# Patient Record
Sex: FEMALE | Race: WHITE | NOT HISPANIC OR LATINO | Employment: UNEMPLOYED | URBAN - METROPOLITAN AREA
[De-identification: names, ages, dates, MRNs, and addresses within clinical notes are randomized per-mention and may not be internally consistent; named-entity substitution may affect disease eponyms.]

---

## 2017-01-04 ENCOUNTER — PATIENT MESSAGE (OUTPATIENT)
Dept: INTERNAL MEDICINE | Facility: CLINIC | Age: 82
End: 2017-01-04

## 2017-01-06 ENCOUNTER — LAB VISIT (OUTPATIENT)
Dept: LAB | Facility: HOSPITAL | Age: 82
End: 2017-01-06
Attending: INTERNAL MEDICINE
Payer: MEDICARE

## 2017-01-06 DIAGNOSIS — M17.0 PRIMARY OSTEOARTHRITIS OF BOTH KNEES: ICD-10-CM

## 2017-01-06 DIAGNOSIS — M79.89 SWELLING OF LOWER EXTREMITY: ICD-10-CM

## 2017-01-06 LAB
ALBUMIN SERPL BCP-MCNC: 3.4 G/DL
ALP SERPL-CCNC: 77 U/L
ALT SERPL W/O P-5'-P-CCNC: 14 U/L
ANION GAP SERPL CALC-SCNC: 8 MMOL/L
AST SERPL-CCNC: 22 U/L
BILIRUB SERPL-MCNC: 0.6 MG/DL
BUN SERPL-MCNC: 17 MG/DL
CALCIUM SERPL-MCNC: 9.5 MG/DL
CHLORIDE SERPL-SCNC: 101 MMOL/L
CO2 SERPL-SCNC: 31 MMOL/L
CREAT SERPL-MCNC: 0.9 MG/DL
EST. GFR  (AFRICAN AMERICAN): >60 ML/MIN/1.73 M^2
EST. GFR  (NON AFRICAN AMERICAN): 58.5 ML/MIN/1.73 M^2
GLUCOSE SERPL-MCNC: 117 MG/DL
POTASSIUM SERPL-SCNC: 4.2 MMOL/L
PROT SERPL-MCNC: 7.1 G/DL
SODIUM SERPL-SCNC: 140 MMOL/L

## 2017-01-06 PROCEDURE — 36415 COLL VENOUS BLD VENIPUNCTURE: CPT

## 2017-01-06 PROCEDURE — 80053 COMPREHEN METABOLIC PANEL: CPT

## 2017-01-07 ENCOUNTER — HOSPITAL ENCOUNTER (EMERGENCY)
Facility: HOSPITAL | Age: 82
Discharge: HOME | End: 2017-01-07
Attending: EMERGENCY MEDICINE
Payer: MEDICARE

## 2017-01-07 ENCOUNTER — OFFICE VISIT (OUTPATIENT)
Dept: INTERNAL MEDICINE | Facility: CLINIC | Age: 82
End: 2017-01-07
Payer: MEDICARE

## 2017-01-07 VITALS
TEMPERATURE: 98 F | OXYGEN SATURATION: 97 % | DIASTOLIC BLOOD PRESSURE: 94 MMHG | WEIGHT: 192.69 LBS | SYSTOLIC BLOOD PRESSURE: 162 MMHG | BODY MASS INDEX: 30.97 KG/M2 | HEIGHT: 66 IN | HEART RATE: 79 BPM

## 2017-01-07 VITALS
DIASTOLIC BLOOD PRESSURE: 83 MMHG | HEART RATE: 76 BPM | OXYGEN SATURATION: 96 % | RESPIRATION RATE: 19 BRPM | HEIGHT: 66 IN | BODY MASS INDEX: 28.7 KG/M2 | TEMPERATURE: 99 F | SYSTOLIC BLOOD PRESSURE: 161 MMHG | WEIGHT: 178.56 LBS

## 2017-01-07 DIAGNOSIS — R51.9 HEADACHE: Primary | ICD-10-CM

## 2017-01-07 DIAGNOSIS — R51.9 HEADACHE, UNSPECIFIED HEADACHE TYPE: Primary | ICD-10-CM

## 2017-01-07 LAB
ALBUMIN SERPL BCP-MCNC: 3.5 G/DL
ALP SERPL-CCNC: 84 U/L
ALT SERPL W/O P-5'-P-CCNC: 13 U/L
ANION GAP SERPL CALC-SCNC: 12 MMOL/L
AST SERPL-CCNC: 24 U/L
BACTERIA #/AREA URNS AUTO: NORMAL /HPF
BASOPHILS # BLD AUTO: 0.02 K/UL
BASOPHILS NFR BLD: 0.2 %
BILIRUB SERPL-MCNC: 1.1 MG/DL
BILIRUB UR QL STRIP: NEGATIVE
BUN SERPL-MCNC: 10 MG/DL
CALCIUM SERPL-MCNC: 9.5 MG/DL
CARBOXYHEMOGLOBIN: 1.2 % (ref 1.5–5)
CHLORIDE SERPL-SCNC: 101 MMOL/L
CLARITY CSF: CLEAR
CLARITY UR REFRACT.AUTO: CLEAR
CO2 SERPL-SCNC: 24 MMOL/L
COLOR CSF: COLORLESS
COLOR UR AUTO: ABNORMAL
CREAT SERPL-MCNC: 0.8 MG/DL
DIFFERENTIAL METHOD: ABNORMAL
EOSINOPHIL # BLD AUTO: 0.1 K/UL
EOSINOPHIL NFR BLD: 0.8 %
ERYTHROCYTE [DISTWIDTH] IN BLOOD BY AUTOMATED COUNT: 13.7 %
EST. GFR  (AFRICAN AMERICAN): >60 ML/MIN/1.73 M^2
EST. GFR  (NON AFRICAN AMERICAN): >60 ML/MIN/1.73 M^2
GLUCOSE CSF-MCNC: 55 MG/DL
GLUCOSE SERPL-MCNC: 104 MG/DL
GLUCOSE UR QL STRIP: NEGATIVE
HCT VFR BLD AUTO: 45.1 %
HGB BLD-MCNC: 15.3 G/DL
HGB UR QL STRIP: NEGATIVE
HYALINE CASTS UR QL AUTO: 1 /LPF
INR PPP: 1
KETONES UR QL STRIP: ABNORMAL
LEUKOCYTE ESTERASE UR QL STRIP: NEGATIVE
LYMPHOCYTES # BLD AUTO: 0.8 K/UL
LYMPHOCYTES NFR BLD: 9.6 %
LYMPHOCYTES NFR CSF MANUAL: 34 %
MCH RBC QN AUTO: 29 PG
MCHC RBC AUTO-ENTMCNC: 33.9 %
MCV RBC AUTO: 85 FL
MICROSCOPIC COMMENT: NORMAL
MONOCYTES # BLD AUTO: 0.3 K/UL
MONOCYTES NFR BLD: 4 %
MONOS+MACROS NFR CSF MANUAL: 58 %
NEUTROPHILS # BLD AUTO: 7.3 K/UL
NEUTROPHILS NFR BLD: 85.3 %
NEUTROPHILS NFR CSF MANUAL: 8 %
NITRITE UR QL STRIP: NEGATIVE
PH UR STRIP: 8 [PH] (ref 5–8)
PLATELET # BLD AUTO: 173 K/UL
PMV BLD AUTO: 10.7 FL
POTASSIUM SERPL-SCNC: 3.7 MMOL/L
PROT CSF-MCNC: 43 MG/DL
PROT SERPL-MCNC: 7.4 G/DL
PROT UR QL STRIP: ABNORMAL
PROTHROMBIN TIME: 10.9 SEC
RBC # BLD AUTO: 5.28 M/UL
RBC # CSF: 60 /CU MM
RBC #/AREA URNS AUTO: 3 /HPF (ref 0–4)
SODIUM SERPL-SCNC: 137 MMOL/L
SP GR UR STRIP: 1.01 (ref 1–1.03)
SPECIMEN VOL CSF: 1 ML
SQUAMOUS #/AREA URNS AUTO: 5 /HPF
URN SPEC COLLECT METH UR: ABNORMAL
UROBILINOGEN UR STRIP-ACNC: NEGATIVE EU/DL
WBC # BLD AUTO: 8.52 K/UL
WBC # CSF: 1 /CU MM
WBC #/AREA URNS AUTO: 1 /HPF (ref 0–5)

## 2017-01-07 PROCEDURE — 99213 OFFICE O/P EST LOW 20 MIN: CPT | Mod: S$GLB,,, | Performed by: INTERNAL MEDICINE

## 2017-01-07 PROCEDURE — 87205 SMEAR GRAM STAIN: CPT

## 2017-01-07 PROCEDURE — 80053 COMPREHEN METABOLIC PANEL: CPT

## 2017-01-07 PROCEDURE — 1125F AMNT PAIN NOTED PAIN PRSNT: CPT | Mod: S$GLB,,, | Performed by: INTERNAL MEDICINE

## 2017-01-07 PROCEDURE — 96374 THER/PROPH/DIAG INJ IV PUSH: CPT

## 2017-01-07 PROCEDURE — 63600175 PHARM REV CODE 636 W HCPCS: Performed by: EMERGENCY MEDICINE

## 2017-01-07 PROCEDURE — 82375 ASSAY CARBOXYHB QUANT: CPT

## 2017-01-07 PROCEDURE — 1157F ADVNC CARE PLAN IN RCRD: CPT | Mod: S$GLB,,, | Performed by: INTERNAL MEDICINE

## 2017-01-07 PROCEDURE — 82945 GLUCOSE OTHER FLUID: CPT

## 2017-01-07 PROCEDURE — 89051 BODY FLUID CELL COUNT: CPT

## 2017-01-07 PROCEDURE — 62270 DX LMBR SPI PNXR: CPT | Mod: ,,, | Performed by: EMERGENCY MEDICINE

## 2017-01-07 PROCEDURE — 1159F MED LIST DOCD IN RCRD: CPT | Mod: S$GLB,,, | Performed by: INTERNAL MEDICINE

## 2017-01-07 PROCEDURE — 62270 DX LMBR SPI PNXR: CPT

## 2017-01-07 PROCEDURE — 3077F SYST BP >= 140 MM HG: CPT | Mod: S$GLB,,, | Performed by: INTERNAL MEDICINE

## 2017-01-07 PROCEDURE — 99285 EMERGENCY DEPT VISIT HI MDM: CPT | Mod: 25,,, | Performed by: EMERGENCY MEDICINE

## 2017-01-07 PROCEDURE — 96375 TX/PRO/DX INJ NEW DRUG ADDON: CPT

## 2017-01-07 PROCEDURE — 96361 HYDRATE IV INFUSION ADD-ON: CPT

## 2017-01-07 PROCEDURE — 25000003 PHARM REV CODE 250: Performed by: ANESTHESIOLOGY

## 2017-01-07 PROCEDURE — 99000 SPECIMEN HANDLING OFFICE-LAB: CPT

## 2017-01-07 PROCEDURE — 99999 PR PBB SHADOW E&M-EST. PATIENT-LVL IV: CPT | Mod: PBBFAC,,, | Performed by: INTERNAL MEDICINE

## 2017-01-07 PROCEDURE — 99284 EMERGENCY DEPT VISIT MOD MDM: CPT | Mod: 25

## 2017-01-07 PROCEDURE — 3078F DIAST BP <80 MM HG: CPT | Mod: S$GLB,,, | Performed by: INTERNAL MEDICINE

## 2017-01-07 PROCEDURE — 85610 PROTHROMBIN TIME: CPT

## 2017-01-07 PROCEDURE — 81001 URINALYSIS AUTO W/SCOPE: CPT

## 2017-01-07 PROCEDURE — 96376 TX/PRO/DX INJ SAME DRUG ADON: CPT

## 2017-01-07 PROCEDURE — 84157 ASSAY OF PROTEIN OTHER: CPT

## 2017-01-07 PROCEDURE — 25000003 PHARM REV CODE 250: Performed by: EMERGENCY MEDICINE

## 2017-01-07 PROCEDURE — 1160F RVW MEDS BY RX/DR IN RCRD: CPT | Mod: S$GLB,,, | Performed by: INTERNAL MEDICINE

## 2017-01-07 PROCEDURE — 85025 COMPLETE CBC W/AUTO DIFF WBC: CPT

## 2017-01-07 PROCEDURE — 87070 CULTURE OTHR SPECIMN AEROBIC: CPT

## 2017-01-07 RX ORDER — HYDROMORPHONE HYDROCHLORIDE 1 MG/ML
0.25 INJECTION, SOLUTION INTRAMUSCULAR; INTRAVENOUS; SUBCUTANEOUS
Status: COMPLETED | OUTPATIENT
Start: 2017-01-07 | End: 2017-01-07

## 2017-01-07 RX ORDER — METOCLOPRAMIDE HYDROCHLORIDE 5 MG/ML
10 INJECTION INTRAMUSCULAR; INTRAVENOUS
Status: COMPLETED | OUTPATIENT
Start: 2017-01-07 | End: 2017-01-07

## 2017-01-07 RX ORDER — ACETAMINOPHEN 325 MG/1
650 TABLET ORAL
Status: COMPLETED | OUTPATIENT
Start: 2017-01-07 | End: 2017-01-07

## 2017-01-07 RX ADMIN — METOCLOPRAMIDE 10 MG: 5 INJECTION, SOLUTION INTRAMUSCULAR; INTRAVENOUS at 06:01

## 2017-01-07 RX ADMIN — HYDROMORPHONE HYDROCHLORIDE 0.25 MG: 1 INJECTION, SOLUTION INTRAMUSCULAR; INTRAVENOUS; SUBCUTANEOUS at 06:01

## 2017-01-07 RX ADMIN — ACETAMINOPHEN 650 MG: 325 TABLET ORAL at 04:01

## 2017-01-07 RX ADMIN — SODIUM CHLORIDE 1000 ML: 0.9 INJECTION, SOLUTION INTRAVENOUS at 02:01

## 2017-01-07 RX ADMIN — HYDROMORPHONE HYDROCHLORIDE 0.25 MG: 1 INJECTION, SOLUTION INTRAMUSCULAR; INTRAVENOUS; SUBCUTANEOUS at 02:01

## 2017-01-07 NOTE — ED TRIAGE NOTES
Pt was sent from urgent care, daughter states pt was up most of the night with HA and trouble finding words. Cognitive decline over the past few months reported but definitive changes today reported. Daughter states labs were done last night and pt increased lasix from 20 to 40 mg and began taking naproxen 2 weeks ago. Pt disoriented to place and time with increased confusion in the past 12 hours. Pt reports sensitivity to light.

## 2017-01-07 NOTE — ED NOTES
LOC: The patient is awake, alert and aware of environment with an appropriate affect, the patient is oriented to person only.  APPEARANCE: Patient resting comfortably and in no acute distress, patient is clean and well groomed  SKIN: The skin is warm and dry, color consistent with ethnicity, patient has normal skin turgor and moist mucus membranes, skin intact, no breakdown or brusing noted.  MUSCULOSKELETAL: Patient moving all extremities well, no obvious swelling or deformities noted.   RESPIRATORY: Airway is open and patent, breath sounds clear throughout all lung fields; respirations are spontaneous, patient has a normal effort and rate, no accessory muscle use noted.   CARDIAC: Patient has no peripheral edema noted, capillary refill < 3 seconds. No complaints of chest pain   ABDOMEN: Soft and non tender to palpation, no distention noted. Bowel sounds present x 4  NEUROLOGIC: PERRL, 3mm bilaterally, eyes open spontaneously, behavior appropriate to situation, follows commands, facial expression symmetrical, bilateral hand grasp equal and even, purposeful motor response noted, normal sensation in all extremities when touched with a finger.

## 2017-01-07 NOTE — ED PROVIDER NOTES
Encounter Date: 1/7/2017       History     Chief Complaint   Patient presents with    Altered Mental Status     went to bed last night at 2100 woke up at 0700 and having some confusion and confusing words; also c/o 10/10 headache      Review of patient's allergies indicates:   Allergen Reactions    Aspirin      Other reaction(s): esophageal pain    Aspirin Nausea Only    Celebrex  [celecoxib]      Other reaction(s): Rash    Ibuprofen      Other reaction(s): esophogeal pain    Sulfa dyne      Other reaction(s): esophogeal pain    Steroid  [corticosteroids (glucocorticoids)]      Other reaction(s): Flushing (skin)     HPI Comments: Patient is an 85 year old female with PMH of HTN, HLD, GERD and knee pain who presents with AMS from clinic this morning. Patient lives in an assisted living facility and was at her baseline last night per daughter who is at the bedside. Patient woke up this morning with severe headache and confusion, could not find her words, and this headache was worse with light. Denies any N/V, fever, chills, change in bowel or bladder functions.     The history is provided by a relative.     Past Medical History   Diagnosis Date    Arthritis     GERD (gastroesophageal reflux disease)     Hyperlipidemia     Hypertension     Joint pain     Knee pain, left 08/2016     pain with walking or standing    PUD (peptic ulcer disease)      No past medical history pertinent negatives.  Past Surgical History   Procedure Laterality Date    Eye surgery       bilateral cataract    Appendectomy      Joint replacement       right hip replacement    Joint replacement       right foot tendon surgery    Colonoscopy       08    Upper gastrointestinal endoscopy       2013    Hysterectomy       UNKNOWN BSO    Thumb surgery Left 09/2015    Knee arthroplasty Left 2001     Family History   Problem Relation Age of Onset    Heart disease Mother     Heart disease Father     Asthma Father     Cancer  Brother      breast    Heart disease Brother     Melanoma Neg Hx     Psoriasis Neg Hx     Lupus Neg Hx     Eczema Neg Hx     Acne Neg Hx     Breast cancer Neg Hx     Ovarian cancer Neg Hx     Cervical cancer Neg Hx     Endometrial cancer Neg Hx     Vaginal cancer Neg Hx      Social History   Substance Use Topics    Smoking status: Never Smoker    Smokeless tobacco: Never Used    Alcohol use No     Review of Systems   Constitutional: Negative for chills and fever.   HENT: Negative for congestion, rhinorrhea and sore throat.    Eyes: Positive for photophobia.   Respiratory: Negative for cough, chest tightness and shortness of breath.    Cardiovascular: Positive for leg swelling. Negative for chest pain.   Gastrointestinal: Negative for abdominal distention, abdominal pain, blood in stool, constipation, diarrhea, nausea and vomiting.   Endocrine: Negative for cold intolerance and heat intolerance.   Genitourinary: Negative for dysuria.   Musculoskeletal: Positive for arthralgias. Negative for neck pain and neck stiffness.   Allergic/Immunologic: Negative.    Neurological: Positive for headaches. Negative for seizures.   Hematological: Negative.    Psychiatric/Behavioral: Positive for confusion.       Physical Exam   Initial Vitals   BP Pulse Resp Temp SpO2   01/07/17 1343 01/07/17 1343 01/07/17 1343 01/07/17 1343 01/07/17 1343   161/94 91 16 99 °F (37.2 °C) 95 %     Physical Exam    Nursing note and vitals reviewed.  Constitutional: She appears well-developed.   Ill appearing   HENT:   Head: Normocephalic and atraumatic.   Right Ear: External ear normal.   Left Ear: External ear normal.   Mouth/Throat: Oropharynx is clear and moist.   Eyes: EOM are normal. Pupils are equal, round, and reactive to light.   Neck: Normal range of motion. Neck supple. No thyromegaly present. No tracheal deviation present.   Cardiovascular: Normal rate, regular rhythm, normal heart sounds and intact distal pulses.   No murmur  heard.  Pulmonary/Chest: Breath sounds normal. No respiratory distress.   Abdominal: Soft. Bowel sounds are normal.   Musculoskeletal: She exhibits no edema.   Neurological: She is alert.   Not oriented to place or time   Skin: Skin is warm and dry. No rash noted. No pallor.   Psychiatric:   confusion         ED Course   Lumbar Puncture  Date/Time: 1/7/2017 4:35 PM  Location procedure was performed: [facility] EMERGENCY DEPARTMENT  Performed by: ALINA CORRALES  Authorized by: JANETH BATISTA   Assisting provider: JANETH BATISTA  Pre-operative diagnosis:  Altered mental status  Post-operative diagnosis: altered mental status  Consent Done: Yes  Indications: evaluation for altered mental status  Anesthesia: local infiltration    Anesthesia:  Anesthesia: local infiltration  Local Anesthetic: lidocaine 1% without epinephrine   Anesthetic total: 3 mL  Patient sedated: no  Preparation: Patient was prepped and draped in the usual sterile fashion.  Lumbar space: L3-L4 interspace  Patient's position: sitting  Needle gauge: 22  Needle type: spinal needle - Quincke tip  Needle length: 3.5 in  Number of attempts: 2  Fluid appearance: blood-tinged then clearing  Tubes of fluid: 4  Total volume: 5 ml  Post-procedure: site cleaned and adhesive bandage applied  Complications: No  Specimens: No  Implants: No  Patient tolerance: Patient tolerated the procedure well with no immediate complications        Labs Reviewed   COMPREHENSIVE METABOLIC PANEL - Abnormal; Notable for the following:        Result Value    Total Bilirubin 1.1 (*)     All other components within normal limits   CBC W/ AUTO DIFFERENTIAL - Abnormal; Notable for the following:     Lymph # 0.8 (*)     Gran% 85.3 (*)     Lymph% 9.6 (*)     All other components within normal limits   URINALYSIS - Abnormal; Notable for the following:     Protein, UA 2+ (*)     Ketones, UA 1+ (*)     All other components within normal limits   PROTEIN, CSF - Abnormal; Notable for the  following:     Protein, CSF 43 (*)     All other components within normal limits   CSF CELL COUNT WITH DIFFERENTIAL - Abnormal; Notable for the following:     RBC, CSF 60 (*)     Segmented Neutrophils, CSF 8 (*)     Lymphs, CSF 34 (*)     Mono/Macrophage, CSF 58 (*)     All other components within normal limits   CULTURE, CSF  (INCLUDES STAIN)    Narrative:     On which sequentially labeled tube should this analysis be  performed?->2   PROTIME-INR   URINALYSIS MICROSCOPIC   GLUCOSE, CSF   FREEZE AND HOLD -           X-Rays:   Independently Interpreted Readings:   Head CT: No hemorrhage.  No acute stroke.           APC / Resident Notes:   Patient with acute (within last 12 hours) confusion and HA. Will order labs, CT head, UA, rectal temp, and small dose of Dilaudid for pain control.       CT head: negative    CO level: 1.2%,  Will now perform LP.            Attending Attestation:   Physician Attestation Statement for Resident:  As the supervising MD   Physician Attestation Statement: I have personally seen and examined this patient.   I agree with the above history. -: 86 yo f, h/o mild cognitive impairment?, HTN, referred to ED from , pt with severe HA x 1-3 days (family says pt was not c/o HA last night when they were w her, pt reports HA for 2-3 days), diffuse, with photophobia, no neck pain/fever/chills, no URI sx, no n/v.  Took tylenol PTA without relief.  Pt lives in assisted living.  On arrival, pt uncomfortable, confused.  + mild photophobia but no nuchal ridigity.  No significant focal weakness.  ddx includes bleed vs CO toxicity vs meningitis vs more benign pathology like migraine.  Plan on labs, CO level, CT head.  If all negative, will get LP   As the supervising MD I agree with the above PE.    As the supervising MD I agree with the above treatment, course, plan, and disposition.  I have reviewed and agree with the residents interpretation of the following: lab data, x-rays and CT scans.  I have  reviewed the following: old records at this facility.                    ED Course     7:03 PM  CT and LP negative.  Pt still with mild HA.  Given additional small dose of dilaudid and added reglan as pt has remote h/o migraine.  Unclear etiology of HA at this point but feel that major pathologies have been excluded.  Offered pt/family admit for further work-up and pain management but they are declining.  Pt wishes to go home and will f/u with PMD on Monday    Clinical Impression:   The encounter diagnosis was Headache.          Maren Jones MD  01/08/17 [id number]

## 2017-01-08 NOTE — DISCHARGE INSTRUCTIONS
"  Self-Care for Headaches  Most headaches aren't serious and can be relieved with self-care. But some headaches may be a sign of another health problem like eye trouble or high blood pressure. To find the best treatment, learn what kind of headaches you get. For tension headaches, self-care will usually help. To treat migraines, ask your healthcare provider for advice. It is also possible to get both tension and migraine headaches. Self-care involves relieving the pain and avoiding headache triggers if you can.    Ways to reduce pain and tension  Try these steps:  · Apply a cold compress or ice pack to the pain site.  · Drink fluids. If nausea makes it hard to drink, try sucking on ice.  · Rest. Protect yourself from bright light and loud noises.  · Calm your emotions by imagining a peaceful scene.  · Massage tight neck, shoulder, and head muscles.  · To relax muscles, soak in a hot bath or use a hot shower.  Use medicines  Aspirin or aspirin substitutes, such as ibuprofen and acetaminophen, can relieve headache. Remember: Never give aspirin to anyone 18 years old or younger because of the risk of developing Reye syndrome. Use pain medicines only when necessary.  Track your headaches  Keeping a headache diary can help you and your healthcare provider identify what's causing your headaches:  · Note when each headache happens.  · Identify your activities and the foods you've eaten 6 to 8 hours before the headache began.  · Look for any trends or "triggers."  Signs of tension headache  Any of the following can be signs:  · Dull pain or feeling of pressure in a tight band around your head  · Pain in your neck or shoulders  · Headache without a definite beginning or end  · Headache after an activity such as driving or working on a computer  Signs of migraine  Any of the following can be signs:  · Throbbing pain on one or both sides of your head  · Nausea or vomiting  · Extreme sensitivity to light, sound, and " "smells  · Bright spots, flashes, or other visual changes  · Pain or nausea so severe that you can't continue your daily activities  Call your healthcare provider   If you have any of the following symptoms, contact your healthcare provider:  · A headache that lingers after a recent injury or bump to the head.  · A fever with a stiff neck or pain when you bend your head toward your chest.  · A headache along with slurred speech, changes in your vision, or numbness or weakness in your arms or legs.  · A headache for longer than 3 days.  · Frequent headaches, especially in the morning.  · Headaches with seizures   · Seek immediate medical attention if you have a headache that you would call "the worst headache you have ever had."   © 5526-1556 [employer]. [address] [place] [address], [place] [address]. All rights reserved. This information is not intended as a substitute for professional medical care. Always follow your healthcare professional's instructions.        "

## 2017-01-09 ENCOUNTER — PATIENT MESSAGE (OUTPATIENT)
Dept: INTERNAL MEDICINE | Facility: CLINIC | Age: 82
End: 2017-01-09

## 2017-01-11 ENCOUNTER — PATIENT MESSAGE (OUTPATIENT)
Dept: INTERNAL MEDICINE | Facility: CLINIC | Age: 82
End: 2017-01-11

## 2017-01-11 NOTE — PROGRESS NOTES
Subjective:       Patient ID: Lilia Hidalgo is a 85 y.o. female.    Chief Complaint: Headache (headache, confusion, chills)    HPI: She complains of headache 10 out of 10 in intensity.  Her daughters reports she is very confused  Review of Systems   Constitutional: Negative for chills, fatigue, fever and unexpected weight change.   Respiratory: Negative for chest tightness and shortness of breath.    Cardiovascular: Negative for chest pain and palpitations.   Gastrointestinal: Negative for abdominal pain and blood in stool.   Neurological: Positive for headaches. Negative for dizziness, syncope and numbness.       Objective:      Physical Exam   HENT:   Right Ear: External ear normal.   Left Ear: External ear normal.   Nose: Nose normal.   Mouth/Throat: Oropharynx is clear and moist.   Eyes: Pupils are equal, round, and reactive to light.   Neck: Normal range of motion.   Cardiovascular: Normal rate and regular rhythm.    No murmur heard.  Pulmonary/Chest: Breath sounds normal.   Abdominal: She exhibits no distension. There is no hepatosplenomegaly. There is no tenderness.   Lymphadenopathy:     She has no cervical adenopathy.     She has no axillary adenopathy.   Neurological: She has normal strength and normal reflexes. No cranial nerve deficit or sensory deficit.       Assessment:         assessment and plan: Headache: Patient transferred to emergency room for further evaluation  Plan:       As above

## 2017-01-12 LAB
CMV SPEC QL SHELL VIAL CULT: NO GROWTH
GRAM STN SPEC: NORMAL

## 2017-02-08 RX ORDER — TEMAZEPAM 15 MG/1
CAPSULE ORAL
Qty: 30 CAPSULE | Refills: 5 | Status: SHIPPED | OUTPATIENT
Start: 2017-02-08 | End: 2017-06-14

## 2017-03-28 ENCOUNTER — PATIENT MESSAGE (OUTPATIENT)
Dept: INTERNAL MEDICINE | Facility: CLINIC | Age: 82
End: 2017-03-28

## 2017-03-28 DIAGNOSIS — M25.569 KNEE PAIN, UNSPECIFIED CHRONICITY, UNSPECIFIED LATERALITY: Primary | ICD-10-CM

## 2017-03-29 NOTE — TELEPHONE ENCOUNTER
Reviewed email message. Can you have family bring pt in to be seen for home health? She has to be seen within the last 3 months for Medicare to cover HH and she's right outside the window.

## 2017-03-30 ENCOUNTER — PATIENT MESSAGE (OUTPATIENT)
Dept: ORTHOPEDICS | Facility: CLINIC | Age: 82
End: 2017-03-30

## 2017-03-31 ENCOUNTER — NURSE TRIAGE (OUTPATIENT)
Dept: ADMINISTRATIVE | Facility: CLINIC | Age: 82
End: 2017-03-31

## 2017-03-31 RX ORDER — MELOXICAM 15 MG/1
15 TABLET ORAL DAILY
Qty: 30 TABLET | Refills: 11 | Status: ON HOLD | OUTPATIENT
Start: 2017-03-31 | End: 2017-05-26 | Stop reason: HOSPADM

## 2017-03-31 NOTE — TELEPHONE ENCOUNTER
Reason for Disposition   Caller has medication question only, adult not sick, and triager answers question    Protocols used: ST MEDICATION QUESTION CALL-A-AH  pt got a message today regarding a change in her meds/ states she couldn't completely understand the message/   Based on her medcard--advised pt to stop Naprosyn and start taking Mobic instead--    Pt verbalized understanding and will  new med this PM @ [facility]    Darya Morrissey RN

## 2017-03-31 NOTE — TELEPHONE ENCOUNTER
She is currently taking naproxen qd with minimal relief, when i spoke to her daughter i advised she could take up to bid, and she could not take meloxicam with naproxen, too stated she would DC naproxen when meloxicam was sent in

## 2017-04-03 ENCOUNTER — OFFICE VISIT (OUTPATIENT)
Dept: INTERNAL MEDICINE | Facility: CLINIC | Age: 82
End: 2017-04-03
Payer: MEDICARE

## 2017-04-03 VITALS
SYSTOLIC BLOOD PRESSURE: 130 MMHG | WEIGHT: 180.75 LBS | HEIGHT: 66 IN | BODY MASS INDEX: 29.05 KG/M2 | DIASTOLIC BLOOD PRESSURE: 80 MMHG | HEART RATE: 73 BPM

## 2017-04-03 DIAGNOSIS — M16.12 PRIMARY OSTEOARTHRITIS OF LEFT HIP: ICD-10-CM

## 2017-04-03 DIAGNOSIS — I10 BENIGN ESSENTIAL HYPERTENSION: ICD-10-CM

## 2017-04-03 DIAGNOSIS — I70.0 AORTIC ATHEROSCLEROSIS: ICD-10-CM

## 2017-04-03 DIAGNOSIS — R60.0 BILATERAL EDEMA OF LOWER EXTREMITY: ICD-10-CM

## 2017-04-03 DIAGNOSIS — Z79.1 ENCOUNTER FOR LONG-TERM (CURRENT) USE OF NON-STEROIDAL ANTI-INFLAMMATORIES: ICD-10-CM

## 2017-04-03 DIAGNOSIS — M17.12 PRIMARY OSTEOARTHRITIS OF LEFT KNEE: Primary | ICD-10-CM

## 2017-04-03 DIAGNOSIS — E78.5 HYPERLIPIDEMIA, UNSPECIFIED HYPERLIPIDEMIA TYPE: ICD-10-CM

## 2017-04-03 PROCEDURE — 1157F ADVNC CARE PLAN IN RCRD: CPT | Mod: S$GLB,,, | Performed by: INTERNAL MEDICINE

## 2017-04-03 PROCEDURE — 1125F AMNT PAIN NOTED PAIN PRSNT: CPT | Mod: S$GLB,,, | Performed by: INTERNAL MEDICINE

## 2017-04-03 PROCEDURE — 1159F MED LIST DOCD IN RCRD: CPT | Mod: S$GLB,,, | Performed by: INTERNAL MEDICINE

## 2017-04-03 PROCEDURE — 99499 UNLISTED E&M SERVICE: CPT | Mod: S$GLB,,, | Performed by: INTERNAL MEDICINE

## 2017-04-03 PROCEDURE — 3075F SYST BP GE 130 - 139MM HG: CPT | Mod: S$GLB,,, | Performed by: INTERNAL MEDICINE

## 2017-04-03 PROCEDURE — 99214 OFFICE O/P EST MOD 30 MIN: CPT | Mod: S$GLB,,, | Performed by: INTERNAL MEDICINE

## 2017-04-03 PROCEDURE — 1160F RVW MEDS BY RX/DR IN RCRD: CPT | Mod: S$GLB,,, | Performed by: INTERNAL MEDICINE

## 2017-04-03 PROCEDURE — 99999 PR PBB SHADOW E&M-EST. PATIENT-LVL III: CPT | Mod: PBBFAC,,, | Performed by: INTERNAL MEDICINE

## 2017-04-03 PROCEDURE — 3079F DIAST BP 80-89 MM HG: CPT | Mod: S$GLB,,, | Performed by: INTERNAL MEDICINE

## 2017-04-03 RX ORDER — TRAMADOL HYDROCHLORIDE 50 MG/1
50 TABLET ORAL EVERY 12 HOURS PRN
Qty: 60 TABLET | Refills: 1 | Status: SHIPPED | OUTPATIENT
Start: 2017-04-03 | End: 2017-04-13

## 2017-04-03 NOTE — MR AVS SNAPSHOT
[facility]  [address]  [facility] [address]  Phone: [phone]  Fax: [phone]                  Lilia Hidalgo   4/3/2017 9:20 AM   Office Visit    Description:  Female : 1931   Provider:  April Herrera MD   Department:  [facility]           Reason for Visit     Knee Pain     Hip Pain           Diagnoses this Visit        Comments    Primary osteoarthritis of left knee    -  Primary     Primary osteoarthritis of left hip         Bilateral edema of lower extremity         Benign essential hypertension         Aortic atherosclerosis                To Do List           Future Appointments        Provider Department Dept Phone    2017 10:00 AM MD [facility] [phone]      Goals (5 Years of Data)     None       These Medications        Disp Refills Start End    tramadol (ULTRAM) 50 mg tablet 60 tablet 1 4/3/2017 2017    Take 1 tablet (50 mg total) by mouth every 12 (twelve) hours as needed for Pain. - Oral    Pharmacy: [facility] - [place] - [address] [place] [address] Ph #: [phone]         [facility]     [facility] -  Assistance  Unless otherwise directed by your provider, please contact [facility], our nurse care line that is available for  assistance.     Registered nurses in the [facility] provide: appointment scheduling, clinical advisement, health education, and other advisory services.  Call: [phone] (toll free)               Medications           Message regarding Medications     Verify the changes and/or additions to your medication regime listed below are the same as discussed with your clinician today.  If any of these changes or additions are incorrect, please notify your healthcare provider.        START taking these NEW medications        Refills    tramadol (ULTRAM) 50 mg tablet 1    Sig: Take 1 tablet (50 mg total) by  mouth every 12 (twelve) hours as needed for Pain.    Class: Print    Route: Oral      STOP taking these medications     hydrocodone-acetaminophen 5-325mg (NORCO) 5-325 mg per tablet Take 1 tablet by mouth every 12 (twelve) hours as needed for Pain.           Verify that the below list of medications is an accurate representation of the medications you are currently taking.  If none reported, the list may be blank. If incorrect, please contact your healthcare provider. Carry this list with you in case of emergency.           Current Medications     acetaminophen (TYLENOL) 500 MG tablet Take 500 mg by mouth every 6 (six) hours as needed.    atorvastatin (LIPITOR) 20 MG tablet take 1 tablet by mouth once daily    benazepril (LOTENSIN) 40 MG tablet Take 1 tablet (40 mg total) by mouth once daily.    conjugated estrogens (PREMARIN) vaginal cream 1 g with applicator or dime-sized amt with finger in vagina nightly x 2 weeks, then twice a week thereafter    diclofenac sodium (VOLTAREN) 1 % Gel Apply 2 g topically once daily.    escitalopram oxalate (LEXAPRO) 20 MG tablet Take 1 tablet (20 mg total) by mouth once daily.    fish oil-omega-3 fatty acids 300-1,000 mg capsule Take 2 g by mouth once daily.    furosemide (LASIX) 40 MG tablet Take 1 tablet (40 mg total) by mouth once daily.    melatonin 5 mg Tab Take 1 tablet by mouth nightly as needed.    meloxicam (MOBIC) 15 MG tablet Take 1 tablet (15 mg total) by mouth once daily.    metoprolol succinate (TOPROL-XL) 100 MG 24 hr tablet Take 1 tablet (100 mg total) by mouth once daily.    MULTIVITAMIN W-MINERALS/LUTEIN (CENTRUM SILVER ORAL) Take by mouth once daily.    omeprazole (PRILOSEC) 20 MG capsule Take 1 capsule (20 mg total) by mouth once daily.    temazepam (RESTORIL) 15 mg Cap take 1 capsule by mouth at bedtime    tramadol (ULTRAM) 50 mg tablet Take 1 tablet (50 mg total) by mouth every 12 (twelve) hours as needed for Pain.    triamcinolone acetonide 0.1% (KENALOG) 0.1  "% cream Apply topically 2 (two) times daily.    vitamin D 1000 units Tab Take 2,000 Units by mouth once daily.           Clinical Reference Information           Your Vitals Were     BP Pulse Height Weight BMI    130/80 (BP Location: Left arm, Patient Position: Sitting, BP Method: Manual) 73 5' 6" (1.676 m) 82 kg (180 lb 12.4 oz) 29.18 kg/m2      Blood Pressure          Most Recent Value    BP  130/80      Allergies as of 4/3/2017     Aspirin    Aspirin    Celebrex  [Celecoxib]    Ibuprofen    Sulfa Dyne    Steroid  [Corticosteroids (Glucocorticoids)]      Immunizations Administered on Date of Encounter - 4/3/2017     None      Orders Placed During Today's Visit      Normal Orders This Visit    COMPRESSION STOCKINGS       Language Assistance Services     ATTENTION: Language assistance services are available, free of charge. Please call [phone].      ATENCIÓN: Si rebecca pringle, tiene a bolden disposición servicios gratuitos de asistencia lingüística. Llame al [phone].     CHÚ Ý: N?u b?n nói Ti?ng Vi?t, có các d?ch v? h? tr? ngôn ng? mi?n phí dành cho b?n. G?i s? [phone].         [facility] complies with applicable Federal civil rights laws and does not discriminate on the basis of race, color, national origin, age, disability, or sex.        "

## 2017-04-03 NOTE — PROGRESS NOTES
"Subjective:       Patient ID: Lilia Hidalgo is a 85 y.o. female.    Chief Complaint: Knee Pain (left ) and Hip Pain (left )    HPI   Chronic L knee pain and swelling. Sometimes w/ L knee weakness - feels like it would give away. Previously on mobic 15mg daily, tylenol 500mg bid, voltaren gel. Tried on tramadol and norco but pt's family have hesitations as she's had a difficult time coming off of the tramadol previously.  Knee XR 12/15/16 - L TKA (2013) w/ satisfactory position and alignment w/o new fx or dislocation. R knee w/ erosive DJD sup medical patella articular facet.  Evaluated w/ Dr. [facility] 12/22/16 for L knee and L hip pain - recommended to continue meloxicam and per note: "The only option for the left hip would be a hip replacement and will put this on hold for a while.  She will follow up with her primary care for the swelling and heaviness in her legs. Her present Lasix regiment is probably not adequate."  We increased her lasix from 20 to 40mg daily. In Jan 2017, developed confusion and HA and thinks lasix may be contributory. Lasix didn't help w/ the leg pains. Went back to 20mg qod and 40gm qod.   Tried on naproxen 500mg daily w/ foods. Pt reports that no improvement during the day. Constant pain. Only relief is not moving and lying down. This limits her involvement in activities at the assisted living facility  Will change back to mobic 15mg daily.     Review of Systems  Comprehensive review of systems otherwise negative. See history/subjective section for more details.    Objective:      Physical Exam    /80 (BP Location: Left arm, Patient Position: Sitting, BP Method: Manual)  Pulse 73  Ht 5' 6" (1.676 m)  Wt 82 kg (180 lb 12.4 oz)  BMI 29.18 kg/m2    Gen - A+OX4, NAD  HEENT - PERRL, OP clear. MMM.   Neck no LAD.   CV - RRR, II/VI ELIJAH best at RUSB  Chest - CTAB, no wheezing/rhonchi  Abd - S/NT/ND/+BS  Ext - 2+ B radial. 1+ BLE pitting edema.   MSK - pain on palpation of the L lateral " "knee and also the L trochanteric bursa region. Pain on trying to flex the L hip.     CT A/P 1/27/13  "The abdominal aorta is normal in course and caliber with atherosclerotic calcifications within its course."    KNEE XR 2016  Narrative   The 3 views left knee, 2 views right knee, comparison 2015 exam.  Erosive DJD superior medial patella articular facet.  No fracture dislocation.    Left total knee prosthesis satisfactory position and alignment.  No new fracture or dislocation.   Impression    See results above.         HIP XR 2016  Findings:  Lung volumes are normal and symmetric.  Mediastinal structures are midline allowing for mild scoliosis.    I detect no convincing evidence of pulmonary disease, pleural fluid, lymph node enlargement, cardiac decompensation, pneumothorax, pneumomediastinum, pneumoperitoneum or significant osseous abnormality.   Impression     No convincing evidence of intrathoracic disease identified in this patient with cough.         Assessment/Plan     Lilia was seen today for knee pain and hip pain.    Diagnoses and all orders for this visit:    Primary osteoarthritis of left knee - due to severe arthritis of hip joint, no PT recommended. Discussed w/ Belkys in regards to pain control. Stop naproxen and restart mobic as it has a slightly better SE profile. Dicussed tramadol, which helped before. Will start BID. Pt will have a pill box in which family will fill it a week ahead of time to prevent abuse. Discussed fall risk. At this point, my goal is to improve her quality of life as the pain is preventing her from doing her normal activities. Discussed this w/ daughter and she understands. Ok w/ proceeding w/ tramadol.   -     tramadol (ULTRAM) 50 mg tablet; Take 1 tablet (50 mg total) by mouth every 12 (twelve) hours as needed for Pain.    Primary osteoarthritis of left hip  -     tramadol (ULTRAM) 50 mg tablet; Take 1 tablet (50 mg total) by mouth every 12 (twelve) hours as needed for " Pain.    Bilateral edema of lower extremity  -     COMPRESSION STOCKINGS    Benign essential hypertension - Stable and controlled. Continue current medications.  -     Comprehensive metabolic panel; Future  -     TSH; Future    Aortic atherosclerosis - on atorva 20mg daily.  -     Lipid panel; Future    Encounter for long-term (current) use of non-steroidal anti-inflammatories  -     CBC auto differential; Future    Hyperlipidemia, unspecified hyperlipidemia type - on atorva 20mg daily.  -     Lipid panel; Future      Return in about 2 months (around 6/3/2017).      April Herrera MD  Department of Internal Medicine - [facility]  9:27 AM

## 2017-04-14 ENCOUNTER — PATIENT MESSAGE (OUTPATIENT)
Dept: INTERNAL MEDICINE | Facility: CLINIC | Age: 82
End: 2017-04-14

## 2017-05-02 ENCOUNTER — PATIENT MESSAGE (OUTPATIENT)
Dept: INTERNAL MEDICINE | Facility: CLINIC | Age: 82
End: 2017-05-02

## 2017-05-02 DIAGNOSIS — M25.559 CHRONIC HIP PAIN, UNSPECIFIED LATERALITY: Primary | ICD-10-CM

## 2017-05-02 DIAGNOSIS — G89.29 CHRONIC KNEE PAIN, UNSPECIFIED LATERALITY: ICD-10-CM

## 2017-05-02 DIAGNOSIS — M25.569 CHRONIC KNEE PAIN, UNSPECIFIED LATERALITY: ICD-10-CM

## 2017-05-02 DIAGNOSIS — G89.29 CHRONIC HIP PAIN, UNSPECIFIED LATERALITY: Primary | ICD-10-CM

## 2017-05-03 ENCOUNTER — TELEPHONE (OUTPATIENT)
Dept: PAIN MEDICINE | Facility: CLINIC | Age: 82
End: 2017-05-03

## 2017-05-03 NOTE — TELEPHONE ENCOUNTER
Patient's daughter was contacted she stated that she wanted to know about the Coolief procedures our office do on knees. She was informed that the patient would have to be evaluated first if the patient is considered a candidate for this procedure they will be scheduled for a nerve block first if the patient receives adequate relief from the nerve block the patient is scheduled for the ablation. Patient's daughter was informed the difference between a conventional RFA and Coolief is that a normal RFA is a needle that's injected in the affected area and it heats up and and burns the nerve where as the Coolief is a non invasive procedure that has heat energy and water circulating through the device cooling the surround tissue and it covers larger areas. Patient's daughter stated that she would like to discuss this with her siblings and she would contact our office on scheduling her mother later. Office staff verbalized understanding

## 2017-05-03 NOTE — TELEPHONE ENCOUNTER
----- Message from Rosalee Frazier sent at 5/3/2017  9:13 AM CDT -----  x_  1st Request  _  2nd Request  _  3rd Request        Who: pt. Daughter Belkys    Why: pt. Daughter would like to speak with nurse in regards to information about the coolief procedure.please call daughter to discuss    What Number to Call Back: [phone]    When to Expect a call back: (Before the end of the day)   -- if the call is after 12:00, the call back will be tomorrow.

## 2017-05-05 ENCOUNTER — HOSPITAL ENCOUNTER (INPATIENT)
Facility: HOSPITAL | Age: 82
LOS: 3 days | Discharge: NURSING, REHAB OR LONG-TERM CARE | DRG: 871 | End: 2017-05-08
Attending: EMERGENCY MEDICINE | Admitting: HOSPITALIST
Payer: MEDICARE

## 2017-05-05 DIAGNOSIS — J69.0 ASPIRATION PNEUMONIA: ICD-10-CM

## 2017-05-05 DIAGNOSIS — K57.92 DIVERTICULITIS: ICD-10-CM

## 2017-05-05 DIAGNOSIS — R41.82 ALTERED MENTAL STATUS, UNSPECIFIED ALTERED MENTAL STATUS TYPE: Primary | ICD-10-CM

## 2017-05-05 DIAGNOSIS — K57.92 DIVERTICULITIS OF INTESTINE WITHOUT PERFORATION OR ABSCESS WITHOUT BLEEDING, UNSPECIFIED PART OF INTESTINAL TRACT: ICD-10-CM

## 2017-05-05 DIAGNOSIS — G93.41 SEPTIC ENCEPHALOPATHY: ICD-10-CM

## 2017-05-05 DIAGNOSIS — J18.9 PNEUMONIA DUE TO INFECTIOUS ORGANISM, UNSPECIFIED LATERALITY, UNSPECIFIED PART OF LUNG: ICD-10-CM

## 2017-05-05 PROBLEM — G93.40 ENCEPHALOPATHY ACUTE: Status: ACTIVE | Noted: 2017-05-05

## 2017-05-05 PROBLEM — K57.32 DIVERTICULITIS OF LARGE INTESTINE WITHOUT PERFORATION OR ABSCESS WITHOUT BLEEDING: Status: ACTIVE | Noted: 2017-05-05

## 2017-05-05 PROBLEM — I50.9 ACUTE ON CHRONIC HEART FAILURE: Status: ACTIVE | Noted: 2017-05-05

## 2017-05-05 PROBLEM — K80.20 CHOLELITHIASIS WITHOUT CHOLECYSTITIS: Status: ACTIVE | Noted: 2017-05-05

## 2017-05-05 PROBLEM — I50.33 ACUTE ON CHRONIC DIASTOLIC HEART FAILURE: Status: ACTIVE | Noted: 2017-05-05

## 2017-05-05 PROBLEM — A41.9 SEPSIS: Status: ACTIVE | Noted: 2017-05-05

## 2017-05-05 LAB
ALBUMIN SERPL BCP-MCNC: 3.3 G/DL
ALP SERPL-CCNC: 106 U/L
ALT SERPL W/O P-5'-P-CCNC: 24 U/L
ANION GAP SERPL CALC-SCNC: 12 MMOL/L
AST SERPL-CCNC: 23 U/L
BACTERIA #/AREA URNS AUTO: NORMAL /HPF
BASOPHILS # BLD AUTO: 0.02 K/UL
BASOPHILS NFR BLD: 0.2 %
BILIRUB SERPL-MCNC: 1.3 MG/DL
BILIRUB UR QL STRIP: NEGATIVE
BNP SERPL-MCNC: 292 PG/ML
BUN SERPL-MCNC: 10 MG/DL
CALCIUM SERPL-MCNC: 9 MG/DL
CHLORIDE SERPL-SCNC: 100 MMOL/L
CLARITY UR REFRACT.AUTO: CLEAR
CO2 SERPL-SCNC: 23 MMOL/L
COLOR UR AUTO: YELLOW
CREAT SERPL-MCNC: 0.7 MG/DL
DIFFERENTIAL METHOD: ABNORMAL
EOSINOPHIL # BLD AUTO: 0 K/UL
EOSINOPHIL NFR BLD: 0 %
ERYTHROCYTE [DISTWIDTH] IN BLOOD BY AUTOMATED COUNT: 13.3 %
EST. GFR  (AFRICAN AMERICAN): >60 ML/MIN/1.73 M^2
EST. GFR  (NON AFRICAN AMERICAN): >60 ML/MIN/1.73 M^2
GLUCOSE SERPL-MCNC: 136 MG/DL
GLUCOSE UR QL STRIP: ABNORMAL
HCT VFR BLD AUTO: 44 %
HGB BLD-MCNC: 15.4 G/DL
HGB UR QL STRIP: NEGATIVE
HYALINE CASTS UR QL AUTO: 0 /LPF
INR PPP: 1
KETONES UR QL STRIP: ABNORMAL
LACTATE SERPL-SCNC: 1.3 MMOL/L
LEUKOCYTE ESTERASE UR QL STRIP: NEGATIVE
LIPASE SERPL-CCNC: 45 U/L
LYMPHOCYTES # BLD AUTO: 0.6 K/UL
LYMPHOCYTES NFR BLD: 7.3 %
MAGNESIUM SERPL-MCNC: 1.9 MG/DL
MCH RBC QN AUTO: 29.1 PG
MCHC RBC AUTO-ENTMCNC: 35 %
MCV RBC AUTO: 83 FL
MICROSCOPIC COMMENT: NORMAL
MONOCYTES # BLD AUTO: 0.2 K/UL
MONOCYTES NFR BLD: 2.4 %
NEUTROPHILS # BLD AUTO: 7.7 K/UL
NEUTROPHILS NFR BLD: 89.9 %
NITRITE UR QL STRIP: NEGATIVE
PH UR STRIP: 8 [PH] (ref 5–8)
PLATELET # BLD AUTO: 184 K/UL
PMV BLD AUTO: 10.8 FL
POCT GLUCOSE: 150 MG/DL (ref 70–110)
POTASSIUM SERPL-SCNC: 3.6 MMOL/L
PROT SERPL-MCNC: 7 G/DL
PROT UR QL STRIP: ABNORMAL
PROTHROMBIN TIME: 11.1 SEC
RBC # BLD AUTO: 5.29 M/UL
RBC #/AREA URNS AUTO: 2 /HPF (ref 0–4)
SODIUM SERPL-SCNC: 135 MMOL/L
SP GR UR STRIP: 1.01 (ref 1–1.03)
TROPONIN I SERPL DL<=0.01 NG/ML-MCNC: 0.12 NG/ML
TSH SERPL DL<=0.005 MIU/L-ACNC: 1.23 UIU/ML
URN SPEC COLLECT METH UR: ABNORMAL
UROBILINOGEN UR STRIP-ACNC: NEGATIVE EU/DL
WBC # BLD AUTO: 8.61 K/UL
WBC #/AREA URNS AUTO: 5 /HPF (ref 0–5)

## 2017-05-05 PROCEDURE — 94640 AIRWAY INHALATION TREATMENT: CPT

## 2017-05-05 PROCEDURE — 96366 THER/PROPH/DIAG IV INF ADDON: CPT

## 2017-05-05 PROCEDURE — 93010 ELECTROCARDIOGRAM REPORT: CPT | Mod: ,,, | Performed by: INTERNAL MEDICINE

## 2017-05-05 PROCEDURE — 83735 ASSAY OF MAGNESIUM: CPT

## 2017-05-05 PROCEDURE — 25000003 PHARM REV CODE 250: Performed by: EMERGENCY MEDICINE

## 2017-05-05 PROCEDURE — 84484 ASSAY OF TROPONIN QUANT: CPT

## 2017-05-05 PROCEDURE — 25000242 PHARM REV CODE 250 ALT 637 W/ HCPCS: Performed by: INTERNAL MEDICINE

## 2017-05-05 PROCEDURE — 93005 ELECTROCARDIOGRAM TRACING: CPT

## 2017-05-05 PROCEDURE — 99285 EMERGENCY DEPT VISIT HI MDM: CPT | Mod: ,,, | Performed by: EMERGENCY MEDICINE

## 2017-05-05 PROCEDURE — 83605 ASSAY OF LACTIC ACID: CPT

## 2017-05-05 PROCEDURE — 63600175 PHARM REV CODE 636 W HCPCS: Performed by: EMERGENCY MEDICINE

## 2017-05-05 PROCEDURE — 36415 COLL VENOUS BLD VENIPUNCTURE: CPT

## 2017-05-05 PROCEDURE — 11000001 HC ACUTE MED/SURG PRIVATE ROOM

## 2017-05-05 PROCEDURE — 81001 URINALYSIS AUTO W/SCOPE: CPT

## 2017-05-05 PROCEDURE — 25500020 PHARM REV CODE 255: Performed by: EMERGENCY MEDICINE

## 2017-05-05 PROCEDURE — 85025 COMPLETE CBC W/AUTO DIFF WBC: CPT

## 2017-05-05 PROCEDURE — 83880 ASSAY OF NATRIURETIC PEPTIDE: CPT

## 2017-05-05 PROCEDURE — 99285 EMERGENCY DEPT VISIT HI MDM: CPT | Mod: 25

## 2017-05-05 PROCEDURE — P9612 CATHETERIZE FOR URINE SPEC: HCPCS

## 2017-05-05 PROCEDURE — 96375 TX/PRO/DX INJ NEW DRUG ADDON: CPT

## 2017-05-05 PROCEDURE — 63600175 PHARM REV CODE 636 W HCPCS: Performed by: INTERNAL MEDICINE

## 2017-05-05 PROCEDURE — 96365 THER/PROPH/DIAG IV INF INIT: CPT

## 2017-05-05 PROCEDURE — 25000003 PHARM REV CODE 250: Performed by: INTERNAL MEDICINE

## 2017-05-05 PROCEDURE — 84443 ASSAY THYROID STIM HORMONE: CPT

## 2017-05-05 PROCEDURE — 99223 1ST HOSP IP/OBS HIGH 75: CPT | Mod: AI,GC,, | Performed by: HOSPITALIST

## 2017-05-05 PROCEDURE — 87040 BLOOD CULTURE FOR BACTERIA: CPT

## 2017-05-05 PROCEDURE — 85610 PROTHROMBIN TIME: CPT

## 2017-05-05 PROCEDURE — 84484 ASSAY OF TROPONIN QUANT: CPT | Mod: 91

## 2017-05-05 PROCEDURE — 83690 ASSAY OF LIPASE: CPT

## 2017-05-05 PROCEDURE — 87086 URINE CULTURE/COLONY COUNT: CPT

## 2017-05-05 PROCEDURE — 27000221 HC OXYGEN, UP TO 24 HOURS

## 2017-05-05 PROCEDURE — 80053 COMPREHEN METABOLIC PANEL: CPT

## 2017-05-05 RX ORDER — IPRATROPIUM BROMIDE AND ALBUTEROL SULFATE 2.5; .5 MG/3ML; MG/3ML
3 SOLUTION RESPIRATORY (INHALATION)
Status: DISCONTINUED | OUTPATIENT
Start: 2017-05-05 | End: 2017-05-05

## 2017-05-05 RX ORDER — MELOXICAM 7.5 MG/1
15 TABLET ORAL
Status: DISCONTINUED | OUTPATIENT
Start: 2017-05-05 | End: 2017-05-05

## 2017-05-05 RX ORDER — MORPHINE SULFATE 2 MG/ML
2 INJECTION, SOLUTION INTRAMUSCULAR; INTRAVENOUS EVERY 4 HOURS PRN
Status: DISCONTINUED | OUTPATIENT
Start: 2017-05-05 | End: 2017-05-07

## 2017-05-05 RX ORDER — PANTOPRAZOLE SODIUM 40 MG/1
40 TABLET, DELAYED RELEASE ORAL DAILY
Status: DISCONTINUED | OUTPATIENT
Start: 2017-05-06 | End: 2017-05-05

## 2017-05-05 RX ORDER — CHOLECALCIFEROL (VITAMIN D3) 25 MCG
2000 TABLET ORAL DAILY
Status: DISCONTINUED | OUTPATIENT
Start: 2017-05-06 | End: 2017-05-05

## 2017-05-05 RX ORDER — METRONIDAZOLE 500 MG/100ML
500 INJECTION, SOLUTION INTRAVENOUS
Status: COMPLETED | OUTPATIENT
Start: 2017-05-05 | End: 2017-05-05

## 2017-05-05 RX ORDER — MORPHINE SULFATE 2 MG/ML
4 INJECTION, SOLUTION INTRAMUSCULAR; INTRAVENOUS
Status: COMPLETED | OUTPATIENT
Start: 2017-05-05 | End: 2017-05-05

## 2017-05-05 RX ORDER — GLUCAGON 1 MG
1 KIT INJECTION
Status: DISCONTINUED | OUTPATIENT
Start: 2017-05-05 | End: 2017-05-08 | Stop reason: HOSPADM

## 2017-05-05 RX ORDER — HALOPERIDOL 5 MG/ML
1 INJECTION INTRAMUSCULAR EVERY 4 HOURS PRN
Status: DISCONTINUED | OUTPATIENT
Start: 2017-05-05 | End: 2017-05-05

## 2017-05-05 RX ORDER — IBUPROFEN 200 MG
16 TABLET ORAL
Status: DISCONTINUED | OUTPATIENT
Start: 2017-05-05 | End: 2017-05-08 | Stop reason: HOSPADM

## 2017-05-05 RX ORDER — METRONIDAZOLE 500 MG/100ML
500 INJECTION, SOLUTION INTRAVENOUS
Status: DISCONTINUED | OUTPATIENT
Start: 2017-05-05 | End: 2017-05-07

## 2017-05-05 RX ORDER — FUROSEMIDE 40 MG/1
40 TABLET ORAL
Status: COMPLETED | OUTPATIENT
Start: 2017-05-05 | End: 2017-05-05

## 2017-05-05 RX ORDER — IPRATROPIUM BROMIDE AND ALBUTEROL SULFATE 2.5; .5 MG/3ML; MG/3ML
3 SOLUTION RESPIRATORY (INHALATION)
Status: DISCONTINUED | OUTPATIENT
Start: 2017-05-05 | End: 2017-05-08

## 2017-05-05 RX ORDER — POTASSIUM CHLORIDE 7.45 MG/ML
10 INJECTION INTRAVENOUS ONCE
Status: COMPLETED | OUTPATIENT
Start: 2017-05-05 | End: 2017-05-05

## 2017-05-05 RX ORDER — OLANZAPINE 2.5 MG/1
2.5 TABLET ORAL NIGHTLY PRN
Status: DISCONTINUED | OUTPATIENT
Start: 2017-05-05 | End: 2017-05-08 | Stop reason: HOSPADM

## 2017-05-05 RX ORDER — BENAZEPRIL HYDROCHLORIDE 20 MG/1
40 TABLET ORAL
Status: DISPENSED | OUTPATIENT
Start: 2017-05-05 | End: 2017-05-05

## 2017-05-05 RX ORDER — METOPROLOL SUCCINATE 100 MG/1
100 TABLET, EXTENDED RELEASE ORAL DAILY
Status: DISCONTINUED | OUTPATIENT
Start: 2017-05-06 | End: 2017-05-08 | Stop reason: HOSPADM

## 2017-05-05 RX ORDER — ONDANSETRON 2 MG/ML
4 INJECTION INTRAMUSCULAR; INTRAVENOUS EVERY 12 HOURS PRN
Status: DISCONTINUED | OUTPATIENT
Start: 2017-05-05 | End: 2017-05-08 | Stop reason: HOSPADM

## 2017-05-05 RX ORDER — PROMETHAZINE HYDROCHLORIDE 25 MG/1
25 SUPPOSITORY RECTAL EVERY 6 HOURS PRN
Status: DISCONTINUED | OUTPATIENT
Start: 2017-05-05 | End: 2017-05-08 | Stop reason: HOSPADM

## 2017-05-05 RX ORDER — IBUPROFEN 200 MG
24 TABLET ORAL
Status: DISCONTINUED | OUTPATIENT
Start: 2017-05-05 | End: 2017-05-08 | Stop reason: HOSPADM

## 2017-05-05 RX ORDER — INSULIN ASPART 100 [IU]/ML
0-5 INJECTION, SOLUTION INTRAVENOUS; SUBCUTANEOUS
Status: DISCONTINUED | OUTPATIENT
Start: 2017-05-05 | End: 2017-05-05

## 2017-05-05 RX ORDER — CIPROFLOXACIN 2 MG/ML
400 INJECTION, SOLUTION INTRAVENOUS
Status: DISCONTINUED | OUTPATIENT
Start: 2017-05-05 | End: 2017-05-07

## 2017-05-05 RX ORDER — PANTOPRAZOLE SODIUM 40 MG/10ML
40 INJECTION, POWDER, LYOPHILIZED, FOR SOLUTION INTRAVENOUS DAILY
Status: DISCONTINUED | OUTPATIENT
Start: 2017-05-06 | End: 2017-05-07

## 2017-05-05 RX ORDER — ATORVASTATIN CALCIUM 20 MG/1
20 TABLET, FILM COATED ORAL DAILY
Status: DISCONTINUED | OUTPATIENT
Start: 2017-05-06 | End: 2017-05-08 | Stop reason: HOSPADM

## 2017-05-05 RX ORDER — ONDANSETRON 2 MG/ML
4 INJECTION INTRAMUSCULAR; INTRAVENOUS ONCE AS NEEDED
Status: COMPLETED | OUTPATIENT
Start: 2017-05-05 | End: 2017-05-05

## 2017-05-05 RX ORDER — BENAZEPRIL HYDROCHLORIDE 20 MG/1
40 TABLET ORAL DAILY
Status: DISCONTINUED | OUTPATIENT
Start: 2017-05-06 | End: 2017-05-08 | Stop reason: HOSPADM

## 2017-05-05 RX ORDER — HEPARIN SODIUM 5000 [USP'U]/ML
5000 INJECTION, SOLUTION INTRAVENOUS; SUBCUTANEOUS EVERY 8 HOURS
Status: DISCONTINUED | OUTPATIENT
Start: 2017-05-05 | End: 2017-05-08 | Stop reason: HOSPADM

## 2017-05-05 RX ORDER — CIPROFLOXACIN 500 MG/1
500 TABLET ORAL EVERY 12 HOURS
Status: DISCONTINUED | OUTPATIENT
Start: 2017-05-05 | End: 2017-05-05

## 2017-05-05 RX ORDER — METOPROLOL TARTRATE 1 MG/ML
5 INJECTION, SOLUTION INTRAVENOUS EVERY 6 HOURS PRN
Status: DISCONTINUED | OUTPATIENT
Start: 2017-05-05 | End: 2017-05-08 | Stop reason: HOSPADM

## 2017-05-05 RX ORDER — ESCITALOPRAM OXALATE 20 MG/1
20 TABLET ORAL DAILY
Status: DISCONTINUED | OUTPATIENT
Start: 2017-05-06 | End: 2017-05-08 | Stop reason: HOSPADM

## 2017-05-05 RX ORDER — METOPROLOL TARTRATE 100 MG/1
100 TABLET ORAL
Status: DISPENSED | OUTPATIENT
Start: 2017-05-05 | End: 2017-05-05

## 2017-05-05 RX ADMIN — METRONIDAZOLE 500 MG: 500 INJECTION, SOLUTION INTRAVENOUS at 10:05

## 2017-05-05 RX ADMIN — IOHEXOL 75 ML: 350 INJECTION, SOLUTION INTRAVENOUS at 02:05

## 2017-05-05 RX ADMIN — IPRATROPIUM BROMIDE AND ALBUTEROL SULFATE 3 ML: .5; 3 SOLUTION RESPIRATORY (INHALATION) at 03:05

## 2017-05-05 RX ADMIN — HEPARIN SODIUM 5000 UNITS: 5000 INJECTION, SOLUTION INTRAVENOUS; SUBCUTANEOUS at 10:05

## 2017-05-05 RX ADMIN — CEFTRIAXONE 1 G: 1 INJECTION, SOLUTION INTRAVENOUS at 11:05

## 2017-05-05 RX ADMIN — MORPHINE SULFATE 4 MG: 2 INJECTION, SOLUTION INTRAMUSCULAR; INTRAVENOUS at 12:05

## 2017-05-05 RX ADMIN — CIPROFLOXACIN 400 MG: 2 INJECTION, SOLUTION INTRAVENOUS at 05:05

## 2017-05-05 RX ADMIN — METRONIDAZOLE 500 MG: 500 INJECTION, SOLUTION INTRAVENOUS at 01:05

## 2017-05-05 RX ADMIN — POTASSIUM CHLORIDE 10 MEQ: 10 INJECTION, SOLUTION INTRAVENOUS at 05:05

## 2017-05-05 RX ADMIN — IPRATROPIUM BROMIDE AND ALBUTEROL SULFATE 3 ML: .5; 3 SOLUTION RESPIRATORY (INHALATION) at 10:05

## 2017-05-05 RX ADMIN — ONDANSETRON 4 MG: 2 INJECTION INTRAMUSCULAR; INTRAVENOUS at 09:05

## 2017-05-05 RX ADMIN — FUROSEMIDE 40 MG: 40 TABLET ORAL at 11:05

## 2017-05-05 NOTE — ASSESSMENT & PLAN NOTE
-Tbili with slight elevation, continue to trend CMP/CBC  -choleliths noted on CTap  -AES consult if worsening

## 2017-05-05 NOTE — ASSESSMENT & PLAN NOTE
-will switch Rocephin/Flagyl  to Cipro/Flagyl for better enteric coverage  -NPO status  -Diverticulitis noted on CT ap  -will follow volume status as patient appears overloaded on exam  -pain control with morphine/ antiemetics PRN  -follow septic workup, blood cultures.

## 2017-05-05 NOTE — ASSESSMENT & PLAN NOTE
-Lasix PO x1 in ED; will continue to monitor fluid status  -CXR with likely cardiogenic edema  -NPO, current antibiotics will coverage PNA as well as enteric organisms.

## 2017-05-05 NOTE — SUBJECTIVE & OBJECTIVE
Past Medical History:   Diagnosis Date    Arthritis     GERD (gastroesophageal reflux disease)     Hyperlipidemia     Hypertension     Joint pain     Knee pain, left 08/2016    pain with walking or standing    PUD (peptic ulcer disease)        Past Surgical History:   Procedure Laterality Date    APPENDECTOMY      COLONOSCOPY      08    EYE SURGERY      bilateral cataract    HYSTERECTOMY      UNKNOWN BSO    JOINT REPLACEMENT      right hip replacement    JOINT REPLACEMENT      right foot tendon surgery    KNEE ARTHROPLASTY Left 2001    thumb surgery Left 09/2015    UPPER GASTROINTESTINAL ENDOSCOPY      2013       Review of patient's allergies indicates:   Allergen Reactions    Aspirin      Other reaction(s): esophageal pain    Aspirin Nausea Only    Celebrex  [celecoxib]      Other reaction(s): Rash    Ibuprofen      Other reaction(s): esophogeal pain    Sulfa dyne      Other reaction(s): esophogeal pain    Steroid  [corticosteroids (glucocorticoids)]      Other reaction(s): Flushing (skin)       No current facility-administered medications on file prior to encounter.      Current Outpatient Prescriptions on File Prior to Encounter   Medication Sig    acetaminophen (TYLENOL) 500 MG tablet Take 500 mg by mouth every 6 (six) hours as needed.    atorvastatin (LIPITOR) 20 MG tablet take 1 tablet by mouth once daily    benazepril (LOTENSIN) 40 MG tablet Take 1 tablet (40 mg total) by mouth once daily.    conjugated estrogens (PREMARIN) vaginal cream 1 g with applicator or dime-sized amt with finger in vagina nightly x 2 weeks, then twice a week thereafter    diclofenac sodium (VOLTAREN) 1 % Gel Apply 2 g topically once daily.    escitalopram oxalate (LEXAPRO) 20 MG tablet Take 1 tablet (20 mg total) by mouth once daily.    fish oil-omega-3 fatty acids 300-1,000 mg capsule Take 2 g by mouth once daily.    furosemide (LASIX) 40 MG tablet Take 1 tablet (40 mg total) by mouth once daily.     melatonin 5 mg Tab Take 1 tablet by mouth nightly as needed.    meloxicam (MOBIC) 15 MG tablet Take 1 tablet (15 mg total) by mouth once daily.    metoprolol succinate (TOPROL-XL) 100 MG 24 hr tablet Take 1 tablet (100 mg total) by mouth once daily.    MULTIVITAMIN W-MINERALS/LUTEIN (CENTRUM SILVER ORAL) Take by mouth once daily.    omeprazole (PRILOSEC) 20 MG capsule Take 1 capsule (20 mg total) by mouth once daily.    temazepam (RESTORIL) 15 mg Cap take 1 capsule by mouth at bedtime    triamcinolone acetonide 0.1% (KENALOG) 0.1 % cream Apply topically 2 (two) times daily.    vitamin D 1000 units Tab Take 2,000 Units by mouth once daily.     Family History     Problem Relation (Age of Onset)    Asthma Father    Cancer Brother    Heart disease Mother, Father, Brother        Social History Main Topics    Smoking status: Never Smoker    Smokeless tobacco: Never Used    Alcohol use No    Drug use: No    Sexual activity: Not Currently     Birth control/ protection: None     Review of Systems   Unable to perform ROS: Mental status change     Objective:     Vital Signs (Most Recent):  Temp: 99 °F (37.2 °C) (05/05/17 1630)  Pulse: 81 (05/05/17 1630)  Resp: 18 (05/05/17 1630)  BP: 134/63 (05/05/17 1630)  SpO2: 98 % (05/05/17 1630) Vital Signs (24h Range):  Temp:  [99 °F (37.2 °C)-100 °F (37.8 °C)] 99 °F (37.2 °C)  Pulse:  [76-91] 81  Resp:  [11-28] 18  SpO2:  [94 %-98 %] 98 %  BP: (113-188)/(61-80) 134/63        There is no height or weight on file to calculate BMI.    Physical Exam   HENT:   Mouth/Throat: Oropharynx is clear and moist.   Eyes: EOM are normal. Pupils are equal, round, and reactive to light. Left eye exhibits no discharge. No scleral icterus.   Neck: Normal range of motion. No JVD present. No tracheal deviation present. No thyromegaly present.   Cardiovascular: Normal rate, regular rhythm, normal heart sounds and intact distal pulses.  Exam reveals no gallop and no friction rub.    No murmur  heard.  Pulmonary/Chest: Effort normal. No respiratory distress. She has no wheezes. She has rales. She exhibits no tenderness.   Abdominal: Soft. Bowel sounds are normal. She exhibits no distension and no mass. There is tenderness. No hernia.   Musculoskeletal: Normal range of motion. She exhibits edema and tenderness.   Lymphadenopathy:     She has no cervical adenopathy.   Neurological: No cranial nerve deficit. Coordination normal.   AOx0        Significant Labs:   CBC:   Recent Labs  Lab 05/05/17  0905   WBC 8.61   HGB 15.4   HCT 44.0        CMP:   Recent Labs  Lab 05/05/17  1008   *   K 3.6      CO2 23   *   BUN 10   CREATININE 0.7   CALCIUM 9.0   PROT 7.0   ALBUMIN 3.3*   BILITOT 1.3*   ALKPHOS 106   AST 23   ALT 24   ANIONGAP 12   EGFRNONAA >60.0       Significant Imaging: CT: I have reviewed all pertinent results/findings within the past 24 hours and my personal findings are:  cholelithiasis, diverticulitis  CXR: I have reviewed all pertinent results/findings within the past 24 hours and my personal findings are:  pulmonary edema

## 2017-05-05 NOTE — ED NOTES
Patient identifiers verified and correct for Lilia Hidalgo.   LOC: The patient is awake and alert to person only.   APPEARANCE: Patient appears comfortable and in no acute distress, patient is clean and well groomed.  SKIN: The skin is warm and dry, color consistent with ethnicity, patient has normal skin turgor and moist mucus membranes, skin intact, no breakdown or bruising noted.   MUSCULOSKELETAL: Patient moving all extremities spontaneously, no swelling noted.  RESPIRATORY: Airway is open and patent, respirations are spontaneous, patient has a normal effort and rate, no accessory muscle use noted, pt placed on continuous pulse ox with O2 sats noted at 97% on room air.  CARDIAC: Pt placed on cardiac monitor. Patient has a normal rate and regular rhythm, no edema noted, capillary refill < 3 seconds.   GASTRO: Soft and non tender to palpation, no distention noted, normoactive bowel sounds present in all four quadrants.   : Unable to access.   NEURO: Pt opens eyes spontaneously, pt seems very confused, facial expression symmetrical, bilateral hand grasp equal and even, purposeful motor response noted, normal sensation in all extremities when touched with a finger.

## 2017-05-05 NOTE — IP AVS SNAPSHOT
[facility]  [address]  [facility] [address]  Phone: [phone]           Patient Discharge Instructions   Our goal is to set you up for success. This packet includes information on your condition, medications, and your home care.  It will help you care for yourself to prevent having to return to the hospital.     Please ask your nurse if you have any questions.      There are many details to remember when preparing to leave the hospital. Here is what you will need to do:    1. Take your medicine. If you are prescribed medications, review your Medication List on the following pages. You may have new medications to  at the pharmacy and others that you'll need to stop taking. Review the instructions for how and when to take your medications. Talk with your doctor or nurses if you are unsure of what to do.     2. Go to your follow-up appointments. Specific follow-up information is listed in the following pages. Your may be contacted by a nurse or clinical provider about future appointments. Be sure we have all of the phone numbers to reach you. Please contact your provider's office if you are unable to make an appointment.     3. Watch for warning signs. Your doctor or nurse will give you detailed warning signs to watch for and when to call for assistance. These instructions may also include educational information about your condition. If you experience any of warning signs to your health, call your doctor.           [facility]  Unless otherwise directed by your provider, please   contact [facility], our nurse care line   that is available for 24/7 assistance.     [phone] (toll-free)     Registered nurses in the [facility]   provide: appointment scheduling, clinical advisement, health education, and other advisory services.                  ** Verify the list of medication(s) below is accurate and up to date. Carry this with you in case of  emergency. If your medications have changed, please notify your healthcare provider.             Medication List      ASK your doctor about these medications        Additional Info                      acetaminophen 500 MG tablet   Commonly known as:  TYLENOL   Refills:  0   Dose:  500 mg    Instructions:  Take 500 mg by mouth every 6 (six) hours as needed.     Begin Date    AM    Noon    PM    Bedtime       atorvastatin 20 MG tablet   Commonly known as:  LIPITOR   Quantity:  30 tablet   Refills:  11    Last time this was given:  20 mg on 5/8/2017  8:12 AM   Instructions:  take 1 tablet by mouth once daily     Begin Date    AM    Noon    PM    Bedtime       benazepril 40 MG tablet   Commonly known as:  LOTENSIN   Quantity:  90 tablet   Refills:  3   Dose:  40 mg    Last time this was given:  40 mg on 5/8/2017  8:11 AM   Instructions:  Take 1 tablet (40 mg total) by mouth once daily.     Begin Date    AM    Noon    PM    Bedtime       CENTRUM SILVER ORAL   Refills:  0    Instructions:  Take by mouth once daily.     Begin Date    AM    Noon    PM    Bedtime       escitalopram oxalate 20 MG tablet   Commonly known as:  LEXAPRO   Quantity:  30 tablet   Refills:  11   Dose:  20 mg    Last time this was given:  20 mg on 5/8/2017  8:11 AM   Instructions:  Take 1 tablet (20 mg total) by mouth once daily.     Begin Date    AM    Noon    PM    Bedtime       fish oil-omega-3 fatty acids 300-1,000 mg capsule   Refills:  0   Dose:  2 g    Instructions:  Take 2 g by mouth once daily.     Begin Date    AM    Noon    PM    Bedtime       furosemide 40 MG tablet   Commonly known as:  LASIX   Quantity:  90 tablet   Refills:  3   Dose:  40 mg    Last time this was given:  40 mg on 5/8/2017  8:11 AM   Instructions:  Take 1 tablet (40 mg total) by mouth once daily.     Begin Date    AM    Noon    PM    Bedtime       melatonin 5 mg Tab   Refills:  0   Dose:  1 tablet    Instructions:  Take 1 tablet by mouth every evening.     Begin Date     AM    Noon    PM    Bedtime       meloxicam 15 MG tablet   Commonly known as:  MOBIC   Quantity:  30 tablet   Refills:  11   Dose:  15 mg    Instructions:  Take 1 tablet (15 mg total) by mouth once daily.     Begin Date    AM    Noon    PM    Bedtime       metoprolol succinate 100 MG 24 hr tablet   Commonly known as:  TOPROL-XL   Quantity:  90 tablet   Refills:  3   Dose:  100 mg    Last time this was given:  100 mg on 5/8/2017  8:12 AM   Instructions:  Take 1 tablet (100 mg total) by mouth once daily.     Begin Date    AM    Noon    PM    Bedtime       omeprazole 20 MG capsule   Commonly known as:  PRILOSEC   Quantity:  90 capsule   Refills:  3   Dose:  20 mg    Instructions:  Take 1 capsule (20 mg total) by mouth once daily.     Begin Date    AM    Noon    PM    Bedtime       temazepam 15 mg Cap   Commonly known as:  RESTORIL   Quantity:  30 capsule   Refills:  5    Instructions:  take 1 capsule by mouth at bedtime     Begin Date    AM    Noon    PM    Bedtime       tramadol 50 mg tablet   Commonly known as:  ULTRAM   Refills:  0   Dose:  50 mg    Last time this was given:  25 mg on 5/8/2017 11:17 AM   Instructions:  Take 50 mg by mouth every 6 (six) hours as needed for Pain.     Begin Date    AM    Noon    PM    Bedtime       triamcinolone acetonide 0.1% 0.1 % cream   Commonly known as:  KENALOG   Quantity:  15 g   Refills:  0    Instructions:  Apply topically 2 (two) times daily.     Begin Date    AM    Noon    PM    Bedtime                  Please bring to all follow up appointments:    1. A copy of your discharge instructions.  2. All medicines you are currently taking in their original bottles.  3. Identification and insurance card.    Please arrive 15 minutes ahead of scheduled appointment time.    Please call 24 hours in advance if you must reschedule your appointment and/or time.        Your Scheduled Appointments     May 31, 2017 10:10 AM CDT   Fasting Lab with LAB, APPOINTMENT [facility]  "[facility] ([facility])    [address]  [facility] [address]   [phone]            Jun 05, 2017  9:00 AM CDT   Established Patient Visit with April Herrera MD   [facility] ([facility])    [address]  [facility] [address]   [phone]                  Primary Diagnosis     Your primary diagnosis was:  Confusion Associated With Infection      Admission Information     Date & Time Provider Department CSN    5/5/2017  8:34 AM Guero Westfall MD [facility] [id number]      Care Providers     Provider Role Specialty Primary office phone    Guero Westfall MD Attending Provider Hospitalist [phone]    Guero Westfall MD Team Attending  Hospitalist [phone]      Your Vitals Were     BP Pulse Temp Resp Height Weight    131/65 (BP Location: Right arm, Patient Position: Lying, BP Method: Automatic) 75 98.5 °F (36.9 °C) (Oral) 16 5' 7" (1.702 m) 86.7 kg (191 lb 3.2 oz)    SpO2 BMI             90% 29.95 kg/m2         Recent Lab Values     No lab values to display.      Pending Labs     Order Current Status    Blood culture #1 Preliminary result    Blood culture #2 Preliminary result      Allergies as of 5/8/2017        Reactions    Aspirin Nausea Only    Other reaction(s): esophageal pain    Celebrex  [Celecoxib]     Other reaction(s): Rash    Ibuprofen     Other reaction(s): esophogeal pain    Sulfa Dyne     Other reaction(s): esophogeal pain    Steroid  [Corticosteroids (Glucocorticoids)]     Other reaction(s): Flushing (skin)    Morphine Hallucinations      Advance Directives     An advance directive is a document which, in the event you are no longer able to make decisions for yourself, tells your healthcare team what kind of treatment you do or do not want to receive, or who you would like to make those decisions for you.  If you do not currently have an advance " directive, [employer] encourages you to create one.  For more information call:  [phone]), [phone] ([phone]),  or log on to [email or web address].        Language Assistance Services     ATTENTION: Language assistance services are available, free of charge. Please call [phone].      ATENCIÓN: Si habla español, tiene a bolden disposición servicios gratuitos de asistencia lingüística. Llame al [phone].     CHÚ Ý: N?u b?n nói Ti?ng Vi?t, có các d?ch v? h? tr? ngôn ng? mi?n phí dành cho b?n. G?i s? [phone].        Heart Failure Education       Heart Failure: Being Active  You have a condition called heart failure. Being active doesnt mean that you have to wear yourself out. Even a little movement each day helps to strengthen your heart. If you cant get out to exercise, you can do simple stretching and strengthening exercises at home. These are good ways to keep you well-conditioned and prevent you and your heart from becoming excessively weak.    Ideas to get you started  · Add a little movement to things you do now. Walk to mail letters. Park your car at the far end of the parking lot and walk to the store. Walk up a flight of stairs instead of taking the elevator.  · Choose activities you enjoy. You might walk, swim, or ride an exercise bike. Things like gardening and washing the car count, too. Other possibilities include: washing dishes, walking the dog, walking around the mall, and doing aerobic activities with friends.  · Join a group exercise program at a [facility] or [facility], a senior center, or a community center. Or look into a hospital cardiac rehabilitation program. Ask your doctor if you qualify.  Tips to keep you going  · Get up and get dressed each day. Go to a coffee shop and read a newspaper or go somewhere that you'll be in the presence of other active people. Youll feel more like being active.  · Make a plan. Choose one or more activities that you enjoy and  that you can easily do. Then plan to do at least one each day. You might write your plan on a calendar.  · Go with a friend or a group if you like company. This can help you feel supported and stay motivated, too.  · Plan social events that you enjoy. This will keep you mentally engaged as well as physically motivated to do things you find pleasure in.  For your safety  · Talk with your healthcare provider before starting an exercise program.  · Exercise indoors when its too hot or too cold outside, or when the air quality is poor. Try walking at a shopping mall.  · Wear socks and sturdy shoes to maintain your balance and prevent falls.  · Start slowly. Do a few minutes several times a day at first. Increase your time and speed little by little.  · Stop and rest whenever you feel tired or get short of breath.  · Dont push yourself on days when you dont feel well.  Date Last Reviewed: 3/20/2016  © 4543-7639 [employer]. [address] [place] [address], [place] [address]. All rights reserved. This information is not intended as a substitute for professional medical care. Always follow your healthcare professional's instructions.              Heart Failure: Evaluating Your Heart  You have a condition called heart failure. To evaluate your condition, your doctor will examine you, ask questions, and do some tests. Along with looking for signs of heart failure, the doctor looks for any other health problems that may have led to heart failure. The results of your evaluation will help your doctor form a treatment plan.  Health history and physical exam  Your visit will start with a health history. Tell the doctor about any symptoms youve noticed and about all medicines you take. Then youll have a physical exam. This includes listening to your heartbeat and breathing. Youll also be checked for swelling (edema) in your legs and neck. When you have fluid buildup or fluid in the lungs, it may be called congestive  heart failure.  Diagnosing heart failure     During an echocardiogram, sound waves bounce off the heart. These are converted into a picture on the screen.   The following may be done to help your doctor form a diagnosis:  · X-rays show the size and shape of your heart. These pictures can also show fluid in your lungs.  · An electrocardiogram (ECG or EKG) shows the pattern of your heartbeat. Small pads (electrodes) are placed on your chest, arms, and legs. Wires connect the pads to the ECG machine, which records your hearts electrical signals. This can give the doctor information about heart function.  · An echocardiogram uses ultrasound waves to show the structure and movement of your heart muscle. This shows how well the heart pumps. It also shows the thickness of the heart walls, and if the heart is enlarged. It is one of the most useful, non-invasive tests as it provides information about the heart's general function. This helps your doctor make treatment decisions.  · Lab tests evaluate small amounts of blood or urine for signs of problems. A BNP lab test can help diagnose and evaluate heart failure. BNP stands for B-type natriuretic peptide. The ventricles secrete more BNP when heart failure worsens. Lab tests can also provide information about metabolic dysfunction or heart dysfunction.  Your treatment plan  Based on the results of your evaluation and tests, your doctor will develop a treatment plan. This plan is designed to relieve some of your heart failure symptoms and help make you more comfortable. Your treatment plan may include:  · Medicine to help your heart work better and improve your quality of life  · Changes in what you eat and drink to help prevent fluid from backing up in your body  · Daily monitoring of your weight and heart failure symptoms to see how well your treatment plan is working  · Exercise to help you stay healthy  · Help with quitting smoking  · Emotional and psychological support  to help adjust to the changes  · Referrals to other specialists to make sure you are being treated comprehensively  Date Last Reviewed: 3/21/2016  © 4808-6617 [employer]. [address] [place] [address], [place] [address]. All rights reserved. This information is not intended as a substitute for professional medical care. Always follow your healthcare professional's instructions.              Heart Failure: Making Changes to Your Diet  You have a condition called heart failure. When you have heart failure, excess fluid is more likely to build up in your body because your heart isn't working well. This makes the heart work harder to pump blood. Fluid buildup causes symptoms such as shortness of breath and swelling (edema). This is often referred to as congestive heart failure or CHF. Controlling the amount of salt (sodium) you eat may help stop fluid from building up. Your doctor may also tell you to reduce the amount of fluid you drink.  Reading food labels    Your healthcare provider will tell you how much sodium you can eat each day. Read food labels to keep track. Keep in mind that certain foods are high in salt. These include canned, frozen, and processed foods. Check the amount of sodium in each serving. Watch out for high-sodium ingredients. These include MSG (monosodium glutamate), baking soda, and sodium phosphate.   Eating less salt  Give yourself time to get used to eating less salt. It may take a little while. Here are some tips to help:  · Take the saltshaker off the table. Replace it with salt-free herb mixes and spices.  · Eat fresh or plain frozen vegetables. These have much less salt than canned vegetables.  · Choose low-sodium snacks like sodium-free pretzels, crackers, or air-popped popcorn.  · Dont add salt to your food when youre cooking. Instead, season your foods with pepper, lemon, garlic, or onion.  · When you eat out, ask that your food be cooked without added salt.  · Avoid eating  fried foods as these often have a great deal of salt.  If youre told to limit fluids  You may need to limit how much fluid you have to help prevent swelling. This includes anything that is liquid at room temperature, such as ice cream and soup. If your doctor tells you to limit fluid, try these tips:  · Measure drinks in a measuring cup before you drink them. This will help you meet daily goals.  · Chill drinks to make them more refreshing.  · Suck on frozen lemon wedges to quench thirst.  · Only drink when youre thirsty.  · Chew sugarless gum or suck on hard candy to keep your mouth moist.  · Weigh yourself daily to know if your body's fluid content is rising.  My sodium goal  Your healthcare provider may give you a sodium goal to meet each day. This includes sodium found in food as well as salt that you add. My goal is to eat no more than ___________ mg of sodium per day.     When to call your doctor  Call your doctor right away if you have any symptoms of worsening heart failure. These can include:  · Sudden weight gain  · Increased swelling of your legs or ankles  · Trouble breathing when youre resting or at night  · Increase in the number of pillows you have to sleep on  · Chest pain, pressure, discomfort, or pain in the jaw, neck, or back   Date Last Reviewed: 3/21/2016  © 4203-0940 [employer]. [address] [place] [address]. All rights reserved. This information is not intended as a substitute for professional medical care. Always follow your healthcare professional's instructions.              Heart Failure: Medicines to Help Your Heart    You have a condition called heart failure (also known as congestive heart failure, or CHF). Your doctor will likely prescribe medicines for heart failure and any underlying health problems you have. Most heart failure patients take one or more types of medicinen. Your healthcare provider will work to find the combination of medicines that works  best for you.  Heart failure medicines  Here are the most common heart failure medicines:  · ACE inhibitors lower blood pressure and decrease strain on the heart. This makes it easier for the heart to pump. Angiotensin receptor blockers have similar effects. These are prescribed for some patients instead of ACE inhibitors.  · Beta-blockers relieve stress on the heart. They also improve symptoms. They may also improve the heart's pumping action over time.  · Diuretics (also called water pills) help rid your body of excess water. This can help rid your body of swelling (edema). Having less fluid to pump means your heart doesnt have to work as hard. Some diuretics make your body lose a mineral called potassium. Your doctor will tell you if you need to take supplements or eat more foods high in potassium.  · Digoxin helps your heart pump with more strength. This helps your heart pump more blood with each beat. So, more oxygen-rich blood travels to the rest of the body.  · Aldosterone antagonists help alter hormones and decrease strain on the heart.  · Hydralazine and nitrates are two separate medicines used together to treat heart failure. They may come in one combination pill. They lower blood pressure and decrease how hard the heart has to pump.  Medicines for related conditions  Controlling other heart problems helps keep heart failure under control, too. Depending on other heart problems you have, medicines may be prescribed to:  · Lower blood pressure (antihypertensives).  · Lower cholesterol levels (statins).  · Prevent blood clots (anticoagulants or aspirin).  · Keep the heartbeat steady (antiarrhythmics).  Date Last Reviewed: 3/5/2016  © 2207-0423 [employer]. [address] [place] [address], [place] [address]. All rights reserved. This information is not intended as a substitute for professional medical care. Always follow your healthcare professional's instructions.              Heart Failure:  Procedures That May Help    The heart is a muscle that pumps oxygen-rich blood to all parts of the body. When you have heart failure, the heart is not able to pump as well as it should. Blood and fluid may back up into the lungs (congestive heart failure), and some parts of the body dont get enough oxygen-rich blood to work normally. These problems lead to the symptoms of heart failure.     Certain procedures may help the heart pump better in some cases of heart failure. Some procedures are done to treat health problems that may have caused the heart failure such as coronary artery disease or heart rhythm problems. For more serious heart failure, other options are available.  Treating artery and valve problems  If you have coronary artery disease or valve disease, procedures may be done to improve blood flow. This helps the heart pump better, which can improve heart failure symptoms. First, your doctor may do a cardiac catheterization to help detect clogged blood vessels or valve damage. During this procedure, a  thin tube (catheter) in inserted into a blood vessel and guided to the heart. There a dye is injected and a special type of X-ray (angiogram) is taken of the blood vessels. Procedures to open a blocked artery or fix damaged valves can also be done using catheterization.  · Angioplasty uses a balloon-tipped instrument at the end of the catheter. The balloon is inflated to widen the narrowed artery. In many cases, a stent is expanded to further support the narrowed artery. A stent is a metal mesh tube.  · Valve surgery repairs or replacement of faulty valves can also be done during catheterization so blood can flow properly through the chambers of the heart.  Bypass surgery is another option to help treat blocked arteries. It uses a healthy blood vessel from elsewhere in the body. The healthy blood vessel is attached above and below the blocked area so that blood can flow around the blocked artery.  Treating  heart rhythm problems  A device may be placed in the chest to help a weak heart maintain a healthy, heartbeat so the heart can pump more effectively:  · Pacemaker. A pacemaker is an implanted device that regulates your heartbeat electronically. It monitors your heart's rhythm and generates a painless electric impulse that helps the heart beat in a regular rhythm. A pacemaker is programmed to meet your specific heart rhythm needs.  · Biventricular pacing/cardiac resynchronization therapy. A type of pacemaker that paces both pumping chambers of the heart at the same time to coordinate contractions and to improve the heart's function. Some people with heart failure are candidates for this therapy.  · Implantable cardioverter defibrillator. A device similar to a pacemaker that senses when the heart is beating too fast and delivers an electrical shock to convert the fast rhythm to a normal rhythm. This can be a life saving device.  In severe cases  In more serious cases of heart failure when other treatments no longer work, other options may include:  · Ventricular assist devices (VADs). These are mechanical devices used to take over the pumping function for one or both of the heart's ventricles, or pumping chambers. A VAD may be necessary when heart failure progresses to the point that medicines and other treatments no longer help. In some cases, a VAD may be used as a bridge to transplant.  · Heart transplant. This is replacing the diseased heart with a healthy one from a donor. This is an option for a few people who are very sick. A heart transplant is very serious and not an option for all patients. Your doctor can tell you more.  Date Last Reviewed: 3/20/2016  © 0928-9421 [employer]. [address] [place] [address], [place] [address]. All rights reserved. This information is not intended as a substitute for professional medical care. Always follow your healthcare professional's instructions.              Heart  Failure: Tracking Your Weight  You have a condition called heart failure. When you have heart failure, a sudden weight gain or a steady rise in weight is a warning sign that your body is retaining too much water and salt. This could mean your heart failure is getting worse. If left untreated, it can cause problems for your lungs and result in shortness of breath. Weighing yourself each day is the best way to know if youre retaining water. If your weight goes up quickly, call your doctor. You will be given instructions on how to get rid of the excess water. You will likely need medicines and to avoid salt. This will help your heart work better.  Call your doctor if you gain more than 2 pounds in 1 day, more than 5 pounds in 1 week, or whatever weight gain you were told to report by your doctor. This is often a sign of worsening heart failure and needs to be evaluated and treated. Your doctor will tell you what to do next.   Tips for weighing yourself    · Weigh yourself at the same time each morning, wearing the same clothes. Weigh yourself after urinating and before eating.  · Use the same scale each day. Make sure the numbers are easy to read. Put the scale on a flat, hard surface -- not on a rug or carpet.  · Do not stop weighing yourself. If you forget one day, weigh again the next morning.  How to use your weight chart  · Keep your weight chart near the scale. Write your weight on the chart as soon as you get off the scale.  · Fill in the month and the start date on the chart. Then write down your weight each day. Your chart will look like this:    · If you miss a day, leave the space blank. Weigh yourself the next day and write your weight in the next space.  · Take your weight chart with you when you go to see your doctor.  Date Last Reviewed: 3/20/2016  © 5860-9459 [employer]. [address] [place] [address], [place] [address]. All rights reserved. This information is not intended as a substitute for  professional medical care. Always follow your healthcare professional's instructions.              Heart Failure: Warning Signs of a Flare-Up  You have a condition called heart failure. Once you have heart failure, flare-ups can happen. Below are signs that can mean your heart failure is getting worse. If you notice any of these warning signs, call your healthcare provider.  Swelling    · Your feet, ankles, or lower legs get puffier.  · You notice skin changes on your lower legs.  · Your shoes feel too tight.  · Your clothes are tighter in the waist.  · You have trouble getting rings on or off your fingers.  Shortness of breath  · You have to breathe harder even when youre doing your normal activities or when youre resting.  · You are short of breath walking up stairs or even short distances.  · You wake up at night short of breath or coughing.  · You need to use more pillows or sit up to sleep.  · You wake up tired or restless.  Other warning signs  · You feel weaker, dizzy, or more tired.  · You have chest pain or changes in your heartbeat.  · You have a cough that wont go away.  · You cant remember things or dont feel like eating.  Tracking your weight  Gaining weight is often the first warning sign that heart failure is getting worse. Gaining even a few pounds can be a sign that your body is retaining excess water and salt. Weighing yourself each day in the morning after you urinate and before you eat, is the best way to know if you're retaining water. Get a scale that is easy to read and make sure you wear the same clothes and use the same scale every time you weigh. Your healthcare provider will show you how to track your weight. Call your doctor if you gain more than 2 pounds in 1 day, 5 pounds in 1 week, or whatever weight gain you were told to report by your doctor. This is often a sign of worsening heart failure and needs to be evaluated and treated before it compromises your breathing. Your doctor  will tell you what to do next.    Date Last Reviewed: 3/15/2016  © 1087-3032 [employer] [address] [place] [address], [place] [address]. All rights reserved. This information is not intended as a substitute for professional medical care. Always follow your healthcare professional's instructions.               [facility] complies with applicable Federal civil rights laws and does not discriminate on the basis of race, color, national origin, age, disability, or sex.

## 2017-05-05 NOTE — ED PROVIDER NOTES
Encounter Date: 5/5/2017    SCRIBE #1 NOTE: I, Keon Carroll, am scribing for, and in the presence of, Dr. Zhong.       History     Chief Complaint   Patient presents with    Altered Mental Status     Patient last seen normal at 1600 yesterday, in independent living.  Now confused and vomiting yellow gastric content.     Review of patient's allergies indicates:   Allergen Reactions    Aspirin      Other reaction(s): esophageal pain    Aspirin Nausea Only    Celebrex  [celecoxib]      Other reaction(s): Rash    Ibuprofen      Other reaction(s): esophogeal pain    Sulfa dyne      Other reaction(s): esophogeal pain    Steroid  [corticosteroids (glucocorticoids)]      Other reaction(s): Flushing (skin)     HPI Comments: Time patient was seen by the provider: 8:50 AM      The patient is a 85 y.o. female with hx of: arthritis, HLD, GERD, HTN, and PUD that presents to the ED per EMS with a complaint of altered mental status, which was noticed by assisted living staff this morning. Pt reportedly more confused than baseline. Family notes an associated vomiting.     The history is provided by a relative. The history is limited by the condition of the patient.     Past Medical History:   Diagnosis Date    Arthritis     GERD (gastroesophageal reflux disease)     Hyperlipidemia     Hypertension     Joint pain     Knee pain, left 08/2016    pain with walking or standing    PUD (peptic ulcer disease)      Past Surgical History:   Procedure Laterality Date    APPENDECTOMY      COLONOSCOPY      08    EYE SURGERY      bilateral cataract    HYSTERECTOMY      UNKNOWN BSO    JOINT REPLACEMENT      right hip replacement    JOINT REPLACEMENT      right foot tendon surgery    KNEE ARTHROPLASTY Left 2001    thumb surgery Left 09/2015    UPPER GASTROINTESTINAL ENDOSCOPY      2013     Family History   Problem Relation Age of Onset    Heart disease Mother     Heart disease Father     Asthma Father     Cancer  Brother      breast    Heart disease Brother     Melanoma Neg Hx     Psoriasis Neg Hx     Lupus Neg Hx     Eczema Neg Hx     Acne Neg Hx     Breast cancer Neg Hx     Ovarian cancer Neg Hx     Cervical cancer Neg Hx     Endometrial cancer Neg Hx     Vaginal cancer Neg Hx      Social History   Substance Use Topics    Smoking status: Never Smoker    Smokeless tobacco: Never Used    Alcohol use No     Review of Systems   Unable to perform ROS: Mental status change   Gastrointestinal: Positive for vomiting.   Psychiatric/Behavioral: Positive for confusion.       Physical Exam   Initial Vitals   BP Pulse Resp Temp SpO2   05/05/17 0833 05/05/17 0833 05/05/17 0833 05/05/17 0833 05/05/17 0833   160/80 80 20 100 °F (37.8 °C) 95 %     Physical Exam    Nursing note and vitals reviewed.  HENT:   Head: Normocephalic and atraumatic.   Eyes: EOM are normal. Pupils are equal, round, and reactive to light.   Cardiovascular: Normal rate.   Murmur (systolic) heard.  Pulmonary/Chest:   Crackles at bilateral lungs to mid lung field   Abdominal: Soft.   Obese. Tenderness to palpation in RUQ   Musculoskeletal:   Moves all extremities x4 but unable to assess strength. Trace lower extremity edema   Neurological:   Awake but not following commands. Pt is not answering questions appropriately    Skin: Skin is warm and dry.         ED Course   Procedures  Labs Reviewed   CBC W/ AUTO DIFFERENTIAL - Abnormal; Notable for the following:        Result Value    Lymph # 0.6 (*)     Mono # 0.2 (*)     Gran% 89.9 (*)     Lymph% 7.3 (*)     Mono% 2.4 (*)     All other components within normal limits   URINALYSIS - Abnormal; Notable for the following:     Protein, UA 3+ (*)     Glucose, UA 1+ (*)     Ketones, UA Trace (*)     All other components within normal limits   B-TYPE NATRIURETIC PEPTIDE - Abnormal; Notable for the following:      (*)     All other components within normal limits   TROPONIN I - Abnormal; Notable for the  following:     Troponin I 0.125 (*)     All other components within normal limits   COMPREHENSIVE METABOLIC PANEL - Abnormal; Notable for the following:     Sodium 135 (*)     Glucose 136 (*)     Albumin 3.3 (*)     Total Bilirubin 1.3 (*)     All other components within normal limits   POCT GLUCOSE - Abnormal; Notable for the following:     POCT Glucose 150 (*)     All other components within normal limits   CULTURE, URINE   CULTURE, BLOOD    Narrative:     Blood Culture #1   CULTURE, BLOOD    Narrative:     Blood Culture #2   CULTURE, RESPIRATORY   LACTIC ACID, PLASMA   PROTIME-INR   LIPASE   TSH   URINALYSIS MICROSCOPIC   POCT GLUCOSE MONITORING CONTINUOUS     EKG Readings: (Independently Interpreted)   Normal sinus rhythm, rate 81, no acute ischemic changes       X-Rays:   Independently Interpreted Readings:   Chest X-Ray: Cardiomegaly, pulmonary edema, possible infiltrate left lower lobe   Head CT: No acute hemorrhage    Abdomen: No air fluid levels. No dilated loops     Medical Decision Making:   History:   Old Medical Records: I decided to obtain old medical records.  Initial Assessment:   84 y/o F h/o HTN, arthritis, mild cognitive impairment with altered mental status. Per report from assisted living facility pt was usual self yesterday. This morning found by AM  person confused with vomitus. Pt does note nausea but otherwise not answering questions. Low grade fever on arrival to ED.  On exam mild abd ttp non focal neurologic exam  Ddx: sepsis- UTI, intraabdominal source/ cholecystitis, pneumonia, less likely CVA, electrolyte abnormality, dehydration, worsening dementia  CTbrain, routine blood work, CXR, UA, blood cultures  Anticipate admission    Independently Interpreted Test(s):   I have ordered and independently interpreted X-rays - see prior notes.  Clinical Tests:   Lab Tests: Ordered and Reviewed  Radiological Study: Ordered and Reviewed  Medical Tests: Reviewed and Ordered            Scribe  Attestation:   Scribe #1: I performed the above scribed service and the documentation accurately describes the services I performed. I attest to the accuracy of the note.    Attending Attestation:           Physician Attestation for Scribe:  Physician Attestation Statement for Scribe #1: I, Dr. Zhong, reviewed documentation, as scribed by Keon Carroll in my presence, and it is both accurate and complete.         Attending ED Notes:   10:45 AM  Normal wbc but left shift noted  AAS no signs of sbo  Possible infiltrate on CXR but pt without cough, has had vomiting. Will order abx aspiration pna  Awaiting blood work and UA    11:18 AM  Pt becoming agitated, trying to get out of bed.  Also elevated BP. Has not taken home BP and pain meds this morning. Will order home meds.  Given fever, abdominal pain and slightly elevated bilirubin will order CTa/p (do not feel pt is able to go off floor for Ultrasound RUQ at this time)    12:45  UA negative    3:27 PM  Elevated troponin no concern for acs- pt without chest pain and ecg without signs of ischemia. Per cardiology can trend troponins no indication for emergent cards intervention.  CT scan a/p aspiration pneumonitis vs pneumonia as well as sigmoid diverticulitis. No CT evidence of cholecystitis              ED Course     Clinical Impression:   The primary encounter diagnosis was Altered mental status, unspecified altered mental status type. Diagnoses of Pneumonia due to infectious organism, unspecified laterality, unspecified part of lung, Diverticulitis of intestine without perforation or abscess without bleeding, unspecified part of intestinal tract, and Aspiration pneumonia were also pertinent to this visit.    Disposition:   Disposition: Admitted  Condition: Fair       Claire Zhong MD  05/05/17 7770

## 2017-05-05 NOTE — ED TRIAGE NOTES
Pt arrived via EMS from [facility] for evaluation of AMS. Per EMS pt was last normal yesterday at 1600. Pt is oriented to person only.

## 2017-05-05 NOTE — ASSESSMENT & PLAN NOTE
-Zyprexa PRN   -delirium precautions; treat underlying infection  -IV medications and NPO, SLP swallow evaluation  -follow up blood cultures, respiratory Cx

## 2017-05-05 NOTE — H&P
[facility] Hospital Medicine  Progress Note    Patient Name: Lilia Hidalgo  MRN: [id number]  Patient Class: IP- Inpatient   Admission Date: 5/5/2017  Length of Stay: 0 days  Attending Physician: Guero Westfall MD  Primary Care Provider: April Herrera MD    [facility] Medicine Team: [facility] MED 5 Cristhian Bateman MD    Subjective:     Principal Problem:Septic encephalopathy    HPI:  Ms. Hidalgo is an 86 yo with a past medical history of HPLD, GERD, HTN, diastolic dysfunction (last ECHO 70% EF in 02/16) who presents to the ED via EMS from her senior living home after being found to be encephalopathic, poorly conversant and having an episode of nausea and vomiting this morning concerning for an aspiration event. History per patient is limited per mental status, and family member at bedside reports that yesterday evening, the patient was interacting with other facility residents and playing board games. This morning, she was found to have vomited bilious vomitus and complained of generalized abdominal pain. In the ED, her CXR showed basilar opacities concerning for CHF vs developing aspiration PNA. Her BNP was elevated, as well as her troponin and she appears clinically volume overloaded. Her exam was nonfocal, and CT a/p shows some diverticulitis and cholelithiasis without cholecystitis which could correlate with her clinical presentation, and her encephalopathy is likely secondary to a general medical condition. She had a Tbili elevation to 1.3. CT head showed no acute findings. Her family reports that patient likely has a component of dementia and has been encephalopathic from infections in the past.             Hospital Course:  86 yo F with Diastolic dysfunction presents with encephalopathy and vomiting concerning for aspiration PNA vs heart failure and abdominal pain/vomiting.    Past Medical History:   Diagnosis Date    Arthritis     GERD (gastroesophageal reflux disease)     Hyperlipidemia      Hypertension     Joint pain     Knee pain, left 08/2016    pain with walking or standing    PUD (peptic ulcer disease)        Past Surgical History:   Procedure Laterality Date    APPENDECTOMY      COLONOSCOPY      08    EYE SURGERY      bilateral cataract    HYSTERECTOMY      UNKNOWN BSO    JOINT REPLACEMENT      right hip replacement    JOINT REPLACEMENT      right foot tendon surgery    KNEE ARTHROPLASTY Left 2001    thumb surgery Left 09/2015    UPPER GASTROINTESTINAL ENDOSCOPY      2013       Review of patient's allergies indicates:   Allergen Reactions    Aspirin      Other reaction(s): esophageal pain    Aspirin Nausea Only    Celebrex  [celecoxib]      Other reaction(s): Rash    Ibuprofen      Other reaction(s): esophogeal pain    Sulfa dyne      Other reaction(s): esophogeal pain    Steroid  [corticosteroids (glucocorticoids)]      Other reaction(s): Flushing (skin)       No current facility-administered medications on file prior to encounter.      Current Outpatient Prescriptions on File Prior to Encounter   Medication Sig    acetaminophen (TYLENOL) 500 MG tablet Take 500 mg by mouth every 6 (six) hours as needed.    atorvastatin (LIPITOR) 20 MG tablet take 1 tablet by mouth once daily    benazepril (LOTENSIN) 40 MG tablet Take 1 tablet (40 mg total) by mouth once daily.    conjugated estrogens (PREMARIN) vaginal cream 1 g with applicator or dime-sized amt with finger in vagina nightly x 2 weeks, then twice a week thereafter    diclofenac sodium (VOLTAREN) 1 % Gel Apply 2 g topically once daily.    escitalopram oxalate (LEXAPRO) 20 MG tablet Take 1 tablet (20 mg total) by mouth once daily.    fish oil-omega-3 fatty acids 300-1,000 mg capsule Take 2 g by mouth once daily.    furosemide (LASIX) 40 MG tablet Take 1 tablet (40 mg total) by mouth once daily.    melatonin 5 mg Tab Take 1 tablet by mouth nightly as needed.    meloxicam (MOBIC) 15 MG tablet Take 1 tablet (15 mg  total) by mouth once daily.    metoprolol succinate (TOPROL-XL) 100 MG 24 hr tablet Take 1 tablet (100 mg total) by mouth once daily.    MULTIVITAMIN W-MINERALS/LUTEIN (CENTRUM SILVER ORAL) Take by mouth once daily.    omeprazole (PRILOSEC) 20 MG capsule Take 1 capsule (20 mg total) by mouth once daily.    temazepam (RESTORIL) 15 mg Cap take 1 capsule by mouth at bedtime    triamcinolone acetonide 0.1% (KENALOG) 0.1 % cream Apply topically 2 (two) times daily.    vitamin D 1000 units Tab Take 2,000 Units by mouth once daily.     Family History     Problem Relation (Age of Onset)    Asthma Father    Cancer Brother    Heart disease Mother, Father, Brother        Social History Main Topics    Smoking status: Never Smoker    Smokeless tobacco: Never Used    Alcohol use No    Drug use: No    Sexual activity: Not Currently     Birth control/ protection: None     Review of Systems   Unable to perform ROS: Mental status change     Objective:     Vital Signs (Most Recent):  Temp: 99 °F (37.2 °C) (05/05/17 1630)  Pulse: 81 (05/05/17 1630)  Resp: 18 (05/05/17 1630)  BP: 134/63 (05/05/17 1630)  SpO2: 98 % (05/05/17 1630) Vital Signs (24h Range):  Temp:  [99 °F (37.2 °C)-100 °F (37.8 °C)] 99 °F (37.2 °C)  Pulse:  [76-91] 81  Resp:  [11-28] 18  SpO2:  [94 %-98 %] 98 %  BP: (113-188)/(61-80) 134/63        There is no height or weight on file to calculate BMI.    Physical Exam   HENT:   Mouth/Throat: Oropharynx is clear and moist.   Eyes: EOM are normal. Pupils are equal, round, and reactive to light. Left eye exhibits no discharge. No scleral icterus.   Neck: Normal range of motion. No JVD present. No tracheal deviation present. No thyromegaly present.   Cardiovascular: Normal rate, regular rhythm, normal heart sounds and intact distal pulses.  Exam reveals no gallop and no friction rub.    No murmur heard.  Pulmonary/Chest: Effort normal. No respiratory distress. She has no wheezes. She has rales. She exhibits no  tenderness.   Abdominal: Soft. Bowel sounds are normal. She exhibits no distension and no mass. There is tenderness in RUQ, epigatrium. No hernia.   Musculoskeletal: Normal range of motion. She exhibits lower ext edema and tenderness.   Lymphadenopathy:     She has no cervical adenopathy.   Neurological: No cranial nerve deficit. Coordination normal.   AOx0        Significant Labs:   CBC:   Recent Labs  Lab 05/05/17  0905   WBC 8.61   HGB 15.4   HCT 44.0        CMP:   Recent Labs  Lab 05/05/17  1008   *   K 3.6      CO2 23   *   BUN 10   CREATININE 0.7   CALCIUM 9.0   PROT 7.0   ALBUMIN 3.3*   BILITOT 1.3*   ALKPHOS 106   AST 23   ALT 24   ANIONGAP 12   EGFRNONAA >60.0       Significant Imaging: CT: I have reviewed all pertinent results/findings within the past 24 hours and my personal findings are:  cholelithiasis, diverticulitis  CXR: I have reviewed all pertinent results/findings within the past 24 hours and my personal findings are:  pulmonary edema    Assessment/Plan:      * Septic encephalopathy  -Zyprexa PRN   -delirium precautions; treat underlying infection  -IV medications and NPO, SLP swallow evaluation  -follow up blood cultures, respiratory Cx        Essential hypertension  -can resume home PO regimen; Lopressor PRN IV      Hyperlipidemia  -resume home statin when can tolerate PO      GERD (gastroesophageal reflux disease)  -PPI      Debility  -PT/OT consult      Elevated troponin  -likely in light of HFpEF; trend to rule out ACS      Depressed mood  -resume home SSRI when can tolerate PO      Diverticulitis of large intestine without perforation or abscess without bleeding  -will switch Rocephin/Flagyl  to Cipro/Flagyl for better enteric coverage  -NPO status  -Diverticulitis noted on CT ap  -will follow volume status as patient appears overloaded on exam  -pain control with morphine/ antiemetics PRN  -follow septic workup, blood cultures.      Acute on chronic heart  failure  -Lasix PO x1 in ED; will continue to monitor fluid status  -CXR with likely cardiogenic edema  -NPO, current antibiotics will coverage PNA as well as enteric organisms.   -Duonebs, Incentive spirometry    Cholelithiasis without cholecystitis  -Tbili with slight elevation, continue to trend CMP/CBC  -choleliths noted on CTap  -AES consult if worsening labs, symptoms      VTE Risk Mitigation         Ordered     heparin (porcine) injection 5,000 Units  Every 8 hours     Route:  Subcutaneous        05/05/17 1656     Medium Risk of VTE  Once      05/05/17 1501     Place sequential compression device  Until discontinued      05/05/17 1501          Cristhian Bateman MD  Department of Hospital Medicine   [facility]

## 2017-05-06 LAB
ALBUMIN SERPL BCP-MCNC: 2.9 G/DL
ALP SERPL-CCNC: 97 U/L
ALT SERPL W/O P-5'-P-CCNC: 17 U/L
ANION GAP SERPL CALC-SCNC: 10 MMOL/L
ANION GAP SERPL CALC-SCNC: 12 MMOL/L
AST SERPL-CCNC: 22 U/L
BACTERIA UR CULT: NO GROWTH
BASOPHILS # BLD AUTO: 0.03 K/UL
BASOPHILS NFR BLD: 0.4 %
BILIRUB SERPL-MCNC: 1.2 MG/DL
BUN SERPL-MCNC: 12 MG/DL
BUN SERPL-MCNC: 13 MG/DL
CALCIUM SERPL-MCNC: 9.1 MG/DL
CALCIUM SERPL-MCNC: 9.1 MG/DL
CHLORIDE SERPL-SCNC: 102 MMOL/L
CHLORIDE SERPL-SCNC: 103 MMOL/L
CO2 SERPL-SCNC: 24 MMOL/L
CO2 SERPL-SCNC: 26 MMOL/L
CREAT SERPL-MCNC: 0.8 MG/DL
CREAT SERPL-MCNC: 0.8 MG/DL
DIFFERENTIAL METHOD: ABNORMAL
EOSINOPHIL # BLD AUTO: 0.1 K/UL
EOSINOPHIL NFR BLD: 1.2 %
ERYTHROCYTE [DISTWIDTH] IN BLOOD BY AUTOMATED COUNT: 13.5 %
EST. GFR  (AFRICAN AMERICAN): >60 ML/MIN/1.73 M^2
EST. GFR  (AFRICAN AMERICAN): >60 ML/MIN/1.73 M^2
EST. GFR  (NON AFRICAN AMERICAN): >60 ML/MIN/1.73 M^2
EST. GFR  (NON AFRICAN AMERICAN): >60 ML/MIN/1.73 M^2
GLUCOSE SERPL-MCNC: 101 MG/DL
GLUCOSE SERPL-MCNC: 109 MG/DL
HCT VFR BLD AUTO: 40.9 %
HGB BLD-MCNC: 14.2 G/DL
LYMPHOCYTES # BLD AUTO: 1.3 K/UL
LYMPHOCYTES NFR BLD: 16.3 %
MAGNESIUM SERPL-MCNC: 1.8 MG/DL
MCH RBC QN AUTO: 29.3 PG
MCHC RBC AUTO-ENTMCNC: 34.7 %
MCV RBC AUTO: 85 FL
MONOCYTES # BLD AUTO: 1 K/UL
MONOCYTES NFR BLD: 13.3 %
NEUTROPHILS # BLD AUTO: 5.3 K/UL
NEUTROPHILS NFR BLD: 68.5 %
PHOSPHATE SERPL-MCNC: 3.5 MG/DL
PLATELET # BLD AUTO: 170 K/UL
PMV BLD AUTO: 10.4 FL
POCT GLUCOSE: 103 MG/DL (ref 70–110)
POTASSIUM SERPL-SCNC: 3.2 MMOL/L
POTASSIUM SERPL-SCNC: 3.8 MMOL/L
PROT SERPL-MCNC: 6.5 G/DL
RBC # BLD AUTO: 4.84 M/UL
SODIUM SERPL-SCNC: 136 MMOL/L
SODIUM SERPL-SCNC: 141 MMOL/L
TROPONIN I SERPL DL<=0.01 NG/ML-MCNC: 0.2 NG/ML
WBC # BLD AUTO: 7.74 K/UL

## 2017-05-06 PROCEDURE — 80048 BASIC METABOLIC PNL TOTAL CA: CPT

## 2017-05-06 PROCEDURE — 63600175 PHARM REV CODE 636 W HCPCS: Performed by: INTERNAL MEDICINE

## 2017-05-06 PROCEDURE — 83735 ASSAY OF MAGNESIUM: CPT

## 2017-05-06 PROCEDURE — 25000003 PHARM REV CODE 250: Performed by: STUDENT IN AN ORGANIZED HEALTH CARE EDUCATION/TRAINING PROGRAM

## 2017-05-06 PROCEDURE — 94640 AIRWAY INHALATION TREATMENT: CPT

## 2017-05-06 PROCEDURE — 99232 SBSQ HOSP IP/OBS MODERATE 35: CPT | Mod: GC,,, | Performed by: HOSPITALIST

## 2017-05-06 PROCEDURE — 25000003 PHARM REV CODE 250: Performed by: INTERNAL MEDICINE

## 2017-05-06 PROCEDURE — C9113 INJ PANTOPRAZOLE SODIUM, VIA: HCPCS | Performed by: INTERNAL MEDICINE

## 2017-05-06 PROCEDURE — 80053 COMPREHEN METABOLIC PANEL: CPT

## 2017-05-06 PROCEDURE — 94761 N-INVAS EAR/PLS OXIMETRY MLT: CPT

## 2017-05-06 PROCEDURE — 85025 COMPLETE CBC W/AUTO DIFF WBC: CPT

## 2017-05-06 PROCEDURE — 36415 COLL VENOUS BLD VENIPUNCTURE: CPT

## 2017-05-06 PROCEDURE — 94664 DEMO&/EVAL PT USE INHALER: CPT

## 2017-05-06 PROCEDURE — 97530 THERAPEUTIC ACTIVITIES: CPT

## 2017-05-06 PROCEDURE — 11000001 HC ACUTE MED/SURG PRIVATE ROOM

## 2017-05-06 PROCEDURE — 97162 PT EVAL MOD COMPLEX 30 MIN: CPT

## 2017-05-06 PROCEDURE — 25000242 PHARM REV CODE 250 ALT 637 W/ HCPCS: Performed by: INTERNAL MEDICINE

## 2017-05-06 PROCEDURE — 84100 ASSAY OF PHOSPHORUS: CPT

## 2017-05-06 RX ORDER — FUROSEMIDE 10 MG/ML
40 INJECTION INTRAMUSCULAR; INTRAVENOUS ONCE
Status: COMPLETED | OUTPATIENT
Start: 2017-05-06 | End: 2017-05-06

## 2017-05-06 RX ORDER — TRAMADOL HYDROCHLORIDE 50 MG/1
50 TABLET ORAL EVERY 6 HOURS PRN
Status: ON HOLD | COMMUNITY
End: 2017-05-26

## 2017-05-06 RX ORDER — POTASSIUM CHLORIDE 7.45 MG/ML
10 INJECTION INTRAVENOUS
Status: DISCONTINUED | OUTPATIENT
Start: 2017-05-06 | End: 2017-05-06

## 2017-05-06 RX ORDER — MAGNESIUM SULFATE HEPTAHYDRATE 40 MG/ML
2 INJECTION, SOLUTION INTRAVENOUS ONCE
Status: COMPLETED | OUTPATIENT
Start: 2017-05-06 | End: 2017-05-06

## 2017-05-06 RX ORDER — FUROSEMIDE 40 MG/1
40 TABLET ORAL DAILY
Status: DISCONTINUED | OUTPATIENT
Start: 2017-05-06 | End: 2017-05-08 | Stop reason: HOSPADM

## 2017-05-06 RX ORDER — POTASSIUM CHLORIDE 7.45 MG/ML
10 INJECTION INTRAVENOUS
Status: COMPLETED | OUTPATIENT
Start: 2017-05-06 | End: 2017-05-06

## 2017-05-06 RX ADMIN — METOPROLOL SUCCINATE 100 MG: 100 TABLET, FILM COATED, EXTENDED RELEASE ORAL at 12:05

## 2017-05-06 RX ADMIN — MORPHINE SULFATE 2 MG: 2 INJECTION, SOLUTION INTRAMUSCULAR; INTRAVENOUS at 08:05

## 2017-05-06 RX ADMIN — OLANZAPINE 2.5 MG: 2.5 TABLET, FILM COATED ORAL at 10:05

## 2017-05-06 RX ADMIN — FUROSEMIDE 40 MG: 40 TABLET ORAL at 05:05

## 2017-05-06 RX ADMIN — METRONIDAZOLE 500 MG: 500 INJECTION, SOLUTION INTRAVENOUS at 04:05

## 2017-05-06 RX ADMIN — ESCITALOPRAM 20 MG: 20 TABLET, FILM COATED ORAL at 12:05

## 2017-05-06 RX ADMIN — HEPARIN SODIUM 5000 UNITS: 5000 INJECTION, SOLUTION INTRAVENOUS; SUBCUTANEOUS at 09:05

## 2017-05-06 RX ADMIN — POTASSIUM CHLORIDE 10 MEQ: 10 INJECTION, SOLUTION INTRAVENOUS at 07:05

## 2017-05-06 RX ADMIN — IPRATROPIUM BROMIDE AND ALBUTEROL SULFATE 3 ML: .5; 3 SOLUTION RESPIRATORY (INHALATION) at 01:05

## 2017-05-06 RX ADMIN — HEPARIN SODIUM 5000 UNITS: 5000 INJECTION, SOLUTION INTRAVENOUS; SUBCUTANEOUS at 02:05

## 2017-05-06 RX ADMIN — CIPROFLOXACIN 400 MG: 2 INJECTION, SOLUTION INTRAVENOUS at 06:05

## 2017-05-06 RX ADMIN — POTASSIUM CHLORIDE 10 MEQ: 10 INJECTION, SOLUTION INTRAVENOUS at 09:05

## 2017-05-06 RX ADMIN — CIPROFLOXACIN 400 MG: 2 INJECTION, SOLUTION INTRAVENOUS at 05:05

## 2017-05-06 RX ADMIN — MAGNESIUM SULFATE IN WATER 2 G: 40 INJECTION, SOLUTION INTRAVENOUS at 07:05

## 2017-05-06 RX ADMIN — IPRATROPIUM BROMIDE AND ALBUTEROL SULFATE 3 ML: .5; 3 SOLUTION RESPIRATORY (INHALATION) at 08:05

## 2017-05-06 RX ADMIN — POTASSIUM CHLORIDE 10 MEQ: 10 INJECTION, SOLUTION INTRAVENOUS at 08:05

## 2017-05-06 RX ADMIN — FUROSEMIDE 40 MG: 10 INJECTION, SOLUTION INTRAMUSCULAR; INTRAVENOUS at 09:05

## 2017-05-06 RX ADMIN — METRONIDAZOLE 500 MG: 500 INJECTION, SOLUTION INTRAVENOUS at 02:05

## 2017-05-06 RX ADMIN — BENAZEPRIL HYDROCHLORIDE 40 MG: 20 TABLET, FILM COATED ORAL at 12:05

## 2017-05-06 RX ADMIN — METRONIDAZOLE 500 MG: 500 INJECTION, SOLUTION INTRAVENOUS at 09:05

## 2017-05-06 RX ADMIN — ATORVASTATIN CALCIUM 20 MG: 20 TABLET, FILM COATED ORAL at 12:05

## 2017-05-06 RX ADMIN — PANTOPRAZOLE SODIUM 40 MG: 40 INJECTION, POWDER, FOR SOLUTION INTRAVENOUS at 08:05

## 2017-05-06 RX ADMIN — HEPARIN SODIUM 5000 UNITS: 5000 INJECTION, SOLUTION INTRAVENOUS; SUBCUTANEOUS at 05:05

## 2017-05-06 NOTE — PT/OT/SLP EVAL
Physical Therapy  Evaluation/treatment     Lilia Hidalgo   MRN: [id number]   Admitting Diagnosis: Septic encephalopathy    PT Received On: 05/06/17  PT Start Time: 0949     PT Stop Time: 1022    PT Total Time (min): 33 min       Billable Minutes:  Evaluation 23 minutes  and Therapeutic Activity 10 minutes    Diagnosis: Septic encephalopathy    Past Medical History:   Diagnosis Date    Arthritis     GERD (gastroesophageal reflux disease)     Hyperlipidemia     Hypertension     Joint pain     Knee pain, left 08/2016    pain with walking or standing    PUD (peptic ulcer disease)       Past Surgical History:   Procedure Laterality Date    APPENDECTOMY      COLONOSCOPY      08    EYE SURGERY      bilateral cataract    HYSTERECTOMY      UNKNOWN BSO    JOINT REPLACEMENT      right hip replacement    JOINT REPLACEMENT      right foot tendon surgery    KNEE ARTHROPLASTY Left 2001    thumb surgery Left 09/2015    UPPER GASTROINTESTINAL ENDOSCOPY      2013       Referring physician: MONI Westfall  Date referred to PT: 5/5/2017    General Precautions: Standard, fall  Orthopedic Precautions: N/A   Braces: N/A       Do you have any cultural, spiritual, Druze conflicts, given your current situation?: None stated     Patient History:  Lives With: facility resident  Living Arrangements: independent living facility  Living Environment Comment: Pt's daughter present to provide PLOF and living information. Pt [facility] apartment with handicap accessible bathroom. PTA, pt was mod (I) using RW or rollator and (I) with ADLs. Pt with recent decline in function last 2 weeks using WC for longer distances. Pt reports no recent falls. Facility staff assist patient only to cafeteria or events such as bingo.   Equipment Currently Used at Home: walker, rolling, rollator    Previous Level of Function:  Ambulation Skills: independent  Transfer Skills: independent  ADL Skills: independent  Work/Leisure  "Activity: independent    Subjective:  Communicated with RN prior to session.  Pt agreeable to PT evaluation   Chief Complaint: weakness and fear of falling. Pt reports "I don't know if I can walk that far."   Patient goals: improve mobility     Pain Ratin/10   Pain Rating Post-Intervention: 0/10    Objective:   Patient found with: peripheral IV, telemetry, virtual monitor     Cognitive Exam:  Oriented to: Person, Place and Situation    Follows Commands/attention: Follows one-step commands  Communication: clear/fluent  Safety awareness/insight to disability: impaired    Physical Exam:  Postural examination/scapula alignment: Rounded shoulder and Head forward    Skin integrity: Visible skin intact  Edema: Mild in BLE    Sensation:   Intact    Lower Extremity Range of Motion:  Right Lower Extremity: WFL  Left Lower Extremity: WFL    Lower Extremity Strength:  Right Lower Extremity: Deficits: grossly 3+/5  Left Lower Extremity: Deficits: grossly 3+/5     Fine motor coordination:  Intact    Gross motor coordination: WFL    Functional Mobility:  Bed Mobility:       Transfers:  Sit <> Stand Assistance: Minimum Assistance  (x2 trials. Trial 1 from EOB. Trial 2 from BS commode)  Sit <> Stand Assistive Device: Rolling Walker  Bed <> Chair Technique: Stand Pivot  Bed <> Chair Assistance: Minimum Assistance  Bed <> Chair Assistive Device: Rolling Walker  Toilet Transfer Technique: Stand Pivot  Toilet Transfer Assistance: Minimum Assistance  Toilet Transfer Assistive Device: bedside commode, Rolling Walker    Gait:   Gait Distance: 40' with CGA and RW. Cues for safety and AD management.   Assistance 1: Contact Guard Assistance  Gait Assistive Device: Rolling walker  Gait Pattern: reciprocal  Gait Deviation(s): decreased tracee, increased time in double stance, decreased velocity of limb motion, decreased step length, decreased stride length    Stairs: did not occur     Balance:   Static Sit: GOOD: Takes MODERATE challenges " from all directions  Dynamic Sit: GOOD-: Maintains balance through MODERATE excursions of active trunk movement,     Static Stand: FAIR+: Takes MINIMAL challenges from all directions  Dynamic stand: FAIR: Needs CONTACT GUARD during gait    Therapeutic Activities and Exercises:  Therapeutic activities aimed to increase pt's independence, safety, and efficiency with bed mobility and functional transfers. See above for assistance levels.   · Verbal cues for BLE placement on floor, forward trunk lean (nose over toes), momentum to assist with sit to stand transition, and hand placement for safety with AD (1 hand on walker and 1 hand on bed to push). Pt with excessive posterior lean requiring CGA to shift weight anterior and position RW for improved static standing balance.   · 2 trials of sit to stand from EOB and BS commode  · Cues provided for safety throughout session   · Pt educated on role of PT and POC/goals for therapy as well as safety with mobility. Pt and family verbalized understanding. Pt expressed no further concerns/questions.   · White board updated    AM-[facility] 6 CLICK MOBILITY  How much help from another person does this patient currently need?   1 = Unable, Total/Dependent Assistance  2 = A lot, Maximum/Moderate Assistance  3 = A little, Minimum/Contact Guard/Supervision  4 = None, Modified [place]/Independent    Turning over in bed (including adjusting bedclothes, sheets and blankets)?: 3  Sitting down on and standing up from a chair with arms (e.g., wheelchair, bedside commode, etc.): 3  Moving from lying on back to sitting on the side of the bed?: 3  Moving to and from a bed to a chair (including a wheelchair)?: 3  Need to walk in hospital room?: 3  Climbing 3-5 steps with a railing?: 3  Total Score: 18     AM-PAC Raw Score CMS G-Code Modifier Level of Impairment Assistance   6 % Total / Unable   7 - 9 CM 80 - 100% Maximal Assist   10 - 14 CL 60 - 80% Moderate Assist   15 - 19 CK 40 - 60%  Moderate Assist   20 - 22 CJ 20 - 40% Minimal Assist   23 CI 1-20% SBA / CGA   24 CH 0% Independent/ Mod I     Patient left up in chair with all lines intact, call button in reach and daughter present.    Assessment:   Lilia Hidalgo is a 85 y.o. female with a medical diagnosis of Septic encephalopathy. Upon initial PT evaluation, pt presents with decreased endurance, decreased safety awareness, gait instability, imbalance, and impaired functional mobility. Pt completed transfers with min A and ambulated 40' with CGA using RW. PTA, pt was mod (I) with RW and (I) with ADLs. Pt would benefit from skilled PT services to address these deficits and improve return to PLOF. Anticipate d/c to SNF (short stay).     Rehab identified problem list/impairments: Rehab identified problem list/impairments: weakness, impaired endurance, impaired self care skills, impaired functional mobilty, gait instability, impaired balance, decreased coordination    Rehab potential is good.    Activity tolerance: Good    Discharge recommendations: Discharge Facility/Level Of Care Needs: nursing facility, skilled (short stay)     Barriers to discharge: Barriers to Discharge: Decreased caregiver support    Equipment recommendations: Equipment Needed After Discharge: none     GOALS:   Physical Therapy Goals        Problem: Physical Therapy Goal    Goal Priority Disciplines Outcome Goal Variances Interventions   Physical Therapy Goal     PT/OT, PT Ongoing (interventions implemented as appropriate)     Description:  Goals to be met by: 2017     Patient will increase functional independence with mobility by performin. Supine to sit with Modified [place]  2. Sit to supine with Modified [place]  3. Sit to stand transfer with Supervision  4. Bed to chair transfer with Stand-by Assistance using Rolling Walker  5. Gait  x 100 feet with Contact Guard Assistance using Rolling Walker.   6. Lower extremity exercise program x 15 reps per  handout, with assistance as needed                PLAN:    Patient to be seen 4 x/week to address the above listed problems via gait training, therapeutic activities, therapeutic exercises, neuromuscular re-education  Plan of Care expires: 06/05/17  Plan of Care reviewed with: patient          Rosa Haq, PT  05/06/2017

## 2017-05-06 NOTE — ASSESSMENT & PLAN NOTE
-Cipro/Flagyl for better enteric coverage  -NPO status  -Diverticulitis noted on CT ap  -will follow volume status as patient appears overloaded on exam  -pain control with morphine/ antiemetics PRN  -follow septic workup, blood cultures.

## 2017-05-06 NOTE — SUBJECTIVE & OBJECTIVE
Interval History: Patient is slowly improving with mental status and is able to communicate better today, AOx2. She reports near-resolution of abdominal pain symptoms and continued improvement with diuresis and improving SOB. Follow up BMP stable. Will advance diet based on bedside swallow.     Review of Systems   Unable to perform ROS: Mental status change   Constitutional: Negative for fever.   Gastrointestinal: Positive for abdominal pain. Negative for constipation, diarrhea, nausea and vomiting.   Genitourinary: Negative for dysuria.   Psychiatric/Behavioral: Negative for confusion.     Objective:     Vital Signs (Most Recent):  Temp: 97.8 °F (36.6 °C) (05/06/17 1239)  Pulse: 72 (05/06/17 1336)  Resp: 18 (05/06/17 1336)  BP: (!) 141/64 (05/06/17 1239)  SpO2: 95 % (05/06/17 1336) Vital Signs (24h Range):  Temp:  [97.8 °F (36.6 °C)-99 °F (37.2 °C)] 97.8 °F (36.6 °C)  Pulse:  [70-86] 72  Resp:  [16-22] 18  SpO2:  [94 %-98 %] 95 %  BP: (128-169)/(60-76) 141/64     Weight: 86.7 kg (191 lb 3.2 oz)  Body mass index is 29.95 kg/(m^2).  No intake or output data in the 24 hours ending 05/06/17 1453   Physical Exam   HENT:   Mouth/Throat: Oropharynx is clear and moist.   Eyes: EOM are normal. Pupils are equal, round, and reactive to light. Left eye exhibits no discharge. No scleral icterus.   Neck: Normal range of motion. No JVD present. No tracheal deviation present. No thyromegaly present.   Cardiovascular: Normal rate, regular rhythm, normal heart sounds and intact distal pulses.  Exam reveals no gallop and no friction rub.    No murmur heard.  Pulmonary/Chest: Effort normal. No respiratory distress. She has no wheezes. She has rales. She exhibits no tenderness.   Abdominal: Soft. Bowel sounds are normal. She exhibits no distension and no mass. There is tenderness. No hernia.   Musculoskeletal: Normal range of motion. She exhibits edema and tenderness.   Lymphadenopathy:     She has no cervical adenopathy.    Neurological: She is alert. No cranial nerve deficit. Coordination normal.       Significant Labs:   CBC:   Recent Labs  Lab 05/05/17  0905 05/06/17  0412   WBC 8.61 7.74   HGB 15.4 14.2   HCT 44.0 40.9    170     CMP:   Recent Labs  Lab 05/05/17  1008 05/06/17  0412 05/06/17  1329   * 136 141   K 3.6 3.2* 3.8    102 103   CO2 23 24 26   * 109 101   BUN 10 13 12   CREATININE 0.7 0.8 0.8   CALCIUM 9.0 9.1 9.1   PROT 7.0 6.5  --    ALBUMIN 3.3* 2.9*  --    BILITOT 1.3* 1.2*  --    ALKPHOS 106 97  --    AST 23 22  --    ALT 24 17  --    ANIONGAP 12 10 12   EGFRNONAA >60.0 >60.0 >60.0       Significant Imaging: I have reviewed and interpreted all pertinent imaging results/findings within the past 24 hours.

## 2017-05-06 NOTE — ASSESSMENT & PLAN NOTE
-Lasix given twice this admission; will continue to monitor fluid status  -CXR with likely cardiogenic edema  -NPO, current antibiotics will cover PNA as well as enteric organisms.

## 2017-05-06 NOTE — PHARMACY MED REC
[facility] Medication Reconciliation  Template    Patient was admitted on 5/5/2017 for Septic encephalopathy.      Patient's prior to admission medication regimen was as follows:  Prescriptions Prior to Admission   Medication Sig Dispense Refill Last Dose    acetaminophen (TYLENOL) 500 MG tablet Take 500 mg by mouth every 6 (six) hours as needed.   Taking    atorvastatin (LIPITOR) 20 MG tablet take 1 tablet by mouth once daily 30 tablet 11 Taking    benazepril (LOTENSIN) 40 MG tablet Take 1 tablet (40 mg total) by mouth once daily. 90 tablet 3 Taking    escitalopram oxalate (LEXAPRO) 20 MG tablet Take 1 tablet (20 mg total) by mouth once daily. 30 tablet 11 Taking    fish oil-omega-3 fatty acids 300-1,000 mg capsule Take 2 g by mouth once daily.   Taking    furosemide (LASIX) 40 MG tablet Take 1 tablet (40 mg total) by mouth once daily. 90 tablet 3 Taking    melatonin 5 mg Tab Take 1 tablet by mouth every evening.    Not Taking    meloxicam (MOBIC) 15 MG tablet Take 1 tablet (15 mg total) by mouth once daily. 30 tablet 11 Not Taking    metoprolol succinate (TOPROL-XL) 100 MG 24 hr tablet Take 1 tablet (100 mg total) by mouth once daily. 90 tablet 3 Taking    MULTIVITAMIN W-MINERALS/LUTEIN (CENTRUM SILVER ORAL) Take by mouth once daily.   Taking    omeprazole (PRILOSEC) 20 MG capsule Take 1 capsule (20 mg total) by mouth once daily. 90 capsule 3 Taking    temazepam (RESTORIL) 15 mg Cap take 1 capsule by mouth at bedtime 30 capsule 5 Taking    tramadol (ULTRAM) 50 mg tablet Take 50 mg by mouth every 6 (six) hours as needed for Pain.       triamcinolone acetonide 0.1% (KENALOG) 0.1 % cream Apply topically 2 (two) times daily. 15 g 0 Not Taking         Please add appropriate    SmartPhrase below: Admission Medication Reconciliation - Pharmacy Consult Note    The home medication history was taken by [facility] lyly Byrnes.  Based on information gathered and subsequent review by the clinical  pharmacist, the items below may need attention.    No issues noted with the medication reconciliation.      Potential issues to be addressed PRIOR TO DISCHARGE  o None    Please address this information as you see fit.  Feel free to contact us if you have any questions or require assistance.    PHARMACIST NAME Moises Perez  EXT [phone]

## 2017-05-06 NOTE — PROGRESS NOTES
[facility] Hospital Medicine  Progress Note    Patient Name: Lilia Hidalgo  MRN: [id number]  Patient Class: IP- Inpatient   Admission Date: 5/5/2017  Length of Stay: 1 days  Attending Physician: Guero Westfall MD  Primary Care Provider: April Herrera MD    [facility] Medicine Team: [facility] HOSP MED 5 Cristhian Bateman MD    Subjective:     Principal Problem:Septic encephalopathy    Hospital Course:  86 yo F with Diastolic dysfunction presents with encephalopathy and vomiting concerning  heart failure and abdominal pain 2/2 diverticulitis    Interval History: Patient is slowly improving with mental status and is able to communicate better today, AOx2. She reports near-resolution of abdominal pain symptoms and continued improvement with diuresis and improving SOB. Follow up BMP stable. Will advance diet based on bedside swallow.     Review of Systems   Unable to perform ROS: Mental status change   Constitutional: Negative for fever.   Gastrointestinal: Positive for abdominal pain. Negative for constipation, diarrhea, nausea and vomiting.   Genitourinary: Negative for dysuria.   Psychiatric/Behavioral: Negative for confusion.     Objective:     Vital Signs (Most Recent):  Temp: 97.8 °F (36.6 °C) (05/06/17 1239)  Pulse: 72 (05/06/17 1336)  Resp: 18 (05/06/17 1336)  BP: (!) 141/64 (05/06/17 1239)  SpO2: 95 % (05/06/17 1336) Vital Signs (24h Range):  Temp:  [97.8 °F (36.6 °C)-99 °F (37.2 °C)] 97.8 °F (36.6 °C)  Pulse:  [70-86] 72  Resp:  [16-22] 18  SpO2:  [94 %-98 %] 95 %  BP: (128-169)/(60-76) 141/64     Weight: 86.7 kg (191 lb 3.2 oz)  Body mass index is 29.95 kg/(m^2).  No intake or output data in the 24 hours ending 05/06/17 1453   Physical Exam   HENT:   Mouth/Throat: Oropharynx is clear and moist.   Eyes: EOM are normal. Pupils are equal, round, and reactive to light. Left eye exhibits no discharge. No scleral icterus.   Neck: Normal range of motion. No JVD present. No tracheal deviation present. No  thyromegaly present.   Cardiovascular: Normal rate, regular rhythm, normal heart sounds and intact distal pulses.  Exam reveals no gallop and no friction rub.    No murmur heard.  Pulmonary/Chest: Effort normal. No respiratory distress. She has no wheezes. She has rales. She exhibits no tenderness.   Abdominal: Soft. Bowel sounds are normal. She exhibits no distension and no mass. There is tenderness. No hernia.   Musculoskeletal: Normal range of motion. She exhibits edema and tenderness.   Lymphadenopathy:     She has no cervical adenopathy.   Neurological: She is alert. No cranial nerve deficit. Coordination normal.       Significant Labs:   CBC:   Recent Labs  Lab 05/05/17  0905 05/06/17  0412   WBC 8.61 7.74   HGB 15.4 14.2   HCT 44.0 40.9    170     CMP:   Recent Labs  Lab 05/05/17  1008 05/06/17  0412 05/06/17  1329   * 136 141   K 3.6 3.2* 3.8    102 103   CO2 23 24 26   * 109 101   BUN 10 13 12   CREATININE 0.7 0.8 0.8   CALCIUM 9.0 9.1 9.1   PROT 7.0 6.5  --    ALBUMIN 3.3* 2.9*  --    BILITOT 1.3* 1.2*  --    ALKPHOS 106 97  --    AST 23 22  --    ALT 24 17  --    ANIONGAP 12 10 12   EGFRNONAA >60.0 >60.0 >60.0       Significant Imaging: I have reviewed and interpreted all pertinent imaging results/findings within the past 24 hours.    Assessment/Plan:      * Septic encephalopathy  -Zyprexa PRN   -delirium precautions; treat underlying infection  -IV medications and NPO when swallow evaluation cleared  -follow up blood cultures, respiratory Cx        Essential hypertension  -can resume home PO regimen; Lopressor PRN IV      Hyperlipidemia  -resume home statin when can tolerate PO      GERD (gastroesophageal reflux disease)  -PPI      Debility  -PT/OT consult      Elevated troponin  -likely in light of HFpEF; trend to rule out ACS      Depressed mood  -resume home SSRI when can tolerate PO      Diverticulitis of large intestine without perforation or abscess without  bleeding  -Cipro/Flagyl for better enteric coverage  -NPO status  -Diverticulitis noted on CT ap  -will follow volume status as patient appears overloaded on exam  -pain control with morphine/ antiemetics PRN  -follow septic workup, blood cultures.      Acute on chronic diastolic heart failure  -Lasix given twice this admission; will continue to monitor fluid status  -CXR with likely cardiogenic edema  -NPO, current antibiotics will cover PNA as well as enteric organisms.       Cholelithiasis without cholecystitis  -Tbili with slight elevation, continue to trend CMP/CBC  -choleliths noted on CTap  -AES consult if worsening      VTE Risk Mitigation         Ordered     heparin (porcine) injection 5,000 Units  Every 8 hours     Route:  Subcutaneous        05/05/17 1656     Medium Risk of VTE  Once      05/05/17 1501     Place sequential compression device  Until discontinued      05/05/17 1501          Cristhian Bateman MD  Department of Hospital Medicine   [facility]

## 2017-05-06 NOTE — PLAN OF CARE
Problem: Physical Therapy Goal  Goal: Physical Therapy Goal  Goals to be met by: 2017     Patient will increase functional independence with mobility by performin. Supine to sit with Modified [place]  2. Sit to supine with Modified [place]  3. Sit to stand transfer with Supervision  4. Bed to chair transfer with Stand-by Assistance using Rolling Walker  5. Gait x 100 feet with Contact Guard Assistance using Rolling Walker.   6. Lower extremity exercise program x 15 reps per handout, with assistance as needed  Outcome: Ongoing (interventions implemented as appropriate)  Upon initial PT evaluation, pt presents with decreased endurance, decreased safety awareness, gait instability, imbalance, and impaired functional mobility. Pt completed transfers with min A and ambulated 40' with CGA using RW. PTA, pt was mod (I) with RW and (I) with ADLs. Pt would benefit from skilled PT services to address these deficits and improve return to PLOF. Anticipate d/c to SNF (short stay).      Rosa Haq DPT, PT  2017

## 2017-05-06 NOTE — ASSESSMENT & PLAN NOTE
-Zyprexa PRN   -delirium precautions; treat underlying infection  -IV medications and NPO when swallow evaluation cleared  -follow up blood cultures, respiratory Cx

## 2017-05-07 PROBLEM — A41.9 SEPSIS: Status: RESOLVED | Noted: 2017-05-05 | Resolved: 2017-05-07

## 2017-05-07 LAB
ALBUMIN SERPL BCP-MCNC: 2.7 G/DL
ALP SERPL-CCNC: 81 U/L
ALT SERPL W/O P-5'-P-CCNC: 15 U/L
ANION GAP SERPL CALC-SCNC: 9 MMOL/L
AST SERPL-CCNC: 18 U/L
BASOPHILS # BLD AUTO: 0.04 K/UL
BASOPHILS NFR BLD: 0.6 %
BILIRUB SERPL-MCNC: 1 MG/DL
BUN SERPL-MCNC: 17 MG/DL
CALCIUM SERPL-MCNC: 8.8 MG/DL
CHLORIDE SERPL-SCNC: 104 MMOL/L
CO2 SERPL-SCNC: 27 MMOL/L
CREAT SERPL-MCNC: 0.9 MG/DL
DIFFERENTIAL METHOD: NORMAL
EOSINOPHIL # BLD AUTO: 0.4 K/UL
EOSINOPHIL NFR BLD: 5.9 %
ERYTHROCYTE [DISTWIDTH] IN BLOOD BY AUTOMATED COUNT: 13.7 %
EST. GFR  (AFRICAN AMERICAN): >60 ML/MIN/1.73 M^2
EST. GFR  (NON AFRICAN AMERICAN): 58.5 ML/MIN/1.73 M^2
GLUCOSE SERPL-MCNC: 93 MG/DL
HCT VFR BLD AUTO: 38.4 %
HGB BLD-MCNC: 13.3 G/DL
LYMPHOCYTES # BLD AUTO: 1.7 K/UL
LYMPHOCYTES NFR BLD: 24.7 %
MAGNESIUM SERPL-MCNC: 1.9 MG/DL
MCH RBC QN AUTO: 29.3 PG
MCHC RBC AUTO-ENTMCNC: 34.6 %
MCV RBC AUTO: 85 FL
MONOCYTES # BLD AUTO: 0.9 K/UL
MONOCYTES NFR BLD: 12.9 %
NEUTROPHILS # BLD AUTO: 3.9 K/UL
NEUTROPHILS NFR BLD: 55.9 %
PHOSPHATE SERPL-MCNC: 3.6 MG/DL
PLATELET # BLD AUTO: 180 K/UL
PMV BLD AUTO: 10.3 FL
POTASSIUM SERPL-SCNC: 3.3 MMOL/L
PROT SERPL-MCNC: 6 G/DL
RBC # BLD AUTO: 4.54 M/UL
SODIUM SERPL-SCNC: 140 MMOL/L
WBC # BLD AUTO: 6.92 K/UL

## 2017-05-07 PROCEDURE — 99232 SBSQ HOSP IP/OBS MODERATE 35: CPT | Mod: GC,,, | Performed by: HOSPITALIST

## 2017-05-07 PROCEDURE — 25000003 PHARM REV CODE 250: Performed by: STUDENT IN AN ORGANIZED HEALTH CARE EDUCATION/TRAINING PROGRAM

## 2017-05-07 PROCEDURE — 99900031 HC PATIENT EDUCATION (STAT)

## 2017-05-07 PROCEDURE — 63600175 PHARM REV CODE 636 W HCPCS: Performed by: INTERNAL MEDICINE

## 2017-05-07 PROCEDURE — 25000242 PHARM REV CODE 250 ALT 637 W/ HCPCS: Performed by: INTERNAL MEDICINE

## 2017-05-07 PROCEDURE — 99900035 HC TECH TIME PER 15 MIN (STAT)

## 2017-05-07 PROCEDURE — 94640 AIRWAY INHALATION TREATMENT: CPT

## 2017-05-07 PROCEDURE — 83735 ASSAY OF MAGNESIUM: CPT

## 2017-05-07 PROCEDURE — 84100 ASSAY OF PHOSPHORUS: CPT

## 2017-05-07 PROCEDURE — 97530 THERAPEUTIC ACTIVITIES: CPT

## 2017-05-07 PROCEDURE — 36415 COLL VENOUS BLD VENIPUNCTURE: CPT

## 2017-05-07 PROCEDURE — 25000003 PHARM REV CODE 250: Performed by: INTERNAL MEDICINE

## 2017-05-07 PROCEDURE — 85025 COMPLETE CBC W/AUTO DIFF WBC: CPT

## 2017-05-07 PROCEDURE — 94664 DEMO&/EVAL PT USE INHALER: CPT

## 2017-05-07 PROCEDURE — 97165 OT EVAL LOW COMPLEX 30 MIN: CPT

## 2017-05-07 PROCEDURE — 11000001 HC ACUTE MED/SURG PRIVATE ROOM

## 2017-05-07 PROCEDURE — 94761 N-INVAS EAR/PLS OXIMETRY MLT: CPT

## 2017-05-07 PROCEDURE — 80053 COMPREHEN METABOLIC PANEL: CPT

## 2017-05-07 PROCEDURE — 94799 UNLISTED PULMONARY SVC/PX: CPT

## 2017-05-07 RX ORDER — PANTOPRAZOLE SODIUM 40 MG/1
40 TABLET, DELAYED RELEASE ORAL DAILY
Status: DISCONTINUED | OUTPATIENT
Start: 2017-05-07 | End: 2017-05-08 | Stop reason: HOSPADM

## 2017-05-07 RX ORDER — POTASSIUM CHLORIDE 20 MEQ/1
40 TABLET, EXTENDED RELEASE ORAL ONCE
Status: COMPLETED | OUTPATIENT
Start: 2017-05-07 | End: 2017-05-07

## 2017-05-07 RX ORDER — CIPROFLOXACIN 500 MG/1
500 TABLET ORAL EVERY 12 HOURS
Status: DISCONTINUED | OUTPATIENT
Start: 2017-05-07 | End: 2017-05-08 | Stop reason: HOSPADM

## 2017-05-07 RX ORDER — METRONIDAZOLE 500 MG/1
500 TABLET ORAL EVERY 8 HOURS
Status: DISCONTINUED | OUTPATIENT
Start: 2017-05-07 | End: 2017-05-08 | Stop reason: HOSPADM

## 2017-05-07 RX ADMIN — BENAZEPRIL HYDROCHLORIDE 40 MG: 20 TABLET, FILM COATED ORAL at 08:05

## 2017-05-07 RX ADMIN — OLANZAPINE 2.5 MG: 2.5 TABLET, FILM COATED ORAL at 08:05

## 2017-05-07 RX ADMIN — METRONIDAZOLE 500 MG: 500 TABLET ORAL at 10:05

## 2017-05-07 RX ADMIN — CIPROFLOXACIN HYDROCHLORIDE 500 MG: 500 TABLET, FILM COATED ORAL at 08:05

## 2017-05-07 RX ADMIN — TRAMADOL HYDROCHLORIDE 25 MG: 50 TABLET, FILM COATED ORAL at 08:05

## 2017-05-07 RX ADMIN — PANTOPRAZOLE SODIUM 40 MG: 40 TABLET, DELAYED RELEASE ORAL at 08:05

## 2017-05-07 RX ADMIN — CIPROFLOXACIN 400 MG: 2 INJECTION, SOLUTION INTRAVENOUS at 06:05

## 2017-05-07 RX ADMIN — MORPHINE SULFATE 2 MG: 2 INJECTION, SOLUTION INTRAMUSCULAR; INTRAVENOUS at 06:05

## 2017-05-07 RX ADMIN — HEPARIN SODIUM 5000 UNITS: 5000 INJECTION, SOLUTION INTRAVENOUS; SUBCUTANEOUS at 06:05

## 2017-05-07 RX ADMIN — FUROSEMIDE 40 MG: 40 TABLET ORAL at 08:05

## 2017-05-07 RX ADMIN — HEPARIN SODIUM 5000 UNITS: 5000 INJECTION, SOLUTION INTRAVENOUS; SUBCUTANEOUS at 10:05

## 2017-05-07 RX ADMIN — IPRATROPIUM BROMIDE AND ALBUTEROL SULFATE 3 ML: .5; 3 SOLUTION RESPIRATORY (INHALATION) at 07:05

## 2017-05-07 RX ADMIN — ESCITALOPRAM 20 MG: 20 TABLET, FILM COATED ORAL at 08:05

## 2017-05-07 RX ADMIN — ATORVASTATIN CALCIUM 20 MG: 20 TABLET, FILM COATED ORAL at 08:05

## 2017-05-07 RX ADMIN — IPRATROPIUM BROMIDE AND ALBUTEROL SULFATE 3 ML: .5; 3 SOLUTION RESPIRATORY (INHALATION) at 01:05

## 2017-05-07 RX ADMIN — HEPARIN SODIUM 5000 UNITS: 5000 INJECTION, SOLUTION INTRAVENOUS; SUBCUTANEOUS at 02:05

## 2017-05-07 RX ADMIN — METRONIDAZOLE 500 MG: 500 TABLET ORAL at 02:05

## 2017-05-07 RX ADMIN — METRONIDAZOLE 500 MG: 500 INJECTION, SOLUTION INTRAVENOUS at 05:05

## 2017-05-07 RX ADMIN — METOPROLOL SUCCINATE 100 MG: 100 TABLET, FILM COATED, EXTENDED RELEASE ORAL at 08:05

## 2017-05-07 RX ADMIN — POTASSIUM CHLORIDE 40 MEQ: 1500 TABLET, EXTENDED RELEASE ORAL at 08:05

## 2017-05-07 NOTE — ASSESSMENT & PLAN NOTE
- Resolved  -Zyprexa PRN   -delirium precautions; treat underlying infection  -follow up blood cultures, respiratory Cx: NGTD

## 2017-05-07 NOTE — PT/OT/SLP PROGRESS
Speech Language Pathology      Lilia Hidalgo  MRN: [id number]     Other (Comment) (Orders received and acknowledged chart reviewed. Pt currently on diet and per discussion with medical team Pt tolerating diet without difficulty. Medical team would still appreciate SLP input regarding swallow assessment/status and in agreement with consult to be completed 5/8 as her mental status continues to improve.      Argelia Baltazar, CCC-SLP

## 2017-05-07 NOTE — ASSESSMENT & PLAN NOTE
-Cipro/Flagyl for enteric coverage  -Diverticulitis noted on CT ap  -pain control with morphine/ antiemetics PRN  -follow septic workup, blood cultures.

## 2017-05-07 NOTE — SUBJECTIVE & OBJECTIVE
Interval History: Dramatic mental status improvement. Physical exam still difficult to localize pain. Worked well with PT/OT. Transitioned to PO Cipro and flagyl.    Review of Systems   Constitutional: Negative for fever.   Gastrointestinal: Positive for abdominal pain. Negative for constipation, diarrhea, nausea and vomiting.   Genitourinary: Negative for dysuria.   Psychiatric/Behavioral: Negative for confusion.     Objective:     Vital Signs (Most Recent):  Temp: 98.7 °F (37.1 °C) (05/07/17 0435)  Pulse: 76 (05/07/17 0435)  Resp: 16 (05/07/17 0435)  BP: 128/61 (05/07/17 0435)  SpO2: (!) 94 % (05/07/17 0435) Vital Signs (24h Range):  Temp:  [97.8 °F (36.6 °C)-99.1 °F (37.3 °C)] 98.7 °F (37.1 °C)  Pulse:  [70-86] 76  Resp:  [16-18] 16  SpO2:  [92 %-96 %] 94 %  BP: (128-160)/(58-74) 128/61     Weight: 86.7 kg (191 lb 3.2 oz)  Body mass index is 29.95 kg/(m^2).    Intake/Output Summary (Last 24 hours) at 05/07/17 0656  Last data filed at 05/06/17 2155   Gross per 24 hour   Intake              720 ml   Output              325 ml   Net              395 ml      Physical Exam   Constitutional: She appears well-developed and well-nourished. No distress.   HENT:   Mouth/Throat: Oropharynx is clear and moist.   Eyes: EOM are normal. Pupils are equal, round, and reactive to light. Left eye exhibits no discharge. No scleral icterus.   Neck: Normal range of motion. No JVD present. No tracheal deviation present. No thyromegaly present.   Cardiovascular: Normal rate, regular rhythm, normal heart sounds and intact distal pulses.  Exam reveals no gallop and no friction rub.    No murmur heard.  Pulmonary/Chest: Effort normal. No respiratory distress. She has no wheezes. She exhibits no tenderness.   Abdominal: Soft. Bowel sounds are normal. She exhibits no distension and no mass. There is tenderness. No hernia.   Musculoskeletal: Normal range of motion. She exhibits edema and tenderness.   Lymphadenopathy:     She has no cervical  adenopathy.   Neurological: She is alert.   Skin: She is not diaphoretic.   Psychiatric: She has a normal mood and affect.   Nursing note and vitals reviewed.

## 2017-05-07 NOTE — PT/OT/SLP EVAL
Occupational Therapy  Evaluation/Treatment    Lilia Hidalgo   MRN: [id number]   Admitting Diagnosis: Septic encephalopathy    OT Date of Treatment: 05/07/17   OT Start Time: 1055  OT Stop Time: 1130  OT Total Time (min): 35 min    Billable Minutes:  Evaluation 20 min  Therapeutic Activity 15 min    Diagnosis: Septic encephalopathy       Past Medical History:   Diagnosis Date    Arthritis     GERD (gastroesophageal reflux disease)     Hyperlipidemia     Hypertension     Joint pain     Knee pain, left 08/2016    pain with walking or standing    PUD (peptic ulcer disease)       Past Surgical History:   Procedure Laterality Date    APPENDECTOMY      COLONOSCOPY      08    EYE SURGERY      bilateral cataract    HYSTERECTOMY      UNKNOWN BSO    JOINT REPLACEMENT      right hip replacement    JOINT REPLACEMENT      right foot tendon surgery    KNEE ARTHROPLASTY Left 2001    thumb surgery Left 09/2015    UPPER GASTROINTESTINAL ENDOSCOPY      2013       Referring physician: Cristhian Bateman MD   Date referred to OT: 5/5/17    General Precautions: Standard, fall  Orthopedic Precautions: N/A  Braces: N/A    Do you have any cultural, spiritual, Presybeterian conflicts, given your current situation?: None     Patient History:  Living Environment  Lives With: facility resident  Living Arrangements: independent living facility  Home Accessibility:  (no concerns)  Home Layout: Able to live on 1st floor  Stair Railings at Home: none  Transportation Available: family or friend will provide  Living Environment Comment: Pt's daughter present to provide PLOF and living info. Pt lives in an independent living facility with handicap accessible bathroom. PTA, pt was mod (I) for mobility using RW or rollator and (I) with ADLs, performing some meal prep tasks as well. Pt with recent decline in function for the past 2 weeks, requiring w/c for longer distances to the activity room. No recent falls.  Equipment Currently Used  at Home: rollator, walker, rolling    Prior level of function:   Bed Mobility/Transfers: needs device  Grooming: independent  Bathing: independent  Upper Body Dressing: independent  Lower Body Dressing: independent  Toileting: independent  Home Management Skills: independent        Dominant hand: right    Subjective:  Communicated with EVONNE Wilkes prior to session.  Pt and daughter agreeable to OT session.  Chief Complaint: Dizziness/blurred vision from pain medication  Patient/Family stated goals: To get better    Pain Ratin/10  Pain Rating Post-Intervention: 0/10    Objective:  Patient found with: telemetry    Cognitive Exam:  Oriented to: Person and Place  Follows Commands/attention: Follows one-step commands consistently  Communication: clear/fluent  Memory:  Impaired LTM  Safety awareness/insight to disability: impaired 2/2 dementia  Coping skills/emotional control: Appropriate to situation    Visual/perceptual:  Intact    Physical Exam:  Postural examination/scapula alignment: Rounded shoulder and Head forward  Skin integrity: Visible skin intact  Edema: Mild in BLEs    Sensation:   Intact    Upper Extremity Range of Motion:  Right Upper Extremity: WFL  Left Upper Extremity: WFL    Upper Extremity Strength:  Right Upper Extremity: grossly 4/5  Left Upper Extremity: grossly 4/5   Strength: WFL bilaterally    Fine motor coordination:   Intact  Left hand thumb/finger opposition skills and Right hand thumb/finger opposition skills, however increased time required    Gross motor coordination: WFL    Functional Mobility:  Bed Mobility:  Scooting/Bridging: Contact Guard Assistance  Sit to Supine: Minimum Assistance    Transfers:  Sit <> Stand Assistance: Minimum Assistance  Sit <> Stand Assistive Device: Rolling Walker  Bed <> Chair Technique: Stand Pivot  Bed <> Chair Transfer Assistance: Minimum Assistance  Bed <> Chair Assistive Device: No Assistive Device    Functional Ambulation: Pt performed mobility  "within room, ~20 ft, with CGA and RW. Pt c/o dizziness,so distance was limited.    Activities of Daily Living:  UE Dressing Level of Assistance: Moderate assistance, Maximum assistance (Mod Assist to doff socks, and Max Assist to don socks)    Grooming Position: Seated, bedside chair  Grooming Level of Assistance: Stand by assistance (for face washing)       Balance:   Static Sit: CGA while seated EOB  Static Stand: CGA with RW  Dynamic stand: CGA with RW    Therapeutic Activities and Exercises:  - Pt and daughter educated on OT role and POC. We also discussed in detail about D/C recs of SNF.       AM-PAC 6 CLICK ADL  How much help from another person does this patient currently need?  1 = Unable, Total/Dependent Assistance  2 = A lot, Maximum/Moderate Assistance  3 = A little, Minimum/Contact Guard/Supervision  4 = None, Modified [place]/Independent    Putting on and taking off regular lower body clothing? : 2  Bathing (including washing, rinsing, drying)?: 2  Toileting, which includes using toilet, bedpan, or urinal? : 2  Putting on and taking off regular upper body clothing?: 3  Taking care of personal grooming such as brushing teeth?: 3  Eating meals?: 4  Total Score: 16    AM-PAC Raw Score CMS "G-Code Modifier Level of Impairment Assistance   6 % Total / Unable   7 - 9 CM 80 - 100% Maximal Assist   10 - 14 CL 60 - 80% Moderate Assist   15 - 19 CK 40 - 60% Moderate Assist   20 - 22 CJ 20 - 40% Minimal Assist   23 CI 1-20% SBA / CGA   24 CH 0% Independent/ Mod I       Patient left supine with all lines intact.    Assessment:  Lilia Hidalgo is a 85 y.o. female with a medical diagnosis of Septic encephalopathy and presents with decreased mobility and (I) in ADLs. PTA, pt was (I) in ADL tasks,  And is now having to grade tasks to sitting or requires assist for LB dressing. She would benefit from SNF following D/C from hospital stay to promote return to (I) PLOF. She would benefit from continued acute " OT services to maximize (I) in ADLs and return to valued roles and routines.    Rehab identified problem list/impairments: Rehab identified problem list/impairments: weakness, impaired endurance, impaired balance, impaired self care skills, impaired functional mobilty, impaired cognition, decreased coordination    Rehab potential is good.    Activity tolerance: Good    Discharge recommendations: Discharge Facility/Level Of Care Needs:  (short stay)     Barriers to discharge: Barriers to Discharge: Decreased caregiver support    Equipment recommendations: none     GOALS:   Occupational Therapy Goals        Problem: Occupational Therapy Goal    Goal Priority Disciplines Outcome Interventions   Occupational Therapy Goal     OT, PT/OT Ongoing (interventions implemented as appropriate)    Description:  Goals to be met by: 5/17/17     Patient will increase functional independence with ADLs by performing:    UE Dressing with Stand-by Assistance.  LE Dressing with Minimal Assistance and Assistive Devices as needed.  Grooming while standing with Contact Guard Assistance.  Toileting from toilet with Minimal Assistance for hygiene and clothing management.   Supine to sit with Minimal Assistance.  Stand pivot transfers with Contact Guard Assistance.  Toilet transfer to toilet with Contact Guard Assistance.  Upper extremity exercise program x10-15 reps per handout, with assistance as needed.                PLAN:  Patient to be seen 4 x/week to address the above listed problems via self-care/home management, therapeutic activities, therapeutic exercises  Plan of Care expires: 06/06/17  Plan of Care reviewed with: patient, daughter         Maren MUÑOZ MIR Wallis  05/07/2017

## 2017-05-07 NOTE — PLAN OF CARE
Problem: Occupational Therapy Goal  Goal: Occupational Therapy Goal  Goals to be met by: 5/17/17     Patient will increase functional independence with ADLs by performing:    UE Dressing with Stand-by Assistance.  LE Dressing with Minimal Assistance and Assistive Devices as needed.  Grooming while standing with Contact Guard Assistance.  Toileting from toilet with Minimal Assistance for hygiene and clothing management.   Supine to sit with Minimal Assistance.  Stand pivot transfers with Contact Guard Assistance.  Toilet transfer to toilet with Contact Guard Assistance.  Upper extremity exercise program x10-15 reps per handout, with assistance as needed.  Outcome: Ongoing (interventions implemented as appropriate)  OT evaluation completed today. POC established.  MIR Garcia  5/7/2017

## 2017-05-07 NOTE — PROGRESS NOTES
[facility] Hospital Medicine  Progress Note    Patient Name: Lilia Hidalgo  MRN: [id number]  Patient Class: IP- Inpatient   Admission Date: 5/5/2017  Length of Stay: 2 days  Attending Physician: Guero Westfall MD  Primary Care Provider: April Herrera MD    [facility] Medicine Team: [facility]OSP MED 5 Lilly Morrow MD    Subjective:     Principal Problem:Septic encephalopathy    HPI:  Ms. Hidalgo is an 86 yo with a past medical history of HPLD, GERD, HTN, diastolic dysfunction (last ECHO 70% EF in 02/16) who presents to the ED via EMS from her senior living home after being found to be encephalopathic, poorly conversant and having an episode of nausea and vomiting this morning concerning for an aspiration event. History per patient is limited per mental status, and family member at bedside reports that yesterday evening, the patient was interacting with other facility residents and playing board games. This morning, she was found to have vomited bilious vomitus and complained of generalized abdominal pain. In the ED, her CXR showed basilar opacities concerning for CHF vs developing aspiration PNA. Her BNP was elevated, as well as her troponin and she appears clinically volume overloaded. Her exam was nonfocal, and CT a/p shows some diverticulitis and cholelithiasis without cholecystitis which could correlate with her clinical presentation, and her encephalopathy is likely secondary to a general medical condition. She had a Tbili elevation to 1.3. CT head showed no acute findings. Her family reports that patient likely has a component of dementia and has been encephalopathic from infections in the past.             Hospital Course:  86 yo F with Diastolic dysfunction presents with encephalopathy and vomiting concerning  heart failure and abdominal pain 2/2 diverticulitis Started on IB Cipro and Flagyl.     5/6: dramatic mental status improvement. Physical exam still difficult to localize pain. Worked  well with PT/OT.  5/7: transitioned to PO Cipro and flagyl.      Interval History: Dramatic mental status improvement. Physical exam still difficult to localize pain. Worked well with PT/OT. Transitioned to PO Cipro and flagyl.    Review of Systems   Constitutional: Negative for fever.   Gastrointestinal: Positive for abdominal pain. Negative for constipation, diarrhea, nausea and vomiting.   Genitourinary: Negative for dysuria.   Psychiatric/Behavioral: Positive for confusion.     Objective:     Vital Signs (Most Recent):  Temp: 98.7 °F (37.1 °C) (05/07/17 0435)  Pulse: 76 (05/07/17 0435)  Resp: 16 (05/07/17 0435)  BP: 128/61 (05/07/17 0435)  SpO2: (!) 94 % (05/07/17 0435) Vital Signs (24h Range):  Temp:  [97.8 °F (36.6 °C)-99.1 °F (37.3 °C)] 98.7 °F (37.1 °C)  Pulse:  [70-86] 76  Resp:  [16-18] 16  SpO2:  [92 %-96 %] 94 %  BP: (128-160)/(58-74) 128/61     Weight: 86.7 kg (191 lb 3.2 oz)  Body mass index is 29.95 kg/(m^2).    Intake/Output Summary (Last 24 hours) at 05/07/17 0656  Last data filed at 05/06/17 2155   Gross per 24 hour   Intake              720 ml   Output              325 ml   Net              395 ml      Physical Exam   Constitutional: She appears well-developed and well-nourished. No distress.   HENT:   Mouth/Throat: Oropharynx is clear and moist.   Eyes: EOM are normal. Pupils are equal, round, and reactive to light. Left eye exhibits no discharge. No scleral icterus.   Neck: Normal range of motion. No JVD present. No tracheal deviation present. No thyromegaly present.   Cardiovascular: Normal rate, regular rhythm, normal heart sounds and intact distal pulses.  Exam reveals no gallop and no friction rub.    No murmur heard.  Pulmonary/Chest: Effort normal. No respiratory distress. She has no wheezes. She exhibits no tenderness.   Abdominal: Soft. Bowel sounds are normal. She exhibits no distension and no mass. There is tenderness. No hernia.   Musculoskeletal: Normal range of motion. She exhibits  edema and tenderness.   Lymphadenopathy:     She has no cervical adenopathy.   Neurological: She is alert and oriented but confused and tangental  Skin: She is not diaphoretic.   Psychiatric: She has a normal mood and affect.   Nursing note and vitals reviewed.      Assessment/Plan:      * Septic encephalopathy  - Resolved  -Zyprexa PRN   -delirium precautions; treat underlying infection  -follow up blood cultures, respiratory Cx: NGTD        Diverticulitis of large intestine without perforation or abscess without bleeding  -Cipro/Flagyl for enteric coverage  -Diverticulitis noted on CT ap  -pain control with morphine/ antiemetics PRN  -follow septic workup, blood cultures.      Essential hypertension  -can resume home PO regimen; Lopressor PRN IV      Depressed mood  -resume home SSRI       Hyperlipidemia  -resume home statin       GERD (gastroesophageal reflux disease)  -PPI      Debility  -PT/OT consult: Nursing home vs Short stay      Elevated troponin  -likely in light of HFpEF;   -Trend to rule out ACS, negative      VTE Risk Mitigation         Ordered     heparin (porcine) injection 5,000 Units  Every 8 hours     Route:  Subcutaneous        05/05/17 1656     Medium Risk of VTE  Once      05/05/17 1501     Place sequential compression device  Until discontinued      05/05/17 1501          Lilly Morrow MD  Department of Hospital Medicine   [facility]

## 2017-05-08 ENCOUNTER — HOSPITAL ENCOUNTER (INPATIENT)
Facility: HOSPITAL | Age: 82
LOS: 21 days | Discharge: HOME HEALTH | DRG: 391 | End: 2017-05-29
Attending: HOSPITALIST | Admitting: HOSPITALIST
Payer: MEDICARE

## 2017-05-08 VITALS
HEART RATE: 75 BPM | RESPIRATION RATE: 16 BRPM | BODY MASS INDEX: 30.01 KG/M2 | TEMPERATURE: 99 F | HEIGHT: 67 IN | WEIGHT: 191.19 LBS | SYSTOLIC BLOOD PRESSURE: 131 MMHG | DIASTOLIC BLOOD PRESSURE: 65 MMHG | OXYGEN SATURATION: 90 %

## 2017-05-08 DIAGNOSIS — I50.33 ACUTE ON CHRONIC DIASTOLIC HEART FAILURE: ICD-10-CM

## 2017-05-08 DIAGNOSIS — Z91.81 AT RISK FOR FALLING: ICD-10-CM

## 2017-05-08 DIAGNOSIS — R13.10 DYSPHAGIA, UNSPECIFIED TYPE: ICD-10-CM

## 2017-05-08 DIAGNOSIS — R45.89 DEPRESSED MOOD: ICD-10-CM

## 2017-05-08 DIAGNOSIS — R53.81 DEBILITY: ICD-10-CM

## 2017-05-08 DIAGNOSIS — K57.92 DIVERTICULITIS: ICD-10-CM

## 2017-05-08 DIAGNOSIS — E78.5 HYPERLIPIDEMIA, UNSPECIFIED HYPERLIPIDEMIA TYPE: ICD-10-CM

## 2017-05-08 DIAGNOSIS — M17.12 PRIMARY OSTEOARTHRITIS OF LEFT KNEE: ICD-10-CM

## 2017-05-08 DIAGNOSIS — K57.32 DIVERTICULITIS OF LARGE INTESTINE WITHOUT PERFORATION OR ABSCESS WITHOUT BLEEDING: ICD-10-CM

## 2017-05-08 DIAGNOSIS — R41.89 IMPAIRED COGNITION: ICD-10-CM

## 2017-05-08 DIAGNOSIS — I10 ESSENTIAL HYPERTENSION: ICD-10-CM

## 2017-05-08 DIAGNOSIS — M17.10 PRIMARY OSTEOARTHRITIS OF KNEE, UNSPECIFIED LATERALITY: ICD-10-CM

## 2017-05-08 DIAGNOSIS — K21.9 GASTROESOPHAGEAL REFLUX DISEASE WITHOUT ESOPHAGITIS: ICD-10-CM

## 2017-05-08 DIAGNOSIS — G93.41 SEPTIC ENCEPHALOPATHY: Primary | ICD-10-CM

## 2017-05-08 LAB
ALBUMIN SERPL BCP-MCNC: 3 G/DL
ALP SERPL-CCNC: 84 U/L
ALT SERPL W/O P-5'-P-CCNC: 15 U/L
ANION GAP SERPL CALC-SCNC: 10 MMOL/L
ANISOCYTOSIS BLD QL SMEAR: SLIGHT
AST SERPL-CCNC: 22 U/L
BASOPHILS # BLD AUTO: 0.05 K/UL
BASOPHILS NFR BLD: 0.8 %
BILIRUB SERPL-MCNC: 0.8 MG/DL
BUN SERPL-MCNC: 20 MG/DL
BURR CELLS BLD QL SMEAR: NORMAL
CALCIUM SERPL-MCNC: 9.1 MG/DL
CHLORIDE SERPL-SCNC: 105 MMOL/L
CO2 SERPL-SCNC: 26 MMOL/L
CREAT SERPL-MCNC: 1 MG/DL
DACRYOCYTES BLD QL SMEAR: NORMAL
DIFFERENTIAL METHOD: NORMAL
EOSINOPHIL # BLD AUTO: 0.4 K/UL
EOSINOPHIL NFR BLD: 5.8 %
ERYTHROCYTE [DISTWIDTH] IN BLOOD BY AUTOMATED COUNT: 14.1 %
EST. GFR  (AFRICAN AMERICAN): 59.4 ML/MIN/1.73 M^2
EST. GFR  (NON AFRICAN AMERICAN): 51.5 ML/MIN/1.73 M^2
GLUCOSE SERPL-MCNC: 103 MG/DL
HCT VFR BLD AUTO: 40.9 %
HGB BLD-MCNC: 13.4 G/DL
HYPOCHROMIA BLD QL SMEAR: NORMAL
LYMPHOCYTES # BLD AUTO: 1.5 K/UL
LYMPHOCYTES NFR BLD: 24.5 %
MAGNESIUM SERPL-MCNC: 2.1 MG/DL
MCH RBC QN AUTO: 29 PG
MCHC RBC AUTO-ENTMCNC: 32.8 %
MCV RBC AUTO: 89 FL
MONOCYTES # BLD AUTO: 0.6 K/UL
MONOCYTES NFR BLD: 9.7 %
NEUTROPHILS # BLD AUTO: 3.7 K/UL
NEUTROPHILS NFR BLD: 59.2 %
OVALOCYTES BLD QL SMEAR: NORMAL
PHOSPHATE SERPL-MCNC: 4.2 MG/DL
PLATELET # BLD AUTO: 200 K/UL
PMV BLD AUTO: 11.4 FL
POIKILOCYTOSIS BLD QL SMEAR: SLIGHT
POLYCHROMASIA BLD QL SMEAR: NORMAL
POTASSIUM SERPL-SCNC: 4 MMOL/L
PROT SERPL-MCNC: 6.4 G/DL
RBC # BLD AUTO: 4.62 M/UL
SODIUM SERPL-SCNC: 141 MMOL/L
WBC # BLD AUTO: 6.2 K/UL

## 2017-05-08 PROCEDURE — 94664 DEMO&/EVAL PT USE INHALER: CPT

## 2017-05-08 PROCEDURE — 25000003 PHARM REV CODE 250: Performed by: STUDENT IN AN ORGANIZED HEALTH CARE EDUCATION/TRAINING PROGRAM

## 2017-05-08 PROCEDURE — 94640 AIRWAY INHALATION TREATMENT: CPT

## 2017-05-08 PROCEDURE — 84100 ASSAY OF PHOSPHORUS: CPT

## 2017-05-08 PROCEDURE — 97110 THERAPEUTIC EXERCISES: CPT

## 2017-05-08 PROCEDURE — G8997 SWALLOW GOAL STATUS: HCPCS | Mod: CH

## 2017-05-08 PROCEDURE — 99238 HOSP IP/OBS DSCHRG MGMT 30/<: CPT | Mod: GC,,, | Performed by: HOSPITALIST

## 2017-05-08 PROCEDURE — 25000003 PHARM REV CODE 250: Performed by: INTERNAL MEDICINE

## 2017-05-08 PROCEDURE — 97116 GAIT TRAINING THERAPY: CPT

## 2017-05-08 PROCEDURE — 92610 EVALUATE SWALLOWING FUNCTION: CPT

## 2017-05-08 PROCEDURE — 80053 COMPREHEN METABOLIC PANEL: CPT

## 2017-05-08 PROCEDURE — 11000004 HC SNF PRIVATE

## 2017-05-08 PROCEDURE — G8996 SWALLOW CURRENT STATUS: HCPCS | Mod: CK

## 2017-05-08 PROCEDURE — 36415 COLL VENOUS BLD VENIPUNCTURE: CPT

## 2017-05-08 PROCEDURE — 83735 ASSAY OF MAGNESIUM: CPT

## 2017-05-08 PROCEDURE — 85025 COMPLETE CBC W/AUTO DIFF WBC: CPT

## 2017-05-08 PROCEDURE — 25000242 PHARM REV CODE 250 ALT 637 W/ HCPCS: Performed by: INTERNAL MEDICINE

## 2017-05-08 RX ORDER — BENAZEPRIL HYDROCHLORIDE 20 MG/1
40 TABLET ORAL DAILY
Status: CANCELLED | OUTPATIENT
Start: 2017-05-09

## 2017-05-08 RX ORDER — METRONIDAZOLE 500 MG/1
500 TABLET ORAL EVERY 8 HOURS
Status: CANCELLED | OUTPATIENT
Start: 2017-05-08

## 2017-05-08 RX ORDER — PANTOPRAZOLE SODIUM 40 MG/1
40 TABLET, DELAYED RELEASE ORAL DAILY
Status: CANCELLED | OUTPATIENT
Start: 2017-05-09

## 2017-05-08 RX ORDER — AMOXICILLIN 250 MG
1 CAPSULE ORAL 2 TIMES DAILY
Status: CANCELLED | OUTPATIENT
Start: 2017-05-08

## 2017-05-08 RX ORDER — IBUPROFEN 200 MG
24 TABLET ORAL
Status: CANCELLED | OUTPATIENT
Start: 2017-05-08

## 2017-05-08 RX ORDER — FUROSEMIDE 40 MG/1
40 TABLET ORAL DAILY
Status: CANCELLED | OUTPATIENT
Start: 2017-05-09

## 2017-05-08 RX ORDER — IBUPROFEN 200 MG
16 TABLET ORAL
Status: CANCELLED | OUTPATIENT
Start: 2017-05-08

## 2017-05-08 RX ORDER — MAG HYDROX/ALUMINUM HYD/SIMETH 200-200-20
15 SUSPENSION, ORAL (FINAL DOSE FORM) ORAL EVERY 6 HOURS PRN
Status: CANCELLED | OUTPATIENT
Start: 2017-05-08

## 2017-05-08 RX ORDER — ACETAMINOPHEN 325 MG/1
650 TABLET ORAL EVERY 6 HOURS PRN
Status: CANCELLED | OUTPATIENT
Start: 2017-05-08

## 2017-05-08 RX ORDER — OLANZAPINE 2.5 MG/1
2.5 TABLET ORAL NIGHTLY PRN
Status: CANCELLED | OUTPATIENT
Start: 2017-05-08

## 2017-05-08 RX ORDER — ATORVASTATIN CALCIUM 20 MG/1
20 TABLET, FILM COATED ORAL DAILY
Status: CANCELLED | OUTPATIENT
Start: 2017-05-09

## 2017-05-08 RX ORDER — CIPROFLOXACIN 500 MG/1
500 TABLET ORAL EVERY 12 HOURS
Status: CANCELLED | OUTPATIENT
Start: 2017-05-08

## 2017-05-08 RX ORDER — RAMELTEON 8 MG/1
8 TABLET ORAL NIGHTLY PRN
Status: CANCELLED | OUTPATIENT
Start: 2017-05-08

## 2017-05-08 RX ORDER — ONDANSETRON 2 MG/ML
4 INJECTION INTRAMUSCULAR; INTRAVENOUS EVERY 12 HOURS PRN
Status: CANCELLED | OUTPATIENT
Start: 2017-05-08

## 2017-05-08 RX ORDER — METOPROLOL SUCCINATE 100 MG/1
100 TABLET, EXTENDED RELEASE ORAL DAILY
Status: CANCELLED | OUTPATIENT
Start: 2017-05-09

## 2017-05-08 RX ORDER — ESCITALOPRAM OXALATE 20 MG/1
20 TABLET ORAL DAILY
Status: CANCELLED | OUTPATIENT
Start: 2017-05-09

## 2017-05-08 RX ORDER — IPRATROPIUM BROMIDE AND ALBUTEROL SULFATE 2.5; .5 MG/3ML; MG/3ML
3 SOLUTION RESPIRATORY (INHALATION) EVERY 6 HOURS PRN
Status: DISCONTINUED | OUTPATIENT
Start: 2017-05-08 | End: 2017-05-08 | Stop reason: HOSPADM

## 2017-05-08 RX ORDER — GLUCAGON 1 MG
1 KIT INJECTION
Status: CANCELLED | OUTPATIENT
Start: 2017-05-08

## 2017-05-08 RX ORDER — HEPARIN SODIUM 5000 [USP'U]/ML
5000 INJECTION, SOLUTION INTRAVENOUS; SUBCUTANEOUS EVERY 8 HOURS
Status: CANCELLED | OUTPATIENT
Start: 2017-05-08

## 2017-05-08 RX ADMIN — PANTOPRAZOLE SODIUM 40 MG: 40 TABLET, DELAYED RELEASE ORAL at 08:05

## 2017-05-08 RX ADMIN — HEPARIN SODIUM 5000 UNITS: 5000 INJECTION, SOLUTION INTRAVENOUS; SUBCUTANEOUS at 05:05

## 2017-05-08 RX ADMIN — METRONIDAZOLE 500 MG: 500 TABLET ORAL at 02:05

## 2017-05-08 RX ADMIN — BENAZEPRIL HYDROCHLORIDE 40 MG: 20 TABLET, FILM COATED ORAL at 08:05

## 2017-05-08 RX ADMIN — METOPROLOL SUCCINATE 100 MG: 100 TABLET, FILM COATED, EXTENDED RELEASE ORAL at 08:05

## 2017-05-08 RX ADMIN — CIPROFLOXACIN HYDROCHLORIDE 500 MG: 500 TABLET, FILM COATED ORAL at 08:05

## 2017-05-08 RX ADMIN — TRAMADOL HYDROCHLORIDE 25 MG: 50 TABLET, FILM COATED ORAL at 11:05

## 2017-05-08 RX ADMIN — IPRATROPIUM BROMIDE AND ALBUTEROL SULFATE 3 ML: .5; 3 SOLUTION RESPIRATORY (INHALATION) at 07:05

## 2017-05-08 RX ADMIN — HEPARIN SODIUM 5000 UNITS: 5000 INJECTION, SOLUTION INTRAVENOUS; SUBCUTANEOUS at 02:05

## 2017-05-08 RX ADMIN — FUROSEMIDE 40 MG: 40 TABLET ORAL at 08:05

## 2017-05-08 RX ADMIN — ESCITALOPRAM 20 MG: 20 TABLET, FILM COATED ORAL at 08:05

## 2017-05-08 RX ADMIN — METRONIDAZOLE 500 MG: 500 TABLET ORAL at 05:05

## 2017-05-08 RX ADMIN — ATORVASTATIN CALCIUM 20 MG: 20 TABLET, FILM COATED ORAL at 08:05

## 2017-05-08 NOTE — ASSESSMENT & PLAN NOTE
-Cipro/Flagyl for enteric coverage  -Diverticulitis noted on CT ap  -pain control with Tramadol/antiemetics PRN  -follow septic workup, blood cultures.

## 2017-05-08 NOTE — PLAN OF CARE
05/08/17 1210   Discharge Assessment   Assessment Type Discharge Planning Assessment   Assessment information obtained from? Patient;Other  (daughter, too)   Expected Length of Stay (days) 2   Communicated expected length of stay with patient/caregiver yes   Prior to hospitilization cognitive status: Alert/Oriented   Prior to hospitalization functional status: Assistive Equipment;Needs Assistance   Current cognitive status: Alert/Oriented;Not Oriented to Time   Current Functional Status: Assistive Equipment;Needs Assistance   Arrived From assisted living   Lives With facility resident  ([facility])   Able to Return to Prior Arrangements unable to determine at this time (comments)   Is patient able to care for self after discharge? Unable to determine at this time (comments)   How many people do you have in your home that can help with your care after discharge? 1   Who are your caregiver(s) and their phone number(s)? (Too Moser, daughter, [phone])   Patient's perception of discharge disposition skilled nursing facility   Readmission Within The Last 30 Days no previous admission in last 30 days   Patient currently being followed by outpatient case management? No   Patient currently receives home health services? No   Does the patient currently use HME? Yes   Patient currently receives private duty nursing? N/A   Patient currently receives any other outside agency services? No   Equipment Currently Used at Home rollator;wheelchair   Do you have any problems affording any of your prescribed medications? No   Is the patient taking medications as prescribed? yes   Do you have any financial concerns preventing you from receiving the healthcare you need? No   Does the patient have transportation to healthcare appointments? Yes   Transportation Available family or friend will provide   On Dialysis? No   Does the patient receive services at the [facility]? No   Are there any open  cases? No   Discharge Plan A Skilled Nursing Facility   Discharge Plan B Assisted Living   Patient/Family In Agreement With Plan yes     Plan A: OSNF  Plan B: return to [facility] with HH

## 2017-05-08 NOTE — PLAN OF CARE
Pt accepted for transfer to [facility] today. Pt's team notified.    SW informed pt's daughter Belkys [phone]) of pt's transfer. Belkys amenable.    Pt accepted to [facility] Rm [id number]. Nurse can call report to nurse Harding [phone].    Secure Patient Delivery w/c van requested ([phone]), SPD estimates a 2-2.5 hr pickup delay.    Manju Borja LMSW, CCM  On Call SW

## 2017-05-08 NOTE — PROGRESS NOTES
[facility] Hospital Medicine  Progress Note    Patient Name: Lilia Hidalgo  MRN: [id number]  Patient Class: IP- Inpatient   Admission Date: 5/5/2017  Length of Stay: 3 days  Attending Physician: Guero Westfall MD  Primary Care Provider: April Herrera MD    [facility] Medicine Team: [facility] [place] 5 Cristhian Bateman MD    Subjective:     Principal Problem:Septic encephalopathy      Hospital Course:  84 yo F with Diastolic dysfunction presents with encephalopathy and vomiting concerning  heart failure and abdominal pain 2/2 diverticulitis Started on IB Cipro and Flagyl.     5/6: dramatic mental status improvement. Physical exam still difficult to localize pain. Worked well with PT/OT.  5/7: transitioned to PO Cipro and flagyl.  5/8: Continued improvement; patient will need SNF placement. Continuing antibiotics for diverticulitis.       Interval History: Patient still with mild in-hospital delirium as reported per patient and family at bedside. Patient reports improving abdominal pain and no infection symptoms. Will plan for SNF at discharge.     Review of Systems   Constitutional: Negative for fever.   Gastrointestinal: Positive for abdominal pain. Negative for constipation, diarrhea, nausea and vomiting.   Genitourinary: Negative for dysuria.   Psychiatric/Behavioral: Positive for confusion.     Objective:     Vital Signs (Most Recent):  Temp: 98.3 °F (36.8 °C) (05/08/17 0737)  Pulse: 67 (05/08/17 0749)  Resp: 16 (05/08/17 0749)  BP: (!) 150/79 (05/08/17 0737)  SpO2: (!) 92 % (05/08/17 0749) Vital Signs (24h Range):  Temp:  [97.5 °F (36.4 °C)-99 °F (37.2 °C)] 98.3 °F (36.8 °C)  Pulse:  [67-80] 67  Resp:  [16-18] 16  SpO2:  [92 %-97 %] 92 %  BP: (118-150)/(56-79) 150/79     Weight: 86.7 kg (191 lb 3.2 oz)  Body mass index is 29.95 kg/(m^2).    Intake/Output Summary (Last 24 hours) at 05/08/17 1237  Last data filed at 05/07/17 1800   Gross per 24 hour   Intake              810 ml   Output               500 ml   Net              310 ml      Physical Exam   Constitutional: She appears well-developed and well-nourished. No distress.   HENT:   Mouth/Throat: Oropharynx is clear and moist.   Eyes: EOM are normal. Pupils are equal, round, and reactive to light. Left eye exhibits no discharge. No scleral icterus.   Neck: Normal range of motion. No JVD present. No tracheal deviation present. No thyromegaly present.   Cardiovascular: Normal rate, regular rhythm, normal heart sounds and intact distal pulses.  Exam reveals no gallop and no friction rub.    No murmur heard.  Pulmonary/Chest: Effort normal. No respiratory distress. She has no wheezes. She exhibits no tenderness.   Abdominal: Soft. Bowel sounds are normal. She exhibits no distension and no mass. There is tenderness. No hernia.   Musculoskeletal: Normal range of motion. She exhibits edema and tenderness.   Lymphadenopathy:     She has no cervical adenopathy.   Neurological: She is alert.   Skin: She is not diaphoretic.   Psychiatric: She has a normal mood and affect.   Nursing note and vitals reviewed.      Significant Labs:   CBC:   Recent Labs  Lab 05/07/17  0418 05/08/17  0521   WBC 6.92 6.20   HGB 13.3 13.4   HCT 38.4 40.9    200     CMP:   Recent Labs  Lab 05/06/17  1329 05/07/17  0419 05/08/17  0819    140 141   K 3.8 3.3* 4.0    104 105   CO2 26 27 26    93 103   BUN 12 17 20   CREATININE 0.8 0.9 1.0   CALCIUM 9.1 8.8 9.1   PROT  --  6.0 6.4   ALBUMIN  --  2.7* 3.0*   BILITOT  --  1.0 0.8   ALKPHOS  --  81 84   AST  --  18 22   ALT  --  15 15   ANIONGAP 12 9 10   EGFRNONAA >60.0 58.5* 51.5*       Significant Imaging: I have reviewed and interpreted all pertinent imaging results/findings within the past 24 hours.    Assessment/Plan:      * Septic encephalopathy  - Resolving  -Zyprexa PRN   -delirium precautions; treat underlying infection  -follow up blood cultures, respiratory Cx: NGTD        Essential hypertension  -can resume  home PO regimen of Benazepril and Toprol XL; Lopressor PRN IV      Hyperlipidemia  -resume home statin       GERD (gastroesophageal reflux disease)  -PPI      Debility  -PT/OT consult: Nursing home vs Short stay SNF      Elevated troponin  -likely in light of HFpEF   -Trend to rule out ACS, negative      Depressed mood  -resume home SSRI       Diverticulitis of large intestine without perforation or abscess without bleeding  -Cipro/Flagyl for enteric coverage  -Diverticulitis noted on CT ap  -pain control with Tramadol/antiemetics PRN  -follow septic workup, blood cultures.      Acute on chronic diastolic heart failure  -Lasix PO daily; will continue to monitor fluid status  -CXR with likely cardiogenic edema        Cholelithiasis without cholecystitis  -Tbili with slight elevation, continue to trend CMP/CBC  -choleliths noted on CTap    VTE Risk Mitigation         Ordered     heparin (porcine) injection 5,000 Units  Every 8 hours     Route:  Subcutaneous        05/05/17 1656     Medium Risk of VTE  Once      05/05/17 1501     Place sequential compression device  Until discontinued      05/05/17 1501          Cristhian Bateman MD  Department of Hospital Medicine   [facility]

## 2017-05-08 NOTE — PT/OT/SLP PROGRESS
"Physical Therapy  Treatment    Lilia Hidalgo   MRN: [id number]   Admitting Diagnosis: Septic encephalopathy    PT Received On: 17  PT Start Time: 1023     PT Stop Time: 1049    PT Total Time (min): 26 min       Billable Minutes:  Gait Training 16 and Therapeutic Exercise 10    Treatment Type: Treatment  PT/PTA: PTA     PTA Visit Number: 1       General Precautions: Standard, fall  Orthopedic Precautions: N/A   Braces: N/A    Do you have any cultural, spiritual, Congregational conflicts, given your current situation?: None stated     Subjective:  Communicated with NSG prior to session.  Patient states " I am doing fine, but the doctor says that I have arthritis on my left hip".    Pain Ratin/10              Pain Rating Post-Intervention: 0/10    Objective:   Patient found with: telemetry    Functional Mobility:  Bed Mobility:   Scooting/Bridging: Stand by Assistance    Transfers:  Sit <> Stand Assistance: Minimum Assistance  Sit <> Stand Assistive Device: Rolling Walker    Gait:   Gait Distance: 70 ft with cues to correct posture and for directions.  Assistance 1: Contact Guard Assistance  Gait Assistive Device: Rolling walker  Gait Pattern: 3-point gait  Gait Deviation(s): decreased tracee, decreased velocity of limb motion, decreased step length, decreased stride length, decreased swing-to-stance ratio    Stairs:      Balance:   Static Sit: GOOD-: Takes MODERATE challenges from all directions but inconsistently  Dynamic Sit: GOOD-: Maintains balance through MODERATE excursions of active trunk movement,     Static Stand: FAIR+: Takes MINIMAL challenges from all directions  Dynamic stand: FAIR: Needs CONTACT GUARD during gait     Therapeutic Activities and Exercises:  Donned a second gown. There ex in sitting: LAQ nad AP 2x12 reps B LE.     AM-PAC 6 CLICK MOBILITY  How much help from another person does this patient currently need?   1 = Unable, Total/Dependent Assistance  2 = A lot, Maximum/Moderate " Assistance  3 = A little, Minimum/Contact Guard/Supervision  4 = None, Modified [place]/Independent    Turning over in bed (including adjusting bedclothes, sheets and blankets)?: 3  Sitting down on and standing up from a chair with arms (e.g., wheelchair, bedside commode, etc.): 3  Moving from lying on back to sitting on the side of the bed?: 3  Moving to and from a bed to a chair (including a wheelchair)?: 3  Need to walk in hospital room?: 3  Climbing 3-5 steps with a railing?: 3  Total Score: 18    AM-PAC Raw Score CMS G-Code Modifier Level of Impairment Assistance   6 % Total / Unable   7 - 9 CM 80 - 100% Maximal Assist   10 - 14 CL 60 - 80% Moderate Assist   15 - 19 CK 40 - 60% Moderate Assist   20 - 22 CJ 20 - 40% Minimal Assist   23 CI 1-20% SBA / CGA   24 CH 0% Independent/ Mod I     Patient left up in chair with all lines intact, call button in reach and daughter present.    Assessment:  Lilia Hidalgo is a 85 y.o. female with a medical diagnosis of Septic encephalopathy and presents with decreased endurance and strength. Patient ambulates with antalgic gait pattern requiring multiple stops during gait training due to pain on the left hip, but patient showed good compliance with all instructions and good participation. Patient would benefit from continued P.T. To address deficits.    Rehab identified problem list/impairments: Rehab identified problem list/impairments: weakness, impaired endurance, impaired functional mobilty, impaired balance, decreased lower extremity function    Rehab potential is good.    Activity tolerance: Good    Discharge recommendations: Discharge Facility/Level Of Care Needs: nursing facility, skilled, other (see comments) (Short stay)     Barriers to discharge: Barriers to Discharge: Decreased caregiver support    Equipment recommendations: Equipment Needed After Discharge: none     GOALS:   Physical Therapy Goals        Problem: Physical Therapy Goal    Goal  Priority Disciplines Outcome Goal Variances Interventions   Physical Therapy Goal     PT/OT, PT Ongoing (interventions implemented as appropriate)     Description:  Goals to be met by: 2017     Patient will increase functional independence with mobility by performin. Supine to sit with Modified [place]  2. Sit to supine with Modified [place]  3. Sit to stand transfer with Supervision  4. Bed to chair transfer with Stand-by Assistance using Rolling Walker  5. Gait  x 100 feet with Contact Guard Assistance using Rolling Walker.   6. Lower extremity exercise program x 15 reps per handout, with assistance as needed                PLAN:    Patient to be seen 4 x/week  to address the above listed problems via gait training, therapeutic activities, therapeutic exercises, neuromuscular re-education  Plan of Care expires: 17  Plan of Care reviewed with: patient, daughter         Rian Gaitan, PTA  2017

## 2017-05-08 NOTE — PT/OT/SLP EVAL
"Speech Language Pathology  Evaluation    Lilia Hidalgo   MRN: [id number]   Admitting Diagnosis: Septic encephalopathy    Diet recommendations: Solid Diet Level: Regular  Liquid Diet Level: Nectar Thick No straws, HOB to 90 degrees, Small bites/sips, Alternating bites/sips, 1 bite/sip at a time, Meds whole 1 at a time, Eliminate distractions, Avoid talking while eating, Assistance with meals and Assistance with thickening liquids    SLP Treatment Date: 05/08/17  Speech Start Time: 1405     Speech Stop Time: 1436     Speech Total (min): 31 min       TREATMENT BILLABLE MINUTES:  Eval Swallow and Oral Function 21    Diagnosis: Septic encephalopathy    Past Medical History:   Diagnosis Date    Arthritis     GERD (gastroesophageal reflux disease)     Hyperlipidemia     Hypertension     Joint pain     Knee pain, left 08/2016    pain with walking or standing    PUD (peptic ulcer disease)      Past Surgical History:   Procedure Laterality Date    APPENDECTOMY      COLONOSCOPY      08    EYE SURGERY      bilateral cataract    HYSTERECTOMY      UNKNOWN BSO    JOINT REPLACEMENT      right hip replacement    JOINT REPLACEMENT      right foot tendon surgery    KNEE ARTHROPLASTY Left 2001    thumb surgery Left 09/2015    UPPER GASTROINTESTINAL ENDOSCOPY      2013     Has the patient been evaluated by SLP for swallowing? : Yes  Keep patient NPO?: No   General Precautions: Standard, aspiration, fall, nectar thick        Social Hx: Patient lives alone in apartment in [place].    Prior diet: Regular with thin liquids.    Occupational/hobbies/homemaking: Patient reports she is the mother of three adult children, and grandmother of 6.    Subjective:  Patient presents awake, alert and cooperative  She explains, "I was coughing when I got up and with my breakfast today."  She denies pain.  Patient goals: "I want to get out of the hospital, I want to be able to get from the bed to the chair again on my own."    Pain Rating: " 0/10  Pain Rating Post-Intervention: 0/10    Objective: Patient found awake in bed, HOB elevated and pt repositioned in bed at start of session. Bed alarm going off and charge nurse in room to assist with bed alarm and repositioning patient.     Oral Musculature Evaluation  Dentition: present and adequate  Mucosal Quality: good  Mandibular Strength and Mobility: WFL  Oral Labial Strength and Mobility: WFL  Lingual Strength and Mobility: WFL  Volitional Cough: present and productive  Volitional Swallow: timely  Voice Prior to PO Intake: clear     Bedside Swallow Eval:  Consistencies Assessed: Thin liquids ice chip x2, tsp thin liquids x1, cup sips x2, Nectar thick liquids tsp sips x4, Puree tsp bites x3 and Solids 1 bite of cracker  Oral Phase: WFL  Pharyngeal Phase: coughing/choking and throat clearing  Patient with overt coughing following teaspoon sip of thin liquids. Patient with delayed throat clear 1 of 2 cup sips thin liquids and wet change in vocal quality, requiring cues from SLP for patient to clear throat. No overt S/S aspiration noted on trials of nectar-thickened liquids, puree or solid item. REC: Regular diet with nectar-thickened liquids with whole medications one at at time, strict aspiration precautions, and SLP to initiate tx to continue to monitor. Patient might benefit from further assessment of swallow function via MBSS to determine safest, least restrictive diet.     Additional Treatment:  SLP educated Patient on role of SLP, S/S aspiration, MBSS procedure, dietary modifications, thickener guidlines and standard aspiration precautions.  Patient v/u recommendations. No further questions noted. Nurse notified of recommendations and MD team paged. Whiteboard updated.     FIM:  Social Interaction: 5 Supervision--The patient requires supervision (e.g., monitoring, verbal control, cueing, or coaxing) only under stressful or unfamiliar conditions, but less than 10% of the time.  The patient may require  encouragement to initiate participation.     Assessment:  Lilia Hidalgo is a 85 y.o. female with a medical diagnosis of Septic encephalopathy and presents with S/S pharyngeal Dysphagia.  ST to continue to follow to monitor tolerance and safety of diet.     Do you have any cultural, spiritual, Adventist conflicts, given your current situation?: none noted     Discharge recommendations: Discharge Facility/Level Of Care Needs: nursing facility, skilled     Goals:   SLP Goals        Problem: SLP Goal    Goal Priority Disciplines Outcome   SLP Goal     SLP Ongoing (interventions implemented as appropriate)   Description:  Speech Language Pathology Goals  Goals expected to be met by 5/15/2017  1. Pt will tolerate regular diet with nectar-thickened liquids w/o overt S/S aspiration, MOD I, to improve swallow safety  2. Pt will tolerate trials of thin liquids w/o overt S/S aspiration, MIN A  3. Educate patient and family on safety swallow strategies and S/S aspiration                    Plan:   Patient to be seen Therapy Frequency: 5 x/week   Plan of Care expires: 06/07/17  Plan of Care reviewed with: patient  SLP Follow-up?: Yes  SLP - Next Visit Date: 05/09/17      SLP G-Codes  Functional Assessment Tool Used: NOMS  Score: 4  Functional Limitations: Swallowing  Swallow Current Status (): CK  Swallow Goal Status (): LUCIEN Metz, CCC-SLP  Speech-Language Pathology  Pager: [phone]  5/8/2017

## 2017-05-08 NOTE — PLAN OF CARE
05/08/17 1707   Final Note   Assessment Type Final Discharge Note   Discharge Disposition SNF  ([facility])   Discharge planning education complete? Yes   Discharge plans and expectations educations in teach back method with documentation complete? Yes   Offered [facility] -- Bedside Delivery? n/a   Discharge/Hospital Encounter Summary to (non-[facility]) PCP n/a   Referral to Outpatient Case Management complete? n/a   Referral to / orders for Home Health Complete? n/a   30 day supply of medicines given at discharge, if documented non-compliance / non-adherence? n/a   Any social issues identified prior to discharge? No   Did you assess the readiness or willingness of the family or caregiver to support self management of care? Yes     Transfer to [facility]

## 2017-05-08 NOTE — ASSESSMENT & PLAN NOTE
- Resolving  -Zyprexa PRN   -delirium precautions; treat underlying infection  -follow up blood cultures, respiratory Cx: NGTD

## 2017-05-08 NOTE — PLAN OF CARE
Problem: SLP Goal  Goal: SLP Goal  Outcome: Ongoing (interventions implemented as appropriate)  Bedside Swallow Study completed. Patient with S/S Pharyngeal Dysphagia. REC: Regular diet with nectar-thickened liquids, no straws, one bite/sip at a time, and ST to continue to follow to monitor diet tolerance and safety of swallow mechanism. Nurse notified. SLP to familia WOOD team.    JAZMIN Wills., [facility]-SLP  Speech-Language Pathology  Pager: [phone]  5/8/2017

## 2017-05-08 NOTE — DISCHARGE SUMMARY
Discharge Summary  [facility] Medicine    Patient Name: Lilia Hidalgo    YOB: 1931    Admit Date: 5/5/2017    Discharge Date and Time: 05/08/2017    Discharge Attending Physician: Guero Westfall MD     Discharge Resident Team: [facility] MED 5    Chief Complaint/Reason for Admission: encephalopathy    Primary Diagnosis: Septic encephalopathy    Secondary Diagnosis:  Patient Active Problem List   Diagnosis    Essential hypertension    Hyperlipidemia    GERD (gastroesophageal reflux disease)    Osteoarthritis of knee    Debility    S/P knee replacement    At risk for falling    Aortic stenosis    Hypertensive heart disease with heart failure    Elevated troponin    Atrophic vaginitis    Slow transit constipation    Depressed mood    Bilateral edema of lower extremity    Atherosclerosis of aorta    Septic encephalopathy    Diverticulitis of large intestine without perforation or abscess without bleeding    Acute on chronic diastolic heart failure    Cholelithiasis without cholecystitis       History of Presenting Illness:   Ms. Hidalgo is an 86 yo with a past medical history of HPLD, GERD, HTN, diastolic dysfunction (last ECHO 70% EF in 02/16) who presents to the ED via EMS from her senior living home after being found to be encephalopathic, poorly conversant and having an episode of nausea and vomiting this morning concerning for an aspiration event. History per patient is limited per mental status, and family member at bedside reports that yesterday evening, the patient was interacting with other facility residents and playing board games. This morning, she was found to have vomited bilious vomitus and complained of generalized abdominal pain. In the ED, her CXR showed basilar opacities concerning for CHF vs developing aspiration PNA. Her BNP was elevated, as well as her troponin and she appears clinically volume overloaded. Her exam was nonfocal, and CT a/p shows some  diverticulitis and cholelithiasis without cholecystitis which could correlate with her clinical presentation, and her encephalopathy is likely secondary to a general medical condition. She had a Tbili elevation to 1.3. CT head showed no acute findings. Her family reports that patient likely has a component of dementia and has been encephalopathic from infections in the past.   Hospital Course:   Patient was admitted to Hospital Medicine.   Septic encephalopathy  - Resolving  -Zyprexa PRN   -delirium precautions; treat underlying infection  -follow up blood cultures, respiratory Cx: NGTD     Essential hypertension  -can resume home PO regimen of Benazepril and Toprol XL; Lopressor PRN IV        Hyperlipidemia  -resume home statin         GERD (gastroesophageal reflux disease)  -PPI        Debility  -PT/OT consult: Nursing home vs Short stay SNF        Elevated troponin  -likely in light of HFpEF   -Trend to rule out ACS, negative        Depressed mood  -resume home SSRI         Diverticulitis of large intestine without perforation or abscess without bleeding  -Cipro/Flagyl for enteric coverage  -Diverticulitis noted on CT ap  -pain control with Tramadol/antiemetics PRN  -follow septic workup, blood cultures.        Acute on chronic diastolic heart failure  -Lasix PO daily; will continue to monitor fluid status  -CXR with likely cardiogenic edema          Cholelithiasis without cholecystitis  -Tbili with slight elevation, continue to trend CMP/CBC  -choleliths noted on CTap  Prior to discharge, the primary team reviewed the discharge plan and med list with the patient, who communicated his understanding and agreement with the plan.       Significant Investigations: CT ap, CT head, XRay abdomen, chest.     Surgical / Diagnostic Procedures: septic workup    Studies Pending: none    Discharged Condition: stable and good condition    Discharge Disposition: [facility]    Follow-up Plan: [facility]    Future  Appointments  Date Time Provider Department Center   5/31/2017 10:10 AM LAB, APPOINTMENT [facility] LAB IM K[facility] [place]   6/5/2017 9:00 AM April Herrera MD [facility] [place] [facility]       Patient Instructions / Medications:   Current Discharge Medication List      CONTINUE these medications which have NOT CHANGED    Details   acetaminophen (TYLENOL) 500 MG tablet Take 500 mg by mouth every 6 (six) hours as needed.      atorvastatin (LIPITOR) 20 MG tablet take 1 tablet by mouth once daily  Qty: 30 tablet, Refills: 11      benazepril (LOTENSIN) 40 MG tablet Take 1 tablet (40 mg total) by mouth once daily.  Qty: 90 tablet, Refills: 3      escitalopram oxalate (LEXAPRO) 20 MG tablet Take 1 tablet (20 mg total) by mouth once daily.  Qty: 30 tablet, Refills: 11      fish oil-omega-3 fatty acids 300-1,000 mg capsule Take 2 g by mouth once daily.      furosemide (LASIX) 40 MG tablet Take 1 tablet (40 mg total) by mouth once daily.  Qty: 90 tablet, Refills: 3    Associated Diagnoses: Swelling of lower extremity      melatonin 5 mg Tab Take 1 tablet by mouth every evening.       meloxicam (MOBIC) 15 MG tablet Take 1 tablet (15 mg total) by mouth once daily.  Qty: 30 tablet, Refills: 11    Associated Diagnoses: Knee pain, unspecified chronicity, unspecified laterality      metoprolol succinate (TOPROL-XL) 100 MG 24 hr tablet Take 1 tablet (100 mg total) by mouth once daily.  Qty: 90 tablet, Refills: 3    Associated Diagnoses: Benign essential hypertension      MULTIVITAMIN W-MINERALS/LUTEIN (CENTRUM SILVER ORAL) Take by mouth once daily.      omeprazole (PRILOSEC) 20 MG capsule Take 1 capsule (20 mg total) by mouth once daily.  Qty: 90 capsule, Refills: 3    Associated Diagnoses: Gastroesophageal reflux disease, esophagitis presence not specified; Hiatal hernia      temazepam (RESTORIL) 15 mg Cap take 1 capsule by mouth at bedtime  Qty: 30 capsule, Refills: 5      tramadol (ULTRAM) 50 mg tablet Take 50 mg by mouth every 6 (six)  hours as needed for Pain.      triamcinolone acetonide 0.1% (KENALOG) 0.1 % cream Apply topically 2 (two) times daily.  Qty: 15 g, Refills: 0    Associated Diagnoses: Dermatitis             No discharge procedures on file.

## 2017-05-08 NOTE — PLAN OF CARE
Problem: Physical Therapy Goal  Goal: Physical Therapy Goal  Goals to be met by: 2017     Patient will increase functional independence with mobility by performin. Supine to sit with Modified [place]  2. Sit to supine with Modified [place]  3. Sit to stand transfer with Supervision  4. Bed to chair transfer with Stand-by Assistance using Rolling Walker  5. Gait x 100 feet with Contact Guard Assistance using Rolling Walker.   6. Lower extremity exercise program x 15 reps per handout, with assistance as needed   Outcome: Ongoing (interventions implemented as appropriate)  Patient endurance and strength progressing consistently as planned.

## 2017-05-08 NOTE — SUBJECTIVE & OBJECTIVE
Interval History: Patient still with mild in-hospital delirium as reported per patient and family at bedside. Patient reports improving abdominal pain and no infection symptoms. Will plan for SNF at discharge.     Review of Systems   Constitutional: Negative for fever.   Gastrointestinal: Positive for abdominal pain. Negative for constipation, diarrhea, nausea and vomiting.   Genitourinary: Negative for dysuria.   Psychiatric/Behavioral: Positive for confusion.     Objective:     Vital Signs (Most Recent):  Temp: 98.3 °F (36.8 °C) (05/08/17 0737)  Pulse: 67 (05/08/17 0749)  Resp: 16 (05/08/17 0749)  BP: (!) 150/79 (05/08/17 0737)  SpO2: (!) 92 % (05/08/17 0749) Vital Signs (24h Range):  Temp:  [97.5 °F (36.4 °C)-99 °F (37.2 °C)] 98.3 °F (36.8 °C)  Pulse:  [67-80] 67  Resp:  [16-18] 16  SpO2:  [92 %-97 %] 92 %  BP: (118-150)/(56-79) 150/79     Weight: 86.7 kg (191 lb 3.2 oz)  Body mass index is 29.95 kg/(m^2).    Intake/Output Summary (Last 24 hours) at 05/08/17 1237  Last data filed at 05/07/17 1800   Gross per 24 hour   Intake              810 ml   Output              500 ml   Net              310 ml      Physical Exam   Constitutional: She appears well-developed and well-nourished. No distress.   HENT:   Mouth/Throat: Oropharynx is clear and moist.   Eyes: EOM are normal. Pupils are equal, round, and reactive to light. Left eye exhibits no discharge. No scleral icterus.   Neck: Normal range of motion. No JVD present. No tracheal deviation present. No thyromegaly present.   Cardiovascular: Normal rate, regular rhythm, normal heart sounds and intact distal pulses.  Exam reveals no gallop and no friction rub.    No murmur heard.  Pulmonary/Chest: Effort normal. No respiratory distress. She has no wheezes. She exhibits no tenderness.   Abdominal: Soft. Bowel sounds are normal. She exhibits no distension and no mass. There is tenderness. No hernia.   Musculoskeletal: Normal range of motion. She exhibits edema and  tenderness.   Lymphadenopathy:     She has no cervical adenopathy.   Neurological: She is alert.   Skin: She is not diaphoretic.   Psychiatric: She has a normal mood and affect.   Nursing note and vitals reviewed.      Significant Labs:   CBC:   Recent Labs  Lab 05/07/17  0418 05/08/17  0521   WBC 6.92 6.20   HGB 13.3 13.4   HCT 38.4 40.9    200     CMP:   Recent Labs  Lab 05/06/17  1329 05/07/17  0419 05/08/17  0819    140 141   K 3.8 3.3* 4.0    104 105   CO2 26 27 26    93 103   BUN 12 17 20   CREATININE 0.8 0.9 1.0   CALCIUM 9.1 8.8 9.1   PROT  --  6.0 6.4   ALBUMIN  --  2.7* 3.0*   BILITOT  --  1.0 0.8   ALKPHOS  --  81 84   AST  --  18 22   ALT  --  15 15   ANIONGAP 12 9 10   EGFRNONAA >60.0 58.5* 51.5*       Significant Imaging: I have reviewed and interpreted all pertinent imaging results/findings within the past 24 hours.

## 2017-05-09 ENCOUNTER — PATIENT OUTREACH (OUTPATIENT)
Dept: ADMINISTRATIVE | Facility: CLINIC | Age: 82
End: 2017-05-09
Payer: MEDICARE

## 2017-05-09 PROBLEM — R13.10 DYSPHAGIA: Status: ACTIVE | Noted: 2017-05-09

## 2017-05-09 LAB
ALBUMIN SERPL BCP-MCNC: 2.8 G/DL
ALP SERPL-CCNC: 84 U/L
ALT SERPL W/O P-5'-P-CCNC: 18 U/L
ANION GAP SERPL CALC-SCNC: 9 MMOL/L
AST SERPL-CCNC: 29 U/L
BILIRUB SERPL-MCNC: 0.7 MG/DL
BUN SERPL-MCNC: 16 MG/DL
CALCIUM SERPL-MCNC: 9.1 MG/DL
CHLORIDE SERPL-SCNC: 107 MMOL/L
CO2 SERPL-SCNC: 25 MMOL/L
CREAT SERPL-MCNC: 0.8 MG/DL
EST. GFR  (AFRICAN AMERICAN): >60 ML/MIN/1.73 M^2
EST. GFR  (NON AFRICAN AMERICAN): >60 ML/MIN/1.73 M^2
GLUCOSE SERPL-MCNC: 124 MG/DL
POTASSIUM SERPL-SCNC: 4.1 MMOL/L
PROT SERPL-MCNC: 6.1 G/DL
SODIUM SERPL-SCNC: 141 MMOL/L

## 2017-05-09 PROCEDURE — 97162 PT EVAL MOD COMPLEX 30 MIN: CPT

## 2017-05-09 PROCEDURE — 99306 1ST NF CARE HIGH MDM 50: CPT | Mod: ,,, | Performed by: INTERNAL MEDICINE

## 2017-05-09 PROCEDURE — 97161 PT EVAL LOW COMPLEX 20 MIN: CPT

## 2017-05-09 PROCEDURE — 25000003 PHARM REV CODE 250: Performed by: INTERNAL MEDICINE

## 2017-05-09 PROCEDURE — 92610 EVALUATE SWALLOWING FUNCTION: CPT

## 2017-05-09 PROCEDURE — 97530 THERAPEUTIC ACTIVITIES: CPT

## 2017-05-09 PROCEDURE — 97116 GAIT TRAINING THERAPY: CPT

## 2017-05-09 PROCEDURE — 80053 COMPREHEN METABOLIC PANEL: CPT

## 2017-05-09 PROCEDURE — 97165 OT EVAL LOW COMPLEX 30 MIN: CPT

## 2017-05-09 PROCEDURE — 11000004 HC SNF PRIVATE

## 2017-05-09 PROCEDURE — 36415 COLL VENOUS BLD VENIPUNCTURE: CPT

## 2017-05-09 PROCEDURE — 97535 SELF CARE MNGMENT TRAINING: CPT

## 2017-05-09 PROCEDURE — 25000003 PHARM REV CODE 250: Performed by: STUDENT IN AN ORGANIZED HEALTH CARE EDUCATION/TRAINING PROGRAM

## 2017-05-09 RX ORDER — HEPARIN SODIUM 5000 [USP'U]/ML
5000 INJECTION, SOLUTION INTRAVENOUS; SUBCUTANEOUS EVERY 8 HOURS
Status: DISCONTINUED | OUTPATIENT
Start: 2017-05-09 | End: 2017-05-29 | Stop reason: HOSPADM

## 2017-05-09 RX ORDER — GLUCAGON 1 MG
1 KIT INJECTION
Status: DISCONTINUED | OUTPATIENT
Start: 2017-05-09 | End: 2017-05-09

## 2017-05-09 RX ORDER — METOPROLOL SUCCINATE 100 MG/1
100 TABLET, EXTENDED RELEASE ORAL DAILY
Status: DISCONTINUED | OUTPATIENT
Start: 2017-05-09 | End: 2017-05-29 | Stop reason: HOSPADM

## 2017-05-09 RX ORDER — FUROSEMIDE 40 MG/1
40 TABLET ORAL DAILY
Status: DISCONTINUED | OUTPATIENT
Start: 2017-05-12 | End: 2017-05-10

## 2017-05-09 RX ORDER — OLANZAPINE 2.5 MG/1
2.5 TABLET ORAL NIGHTLY PRN
Status: DISCONTINUED | OUTPATIENT
Start: 2017-05-09 | End: 2017-05-29 | Stop reason: HOSPADM

## 2017-05-09 RX ORDER — FUROSEMIDE 40 MG/1
40 TABLET ORAL DAILY
Status: DISCONTINUED | OUTPATIENT
Start: 2017-05-09 | End: 2017-05-09

## 2017-05-09 RX ORDER — CIPROFLOXACIN 500 MG/1
500 TABLET ORAL EVERY 12 HOURS
Status: COMPLETED | OUTPATIENT
Start: 2017-05-09 | End: 2017-05-16

## 2017-05-09 RX ORDER — MAG HYDROX/ALUMINUM HYD/SIMETH 200-200-20
15 SUSPENSION, ORAL (FINAL DOSE FORM) ORAL EVERY 6 HOURS PRN
Status: DISCONTINUED | OUTPATIENT
Start: 2017-05-09 | End: 2017-05-29 | Stop reason: HOSPADM

## 2017-05-09 RX ORDER — IBUPROFEN 200 MG
24 TABLET ORAL
Status: DISCONTINUED | OUTPATIENT
Start: 2017-05-09 | End: 2017-05-09

## 2017-05-09 RX ORDER — ATORVASTATIN CALCIUM 20 MG/1
20 TABLET, FILM COATED ORAL DAILY
Status: DISCONTINUED | OUTPATIENT
Start: 2017-05-09 | End: 2017-05-29 | Stop reason: HOSPADM

## 2017-05-09 RX ORDER — BENAZEPRIL HYDROCHLORIDE 40 MG/1
40 TABLET ORAL DAILY
Status: DISCONTINUED | OUTPATIENT
Start: 2017-05-09 | End: 2017-05-10

## 2017-05-09 RX ORDER — ACETAMINOPHEN 325 MG/1
650 TABLET ORAL EVERY 6 HOURS PRN
Status: DISCONTINUED | OUTPATIENT
Start: 2017-05-09 | End: 2017-05-29 | Stop reason: HOSPADM

## 2017-05-09 RX ORDER — AMOXICILLIN 250 MG
1 CAPSULE ORAL 2 TIMES DAILY
Status: DISCONTINUED | OUTPATIENT
Start: 2017-05-09 | End: 2017-05-22

## 2017-05-09 RX ORDER — IBUPROFEN 200 MG
16 TABLET ORAL
Status: DISCONTINUED | OUTPATIENT
Start: 2017-05-09 | End: 2017-05-09

## 2017-05-09 RX ORDER — METRONIDAZOLE 500 MG/1
500 TABLET ORAL EVERY 8 HOURS
Status: COMPLETED | OUTPATIENT
Start: 2017-05-09 | End: 2017-05-16

## 2017-05-09 RX ORDER — ONDANSETRON 2 MG/ML
4 INJECTION INTRAMUSCULAR; INTRAVENOUS EVERY 12 HOURS PRN
Status: DISCONTINUED | OUTPATIENT
Start: 2017-05-09 | End: 2017-05-29 | Stop reason: HOSPADM

## 2017-05-09 RX ORDER — RAMELTEON 8 MG/1
8 TABLET ORAL NIGHTLY PRN
Status: DISCONTINUED | OUTPATIENT
Start: 2017-05-09 | End: 2017-05-29 | Stop reason: HOSPADM

## 2017-05-09 RX ORDER — PANTOPRAZOLE SODIUM 40 MG/1
40 TABLET, DELAYED RELEASE ORAL DAILY
Status: DISCONTINUED | OUTPATIENT
Start: 2017-05-09 | End: 2017-05-29 | Stop reason: HOSPADM

## 2017-05-09 RX ORDER — ESCITALOPRAM OXALATE 10 MG/1
20 TABLET ORAL DAILY
Status: DISCONTINUED | OUTPATIENT
Start: 2017-05-09 | End: 2017-05-29 | Stop reason: HOSPADM

## 2017-05-09 RX ADMIN — RAMELTEON 8 MG: 8 TABLET, FILM COATED ORAL at 12:05

## 2017-05-09 RX ADMIN — METOPROLOL SUCCINATE 100 MG: 100 TABLET, EXTENDED RELEASE ORAL at 09:05

## 2017-05-09 RX ADMIN — METRONIDAZOLE 500 MG: 500 TABLET ORAL at 09:05

## 2017-05-09 RX ADMIN — STANDARDIZED SENNA CONCENTRATE AND DOCUSATE SODIUM 1 TABLET: 8.6; 5 TABLET, FILM COATED ORAL at 12:05

## 2017-05-09 RX ADMIN — TRAMADOL HYDROCHLORIDE 25 MG: 50 TABLET, FILM COATED ORAL at 09:05

## 2017-05-09 RX ADMIN — HEPARIN SODIUM 5000 UNITS: 5000 INJECTION, SOLUTION INTRAVENOUS; SUBCUTANEOUS at 09:05

## 2017-05-09 RX ADMIN — CIPROFLOXACIN HYDROCHLORIDE 500 MG: 500 TABLET, FILM COATED ORAL at 09:05

## 2017-05-09 RX ADMIN — ATORVASTATIN CALCIUM 20 MG: 20 TABLET, FILM COATED ORAL at 09:05

## 2017-05-09 RX ADMIN — FUROSEMIDE 40 MG: 40 TABLET ORAL at 09:05

## 2017-05-09 RX ADMIN — HEPARIN SODIUM 5000 UNITS: 5000 INJECTION, SOLUTION INTRAVENOUS; SUBCUTANEOUS at 03:05

## 2017-05-09 RX ADMIN — METRONIDAZOLE 500 MG: 500 TABLET ORAL at 05:05

## 2017-05-09 RX ADMIN — HEPARIN SODIUM 5000 UNITS: 5000 INJECTION, SOLUTION INTRAVENOUS; SUBCUTANEOUS at 05:05

## 2017-05-09 RX ADMIN — ESCITALOPRAM 20 MG: 10 TABLET, FILM COATED ORAL at 09:05

## 2017-05-09 RX ADMIN — PANTOPRAZOLE SODIUM 40 MG: 40 TABLET, DELAYED RELEASE ORAL at 09:05

## 2017-05-09 RX ADMIN — STANDARDIZED SENNA CONCENTRATE AND DOCUSATE SODIUM 1 TABLET: 8.6; 5 TABLET, FILM COATED ORAL at 09:05

## 2017-05-09 RX ADMIN — BENAZEPRIL HYDROCHLORIDE 40 MG: 40 TABLET, COATED ORAL at 09:05

## 2017-05-09 RX ADMIN — METRONIDAZOLE 500 MG: 500 TABLET ORAL at 03:05

## 2017-05-09 NOTE — PT/OT/SLP EVAL
"Speech Language Pathology  Clinical Swallow Evaluation    Lilia Hidalgo   MRN: [id number]   Admitting Diagnosis: Debility    Diet recommendations: Solid Diet Level: Regular  Liquid Diet Level: Thin HOB to 90 degrees, Small bites/sips, Alternating bites/sips and 1 bite/sip at a time Monitor for overt s/s of aspiration    SLP Treatment Date: 05/09/17  Speech Start Time: 1524     Speech Stop Time: 1549     Speech Total (min): 25 min       TREATMENT BILLABLE MINUTES:  Eval Swallow and Oral Function 25    Diagnosis: Debility  S/p septic encephalopathy per acute notes    Past Medical History:   Diagnosis Date    Arthritis     GERD (gastroesophageal reflux disease)     Hyperlipidemia     Hypertension     Joint pain     Knee pain, left 08/2016    pain with walking or standing    PUD (peptic ulcer disease)      Past Surgical History:   Procedure Laterality Date    APPENDECTOMY      COLONOSCOPY      08    EYE SURGERY      bilateral cataract    HYSTERECTOMY      UNKNOWN BSO    JOINT REPLACEMENT      right hip replacement    JOINT REPLACEMENT      right foot tendon surgery    KNEE ARTHROPLASTY Left 2001    thumb surgery Left 09/2015    UPPER GASTROINTESTINAL ENDOSCOPY      2013       Has the patient been evaluated by SLP for swallowing? : Yes  Keep patient NPO?: No General Precautions: Standard, aspiration, fall          Social Hx: Patient lives in an assisted living facility ([facility])    Prior diet:regular/thins prior to hospitalization; acute SLP recommendations on 5/8 were for regular diet and nectar thick liquids      Subjective:  "Oh, it's terrible. I don't like it." pt referring to thickener. Pt likely has not been using thickener since t/f to SNF.  SLP noted pt did not have a can of thickener in room as orders for nectar thick liquids did not appear to have transferred when pt transferred from acute floor.   OT noted acute SLP's recommendations for thickener and communicated this to MD.    "There " "have been gradual changes before this." pt's daughter referring to changes in cognition.                         Objective:        Oral Musculature Evaluation  Oral Musculature: WFL  Dentition: present and adequate  Mucosal Quality: good  Mandibular Strength and Mobility: WFL  Oral Labial Strength and Mobility: WFL  Lingual Strength and Mobility: WFL  Velar Elevation: WFL  Buccal Strength and Mobility: WFL  Volitional Cough: WFL  Volitional Swallow: elicited; timely; good elevation/excursion  Voice Prior to PO Intake: dry, clear     Bedside Swallow Eval:  Consistencies Assessed: Thin liquids thin water via tsp x 1, cup sip x 2, straw sip x 3, multiple straw sips of Coke, Puree 1/2 tsp x 1, full tsp x 1 and Solids 1/4 cracker  Oral Phase: WFL  Pharyngeal Phase: mild delayed throat clear x 1 after liquid wash with Coke after solid trial; no changes in vocal quality, throat clearing, or coughing observed with any other trials    Additional Treatment:  Education provided to pt and family regarding role of ST, swallowing evaluation, overt s/s of aspiration, concern for aspiration, diet recommendations, and upcoming cognitive evaluation.  Understanding was verbalized, but pt may need reinforcement.  Pt's daughter reporting pt with gradual cognition changes prior to this hospitalization.      Assessment:  Lilia Hidalgo is a 85 y.o. female with a medical diagnosis of Debility and presents with possible mild dysphagia.           Discharge recommendations: Discharge Facility/Level Of Care Needs:  (TBD pending progress)     Diet recommendations: Solid Diet Level: Regular  Liquid Diet Level: Thin     Goals:   SLP Goals        Problem: SLP Goal    Goal Priority Disciplines Outcome   SLP Goal     SLP    Description:  Speech Language Pathology Goals  Goals expected to be met by 5/16:  1. Pt will tolerate a regular consistency diet and thin liquids without s/s of aspiration.  2. Pt will participate in speech/language/cognitive " evaluation to determine need for further intervention.                   Plan:   Patient to be seen Therapy Frequency: 5 x/week   Plan of Care expires: 06/08/17  Plan of Care reviewed with: patient, daughter, son  SLP Follow-up?: Yes              MATEUS Mejia, CCC-SLP  05/09/2017    MATEUS Mejia, CCC-SLP  Speech Language Pathologist  [phone]  5/9/2017

## 2017-05-09 NOTE — PROGRESS NOTES
Pt arrived to floor via W/C with attendant. Pt aao x 3, disoriented to time. VSS. Assessment completed. No skin breakdown noted. Fall precautions explained to pt. Call bell given to pt. Bed alarm activated and informed pt. Small bag of belongings at bedside. No c/o pain. Pt requesting sleep medication.

## 2017-05-09 NOTE — PT/OT/SLP EVAL
Occupational Therapy  Evaluation/tx    Lilia Hidalgo   MRN: [id number]   Admitting Diagnosis: deconditioning/diverticulitis/ septic encephalopathy    OT Date of Treatment: 05/09/17   OT Start Time: 0900  OT Stop Time: 0950  OT Total Time (min): 50 min    Billable Minutes:  Evaluation 25  Self Care/Home Management 25    Diagnosis:  deconditioning/diverticulitis/ septic encephalopathy      Past Medical History:   Diagnosis Date    Arthritis     GERD (gastroesophageal reflux disease)     Hyperlipidemia     Hypertension     Joint pain     Knee pain, left 08/2016    pain with walking or standing    PUD (peptic ulcer disease)       Past Surgical History:   Procedure Laterality Date    APPENDECTOMY      COLONOSCOPY      08    EYE SURGERY      bilateral cataract    HYSTERECTOMY      UNKNOWN BSO    JOINT REPLACEMENT      right hip replacement    JOINT REPLACEMENT      right foot tendon surgery    KNEE ARTHROPLASTY Left 2001    thumb surgery Left 09/2015    UPPER GASTROINTESTINAL ENDOSCOPY      2013         General Precautions: Standard, aspiration, fall, nectar thick (delerium/ No straws)  Orthopedic Precautions: N/A  Braces: N/A    Do you have any cultural, spiritual, Pentecostalism conflicts, given your current situation?: Scientologist     Patient History:  Lives With: facility resident (independent living)  Living Arrangements: independent living facility  Home Accessibility:  (elevator access)  Transportation Available: family or friend will provide  Equipment Currently Used at Home: walker, rolling, rollator (transport chair)    Prior level of function:   Bed Mobility/Transfers: needs device  Grooming: independent  Bathing: needs device (shower chair/grab bars)  Upper Body Dressing: independent  Lower Body Dressing: independent  Toileting: needs device (handicapped toilet)  Home Management Skills: independent  Driving License: No  Leisure and Hobbies: Bingo 2x/week  IADL Comments: Pt. resides in an independent  living facility ( [facility]) in a 3rd floor apartment.Per family report(son) pt. started having difficulty getting around about 3 weeks ago and needed to use a transport chair to get to dining room.  Prior to that time pt. was Mod I with ADL tasks as well as functional mobility with use of RW.    Pt. has a handicapped bathroom and was able to shower on her own as well as do light IADL tasks.       Dominant hand: right    Subjective:  Communicated with nurse prior to session.    Chief Complaint: pt. Concerned about levelof confusion she was having about where she presently was and how she got here  Patient/Family stated goals: To be able to go to granddaughter's wedding in a few weeks    Pain Rating:  (did not quantify)  Location - Side: Left     Location: knee     Pain Rating Post-Intervention:  (no increases in pain noted)    Objective:   Patient found with:  (supine in bed with bed alarm on)    Cognitive Exam:  Oriented to: Person, Place and Time  Follows Commands/attention: Follows one-step commands  Communication: clear/fluent  Memory:  Deficits noted  Safety awareness/insight to disability: impaired  Coping skills/emotional control: fearful of unfamiliar surroundings    Visual/perceptual:  Wears glasses    Physical Exam:  Postural examination/scapula alignment: Rounded shoulder, Head forward and Posterior pelvic tilt  Skin integrity: right arm noted to have slight edema and redness near dorsal surface of elbow crease  Edema: Mild elbow region    Sensation:   Intact in BUE    Upper Extremity Range of Motion:  Right Upper Extremity: WFL  Left Upper Extremity: WFL    Upper Extremity Strength:  Right Upper Extremity: WFL  Left Upper Extremity: WFL   Strength: right good; left was fair    Fine motor coordination:   Impaired on left    Gross motor coordination: WFL    Functional Status:  MDS G  Bed Mobility Functional Status: SBA  Transfer Functional Status: mod(A) without an AD; Min A with RW)  Dressing  "Functional Status: 3:Max(A) LBD with assist to initially thread BLE into pants as well as for balance when managing over hips in stand; UBD Min A to doff robe; able to don pull over shirt seated EOB  Personal Hygiene Functional Status: SBA to comb hair and brush teeth seated  Bathing Functional Status: mod(A) sponge bath with assist to wash buttocks and for balance when standing; assist at feet  Eval Only: Number of U/E limb <4/5 MMT: 0  Moving from seated to standing position: Not steady, only able to stabilize with staff assistance  Surface-to-surface transfer (transfer between bed and chair or wheelchair): Not steady, only able to stabilize with staff assistance       AM-PAC 6 CLICK ADL  How much help from another person does this patient currently need?  1 = Unable, Total/Dependent Assistance  2 = A lot, Maximum/Moderate Assistance  3 = A little, Minimum/Contact Guard/Supervision  4 = None, Modified [place]/Independent     Putting on and taking off regular lower body clothing? : 2  Bathing (including washing, rinsing, drying)?: 2  Toileting, which includes using toilet, bedpan, or urinal? : 2  Putting on and taking off regular upper body clothing?: 3  Taking care of personal grooming such as brushing teeth?: 3  Eating meals?: 3  Total Score: 15     AM-PAC Raw Score CMS "G-Code Modifier Level of Impairment Assistance   6 % Total / Unable   7 - 9 CM 80 - 100% Maximal Assist   10 - 14 CL 60 - 80% Moderate Assist   15 - 19 CK 40 - 60% Moderate Assist   20 - 22 CJ 20 - 40% Minimal Assist   23 CI 1-20% SBA / CGA   24 CH 0% Independent/ Mod I            Additional Treatment:  Pt. Educated on role of OT and POC  Pt. Educated on safety with transfers and techniques to assist with dressing/bathing on this date  Safety education provided to pt. On :  Location of call button, need for assist for transfers, and nectar thick liquids and no straws    Patient left up in chair with call button in reach, nurse " notified and son present    Assessment:  Lilia Hidalgo is a 85 y.o. female with a medical diagnosis of   deconditioning/diverticulitis/ septic encephalopathy  And presents with deficits in self-care skills, functional mobility, endurance, cognition as well as pain that is effecting safe performance with ADL tasks.  Pt. Would benefit from continued OT services to maximize independence and safety with ADL tasks.     .    Rehab identified problem list/impairments: impaired endurance, impaired self care skills, impaired functional mobilty, gait instability, impaired cognition, decreased safety awareness, impaired balance, pain    Rehab potential is good    Activity tolerance: Good    Discharge recommendations: home health OT (with supervision/light assist)     Barriers to discharge: Decreased caregiver support     Equipment recommendations: none     GOALS:   Occupational Therapy Goals        Problem: Occupational Therapy Goal    Goal Priority Disciplines Outcome Interventions   Occupational Therapy Goal     OT, PT/OT     Description:  Goals to be met by: 3 weeks     Patient will increase functional independence with ADLs by performing:    Feeding with Set-up Assistance.  UE Dressing with Set-up Assistance.  LE Dressing with Supervision.  Grooming while standing with Supervision.  Toileting from bedside commode with Supervision for hygiene and clothing management.   Bathing from  shower chair/bench with Stand-by Assistance.  Supine to sit with Modified [place].  Stand pivot transfers with Supervision.  Toilet transfer to bedside commode with Supervision  Pt. Will be able to follow 2 step directions involving ADL tasks with  75% accuracy  Pt.. Will be independent with HEP for BUE as well as for improving FMC skills in left hand   .                 PLAN: Patient to be seen 5 x/week to address the above listed problems via self-care/home management, therapeutic activities, therapeutic exercises, cognitive  retraining  Plan of Care expires: 06/08/17  Plan of Care reviewed with: patient, son    Mariangel NEFF MIR Reed  05/09/2017

## 2017-05-09 NOTE — PLAN OF CARE
Problem: Physical Therapy Goal  Goal: Physical Therapy Goal  Goals to be met by: 2-3 weeks     Patient will increase functional independence with mobility by performin. Supine to sit with supervision.  2. Sit to supine with supervision  3. Sit to stand transfer with supervision  4. Bed to chair transfer with supervision using Rolling Walker  5. Gait x 150 feet with Supervision using Rolling Walker.   6. Wheelchair propulsion x 150 feet with Supervision using bilateral upper extremities  7. Ascend/Descend 4 inch curb step with Stand-by Assistance using Rolling Walker.  8. Stand for 5 minutes with Stand-by Assistance using Rolling Walker while performing a task.  9. Lower extremity exercise program x20 reps per handout, with supervision  Outcome: Ongoing (interventions implemented as appropriate)  Goals set.

## 2017-05-09 NOTE — H&P
History & Physical  Skilled Nursing Facility      SUBJECTIVE:     Chief Complaint/Reason for Admission: Debility    History of Present Illness:  Patient is a 85 y.o. female with HLP, GERD, HTN who presents to SNF after hospitalization for septic encephalopathy, diverticulitis and diastolic CHF exacerbation. She presented with N/V and abdominal pain both of which have resolved.  She is tolerating > 50% of her meals.  She denies SOB or CP.  She remembers having visual hallucinations of red flowers in the hospital and also saw a green plant last night in her room which is now gone.  She has chronic LE edema at home and wears compression stocking at home.  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  She has no pain at rest but only with ambulation which is relieved with tramadol.  She is not familiar with mobic on her med list.      The patient has been admitted to SNF for ongoing PT/OT due to insufficient progress to go home safely from the hospital.    Events from last hospital admit reviewed.    Home Medication list:  PTA Medications   Medication Sig    acetaminophen (TYLENOL) 500 MG tablet Take 500 mg by mouth every 6 (six) hours as needed.    atorvastatin (LIPITOR) 20 MG tablet take 1 tablet by mouth once daily    benazepril (LOTENSIN) 40 MG tablet Take 1 tablet (40 mg total) by mouth once daily.    escitalopram oxalate (LEXAPRO) 20 MG tablet Take 1 tablet (20 mg total) by mouth once daily.    fish oil-omega-3 fatty acids 300-1,000 mg capsule Take 2 g by mouth once daily.    furosemide (LASIX) 40 MG tablet Take 1 tablet (40 mg total) by mouth once daily.    melatonin 5 mg Tab Take 1 tablet by mouth every evening.     meloxicam (MOBIC) 15 MG tablet Take 1 tablet (15 mg total) by mouth once daily.    metoprolol succinate (TOPROL-XL) 100 MG 24 hr tablet Take 1 tablet (100 mg total) by mouth once daily.    MULTIVITAMIN W-MINERALS/LUTEIN (CENTRUM SILVER ORAL) Take by mouth once daily.     omeprazole (PRILOSEC) 20 MG capsule Take 1 capsule (20 mg total) by mouth once daily.    temazepam (RESTORIL) 15 mg Cap take 1 capsule by mouth at bedtime    tramadol (ULTRAM) 50 mg tablet Take 50 mg by mouth every 6 (six) hours as needed for Pain.    triamcinolone acetonide 0.1% (KENALOG) 0.1 % cream Apply topically 2 (two) times daily.       Medications ordered by the discharge team:  Scheduled Meds:   atorvastatin  20 mg Oral Daily    benazepril  40 mg Oral Daily    ciprofloxacin HCl  500 mg Oral Q12H    escitalopram oxalate  20 mg Oral Daily    furosemide  40 mg Oral Daily    heparin (porcine)  5,000 Units Subcutaneous Q8H    metoprolol succinate  100 mg Oral Daily    metronidazole  500 mg Oral Q8H    pantoprazole  40 mg Oral Daily    senna-docusate 8.6-50 mg  1 tablet Oral BID     Continuous Infusions:   PRN Meds:.acetaminophen, aluminum-magnesium hydroxide-simethicone, dextrose 50%, dextrose 50%, glucagon (human recombinant), glucose, glucose, olanzapine, ondansetron, ramelteon, tramadol    Review of patient's allergies indicates:   Allergen Reactions    Aspirin Nausea Only     Other reaction(s): esophageal pain    Celebrex  [celecoxib]      Other reaction(s): Rash    Ibuprofen      Other reaction(s): esophogeal pain    Sulfa dyne      Other reaction(s): esophogeal pain    Steroid  [corticosteroids (glucocorticoids)]      Other reaction(s): Flushing (skin)    Morphine Hallucinations        Past Medical History:   Diagnosis Date    Arthritis     GERD (gastroesophageal reflux disease)     Hyperlipidemia     Hypertension     Joint pain     Knee pain, left 08/2016    pain with walking or standing    PUD (peptic ulcer disease)      Past Surgical History:   Procedure Laterality Date    APPENDECTOMY      COLONOSCOPY      08    EYE SURGERY      bilateral cataract    HYSTERECTOMY      UNKNOWN BSO    JOINT REPLACEMENT      right hip replacement    JOINT REPLACEMENT      right foot  tendon surgery    KNEE ARTHROPLASTY Left 2001    thumb surgery Left 09/2015    UPPER GASTROINTESTINAL ENDOSCOPY      2013     Family History   Problem Relation Age of Onset    Heart disease Mother     Heart disease Father     Asthma Father     Cancer Brother      breast    Heart disease Brother     Melanoma Neg Hx     Psoriasis Neg Hx     Lupus Neg Hx     Eczema Neg Hx     Acne Neg Hx     Breast cancer Neg Hx     Ovarian cancer Neg Hx     Cervical cancer Neg Hx     Endometrial cancer Neg Hx     Vaginal cancer Neg Hx      Social History   Substance Use Topics    Smoking status: Never Smoker    Smokeless tobacco: Never Used    Alcohol use No       Review of Systems:  Constitutional: no fever or chills  Eyes: no visual changes  ENT: no nasal congestion or sore throat  Respiratory: no cough or shortness of breath  Cardiovascular: no chest pain or palpitations  Gastrointestinal: no nausea or vomiting, no abdominal pain; last BM: 5/5/2017  Genitourinary: no hematuria or dysuria  Integument/Breast: no rash or pruritis  Hematologic/Lymphatic: no easy bruising or lymphadenopathy  Allergy/Immunology: no postnasal drip  Musculoskeletal: no arthralgias or myalgias  Neurological: no seizures or tremors  Behavioral/Psych: no auditory or visual hallucinations  Endocrine: no heat or cold intolerance      OBJECTIVE:     Vital Signs (Most Recent)  Temp: 98 °F (36.7 °C) (05/08/17 2300)  Pulse: 70 (05/08/17 2300)  Resp: 18 (05/08/17 2300)  BP: (!) 160/74 (05/08/17 2300)  SpO2: 97 % (05/08/17 2300)    Vital Signs Range (Last 24H):  Temp:  [98 °F (36.7 °C)-98.5 °F (36.9 °C)]   Pulse:  [67-75]   Resp:  [18]   BP: (131-160)/(63-74)   SpO2:  [90 %-97 %]     Physical Exam:  General: well developed, well nourished, no distress, seated in wheelchair  HENT: Head:normocephalic, atraumatic. Ears:bilateral external ear canals normal. Nose: Nares normal. Septum midline. Mucosa normal. Throat: lips, mucosa, and tongue normal and  no throat erythema.  Eyes: conjunctivae/corneas clear.    Neck: supple, symmetrical, trachea midline  Lungs:  clear to auscultation bilaterally and normal respiratory effort  Cardiovascular: Heart: regular rate and rhythm, S1, S2 normal, no murmur, click, rub or gallop. Chest Wall: no tenderness. Extremities: no cyanosis, 1+ BLE edema, no clubbing. Pulses: 2+ and symmetric.  Abdomen/Rectal: Abdomen: soft, non-tender non-distended; bowel sounds normal; no masses,  no organomegaly.   Skin: Skin color, texture, turgor normal. Mild erythema to right antecubital area at previous IV insertion site  Musculoskeletal:no clubbing, cyanosis  Lymph Nodes: No cervical or supraclavicular adenopathy  Neurologic: Normal strength and tone. No focal numbness or weakness  Psych/Behavioral:  Alert and oriented, appropriate affect.      Laboratory  Recent Results (from the past 24 hour(s))   Comprehensive Metabolic Panel (CMP)    Collection Time: 05/09/17  6:54 AM   Result Value Ref Range    Sodium 141 136 - 145 mmol/L    Potassium 4.1 3.5 - 5.1 mmol/L    Chloride 107 95 - 110 mmol/L    CO2 25 23 - 29 mmol/L    Glucose 124 (H) 70 - 110 mg/dL    BUN, Bld 16 8 - 23 mg/dL    Creatinine 0.8 0.5 - 1.4 mg/dL    Calcium 9.1 8.7 - 10.5 mg/dL    Total Protein 6.1 6.0 - 8.4 g/dL    Albumin 2.8 (L) 3.5 - 5.2 g/dL    Total Bilirubin 0.7 0.1 - 1.0 mg/dL    Alkaline Phosphatase 84 55 - 135 U/L    AST 29 10 - 40 U/L    ALT 18 10 - 44 U/L    Anion Gap 9 8 - 16 mmol/L    eGFR if African American >60.0 >60 mL/min/1.73 m^2    eGFR if non African American >60.0 >60 mL/min/1.73 m^2       Recent Labs  Lab 05/06/17  0412 05/07/17  0418 05/08/17  0521   WBC 7.74 6.92 6.20   HGB 14.2 13.3 13.4   HCT 40.9 38.4 40.9    180 200         Diagnostic Results:  Labs: Reviewed    ASSESSMENT/PLAN:       Active Hospital Problems    Diagnosis  POA    *Debility [R53.81]  -continue PT/OT to increase ambulation, ADL performance and endurance  -continue heparin for DVT  prophylaxis  -continue fall precautions  -continue senokot-s to prevent constipation; hold for frequent or loose stooling  Yes    Diverticulitis of large intestine without perforation or abscess without bleeding [K57.32]  -complete cipro and flagyl for 10 days  Yes    Septic encephalopathy [G93.41]  -improved from admit but remains with intermittent visual hallucinations which are decreasing in frequency  -delirium precautions and continue olanzapine prn to treat  Yes    Dysphagia  -patient was on nectar thick liquids and has been cleared for clear liquids  -patient has not been drinking the thickened liquids and has been on lasix daily and now with low BP and tachycardia; will encourage oral fluids  Yes     Acute on chronic diastolic heart failure [I50.33]  -lungs clear but remains with chronic LE edema  -hold lasix for now and encourage hydration  -monitor with daily weights  -yannick hose for LE edema which may also be due to venous insufficiency  Yes    Depressed mood [F32.9]  --continue current medical therapy as listed below  Yes    Hyperlipidemia [E78.5]  Chronic and stable  -continue current medical therapy as listed below  Yes    Essential hypertension [I10]  -low BP currently and lasix will be held  -holdin paramaters added to therapy as listed below  Yes    Chronic left knee and hip OA  -continue tramadol prn for pain  -elevate   -trial of lidoerm patch if hindering therapy      GERD (gastroesophageal reflux disease) [K21.9]  Chronic  -continue current medical therapy as listed below  Yes      Will continue to monitor HR and BP; hold IVF due to recent CHF    Continue the following medications for treatment of the indicated conditions:  ·  Indication Medication Dose Route  Frequency       HLP atorvastatin  20 mg Oral Daily    HTN benazepril  40 mg Oral Daily    diverticulitis ciprofloxacin HCl  500 mg Oral Q12H    depression escitalopram oxalate  20 mg Oral Daily    CHF furosemide  40 mg Oral Daily     DVT prophylaxis heparin (porcine)  5,000 Units Subcutaneous Q8H    HTN metoprolol succinate  100 mg Oral Daily    diverticulitis metronidazole  500 mg Oral Q8H    GERD pantoprazole  40 mg Oral Daily    constipation senna-docusate 8.6-50 mg  1 tablet Oral BID       Future Appointments  Date Time Provider Department Center   5/31/2017 10:10 AM [facility]   6/5/2017 9:00 AM April Herrera MD [facility] [place]       Patient's care plan and discharge planning will be discussed by the SNF team in IDT meeting. Medications to be reviewed and discussed with the SNF unit clinical pharmacist.

## 2017-05-09 NOTE — PLAN OF CARE
Problem: Patient Care Overview  Goal: Plan of Care Review  Outcome: Ongoing (interventions implemented as appropriate)    05/09/17 0230   Coping/Psychosocial   Plan Of Care Reviewed With patient         Problem: Mobility, Physical Impaired (Adult)  Intervention: Monitor/Assist with Self Care    05/09/17 0230   Activity   Activity Assistance Provided assistance, 1 person   Daily Care Interventions   Self-Care Promotion BADL personal routines maintained;BADL personal objects within reach   Functional Level Current   Ambulation 3 - assistive equipment and person   Transferring 3 - assistive equipment and person   Toileting 3 - assistive equipment and person   Bathing 0 - independent   Dressing 0 - independent   Eating 0 - independent   Communication 0 - understands/communicates without difficulty   Swallowing 0 - swallows foods/liquids without difficulty         Goal: Identify Related Risk Factors and Signs and Symptoms  Related risk factors and signs and symptoms are identified upon initiation of Human Response Clinical Practice Guideline (CPG)  Outcome: Ongoing (interventions implemented as appropriate)    05/09/17 0230   Mobility, Physical Impaired   Related Risk Factors (Physical Mobility, Impaired) activity intolerance;fear of falling   Signs and Symptoms (Physical Mobility Impaired) decreased balance;fear/anxiety related to mobility;holding on to furniture;inability to purposefully move in environment;unsafe transfers/ambulation

## 2017-05-09 NOTE — CLINICAL REVIEW
Clinical Pharmacy Chart Review Note    Admit Date: 5/8/2017   LOS: 1 day     Lilia Hidalgo is a 85 y.o. female admitted to SNF for PT/OT after hospitalization for diverticulitis.    Active Hospital Problems    Diagnosis  POA    Diverticulitis [K57.92]  Yes      Resolved Hospital Problems    Diagnosis Date Resolved POA   No resolved problems to display.     Review of patient's allergies indicates:   Allergen Reactions    Aspirin Nausea Only     Other reaction(s): esophageal pain    Celebrex  [celecoxib]      Other reaction(s): Rash    Ibuprofen      Other reaction(s): esophogeal pain    Sulfa dyne      Other reaction(s): esophogeal pain    Steroid  [corticosteroids (glucocorticoids)]      Other reaction(s): Flushing (skin)    Morphine Hallucinations     Patient Active Problem List    Diagnosis Date Noted    Diverticulitis 05/08/2017    Septic encephalopathy 05/05/2017    Diverticulitis of large intestine without perforation or abscess without bleeding 05/05/2017    Acute on chronic diastolic heart failure 05/05/2017    Cholelithiasis without cholecystitis 05/05/2017    Atherosclerosis of aorta 11/01/2016    Depressed mood 06/22/2016    Bilateral edema of lower extremity 06/22/2016    Atrophic vaginitis 02/18/2016    Slow transit constipation 02/18/2016    Elevated troponin 02/13/2016    Hypertensive heart disease with heart failure 07/14/2015    Aortic stenosis 06/17/2015    At risk for falling 02/21/2013    Debility 02/13/2013    S/P knee replacement 02/13/2013    Osteoarthritis of knee 02/05/2013    Essential hypertension     Hyperlipidemia     GERD (gastroesophageal reflux disease)        Scheduled Meds:    atorvastatin  20 mg Oral Daily    benazepril  40 mg Oral Daily    ciprofloxacin HCl  500 mg Oral Q12H    escitalopram oxalate  20 mg Oral Daily    furosemide  40 mg Oral Daily    heparin (porcine)  5,000 Units Subcutaneous Q8H    metoprolol succinate  100 mg Oral Daily     metronidazole  500 mg Oral Q8H    pantoprazole  40 mg Oral Daily    senna-docusate 8.6-50 mg  1 tablet Oral BID     Continuous Infusions:    PRN Meds: acetaminophen, aluminum-magnesium hydroxide-simethicone, dextrose 50%, dextrose 50%, glucagon (human recombinant), glucose, glucose, olanzapine, ondansetron, ramelteon, tramadol    OBJECTIVE:     Vital Signs (Last 24H)  Temp:  [98 °F (36.7 °C)-98.5 °F (36.9 °C)]   Pulse:  [70-75]   Resp:  [18]   BP: (131-160)/(65-74)   SpO2:  [90 %-97 %]     Laboratory:  CBC:   Recent Labs  Lab 05/06/17 0412 05/07/17  0418 05/08/17  0521   WBC 7.74 6.92 6.20   RBC 4.84 4.54 4.62   HGB 14.2 13.3 13.4   HCT 40.9 38.4 40.9    180 200   MCV 85 85 89   MCH 29.3 29.3 29.0   MCHC 34.7 34.6 32.8     BMP:   Recent Labs  Lab 05/06/17  0412 05/07/17  0419 05/08/17  0819 05/09/17  0654     < > 93 103 124*     < > 140 141 141   K 3.2*  < > 3.3* 4.0 4.1     < > 104 105 107   CO2 24  < > 27 26 25   BUN 13  < > 17 20 16   CREATININE 0.8  < > 0.9 1.0 0.8   CALCIUM 9.1  < > 8.8 9.1 9.1   MG 1.8  --  1.9 2.1  --    < > = values in this interval not displayed.  CMP:   Recent Labs  Lab 05/07/17 0419 05/08/17  0819 05/09/17  0654   GLU 93 103 124*   CALCIUM 8.8 9.1 9.1   ALBUMIN 2.7* 3.0* 2.8*   PROT 6.0 6.4 6.1    141 141   K 3.3* 4.0 4.1   CO2 27 26 25    105 107   BUN 17 20 16   CREATININE 0.9 1.0 0.8   ALKPHOS 81 84 84   ALT 15 15 18   AST 18 22 29   BILITOT 1.0 0.8 0.7     LFTs:   Recent Labs  Lab 05/07/17  0419 05/08/17  0819 05/09/17  0654   ALT 15 15 18   AST 18 22 29   ALKPHOS 81 84 84   BILITOT 1.0 0.8 0.7   PROT 6.0 6.4 6.1   ALBUMIN 2.7* 3.0* 2.8*     Coagulation:   Recent Labs  Lab 05/05/17  0905   INR 1.0     Cardiac markers: No results for input(s): CKMB, TROPONINT, MYOGLOBIN in the last 168 hours.  ABGs: No results for input(s): PH, PCO2, PO2, HCO3, POCSATURATED, BE in the last 168 hours.  Microbiology Results (last 7 days)     ** No results found  for the last 168 hours. **        Specimen     None          Recent Labs  Lab 05/05/17  1129   COLORU Yellow   SPECGRAV 1.015   PHUR 8.0   PROTEINUA 3+*   BACTERIA Rare   NITRITE Negative   LEUKOCYTESUR Negative   UROBILINOGEN Negative   HYALINECASTS 0     Others:   Recent Labs  Lab 05/05/17  1008   TSH 1.229     ASSESSMENT/PLAN:      Debility  --PT/OT  --tramadol 25 mg q6h prn severe pain   --bowel regimen for constipation; hold for loose or frequent stools  --DVT prophylaxis: heparin 5000 units q8h      Septic encephalopathy  --improving, continue antibiotics for underlying infection   --olanzapine 2.5 mg qHS prn agitation     Diverticulitis  --ciprofloxacin 500 mg q12h, metronidazole 500 mg q8h day 4 of 10    Acute on chronic diastolic heart failure  --furosemide 40 mg daily  --benazepril 40 mg daily, toprol  mg daily   --salt & fluid restriction    Monitor weight, volume status, electrolytes, renal function, BP, HR     Depressed mood  --escitalopram 20 mg daily  Monitor: mental status for depression, suicide ideation, anxiety, serotonin syndrome, hyponatremia     Essential hypertension  --benazepril 40 mg daily, toprol  mg daily   BP: (131-160)/(65-74), HR 70-75     Hyperlipidemia  --atorvastatin 20 mg daily; AST 29, ALT 18   Lab Results   Component Value Date    CHOL 189 02/14/2016    CHOL 206 (H) 02/13/2016    CHOL 217 (H) 06/05/2015     Lab Results   Component Value Date    HDL 40 02/14/2016    HDL 47 06/05/2015    HDL 43 12/02/2014     Lab Results   Component Value Date    LDLCALC 113.6 02/14/2016    LDLCALC 111.6 06/05/2015    LDLCALC Invalid, Trig>400.0 12/02/2014     Lab Results   Component Value Date    TRIG 177 (H) 02/14/2016    TRIG 292 (H) 06/05/2015    TRIG 908 (H) 12/02/2014     Lab Results   Component Value Date    CHOLHDL 21.2 02/14/2016    CHOLHDL 21.7 06/05/2015    CHOLHDL 11.1 (L) 12/02/2014       Osteoarthritis of knee  --S/P knee replacement 2013  --tramadol 25 mg q6h prn  severe pain     GERD (gastroesophageal reflux disease)  --pantoprazole 40 mg daily      Monitor BMP/CBC/Vitals

## 2017-05-09 NOTE — PT/OT/SLP EVAL
PhysicalTherapy   Evaluation    Lilia Hidalgo   MRN: [id number]     PT Received On: 05/09/17  PT Start Time: 1400     PT Stop Time: 1500    PT Total Time (min): 60 min       Billable Minutes:  Evaluation 1, Gait [id number] and Therapeutic Activity 30    Diagnosis: Debility  Past Medical History:   Diagnosis Date    Arthritis     GERD (gastroesophageal reflux disease)     Hyperlipidemia     Hypertension     Joint pain     Knee pain, left 08/2016    pain with walking or standing    PUD (peptic ulcer disease)       Past Surgical History:   Procedure Laterality Date    APPENDECTOMY      COLONOSCOPY      08    EYE SURGERY      bilateral cataract    HYSTERECTOMY      UNKNOWN BSO    JOINT REPLACEMENT      right hip replacement    JOINT REPLACEMENT      right foot tendon surgery    KNEE ARTHROPLASTY Left 2001    thumb surgery Left 09/2015    UPPER GASTROINTESTINAL ENDOSCOPY      2013         General Precautions: Standard, aspiration, fall, nectar thick (no straws), Delerium (keep blinds open, lights on).  Orthopedic Precautions: N/A   Braces: N/A    Do you have any cultural, spiritual, Sikhism conflicts, given your current situation?: Oriental orthodox    Patient History:  Lives With: facility resident ([facility]  Living Arrangements: independent living facility  Home Layout: Able to live on 1st floor  Stair Railings at Home: none  Transportation Available: family or friend will provide  Living Environment Comment: Pt lives in [facility] apartment with handicap accessible bathroom. PTA, pt was mod (I) using RW or rollator and (I) with ADLs. Pt with recent decline in function last 2 weeks using WC for longer distances. Pt reports no recent falls.   (Son, Joao, present to confirm details.)  Equipment Currently Used at Home: walker, rolling, rollator  DME owned (not currently used): none    Previous Level of Function:  Ambulation Skills: needs device (Furniture walking or rollator/RW)  Transfer  "Skills: independent  ADL Skills: independent  Work/Leisure Activity: independent    Subjective:  Communicated with pt prior to session. Pt reports that her L knee gives her trouble. Pt's son says that the problems in her knees is stemming from her L hip.  "I've been confused" reported Ms. Hidalgo.    Chief Complaint: L knee pain  Patient goals: To get back to [facility], taking care of herself.    Pain Ratin/10  Location - Side: Left  Location - Orientation: anterior  Location: knee  Pain Addressed: Reposition       Objective:  Patient found supine in bed with son, Joao, present.       Cognitive Exam:  Oriented to: Person, Place, Time and Situation  Follows Commands/attention: Follows multistep  commands  Communication: clear/fluent  Safety awareness/insight to disability: intact    Physical Exam:  Postural examination/scapula alignment: Rounded shoulder and Head forward    Skin integrity: Visible skin intact  Edema: None noted     Sensation:   Intact    Upper Extremity Range of Motion:  Right Upper Extremity: WNL  Left Upper Extremity: WNL    Upper Extremity Strength:  Right Upper Extremity: WNL  Left Upper Extremity: WNL    Lower Extremity Range of Motion:  Right Lower Extremity: WNL  Left Lower Extremity: WNL    Lower Extremity Strength:  Right Lower Extremity: WNL except knee flexion/extension (3/5) but pt reports pain in L knee.  Left Lower Extremity: WNL     Fine motor coordination:  Intact    Gross motor coordination: WFL    Functional Status:  MDS G  Bed Mobility Functional Status: S-SBA  Transfer Functional Status: CGA-Min (A)  Walk in Room Functional Status: CGA-Min (A)  Walk in Corridor Functional Status: CGA-Min (A)  Eval Only: Number of L/E limb <4/5 MMT: 1        Bed Mobility:  Supine>Sit: SBA from bed    Transfers:  Sit<>Stand: Minimal assistance edge of bed<>wheelchair  Toilet sit<>stand: Contact guard assistance  Stand Pivot Transfer: CGA from edge of bed>wheelchair    Gait:  Amb 75 feet " with reciprocal gait pattern, no loss of balance, use of rolling walker, CGA    Additional Treatment:   Pt was assisted to the restroom. Ambulated to the toilet, sat with CGA.   She performed her own toileting, pulled up her pants with CGA, washed her hands at the sink with SBA. No loss of balance noted throughout this activity.    LBE x 10 minutes to improve her LE strength and AROM.    Patient left up in chair with call button in reach and son, Joao, present.  Educated Joao on having pt return to bed prior to his leaving to promote safety for Ms. Hidalgo.    Assessment:  Lilia Hidalgo is a 85 y.o. female with a medical diagnosis of Debility. Ms. Hidalgo will benefit from skilled PT services to improve the strength of her L knee functionally during ambulation so that it will not buckle.  She presents with slight confusion, pain in her L knee, and requires CGA-Tiana for transfers at this time.  Ms. Hidalgo's evaluation is labeled low complexity, despite the fact that her presentation is evolving due to her confusion.     Rehab identified problem list/impairments: weakness, impaired endurance, impaired self care skills, impaired functional mobilty, gait instability, decreased lower extremity function, pain    Rehab potential is good.    Activity tolerance: Good    Discharge recommendations: home health PT     Barriers to discharge: Decreased caregiver support    Equipment recommendations: none     GOALS:   Physical Therapy Goals        Problem: Physical Therapy Goal    Goal Priority Disciplines Outcome Goal Variances Interventions   Physical Therapy Goal     PT/OT, PT Ongoing (interventions implemented as appropriate)     Description:  Goals to be met by: 2-3 weeks     Patient will increase functional independence with mobility by performin. Supine to sit with supervision.  2. Sit to supine with supervision  3. Sit to stand transfer with supervision  4. Bed to chair transfer with supervision using Rolling  Walker  5. Gait  x 150 feet with Supervision using Rolling Walker.   6. Wheelchair propulsion x 150 feet with Supervision using bilateral upper extremities  7. Ascend/Descend 4 inch curb step with Stand-by Assistance using Rolling Walker.  8. Stand for 5 minutes with Stand-by Assistance using Rolling Walker while performing a task.  9. Lower extremity exercise program x20 reps per handout, with supervision                PLAN:    Patient to be seen  (5-6x/wk)  to address the above listed problems via gait training, therapeutic activities, therapeutic exercises, neuromuscular re-education, wheelchair management/training  Plan of Care Expires: 06/08/17    Patricia Moralez, PT 5/9/2017

## 2017-05-10 LAB
BACTERIA BLD CULT: NORMAL
BACTERIA BLD CULT: NORMAL

## 2017-05-10 PROCEDURE — 99309 SBSQ NF CARE MODERATE MDM 30: CPT | Mod: ,,, | Performed by: NURSE PRACTITIONER

## 2017-05-10 PROCEDURE — 25000003 PHARM REV CODE 250: Performed by: INTERNAL MEDICINE

## 2017-05-10 PROCEDURE — 97110 THERAPEUTIC EXERCISES: CPT

## 2017-05-10 PROCEDURE — 97116 GAIT TRAINING THERAPY: CPT

## 2017-05-10 PROCEDURE — 11000004 HC SNF PRIVATE

## 2017-05-10 PROCEDURE — 92523 SPEECH SOUND LANG COMPREHEN: CPT

## 2017-05-10 PROCEDURE — 97535 SELF CARE MNGMENT TRAINING: CPT

## 2017-05-10 PROCEDURE — 25000003 PHARM REV CODE 250: Performed by: STUDENT IN AN ORGANIZED HEALTH CARE EDUCATION/TRAINING PROGRAM

## 2017-05-10 RX ORDER — BENAZEPRIL HYDROCHLORIDE 20 MG/1
20 TABLET ORAL DAILY
Status: DISCONTINUED | OUTPATIENT
Start: 2017-05-11 | End: 2017-05-23

## 2017-05-10 RX ORDER — FUROSEMIDE 20 MG/1
20 TABLET ORAL DAILY
Status: DISCONTINUED | OUTPATIENT
Start: 2017-05-11 | End: 2017-05-14

## 2017-05-10 RX ADMIN — METRONIDAZOLE 500 MG: 500 TABLET ORAL at 06:05

## 2017-05-10 RX ADMIN — STANDARDIZED SENNA CONCENTRATE AND DOCUSATE SODIUM 1 TABLET: 8.6; 5 TABLET, FILM COATED ORAL at 10:05

## 2017-05-10 RX ADMIN — ATORVASTATIN CALCIUM 20 MG: 20 TABLET, FILM COATED ORAL at 08:05

## 2017-05-10 RX ADMIN — HEPARIN SODIUM 5000 UNITS: 5000 INJECTION, SOLUTION INTRAVENOUS; SUBCUTANEOUS at 02:05

## 2017-05-10 RX ADMIN — METRONIDAZOLE 500 MG: 500 TABLET ORAL at 10:05

## 2017-05-10 RX ADMIN — CIPROFLOXACIN HYDROCHLORIDE 500 MG: 500 TABLET, FILM COATED ORAL at 10:05

## 2017-05-10 RX ADMIN — PANTOPRAZOLE SODIUM 40 MG: 40 TABLET, DELAYED RELEASE ORAL at 08:05

## 2017-05-10 RX ADMIN — HEPARIN SODIUM 5000 UNITS: 5000 INJECTION, SOLUTION INTRAVENOUS; SUBCUTANEOUS at 06:05

## 2017-05-10 RX ADMIN — METOPROLOL SUCCINATE 100 MG: 100 TABLET, EXTENDED RELEASE ORAL at 08:05

## 2017-05-10 RX ADMIN — STANDARDIZED SENNA CONCENTRATE AND DOCUSATE SODIUM 1 TABLET: 8.6; 5 TABLET, FILM COATED ORAL at 08:05

## 2017-05-10 RX ADMIN — HEPARIN SODIUM 5000 UNITS: 5000 INJECTION, SOLUTION INTRAVENOUS; SUBCUTANEOUS at 10:05

## 2017-05-10 RX ADMIN — METRONIDAZOLE 500 MG: 500 TABLET ORAL at 02:05

## 2017-05-10 RX ADMIN — ESCITALOPRAM 20 MG: 10 TABLET, FILM COATED ORAL at 08:05

## 2017-05-10 RX ADMIN — CIPROFLOXACIN HYDROCHLORIDE 500 MG: 500 TABLET, FILM COATED ORAL at 08:05

## 2017-05-10 RX ADMIN — RAMELTEON 8 MG: 8 TABLET, FILM COATED ORAL at 10:05

## 2017-05-10 NOTE — PROGRESS NOTES
Discharge Planning Assessment     Payor: HU[employer] MANAGED MEDICARE / Plan: [employer] H[employer] SNP / Product Type: Medicare Advantage /     Expected length of stay:  [] 7 days   [] 10 days  [] 14 days   [x] 21 days   [] > 30 days    Communicated expected length of stay with patient/caregiver:  [x] Yes   [] No    Anticipated discharge date:  5/29/2017    Assessment information obtained from:  [x] Patient   [] Caregiver     Arrived from:   [] Home   [x] Assisted Living    [] Nursing Home   [] SNF   [] Rehab  [] LTACH   [] Group Home   [] Foster Care   [] Psych   [] Shelter   [] Homeless   [] Transfer  [] Correctional Facility  [] Name of Facility:      Patient currently lives with:    [x] Alone   [] Spouse   [] Daughter   [] Son   [] Grandparents   []  Parents   [] Siblings   [] Friends   [] Domestic Partner   [] Facility Resident      [] Foster Home    [] Other:       Extended Emergency Contact Information  Primary Emergency Contact: Belkys Moser  Address: [address] [place] [address]  Home Phone: [phone]  Mobile Phone: [phone]  Relation: Daughter  Secondary Emergency Contact: Lina Benavides  Address: [address] [place] [address]  Mobile Phone: [phone]  Relation: Daughter     Prior to hospitalization cognitive status:   [x] Alert/Oriented  [] No Deficits [] Risk of Harm to Self/Others   [] Not Oriented to Person   [] Not Oriented to Place   [] Not Oriented to Time   [] Coma/Sedated/Intubated  [] Judgement Impaired    []  Unable to Assess   [] Inappropriate Behavior  [] Infant/Toddler    Prior to hospitalization functional status:   [x] Independent   [x] Assistive Equipment   [] Assistive Person    [] Completely Dependent  [] Infant/Toddler/Child Appropriate   [] Infant/Toddler/Child Delayed    []  Adolescent     Current cognitive status:   [x] Alert/Oriented  [] No Deficits [] Risk of Harm to  Self/Others   [] Not Oriented to Person   [] Not Oriented to Place   [] Not Oriented to Time   [] Coma/Sedated/Intubated  [] Judgement Impaired    []  Unable to Assess   [] Inappropriate Behavior  [] Infant/Toddler    Current functional status:     [] Independent   [x] Assistive Equipment   [x] Assistive Person     [] Completely Dependent   [] Infant/Toddler/Child Appropriate   [] Infant/Toddler/Child Delayed     [] Adolescent     Capacity to Care for Self:   Is patient able to return to prior living arrangements after discharge: [x] Yes  [] No     Is patient able to care for self after discharge?   [] Yes   [x] No     [] Pediatric     Does the patient have family/friends to assist after discharge?:  [x] Yes   [] No    [] N/A   Comments:  Son,daughter      Patient/caregiver perception of discharge disposition:   [x] Home   [] Home with Family  [x] Home Health   [] SNF   [] Rehab   [] LTAC    []  New Nursing Home Placement  [] Return to Nursing Home    [] Shelter     [] Assisted Living  [] Foster Home   [] Other:      Readmit:   Has patient michael in the hospital in the last 30 days? [] Yes   [x] No     If YES, was patient admitted for the same reason?  [] Yes   [] No       Home Health:   Patient currently receives home health services?:   [] Yes   [x] No     Patient previously received home health services and would like to resume services if necessary   [] Yes   [x] No   DME:   Patient currently uses DME:   [x] Yes   [] No        List of equipment currently used:     [] Wheelchair   [] Standard Walker  [x] Rolling Walker  [x]  Rollator    [] Oxygen    [] Portable oxygen   [] Nebulizer    [] Apnea Monitor    [] Crutches  [] Hospital Bed   []  Lift Device   [] Scooter [] Cane     [] Prosthesis   []  BSC   [] Tub Bench   [] Catheter Supplies    [] Ostomy Supplies   [] Trach Supplies     [] Suction Machine        [] Home Vent    [] Bipap   [] Other:         Medications:    Can the patient afford all prescribed  medications?  [x] Yes   [] No     If NO, what medication:       Is the patient taking medications as prescribed?    [x] Yes   [] No    Financial Concerns:   Does the patient have any financial concerns?   [] Yes   [x] No     Transportation:   Does the patient have transportation to healthcare appointments?   [x] Yes   [] No     If YES, what means of transportation does the patient have?   [] Car   [x] Family/Friend  [] Bicycle   [] Motorcycle   [] Public Transportation [] Ambulance[] Wheelchair van   [] Name of Provider    Dialysis:   Does the patient currently receive dialysis?   [] Yes   [x] No     April Herrera MD   [address]  [place] [address]  [phone]         APS/CPS involved in the case:  [] Yes   [x] No   If YES, name of :     If YES, phone number of :        Discharge Plan A:  [x] Home   [] Home with Family  [x] Home Health   [] SNF   [] Rehab   [] LTAC   []  New Nursing Home Placement  [] Return to Nursing Home    [] Assisted Living    [] Shelter     [] Private Duty Nursing   [] Foster Home    [] Psych    [] Early Steps  [] WIC       [] Home Hospice   [] Inpatient Hospice   [] Other    Discharge Plan B:  [] Home   [] Home with Family  [] Home Health   [] SNF   [] Rehab   [] LTAC  []  New Nursing Home Placement   [] Return to  Nursing Home    [] Assisted Living   [] Shelter  [] Private Duty Nursing   [] Foster Home     [] Psych     [] Early Steps  [] WIC    [] Home Hospice     [] Inpatient Hospice   [] Other     [x] Patient and family in agreement with discharge plan.    Met with patient in room to discuss discharge plan and date. Patient AAO. Discharge plan is to return home alone ([facility]). Patient stated assist of family, son and daughter. Informed patient therapy recommends a 3 week SNF stay and that we (MD, nurse, therapy,SHAREE,PREET) meet tomorrow to review her case and come up with a discharge date. Patient stated understanding. PREET and SHAREE will continue to  follow for any additional needs.    Jaquelin Hewitt RN, CM Skilled  [id number]

## 2017-05-10 NOTE — ASSESSMENT & PLAN NOTE
Continue PT/OT to restore functional goals.  She reports she lives alone but at [facility].  Plans to return to same living arrangement after discharge.  Continue Heparin for DVT prevention.

## 2017-05-10 NOTE — PT/OT/SLP PROGRESS
Physical Therapy  Treatment    Lilia Hidalgo   MRN: [id number]   Admitting Diagnosis: Debility    PT Received On: 05/10/17    Billable Minutes:  Gait [id number] and Therapeutic Exercise 30    Treatment Type: Treatment  PT/PTA: PTA       General Precautions: Standard, aspiration, fall, nectar thick (no straws)  Orthopedic Precautions: N/A   Braces: N/A    Do you have any cultural, spiritual, Protestant conflicts, given your current situation?: Gnosticist    Subjective:  Communicated with nsg prior to session.  Nsg communicated that pt has had incidence of low blood pressure and to be aware of this in case the patient becomes dizzy.   The patient was agreeable to physical therapy.   The patient reported fatigue and occasional pain in her L knee with movement.     Objective:  Patient found with HOB elevated, falling asleep.      Functional Status:  MDS G  Bed Mobility Functional Status: S-SBA  Bed Mobility Level of (A): 0: No set up or physical (A) from staff  Transfer Functional Status: S-SBA  Transfer Level of (A): 0: No set up or physical (A) from staff  Walk in Room Functional Status: CGA-Min (A)  Walk in Corridor Functional Status: CGA-Min (A)    Bed Mobility:  Sit>Supine: NP left UIC   Supine>Sit: HOB elevated, Sup    Transfers:  Sit<>Stand: SBA  Stand Pivot Transfer: SBA bed > w/c    Gait:  Amb 107 feet, RW, wheelchair follow, CGA   Pt with decreased tracee and increased time in stance on L LE.     Therex:  Pt performs BLE therex in sitting x 10 ea to improve strength, ROM, endurance for functional activities. Reps limited by fatigue.    Heel raises/ toe raises   LAQ's   ABD/ADD with knees extended, hips flexed   glute sets     Additional Treatment:  Pt educated on safety for sit <> stand using RW transfers. Pt attempted x 3 and was correct on third attempt with technique. Pt has a tendency to place both UE's on the RW. Pt needs reinforcement for safe management.     Patient left up in chair with call button in  jeet and JULIA Edwards present.    Assessment:  Lilia Hidalgo is a 85 y.o. female with a medical diagnosis of Debility.  Pt bk tx well and shows improvement in distance ambulated from her last treatment session. She experienced fatigue with therex and had difficulty completing ten reps. The curb step was not attempted today 2/2 fatigue, laboured breathing and slowed tracee during gait training. Pt with 1 standing rest break during gait training, then stated she could not go any further and returned to sitting in her w/c. She is very motivated for therapy and would continue to benefit from skilled PT to address the deficits in her POC.     Rehab identified problem list/impairments: weakness, impaired endurance, impaired self care skills, impaired functional mobilty, gait instability, decreased lower extremity function, pain    Rehab potential is good.    Activity tolerance: Good    Discharge recommendations: home health PT     Barriers to discharge: Decreased caregiver support    Equipment recommendations: none     GOALS:   Physical Therapy Goals        Problem: Physical Therapy Goal    Goal Priority Disciplines Outcome Goal Variances Interventions   Physical Therapy Goal     PT/OT, PT Ongoing (interventions implemented as appropriate)     Description:  Goals to be met by: 2-3 weeks     Patient will increase functional independence with mobility by performin. Supine to sit with supervision.  2. Sit to supine with supervision  3. Sit to stand transfer with supervision  4. Bed to chair transfer with supervision using Rolling Walker  5. Gait  x 150 feet with Supervision using Rolling Walker.   6. Wheelchair propulsion x 150 feet with Supervision using bilateral upper extremities  7. Ascend/Descend 4 inch curb step with Stand-by Assistance using Rolling Walker.  8. Stand for 5 minutes with Stand-by Assistance using Rolling Walker while performing a task.  9. Lower extremity exercise program x20 reps per  handout, with supervision                PLAN:    Patient to be seen  (5-6x/wk)  to address the above listed problems via gait training, therapeutic activities, therapeutic exercises, neuromuscular re-education, wheelchair management/training  Plan of Care expires: 06/08/17  Plan of Care reviewed with: patient, sukhdev MANRIQUEZ Dov, PTA  05/10/2017

## 2017-05-10 NOTE — PLAN OF CARE
Problem: Physical Therapy Goal  Goal: Physical Therapy Goal  Goals to be met by: 2-3 weeks     Patient will increase functional independence with mobility by performin. Supine to sit with supervision.  2. Sit to supine with supervision  3. Sit to stand transfer with supervision  4. Bed to chair transfer with supervision using Rolling Walker  5. Gait x 150 feet with Supervision using Rolling Walker.   6. Wheelchair propulsion x 150 feet with Supervision using bilateral upper extremities  7. Ascend/Descend 4 inch curb step with Stand-by Assistance using Rolling Walker.  8. Stand for 5 minutes with Stand-by Assistance using Rolling Walker while performing a task.  9. Lower extremity exercise program x20 reps per handout, with supervision   Outcome: Ongoing (interventions implemented as appropriate)  Goals remain appropriate. Continue with plan of care.

## 2017-05-10 NOTE — ASSESSMENT & PLAN NOTE
Currently afebrile.  Last WBC was normal on 5/8.  Currently on Cipro 500 mg bid and Flagyl 500 mg tid until 5/16.

## 2017-05-10 NOTE — PLAN OF CARE
Pt A & O pt repositioned with pillow every q2 hrs,  No skin breakdown noted .  pulses palable +movement/sensation, cap refill WNL in all 4 extremities , monitored for pain and safety. Safety maintained pt remained free of falls . , pt remained afebrile 97.3 Bed locked and lowered, call light within reach. Will continue to monitor

## 2017-05-10 NOTE — PROGRESS NOTES
[facility]  Progress Note    Patient Name: Lilia Hidalgo  MRN: [id number]  Code Status: Full Code  Admission Date: 5/8/2017  Length of Stay: 2 days  Attending Physician: Crystal Sanders MD  Primary Care Provider: April Herrera MD    Subjective:     Principal Problem:Debility    HPI:  Patient is a 85 y.o. female with HLP, GERD, HTN who presents to SNF after hospitalization for septic encephalopathy, diverticulitis and diastolic CHF exacerbation. She presented with N/V and abdominal pain both of which have resolved. She is tolerating > 50% of her meals. She denies SOB or CP. Upon admit to SNF, she reported to the MD she previously had visual hallucinations which has now resolved.  She has chronic LE edema at home and wears compression stocking at home. 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 She has no pain at rest but only with ambulation which is relieved with tramadol.     Interval History:   Admit to SNF for therapy.  Monitor weights  Control pain.    Review of Systems   Constitutional: Positive for fatigue. Negative for appetite change and fever.   Respiratory: Negative for cough and shortness of breath.    Cardiovascular: Negative for chest pain, palpitations and leg swelling.   Gastrointestinal: Negative for abdominal pain, constipation, diarrhea, nausea and vomiting.   Endocrine: Negative for polydipsia, polyphagia and polyuria.   Genitourinary: Negative for dysuria and frequency.   Musculoskeletal: Positive for arthralgias.   Skin: Negative for rash.   Psychiatric/Behavioral: Negative for confusion and hallucinations.     Objective:     Vital Signs (Most Recent):  Temp: 97.3 °F (36.3 °C) (05/10/17 0749)  Pulse: 66 (05/10/17 0749)  Resp: 18 (05/10/17 0749)  BP: (!) 107/59 (05/10/17 0749)  SpO2: (!) 94 % (05/10/17 0749) Vital Signs (24h Range):  Temp:  [97.3 °F (36.3 °C)-98 °F (36.7 °C)] 97.3 °F (36.3 °C)  Pulse:  [] 66  Resp:  [18-19] 18  SpO2:  [94 %] 94 %  BP:  ()/(50-59) 107/59     Weight: 83.1 kg (183 lb 3.2 oz)  Body mass index is 30.49 kg/(m^2).    Intake/Output Summary (Last 24 hours) at 05/10/17 1048  Last data filed at 05/10/17 0600   Gross per 24 hour   Intake             1930 ml   Output                0 ml   Net             1930 ml      Physical Exam   Constitutional: She is oriented to person, place, and time. She appears well-developed and well-nourished.   Cardiovascular: Normal rate, regular rhythm, normal heart sounds and intact distal pulses.  Exam reveals no gallop and no friction rub.    No murmur heard.  Pulmonary/Chest: Effort normal and breath sounds normal. No respiratory distress.   Abdominal: Soft. Normal appearance and bowel sounds are normal. She exhibits distension. There is no tenderness. There is no guarding.   Musculoskeletal: Normal range of motion. She exhibits edema. She exhibits no tenderness.   Neurological: She is alert and oriented to person, place, and time. She has normal strength.   Skin: Skin is warm, dry and intact. No rash noted. No erythema.   Psychiatric: She has a normal mood and affect.       Significant Labs:   BMP:   Recent Labs  Lab 05/09/17  0654   *      K 4.1      CO2 25   BUN 16   CREATININE 0.8   CALCIUM 9.1     CBC: No results for input(s): WBC, HGB, HCT, PLT in the last 48 hours.    Significant Imaging: n/a    Assessment/Plan:      * Debility  Continue PT/OT to restore functional goals.  She reports she lives alone but at [facility].  Plans to return to same living arrangement after discharge.  Continue Heparin for DVT prevention.    Essential hypertension  BP is 107/59 HR is 66.  Currently treated with benazepril 40 mg daily, Toprol  mg daily.  Per MD note, lasix is on hold and set to resume on 5/12.    Will reduce benazepril to 20 mg daily and restart lasix at 20 mg daily given her increase in weight of 2 kg.    Hyperlipidemia  Chronic.  Continue atorvastatin 20 mg daily.    GERD  (gastroesophageal reflux disease)  Chronic.  Continue Protonix 40 mg daily.    Primary osteoarthritis of knee  Chronic.  Continue Tramadol 25 mg PRN for pain control.  Encourage limb elevation when not in therapy.    Depressed mood  Chronic.  Continue Lexapro 20 mg daily.    Septic encephalopathy  No further complaints of hallucinations.  Continue Zyprexa 2.5 mg PRN.     Diverticulitis of large intestine without perforation or abscess without bleeding  Currently afebrile.  Last WBC was normal on 5/8.  Currently on Cipro 500 mg bid and Flagyl 500 mg tid until 5/16.    Acute on chronic diastolic heart failure  No reports of SOB.  Lungs are clear but remains with chronic LE edema  Will restart lasix at 20 mg due to increase in weight and continue to encourage hydration  monitor with daily weights  yannick hose for LE edema which may also be due to venous insufficiency    Dysphagia  Per MD note now on clear liquids.  Will ask speech to follow.       Foreign Hendrix NP  [facility]

## 2017-05-10 NOTE — PLAN OF CARE
Problem: Occupational Therapy Goal  Goal: Occupational Therapy Goal  Goals to be met by: 3 weeks     Patient will increase functional independence with ADLs by performing:    Feeding with Set-up Assistance.  UE Dressing with Set-up Assistance.  LE Dressing with Supervision.  Grooming while standing with Supervision.  Toileting from bedside commode with Supervision for hygiene and clothing management.   Bathing from shower chair/bench with Stand-by Assistance.  Supine to sit with Modified [place].  Stand pivot transfers with Supervision.  Toilet transfer to bedside commode with Supervision  Pt. Will be able to follow 2 step directions involving ADL tasks with 75% accuracy  Pt.. Will be independent with HEP for BUE as well as for improving FMC skills in left hand   .   Pt. Tolerated session well

## 2017-05-10 NOTE — PLAN OF CARE
Problem: Patient Care Overview  Goal: Plan of Care Review  Outcome: Ongoing (interventions implemented as appropriate)  Pt remained free from falls, injury and trauma throughout shift. Pt AAO x 3, disoriented to time. Bed in lowest position and wheels locked. Pt instructed to call for assistance. Hourly rounds to monitor pt for safety and comfort. Fluids encouraged, Personal items and call bell within reach. Pt medicated for pain. No signs or symptoms of distress. Pt resting, will continue to monitor.

## 2017-05-10 NOTE — ASSESSMENT & PLAN NOTE
Chronic.  Continue Tramadol 25 mg PRN for pain control.  Encourage limb elevation when not in therapy.

## 2017-05-10 NOTE — SUBJECTIVE & OBJECTIVE
Interval History:   Admit to SNF for therapy.  Monitor weights  Control pain.    Review of Systems   Constitutional: Positive for fatigue. Negative for appetite change and fever.   Respiratory: Negative for cough and shortness of breath.    Cardiovascular: Negative for chest pain, palpitations and leg swelling.   Gastrointestinal: Negative for abdominal pain, constipation, diarrhea, nausea and vomiting.   Endocrine: Negative for polydipsia, polyphagia and polyuria.   Genitourinary: Negative for dysuria and frequency.   Musculoskeletal: Positive for arthralgias.   Skin: Negative for rash.   Psychiatric/Behavioral: Negative for confusion and hallucinations.     Objective:     Vital Signs (Most Recent):  Temp: 97.3 °F (36.3 °C) (05/10/17 0749)  Pulse: 66 (05/10/17 0749)  Resp: 18 (05/10/17 0749)  BP: (!) 107/59 (05/10/17 0749)  SpO2: (!) 94 % (05/10/17 0749) Vital Signs (24h Range):  Temp:  [97.3 °F (36.3 °C)-98 °F (36.7 °C)] 97.3 °F (36.3 °C)  Pulse:  [] 66  Resp:  [18-19] 18  SpO2:  [94 %] 94 %  BP: ()/(50-59) 107/59     Weight: 83.1 kg (183 lb 3.2 oz)  Body mass index is 30.49 kg/(m^2).    Intake/Output Summary (Last 24 hours) at 05/10/17 1048  Last data filed at 05/10/17 0600   Gross per 24 hour   Intake             1930 ml   Output                0 ml   Net             1930 ml      Physical Exam   Constitutional: She is oriented to person, place, and time. She appears well-developed and well-nourished.   Cardiovascular: Normal rate, regular rhythm, normal heart sounds and intact distal pulses.  Exam reveals no gallop and no friction rub.    No murmur heard.  Pulmonary/Chest: Effort normal and breath sounds normal. No respiratory distress.   Abdominal: Soft. Normal appearance and bowel sounds are normal. She exhibits distension. There is no tenderness. There is no guarding.   Musculoskeletal: Normal range of motion. She exhibits edema. She exhibits no tenderness.   Neurological: She is alert and  oriented to person, place, and time. She has normal strength.   Skin: Skin is warm, dry and intact. No rash noted. No erythema.   Psychiatric: She has a normal mood and affect.       Significant Labs:   BMP:   Recent Labs  Lab 05/09/17  0654   *      K 4.1      CO2 25   BUN 16   CREATININE 0.8   CALCIUM 9.1     CBC: No results for input(s): WBC, HGB, HCT, PLT in the last 48 hours.    Significant Imaging: n/a

## 2017-05-10 NOTE — ASSESSMENT & PLAN NOTE
BP is 107/59 HR is 66.  Currently treated with benazepril 40 mg daily, Toprol  mg daily.  Per MD note, lasix is on hold and set to resume on 5/12.    Will reduce benazepril to 20 mg daily and restart lasix at 20 mg daily given her increase in weight of 2 kg.

## 2017-05-10 NOTE — PT/OT/SLP PROGRESS
Occupational Therapy  Treatment    Lilia Hidalgo   MRN: [id number]   Admitting Diagnosis: Debility    OT Date of Treatment: 05/10/17  Total Time (min): 59 min    Billable Minutes:  Self Care/Home Management 59    General Precautions: Standard, aspiration, fall (delerium)  Orthopedic Precautions: N/A  Braces: N/A    Do you have any cultural, spiritual, Pentecostalism conflicts, given your current situation?: Mormonism    Subjective:  Communicated with nurse prior to session.  I still do not know why I am here.    Pain Rating:  (did not quantify appeared tolerable)  Location - Side: Left     Location: hip     Pain Rating Post-Intervention:  (no increases in pain noted during session)    Objective:  Patient found with:  (supine in bed)    Functional Status:  MDS G  Bed Mobility Functional Status: SBA  Transfer Functional Status: CGA sit to stand and SPT from bed to w/c; w/c to toilet with use of grab bar  Transfer Level of (A):  (with RW)  Dressing Functional Status: 3:mod(A)-LBD with assist to initially thread RLE as well as for balance when managing pants over hips in stand while holding grab bar  Eating Functional Status: Supervision and cues for small bites/sips as well as no straws  Toilet Use Functional Status: Min (A) to manage underpants back over hips in stand  Personal Hygiene Functional Status: S seated in w/c at sink to brush hair and wash face  Bathing Functional Status:Min (A) when standing for balance to wash buttocks and to wash feet  Moving from seated to standing position: Not steady, only able to stabilize with staff assistance  Moving on and off the toilet: Not steady, only able to stabilize with staff assistance  Surface-to-surface transfer (transfer between bed and chair or wheelchair): Not steady, only able to stabilize with staff assistance          Additional Treatment:  Education on safety with transfers  Education on small bites and sips with meals as well as NO straws    Patient left supine with  call button in reach, bed alarm on and nurse notified    ASSESSMENT:  Lilia Hidalgo is a 85 y.o. female with a medical diagnosis of Debility and presents with deficits with ADL tasks, functional mobility, endurance as well as cognition and would benefit from continued OT services to maximize safety and independence with ADL tasks.     Rehab identified problem list/impairments: impaired self care skills, impaired functional mobilty, impaired cognition, decreased safety awareness, pain, gait instability, impaired balance    Rehab potential is good    Activity tolerance: Good    Discharge recommendations: home health OT (may require supervision for safety on d/c)     Barriers to discharge: Decreased caregiver support     Equipment recommendations: none     GOALS:   Occupational Therapy Goals        Problem: Occupational Therapy Goal    Goal Priority Disciplines Outcome Interventions   Occupational Therapy Goal     OT, PT/OT     Description:  Goals to be met by: 3 weeks     Patient will increase functional independence with ADLs by performing:    Feeding with Set-up Assistance.  UE Dressing with Set-up Assistance.  LE Dressing with Supervision.  Grooming while standing with Supervision.  Toileting from bedside commode with Supervision for hygiene and clothing management.   Bathing from  shower chair/bench with Stand-by Assistance.  Supine to sit with Modified [place].  Stand pivot transfers with Supervision.  Toilet transfer to bedside commode with Supervision  Pt. Will be able to follow 2 step directions involving ADL tasks with  75% accuracy  Pt.. Will be independent with HEP for BUE as well as for improving FMC skills in left hand   .                 Plan:  Patient to be seen 5 x/week to address the above listed problems via self-care/home management, therapeutic activities, therapeutic exercises  Plan of Care expires: 06/08/17  Plan of Care reviewed with: patient    MIR Chavis  05/10/2017

## 2017-05-10 NOTE — ASSESSMENT & PLAN NOTE
No reports of SOB.  Lungs are clear but remains with chronic LE edema  Will restart lasix at 20 mg due to increase in weight and continue to encourage hydration  monitor with daily weights  yannick hose for LE edema which may also be due to venous insufficiency

## 2017-05-10 NOTE — PT/OT/SLP DISCHARGE
Physical Therapy Discharge Summary    Lilia Hidalgo  MRN: [id number]   Septic encephalopathy   Patient Discharged from acute Physical Therapy on 2017.  Please refer to prior PT noted date on 2017 for functional status.     Assessment:   Patient appropriate for care in another setting.  GOALS:   Physical Therapy Goals        Problem: Physical Therapy Goal    Goal Priority Disciplines Outcome Goal Variances Interventions   Physical Therapy Goal     PT/OT, PT Ongoing (interventions implemented as appropriate)     Description:  Goals to be met by: 2017     Patient will increase functional independence with mobility by performin. Supine to sit with Modified [place]  2. Sit to supine with Modified [place]  3. Sit to stand transfer with Supervision  4. Bed to chair transfer with Stand-by Assistance using Rolling Walker  5. Gait  x 100 feet with Contact Guard Assistance using Rolling Walker.   6. Lower extremity exercise program x 15 reps per handout, with assistance as needed              Reasons for Discontinuation of Therapy Services  Transfer to alternate level of care.      Plan:  Patient Discharged to: Skilled Nursing Facility.    Rosa Haq DPT, PT  5/10/2017

## 2017-05-11 LAB
ANION GAP SERPL CALC-SCNC: 10 MMOL/L
BASOPHILS # BLD AUTO: 0.04 K/UL
BASOPHILS NFR BLD: 0.6 %
BUN SERPL-MCNC: 19 MG/DL
CALCIUM SERPL-MCNC: 8.8 MG/DL
CHLORIDE SERPL-SCNC: 105 MMOL/L
CO2 SERPL-SCNC: 25 MMOL/L
CREAT SERPL-MCNC: 0.9 MG/DL
DIFFERENTIAL METHOD: NORMAL
EOSINOPHIL # BLD AUTO: 0.4 K/UL
EOSINOPHIL NFR BLD: 6.6 %
ERYTHROCYTE [DISTWIDTH] IN BLOOD BY AUTOMATED COUNT: 14.5 %
EST. GFR  (AFRICAN AMERICAN): >60 ML/MIN/1.73 M^2
EST. GFR  (NON AFRICAN AMERICAN): 58.5 ML/MIN/1.73 M^2
GLUCOSE SERPL-MCNC: 95 MG/DL
HCT VFR BLD AUTO: 41.5 %
HGB BLD-MCNC: 13.4 G/DL
LYMPHOCYTES # BLD AUTO: 1.3 K/UL
LYMPHOCYTES NFR BLD: 20.3 %
MAGNESIUM SERPL-MCNC: 1.8 MG/DL
MCH RBC QN AUTO: 28.8 PG
MCHC RBC AUTO-ENTMCNC: 32.3 %
MCV RBC AUTO: 89 FL
MONOCYTES # BLD AUTO: 0.7 K/UL
MONOCYTES NFR BLD: 10.7 %
NEUTROPHILS # BLD AUTO: 4.1 K/UL
NEUTROPHILS NFR BLD: 61.8 %
PHOSPHATE SERPL-MCNC: 4.2 MG/DL
PLATELET # BLD AUTO: 187 K/UL
PMV BLD AUTO: 11.4 FL
POTASSIUM SERPL-SCNC: 3.4 MMOL/L
RBC # BLD AUTO: 4.65 M/UL
SODIUM SERPL-SCNC: 140 MMOL/L
WBC # BLD AUTO: 6.56 K/UL

## 2017-05-11 PROCEDURE — 25000003 PHARM REV CODE 250: Performed by: INTERNAL MEDICINE

## 2017-05-11 PROCEDURE — 97535 SELF CARE MNGMENT TRAINING: CPT

## 2017-05-11 PROCEDURE — 85025 COMPLETE CBC W/AUTO DIFF WBC: CPT

## 2017-05-11 PROCEDURE — 25000003 PHARM REV CODE 250: Performed by: STUDENT IN AN ORGANIZED HEALTH CARE EDUCATION/TRAINING PROGRAM

## 2017-05-11 PROCEDURE — 92507 TX SP LANG VOICE COMM INDIV: CPT

## 2017-05-11 PROCEDURE — 80048 BASIC METABOLIC PNL TOTAL CA: CPT

## 2017-05-11 PROCEDURE — 83735 ASSAY OF MAGNESIUM: CPT

## 2017-05-11 PROCEDURE — 97110 THERAPEUTIC EXERCISES: CPT

## 2017-05-11 PROCEDURE — 97116 GAIT TRAINING THERAPY: CPT

## 2017-05-11 PROCEDURE — 25000003 PHARM REV CODE 250: Performed by: NURSE PRACTITIONER

## 2017-05-11 PROCEDURE — 11000004 HC SNF PRIVATE

## 2017-05-11 PROCEDURE — 84100 ASSAY OF PHOSPHORUS: CPT

## 2017-05-11 PROCEDURE — 97530 THERAPEUTIC ACTIVITIES: CPT

## 2017-05-11 PROCEDURE — 36415 COLL VENOUS BLD VENIPUNCTURE: CPT

## 2017-05-11 RX ORDER — POTASSIUM CHLORIDE 20 MEQ/1
40 TABLET, EXTENDED RELEASE ORAL ONCE
Status: COMPLETED | OUTPATIENT
Start: 2017-05-11 | End: 2017-05-11

## 2017-05-11 RX ADMIN — HEPARIN SODIUM 5000 UNITS: 5000 INJECTION, SOLUTION INTRAVENOUS; SUBCUTANEOUS at 01:05

## 2017-05-11 RX ADMIN — PANTOPRAZOLE SODIUM 40 MG: 40 TABLET, DELAYED RELEASE ORAL at 09:05

## 2017-05-11 RX ADMIN — CIPROFLOXACIN HYDROCHLORIDE 500 MG: 500 TABLET, FILM COATED ORAL at 09:05

## 2017-05-11 RX ADMIN — HEPARIN SODIUM 5000 UNITS: 5000 INJECTION, SOLUTION INTRAVENOUS; SUBCUTANEOUS at 06:05

## 2017-05-11 RX ADMIN — BENAZEPRIL HYDROCHLORIDE 20 MG: 20 TABLET, FILM COATED ORAL at 09:05

## 2017-05-11 RX ADMIN — METOPROLOL SUCCINATE 100 MG: 100 TABLET, EXTENDED RELEASE ORAL at 09:05

## 2017-05-11 RX ADMIN — HEPARIN SODIUM 5000 UNITS: 5000 INJECTION, SOLUTION INTRAVENOUS; SUBCUTANEOUS at 09:05

## 2017-05-11 RX ADMIN — METRONIDAZOLE 500 MG: 500 TABLET ORAL at 01:05

## 2017-05-11 RX ADMIN — METRONIDAZOLE 500 MG: 500 TABLET ORAL at 09:05

## 2017-05-11 RX ADMIN — FUROSEMIDE 20 MG: 20 TABLET ORAL at 09:05

## 2017-05-11 RX ADMIN — ESCITALOPRAM 20 MG: 10 TABLET, FILM COATED ORAL at 09:05

## 2017-05-11 RX ADMIN — POTASSIUM CHLORIDE 40 MEQ: 1500 TABLET, EXTENDED RELEASE ORAL at 09:05

## 2017-05-11 RX ADMIN — RAMELTEON 8 MG: 8 TABLET, FILM COATED ORAL at 10:05

## 2017-05-11 RX ADMIN — METRONIDAZOLE 500 MG: 500 TABLET ORAL at 06:05

## 2017-05-11 RX ADMIN — ATORVASTATIN CALCIUM 20 MG: 20 TABLET, FILM COATED ORAL at 09:05

## 2017-05-11 NOTE — PLAN OF CARE
Problem: Occupational Therapy Goal  Goal: Occupational Therapy Goal  Goals to be met by: 3 weeks     Patient will increase functional independence with ADLs by performing:    Feeding with Set-up Assistance.  UE Dressing with Set-up Assistance.  LE Dressing with Supervision.  Grooming while standing with Supervision.  Toileting from bedside commode with Supervision for hygiene and clothing management.   Bathing from shower chair/bench with Stand-by Assistance.  Supine to sit with Modified [place].  Stand pivot transfers with Supervision.  Toilet transfer to bedside commode with Supervision  Pt. Will be able to follow 2 step directions involving ADL tasks with 75% accuracy  Pt.. Will be independent with HEP for BUE as well as for improving FMC skills in left hand   .   Pt. Tolerated session well

## 2017-05-11 NOTE — PLAN OF CARE
Problem: Patient Care Overview  Goal: Plan of Care Review  Outcome: Ongoing (interventions implemented as appropriate)    05/11/17 9589   Coping/Psychosocial   Plan Of Care Reviewed With patient

## 2017-05-11 NOTE — PT/OT/SLP PROGRESS
Occupational Therapy  Treatment    Lilia Hidalgo   MRN: [id number]   Admitting Diagnosis: Debility    OT Date of Treatment: 05/11/17  Total Time (min): 58 min    Billable Minutes:  Self Care/Home Management 58    General Precautions: Standard, aspiration, fall (delerium/bed alarm)  Orthopedic Precautions: N/A  Braces: N/A    Do you have any cultural, spiritual, Church conflicts, given your current situation?: Orthodoxy    Subjective:  Communicated with nurse prior to session.  I have to go to the bathroom again    Pain Rating: 3/10  Location - Side: Left     Location: hip  Pain Addressed: Reposition, Distraction  Pain Rating Post-Intervention:  (no increases in pain noted)    Objective:  Patient found with:  (seated on BSC)    Functional Status:  MDS G  Transfer Functional Status: CGA with RW  Transfer Level of (A):  (with RW and vc's for technique)  Dressing Functional Status: 2:CGA LBD to don socks, pants and underwear with AE for socks; UBD SBA for bra and pull over top  Eating Functional Status: S-SBA  Toilet Use Functional Status: Min (A) when standing to manage clothing  Personal Hygiene Functional Status: CGA standing at sink to brush teeth  Bathing Functional Status: CGA-Min (A)  Moving from seated to standing position: Not steady, only able to stabilize with staff assistance  Moving on and off the toilet: Not steady, only able to stabilize with staff assistance  Surface-to-surface transfer (transfer between bed and chair or wheelchair): Not steady, only able to stabilize with staff assistance            Additional Treatment:  Educated on technique to don bra overhead  Educated on safety with transfers and dressing    Patient left supine with call button in reach, bed alarm on and nurse notified    ASSESSMENT:  Lilia Hidalgo is a 85 y.o. female with a medical diagnosis of Debility and presents with deficits with cognition, self-care skills, functional mobility and overall level of endurance and would  benefit from continued OT services to maximize safety and independence with ADL tasks.    Rehab identified problem list/impairments: impaired endurance, impaired self care skills, impaired functional mobilty, impaired cognition, pain    Rehab potential is good    Activity tolerance: Good    Discharge recommendations: home health OT (may require supervision for safety on d/c)     Barriers to discharge: Decreased caregiver support     Equipment recommendations: none     GOALS:   Occupational Therapy Goals        Problem: Occupational Therapy Goal    Goal Priority Disciplines Outcome Interventions   Occupational Therapy Goal     OT, PT/OT     Description:  Goals to be met by: 3 weeks     Patient will increase functional independence with ADLs by performing:    Feeding with Set-up Assistance.  UE Dressing with Set-up Assistance.  LE Dressing with Supervision.  Grooming while standing with Supervision.  Toileting from bedside commode with Supervision for hygiene and clothing management.   Bathing from  shower chair/bench with Stand-by Assistance.  Supine to sit with Modified [place].  Stand pivot transfers with Supervision.  Toilet transfer to bedside commode with Supervision  Pt. Will be able to follow 2 step directions involving ADL tasks with  75% accuracy  Pt.. Will be independent with HEP for BUE as well as for improving FMC skills in left hand   .                 Plan:  Patient to be seen 5 x/week to address the above listed problems via self-care/home management, therapeutic activities, therapeutic exercises  Plan of Care expires: 06/08/17  Plan of Care reviewed with: patient    MIR Chavis  05/11/2017

## 2017-05-11 NOTE — PT/OT/SLP PROGRESS
"Speech Language Pathology  Treatment    Lilia Hidalgo   MRN: [id number]   Admitting Diagnosis: Debility    Diet recommendations: Solid Diet Level: Regular  Liquid Diet Level: Thin No straws, HOB to 90 degrees, Small bites/sips, Alternating bites/sips and 1 bite/sip at a time    SLP Treatment Date: 05/11/17  Speech Start Time: 1340     Speech Stop Time: 1422     Speech Total (min): 42 min       TREATMENT BILLABLE MINUTES:  Speech Therapy Individual 42    Has the patient been evaluated by SLP for swallowing? : Yes  Keep patient NPO?: No   General Precautions: Standard, aspiration, fall          Subjective:  "A few days is all I think I'll stay." pt referring to length of stay she is willing to stay at SNF.                        Objective:       Pt exhibited functional reading abilities when reading a paragraph with reading glasses on.  She write her name and generated a sentence with good legibility and one grammar error.  She valeriy the face of a clock with minor errors in placement on left side of clock with verbalized awareness of errors.  Pt was provided with 3 facts which were repeated and rehearsed multiple times with SLP's assistance to facilitate delayed recall.  Following a 3 minute delay, pt recalled 2/3 facts given supervision.  Word list retention tasks recalling words from fo4 by attribute completed with 70% acc. Ind'ly/100% given A.  Extensive education was provided to pt regarding purpose of therapy for increasing strength, function, endurance, and independence.  Pt stating that she does not want to stay at SNF for more than "a few days."       Assessment:  Lilia Hidalgo is a 85 y.o. female with a medical diagnosis of Debility and presents with cognitive-linguistic deficits and mild dysphagia.   Diet recommendations:   Solid Diet Level: Regular    Liquid Diet Level: Thin        Discharge recommendations: Discharge Facility/Level Of Care Needs: home health speech therapy     Goals:   SLP Goals        " Problem: SLP Goal    Goal Priority Disciplines Outcome   SLP Goal     SLP Ongoing (interventions implemented as appropriate)   Description:  Speech Language Pathology Goals  Goals expected to be met by 5/16:  1. Pt will tolerate a regular consistency diet and thin liquids without s/s of aspiration.  2. Pt will participate in speech/language/cognitive evaluation to determine need for further intervention.- goal met 5/10  ADDITIONAL GOALS:  2. Pt will orient x 4 given min-mod A.  3. Pt will complete immediate memory tasks with 70% accuracy given min A.  4. Following a delay, pt will recall 3 words/facts given mod A.  5. Pt will follow complex commands with 70% accuracy.  6. Pt will complete moderate level problem solving tasks with 80% accuracy given mod A.  7. Pt will list an average of 10 items in a category within one minute.  8. Further evaluation of reading, writing, visual spatial, and math/money management abilities.                     Plan:   Patient to be seen Therapy Frequency: 5 x/week   Plan of Care expires: 06/08/17  Plan of Care reviewed with: patient  SLP Follow-up?: Yes              MATEUS Mejia, MANUEL-SLP  05/11/2017     MATEUS Mejia, CCC-SLP  Speech Language Pathologist  [phone]  5/11/2017

## 2017-05-11 NOTE — PLAN OF CARE
Problem: Mobility, Physical Impaired (Adult)  Goal: Identify Related Risk Factors and Signs and Symptoms  Related risk factors and signs and symptoms are identified upon initiation of Human Response Clinical Practice Guideline (CPG)   Outcome: Ongoing (interventions implemented as appropriate)    05/11/17 0445   Mobility, Physical Impaired   Related Risk Factors (Physical Mobility, Impaired) activity intolerance;cognitive impairment;functional decline   Signs and Symptoms (Physical Mobility Impaired) fear/anxiety related to mobility

## 2017-05-11 NOTE — PT/OT/SLP PROGRESS
"Physical Therapy  Treatment    Lilia Hidalgo   MRN: [id number]   Admitting Diagnosis: Debility    PT Received On: 17  Total Time (min): 48       Billable Minutes:  Gait [id number], Therapeutic Activity 15, Therapeutic Exercise 20 and Total Time 48    Treatment Type: Treatment  PT/PTA: PT     PTA Visit Number: 0       General Precautions: Standard, aspiration, fall, nectar thick (no straws)  Orthopedic Precautions: N/A   Braces: N/A    Do you have any cultural, spiritual, Restorationist conflicts, given your current situation?: Taoism    Subjective:  Pt agreeable to session.    Pain Ratin/10  Location - Side: Left  Location - Orientation: generalized  Location: knee  Pain Addressed: Pre-medicate for activity       Objective:  Patient found supine in bed         Functional Status:  MDS G  Bed Mobility Functional Status: S-SBA  Transfer Functional Status: CGA-Min (A)  Walk in Room Functional Status: CGA-Min (A)  Walk in Corridor Functional Status: CGA-Min (A)  Locomotion on Unit Functional Status: CGA-Min (A)          Bed Mobility:  Supine>Sit: on bed w/ HOB elevated and side rail, SBA    Transfers:  Sit<>Stand: to/from w/c (2 trials) w/ RW and close SBA for safety  Stand Pivot Transfer: EOB>w/c w/ RW and SBA  vc's for hand placement    Gait:  Amb 172ft w/ RW and CGA for safety, limited by knee pain and fatigue, vc's for upright posture     Advanced Gait:  Curb Step: 4" curb step w/ RW and Tiana for RW management and stability, vc's for correct sequence and technique    Wheelchair Mobility:  Patient propels w/c 150ft w/ BUE and SBA, vc's for technique     Balance:  Standing beach ball tap [id number] w/ RW and Min/CGA for stability, limited by fatigue    Additional Treatment:  Seated w/c leg press BLE x10min to inc BLE strength and endurance    Patient left up in chair with call button in reach.    Assessment:  Lilia Hidalgo is a 85 y.o. female with a medical diagnosis of Debility.  Pt bk session well w/ good " participation. Pt was able to inc amb distance and complete the curb step today for the first time. Pt is progressing well overall but continues to be limited by impaired cognition and knee pain. Pt will continue to benefit from skilled PT. She is however recommended to have assistance upon D/C for safety.    Rehab identified problem list/impairments: weakness, impaired endurance, impaired self care skills, impaired functional mobilty, gait instability, decreased lower extremity function, pain    Rehab potential is good.    Activity tolerance: Good    Discharge recommendations: home health PT     Barriers to discharge: Decreased caregiver support    Equipment recommendations: none     GOALS:   Physical Therapy Goals        Problem: Physical Therapy Goal    Goal Priority Disciplines Outcome Goal Variances Interventions   Physical Therapy Goal     PT/OT, PT Ongoing (interventions implemented as appropriate)     Description:  Goals to be met by: 2-3 weeks     Patient will increase functional independence with mobility by performin. Supine to sit with supervision.  2. Sit to supine with supervision  3. Sit to stand transfer with supervision  4. Bed to chair transfer with supervision using Rolling Walker  5. Gait  x 150 feet with Supervision using Rolling Walker.   6. Wheelchair propulsion x 150 feet with Supervision using bilateral upper extremities  7. Ascend/Descend 4 inch curb step with Stand-by Assistance using Rolling Walker.  8. Stand for 5 minutes with Stand-by Assistance using Rolling Walker while performing a task.  9. Lower extremity exercise program x20 reps per handout, with supervision                PLAN:    Patient to be seen  (5-6x/wk)  to address the above listed problems via gait training, therapeutic activities, therapeutic exercises, neuromuscular re-education, wheelchair management/training  Plan of Care expires: 17  Plan of Care reviewed with: patient, sukhdev Peterson,  PT  05/11/2017

## 2017-05-11 NOTE — PLAN OF CARE
Problem: Physical Therapy Goal  Goal: Physical Therapy Goal  Goals to be met by: 2-3 weeks     Patient will increase functional independence with mobility by performin. Supine to sit with supervision.  2. Sit to supine with supervision  3. Sit to stand transfer with supervision  4. Bed to chair transfer with supervision using Rolling Walker  5. Gait x 150 feet with Supervision using Rolling Walker.   6. Wheelchair propulsion x 150 feet with Supervision using bilateral upper extremities  7. Ascend/Descend 4 inch curb step with Stand-by Assistance using Rolling Walker.  8. Stand for 5 minutes with Stand-by Assistance using Rolling Walker while performing a task.  9. Lower extremity exercise program x20 reps per handout, with supervision   LTGs remain appropriate. Pt will continue PT POC.     Jaelyn Peterson, PT  2017

## 2017-05-11 NOTE — PLAN OF CARE
Problem: SLP Goal  Goal: SLP Goal  Speech Language Pathology Goals  Goals expected to be met by 5/16:  1. Pt will tolerate a regular consistency diet and thin liquids without s/s of aspiration.  2. Pt will participate in speech/language/cognitive evaluation to determine need for further intervention.- goal met 5/10  ADDITIONAL GOALS:  2. Pt will orient x 4 given min-mod A.  3. Pt will complete immediate memory tasks with 70% accuracy given min A.  4. Following a delay, pt will recall 3 words/facts given mod A.  5. Pt will follow complex commands with 70% accuracy.  6. Pt will complete moderate level problem solving tasks with 80% accuracy given mod A.  7. Pt will list an average of 10 items in a category within one minute.  8. Further evaluation of reading, writing, visual spatial, and math/money management abilities.      Outcome: Ongoing (interventions implemented as appropriate)  Pt continues to demonstrate cognitive-linguistic deficits, including dec'd short term memory deficits and dec'd understanding of purpose and benefits of therapy.  Pt may need significant encouragement to agree to stay until recommended discharge date.  MATEUS Mejia, CCC-SLP  Speech Language Pathologist  [phone]  5/11/2017

## 2017-05-12 PROCEDURE — 92507 TX SP LANG VOICE COMM INDIV: CPT

## 2017-05-12 PROCEDURE — 25000003 PHARM REV CODE 250: Performed by: NURSE PRACTITIONER

## 2017-05-12 PROCEDURE — 97530 THERAPEUTIC ACTIVITIES: CPT

## 2017-05-12 PROCEDURE — 97116 GAIT TRAINING THERAPY: CPT

## 2017-05-12 PROCEDURE — 97110 THERAPEUTIC EXERCISES: CPT

## 2017-05-12 PROCEDURE — 97535 SELF CARE MNGMENT TRAINING: CPT

## 2017-05-12 PROCEDURE — 25000003 PHARM REV CODE 250: Performed by: INTERNAL MEDICINE

## 2017-05-12 PROCEDURE — 25000003 PHARM REV CODE 250: Performed by: STUDENT IN AN ORGANIZED HEALTH CARE EDUCATION/TRAINING PROGRAM

## 2017-05-12 PROCEDURE — 11000004 HC SNF PRIVATE

## 2017-05-12 RX ADMIN — RAMELTEON 8 MG: 8 TABLET, FILM COATED ORAL at 09:05

## 2017-05-12 RX ADMIN — METRONIDAZOLE 500 MG: 500 TABLET ORAL at 09:05

## 2017-05-12 RX ADMIN — HEPARIN SODIUM 5000 UNITS: 5000 INJECTION, SOLUTION INTRAVENOUS; SUBCUTANEOUS at 06:05

## 2017-05-12 RX ADMIN — ESCITALOPRAM 20 MG: 10 TABLET, FILM COATED ORAL at 09:05

## 2017-05-12 RX ADMIN — ALUMINUM HYDROXIDE, MAGNESIUM HYDROXIDE, AND SIMETHICONE 15 ML: 200; 200; 20 SUSPENSION ORAL at 11:05

## 2017-05-12 RX ADMIN — METRONIDAZOLE 500 MG: 500 TABLET ORAL at 01:05

## 2017-05-12 RX ADMIN — METOPROLOL SUCCINATE 100 MG: 100 TABLET, EXTENDED RELEASE ORAL at 09:05

## 2017-05-12 RX ADMIN — ATORVASTATIN CALCIUM 20 MG: 20 TABLET, FILM COATED ORAL at 09:05

## 2017-05-12 RX ADMIN — FUROSEMIDE 20 MG: 20 TABLET ORAL at 09:05

## 2017-05-12 RX ADMIN — TRAMADOL HYDROCHLORIDE 25 MG: 50 TABLET, FILM COATED ORAL at 09:05

## 2017-05-12 RX ADMIN — HEPARIN SODIUM 5000 UNITS: 5000 INJECTION, SOLUTION INTRAVENOUS; SUBCUTANEOUS at 09:05

## 2017-05-12 RX ADMIN — CIPROFLOXACIN HYDROCHLORIDE 500 MG: 500 TABLET, FILM COATED ORAL at 09:05

## 2017-05-12 RX ADMIN — PANTOPRAZOLE SODIUM 40 MG: 40 TABLET, DELAYED RELEASE ORAL at 09:05

## 2017-05-12 RX ADMIN — METRONIDAZOLE 500 MG: 500 TABLET ORAL at 06:05

## 2017-05-12 RX ADMIN — HEPARIN SODIUM 5000 UNITS: 5000 INJECTION, SOLUTION INTRAVENOUS; SUBCUTANEOUS at 01:05

## 2017-05-12 NOTE — PT/OT/SLP PROGRESS
Occupational Therapy  Treatment    Lilia Hidalgo   MRN: [id number]   Admitting Diagnosis: Debility    OT Date of Treatment: 05/12/17  Total Time (min): 59 min    Billable Minutes:  Self Care/Home Management 15, Therapeutic Activity 15 and Therapeutic Exercise 29    General Precautions: Standard, aspiration, fall  Orthopedic Precautions: N/A  Braces: N/A    Do you have any cultural, spiritual, Hindu conflicts, given your current situation?: Moravian    Subjective:  Communicated with nurse prior to session.  My legs feel like they weigh 300 pounds.     Pain Rating: other (see comments) (pain not quanitfied at this time)                   Objective:  Patient found with:  (found up in chair in activity room with Supervision)    Functional Status:  MDS G  Bed Mobility Functional Status: Min A  for LLE from sit to supine  Transfer Functional Status: ; CGA to transfer from w/c to BSC for balance and safety  Toilet Use Functional Status: CGA- for balance while managing dress  Moving on and off the toilet: Not steady, but able to stabilize without staff assistance  Surface-to-surface transfer (transfer between bed and chair or wheelchair): Not steady, but able to stabilize without staff assistance          OT Exercises: AROM chest presses with 1# dowel 2 sets x 10 reps; shoulder flexion and bicep curls without dowel, 2 sets x 10 reps; cervical stretches in all planes 2 reps with 3 second hold  PROM shoulder rolls 2 sets x 10 reps    Additional Treatment:  Pt engaged in dynamic standing task at table while manipulating tubes over PVC pipe with 3 seated rest breaks. Times standing as follows: ~3 minutes, 1.19 minutes, & 4.37 minutes  Pt. Engaged in balloon toss activity while seated 10 sets x 10 reps.     Patient left supine with call button in reach, bed alarm on and ST notified    ASSESSMENT:  Lilia Hidalgo is a 85 y.o. female with a medical diagnosis of Debility and presents with BLE weakness, decreased endurance,  decreased self-care skills, confusion, decreased safety awareness, and is limited by pain on this date. Pt. Would benefit from continued OT services to maximize safety and independence with ADLs.     Rehab identified problem list/impairments: impaired endurance, gait instability, impaired functional mobilty, impaired self care skills, impaired balance, decreased lower extremity function, decreased safety awareness, pain    Rehab potential is good    Activity tolerance: Good    Discharge recommendations: home health OT, home health speech therapy     Barriers to discharge: Decreased caregiver support     Equipment recommendations: none     GOALS:   Occupational Therapy Goals        Problem: Occupational Therapy Goal    Goal Priority Disciplines Outcome Interventions   Occupational Therapy Goal     OT, PT/OT     Description:  Goals to be met by: 3 weeks     Patient will increase functional independence with ADLs by performing:    Feeding with Set-up Assistance.  UE Dressing with Set-up Assistance.  LE Dressing with Supervision.  Grooming while standing with Supervision.  Toileting from bedside commode with Supervision for hygiene and clothing management.   Bathing from  shower chair/bench with Stand-by Assistance.  Supine to sit with Modified [place].  Stand pivot transfers with Supervision.  Toilet transfer to bedside commode with Supervision  Pt. Will be able to follow 2 step directions involving ADL tasks with  75% accuracy  Pt.. Will be independent with HEP for BUE as well as for improving FMC skills in left hand   .                 Plan:  Patient to be seen 5 x/week to address the above listed problems via self-care/home management, therapeutic exercises, therapeutic activities  Plan of Care expires: 06/08/17  Plan of Care reviewed with: patient    Brenda Soleruldin, SOT  05/12/2017

## 2017-05-12 NOTE — PT/OT/SLP PROGRESS
"Speech Language Pathology  Treatment    Lilia Hidalgo   MRN: [id number]   Admitting Diagnosis: Debility    Diet recommendations: Solid Diet Level: Regular  Liquid Diet Level: Thin No straws, HOB to 90 degrees, Small bites/sips, Alternating bites/sips and 1 bite/sip at a time    SLP Treatment Date: 05/12/17  Speech Start Time: 1412     Speech Stop Time: 1442     Speech Total (min): 30 min       TREATMENT BILLABLE MINUTES:  Speech Therapy Individual 30    Has the patient been evaluated by SLP for swallowing? : Yes  Keep patient NPO?: No   General Precautions: Standard, aspiration, fall          Subjective:  "I think I'll leave the early part of next week."                        Objective:      Pt was oriented to month, RAMANA, year, time of day, and place ind'ly.  Min cues needed to fully orient to situation/reason for admission.  SLP provided pt with 3 simple facts regarding events earlier this AM.  These facts were reviewed multiple times with pt begin encouraged to repeat back to SLP. FOllwoing a 2 minute delay, pt recall;ed 1/3 facts given supervision.  A picture retention task was completed with 85% acc. Ind'ly/95% given cues.  Pt immediately recalled sentences with 80% acc. Ind'ly/90% given repetition.  A word list retention/category inclusion task was completed with 60% acc. Ind'ly/90% given A.       Assessment:  Lilia Hidalgo is a 85 y.o. female with a medical diagnosis of Debility and presents with cognitive-linguistic deficits and mild dysphagia.     Diet recommendations:   Solid Diet Level: Regular    Liquid Diet Level: Thin        Discharge recommendations: Discharge Facility/Level Of Care Needs: home health speech therapy     Goals:   SLP Goals        Problem: SLP Goal    Goal Priority Disciplines Outcome   SLP Goal     SLP Ongoing (interventions implemented as appropriate)   Description:  Speech Language Pathology Goals  Goals expected to be met by 5/16:  1. Pt will tolerate a regular consistency diet and " thin liquids without s/s of aspiration.  2. Pt will participate in speech/language/cognitive evaluation to determine need for further intervention.- goal met 5/10  ADDITIONAL GOALS:  2. Pt will orient x 4 given min-mod A.  3. Pt will complete immediate memory tasks with 70% accuracy given min A.  4. Following a delay, pt will recall 3 words/facts given mod A.  5. Pt will follow complex commands with 70% accuracy.  6. Pt will complete moderate level problem solving tasks with 80% accuracy given mod A.  7. Pt will list an average of 10 items in a category within one minute.  8. Further evaluation of reading, writing, visual spatial, and math/money management abilities.                     Plan:   Patient to be seen Therapy Frequency: 5 x/week   Plan of Care expires: 06/08/17  Plan of Care reviewed with: patient  SLP Follow-up?: Yes  SLP - Next Visit Date: 05/15/17           MATEUS Mejia, MANUEL-SLP  05/12/2017     MATEUS Mejia, CCC-SLP  Speech Language Pathologist  [phone]  5/12/2017

## 2017-05-12 NOTE — PT/OT/SLP PROGRESS
"Physical Therapy  Treatment    Lilia Hidalgo   MRN: [id number]   Admitting Diagnosis: Debility    PT Received On: 17  Total Time (min): 46       Billable Minutes:  Gait [id number], Therapeutic Activity 21, Therapeutic Exercise 15 and Total Time 46    Treatment Type: Treatment  PT/PTA: PT     PTA Visit Number: 0       General Precautions: Standard, aspiration, fall, nectar thick (no straws)  Orthopedic Precautions: N/A   Braces: N/A    Do you have any cultural, spiritual, Taoism conflicts, given your current situation?: Lutheran    Subjective:  "I'm never sure what I'm supposed to be doing."    Pain Ratin/10  Location - Side: Left  Location - Orientation: generalized  Location: knee  Pain Addressed: Pre-medicate for activity       Objective:  Patient found supine in bed         Functional Status:  MDS G  Bed Mobility Functional Status: S-SBA  Transfer Functional Status: S-SBA  Walk in Room Functional Status: CGA-Min (A)  Walk in Corridor Functional Status: CGA-Min (A)  Locomotion on Unit Functional Status: CGA-Min (A)  Moving from seated to standing position: Not steady, but able to stabilize without staff assistance  Walking (with assistive device if used): Not steady, only able to stabilize with staff assistance  Surface-to-surface transfer (transfer between bed and chair or wheelchair): Not steady, only able to stabilize with staff assistance          Bed Mobility:  Sit>Supine:on mat w/ SBA, vc's for technique  Supine>Sit: on bed and mat w/ SBA, vc's for technique    Transfers:  Sit<>Stand: to/from w/c (2 trials) w/ RW and SBA for safety  Stand Pivot Transfer: EOB>w/c and w/c<>EOM w/ RW and SBA  vc's for hand placment    Gait:  Amb 135ft w/ RW and CGA for safety, limited by left knee pain     Therex:  Supine therex 2x15 reps (GS, SAQ, AP, Abd/Add)    Balance:  Standing card activity x5min w/ RW and SBA for safety, limited by left knee pain    Additional Treatment:  PT assisted pt w/ dressing at start " of session. Pt required Tiana only for bra. SBA to change dresses.    Patient left up in chair with call button in reach.    Assessment:  Lilia Hidalgo is a 85 y.o. female with a medical diagnosis of Debility.  Pt continues to be limited t/o sessions by left knee pain when standing. She does however participate well. Pt remains at a CGA/SBA level for transfers and amb w/ RW. Pt is recommended to have 24hour care upon D/C for safety due to impaired memory. Pt will continue to benefit from skilled PT sessions..    Rehab identified problem list/impairments: weakness, impaired endurance, impaired self care skills, impaired functional mobilty, gait instability, decreased lower extremity function, pain    Rehab potential is good.    Activity tolerance: Good    Discharge recommendations: home health PT     Barriers to discharge: Decreased caregiver support    Equipment recommendations: none     GOALS:   Physical Therapy Goals        Problem: Physical Therapy Goal    Goal Priority Disciplines Outcome Goal Variances Interventions   Physical Therapy Goal     PT/OT, PT Ongoing (interventions implemented as appropriate)     Description:  Goals to be met by: 2-3 weeks     Patient will increase functional independence with mobility by performin. Supine to sit with supervision.  2. Sit to supine with supervision  3. Sit to stand transfer with supervision  4. Bed to chair transfer with supervision using Rolling Walker  5. Gait  x 150 feet with Supervision using Rolling Walker.   6. Wheelchair propulsion x 150 feet with Supervision using bilateral upper extremities  7. Ascend/Descend 4 inch curb step with Stand-by Assistance using Rolling Walker.  8. Stand for 5 minutes with Stand-by Assistance using Rolling Walker while performing a task.  9. Lower extremity exercise program x20 reps per handout, with supervision                PLAN:    Patient to be seen  (5-6x/wk)  to address the above listed problems via gait training,  therapeutic activities, therapeutic exercises, neuromuscular re-education, wheelchair management/training  Plan of Care expires: 06/08/17  Plan of Care reviewed with: patient, sukhdev Peterson, PT  05/12/2017

## 2017-05-12 NOTE — PLAN OF CARE
Problem: Occupational Therapy Goal  Goal: Occupational Therapy Goal  Goals to be met by: 3 weeks     Patient will increase functional independence with ADLs by performing:    Feeding with Set-up Assistance.  UE Dressing with Set-up Assistance.  LE Dressing with Supervision.  Grooming while standing with Supervision.  Toileting from bedside commode with Supervision for hygiene and clothing management.   Bathing from shower chair/bench with Stand-by Assistance.  Supine to sit with Modified [place].  Stand pivot transfers with Supervision.  Toilet transfer to bedside commode with Supervision  Pt. Will be able to follow 2 step directions involving ADL tasks with 75% accuracy  Pt.. Will be independent with HEP for BUE as well as for improving FMC skills in left hand   .   Pt. Tolerated session well.

## 2017-05-12 NOTE — PLAN OF CARE
Problem: SLP Goal  Goal: SLP Goal  Speech Language Pathology Goals  Goals expected to be met by 5/16:  1. Pt will tolerate a regular consistency diet and thin liquids without s/s of aspiration.  2. Pt will participate in speech/language/cognitive evaluation to determine need for further intervention.- goal met 5/10  ADDITIONAL GOALS:  2. Pt will orient x 4 given min-mod A.  3. Pt will complete immediate memory tasks with 70% accuracy given min A.  4. Following a delay, pt will recall 3 words/facts given mod A.  5. Pt will follow complex commands with 70% accuracy.  6. Pt will complete moderate level problem solving tasks with 80% accuracy given mod A.  7. Pt will list an average of 10 items in a category within one minute.  8. Further evaluation of reading, writing, visual spatial, and math/money management abilities.      Outcome: Ongoing (interventions implemented as appropriate)  Pt with good participation and cooperation. Cont POC. MATEUS Mejia, CCC-SLP  Speech Language Pathologist  [phone]  5/12/2017

## 2017-05-12 NOTE — PLAN OF CARE
Problem: Patient Care Overview  Goal: Plan of Care Review  Outcome: Ongoing (interventions implemented as appropriate)  Pt is a a o x 3, continent  X 2, vss, resp effort even and unlabored. Call bell within reach, side rails up x 3, Pt has remained free from falls. Will continue to monitor.    05/12/17 4633   Coping/Psychosocial   Plan Of Care Reviewed With patient

## 2017-05-13 PROCEDURE — 25000003 PHARM REV CODE 250: Performed by: INTERNAL MEDICINE

## 2017-05-13 PROCEDURE — 25000003 PHARM REV CODE 250: Performed by: NURSE PRACTITIONER

## 2017-05-13 PROCEDURE — 11000004 HC SNF PRIVATE

## 2017-05-13 PROCEDURE — 25000003 PHARM REV CODE 250: Performed by: STUDENT IN AN ORGANIZED HEALTH CARE EDUCATION/TRAINING PROGRAM

## 2017-05-13 RX ADMIN — ESCITALOPRAM 20 MG: 10 TABLET, FILM COATED ORAL at 09:05

## 2017-05-13 RX ADMIN — METRONIDAZOLE 500 MG: 500 TABLET ORAL at 02:05

## 2017-05-13 RX ADMIN — ATORVASTATIN CALCIUM 20 MG: 20 TABLET, FILM COATED ORAL at 09:05

## 2017-05-13 RX ADMIN — BENAZEPRIL HYDROCHLORIDE 20 MG: 20 TABLET, FILM COATED ORAL at 09:05

## 2017-05-13 RX ADMIN — RAMELTEON 8 MG: 8 TABLET, FILM COATED ORAL at 09:05

## 2017-05-13 RX ADMIN — TRAMADOL HYDROCHLORIDE 25 MG: 50 TABLET, FILM COATED ORAL at 09:05

## 2017-05-13 RX ADMIN — CIPROFLOXACIN HYDROCHLORIDE 500 MG: 500 TABLET, FILM COATED ORAL at 09:05

## 2017-05-13 RX ADMIN — FUROSEMIDE 20 MG: 20 TABLET ORAL at 09:05

## 2017-05-13 RX ADMIN — METOPROLOL SUCCINATE 100 MG: 100 TABLET, EXTENDED RELEASE ORAL at 09:05

## 2017-05-13 RX ADMIN — HEPARIN SODIUM 5000 UNITS: 5000 INJECTION, SOLUTION INTRAVENOUS; SUBCUTANEOUS at 02:05

## 2017-05-13 RX ADMIN — PANTOPRAZOLE SODIUM 40 MG: 40 TABLET, DELAYED RELEASE ORAL at 09:05

## 2017-05-13 RX ADMIN — HEPARIN SODIUM 5000 UNITS: 5000 INJECTION, SOLUTION INTRAVENOUS; SUBCUTANEOUS at 09:05

## 2017-05-13 RX ADMIN — METRONIDAZOLE 500 MG: 500 TABLET ORAL at 09:05

## 2017-05-13 RX ADMIN — METRONIDAZOLE 500 MG: 500 TABLET ORAL at 06:05

## 2017-05-13 RX ADMIN — HEPARIN SODIUM 5000 UNITS: 5000 INJECTION, SOLUTION INTRAVENOUS; SUBCUTANEOUS at 06:05

## 2017-05-13 NOTE — PLAN OF CARE
Problem: Patient Care Overview  Goal: Plan of Care Review  Outcome: Ongoing (interventions implemented as appropriate)  Pt a a o x 3, forgettful at times. Vss, resp effort even and unlabored, voids per BSC. Call bell within reach, side rails up x 3. Pt has remained free from falls. Will continue to monitor.    05/13/17 0707   Coping/Psychosocial   Plan Of Care Reviewed With patient

## 2017-05-14 PROCEDURE — 25000003 PHARM REV CODE 250: Performed by: INTERNAL MEDICINE

## 2017-05-14 PROCEDURE — 25000003 PHARM REV CODE 250: Performed by: STUDENT IN AN ORGANIZED HEALTH CARE EDUCATION/TRAINING PROGRAM

## 2017-05-14 PROCEDURE — 11000004 HC SNF PRIVATE

## 2017-05-14 PROCEDURE — 97535 SELF CARE MNGMENT TRAINING: CPT

## 2017-05-14 PROCEDURE — 25000003 PHARM REV CODE 250: Performed by: NURSE PRACTITIONER

## 2017-05-14 RX ORDER — FUROSEMIDE 40 MG/1
40 TABLET ORAL DAILY
Status: DISCONTINUED | OUTPATIENT
Start: 2017-05-15 | End: 2017-05-29 | Stop reason: HOSPADM

## 2017-05-14 RX ORDER — FUROSEMIDE 20 MG/1
20 TABLET ORAL ONCE
Status: COMPLETED | OUTPATIENT
Start: 2017-05-14 | End: 2017-05-14

## 2017-05-14 RX ADMIN — HEPARIN SODIUM 5000 UNITS: 5000 INJECTION, SOLUTION INTRAVENOUS; SUBCUTANEOUS at 10:05

## 2017-05-14 RX ADMIN — METRONIDAZOLE 500 MG: 500 TABLET ORAL at 06:05

## 2017-05-14 RX ADMIN — CIPROFLOXACIN HYDROCHLORIDE 500 MG: 500 TABLET, FILM COATED ORAL at 10:05

## 2017-05-14 RX ADMIN — ATORVASTATIN CALCIUM 20 MG: 20 TABLET, FILM COATED ORAL at 09:05

## 2017-05-14 RX ADMIN — FUROSEMIDE 20 MG: 20 TABLET ORAL at 02:05

## 2017-05-14 RX ADMIN — TRAMADOL HYDROCHLORIDE 25 MG: 50 TABLET, FILM COATED ORAL at 10:05

## 2017-05-14 RX ADMIN — ESCITALOPRAM 20 MG: 10 TABLET, FILM COATED ORAL at 09:05

## 2017-05-14 RX ADMIN — PANTOPRAZOLE SODIUM 40 MG: 40 TABLET, DELAYED RELEASE ORAL at 09:05

## 2017-05-14 RX ADMIN — STANDARDIZED SENNA CONCENTRATE AND DOCUSATE SODIUM 1 TABLET: 8.6; 5 TABLET, FILM COATED ORAL at 10:05

## 2017-05-14 RX ADMIN — HEPARIN SODIUM 5000 UNITS: 5000 INJECTION, SOLUTION INTRAVENOUS; SUBCUTANEOUS at 06:05

## 2017-05-14 RX ADMIN — FUROSEMIDE 20 MG: 20 TABLET ORAL at 09:05

## 2017-05-14 RX ADMIN — METOPROLOL SUCCINATE 100 MG: 100 TABLET, EXTENDED RELEASE ORAL at 09:05

## 2017-05-14 RX ADMIN — RAMELTEON 8 MG: 8 TABLET, FILM COATED ORAL at 10:05

## 2017-05-14 RX ADMIN — METRONIDAZOLE 500 MG: 500 TABLET ORAL at 10:05

## 2017-05-14 RX ADMIN — HEPARIN SODIUM 5000 UNITS: 5000 INJECTION, SOLUTION INTRAVENOUS; SUBCUTANEOUS at 02:05

## 2017-05-14 RX ADMIN — CIPROFLOXACIN HYDROCHLORIDE 500 MG: 500 TABLET, FILM COATED ORAL at 09:05

## 2017-05-14 RX ADMIN — METRONIDAZOLE 500 MG: 500 TABLET ORAL at 02:05

## 2017-05-14 NOTE — PROGRESS NOTES
"Upon entering pt room to inform of follow-up order, to obtain cath u/a,c&s, pt states,"I feel better now." "Do I have to do that." Replied, no, "you can refuse if you want to."  Pt refused, informed charge nurse who stated to obtain clean catch and this nurse states pt is refusing, not getting either. Charge nurse states she will attempt to obtain clean catch.  "

## 2017-05-14 NOTE — PT/OT/SLP PROGRESS
Occupational Therapy  Treatment    Lilia Hidalgo   MRN: [id number]   Admitting Diagnosis: Debility    OT Date of Treatment: 05/14/17  Total Time (min): 43 min    Billable Minutes:  Self Care/Home Management 43    General Precautions: Standard, fall, aspiration  Orthopedic Precautions: N/A  Braces: N/A    Do you have any cultural, spiritual, Sabianism conflicts, given your current situation?: Pentecostalism    Subjective:  Communicated with nurse prior to session.  I am not sure why my leg gives me so much trouble (referring to left hip to knee)    Pain Rating: 3/10 (at rest 8 when ambulating)  Location - Side: Left     Location: hip (down to knee)  Pain Addressed: Nurse notified  Pain Rating Post-Intervention: 3/10 (because seated)    Objective:  Patient found with:  (seated at EOB awaiting breakfast)  Pt. Offered shower but declined stating preferred to wash at sink )    Functional Status:  MDS G  Transfer Functional Status: SBA  Transfer Level of (A):  (with RW)  Walk in Room Functional Status: SBA to perform functional mobility in room x ~ 50 feet with RW  Dressing Functional Status: 1: SBA LBD/ set Up A UBD with mod vc's for bra  Eating Functional Status: Set Up A  Personal Hygiene Functional Status: SBA in stand at sink to brush teeth, hair and apply lipstick  Bathing Functional Status:SBA sponge bath at sink when standing to clean danny region.  ( pt. With c/o of stinging in vaginal area OT notified nursing at end of session)  Moving from seated to standing position: Not steady, but able to stabilize without staff assistance  Surface-to-surface transfer (transfer between bed and chair or wheelchair): Not steady, but able to stabilize without staff assistance            Additional Treatment:  Educated on   Safety with ADL task performance and proper technique.  Need to call for assist with all ambulation  Safety with functional mobility with RW  Role of OT and POC      Patient left seated EOB with breakfast tray with  call button in reach     ASSESSMENT:  Lilia Hidalgo is a 85 y.o. female with a medical diagnosis of Debility and presents with deficits in self-care skills as well as functional mobility and cognitive deficits effecting safety with ADL task performance.  Pain in left knee is also inhibiting pt.  Performance.  Recommend continued OT services to maximize independence and safety with ADL tasks.    Rehab identified problem list/impairments: impaired endurance, impaired self care skills, impaired functional mobilty, impaired cognition, pain, decreased lower extremity function, decreased safety awareness    Rehab potential is good    Activity tolerance: Good    Discharge recommendations: home health OT (with supervision/light assist)     Barriers to discharge: Decreased caregiver support     Equipment recommendations: none     GOALS:   Occupational Therapy Goals        Problem: Occupational Therapy Goal    Goal Priority Disciplines Outcome Interventions   Occupational Therapy Goal     OT, PT/OT     Description:  Goals to be met by: 3 weeks     Patient will increase functional independence with ADLs by performing:    Feeding with Set-up Assistance.  UE Dressing with Set-up Assistance.  LE Dressing with Supervision.  Grooming while standing with Supervision.  Toileting from bedside commode with Supervision for hygiene and clothing management.   Bathing from  shower chair/bench with Stand-by Assistance.  Supine to sit with Modified [place].  Stand pivot transfers with Supervision.  Toilet transfer to bedside commode with Supervision  Pt. Will be able to follow 2 step directions involving ADL tasks with  75% accuracy  Pt.. Will be independent with HEP for BUE as well as for improving FMC skills in left hand   .                 Plan:  Patient to be seen 5 x/week to address the above listed problems via self-care/home management, therapeutic activities, therapeutic exercises  Plan of Care expires: 06/08/17  Plan of Care  reviewed with: patient    MIR Chavis  05/14/2017

## 2017-05-14 NOTE — PROGRESS NOTES
"Summoned by OT, Mariangel, regarding pt's c/o "urine stinging."  Upon assessment. Pt stated, urine feels like stinging." pt denied burning on urination, pain and is afebrile. Emptied approx 250 cc of urine from commode in which urine color was very dk sukumar, no odor. Encouraged to drink more water.  Informed on duty charge nurse of complaint. Dr. Sanders notified. Awaiting follow-up.  "

## 2017-05-15 ENCOUNTER — TELEPHONE (OUTPATIENT)
Dept: INTERNAL MEDICINE | Facility: CLINIC | Age: 82
End: 2017-05-15

## 2017-05-15 LAB
ANION GAP SERPL CALC-SCNC: 8 MMOL/L
BACTERIA #/AREA URNS AUTO: ABNORMAL /HPF
BASOPHILS # BLD AUTO: 0.03 K/UL
BASOPHILS NFR BLD: 0.6 %
BILIRUB UR QL STRIP: NEGATIVE
BUN SERPL-MCNC: 13 MG/DL
CALCIUM SERPL-MCNC: 9.2 MG/DL
CAOX CRY UR QL COMP ASSIST: ABNORMAL
CHLORIDE SERPL-SCNC: 103 MMOL/L
CLARITY UR REFRACT.AUTO: ABNORMAL
CO2 SERPL-SCNC: 31 MMOL/L
COLOR UR AUTO: YELLOW
CREAT SERPL-MCNC: 0.9 MG/DL
DIFFERENTIAL METHOD: NORMAL
EOSINOPHIL # BLD AUTO: 0.3 K/UL
EOSINOPHIL NFR BLD: 5.5 %
ERYTHROCYTE [DISTWIDTH] IN BLOOD BY AUTOMATED COUNT: 14.5 %
EST. GFR  (AFRICAN AMERICAN): >60 ML/MIN/1.73 M^2
EST. GFR  (NON AFRICAN AMERICAN): 58.5 ML/MIN/1.73 M^2
GLUCOSE SERPL-MCNC: 91 MG/DL
GLUCOSE UR QL STRIP: NEGATIVE
HCT VFR BLD AUTO: 42.1 %
HGB BLD-MCNC: 13.7 G/DL
HGB UR QL STRIP: ABNORMAL
KETONES UR QL STRIP: NEGATIVE
LEUKOCYTE ESTERASE UR QL STRIP: ABNORMAL
LYMPHOCYTES # BLD AUTO: 1.2 K/UL
LYMPHOCYTES NFR BLD: 21.3 %
MAGNESIUM SERPL-MCNC: 1.6 MG/DL
MCH RBC QN AUTO: 29 PG
MCHC RBC AUTO-ENTMCNC: 32.5 %
MCV RBC AUTO: 89 FL
MICROSCOPIC COMMENT: ABNORMAL
MONOCYTES # BLD AUTO: 0.5 K/UL
MONOCYTES NFR BLD: 9.9 %
NEUTROPHILS # BLD AUTO: 3.4 K/UL
NEUTROPHILS NFR BLD: 62.7 %
NITRITE UR QL STRIP: NEGATIVE
NON-SQ EPI CELLS #/AREA URNS AUTO: <1 /HPF
PH UR STRIP: 6 [PH] (ref 5–8)
PHOSPHATE SERPL-MCNC: 4.1 MG/DL
PLATELET # BLD AUTO: 192 K/UL
PMV BLD AUTO: 11.5 FL
POCT GLUCOSE: 147 MG/DL (ref 70–110)
POTASSIUM SERPL-SCNC: 3.7 MMOL/L
PROT UR QL STRIP: NEGATIVE
RBC # BLD AUTO: 4.72 M/UL
RBC #/AREA URNS AUTO: 3 /HPF (ref 0–4)
SODIUM SERPL-SCNC: 142 MMOL/L
SP GR UR STRIP: 1.01 (ref 1–1.03)
SQUAMOUS #/AREA URNS AUTO: 5 /HPF
URN SPEC COLLECT METH UR: ABNORMAL
UROBILINOGEN UR STRIP-ACNC: NEGATIVE EU/DL
WBC # BLD AUTO: 5.44 K/UL
WBC #/AREA URNS AUTO: >100 /HPF (ref 0–5)
WBC CLUMPS UR QL AUTO: ABNORMAL
YEAST UR QL AUTO: ABNORMAL

## 2017-05-15 PROCEDURE — 25000003 PHARM REV CODE 250: Performed by: INTERNAL MEDICINE

## 2017-05-15 PROCEDURE — 97110 THERAPEUTIC EXERCISES: CPT

## 2017-05-15 PROCEDURE — 87086 URINE CULTURE/COLONY COUNT: CPT

## 2017-05-15 PROCEDURE — 25000003 PHARM REV CODE 250: Performed by: STUDENT IN AN ORGANIZED HEALTH CARE EDUCATION/TRAINING PROGRAM

## 2017-05-15 PROCEDURE — 97530 THERAPEUTIC ACTIVITIES: CPT

## 2017-05-15 PROCEDURE — 36415 COLL VENOUS BLD VENIPUNCTURE: CPT

## 2017-05-15 PROCEDURE — 97116 GAIT TRAINING THERAPY: CPT

## 2017-05-15 PROCEDURE — 87088 URINE BACTERIA CULTURE: CPT

## 2017-05-15 PROCEDURE — 84100 ASSAY OF PHOSPHORUS: CPT

## 2017-05-15 PROCEDURE — 25000003 PHARM REV CODE 250: Performed by: NURSE PRACTITIONER

## 2017-05-15 PROCEDURE — 80048 BASIC METABOLIC PNL TOTAL CA: CPT

## 2017-05-15 PROCEDURE — 85025 COMPLETE CBC W/AUTO DIFF WBC: CPT

## 2017-05-15 PROCEDURE — 92507 TX SP LANG VOICE COMM INDIV: CPT

## 2017-05-15 PROCEDURE — 11000004 HC SNF PRIVATE

## 2017-05-15 PROCEDURE — 87106 FUNGI IDENTIFICATION YEAST: CPT

## 2017-05-15 PROCEDURE — 99900058 HC 022 PAID UNDER SNF PPS

## 2017-05-15 PROCEDURE — 97535 SELF CARE MNGMENT TRAINING: CPT

## 2017-05-15 PROCEDURE — 81001 URINALYSIS AUTO W/SCOPE: CPT

## 2017-05-15 PROCEDURE — 83735 ASSAY OF MAGNESIUM: CPT

## 2017-05-15 RX ADMIN — HEPARIN SODIUM 5000 UNITS: 5000 INJECTION, SOLUTION INTRAVENOUS; SUBCUTANEOUS at 06:05

## 2017-05-15 RX ADMIN — METRONIDAZOLE 500 MG: 500 TABLET ORAL at 09:05

## 2017-05-15 RX ADMIN — ESCITALOPRAM 20 MG: 10 TABLET, FILM COATED ORAL at 10:05

## 2017-05-15 RX ADMIN — FUROSEMIDE 40 MG: 40 TABLET ORAL at 10:05

## 2017-05-15 RX ADMIN — CIPROFLOXACIN HYDROCHLORIDE 500 MG: 500 TABLET, FILM COATED ORAL at 09:05

## 2017-05-15 RX ADMIN — METRONIDAZOLE 500 MG: 500 TABLET ORAL at 06:05

## 2017-05-15 RX ADMIN — BENAZEPRIL HYDROCHLORIDE 20 MG: 20 TABLET, FILM COATED ORAL at 10:05

## 2017-05-15 RX ADMIN — METOPROLOL SUCCINATE 100 MG: 100 TABLET, EXTENDED RELEASE ORAL at 10:05

## 2017-05-15 RX ADMIN — STANDARDIZED SENNA CONCENTRATE AND DOCUSATE SODIUM 1 TABLET: 8.6; 5 TABLET, FILM COATED ORAL at 09:05

## 2017-05-15 RX ADMIN — HEPARIN SODIUM 5000 UNITS: 5000 INJECTION, SOLUTION INTRAVENOUS; SUBCUTANEOUS at 09:05

## 2017-05-15 RX ADMIN — PANTOPRAZOLE SODIUM 40 MG: 40 TABLET, DELAYED RELEASE ORAL at 10:05

## 2017-05-15 RX ADMIN — HEPARIN SODIUM 5000 UNITS: 5000 INJECTION, SOLUTION INTRAVENOUS; SUBCUTANEOUS at 03:05

## 2017-05-15 RX ADMIN — CIPROFLOXACIN HYDROCHLORIDE 500 MG: 500 TABLET, FILM COATED ORAL at 10:05

## 2017-05-15 RX ADMIN — METRONIDAZOLE 500 MG: 500 TABLET ORAL at 03:05

## 2017-05-15 RX ADMIN — RAMELTEON 8 MG: 8 TABLET, FILM COATED ORAL at 09:05

## 2017-05-15 RX ADMIN — ATORVASTATIN CALCIUM 20 MG: 20 TABLET, FILM COATED ORAL at 10:05

## 2017-05-15 NOTE — PT/OT/SLP PROGRESS
"Speech Language Pathology  Treatment    Lilia Hidalgo   MRN: [id number]   Admitting Diagnosis: Debility    Diet recommendations: Solid Diet Level: Regular  Liquid Diet Level: Thin No straws, HOB to 90 degrees, Small bites/sips, Alternating bites/sips and 1 bite/sip at a time    SLP Treatment Date: 05/15/17  Speech Start Time: 1137     Speech Stop Time: 1208     Speech Total (min): 31 min       TREATMENT BILLABLE MINUTES:  Speech Therapy Individual 31    Has the patient been evaluated by SLP for swallowing? : Yes  Keep patient NPO?: No   General Precautions: Standard, aspiration, fall          Subjective:  "I think it's age playing its part." pt commented regarding memory difficulties.                         Objective:      Pt was oriented x 4.  SLP stated 3 facts and rehearsed with pt multiple times to facilitate delayed recall of information. Following a 3 minute delay, pt recalled 3/3 facts given supervision to min A (1/3 ind'ly).  During another delayed memory task, pt generated a small grocery list (3 items), wrote list, and was encouraged to immediately repeat/recall list. Following a 3 minute delay, pt recalled 3/3 items ind'ly.  Pt provided solutions to hypothetical medical and safety situations with 90% acc.  Pt stated that her daughter assists her with organizing and managing her medications at home.  She does not, however, have reminders for taking medications at designated times, but medications are organized according to time of day (i.e., morning, noon, midday, evening).  Pt's daughter also assists pt with handling mail and bills.        Assessment:  Lilia Hidalgo is a 85 y.o. female with a medical diagnosis of Debility and presents with cognitive-linguistic deficits.     Diet recommendations:   Solid Diet Level: Regular    Liquid Diet Level: Thin        Discharge recommendations: Discharge Facility/Level Of Care Needs: home health speech therapy     Goals:   SLP Goals        Problem: SLP Goal    Goal " Priority Disciplines Outcome   SLP Goal     SLP Ongoing (interventions implemented as appropriate)   Description:  Speech Language Pathology Goals  Goals expected to be met by 5/16:  1. Pt will tolerate a regular consistency diet and thin liquids without s/s of aspiration.  2. Pt will participate in speech/language/cognitive evaluation to determine need for further intervention.- goal met 5/10  ADDITIONAL GOALS:  2. Pt will orient x 4 given min-mod A.  3. Pt will complete immediate memory tasks with 70% accuracy given min A.  4. Following a delay, pt will recall 3 words/facts given mod A.  5. Pt will follow complex commands with 70% accuracy.  6. Pt will complete moderate level problem solving tasks with 80% accuracy given mod A.  7. Pt will list an average of 10 items in a category within one minute.  8. Further evaluation of reading, writing, visual spatial, and math/money management abilities.                     Plan:   Patient to be seen Therapy Frequency: 5 x/week   Plan of Care expires: 06/08/17  Plan of Care reviewed with: patient  SLP Follow-up?: Yes  SLP - Next Visit Date: 05/15/17           MATEUS Mejia, CCC-SLP  05/15/2017     MATEUS Mejia, CCC-SLP  Speech Language Pathologist  [phone]  5/15/2017

## 2017-05-15 NOTE — PT/OT/SLP PROGRESS
Occupational Therapy  Treatment    Lilia Hidalgo   MRN: [id number]   Admitting Diagnosis: Debility    OT Date of Treatment: 05/15/17  Total Time (min): 36 min    Billable Minutes:  Self Care/Home Management 36    General Precautions: Standard, aspiration, fall  Orthopedic Precautions: N/A  Braces: N/A    Do you have any cultural, spiritual, Shinto conflicts, given your current situation?: Yarsanism    Subjective:  Communicated with nurse prior to session.  I didn't know I made a mess (referring to the feces on the floor and along the frame of the BSC as well as on the bed sheets)    Pain Rating: other (see comments) (not quantified at this time)  Location - Side: Left     Location: knee          Objective:  Patient found with:  (supine in bed; feces on floor and BSC)    Functional Status:  MDS G  Bed Mobility Functional Status: SBA to sit up on EOB  Transfer Functional Status: CGA with RW  Dressing Functional Status: LBD Min (A) to don socks without the use of equipment. UBD Supervision for bra and zip up dress.   Toilet Use Functional Status: Min (A) (upon entering room, feces found on floor and along the frame of the BSC. Pt. Insists that she did not use the BSC by herself)  Bathing Functional Status: Min (A) to wash feet on this date        Additional Treatment:  Pt. Educated on need for (A) when getting out of bed.     Patient left up in chair with call button in reach and nurse notified    ASSESSMENT:  Lilia Hidalgo is a 85 y.o. female with a medical diagnosis of Debility and presents with decreased endurance, functional mobility, and self-care skills. Pt. Would benefit from continued OT services to increase independence and safety with ADL tasks.    Rehab identified problem list/impairments: impaired endurance, impaired self care skills, impaired functional mobilty, impaired cognition, decreased safety awareness, pain    Rehab potential is good    Activity tolerance: Good    Discharge recommendations: home  health OT, home health speech therapy     Barriers to discharge: Decreased caregiver support     Equipment recommendations: none     GOALS:   Occupational Therapy Goals        Problem: Occupational Therapy Goal    Goal Priority Disciplines Outcome Interventions   Occupational Therapy Goal     OT, PT/OT     Description:  Goals to be met by: 3 weeks     Patient will increase functional independence with ADLs by performing:    Feeding with Set-up Assistance.  MET 05-14  UE Dressing with Set-up Assistance. MET 05-14  LE Dressing with Supervision.  Grooming while standing with Supervision.  Toileting from bedside commode with Supervision for hygiene and clothing management.   Bathing from  shower chair/bench with Stand-by Assistance. MET sponge bath 05-14  Supine to sit with Modified [place].   Stand pivot transfers with Supervision.  Toilet transfer to bedside commode with Supervision  Pt. Will be able to follow 2 step directions involving ADL tasks with  75% accuracy MET 05-14  Pt.. Will be independent with HEP for BUE as well as for improving FMC skills in left hand   .                  Plan:  Patient to be seen 5 x/week to address the above listed problems via self-care/home management, therapeutic activities, therapeutic exercises  Plan of Care expires: 06/08/17  Plan of Care reviewed with: patient    Brenda Arlene, ARMANI  05/15/2017

## 2017-05-15 NOTE — PT/OT/SLP PROGRESS
"Physical Therapy  Treatment    Lilia Hidalgo   MRN: [id number]   Admitting Diagnosis: Debility    PT Received On: 05/15/17  Total Time (min): 48       Billable Minutes:  Gait [id number], Therapeutic Activity 22, Therapeutic Exercise 15 and Total Time 48    Treatment Type: Treatment  PT/PTA: PT     PTA Visit Number: 0       General Precautions: Standard, aspiration, fall, nectar thick (no straws)  Orthopedic Precautions: N/A   Braces: N/A    Do you have any cultural, spiritual, Alevism conflicts, given your current situation?: Methodist    Subjective:  "I'm not sure why I'm here."    Pain Ratin/10  Location - Side: Left  Location - Orientation: generalized  Location: knee  Pain Addressed: Pre-medicate for activity       Objective:  Patient found sitting in w/c        Functional Status:  MDS G  Transfer Functional Status: S-SBA  Walk in Room Functional Status: S-SBA  Walk in Corridor Functional Status: S-SBA  Locomotion on Unit Functional Status: S-SBA  Toilet Use Functional Status: S-SBA          Bed Mobility:  Activity not performed    Transfers:  Sit<>Stand: to/from w/c (3 trials) w/ RW and SBA for safety  Stand Pivot w/c<>BSC w/ RW and SBA for safety  vc's for safety    Gait:  Amb 2 trials (91ft and 75ft) w/ RW and SBA for safety, limited by fatigue and left knee pain     Balance:  Standing balloon tap x5min w/ RW and SBA for safety, limited by fatigue, no LOB    Additional Treatment:  Seated LBE x15min to inc BLE strength and endurance    Pt performed toileting at start of session requiring only SBA for safety    Patient left up in chair with call button in reach.    Assessment:  Lilia Hidalgo is a 85 y.o. female with a medical diagnosis of Debility.  Pt bk session well despite knee pain. Pt only able to ambulate up to 91ft today (previously she could walk 135ft) due to fatigue. Pt remains at a SBA level for transfers and amb w/ RW. Pt will continue to benefit from skilled PT in order to improve functional " mobility.    Rehab identified problem list/impairments: weakness, impaired endurance, impaired self care skills, impaired functional mobilty, gait instability, decreased lower extremity function, pain    Rehab potential is good.    Activity tolerance: Good    Discharge recommendations: home health PT     Barriers to discharge: Decreased caregiver support    Equipment recommendations: none     GOALS:   Physical Therapy Goals        Problem: Physical Therapy Goal    Goal Priority Disciplines Outcome Goal Variances Interventions   Physical Therapy Goal     PT/OT, PT Ongoing (interventions implemented as appropriate)     Description:  Goals to be met by: 2-3 weeks     Patient will increase functional independence with mobility by performin. Supine to sit with supervision.  2. Sit to supine with supervision  3. Sit to stand transfer with supervision  4. Bed to chair transfer with supervision using Rolling Walker  5. Gait  x 150 feet with Supervision using Rolling Walker.   6. Wheelchair propulsion x 150 feet with Supervision using bilateral upper extremities  7. Ascend/Descend 4 inch curb step with Stand-by Assistance using Rolling Walker.  8. Stand for 5 minutes with Stand-by Assistance using Rolling Walker while performing a task.  9. Lower extremity exercise program x20 reps per handout, with supervision                PLAN:    Patient to be seen  (5-6x/wk)  to address the above listed problems via gait training, therapeutic activities, therapeutic exercises, neuromuscular re-education, wheelchair management/training  Plan of Care expires: 17  Plan of Care reviewed with: patient, sukhdev    Jaelyn Peterson, PT  05/15/2017

## 2017-05-15 NOTE — PLAN OF CARE
Problem: Occupational Therapy Goal  Goal: Occupational Therapy Goal  Goals to be met by: 3 weeks     Patient will increase functional independence with ADLs by performing:    Feeding with Set-up Assistance. MET 05-14  UE Dressing with Set-up Assistance. MET 05-14  LE Dressing with Supervision.  Grooming while standing with Supervision.  Toileting from bedside commode with Supervision for hygiene and clothing management.   Bathing from shower chair/bench with Stand-by Assistance. MET sponge bath 05-14  Supine to sit with Modified [place].   Stand pivot transfers with Supervision.  Toilet transfer to bedside commode with Supervision  Pt. Will be able to follow 2 step directions involving ADL tasks with 75% accuracy MET 05-14  Pt.. Will be independent with HEP for BUE as well as for improving FMC skills in left hand   .   Pt. Tolerated session well.

## 2017-05-15 NOTE — PLAN OF CARE
Problem: Physical Therapy Goal  Goal: Physical Therapy Goal  Goals to be met by: 2-3 weeks     Patient will increase functional independence with mobility by performin. Supine to sit with supervision.  2. Sit to supine with supervision  3. Sit to stand transfer with supervision  4. Bed to chair transfer with supervision using Rolling Walker  5. Gait x 150 feet with Supervision using Rolling Walker.   6. Wheelchair propulsion x 150 feet with Supervision using bilateral upper extremities  7. Ascend/Descend 4 inch curb step with Stand-by Assistance using Rolling Walker.  8. Stand for 5 minutes with Stand-by Assistance using Rolling Walker while performing a task.  9. Lower extremity exercise program x20 reps per handout, with supervision   LTGs remain appropriate. Pt will continue PT POC.     Jaelyn Peterson, PT  5/15/2017

## 2017-05-16 PROCEDURE — 25000003 PHARM REV CODE 250: Performed by: INTERNAL MEDICINE

## 2017-05-16 PROCEDURE — 97535 SELF CARE MNGMENT TRAINING: CPT

## 2017-05-16 PROCEDURE — 99308 SBSQ NF CARE LOW MDM 20: CPT | Mod: ,,, | Performed by: NURSE PRACTITIONER

## 2017-05-16 PROCEDURE — 25000003 PHARM REV CODE 250: Performed by: NURSE PRACTITIONER

## 2017-05-16 PROCEDURE — 97110 THERAPEUTIC EXERCISES: CPT

## 2017-05-16 PROCEDURE — 92507 TX SP LANG VOICE COMM INDIV: CPT

## 2017-05-16 PROCEDURE — 11000004 HC SNF PRIVATE

## 2017-05-16 PROCEDURE — 97530 THERAPEUTIC ACTIVITIES: CPT

## 2017-05-16 PROCEDURE — 25000003 PHARM REV CODE 250: Performed by: STUDENT IN AN ORGANIZED HEALTH CARE EDUCATION/TRAINING PROGRAM

## 2017-05-16 PROCEDURE — 97116 GAIT TRAINING THERAPY: CPT

## 2017-05-16 RX ADMIN — METRONIDAZOLE 500 MG: 500 TABLET ORAL at 09:05

## 2017-05-16 RX ADMIN — METRONIDAZOLE 500 MG: 500 TABLET ORAL at 05:05

## 2017-05-16 RX ADMIN — PANTOPRAZOLE SODIUM 40 MG: 40 TABLET, DELAYED RELEASE ORAL at 10:05

## 2017-05-16 RX ADMIN — CIPROFLOXACIN HYDROCHLORIDE 500 MG: 500 TABLET, FILM COATED ORAL at 09:05

## 2017-05-16 RX ADMIN — HEPARIN SODIUM 5000 UNITS: 5000 INJECTION, SOLUTION INTRAVENOUS; SUBCUTANEOUS at 09:05

## 2017-05-16 RX ADMIN — ATORVASTATIN CALCIUM 20 MG: 20 TABLET, FILM COATED ORAL at 10:05

## 2017-05-16 RX ADMIN — CIPROFLOXACIN HYDROCHLORIDE 500 MG: 500 TABLET, FILM COATED ORAL at 10:05

## 2017-05-16 RX ADMIN — HEPARIN SODIUM 5000 UNITS: 5000 INJECTION, SOLUTION INTRAVENOUS; SUBCUTANEOUS at 02:05

## 2017-05-16 RX ADMIN — STANDARDIZED SENNA CONCENTRATE AND DOCUSATE SODIUM 1 TABLET: 8.6; 5 TABLET, FILM COATED ORAL at 10:05

## 2017-05-16 RX ADMIN — STANDARDIZED SENNA CONCENTRATE AND DOCUSATE SODIUM 1 TABLET: 8.6; 5 TABLET, FILM COATED ORAL at 09:05

## 2017-05-16 RX ADMIN — METRONIDAZOLE 500 MG: 500 TABLET ORAL at 02:05

## 2017-05-16 RX ADMIN — TRAMADOL HYDROCHLORIDE 25 MG: 50 TABLET, FILM COATED ORAL at 02:05

## 2017-05-16 RX ADMIN — RAMELTEON 8 MG: 8 TABLET, FILM COATED ORAL at 03:05

## 2017-05-16 RX ADMIN — FUROSEMIDE 40 MG: 40 TABLET ORAL at 10:05

## 2017-05-16 RX ADMIN — BENAZEPRIL HYDROCHLORIDE 20 MG: 20 TABLET, FILM COATED ORAL at 10:05

## 2017-05-16 RX ADMIN — ESCITALOPRAM 20 MG: 10 TABLET, FILM COATED ORAL at 10:05

## 2017-05-16 RX ADMIN — METOPROLOL SUCCINATE 100 MG: 100 TABLET, EXTENDED RELEASE ORAL at 10:05

## 2017-05-16 RX ADMIN — HEPARIN SODIUM 5000 UNITS: 5000 INJECTION, SOLUTION INTRAVENOUS; SUBCUTANEOUS at 05:05

## 2017-05-16 NOTE — PLAN OF CARE
Problem: Physical Therapy Goal  Goal: Physical Therapy Goal  Goals to be met by: 2-3 weeks     Patient will increase functional independence with mobility by performin. Supine to sit with supervision.  2. Sit to supine with supervision  3. Sit to stand transfer with supervision  4. Bed to chair transfer with supervision using Rolling Walker  5. Gait x 150 feet with Supervision using Rolling Walker.   6. Wheelchair propulsion x 150 feet with Supervision using bilateral upper extremities  7. Ascend/Descend 4 inch curb step with Stand-by Assistance using Rolling Walker.  8. Stand for 5 minutes with Stand-by Assistance using Rolling Walker while performing a task.  9. Lower extremity exercise program x20 reps per handout, with supervision   Goals remain appropriate at time. Continue with PT POC as indicated.

## 2017-05-16 NOTE — PLAN OF CARE
Problem: Occupational Therapy Goal  Goal: Occupational Therapy Goal  Goals to be met by: 3 weeks     Patient will increase functional independence with ADLs by performing:    Feeding with Set-up Assistance. MET 05-14  UE Dressing with Set-up Assistance. MET 05-14  LE Dressing with Supervision.  Grooming while standing with Supervision.  Toileting from bedside commode with Supervision for hygiene and clothing management.   Bathing from shower chair/bench with Stand-by Assistance. MET sponge bath 05-14  Supine to sit with Modified Hurt.   Stand pivot transfers with Supervision.  Toilet transfer to bedside commode with Supervision  Pt. Will be able to follow 2 step directions involving ADL tasks with 75% accuracy MET 05-14  Pt.. Will be independent with HEP for BUE as well as for improving FMC skills in left hand   .   Pt. Tolerated session well.

## 2017-05-16 NOTE — PT/OT/SLP PROGRESS
Occupational Therapy  Treatment    Lilia Hidalgo   MRN: [id number]   Admitting Diagnosis: Debility    OT Date of Treatment: 05/16/17  Total Time (min): 64 min    Billable Minutes:  Self Care/Home Management 24, Therapeutic Activity 10 and Therapeutic Exercise 30    General Precautions: Standard, aspiration, fall  Orthopedic Precautions: N/A  Braces: N/A    Do you have any cultural, spiritual, Judaism conflicts, given your current situation?: Moravian    Subjective:  Communicated with nurse prior to session.  I'm tired. I don't know how I am going to do this when I get home.    Pain Rating: 3/10  Location - Side: Left     Location: hip  Pain Addressed: Reposition, Distraction  Pain Rating Post-Intervention: 7/10    Objective:  Patient found with:  (supine in bed)    Functional Status:  MDS G  Bed Mobility Functional Status: Mod I supine to sitting EOB  Transfer Functional Status: CGA with RW from EOB to w/c  Walk in Room Functional Status: CGA with RW from w/c to EOB          OT Exercises: AROM BUE 1# dowel exercises 2 sets x 10 reps in all major planes  UE Ergometer 10 minutes  Rickshaw 2# 4 sets x 25 reps    Additional Treatment:  Pt. Engaged in dynamic standing activity at table to place spheres into designated spaces with 3 seated rest breaks. Times:   Trial #1: 1:14 min   Trial #2: 1:04 min   Trial #3: 1:00 min      Patient left supine with call button in reach and nurse notified    ASSESSMENT:  Lilia Hidalgo is a 85 y.o. female with a medical diagnosis of Debility and presents with decreased endurance, self-care skills, and functional mobility. Pt. Indicates that pain in L knee is better than in previous days, however, pt. Is unable to endure standing activity as long as previous sessions 2/2 LE pain. Pt. Would benefit from continued OT services to maximize safety and independence in ADL tasks.    Rehab identified problem list/impairments: weakness, impaired endurance, impaired self care skills, impaired  functional mobilty, gait instability, impaired balance, decreased lower extremity function, decreased upper extremity function, pain    Rehab potential is good    Activity tolerance: Good    Discharge recommendations: home health OT, home health speech therapy     Barriers to discharge: Decreased caregiver support     Equipment recommendations: none     GOALS:   Occupational Therapy Goals        Problem: Occupational Therapy Goal    Goal Priority Disciplines Outcome Interventions   Occupational Therapy Goal     OT, PT/OT     Description:  Goals to be met by: 3 weeks     Patient will increase functional independence with ADLs by performing:    Feeding with Set-up Assistance.  MET 05-14  UE Dressing with Set-up Assistance. MET 05-14  LE Dressing with Supervision.  Grooming while standing with Supervision.  Toileting from bedside commode with Supervision for hygiene and clothing management.   Bathing from  shower chair/bench with Stand-by Assistance. MET sponge bath 05-14  Supine to sit with Modified Coyle. MET 05/16  Stand pivot transfers with Supervision.  Toilet transfer to bedside commode with Supervision  Pt. Will be able to follow 2 step directions involving ADL tasks with  75% accuracy MET 05-14  Pt.. Will be independent with HEP for BUE as well as for improving FMC skills in left hand   .                   Plan:  Patient to be seen 5 x/week to address the above listed problems via therapeutic activities, therapeutic exercises, self-care/home management  Plan of Care expires: 06/08/17  Plan of Care reviewed with: patient    ARMANI Carias  05/16/2017

## 2017-05-16 NOTE — PLAN OF CARE
Problem: Physical Therapy Goal  Goal: Physical Therapy Goal  Goals to be met by: 2-3 weeks     Patient will increase functional independence with mobility by performin. Supine to sit with supervision.  2. Sit to supine with supervision  3. Sit to stand transfer with supervision  4. Bed to chair transfer with supervision using Rolling Walker  5. Gait x 150 feet with Supervision using Rolling Walker.   6. Wheelchair propulsion x 150 feet with Supervision using bilateral upper extremities  7. Ascend/Descend 4 inch curb step with Stand-by Assistance using Rolling Walker.  8. Stand for 5 minutes with Stand-by Assistance using Rolling Walker while performing a task.  9. Lower extremity exercise program x20 reps per handout, with supervision   Safety with mobility.

## 2017-05-16 NOTE — ASSESSMENT & PLAN NOTE
BP is 125/59 HR is 68.  Currently treated with benazepril 20 mg daily, Lasix 40 mg daily and Toprol  mg daily.  Weight today is 84 kg.

## 2017-05-16 NOTE — ASSESSMENT & PLAN NOTE
Chronic.  No complaints of pain today.  Continue Tramadol 25 mg PRN for pain control.  Encourage limb elevation when not in therapy.

## 2017-05-16 NOTE — ASSESSMENT & PLAN NOTE
Currently afebrile.  Last WBC was normal on 5/15 at 5.44.  Currently on Cipro 500 mg bid and Flagyl 500 mg tid until 5/16.

## 2017-05-16 NOTE — SUBJECTIVE & OBJECTIVE
Interval History:   Admit to SNF for therapy.  Monitor weights  Control pain.    5/16/17  Patient seen at bedside today  No complaints of pain.  Concerned about her inability to walk unassisted.    Review of Systems   Constitutional: Positive for fatigue. Negative for appetite change and fever.   Respiratory: Negative for cough and shortness of breath.    Cardiovascular: Negative for chest pain, palpitations and leg swelling.   Gastrointestinal: Negative for abdominal pain, constipation, diarrhea, nausea and vomiting.   Endocrine: Negative for polydipsia, polyphagia and polyuria.   Genitourinary: Negative for dysuria and frequency.   Musculoskeletal: Positive for arthralgias.   Skin: Negative for rash.   Psychiatric/Behavioral: Negative for confusion and hallucinations.     Objective:     Vital Signs (Most Recent):  Temp: 97.9 °F (36.6 °C) (05/15/17 2000)  Pulse: 63 (05/15/17 2000)  Resp: 20 (05/15/17 2000)  BP: 135/63 (05/15/17 2000)  SpO2: (!) 93 % (05/15/17 0800) Vital Signs (24h Range):  Temp:  [97.9 °F (36.6 °C)] 97.9 °F (36.6 °C)  Pulse:  [62-63] 63  Resp:  [20] 20  BP: (121-135)/(57-63) 135/63     Weight: 84 kg (185 lb 3 oz)  Body mass index is 30.82 kg/(m^2).  No intake or output data in the 24 hours ending 05/16/17 0958   Physical Exam   Constitutional: She is oriented to person, place, and time. She appears well-developed and well-nourished.   Cardiovascular: Normal rate, regular rhythm and intact distal pulses.  Exam reveals no gallop and no friction rub.    Murmur heard.  Pulmonary/Chest: Effort normal and breath sounds normal. No respiratory distress.   Abdominal: Soft. Normal appearance and bowel sounds are normal. She exhibits distension. There is no tenderness. There is no guarding.   Musculoskeletal: Normal range of motion. She exhibits edema. She exhibits no tenderness.   Neurological: She is alert and oriented to person, place, and time. She has normal strength.   Skin: Skin is warm, dry and  intact. No rash noted. No erythema.   Psychiatric: She has a normal mood and affect.       Significant Labs:   BMP:     Recent Labs  Lab 05/15/17  0512   GLU 91      K 3.7      CO2 31*   BUN 13   CREATININE 0.9   CALCIUM 9.2   MG 1.6     CBC:     Recent Labs  Lab 05/15/17  0512   WBC 5.44   HGB 13.7   HCT 42.1          Significant Imaging: n/a    Scheduled Meds:   atorvastatin  20 mg Oral Daily    benazepril  20 mg Oral Daily    ciprofloxacin HCl  500 mg Oral Q12H    escitalopram oxalate  20 mg Oral Daily    furosemide  40 mg Oral Daily    heparin (porcine)  5,000 Units Subcutaneous Q8H    metoprolol succinate  100 mg Oral Daily    metronidazole  500 mg Oral Q8H    pantoprazole  40 mg Oral Daily    senna-docusate 8.6-50 mg  1 tablet Oral BID     Continuous Infusions:   PRN Meds:.acetaminophen, aluminum-magnesium hydroxide-simethicone, olanzapine, ondansetron, ramelteon, tramadol

## 2017-05-16 NOTE — PLAN OF CARE
Problem: Fall Risk (Adult)  Goal: Absence of Falls  Patient will demonstrate the desired outcomes by discharge/transition of care.   Outcome: Ongoing (interventions implemented as appropriate)    05/16/17 0208   Fall Risk (Adult)   Absence of Falls making progress toward outcome   Pt remain free from trauma/fall/injury. Call light w/i reach. Will monitor

## 2017-05-16 NOTE — PT/OT/SLP PROGRESS
Physical Therapy  Treatment    Lilia Hidalgo   MRN: [id number]   Admitting Diagnosis: Debility    PT Received On: 17  Total Time (min): 53       Billable Minutes:  Gait [id number], Therapeutic Activity 20 and Therapeutic Exercise 23    Treatment Type: Treatment  PT/PTA: PTA     PTA Visit Number: 1       General Precautions: Standard, aspiration, fall  Orthopedic Precautions: N/A   Braces: N/A    Do you have any cultural, spiritual, Zoroastrianism conflicts, given your current situation?: Gnosticism    Subjective:  Communicated with nursing prior to session.  Pt agreed to work with therapy.     Pain Ratin/10              Pain Rating Post-Intervention: 0/10    Objective:  Patient found supine in bed.     Functional Status:    Bed Mobility:  Sit>Supine:NP  Supine>Sit: SBA    Transfers:  Sit<>Stand: to/from w/c with RW and SBA for safety  Stand Pivot Transfer: bed to w/c with RW and SBA    Gait:  Amb 115 feet with RW and SBA, wheelchair in tow.     Therex:  Seated BLE therex 2x20 reps (AP, LAQ, Hip Flexion, and GS)     Additional Treatment:  LBE x15 min to improve overall endurance.     Patient left up in chair with call button in reach.    Assessment:  Lilia Hidalgo is a 85 y.o. female with a medical diagnosis of Debility.  Pt tolerated increased gait distance well, and would continue to benefit from skilled PT intervention at this time. Continue with PT POC as indicated.    Rehab identified problem list/impairments: weakness, impaired endurance, impaired self care skills, impaired functional mobilty, gait instability, impaired balance, decreased lower extremity function, decreased upper extremity function, pain    Rehab potential is good.    Activity tolerance: Good    Discharge recommendations: home with home health     Barriers to discharge: Decreased caregiver support    Equipment recommendations: none     GOALS:   Physical Therapy Goals        Problem: Physical Therapy Goal    Goal Priority Disciplines Outcome  Goal Variances Interventions   Physical Therapy Goal     PT/OT, PT Ongoing (interventions implemented as appropriate)     Description:  Goals to be met by: 2-3 weeks     Patient will increase functional independence with mobility by performin. Supine to sit with supervision.  2. Sit to supine with supervision  3. Sit to stand transfer with supervision  4. Bed to chair transfer with supervision using Rolling Walker  5. Gait  x 150 feet with Supervision using Rolling Walker.   6. Wheelchair propulsion x 150 feet with Supervision using bilateral upper extremities  7. Ascend/Descend 4 inch curb step with Stand-by Assistance using Rolling Walker.  8. Stand for 5 minutes with Stand-by Assistance using Rolling Walker while performing a task.  9. Lower extremity exercise program x20 reps per handout, with supervision                PLAN:    Patient to be seen  (5-6x/wk)  to address the above listed problems via gait training, therapeutic activities, therapeutic exercises, wheelchair management/training, neuromuscular re-education  Plan of Care expires: 17  Plan of Care reviewed with: patient    Marycarmen Milton, PTA  2017

## 2017-05-16 NOTE — ASSESSMENT & PLAN NOTE
No reports of SOB.  Lungs are clear but remains with chronic LE edema  Continue Lasix 40 mg daily and monitor electrolytes.  Weight is 84 kg and stable for past few days.  Encourage use of yannick hose for LE edema which may also be due to venous insufficiency

## 2017-05-16 NOTE — ASSESSMENT & PLAN NOTE
Participating with therapy.  She is currently at a [facility] for her adls and transfers.  Ambulating up to 91 feet.  Continue PT/OT to restore functional goals.  She reports she lives alone but at [facility].  Plans to return to same living arrangement after discharge.  Continue Heparin for DVT prevention.

## 2017-05-16 NOTE — PT/OT/SLP PROGRESS
"Speech Language Pathology  Treatment    Lilia Hidalgo   MRN: [id number]   Admitting Diagnosis: Debility    Diet recommendations: Solid Diet Level: Regular  Liquid Diet Level: Thin No straws, HOB to 90 degrees, Small bites/sips, Alternating bites/sips and 1 bite/sip at a time    SLP Treatment Date: 05/16/17  Speech Start Time: 1130     Speech Stop Time: 1200     Speech Total (min): 30 min       TREATMENT BILLABLE MINUTES:  Speech Therapy Individual 30    Has the patient been evaluated by SLP for swallowing? : Yes  Keep patient NPO?: No   General Precautions: Standard, aspiration, fall          Subjective:  "It's rewarding." pt discussing progress she's making in PT.                        Objective:      Pt was able to recall some events from this AM, including performance during PT and conversation with NP.  She recalled 3 elements from paragraphs read aloud by SLP with 40% acc. Ind'ly/90% given cues and repetitions.  A picture retention task was completed with 100% acc.  Pt compared 2 given items with 100% acc. And contrasted them with 50% acc. Ind'ly/80% given A.     Assessment:  Lilia Hidlago is a 85 y.o. female with a medical diagnosis of Debility and presents with cognitive-linguistic deficits and mild dysphagia.     Diet recommendations:   Solid Diet Level: Regular    Liquid Diet Level: Thin        Discharge recommendations: Discharge Facility/Level Of Care Needs: home health speech therapy     Goals:   SLP Goals        Problem: SLP Goal    Goal Priority Disciplines Outcome   SLP Goal     SLP Ongoing (interventions implemented as appropriate)   Description:  Speech Language Pathology Goals  Goals expected to be met by 5/16:  1. Pt will tolerate a regular consistency diet and thin liquids without s/s of aspiration. Ongoing  2. Pt will participate in speech/language/cognitive evaluation to determine need for further intervention.- goal met 5/10  ADDITIONAL GOALS:  2. Pt will orient x 4 given min-mod A. Goal " met.  3. Pt will complete immediate memory tasks with 70% accuracy given min A. Goal met.  4. Following a delay, pt will recall 3 words/facts given mod A. Goal met on 2/4 trials.  5. Pt will follow complex commands with 70% accuracy. Ongoing  6. Pt will complete moderate level problem solving tasks with 80% accuracy given mod A. Goal met.  7. Pt will list an average of 10 items in a category within one minute. Ongoing  8. Further evaluation of reading, writing, visual spatial, and math/money management abilities. Reading, writing, and visual spatial abilities appear to be WFL. Math/money management abilities not yet assessed.    Updated goals expected to be met by 5/23:   1. Pt will tolerate a regular consistency diet and thin liquids without s/s of aspiration.  2. Pt will orient x 4.  3. Pt will complete immediate memory tasks with 80% accuracy given min A.  4. Following a delay, pt will consistently recall 3 words/facts given mod A.  5. Pt will follow complex commands with 70% accuracy.  6. Pt will complete moderate level problem solving tasks with 80% accuracy given min A.  7. Pt will list an average of 10 items in a category within one minute.  8. Further evaluation of math/money management abilities.                         Plan:   Patient to be seen Therapy Frequency: 5 x/week   Plan of Care expires: 06/08/17  Plan of Care reviewed with: patient  SLP Follow-up?: Yes  SLP - Next Visit Date: 05/15/17           MATEUS Mejia CCC-SLP  05/16/2017     MATEUS Mejia CCC-SLP  Speech Language Pathologist  [phone]  5/16/2017

## 2017-05-16 NOTE — PROGRESS NOTES
[facility]  Progress Note    Patient Name: Lilia Hidalgo  MRN: [id number]  Code Status: Full Code  Admission Date: 5/8/2017  Length of Stay: 8 days  Attending Physician: Maxim Patel MD  Primary Care Provider: April Herrera MD    Subjective:     Principal Problem:Debility    HPI:  Patient is a 85 y.o. female with HLP, GERD, HTN who presents to SNF after hospitalization for septic encephalopathy, diverticulitis and diastolic CHF exacerbation. She presented with N/V and abdominal pain both of which have resolved. She is tolerating > 50% of her meals. She denies SOB or CP. Upon admit to SNF, she reported to the MD she previously had visual hallucinations which has now resolved.  She has chronic LE edema at home and wears compression stocking at home. 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 She has no pain at rest but only with ambulation which is relieved with tramadol.     Interval History:   Admit to SNF for therapy.  Monitor weights  Control pain.    5/16/17  Patient seen at bedside today  No complaints of pain.  Concerned about her inability to walk unassisted.    Review of Systems   Constitutional: Positive for fatigue. Negative for appetite change and fever.   Respiratory: Negative for cough and shortness of breath.    Cardiovascular: Negative for chest pain, palpitations and leg swelling.   Gastrointestinal: Negative for abdominal pain, constipation, diarrhea, nausea and vomiting.   Endocrine: Negative for polydipsia, polyphagia and polyuria.   Genitourinary: Negative for dysuria and frequency.   Musculoskeletal: Positive for arthralgias.   Skin: Negative for rash.   Psychiatric/Behavioral: Negative for confusion and hallucinations.     Objective:     Vital Signs (Most Recent):  Temp: 97.9 °F (36.6 °C) (05/15/17 2000)  Pulse: 63 (05/15/17 2000)  Resp: 20 (05/15/17 2000)  BP: 135/63 (05/15/17 2000)  SpO2: (!) 93 % (05/15/17 0800) Vital Signs (24h Range):  Temp:  [97.9  °F (36.6 °C)] 97.9 °F (36.6 °C)  Pulse:  [62-63] 63  Resp:  [20] 20  BP: (121-135)/(57-63) 135/63     Weight: 84 kg (185 lb 3 oz)  Body mass index is 30.82 kg/(m^2).  No intake or output data in the 24 hours ending 05/16/17 0958   Physical Exam   Constitutional: She is oriented to person, place, and time. She appears well-developed and well-nourished.   Cardiovascular: Normal rate, regular rhythm and intact distal pulses.  Exam reveals no gallop and no friction rub.    Murmur heard.  Pulmonary/Chest: Effort normal and breath sounds normal. No respiratory distress.   Abdominal: Soft. Normal appearance and bowel sounds are normal. She exhibits distension. There is no tenderness. There is no guarding.   Musculoskeletal: Normal range of motion. She exhibits edema. She exhibits no tenderness.   Neurological: She is alert and oriented to person, place, and time. She has normal strength.   Skin: Skin is warm, dry and intact. No rash noted. No erythema.   Psychiatric: She has a normal mood and affect.       Significant Labs:   BMP:     Recent Labs  Lab 05/15/17  0512   GLU 91      K 3.7      CO2 31*   BUN 13   CREATININE 0.9   CALCIUM 9.2   MG 1.6     CBC:     Recent Labs  Lab 05/15/17  0512   WBC 5.44   HGB 13.7   HCT 42.1          Significant Imaging: n/a    Scheduled Meds:   atorvastatin  20 mg Oral Daily    benazepril  20 mg Oral Daily    ciprofloxacin HCl  500 mg Oral Q12H    escitalopram oxalate  20 mg Oral Daily    furosemide  40 mg Oral Daily    heparin (porcine)  5,000 Units Subcutaneous Q8H    metoprolol succinate  100 mg Oral Daily    metronidazole  500 mg Oral Q8H    pantoprazole  40 mg Oral Daily    senna-docusate 8.6-50 mg  1 tablet Oral BID     Continuous Infusions:   PRN Meds:.acetaminophen, aluminum-magnesium hydroxide-simethicone, olanzapine, ondansetron, ramelteon, tramadol    Assessment/Plan:      * Debility  Participating with therapy.  She is currently at a [facility] for her  adls and transfers.  Ambulating up to 91 feet.  Continue PT/OT to restore functional goals.  She reports she lives alone but at [facility].  Plans to return to same living arrangement after discharge.  Continue Heparin for DVT prevention.    Essential hypertension  BP is 125/59 HR is 68.  Currently treated with benazepril 20 mg daily, Lasix 40 mg daily and Toprol  mg daily.  Weight today is 84 kg.    Hyperlipidemia  Chronic.  Continue atorvastatin 20 mg daily.    GERD (gastroesophageal reflux disease)  Chronic.  Continue Protonix 40 mg daily.    Primary osteoarthritis of knee  Chronic.  No complaints of pain today.  Continue Tramadol 25 mg PRN for pain control.  Encourage limb elevation when not in therapy.    Depressed mood  Chronic.  Continue Lexapro 20 mg daily.    Septic encephalopathy  No further complaints of hallucinations.  Continue Zyprexa 2.5 mg PRN.     Diverticulitis of large intestine without perforation or abscess without bleeding  Currently afebrile.  Last WBC was normal on 5/15 at 5.44.  Currently on Cipro 500 mg bid and Flagyl 500 mg tid until 5/16.    Acute on chronic diastolic heart failure  No reports of SOB.  Lungs are clear but remains with chronic LE edema  Continue Lasix 40 mg daily and monitor electrolytes.  Weight is 84 kg and stable for past few days.  Encourage use of yannick hose for LE edema which may also be due to venous insufficiency    Dysphagia  Per MD note now on clear liquids.  Seen by speech, recommending regular diet with thin liquids.       Foreign Hendrix NP  [facility]

## 2017-05-17 LAB — BACTERIA UR CULT: NORMAL

## 2017-05-17 PROCEDURE — 25000003 PHARM REV CODE 250: Performed by: NURSE PRACTITIONER

## 2017-05-17 PROCEDURE — 25000003 PHARM REV CODE 250: Performed by: STUDENT IN AN ORGANIZED HEALTH CARE EDUCATION/TRAINING PROGRAM

## 2017-05-17 PROCEDURE — 25000003 PHARM REV CODE 250: Performed by: INTERNAL MEDICINE

## 2017-05-17 PROCEDURE — 92507 TX SP LANG VOICE COMM INDIV: CPT

## 2017-05-17 PROCEDURE — 11000004 HC SNF PRIVATE

## 2017-05-17 PROCEDURE — 97110 THERAPEUTIC EXERCISES: CPT

## 2017-05-17 PROCEDURE — 97535 SELF CARE MNGMENT TRAINING: CPT

## 2017-05-17 PROCEDURE — 97530 THERAPEUTIC ACTIVITIES: CPT

## 2017-05-17 PROCEDURE — 97116 GAIT TRAINING THERAPY: CPT

## 2017-05-17 RX ADMIN — FUROSEMIDE 40 MG: 40 TABLET ORAL at 10:05

## 2017-05-17 RX ADMIN — HEPARIN SODIUM 5000 UNITS: 5000 INJECTION, SOLUTION INTRAVENOUS; SUBCUTANEOUS at 10:05

## 2017-05-17 RX ADMIN — ATORVASTATIN CALCIUM 20 MG: 20 TABLET, FILM COATED ORAL at 10:05

## 2017-05-17 RX ADMIN — TRAMADOL HYDROCHLORIDE 25 MG: 50 TABLET, FILM COATED ORAL at 01:05

## 2017-05-17 RX ADMIN — HEPARIN SODIUM 5000 UNITS: 5000 INJECTION, SOLUTION INTRAVENOUS; SUBCUTANEOUS at 03:05

## 2017-05-17 RX ADMIN — RAMELTEON 8 MG: 8 TABLET, FILM COATED ORAL at 10:05

## 2017-05-17 RX ADMIN — HEPARIN SODIUM 5000 UNITS: 5000 INJECTION, SOLUTION INTRAVENOUS; SUBCUTANEOUS at 05:05

## 2017-05-17 RX ADMIN — METOPROLOL SUCCINATE 100 MG: 100 TABLET, EXTENDED RELEASE ORAL at 10:05

## 2017-05-17 RX ADMIN — STANDARDIZED SENNA CONCENTRATE AND DOCUSATE SODIUM 1 TABLET: 8.6; 5 TABLET, FILM COATED ORAL at 10:05

## 2017-05-17 RX ADMIN — PANTOPRAZOLE SODIUM 40 MG: 40 TABLET, DELAYED RELEASE ORAL at 10:05

## 2017-05-17 RX ADMIN — BENAZEPRIL HYDROCHLORIDE 20 MG: 20 TABLET, FILM COATED ORAL at 10:05

## 2017-05-17 RX ADMIN — ESCITALOPRAM 20 MG: 10 TABLET, FILM COATED ORAL at 10:05

## 2017-05-17 NOTE — PT/OT/SLP PROGRESS
"Speech Language Pathology  Treatment    Lilia Hidalgo   MRN: [id number]   Admitting Diagnosis: Debility    Diet recommendations: Solid Diet Level: Regular  Liquid Diet Level: Thin No straws, HOB to 90 degrees and Small bites/sips    SLP Treatment Date: 05/17/17  Speech Start Time: 1305     Speech Stop Time: 1345     Speech Total (min): 40 min       TREATMENT BILLABLE MINUTES:  Speech Therapy Individual 40    Has the patient been evaluated by SLP for swallowing? : Yes  Keep patient NPO?: No   General Precautions: Standard, aspiration, fall          Subjective:  "I don't want to fall." pt commented when SLP assisted her with ambulating for toileting.  Pt exhibiting good safety awareness for sit/stand and stand/sit, but safety awareness fluctuates, therefore, pt will continue to need supervision.                         Objective:      Pt was provided with 3 unrelated words. Pt was able to utilize association as a memory strategy. Following a 3-4 minute delay, pt recalled 3/3 words.  Pt solved word problems related to time and money with 90% acc. Ind'ly/100% given cues.  Pt was observed swallowing one pill whole with cup sips of soda without overt s/s of aspiration.      Assessment:  Lilia Hidalgo is a 85 y.o. female with a medical diagnosis of Debility and presents with cognitive-linguistic deficits and mild dysphagia.      Diet recommendations:   Solid Diet Level: Regular    Liquid Diet Level: Thin        Discharge recommendations: Discharge Facility/Level Of Care Needs: home health speech therapy     Goals:   SLP Goals        Problem: SLP Goal    Goal Priority Disciplines Outcome   SLP Goal     SLP Ongoing (interventions implemented as appropriate)   Description:  Speech Language Pathology Goals  Goals expected to be met by 5/16:  1. Pt will tolerate a regular consistency diet and thin liquids without s/s of aspiration. Ongoing  2. Pt will participate in speech/language/cognitive evaluation to determine need for " further intervention.- goal met 5/10  ADDITIONAL GOALS:  2. Pt will orient x 4 given min-mod A. Goal met.  3. Pt will complete immediate memory tasks with 70% accuracy given min A. Goal met.  4. Following a delay, pt will recall 3 words/facts given mod A. Goal met on 2/4 trials.  5. Pt will follow complex commands with 70% accuracy. Ongoing  6. Pt will complete moderate level problem solving tasks with 80% accuracy given mod A. Goal met.  7. Pt will list an average of 10 items in a category within one minute. Ongoing  8. Further evaluation of reading, writing, visual spatial, and math/money management abilities. Reading, writing, and visual spatial abilities appear to be WFL. Math/money management abilities not yet assessed.    Updated goals expected to be met by 5/23:   1. Pt will tolerate a regular consistency diet and thin liquids without s/s of aspiration.  2. Pt will orient x 4.  3. Pt will complete immediate memory tasks with 80% accuracy given min A.  4. Following a delay, pt will consistently recall 3 words/facts given mod A.  5. Pt will follow complex commands with 70% accuracy.  6. Pt will complete moderate level problem solving tasks with 80% accuracy given min A.  7. Pt will list an average of 10 items in a category within one minute.  8. Further evaluation of math/money management abilities.                         Plan:   Patient to be seen Therapy Frequency: 5 x/week   Plan of Care expires: 06/08/17  Plan of Care reviewed with: patient  SLP Follow-up?: Yes  SLP - Next Visit Date: 05/15/17           MATEUS Mejia CCC-SLP  05/17/2017     MATEUS Mejia CCC-SLP  Speech Language Pathologist  [phone]  5/17/2017

## 2017-05-17 NOTE — PLAN OF CARE
Problem: Fall Risk (Adult)  Goal: Absence of Falls  Patient will demonstrate the desired outcomes by discharge/transition of care.   Outcome: Ongoing (interventions implemented as appropriate)    05/17/17 0720   Fall Risk (Adult)   Absence of Falls making progress toward outcome   Pt remain free from fall/injury/ trauma. Call light w/i reach will monitor

## 2017-05-17 NOTE — PLAN OF CARE
Problem: SLP Goal  Goal: SLP Goal  Speech Language Pathology Goals  Goals expected to be met by 5/16:  1. Pt will tolerate a regular consistency diet and thin liquids without s/s of aspiration. Ongoing  2. Pt will participate in speech/language/cognitive evaluation to determine need for further intervention.- goal met 5/10  ADDITIONAL GOALS:  2. Pt will orient x 4 given min-mod A. Goal met.  3. Pt will complete immediate memory tasks with 70% accuracy given min A. Goal met.  4. Following a delay, pt will recall 3 words/facts given mod A. Goal met on 2/4 trials.  5. Pt will follow complex commands with 70% accuracy. Ongoing  6. Pt will complete moderate level problem solving tasks with 80% accuracy given mod A. Goal met.  7. Pt will list an average of 10 items in a category within one minute. Ongoing  8. Further evaluation of reading, writing, visual spatial, and math/money management abilities. Reading, writing, and visual spatial abilities appear to be WFL. Math/money management abilities not yet assessed.    Updated goals expected to be met by 5/23:  1. Pt will tolerate a regular consistency diet and thin liquids without s/s of aspiration.  2. Pt will orient x 4.  3. Pt will complete immediate memory tasks with 80% accuracy given min A.  4. Following a delay, pt will consistently recall 3 words/facts given mod A.  5. Pt will follow complex commands with 70% accuracy.  6. Pt will complete moderate level problem solving tasks with 80% accuracy given min A.  7. Pt will list an average of 10 items in a category within one minute.  8. Further evaluation of math/money management abilities.        Outcome: Ongoing (interventions implemented as appropriate)  Pt participate well in cognitive therapy. Continues to exhibit cognitive deficits, including impaired short term memory.  Cont POC. MATEUS Mejia, CCC-SLP  Speech Language Pathologist  [phone]  5/17/2017

## 2017-05-17 NOTE — PT/OT/SLP PROGRESS
Occupational Therapy  Treatment    Lilia Hidalgo   MRN: [id number]   Admitting Diagnosis: Debility    OT Date of Treatment: 05/17/17  Total Time (min): 50 min    Billable Minutes:  Self Care/Management 47 Therapeutic Exercises 10    General Precautions: Standard, aspiration, fall  Orthopedic Precautions: N/A  Braces: N/A    Do you have any cultural, spiritual, Zoroastrianism conflicts, given your current situation?: Tenriism    Subjective:  Communicated with nurse prior to session.  I'm feeling better today.     Pain Rating: other (see comments) (not quantified at this time)        Objective:      Therapeutic Exercises: dowel program without weights 2 sets x 10 reps, shoulder flexion, chest presses, & bicep curls    Functional Status:  MDS G  Bed Mobility Functional Status: S from supine to sitting EOB  Transfer Functional Status: CGA from sitting EOB to w/c with RW  Dressing Functional Status:LBD Min (A) to don socks; UBD SBA to don bra and front-zipper dress  Eating Functional Status: Set up (A)  Toilet Use Functional Status: SBA   Personal Hygiene Functional Status: SBA to comb hair, wash face, and wash hands  Bathing Functional Status: CGA for balance while standing to wash private areas  Moving on and off the toilet: Not steady, but able to stabilize without staff assistance  Surface-to-surface transfer (transfer between bed and chair or wheelchair): Not steady, but able to stabilize without staff assistance        Additional Treatment:  Pt. Educated on how to wipe private areas while still seated on toilet after voiding.     Patient left up in chair with call button in reach, eating breakfast.    ASSESSMENT:  Lilia Hidalgo is a 85 y.o. female with a medical diagnosis of Debility and presents with decreased endurance, self-care skills, and functional mobility. Pt. Is able to tolerate ADL tasks well, but weakness/pain in LLE continues to inhibit pt. Progress. Pt. Would benefit from continued OT services to maximize  safety and independence in ADL tasks.     Rehab identified problem list/impairments: impaired endurance, impaired self care skills, impaired functional mobilty, decreased lower extremity function, decreased safety awareness, pain    Rehab potential is good    Activity tolerance: Good    Discharge recommendations: home health OT, home health speech therapy     Barriers to discharge: Decreased caregiver support     Equipment recommendations: none     GOALS:   Occupational Therapy Goals        Problem: Occupational Therapy Goal    Goal Priority Disciplines Outcome Interventions   Occupational Therapy Goal     OT, PT/OT     Description:  Goals to be met by: 3 weeks     Patient will increase functional independence with ADLs by performing:    Feeding with Set-up Assistance.  MET 05-14  UE Dressing with Set-up Assistance. MET 05-14  LE Dressing with Supervision.  Grooming while standing with Supervision.  Toileting from bedside commode with Supervision for hygiene and clothing management.   Bathing from  shower chair/bench with Stand-by Assistance. MET sponge bath 05-14  Supine to sit with Modified [place]. MET 05/16  Stand pivot transfers with Supervision.  Toilet transfer to bedside commode with Supervision  Pt. Will be able to follow 2 step directions involving ADL tasks with  75% accuracy MET 05-14  Pt.. Will be independent with HEP for BUE as well as for improving FMC skills in left hand   .                   Plan:  Patient to be seen 5 x/week to address the above listed problems via self-care/home management, therapeutic exercises, therapeutic activities  Plan of Care expires: 06/08/17  Plan of Care reviewed with: patient    Brenda Arlene, SOROD  05/17/2017

## 2017-05-17 NOTE — PT/OT/SLP PROGRESS
Physical Therapy  Treatment    Lilia Hidalgo   MRN: [id number]   Admitting Diagnosis: Debility    PT Received On: 05/17/17  Total Time (min): 45       Billable Minutes:  Gait [id number], Therapeutic Activity 12 and Therapeutic Exercise 22    Treatment Type: Treatment  PT/PTA: PTA     PTA Visit Number: 2       General Precautions: Standard, aspiration, fall  Orthopedic Precautions: N/A   Braces: N/A    Do you have any cultural, spiritual, Yazdanism conflicts, given your current situation?: Sabianist    Subjective:  Communicated with nursing prior to session.  Pt was willing to work with therapy.     Pain Rating:  (Pt did not rate. )  Location - Side: Left  Location - Orientation: generalized  Location:  (hip and knee)  Pain Addressed: Distraction, Reposition  Pain Rating Post-Intervention:  (Pt did not rate. )    Objective:  Patient found seated w/c.          Functional Status:             Bed Mobility:  Sit>Supine:SBA  Supine>Sit: NP    Transfers:  Sit<>Stand: to/from w/c with RW and SBA  Stand Pivot Transfer: w/c to bed with RW and SBA      Gait:  Amb 100 feet with RW and SBA, wheelchair follow.     Therex:  Seated BLE therex 2x20 reps (AP, LAQ, Hip Flexion, GS)    Additional Treatment:  LBE x15 min to improve overall endurance.     Patient left up in chair with call button in reach.    Assessment:  Lilia Hidalgo is a 85 y.o. female with a medical diagnosis of Debility.  Pt limited with gait distance on this date do to pain despite pt being medicated prior to treatment session. Pt would continue to benefit from skilled PT intervention at this time. Continue with PT POC as indicated.    Rehab identified problem list/impairments: impaired endurance, impaired self care skills, impaired functional mobilty, decreased lower extremity function, decreased safety awareness    Rehab potential is good.    Activity tolerance: Good    Discharge recommendations: home health PT     Barriers to discharge: Decreased caregiver  support    Equipment recommendations: none     GOALS:   Physical Therapy Goals        Problem: Physical Therapy Goal    Goal Priority Disciplines Outcome Goal Variances Interventions   Physical Therapy Goal     PT/OT, PT Ongoing (interventions implemented as appropriate)     Description:  Goals to be met by: 2-3 weeks     Patient will increase functional independence with mobility by performin. Supine to sit with supervision.  2. Sit to supine with supervision  3. Sit to stand transfer with supervision  4. Bed to chair transfer with supervision using Rolling Walker  5. Gait  x 150 feet with Supervision using Rolling Walker.   6. Wheelchair propulsion x 150 feet with Supervision using bilateral upper extremities  7. Ascend/Descend 4 inch curb step with Stand-by Assistance using Rolling Walker.  8. Stand for 5 minutes with Stand-by Assistance using Rolling Walker while performing a task.  9. Lower extremity exercise program x20 reps per handout, with supervision                PLAN:    Patient to be seen  (5-6x/wk)  to address the above listed problems via gait training, therapeutic activities, therapeutic exercises, neuromuscular re-education, wheelchair management/training  Plan of Care expires: 17  Plan of Care reviewed with: patient    Marycarmen Milton, PTA  2017

## 2017-05-17 NOTE — PLAN OF CARE
Problem: Occupational Therapy Goal  Goal: Occupational Therapy Goal  Goals to be met by: 3 weeks     Patient will increase functional independence with ADLs by performing:    Feeding with Set-up Assistance. MET 05-14  UE Dressing with Set-up Assistance. MET 05-14  LE Dressing with Supervision.  Grooming while standing with Supervision.  Toileting from bedside commode with Supervision for hygiene and clothing management.   Bathing from shower chair/bench with Stand-by Assistance. MET sponge bath 05-14  Supine to sit with Modified [place]. MET 05/16  Stand pivot transfers with Supervision.  Toilet transfer to bedside commode with Supervision  Pt. Will be able to follow 2 step directions involving ADL tasks with 75% accuracy MET 05-14  Pt.. Will be independent with HEP for BUE as well as for improving FMC skills in left hand   .   Pt. Tolerated session well.

## 2017-05-18 LAB
ANION GAP SERPL CALC-SCNC: 6 MMOL/L
BASOPHILS # BLD AUTO: 0.03 K/UL
BASOPHILS NFR BLD: 0.6 %
BUN SERPL-MCNC: 15 MG/DL
CALCIUM SERPL-MCNC: 8.6 MG/DL
CHLORIDE SERPL-SCNC: 100 MMOL/L
CO2 SERPL-SCNC: 29 MMOL/L
CREAT SERPL-MCNC: 0.9 MG/DL
DIFFERENTIAL METHOD: NORMAL
EOSINOPHIL # BLD AUTO: 0.3 K/UL
EOSINOPHIL NFR BLD: 6 %
ERYTHROCYTE [DISTWIDTH] IN BLOOD BY AUTOMATED COUNT: 14.4 %
EST. GFR  (AFRICAN AMERICAN): >60 ML/MIN/1.73 M^2
EST. GFR  (NON AFRICAN AMERICAN): 58.5 ML/MIN/1.73 M^2
GLUCOSE SERPL-MCNC: 97 MG/DL
HCT VFR BLD AUTO: 40 %
HGB BLD-MCNC: 12.9 G/DL
LYMPHOCYTES # BLD AUTO: 1.4 K/UL
LYMPHOCYTES NFR BLD: 25.6 %
MAGNESIUM SERPL-MCNC: 1.6 MG/DL
MCH RBC QN AUTO: 28.8 PG
MCHC RBC AUTO-ENTMCNC: 32.3 %
MCV RBC AUTO: 89 FL
MONOCYTES # BLD AUTO: 0.6 K/UL
MONOCYTES NFR BLD: 10.5 %
NEUTROPHILS # BLD AUTO: 3.1 K/UL
NEUTROPHILS NFR BLD: 57.3 %
PHOSPHATE SERPL-MCNC: 3.8 MG/DL
PLATELET # BLD AUTO: 166 K/UL
PMV BLD AUTO: 11.7 FL
POTASSIUM SERPL-SCNC: 3.1 MMOL/L
RBC # BLD AUTO: 4.48 M/UL
SODIUM SERPL-SCNC: 135 MMOL/L
WBC # BLD AUTO: 5.35 K/UL

## 2017-05-18 PROCEDURE — 97110 THERAPEUTIC EXERCISES: CPT

## 2017-05-18 PROCEDURE — 25000003 PHARM REV CODE 250: Performed by: STUDENT IN AN ORGANIZED HEALTH CARE EDUCATION/TRAINING PROGRAM

## 2017-05-18 PROCEDURE — 83735 ASSAY OF MAGNESIUM: CPT

## 2017-05-18 PROCEDURE — 97802 MEDICAL NUTRITION INDIV IN: CPT

## 2017-05-18 PROCEDURE — 25000003 PHARM REV CODE 250: Performed by: INTERNAL MEDICINE

## 2017-05-18 PROCEDURE — 92507 TX SP LANG VOICE COMM INDIV: CPT

## 2017-05-18 PROCEDURE — 97116 GAIT TRAINING THERAPY: CPT

## 2017-05-18 PROCEDURE — 97530 THERAPEUTIC ACTIVITIES: CPT

## 2017-05-18 PROCEDURE — 85025 COMPLETE CBC W/AUTO DIFF WBC: CPT

## 2017-05-18 PROCEDURE — 11000004 HC SNF PRIVATE

## 2017-05-18 PROCEDURE — 25000003 PHARM REV CODE 250: Performed by: NURSE PRACTITIONER

## 2017-05-18 PROCEDURE — 97535 SELF CARE MNGMENT TRAINING: CPT

## 2017-05-18 PROCEDURE — 80048 BASIC METABOLIC PNL TOTAL CA: CPT

## 2017-05-18 PROCEDURE — 84100 ASSAY OF PHOSPHORUS: CPT

## 2017-05-18 PROCEDURE — 36415 COLL VENOUS BLD VENIPUNCTURE: CPT

## 2017-05-18 RX ORDER — POTASSIUM CHLORIDE 20 MEQ/1
40 TABLET, EXTENDED RELEASE ORAL ONCE
Status: DISCONTINUED | OUTPATIENT
Start: 2017-05-18 | End: 2017-05-18

## 2017-05-18 RX ORDER — FLUCONAZOLE 150 MG/1
150 TABLET ORAL DAILY
Status: COMPLETED | OUTPATIENT
Start: 2017-05-18 | End: 2017-05-20

## 2017-05-18 RX ORDER — POTASSIUM CHLORIDE 20 MEQ/1
40 TABLET, EXTENDED RELEASE ORAL ONCE
Status: COMPLETED | OUTPATIENT
Start: 2017-05-18 | End: 2017-05-18

## 2017-05-18 RX ADMIN — HEPARIN SODIUM 5000 UNITS: 5000 INJECTION, SOLUTION INTRAVENOUS; SUBCUTANEOUS at 09:05

## 2017-05-18 RX ADMIN — ESCITALOPRAM 20 MG: 10 TABLET, FILM COATED ORAL at 08:05

## 2017-05-18 RX ADMIN — POTASSIUM CHLORIDE 40 MEQ: 1500 TABLET, EXTENDED RELEASE ORAL at 02:05

## 2017-05-18 RX ADMIN — ATORVASTATIN CALCIUM 20 MG: 20 TABLET, FILM COATED ORAL at 08:05

## 2017-05-18 RX ADMIN — METOPROLOL SUCCINATE 100 MG: 100 TABLET, EXTENDED RELEASE ORAL at 08:05

## 2017-05-18 RX ADMIN — PANTOPRAZOLE SODIUM 40 MG: 40 TABLET, DELAYED RELEASE ORAL at 08:05

## 2017-05-18 RX ADMIN — FLUCONAZOLE 150 MG: 150 TABLET ORAL at 02:05

## 2017-05-18 RX ADMIN — BENAZEPRIL HYDROCHLORIDE 20 MG: 20 TABLET, FILM COATED ORAL at 08:05

## 2017-05-18 RX ADMIN — TRAMADOL HYDROCHLORIDE 25 MG: 50 TABLET, FILM COATED ORAL at 08:05

## 2017-05-18 RX ADMIN — HEPARIN SODIUM 5000 UNITS: 5000 INJECTION, SOLUTION INTRAVENOUS; SUBCUTANEOUS at 06:05

## 2017-05-18 RX ADMIN — HEPARIN SODIUM 5000 UNITS: 5000 INJECTION, SOLUTION INTRAVENOUS; SUBCUTANEOUS at 02:05

## 2017-05-18 RX ADMIN — FUROSEMIDE 40 MG: 40 TABLET ORAL at 08:05

## 2017-05-18 RX ADMIN — RAMELTEON 8 MG: 8 TABLET, FILM COATED ORAL at 11:05

## 2017-05-18 NOTE — PT/OT/SLP PROGRESS
Occupational Therapy  Treatment    Lilia Hidalgo   MRN: [id number]   Admitting Diagnosis: Debility    OT Date of Treatment: 17  Total Time (min): 65 min    Billable Minutes:  Self Care/Home Management 35, Therapeutic Activity 15 and Therapeutic Exercise 15    General Precautions: Standard, aspiration, fall  Orthopedic Precautions: N/A  Braces: N/A    Do you have any cultural, spiritual, Presybeterian conflicts, given your current situation?: Mormon    Subjective:  Communicated with nurse prior to session.  I think something will have to be done about this knee one day, but it's feeling better today. (referring to L knee)    Pain Ratin/10           Objective:  Patient found with:  (seated, EOB)    Functional Status:  MDS G  Bed Mobility Functional Status: SBA (pt. Found seated at EOB)  Transfer Functional Status: CGA with RW from EOB to sink  Walk in Room Functional Status: CGA with RW from EOB to sink for balance and weakness in LLE  Dressing Functional Status: 1: S-SBA  Eating Functional Status: LBD SBA to don underwear; UBD SBA to don bra and dress  Personal Hygiene Functional Status: CGA standing at sink with RW to brush teeth, supervision to wash hands and brush hair while seated  Moving from seated to standing position: Not steady, but able to stabilize without staff assistance  Walking (with assistive device if used): Not steady, but able to stabilize without staff assistance          OT Exercises: AROM 1# dowel exercises 2 sets x 10 reps  UE Ergometer ~9 minutes    Additional Treatment:  Pt. Participated in dynamic standing activity with SBA at table to increase endurance and visual processing skills.   Trial #1 ~5 minutes    Patient left up in chair with call button in reach and nurse notified    ASSESSMENT:  Lilia Hidalgo is a 85 y.o. female with a medical diagnosis of Debility and presents with decreased endurance, self-care skills, and functional mobility. Pt. Has made improvements in therapy,  including increased standing endurance, however, pt. Remains CGA for pain and weakness in left knee. Pt. Would benefit from continued OT services to maximize safety and independence in ADL tasks.    Rehab identified problem list/impairments: impaired endurance, impaired self care skills, decreased lower extremity function, decreased safety awareness, pain, impaired balance    Rehab potential is good    Activity tolerance: Good    Discharge recommendations: home health OT     Barriers to discharge: Decreased caregiver support     Equipment recommendations: none     GOALS:   Occupational Therapy Goals        Problem: Occupational Therapy Goal    Goal Priority Disciplines Outcome Interventions   Occupational Therapy Goal     OT, PT/OT     Description:  Goals to be met by: 3 weeks     Patient will increase functional independence with ADLs by performing:    Feeding with Set-up Assistance.  MET 05-14  UE Dressing with Set-up Assistance. MET 05-14  LE Dressing with Supervision.  Grooming while standing with Supervision.  Toileting from bedside commode with Supervision for hygiene and clothing management.   Bathing from  shower chair/bench with Stand-by Assistance. MET sponge bath 05-14  Supine to sit with Modified [place]. MET 05/16  Stand pivot transfers with Supervision.  Toilet transfer to bedside commode with Supervision  Pt. Will be able to follow 2 step directions involving ADL tasks with  75% accuracy MET 05-14  Pt.. Will be independent with HEP for BUE as well as for improving FMC skills in left hand   .                   Plan:  Patient to be seen 5 x/week to address the above listed problems via self-care/home management, therapeutic activities, therapeutic exercises  Plan of Care expires: 06/08/17  Plan of Care reviewed with: patient    Brenda Soleruldin, SOROD  05/18/2017

## 2017-05-18 NOTE — PLAN OF CARE
Problem: Occupational Therapy Goal  Goal: Occupational Therapy Goal  Goals to be met by: 3 weeks     Patient will increase functional independence with ADLs by performing:    Feeding with Set-up Assistance. MET 05-14  UE Dressing with Set-up Assistance. MET 05-14  LE Dressing with Supervision.  Grooming while standing with Supervision.  Toileting from bedside commode with Supervision for hygiene and clothing management.   Bathing from shower chair/bench with Stand-by Assistance. MET sponge bath 05-14  Supine to sit with Modified [place]. MET 05/16  Stand pivot transfers with Supervision.  Toilet transfer to bedside commode with Supervision  Pt. Will be able to follow 2 step directions involving ADL tasks with 75% accuracy MET 05-14  Pt.. Will be independent with HEP for BUE as well as for improving FMC skills in left hand   .   Pt. Tolerated session well.

## 2017-05-18 NOTE — PROGRESS NOTES
[facility]  Adult Nutrition  Progress Note    SUMMARY     Recommendations    Recommendation/Intervention: Educate on fluid restriction   Goals: Pt to be complient with fluid restriction, Pt intake to meet 85% of EEN  Nutrition Goal Status: new  Communication of RD Recs: other (comment)    Continuum of Care Plan         Reason for Assessment    Reason for Assessment: length of stay  Diagnosis:  (Debility)  Relevent Medical History: HTN, Obesity, GERD, CHF, Diverticulitis, Dysphagia, Hyperlipidemia   Interdisciplinary Rounds: attended     General Information Comments: Pt states the CHF is new and she was unaware of fluid restriction    Nutrition Discharge Planning: Patient to consume 85% of EEN prior to discharge     Nutrition Prescription Ordered    Current Diet Order: Cardiac  Nutrition Order Comments: 1500ml fluid           Oral Nutrition Supplement: -     Evaluation of Received Nutrients/Fluid Intake               PO %     Tolerance: tolerating     Nutrition Risk Screen     Nutrition Risk Screen: no indicators present    Nutrition/Diet History       Typical Food/Fluid Intake: eats 2-3 meals per day, no special diet but limits portions so as not to gain weight  Food Preferences: likes to drink soda for lunch and dinner , no coffee, No cultural or Jewish needs relative to food        Factors Affecting Nutritional Intake: difficulty/impaired swallowing          Labs/Tests/Procedures/Meds       Pertinent Labs Reviewed: reviewed, pertinent     Pertinent Medications Reviewed: reviewed, pertinent       Physical Findings    Overall Physical Appearance:  (pale mild edema lower extremities)     Oral/Mouth Cavity: WDL       Anthropometrics    Height Method: Stated  Height (inches): 65 in  Weight Method: Standard Scale  Weight (kg): 83.8 kg     Ideal Body Weight (IBW), Female: 125 lb     % Ideal Body Weight, Female (lb): 147.8 lb  BMI (kg/m2): 30.74  BMI Grade: 30 - 34.9- obesity - grade I, 25 -  29.9 - overweight       Estimated/Assessed Needs    Weight Used For Calorie Calculations: 83.8 kg (184 lb 11.9 oz)   Height (cm): 165.1 cm     Energy Need Method: Kcal/kg     20 kcal/kg (kcal): 1676   RMR (Martin-St. Jeor Equation): 1289.84        Weight Used For Protein Calculations: 56.8 kg (125 lb 3.5 oz)  Protein Requirements: 62-75  1.1 gm Protein (gm): 62.61  Fluid Need Method: other (see comments) (per MD 1500ml)                           Assessment and Plan    No new Assessment & Plan notes have been filed under this hospital service since the last note was generated.  Service: Nutrition      Monitor and Evaluation    Food and Nutrient Intake: food and beverage intake  Food and Nutrient Adminstration: diet order  Knowledge/Beliefs/Attitudes: food and nutrition knowledge/skill     Anthropometric Measurements: weight change  Biochemical Data, Medical Tests and Procedures: gastrointestinal profile, electrolyte and renal panel  Nutrition-Focused Physical Findings: overall appearance  % Intake of Estimated Energy Needs: 75 - 100 %  % Meal Intake: 75%    Nutrition Risk    Level of Risk: other (see comments) (follow weekly)    Nutrition Follow-Up     Yes

## 2017-05-18 NOTE — PLAN OF CARE
Problem: Physical Therapy Goal  Goal: Physical Therapy Goal  Goals to be met by: 2-3 weeks     Patient will increase functional independence with mobility by performin. Supine to sit with supervision.  2. Sit to supine with supervision  3. Sit to stand transfer with supervision  4. Bed to chair transfer with supervision using Rolling Walker  5. Gait x 150 feet with Supervision using Rolling Walker.   6. Wheelchair propulsion x 150 feet with Supervision using bilateral upper extremities  7. Ascend/Descend 4 inch curb step with Stand-by Assistance using Rolling Walker.  8. Stand for 5 minutes with Stand-by Assistance using Rolling Walker while performing a task.  9. Lower extremity exercise program x20 reps per handout, with supervision   Outcome: Ongoing (interventions implemented as appropriate)  Goals remain appropriate. Continue with POC.

## 2017-05-18 NOTE — PT/OT/SLP PROGRESS
Physical Therapy  Treatment    Lilia Hidalgo   MRN: [id number]   Admitting Diagnosis: Debility    PT Received On: 05/18/17  Total Time (min): 48       Billable Minutes:  Gait [id number], Therapeutic Activity 8 and Therapeutic Exercise 25    Treatment Type: Treatment  PT/PTA: PTA     PTA Visit Number: 3       General Precautions: Standard, aspiration, fall  Orthopedic Precautions: N/A   Braces: N/A    Do you have any cultural, spiritual, Mormonism conflicts, given your current situation?: Yarsani    Subjective:  Pt agreeable to physical therapy today.   Pain Rating:  (Pain in her knees reported but not rated. )    Objective:  Patient found with:  (HOB elevated in room)       Functional Status:  MDS G  Bed Mobility Functional Status: S-SBA  Transfer Functional Status: CGA-Min (A)  Walk in Room Functional Status: CGA-Min (A)  Walk in Corridor Functional Status: S-SBA    Bed Mobility:  Sit>Supine: SBA  Supine>Sit: SBA    Transfers:  Sit<>Stand: SBA RW   Stand Pivot Transfer: HHA from wheelchair to bed    Gait:  Amb 132 feet x 2 trial, CGA/SBA, RW, wheelchair follow  Pt with vcs to increase step length. Pt demonstrated understanding.     Therex:  LBE x 15 minutes to improve overall cardiovascular endurance, build LE strength and facilitate ankle, hip and knee ROM needed for functional activities. Min A for peddling at times to complete the revolution and pt peddled backwards x 4 minutes.   Pt performed BLE therex 2 x 20 ea: ankle pumps, LAQ's, hip flexion, glute sets   Pt with rest breaks as needed.     Balance:  Static stand x 2 minutes, SBA    Additional Treatment:  Pt educated on the importance of staying positive about her progress. Pt verbalized understanding. Pt reported that her goal is to walk like she used to for long distances.     Patient left HOB elevated with call button in reach.    Assessment:  Lilia Hidalgo is a 85 y.o. female with a medical diagnosis of Debility.  Pt tolerated treatment well and was  very motivated today 2/2 a conversation regarding her personal goal of walking longer distances/building endurance. The patient showed improved cardiovascular endurance with the distance ambulated today. The patient would continue to benefit from skilled PT to address the deficits in her plan of care.     Rehab identified problem list/impairments: impaired endurance, impaired self care skills, impaired functional mobilty, decreased lower extremity function, decreased safety awareness    Rehab potential is good.    Activity tolerance: Good    Discharge recommendations: home health PT     Barriers to discharge: Decreased caregiver support    Equipment recommendations: none     GOALS:   Physical Therapy Goals        Problem: Physical Therapy Goal    Goal Priority Disciplines Outcome Goal Variances Interventions   Physical Therapy Goal     PT/OT, PT Ongoing (interventions implemented as appropriate)     Description:  Goals to be met by: 2-3 weeks     Patient will increase functional independence with mobility by performin. Supine to sit with supervision.  2. Sit to supine with supervision  3. Sit to stand transfer with supervision  4. Bed to chair transfer with supervision using Rolling Walker  5. Gait  x 150 feet with Supervision using Rolling Walker.   6. Wheelchair propulsion x 150 feet with Supervision using bilateral upper extremities  7. Ascend/Descend 4 inch curb step with Stand-by Assistance using Rolling Walker.  8. Stand for 5 minutes with Stand-by Assistance using Rolling Walker while performing a task.  9. Lower extremity exercise program x20 reps per handout, with supervision                PLAN:    Patient to be seen  (5-6x/wk)  to address the above listed problems via gait training, therapeutic activities, therapeutic exercises, neuromuscular re-education, wheelchair management/training  Plan of Care expires: 17  Plan of Care reviewed with: patient    Dom Monae, PTA  2017

## 2017-05-18 NOTE — PLAN OF CARE
Problem: SLP Goal  Goal: SLP Goal  Speech Language Pathology Goals  Goals expected to be met by 5/16:  1. Pt will tolerate a regular consistency diet and thin liquids without s/s of aspiration. Ongoing  2. Pt will participate in speech/language/cognitive evaluation to determine need for further intervention.- goal met 5/10  ADDITIONAL GOALS:  2. Pt will orient x 4 given min-mod A. Goal met.  3. Pt will complete immediate memory tasks with 70% accuracy given min A. Goal met.  4. Following a delay, pt will recall 3 words/facts given mod A. Goal met on 2/4 trials.  5. Pt will follow complex commands with 70% accuracy. Ongoing  6. Pt will complete moderate level problem solving tasks with 80% accuracy given mod A. Goal met.  7. Pt will list an average of 10 items in a category within one minute. Ongoing  8. Further evaluation of reading, writing, visual spatial, and math/money management abilities. Reading, writing, and visual spatial abilities appear to be WFL. Math/money management abilities not yet assessed.    Updated goals expected to be met by 5/23:  1. Pt will tolerate a regular consistency diet and thin liquids without s/s of aspiration.  2. Pt will orient x 4.  3. Pt will complete immediate memory tasks with 80% accuracy given min A.  4. Following a delay, pt will consistently recall 3 words/facts given mod A.  5. Pt will follow complex commands with 70% accuracy.  6. Pt will complete moderate level problem solving tasks with 80% accuracy given min A.  7. Pt will list an average of 10 items in a category within one minute.  8. Further evaluation of math/money management abilities.        Outcome: Ongoing (interventions implemented as appropriate)  Pt with good participation and motivation. Cont POC. MATEUS Mejia, CCC-SLP  Speech Language Pathologist  [phone]  5/18/2017

## 2017-05-18 NOTE — PT/OT/SLP PROGRESS
"Speech Language Pathology  Treatment    Lilia Hidalgo   MRN: [id number]   Admitting Diagnosis: Debility    Diet recommendations: Solid Diet Level: Regular  Liquid Diet Level: Thin No straws, HOB to 90 degrees, Small bites/sips, Alternating bites/sips and 1 bite/sip at a time    SLP Treatment Date: 05/18/17  Speech Start Time: 1440     Speech Stop Time: 1512     Speech Total (min): 32 min       TREATMENT BILLABLE MINUTES:  Speech Therapy Individual 32    Has the patient been evaluated by SLP for swallowing? : Yes  Keep patient NPO?: No   General Precautions: Standard, aspiration, fall          Subjective:  "I did a lot of walking."                        Objective:      During 3 word fluency trials, pt listed 11 fruits with min cues, 13 clothing items ind'ly, and 12 animals with min cues given one minute per category.  Pt completed word list retention/category inclusion tasks with 40% acc. Ind'ly/100% given repetitions and/or cues.  Pt listed 3 methods for accomplishing given tasks with 100% acc. Given supervision x 1.        Assessment:  Lilia Hidalgo is a 85 y.o. female with a medical diagnosis of Debility and presents with cognitive-linguistic deficits and mild dysphagia.     Diet recommendations:   Solid Diet Level: Regular    Liquid Diet Level: Thin        Discharge recommendations: Discharge Facility/Level Of Care Needs: home health speech therapy     Goals:   SLP Goals        Problem: SLP Goal    Goal Priority Disciplines Outcome   SLP Goal     SLP Ongoing (interventions implemented as appropriate)   Description:  Speech Language Pathology Goals  Goals expected to be met by 5/16:  1. Pt will tolerate a regular consistency diet and thin liquids without s/s of aspiration. Ongoing  2. Pt will participate in speech/language/cognitive evaluation to determine need for further intervention.- goal met 5/10  ADDITIONAL GOALS:  2. Pt will orient x 4 given min-mod A. Goal met.  3. Pt will complete immediate memory tasks " with 70% accuracy given min A. Goal met.  4. Following a delay, pt will recall 3 words/facts given mod A. Goal met on 2/4 trials.  5. Pt will follow complex commands with 70% accuracy. Ongoing  6. Pt will complete moderate level problem solving tasks with 80% accuracy given mod A. Goal met.  7. Pt will list an average of 10 items in a category within one minute. Ongoing  8. Further evaluation of reading, writing, visual spatial, and math/money management abilities. Reading, writing, and visual spatial abilities appear to be WFL. Math/money management abilities not yet assessed.    Updated goals expected to be met by 5/23:   1. Pt will tolerate a regular consistency diet and thin liquids without s/s of aspiration.  2. Pt will orient x 4.  3. Pt will complete immediate memory tasks with 80% accuracy given min A.  4. Following a delay, pt will consistently recall 3 words/facts given mod A.  5. Pt will follow complex commands with 70% accuracy.  6. Pt will complete moderate level problem solving tasks with 80% accuracy given min A.  7. Pt will list an average of 10 items in a category within one minute.  8. Further evaluation of math/money management abilities.                         Plan:   Patient to be seen Therapy Frequency: 5 x/week   Plan of Care expires: 06/08/17  Plan of Care reviewed with: patient  SLP Follow-up?: Yes  SLP - Next Visit Date: 05/15/17           MATEUS Mejia, MANUEL-SLP  05/18/2017     MATEUS Mejia CCC-SLP  Speech Language Pathologist  [phone]  5/18/2017

## 2017-05-19 PROCEDURE — 11000004 HC SNF PRIVATE

## 2017-05-19 PROCEDURE — 97535 SELF CARE MNGMENT TRAINING: CPT

## 2017-05-19 PROCEDURE — 97110 THERAPEUTIC EXERCISES: CPT

## 2017-05-19 PROCEDURE — 25000003 PHARM REV CODE 250: Performed by: INTERNAL MEDICINE

## 2017-05-19 PROCEDURE — 97530 THERAPEUTIC ACTIVITIES: CPT

## 2017-05-19 PROCEDURE — 25000003 PHARM REV CODE 250: Performed by: NURSE PRACTITIONER

## 2017-05-19 PROCEDURE — 25000003 PHARM REV CODE 250: Performed by: STUDENT IN AN ORGANIZED HEALTH CARE EDUCATION/TRAINING PROGRAM

## 2017-05-19 PROCEDURE — 97116 GAIT TRAINING THERAPY: CPT

## 2017-05-19 PROCEDURE — 92507 TX SP LANG VOICE COMM INDIV: CPT

## 2017-05-19 RX ADMIN — ATORVASTATIN CALCIUM 20 MG: 20 TABLET, FILM COATED ORAL at 08:05

## 2017-05-19 RX ADMIN — ESCITALOPRAM 20 MG: 10 TABLET, FILM COATED ORAL at 08:05

## 2017-05-19 RX ADMIN — FLUCONAZOLE 150 MG: 150 TABLET ORAL at 08:05

## 2017-05-19 RX ADMIN — ACETAMINOPHEN 650 MG: 325 TABLET ORAL at 09:05

## 2017-05-19 RX ADMIN — PANTOPRAZOLE SODIUM 40 MG: 40 TABLET, DELAYED RELEASE ORAL at 08:05

## 2017-05-19 RX ADMIN — STANDARDIZED SENNA CONCENTRATE AND DOCUSATE SODIUM 1 TABLET: 8.6; 5 TABLET, FILM COATED ORAL at 08:05

## 2017-05-19 RX ADMIN — METOPROLOL SUCCINATE 100 MG: 100 TABLET, EXTENDED RELEASE ORAL at 08:05

## 2017-05-19 RX ADMIN — TRAMADOL HYDROCHLORIDE 25 MG: 50 TABLET, FILM COATED ORAL at 01:05

## 2017-05-19 RX ADMIN — BENAZEPRIL HYDROCHLORIDE 20 MG: 20 TABLET, FILM COATED ORAL at 08:05

## 2017-05-19 RX ADMIN — TRAMADOL HYDROCHLORIDE 25 MG: 50 TABLET, FILM COATED ORAL at 09:05

## 2017-05-19 RX ADMIN — HEPARIN SODIUM 5000 UNITS: 5000 INJECTION, SOLUTION INTRAVENOUS; SUBCUTANEOUS at 01:05

## 2017-05-19 RX ADMIN — HEPARIN SODIUM 5000 UNITS: 5000 INJECTION, SOLUTION INTRAVENOUS; SUBCUTANEOUS at 05:05

## 2017-05-19 RX ADMIN — FUROSEMIDE 40 MG: 40 TABLET ORAL at 08:05

## 2017-05-19 RX ADMIN — HEPARIN SODIUM 5000 UNITS: 5000 INJECTION, SOLUTION INTRAVENOUS; SUBCUTANEOUS at 09:05

## 2017-05-19 RX ADMIN — RAMELTEON 8 MG: 8 TABLET, FILM COATED ORAL at 09:05

## 2017-05-19 NOTE — PT/OT/SLP PROGRESS
"Speech Language Pathology  Treatment    Lilia Hidalgo   MRN: [id number]   Admitting Diagnosis: Debility    Diet recommendations: Solid Diet Level: Regular  Liquid Diet Level: Thin No straws, HOB to 90 degrees, Small bites/sips and Alternating bites/sips    SLP Treatment Date: 05/19/17  Speech Start Time: 1445     Speech Stop Time: 1520     Speech Total (min): 35 min       TREATMENT BILLABLE MINUTES:  Speech Therapy Individual 35    Has the patient been evaluated by SLP for swallowing? : Yes  Keep patient NPO?: No   General Precautions: Standard, aspiration, fall          Subjective:  "I better wake up."                        Objective:      Pt was provided with 3 unrelated words then encouraged to use association and rehearsal/repetition as compensatory memory strategies. Following a 5 minute delay, pt recalled 3/3 words without SLP's assistance.  Multi-unit commands including temporal relations were followed with 100% acc. Pt stated possible causes for hypothetical problem situations with supervision x 1.  Pt found 10 differences between nearly identical pictures with 70% acc. Ind'ly/90% given cues.     Assessment:  Lilia Hidalgo is a 85 y.o. female with a medical diagnosis of Debility and presents with cognitive-linguistic deficits.     Diet recommendations:   Solid Diet Level: Regular    Liquid Diet Level: Thin        Discharge recommendations: Discharge Facility/Level Of Care Needs: home health speech therapy     Goals:   SLP Goals        Problem: SLP Goal    Goal Priority Disciplines Outcome   SLP Goal     SLP Ongoing (interventions implemented as appropriate)   Description:  Speech Language Pathology Goals  Goals expected to be met by 5/16:  1. Pt will tolerate a regular consistency diet and thin liquids without s/s of aspiration. Ongoing  2. Pt will participate in speech/language/cognitive evaluation to determine need for further intervention.- goal met 5/10  ADDITIONAL GOALS:  2. Pt will orient x 4 given " min-mod A. Goal met.  3. Pt will complete immediate memory tasks with 70% accuracy given min A. Goal met.  4. Following a delay, pt will recall 3 words/facts given mod A. Goal met on 2/4 trials.  5. Pt will follow complex commands with 70% accuracy. Ongoing  6. Pt will complete moderate level problem solving tasks with 80% accuracy given mod A. Goal met.  7. Pt will list an average of 10 items in a category within one minute. Ongoing  8. Further evaluation of reading, writing, visual spatial, and math/money management abilities. Reading, writing, and visual spatial abilities appear to be WFL. Math/money management abilities not yet assessed.    Updated goals expected to be met by 5/23:   1. Pt will tolerate a regular consistency diet and thin liquids without s/s of aspiration.  2. Pt will orient x 4.  3. Pt will complete immediate memory tasks with 80% accuracy given min A.  4. Following a delay, pt will consistently recall 3 words/facts given mod A.  5. Pt will follow complex commands with 70% accuracy.  6. Pt will complete moderate level problem solving tasks with 80% accuracy given min A.  7. Pt will list an average of 10 items in a category within one minute.  8. Further evaluation of math/money management abilities.                         Plan:   Patient to be seen Therapy Frequency: 5 x/week   Plan of Care expires: 06/08/17  Plan of Care reviewed with: patient  SLP Follow-up?: Yes  SLP - Next Visit Date: 05/22/17           MATEUS Mejia CCC-SLP  05/19/2017     MATEUS Mejia CCC-SLP  Speech Language Pathologist  [phone]  5/19/2017

## 2017-05-19 NOTE — PLAN OF CARE
Problem: SLP Goal  Goal: SLP Goal  Speech Language Pathology Goals  Goals expected to be met by 5/16:  1. Pt will tolerate a regular consistency diet and thin liquids without s/s of aspiration. Ongoing  2. Pt will participate in speech/language/cognitive evaluation to determine need for further intervention.- goal met 5/10  ADDITIONAL GOALS:  2. Pt will orient x 4 given min-mod A. Goal met.  3. Pt will complete immediate memory tasks with 70% accuracy given min A. Goal met.  4. Following a delay, pt will recall 3 words/facts given mod A. Goal met on 2/4 trials.  5. Pt will follow complex commands with 70% accuracy. Ongoing  6. Pt will complete moderate level problem solving tasks with 80% accuracy given mod A. Goal met.  7. Pt will list an average of 10 items in a category within one minute. Ongoing  8. Further evaluation of reading, writing, visual spatial, and math/money management abilities. Reading, writing, and visual spatial abilities appear to be WFL. Math/money management abilities not yet assessed.    Updated goals expected to be met by 5/23:  1. Pt will tolerate a regular consistency diet and thin liquids without s/s of aspiration.  2. Pt will orient x 4.  3. Pt will complete immediate memory tasks with 80% accuracy given min A.  4. Following a delay, pt will consistently recall 3 words/facts given mod A.  5. Pt will follow complex commands with 70% accuracy.  6. Pt will complete moderate level problem solving tasks with 80% accuracy given min A.  7. Pt will list an average of 10 items in a category within one minute.  8. Further evaluation of math/money management abilities.        Outcome: Ongoing (interventions implemented as appropriate)  Pt making progress towards meeting goals.  Cont POC and ST services upon d/c.  Will still require supervision upon d/c. MATEUS Mejia, CCC-SLP  Speech Language Pathologist  [phone]  5/19/2017

## 2017-05-19 NOTE — PT/OT/SLP PROGRESS
"Physical Therapy  Treatment    Lilia Hidalgo   MRN: [id number]   Admitting Diagnosis: Debility    PT Received On: 05/19/17  Total Time (min): 48       Billable Minutes:  Gait [id number] and Therapeutic Exercise 33    Treatment Type: Treatment  PT/PTA: PTA     PTA Visit Number: 4       General Precautions: Standard, aspiration, fall  Orthopedic Precautions: N/A   Braces: N/A    Do you have any cultural, spiritual, Quaker conflicts, given your current situation?: Quaker    Subjective:  Pt agreeable to physical therapy today.   Pain Rating: 3/10  Location - Side: Right     Location: hip          Objective  Patient found with:  ([facility] in therapy room)       Functional Status:  MDS G  Bed Mobility Functional Status: S-SBA  Transfer Functional Status: CGA-Min (A)  Walk in Room Functional Status: S-SBA  Walk in Corridor Functional Status: S-SBA    Bed Mobility:  Sit>Supine: SBA, vcs for technique    Transfers:  Sit<>Stand: SBA/CGA, pt reports legs feel weaker today than yesterday.   Stand Pivot Transfer: RW to bed SBA, RW    Gait:  Amb ~120 feet with SBA, RW, w/c follow    Therex:  LBE x 15 minutes to promote hip, ankle, knee ROM for functional mobility, build LE strength and improve overall cardiovascular endurance.   Pt performed BLE therex 2 x 20 each in sitting  -ankle pumps  -GS  -LAQ  -HF with soriano range on L due to pain     Additional Treatment:  Pt educated for correct technique for sit to supine to make the process easier for herself. Pt demonstrated understanding. "I will remember this. It's much easier."    Patient left supine with call button in reach.    Assessment:  Lilia Hidalgo is a 85 y.o. female with a medical diagnosis of Debility.  The pt bk tx well with some limitations in distance ambulated and with hip flexion on LLE 2/2 to fatigue and pain. The patient would continue to benefit from skilled PT to address the deficits in her plan of care.     Rehab identified problem list/impairments: impaired " endurance, impaired self care skills, impaired functional mobilty, decreased lower extremity function, decreased safety awareness    Rehab potential is good.    Activity tolerance: Good    Discharge recommendations: home health PT     Barriers to discharge: Decreased caregiver support    Equipment recommendations: none     GOALS:   Physical Therapy Goals        Problem: Physical Therapy Goal    Goal Priority Disciplines Outcome Goal Variances Interventions   Physical Therapy Goal     PT/OT, PT Ongoing (interventions implemented as appropriate)     Description:  Goals to be met by: 2-3 weeks     Patient will increase functional independence with mobility by performin. Supine to sit with supervision.  2. Sit to supine with supervision  3. Sit to stand transfer with supervision  4. Bed to chair transfer with supervision using Rolling Walker  5. Gait  x 150 feet with Supervision using Rolling Walker.   6. Wheelchair propulsion x 150 feet with Supervision using bilateral upper extremities  7. Ascend/Descend 4 inch curb step with Stand-by Assistance using Rolling Walker.  8. Stand for 5 minutes with Stand-by Assistance using Rolling Walker while performing a task.  9. Lower extremity exercise program x20 reps per handout, with supervision                PLAN:    Patient to be seen  (5-6x/wk)  to address the above listed problems via gait training, therapeutic activities, therapeutic exercises, neuromuscular re-education, wheelchair management/training  Plan of Care expires: 17  Plan of Care reviewed with: patient    Dom Monae, PTA  2017

## 2017-05-19 NOTE — PT/OT/SLP PROGRESS
Occupational Therapy  Treatment    Lilia Hidalgo   MRN: [id number]   Admitting Diagnosis: Debility    OT Date of Treatment: 05/19/17  Total Time (min): 60 min    Billable Minutes:  Self Care/Home Management 10, Therapeutic Activity 20 and Therapeutic Exercise 30    General Precautions: Standard, aspiration, fall  Orthopedic Precautions: N/A  Braces: N/A    Do you have any cultural, spiritual, Moravian conflicts, given your current situation?: Confucianist    Subjective:  Communicated with nurse prior to session.  I am exhausted.    Pain Rating: 3/10  Location - Side: Left     Location: knee     Pain Rating Post-Intervention: 7/10    Objective:  Patient found with:  (supine in bed)    Functional Status:  MDS G  Bed Mobility Functional Status: SBA from EOB to supine  Transfer Functional Status: CGA from w/c to EOB with RW; CGA sit to stand with VCs  Walk in Room Functional Status: CGA with RW  Walk in Corridor Functional Status: CGA for balance and safety with RW  Moving from seated to standing position: Not steady, but able to stabilize without staff assistance  Surface-to-surface transfer (transfer between bed and chair or wheelchair): Not steady, but able to stabilize without staff assistance      OT Exercises: UE Ergometer x10 minutes  Rickshaw 5# 4 sets x 25 reps    Additional Treatment:  Pt. Walked from room to rehab gym with RW and CGA with one rest break. Distance as follows:   Trial #1 -24 ft   Trial #2 -58 ft    Pt. Engaged in dynamic standing activity with SBA for endurance for 3 minutes, 20 seconds.  Pt. Engaged in fine motor activity while seated to strengthen L hand (nut/bolt board)  Pt. Engaged in cognitive task while seated with Mod VCs (PVC pipe tree).     Patient left supine with call button in reach    ASSESSMENT:  Lilia Hidalgo is a 85 y.o. female with a medical diagnosis of Debility and presents with limitations in self-care, endurance, and functional mobility. Pain is a limiting factor during  endurance activities. Pt. Would benefit from continued OT services to maximize safety and independence in ADL tasks.     Rehab identified problem list/impairments: impaired endurance, weakness, impaired self care skills, impaired functional mobilty, decreased lower extremity function, pain    Rehab potential is good    Activity tolerance: Good    Discharge recommendations: home health OT     Barriers to discharge: Decreased caregiver support     Equipment recommendations: none     GOALS:   Occupational Therapy Goals        Problem: Occupational Therapy Goal    Goal Priority Disciplines Outcome Interventions   Occupational Therapy Goal     OT, PT/OT     Description:  Goals to be met by: 3 weeks     Patient will increase functional independence with ADLs by performing:    Feeding with Set-up Assistance.  MET 05-14  UE Dressing with Set-up Assistance. MET 05-14  LE Dressing with Supervision.  Grooming while standing with Supervision.  Toileting from bedside commode with Supervision for hygiene and clothing management.   Bathing from  shower chair/bench with Stand-by Assistance. MET sponge bath 05-14  Supine to sit with Modified [place]. MET 05/16  Stand pivot transfers with Supervision.  Toilet transfer to bedside commode with Supervision  Pt. Will be able to follow 2 step directions involving ADL tasks with  75% accuracy MET 05-14  Pt.. Will be independent with HEP for BUE as well as for improving FMC skills in left hand   .                   Plan:  Patient to be seen 5 x/week to address the above listed problems via self-care/home management, therapeutic exercises, therapeutic activities  Plan of Care expires: 06/08/17  Plan of Care reviewed with: patient    Brenda Soleruldin, SOROD  05/19/2017

## 2017-05-19 NOTE — PLAN OF CARE
Problem: Occupational Therapy Goal  Goal: Occupational Therapy Goal  Goals to be met by: 3 weeks     Patient will increase functional independence with ADLs by performing:    Feeding with Set-up Assistance. MET 05-14  UE Dressing with Set-up Assistance. MET 05-14  LE Dressing with Supervision.  Grooming while standing with Supervision.  Toileting from bedside commode with Supervision for hygiene and clothing management.   Bathing from shower chair/bench with Stand-by Assistance. MET sponge bath 05-14  Supine to sit with Modified [place]. MET 05/16  Stand pivot transfers with Supervision.  Toilet transfer to bedside commode with Supervision  Pt. Will be able to follow 2 step directions involving ADL tasks with 75% accuracy MET 05-14  Pt.. Will be independent with HEP for BUE as well as for improving FMC skills in left hand   .   Pt. Tolerated session well

## 2017-05-19 NOTE — PLAN OF CARE
Problem: Occupational Therapy Goal  Goal: Occupational Therapy Goal  Goals to be met by: 3 weeks     Patient will increase functional independence with ADLs by performing:    Feeding with Set-up Assistance. MET 05-14  UE Dressing with Set-up Assistance. MET 05-14  LE Dressing with Supervision.  Grooming while standing with Supervision.  Toileting from bedside commode with Supervision for hygiene and clothing management.   Bathing from shower chair/bench with Stand-by Assistance. MET sponge bath 05-14  Supine to sit with Modified [place]. MET 05/16  Stand pivot transfers with Supervision.  Toilet transfer to bedside commode with Supervision  Pt. Will be able to follow 2 step directions involving ADL tasks with 75% accuracy MET 05-14  Pt.. Will be independent with HEP for BUE as well as for improving FMC skills in left hand   .   Pt. Tolerated session well.

## 2017-05-20 PROCEDURE — 97116 GAIT TRAINING THERAPY: CPT

## 2017-05-20 PROCEDURE — 11000004 HC SNF PRIVATE

## 2017-05-20 PROCEDURE — 25000003 PHARM REV CODE 250: Performed by: NURSE PRACTITIONER

## 2017-05-20 PROCEDURE — 97530 THERAPEUTIC ACTIVITIES: CPT

## 2017-05-20 PROCEDURE — 25000003 PHARM REV CODE 250: Performed by: INTERNAL MEDICINE

## 2017-05-20 PROCEDURE — 97110 THERAPEUTIC EXERCISES: CPT

## 2017-05-20 PROCEDURE — 25000003 PHARM REV CODE 250: Performed by: STUDENT IN AN ORGANIZED HEALTH CARE EDUCATION/TRAINING PROGRAM

## 2017-05-20 RX ADMIN — HEPARIN SODIUM 5000 UNITS: 5000 INJECTION, SOLUTION INTRAVENOUS; SUBCUTANEOUS at 06:05

## 2017-05-20 RX ADMIN — STANDARDIZED SENNA CONCENTRATE AND DOCUSATE SODIUM 1 TABLET: 8.6; 5 TABLET, FILM COATED ORAL at 09:05

## 2017-05-20 RX ADMIN — ESCITALOPRAM 20 MG: 10 TABLET, FILM COATED ORAL at 09:05

## 2017-05-20 RX ADMIN — FUROSEMIDE 40 MG: 40 TABLET ORAL at 09:05

## 2017-05-20 RX ADMIN — METOPROLOL SUCCINATE 100 MG: 100 TABLET, EXTENDED RELEASE ORAL at 09:05

## 2017-05-20 RX ADMIN — FLUCONAZOLE 150 MG: 150 TABLET ORAL at 09:05

## 2017-05-20 RX ADMIN — HEPARIN SODIUM 5000 UNITS: 5000 INJECTION, SOLUTION INTRAVENOUS; SUBCUTANEOUS at 02:05

## 2017-05-20 RX ADMIN — PANTOPRAZOLE SODIUM 40 MG: 40 TABLET, DELAYED RELEASE ORAL at 09:05

## 2017-05-20 RX ADMIN — HEPARIN SODIUM 5000 UNITS: 5000 INJECTION, SOLUTION INTRAVENOUS; SUBCUTANEOUS at 09:05

## 2017-05-20 RX ADMIN — RAMELTEON 8 MG: 8 TABLET, FILM COATED ORAL at 09:05

## 2017-05-20 RX ADMIN — ATORVASTATIN CALCIUM 20 MG: 20 TABLET, FILM COATED ORAL at 09:05

## 2017-05-20 NOTE — PLAN OF CARE
Problem: Patient Care Overview  Goal: Plan of Care Review  Outcome: Ongoing (interventions implemented as appropriate)  Pt a a o x 3, vss, resp effort even and unlabored, voids per BSC. Call bell within reach, side rails up x 3, Pt has remained free from falls. Will continue to monitor.    05/20/17 9698   Coping/Psychosocial   Plan Of Care Reviewed With patient

## 2017-05-20 NOTE — PLAN OF CARE
Problem: Patient Care Overview  Goal: Plan of Care Review  Outcome: Revised  Repositions independently, no new skin breakdowns afebrile. Monitored for pain and safety q 1- 2hrs this shift. Safety maintained. Denies pain.

## 2017-05-20 NOTE — PT/OT/SLP PROGRESS
Physical Therapy  Treatment    Lilia Hidalgo   MRN: [id number]   Admitting Diagnosis: Debility    PT Received On: 17  Total Time (min): 45       Billable Minutes:  Gait Training8, Therapeutic Activity 15 and Therapeutic Exercise 22    Treatment Type: Treatment  PT/PTA: PTA     PTA Visit Number: 5       General Precautions: Standard, aspiration, fall  Orthopedic Precautions: N/A   Braces: N/A    Do you have any cultural, spiritual, Druze conflicts, given your current situation?: [employer]    Subjective:  Communicated with nursing prior to session.  Pt was willing to work with therapy.     Pain Ratin/10              Pain Rating Post-Intervention: 0/10    Objective:  Patient found seated bedside chair.          Functional Status:             Bed Mobility:  Sit>Supine:NP  Supine>Sit: NP    Transfers:  Sit<>Stand: to/from w/c with RW and SBA; to/from toilet with RW and SBA (pt able to perform toileting herself)  Stand Pivot Transfer: bedside chair to w/c with RW and SBA    Gait:  Amb 118 feet with RW and SBA, wheelchair in tow.     Therex:  Seated BLE Therex x20 reps (AP, LAQ, Hip Flexion, GS)    Additional Treatment:  LBE x15 min to improve overall endurance.     Patient left up in chair with call button in reach.    Assessment:  Lilia Hidalgo is a 85 y.o. female with a medical diagnosis of Debility.  Pt tolerated treatment well, and would continue to benefit from skilled PT intervention at this time. Continue with PT POC as indicated.    Rehab identified problem list/impairments: impaired endurance, weakness, impaired self care skills, impaired functional mobilty, decreased lower extremity function, pain    Rehab potential is good.    Activity tolerance: Good    Discharge recommendations: home health PT     Barriers to discharge: Decreased caregiver support    Equipment recommendations: none     GOALS:   Physical Therapy Goals        Problem: Physical Therapy Goal    Goal Priority Disciplines Outcome Goal  Variances Interventions   Physical Therapy Goal     PT/OT, PT Ongoing (interventions implemented as appropriate)     Description:  Goals to be met by: 2-3 weeks     Patient will increase functional independence with mobility by performin. Supine to sit with supervision.  2. Sit to supine with supervision  3. Sit to stand transfer with supervision  4. Bed to chair transfer with supervision using Rolling Walker  5. Gait  x 150 feet with Supervision using Rolling Walker.   6. Wheelchair propulsion x 150 feet with Supervision using bilateral upper extremities  7. Ascend/Descend 4 inch curb step with Stand-by Assistance using Rolling Walker.  8. Stand for 5 minutes with Stand-by Assistance using Rolling Walker while performing a task.  9. Lower extremity exercise program x20 reps per handout, with supervision                PLAN:    Patient to be seen  (5-6x/wk)  to address the above listed problems via gait training, therapeutic activities, therapeutic exercises, neuromuscular re-education, wheelchair management/training  Plan of Care expires: 17  Plan of Care reviewed with: patient    Marycarmen Milton, PTA  2017

## 2017-05-21 PROCEDURE — 25000003 PHARM REV CODE 250: Performed by: NURSE PRACTITIONER

## 2017-05-21 PROCEDURE — 11000004 HC SNF PRIVATE

## 2017-05-21 PROCEDURE — 99900058 HC 022 PAID UNDER SNF PPS

## 2017-05-21 PROCEDURE — 25000003 PHARM REV CODE 250: Performed by: INTERNAL MEDICINE

## 2017-05-21 PROCEDURE — 25000003 PHARM REV CODE 250: Performed by: STUDENT IN AN ORGANIZED HEALTH CARE EDUCATION/TRAINING PROGRAM

## 2017-05-21 PROCEDURE — 97530 THERAPEUTIC ACTIVITIES: CPT

## 2017-05-21 PROCEDURE — 97110 THERAPEUTIC EXERCISES: CPT

## 2017-05-21 PROCEDURE — 97535 SELF CARE MNGMENT TRAINING: CPT

## 2017-05-21 RX ADMIN — ESCITALOPRAM 20 MG: 10 TABLET, FILM COATED ORAL at 09:05

## 2017-05-21 RX ADMIN — RAMELTEON 8 MG: 8 TABLET, FILM COATED ORAL at 09:05

## 2017-05-21 RX ADMIN — STANDARDIZED SENNA CONCENTRATE AND DOCUSATE SODIUM 1 TABLET: 8.6; 5 TABLET, FILM COATED ORAL at 09:05

## 2017-05-21 RX ADMIN — BENAZEPRIL HYDROCHLORIDE 20 MG: 20 TABLET, FILM COATED ORAL at 09:05

## 2017-05-21 RX ADMIN — ATORVASTATIN CALCIUM 20 MG: 20 TABLET, FILM COATED ORAL at 09:05

## 2017-05-21 RX ADMIN — METOPROLOL SUCCINATE 100 MG: 100 TABLET, EXTENDED RELEASE ORAL at 09:05

## 2017-05-21 RX ADMIN — HEPARIN SODIUM 5000 UNITS: 5000 INJECTION, SOLUTION INTRAVENOUS; SUBCUTANEOUS at 05:05

## 2017-05-21 RX ADMIN — FUROSEMIDE 40 MG: 40 TABLET ORAL at 09:05

## 2017-05-21 RX ADMIN — HEPARIN SODIUM 5000 UNITS: 5000 INJECTION, SOLUTION INTRAVENOUS; SUBCUTANEOUS at 01:05

## 2017-05-21 RX ADMIN — TRAMADOL HYDROCHLORIDE 25 MG: 50 TABLET, FILM COATED ORAL at 04:05

## 2017-05-21 RX ADMIN — PANTOPRAZOLE SODIUM 40 MG: 40 TABLET, DELAYED RELEASE ORAL at 09:05

## 2017-05-21 RX ADMIN — HEPARIN SODIUM 5000 UNITS: 5000 INJECTION, SOLUTION INTRAVENOUS; SUBCUTANEOUS at 09:05

## 2017-05-21 RX ADMIN — ALUMINUM HYDROXIDE, MAGNESIUM HYDROXIDE, AND SIMETHICONE 15 ML: 200; 200; 20 SUSPENSION ORAL at 10:05

## 2017-05-21 RX ADMIN — ACETAMINOPHEN 650 MG: 325 TABLET ORAL at 09:05

## 2017-05-21 NOTE — PLAN OF CARE
Problem: Occupational Therapy Goal  Goal: Occupational Therapy Goal  Goals to be met by: 3 weeks     Patient will increase functional independence with ADLs by performing:    Feeding with Set-up Assistance.  MET 05-14  UE Dressing with Set-up Assistance. MET 05-14  LE Dressing with Supervision.  Grooming while standing with Supervision.  Toileting from bedside commode with Supervision for hygiene and clothing management.   Bathing from  shower chair/bench with Stand-by Assistance. MET sponge bath 05-14  Supine to sit with Modified [place]. MET 05/16  Stand pivot transfers with Supervision.  Toilet transfer to bedside commode with Supervision  Pt. Will be able to follow 2 step directions involving ADL tasks with  75% accuracy MET 05-14  Pt.. Will be independent with HEP for BUE as well as for improving FMC skills in left hand   .        Outcome: Ongoing (interventions implemented as appropriate)  OT goals remain appropriate.  MIR Hebert  5/21/2017

## 2017-05-21 NOTE — TREATMENT PLAN
Rehab Services' DME recommendations    Lilia Calixtonz  MRN: [id number]      [x] 3 in 1 commode Standard       [x] Home health PT, OT and Aide      MIR Hebert 5/21/2017

## 2017-05-21 NOTE — PLAN OF CARE
Problem: Patient Care Overview  Goal: Plan of Care Review  Outcome: Revised  Repositions independently, no new skin breakdowns noted. Afebrile, monitored for pain and safety q 1-2 hrs this shift. Safety maintained. Denies pain

## 2017-05-21 NOTE — PT/OT/SLP PROGRESS
"Occupational Therapy  Treatment    Lilia Hidalgo   MRN: [id number]   Admitting Diagnosis: Debility    OT Date of Treatment: 05/21/17  Total Time (min): 53 min    Billable Minutes:  Self Care/Home Management 20, Therapeutic Activity 23 and Therapeutic Exercise 10    General Precautions: Standard, aspiration, fall  Orthopedic Precautions: N/A  Braces: N/A    Do you have any cultural, spiritual, Anabaptism conflicts, given your current situation?: Taoism    Subjective:  "I feel good today."    Pain Rating: other (see comments)    Objective:  Pt presented up in bedside chair    Functional Status:  Transfer Functional Status: S-SBA with RW  Walk in Room Functional Status: S-SBA with RW  Walk in Corridor Functional Status: S-SBA x100ft x 2 trials with SBA using RW  Dressing Functional Status: Pt practiced LB dressing with AD (reacher/sock aid) and completed task with SBA    OT Exercises: UE Ergometer 10min    Additional Treatment:  Pt seen for LB dressing training with AD the results as above  Pt educated on safety with transfers (locking w/c brakes prior to t/f & hand placement on RW)  Dicussed DME needs; OT educated on uses for BS    Patient left up in chair with call button in reach    ASSESSMENT:  Lilia Hidalgo is a 85 y.o. female with a medical diagnosis of Debility.  Pt is completing dressing and functional mobility at SBA level with AD.  She requires minimal cues for safety aspects such as locking w/c brakes prior to transfers. Pt had c/o L knee pain during functional ambulation, but was able to work past this reporting that the pain was manageable.  Fatigue is limiting, therefore frequent rest breaks are required. Pt will benefit from continued skilled OT services to maximize functional independence.    Rehab identified problem list/impairments: impaired endurance, weakness, impaired self care skills, impaired functional mobilty, decreased lower extremity function, pain    Rehab potential is good    Activity " tolerance: Fair    Discharge recommendations: home health OT     Barriers to discharge: Decreased caregiver support     Equipment recommendations: none     GOALS:    Occupational Therapy Goals        Problem: Occupational Therapy Goal    Goal Priority Disciplines Outcome Interventions   Occupational Therapy Goal     OT, PT/OT Ongoing (interventions implemented as appropriate)    Description:  Goals to be met by: 3 weeks     Patient will increase functional independence with ADLs by performing:    Feeding with Set-up Assistance.  MET 05-14  UE Dressing with Set-up Assistance. MET 05-14  LE Dressing with Supervision.  Grooming while standing with Supervision.  Toileting from bedside commode with Supervision for hygiene and clothing management.   Bathing from  shower chair/bench with Stand-by Assistance. MET sponge bath 05-14  Supine to sit with Modified [place]. MET 05/16  Stand pivot transfers with Supervision.  Toilet transfer to bedside commode with Supervision  Pt. Will be able to follow 2 step directions involving ADL tasks with  75% accuracy MET 05-14  Pt.. Will be independent with HEP for BUE as well as for improving FMC skills in left hand   .                         Plan:  Patient to be seen 5 x/week to address the above listed problems via self-care/home management, therapeutic exercises, therapeutic activities  Plan of Care expires: 06/08/17  Plan of Care reviewed with: patient    Gael K MIR Ying  05/21/2017

## 2017-05-21 NOTE — PLAN OF CARE
Problem: Patient Care Overview  Goal: Plan of Care Review  Outcome: Ongoing (interventions implemented as appropriate)    05/20/17 2257   Coping/Psychosocial   Plan Of Care Reviewed With patient         Problem: Fall Risk (Adult)  Goal: Absence of Falls  Patient will demonstrate the desired outcomes by discharge/transition of care.   Outcome: Ongoing (interventions implemented as appropriate)    05/20/17 2257   Fall Risk (Adult)   Absence of Falls making progress toward outcome         Problem: Mobility, Physical Impaired (Adult)  Goal: Enhanced Mobility Skills  Patient will demonstrate the desired outcomes by discharge/transition of care.   Outcome: Ongoing (interventions implemented as appropriate)    05/20/17 2257   Mobility, Physical Impaired (Adult)   Enhanced Mobility Skills making progress toward outcome         Comments:   Pt was assessed and vs taken recorded. Noted no active skin breakdown, bilateral lower extremity edema were noted. Medications were given and pt tolerated. Call bell within reached, commode at bedside, 2 wheel walker within reached and bed in lowest position. Pt turned every 2 hours and monitor for pain and safety.

## 2017-05-22 LAB
ANION GAP SERPL CALC-SCNC: 6 MMOL/L
BASOPHILS # BLD AUTO: 0.05 K/UL
BASOPHILS NFR BLD: 1.1 %
BUN SERPL-MCNC: 16 MG/DL
CALCIUM SERPL-MCNC: 8.8 MG/DL
CHLORIDE SERPL-SCNC: 102 MMOL/L
CO2 SERPL-SCNC: 31 MMOL/L
CREAT SERPL-MCNC: 0.8 MG/DL
DIFFERENTIAL METHOD: NORMAL
EOSINOPHIL # BLD AUTO: 0.3 K/UL
EOSINOPHIL NFR BLD: 5.7 %
ERYTHROCYTE [DISTWIDTH] IN BLOOD BY AUTOMATED COUNT: 14.3 %
EST. GFR  (AFRICAN AMERICAN): >60 ML/MIN/1.73 M^2
EST. GFR  (NON AFRICAN AMERICAN): >60 ML/MIN/1.73 M^2
GLUCOSE SERPL-MCNC: 89 MG/DL
HCT VFR BLD AUTO: 39.4 %
HGB BLD-MCNC: 12.7 G/DL
LYMPHOCYTES # BLD AUTO: 1.5 K/UL
LYMPHOCYTES NFR BLD: 31.7 %
MAGNESIUM SERPL-MCNC: 1.8 MG/DL
MCH RBC QN AUTO: 28.7 PG
MCHC RBC AUTO-ENTMCNC: 32.2 %
MCV RBC AUTO: 89 FL
MONOCYTES # BLD AUTO: 0.5 K/UL
MONOCYTES NFR BLD: 10.3 %
NEUTROPHILS # BLD AUTO: 2.4 K/UL
NEUTROPHILS NFR BLD: 51.2 %
PHOSPHATE SERPL-MCNC: 3.5 MG/DL
PLATELET # BLD AUTO: 179 K/UL
PMV BLD AUTO: 11.5 FL
POTASSIUM SERPL-SCNC: 3.5 MMOL/L
RBC # BLD AUTO: 4.42 M/UL
SODIUM SERPL-SCNC: 139 MMOL/L
WBC # BLD AUTO: 4.58 K/UL

## 2017-05-22 PROCEDURE — 83735 ASSAY OF MAGNESIUM: CPT

## 2017-05-22 PROCEDURE — 84100 ASSAY OF PHOSPHORUS: CPT

## 2017-05-22 PROCEDURE — 25000003 PHARM REV CODE 250: Performed by: INTERNAL MEDICINE

## 2017-05-22 PROCEDURE — 25000003 PHARM REV CODE 250: Performed by: NURSE PRACTITIONER

## 2017-05-22 PROCEDURE — 80048 BASIC METABOLIC PNL TOTAL CA: CPT

## 2017-05-22 PROCEDURE — 36415 COLL VENOUS BLD VENIPUNCTURE: CPT

## 2017-05-22 PROCEDURE — 97116 GAIT TRAINING THERAPY: CPT

## 2017-05-22 PROCEDURE — 97530 THERAPEUTIC ACTIVITIES: CPT

## 2017-05-22 PROCEDURE — 85025 COMPLETE CBC W/AUTO DIFF WBC: CPT

## 2017-05-22 PROCEDURE — 11000004 HC SNF PRIVATE

## 2017-05-22 PROCEDURE — 97110 THERAPEUTIC EXERCISES: CPT

## 2017-05-22 PROCEDURE — 25000003 PHARM REV CODE 250: Performed by: STUDENT IN AN ORGANIZED HEALTH CARE EDUCATION/TRAINING PROGRAM

## 2017-05-22 PROCEDURE — 92507 TX SP LANG VOICE COMM INDIV: CPT

## 2017-05-22 RX ORDER — AMOXICILLIN 250 MG
1 CAPSULE ORAL 2 TIMES DAILY PRN
Status: DISCONTINUED | OUTPATIENT
Start: 2017-05-22 | End: 2017-05-29 | Stop reason: HOSPADM

## 2017-05-22 RX ADMIN — PANTOPRAZOLE SODIUM 40 MG: 40 TABLET, DELAYED RELEASE ORAL at 09:05

## 2017-05-22 RX ADMIN — HEPARIN SODIUM 5000 UNITS: 5000 INJECTION, SOLUTION INTRAVENOUS; SUBCUTANEOUS at 05:05

## 2017-05-22 RX ADMIN — HEPARIN SODIUM 5000 UNITS: 5000 INJECTION, SOLUTION INTRAVENOUS; SUBCUTANEOUS at 08:05

## 2017-05-22 RX ADMIN — TRAMADOL HYDROCHLORIDE 25 MG: 50 TABLET, FILM COATED ORAL at 10:05

## 2017-05-22 RX ADMIN — ACETAMINOPHEN 650 MG: 325 TABLET ORAL at 08:05

## 2017-05-22 RX ADMIN — RAMELTEON 8 MG: 8 TABLET, FILM COATED ORAL at 08:05

## 2017-05-22 RX ADMIN — ESCITALOPRAM 20 MG: 10 TABLET, FILM COATED ORAL at 09:05

## 2017-05-22 RX ADMIN — METOPROLOL SUCCINATE 100 MG: 100 TABLET, EXTENDED RELEASE ORAL at 09:05

## 2017-05-22 RX ADMIN — HEPARIN SODIUM 5000 UNITS: 5000 INJECTION, SOLUTION INTRAVENOUS; SUBCUTANEOUS at 01:05

## 2017-05-22 RX ADMIN — STANDARDIZED SENNA CONCENTRATE AND DOCUSATE SODIUM 1 TABLET: 8.6; 5 TABLET, FILM COATED ORAL at 09:05

## 2017-05-22 RX ADMIN — BENAZEPRIL HYDROCHLORIDE 20 MG: 20 TABLET, FILM COATED ORAL at 09:05

## 2017-05-22 RX ADMIN — FUROSEMIDE 40 MG: 40 TABLET ORAL at 09:05

## 2017-05-22 RX ADMIN — ATORVASTATIN CALCIUM 20 MG: 20 TABLET, FILM COATED ORAL at 09:05

## 2017-05-22 NOTE — PLAN OF CARE
Problem: Patient Care Overview  Goal: Plan of Care Review  Outcome: Ongoing (interventions implemented as appropriate)   05/21/17 2252   Coping/Psychosocial   Plan Of Care Reviewed With patient       Problem: Fall Risk (Adult)  Goal: Absence of Falls  Patient will demonstrate the desired outcomes by discharge/transition of care.   Outcome: Ongoing (interventions implemented as appropriate)   05/21/17 2252   Fall Risk (Adult)   Absence of Falls making progress toward outcome       Problem: Mobility, Physical Impaired (Adult)  Goal: Enhanced Mobility Skills  Patient will demonstrate the desired outcomes by discharge/transition of care.   Outcome: Ongoing (interventions implemented as appropriate)   05/21/17 2252   Mobility, Physical Impaired (Adult)   Enhanced Mobility Skills making progress toward outcome       Comments: Pt was assessed and vs taken recorded. Noted no active skin breakdown only swelling of bilateral extremities and bruising at abdomen were noted. Medications were given including pain medication, pt tolerated and claimed offer relief. Call bell within reached, commode chair at bedside, 2 x side rails of bed elevated and bedin lowest position. Pt can independently repositioned herself and monitor for pain and safety.

## 2017-05-22 NOTE — PLAN OF CARE
Problem: Patient Care Overview  Goal: Plan of Care Review  Outcome: Ongoing (interventions implemented as appropriate)  FALL PRECAUTIONS MAINTAINED NO INJURIES NOTED.NO PRESSURE ULCERS NOTED.

## 2017-05-22 NOTE — PT/OT/SLP PROGRESS
"Speech Language Pathology  Treatment    Lilia Hidalgo   MRN: [id number]   Admitting Diagnosis: Debility    Diet recommendations: Solid Diet Level: Regular  Liquid Diet Level: Thin No straws, HOB to 90 degrees, Small bites/sips, Alternating bites/sips and 1 bite/sip at a time    SLP Treatment Date: 05/22/17  Speech Start Time: 1433     Speech Stop Time: 1508     Speech Total (min): 35 min       TREATMENT BILLABLE MINUTES:  Speech Therapy Individual 35    Has the patient been evaluated by SLP for swallowing? : Yes  Keep patient NPO?: No   General Precautions: Standard, aspiration, fall          Subjective:  "Isn't that strange?" pt often states when having difficulty recalling during a memory task.                         Objective:      After presentation of 2 items belonging in same category, pt stated additional items belonging to category with 80% acc. Ind'ly/100% given cues.  Pt answered reasoning q's with 100% acc.  SLP provided pt with 3 facts and prompted pt to use repetition/rehearsal as memory strategies to facilitate delayed recall. Following a 3 minute delay, pt required supervision and increased response time to recall 3/3 facts.      Spoke to pt's daughter over the phone regarding pt's progress and cognitive status.  SLP explained pt is making progress and demonstrating improvements in cognitive function. However, pt does continue to display short term memory difficulties, including decreased recall of receiving pain medications before therapy and dec'd recall of whether pt received assistance during transfers or mobility tasks.  SLP discussed continued safety concerns for pt to return to assisted living facility without increased supervision. Pt's daughter verbalized understanding and agreement.  Pt's daughter also verbalized interest in having pt undergo neurological or neuropsychological testing to further assess pt's cognition. This was communicated to the PT representing therapy during IDT meeting " today and written on Sticky Note to NP and MD.    Assessment:  Lilia Hidalgo is a 85 y.o. female with a medical diagnosis of Debility and presents with cognitive-linguistic deficits.     Diet recommendations:   Solid Diet Level: Regular    Liquid Diet Level: Thin        Discharge recommendations: Discharge Facility/Level Of Care Needs: home health speech therapy     Goals:    SLP Goals        Problem: SLP Goal    Goal Priority Disciplines Outcome   SLP Goal     SLP Ongoing (interventions implemented as appropriate)   Description:  Speech Language Pathology Goals  Goals expected to be met by 5/16:  1. Pt will tolerate a regular consistency diet and thin liquids without s/s of aspiration. Ongoing  2. Pt will participate in speech/language/cognitive evaluation to determine need for further intervention.- goal met 5/10  ADDITIONAL GOALS:  2. Pt will orient x 4 given min-mod A. Goal met.  3. Pt will complete immediate memory tasks with 70% accuracy given min A. Goal met.  4. Following a delay, pt will recall 3 words/facts given mod A. Goal met on 2/4 trials.  5. Pt will follow complex commands with 70% accuracy. Ongoing  6. Pt will complete moderate level problem solving tasks with 80% accuracy given mod A. Goal met.  7. Pt will list an average of 10 items in a category within one minute. Ongoing  8. Further evaluation of reading, writing, visual spatial, and math/money management abilities. Reading, writing, and visual spatial abilities appear to be WFL. Math/money management abilities not yet assessed.    Updated goals expected to be met by 5/23:   1. Pt will tolerate a regular consistency diet and thin liquids without s/s of aspiration.  2. Pt will orient x 4.  3. Pt will complete immediate memory tasks with 80% accuracy given min A.  4. Following a delay, pt will consistently recall 3 words/facts given mod A.  5. Pt will follow complex commands with 70% accuracy.  6. Pt will complete moderate level problem solving  tasks with 80% accuracy given min A.  7. Pt will list an average of 10 items in a category within one minute.  8. Further evaluation of math/money management abilities.                               Plan:   Patient to be seen Therapy Frequency: 5 x/week   Plan of Care expires: 06/08/17  Plan of Care reviewed with: patient, daughter  SLP Follow-up?: Yes  SLP - Next Visit Date: 05/22/17           MATEUS Mejia, MANUEL-SLP  05/22/2017     MATEUS Mejia, CCC-SLP  Speech Language Pathologist  [phone]  5/22/2017

## 2017-05-22 NOTE — PLAN OF CARE
Problem: Physical Therapy Goal  Goal: Physical Therapy Goal  Goals to be met by: 2-3 weeks     Patient will increase functional independence with mobility by performin. Supine to sit with supervision. Met 17  2. Sit to supine with supervision  3. Sit to stand transfer with supervision Met 17  4. Bed to chair transfer with supervision using Rolling Walker  5. Gait  x 150 feet with Supervision using Rolling Walker.   6. Wheelchair propulsion x 150 feet with Supervision using bilateral upper extremities  7. Ascend/Descend 4 inch curb step with Stand-by Assistance using Rolling Walker.  8. Stand for 5 minutes with Stand-by Assistance using Rolling Walker while performing a task.  9. Lower extremity exercise program x20 reps per handout, with supervision     Outcome: Ongoing (interventions implemented as appropriate)  Goals remain appropriate

## 2017-05-22 NOTE — PT/OT/SLP PROGRESS
Occupational Therapy  Treatment    Lilia Hidalgo   MRN: [id number]   Admitting Diagnosis: Debility    OT Date of Treatment: 17  Total Time (min): 51 min    Billable Minutes:  Therapeutic Activity 21 and Therapeutic Exercise 30    General Precautions: Standard, fall, aspiration  Orthopedic Precautions: N/A  Braces: N/A    Do you have any cultural, spiritual, Advent conflicts, given your current situation?: Restoration    Subjective:  Communicated with PT prior to session.  I am feeling much stronger.    Pain Ratin/10  Location - Side: Left     Location: hip  Pain Addressed: Pre-medicate for activity, Cessation of Activity       Objective:  Patient found with:  (up in w/c in rehab gym)    Functional Status:  MDS G  Transfer Functional Status: SBA from sit to stand from w/c without AD at table  Moving from seated to standing position: Not steady, but able to stabilize without staff assistance          OT Exercises: AROM 1# dowel exercises in all planes 2 sets x 10 reps  UE Ergometer x 10 mins  Rickshaw 2# 4 sets x 25 reps    Additional Treatment:  Pt. Participated in dynamic standing activity with SBA at table. Pt. Able to stand for 1 minute and 53 seconds.  Pt. Participated in cognitive task while seated at table with minimal VCs for ~8 minutes. (PVC pipe tree)    Pt.'s daughter was present on this date and OT was able to inform her on pt.'s progress and recommended need for supervision on d/c.     Patient left up in chair with call button in reach and nurse notified    ASSESSMENT:  Lilia Hidalgo is a 85 y.o. female with a medical diagnosis of Debility and presents with limitations in self-care, endurance, and functional mobility. Pt. Tolerates therapy well, but pain is a limiting factor. Pt. Would benefit from continued OT services to maximize safety and independence in ADL tasks.    Rehab identified problem list/impairments: impaired endurance, impaired self care skills, impaired functional mobilty,  decreased lower extremity function, pain    Rehab potential is good    Activity tolerance: Good    Discharge recommendations: home health OT     Barriers to discharge: Decreased caregiver support     Equipment recommendations: none     GOALS:    Occupational Therapy Goals        Problem: Occupational Therapy Goal    Goal Priority Disciplines Outcome Interventions   Occupational Therapy Goal     OT, PT/OT Ongoing (interventions implemented as appropriate)    Description:  Goals to be met by: 3 weeks     Patient will increase functional independence with ADLs by performing:    Feeding with Set-up Assistance.  MET 05-14  UE Dressing with Set-up Assistance. MET 05-14  LE Dressing with Supervision.  Grooming while standing with Supervision.  Toileting from bedside commode with Supervision for hygiene and clothing management.   Bathing from  shower chair/bench with Stand-by Assistance. MET sponge bath 05-14  Supine to sit with Modified [place]. MET 05/16  Stand pivot transfers with Supervision.  Toilet transfer to bedside commode with Supervision  Pt. Will be able to follow 2 step directions involving ADL tasks with  75% accuracy MET 05-14  Pt.. Will be independent with HEP for BUE as well as for improving FMC skills in left hand   .                         Plan:  Patient to be seen 5 x/week to address the above listed problems via self-care/home management, therapeutic activities, therapeutic exercises  Plan of Care expires: 06/08/17  Plan of Care reviewed with: patient, daughter    Brenda Jacobs, ARMANI  05/22/2017

## 2017-05-22 NOTE — PLAN OF CARE
Problem: SLP Goal  Goal: SLP Goal  Speech Language Pathology Goals  Goals expected to be met by 5/16:  1. Pt will tolerate a regular consistency diet and thin liquids without s/s of aspiration. Ongoing  2. Pt will participate in speech/language/cognitive evaluation to determine need for further intervention.- goal met 5/10  ADDITIONAL GOALS:  2. Pt will orient x 4 given min-mod A. Goal met.  3. Pt will complete immediate memory tasks with 70% accuracy given min A. Goal met.  4. Following a delay, pt will recall 3 words/facts given mod A. Goal met on 2/4 trials.  5. Pt will follow complex commands with 70% accuracy. Ongoing  6. Pt will complete moderate level problem solving tasks with 80% accuracy given mod A. Goal met.  7. Pt will list an average of 10 items in a category within one minute. Ongoing  8. Further evaluation of reading, writing, visual spatial, and math/money management abilities. Reading, writing, and visual spatial abilities appear to be WFL. Math/money management abilities not yet assessed.    Updated goals expected to be met by 5/23:   1. Pt will tolerate a regular consistency diet and thin liquids without s/s of aspiration.  2. Pt will orient x 4.  3. Pt will complete immediate memory tasks with 80% accuracy given min A.  4. Following a delay, pt will consistently recall 3 words/facts given mod A.  5. Pt will follow complex commands with 70% accuracy.  6. Pt will complete moderate level problem solving tasks with 80% accuracy given min A.  7. Pt will list an average of 10 items in a category within one minute.  8. Further evaluation of math/money management abilities.             Outcome: Ongoing (interventions implemented as appropriate)  Pt participated in bedside cognitive-linguistic tx with good effort and motivation.  Cont  POC. MATEUS Mejia, CCC-SLP  Speech Language Pathologist  [phone]  5/22/2017

## 2017-05-22 NOTE — PLAN OF CARE
Problem: Occupational Therapy Goal  Goal: Occupational Therapy Goal  Goals to be met by: 3 weeks     Patient will increase functional independence with ADLs by performing:    Feeding with Set-up Assistance.  MET 05-14  UE Dressing with Set-up Assistance. MET 05-14  LE Dressing with Supervision.  Grooming while standing with Supervision.  Toileting from bedside commode with Supervision for hygiene and clothing management.   Bathing from  shower chair/bench with Stand-by Assistance. MET sponge bath 05-14  Supine to sit with Modified [place]. MET 05/16  Stand pivot transfers with Supervision.  Toilet transfer to bedside commode with Supervision  Pt. Will be able to follow 2 step directions involving ADL tasks with  75% accuracy MET 05-14  Pt.. Will be independent with HEP for BUE as well as for improving FMC skills in left hand   .        Pt. Tolerated session well.

## 2017-05-22 NOTE — PT/OT/SLP PROGRESS
Physical Therapy  Treatment    Lilia Hidalgo   MRN: [id number]   Admitting Diagnosis: Debility    PT Received On: 05/22/17  Total Time (min): 45       Billable Minutes:  Gait [id number] and Therapeutic Activity 29    Treatment Type: Treatment  PT/PTA: PT     PTA Visit Number: 5       General Precautions: Standard, fall  Orthopedic Precautions: N/A   Braces: N/A    Do you have any cultural, spiritual, Mosque conflicts, given your current situation?: Mu-ism    Subjective:  Communicated with pt prior to session. Pt stated that she was ready for anything that would help to get her better. Pt reported L hip and knee pain likely due to arthritis.          Location - Side: Left     Location: knee  Pain Addressed: Cessation of Activity       Objective:  Patient found lying supine in bed with HOB elevated and nurse present.        Functional Status:  MDS G  Bed Mobility Functional Status: S-SBA  Transfer Functional Status: S-SBA  Walk in Room Functional Status: S-SBA  Locomotion on Unit Functional Status: S-SBA  Moving from seated to standing position: Not steady, but able to stabilize without staff assistance  Walking (with assistive device if used): Not steady, but able to stabilize without staff assistance  Turning around and facing the opposite direction while walking: Not steady, but able to stabilize without staff assistance  Surface-to-surface transfer (transfer between bed and chair or wheelchair): Not steady, but able to stabilize without staff assistance          Bed Mobility:  Supine>Sit: supervision    Transfers:  Sit<>Stand: supervision from bed, wheelchair, and BSC  Stand Pivot Transfer: SBA bed <> wheelchair    Gait:  Amb 100 feet and 150 with RW with SBA with seated rest break between trials with verbal instruction required to decrease weight bearing on UE including making turns and starting/stopping gait    Balance:  Standing during adjusting clothes during dressing and toileting without UE  support      Patient left up in chair with OT    Assessment:  Lilia Hidalgo is a 85 y.o. female with a medical diagnosis of Debility.  The patient is tolerating treatment well demonstrating improving stability and endurance. Pt was slightly limited during ambulation due to L knee and hip pain but was able to tolerate activity. Pt continues to demonstrate good potential for further progress toward achieving goals.     Rehab identified problem list/impairments: impaired endurance, weakness, impaired self care skills, impaired functional mobilty, decreased lower extremity function, pain    Rehab potential is good.    Activity tolerance: Good    Discharge recommendations: home health PT     Barriers to discharge: Decreased caregiver support    Equipment recommendations: none     GOALS:    Physical Therapy Goals        Problem: Physical Therapy Goal    Goal Priority Disciplines Outcome Goal Variances Interventions   Physical Therapy Goal     PT/OT, PT Ongoing (interventions implemented as appropriate)     Description:  Goals to be met by: 2-3 weeks     Patient will increase functional independence with mobility by performin. Supine to sit with supervision. Met 17  2. Sit to supine with supervision  3. Sit to stand transfer with supervision Met 17  4. Bed to chair transfer with supervision using Rolling Walker  5. Gait  x 150 feet with Supervision using Rolling Walker.   6. Wheelchair propulsion x 150 feet with Supervision using bilateral upper extremities  7. Ascend/Descend 4 inch curb step with Stand-by Assistance using Rolling Walker.  8. Stand for 5 minutes with Stand-by Assistance using Rolling Walker while performing a task.  9. Lower extremity exercise program x20 reps per handout, with supervision                       PLAN:    Patient to be seen  (5-6x/wk)  to address the above listed problems via gait training, therapeutic activities, therapeutic exercises, neuromuscular re-education,  wheelchair management/training  Plan of Care expires: 06/08/17  Plan of Care reviewed with: patient    Micki D Padmaja, PT  05/22/2017

## 2017-05-22 NOTE — NURSING
Pt was complaining of abdominal pain requested for Aluminum-magnesium hydroxide-simethicone as prn ordered. Charge Nurse made aware.

## 2017-05-23 PROBLEM — R13.10 DYSPHAGIA: Status: RESOLVED | Noted: 2017-05-09 | Resolved: 2017-05-23

## 2017-05-23 PROCEDURE — 99308 SBSQ NF CARE LOW MDM 20: CPT | Mod: ,,, | Performed by: NURSE PRACTITIONER

## 2017-05-23 PROCEDURE — 25000003 PHARM REV CODE 250: Performed by: INTERNAL MEDICINE

## 2017-05-23 PROCEDURE — 97110 THERAPEUTIC EXERCISES: CPT

## 2017-05-23 PROCEDURE — 25000003 PHARM REV CODE 250: Performed by: STUDENT IN AN ORGANIZED HEALTH CARE EDUCATION/TRAINING PROGRAM

## 2017-05-23 PROCEDURE — 25000003 PHARM REV CODE 250: Performed by: NURSE PRACTITIONER

## 2017-05-23 PROCEDURE — 97530 THERAPEUTIC ACTIVITIES: CPT

## 2017-05-23 PROCEDURE — 11000004 HC SNF PRIVATE

## 2017-05-23 PROCEDURE — 92507 TX SP LANG VOICE COMM INDIV: CPT

## 2017-05-23 PROCEDURE — 97535 SELF CARE MNGMENT TRAINING: CPT

## 2017-05-23 PROCEDURE — 97116 GAIT TRAINING THERAPY: CPT

## 2017-05-23 RX ORDER — BENAZEPRIL HYDROCHLORIDE 40 MG/1
40 TABLET ORAL DAILY
Status: DISCONTINUED | OUTPATIENT
Start: 2017-05-24 | End: 2017-05-29 | Stop reason: HOSPADM

## 2017-05-23 RX ADMIN — FUROSEMIDE 40 MG: 40 TABLET ORAL at 09:05

## 2017-05-23 RX ADMIN — PANTOPRAZOLE SODIUM 40 MG: 40 TABLET, DELAYED RELEASE ORAL at 09:05

## 2017-05-23 RX ADMIN — HEPARIN SODIUM 5000 UNITS: 5000 INJECTION, SOLUTION INTRAVENOUS; SUBCUTANEOUS at 06:05

## 2017-05-23 RX ADMIN — HEPARIN SODIUM 5000 UNITS: 5000 INJECTION, SOLUTION INTRAVENOUS; SUBCUTANEOUS at 08:05

## 2017-05-23 RX ADMIN — ESCITALOPRAM 20 MG: 10 TABLET, FILM COATED ORAL at 09:05

## 2017-05-23 RX ADMIN — TRAMADOL HYDROCHLORIDE 25 MG: 50 TABLET, FILM COATED ORAL at 09:05

## 2017-05-23 RX ADMIN — ATORVASTATIN CALCIUM 20 MG: 20 TABLET, FILM COATED ORAL at 09:05

## 2017-05-23 RX ADMIN — BENAZEPRIL HYDROCHLORIDE 20 MG: 20 TABLET, FILM COATED ORAL at 09:05

## 2017-05-23 RX ADMIN — METOPROLOL SUCCINATE 100 MG: 100 TABLET, EXTENDED RELEASE ORAL at 09:05

## 2017-05-23 RX ADMIN — RAMELTEON 8 MG: 8 TABLET, FILM COATED ORAL at 08:05

## 2017-05-23 RX ADMIN — TRAMADOL HYDROCHLORIDE 25 MG: 50 TABLET, FILM COATED ORAL at 08:05

## 2017-05-23 RX ADMIN — HEPARIN SODIUM 5000 UNITS: 5000 INJECTION, SOLUTION INTRAVENOUS; SUBCUTANEOUS at 01:05

## 2017-05-23 NOTE — PLAN OF CARE
05/23/2017  1:20 PM    SHAREE spoke with pt's daughter (Belkys [phone]) regarding pt's d/c plans.  Belkys acknowledged pt's d/c date scheduled for 5/29/17.  Belkys reports having spoken with management at [facility] regarding pt's return there upon SNF d/c.  Belkys states that pt is clear to return and plans to pay for additional support/supervision at [facility] as needed for pt.  Belkys expressed awareness of pt's current functional status and reports having spoken with SNF PT, OT, and ST about pt's status as well.  Belkys states that pt owns all needed DME.  Belkys also requests that pt be placed with [facility] upon SNF d/c with specific request for  PT Tj Mccabe.  Belkys states plans of accompanying and transporting pt back to [facility] upon SNF d/c on 5/29/17.    SHAREE spoke with management at [facility] ([phone]) regarding above.  SHAREE provided update on pt's functional status based on current therapy notes.  Manager stated pt is appropriate to return to [facility] on 5/29/17 and staff will be available to pt as needed upon her return.  Manager requested that SW fax d/c summary and medication list prior to d/c.  SW remains available for further d/c planning assistance and will f/u as needed.    Leon Chun, SHAREE  [id number]

## 2017-05-23 NOTE — PT/OT/SLP PROGRESS
"Physical Therapy  Treatment    Lilia Hidalgo   MRN: [id number]   Admitting Diagnosis: Debility    PT Received On: 17  Total Time (min): 53       Billable Minutes: 53    Gait [id number], Therapeutic Activity 8 and Therapeutic Exercise 25    Treatment Type: Treatment  PT/PTA: PTA     PTA Visit Number: 1       General Precautions: Standard, fall  Orthopedic Precautions: N/A   Braces: N/A    Do you have any cultural, spiritual, Sabianism conflicts, given your current situation?: Presybeterian    Subjective:  Communicated with nsg during session.  "I just sat up" agreeable to therapy    Pain Ratin/10 (with mobility/gait/WB)  Location - Side: Left  Location - Orientation: generalized  Location: knee (sometime hip)  Pain Addressed: Nurse notified (med requested nsg notified)  Pain Rating Post-Intervention:  (inc with moblity)    Objective:  Patient found sitting EOB     Functional Status:  MDS G  Transfer Functional Status: S-SBA  Walk in Corridor Functional Status: S-SBA        Transfers:  Sit<>Stand:  with RW S  Stand Pivot Transfer: with RW SBA/S EOB>WC      Gait:  Amb with RW  ft and 102 ft no LOB, L knee buckled towards end of second trial pt able to maintain balance    Therex:  2x10 reps AP,GS,LAQ,hip flex    Balance:  SBA with RW to doff gown/lucian dress    Additional Treatment:  LBE x15     Patient left up in chair with call button in reach and belongings in reach.    Assessment:  Lilia Hidalgo is a 85 y.o. female with a medical diagnosis of Debility.  Pt tolerated well, pt would continue to benefit from skilled PT services to improve overall functional mobility, strength and endurance.      Rehab identified problem list/impairments: impaired endurance, weakness, impaired self care skills, impaired functional mobilty, decreased lower extremity function, pain    Rehab potential is good.    Activity tolerance: Fair    Discharge recommendations: home health PT     Barriers to discharge: Decreased caregiver " support    Equipment recommendations: none     GOALS:    Physical Therapy Goals        Problem: Physical Therapy Goal    Goal Priority Disciplines Outcome Goal Variances Interventions   Physical Therapy Goal     PT/OT, PT Ongoing (interventions implemented as appropriate)     Description:  Goals to be met by: 2-3 weeks     Patient will increase functional independence with mobility by performin. Supine to sit with supervision. Met 17  2. Sit to supine with supervision  3. Sit to stand transfer with supervision Met 17  4. Bed to chair transfer with supervision using Rolling Walker  5. Gait  x 150 feet with Supervision using Rolling Walker.   6. Wheelchair propulsion x 150 feet with Supervision using bilateral upper extremities  7. Ascend/Descend 4 inch curb step with Stand-by Assistance using Rolling Walker.  8. Stand for 5 minutes with Stand-by Assistance using Rolling Walker while performing a task.  9. Lower extremity exercise program x20 reps per handout, with supervision                       PLAN:    Patient to be seen  (5-6x/wk)  to address the above listed problems via gait training, therapeutic activities, therapeutic exercises, neuromuscular re-education, wheelchair management/training  Plan of Care expires: 17  Plan of Care reviewed with: patient    Luly Anderson, PTA  2017

## 2017-05-23 NOTE — ASSESSMENT & PLAN NOTE
Chronic.  No complaints of pain today.  Change Tramadol 25 mg to bid for pain control.  Encourage limb elevation when not in therapy.

## 2017-05-23 NOTE — PT/OT/SLP PROGRESS
"Speech Language Pathology  Treatment    Lilia Hidalgo   MRN: [id number]   Admitting Diagnosis: Debility    Diet recommendations: Solid Diet Level: Regular  Liquid Diet Level: Thin HOB to 90 degrees, Small bites/sips, Alternating bites/sips and 1 bite/sip at a time    SLP Treatment Date: 05/23/17  Speech Start Time: 1113     Speech Stop Time: 1157     Speech Total (min): 44 min       TREATMENT BILLABLE MINUTES:  Speech Therapy Individual 44    Has the patient been evaluated by SLP for swallowing? : Yes  Keep patient NPO?: No   General Precautions: Standard, aspiration, fall          Subjective:  "All these names." pt commented when having difficulty recalling information from a telephone message containing names.                        Objective:      SLP discussed using environmental cues as a compensatory memory strategy. Pt verbalized understanding and states she uses such strategies at home.  Following extensive review of telephone messages, pt answered retention q's with 50% acc. Ind'ly/80% given cues.  Pt completed a picture retention task with 45% acc. Ind'ly/75% given cues.  Word deductions were solved with 90% acc. Ind'ly/100% given cues.      Pt was speaking on the phone with daughter, Belkys, who asked to speak to SLP briefly.  Daughter wanted to make staff aware that pt/family would like for pt to practice being more independent before discharge back to assisted living on Monday.  She would like for staff to leave pt's walker within reach in case pt needs to get up and ambulate to complete tasks such as toileting.  Daughter was concerned that the pt would not remember to ask staff member to leave walker closer to pt before staff member leaves the room.  After session, SLP spoke to PT and OT who are familiar with pt to ask if they felt pt was safe to ambulate in room without supervision.  PT and OT both feel pt still needs supervision during ambulation for safety.  OT also recommends that pt has supervision " upon discharge initially with gradual decrease in level of supervision as appropriate.  OT plans to call daughter back this afternoon to explain recommendations.     Assessment:  Lilia Hidalgo is a 85 y.o. female with a medical diagnosis of Debility and presents with cognitive-linguistic deficits.     Diet recommendations:   Solid Diet Level: Regular    Liquid Diet Level: Thin        Discharge recommendations: Discharge Facility/Level Of Care Needs: home health speech therapy     Goals:    SLP Goals        Problem: SLP Goal    Goal Priority Disciplines Outcome   SLP Goal     SLP Ongoing (interventions implemented as appropriate)   Description:  Speech Language Pathology Goals  Goals expected to be met by 5/16:  1. Pt will tolerate a regular consistency diet and thin liquids without s/s of aspiration. Ongoing  2. Pt will participate in speech/language/cognitive evaluation to determine need for further intervention.- goal met 5/10  ADDITIONAL GOALS:  2. Pt will orient x 4 given min-mod A. Goal met.  3. Pt will complete immediate memory tasks with 70% accuracy given min A. Goal met.  4. Following a delay, pt will recall 3 words/facts given mod A. Goal met on 2/4 trials.  5. Pt will follow complex commands with 70% accuracy. Ongoing  6. Pt will complete moderate level problem solving tasks with 80% accuracy given mod A. Goal met.  7. Pt will list an average of 10 items in a category within one minute. Ongoing  8. Further evaluation of reading, writing, visual spatial, and math/money management abilities. Reading, writing, and visual spatial abilities appear to be WFL. Math/money management abilities not yet assessed.    Updated goals expected to be met by 5/23:   1. Pt will tolerate a regular consistency diet and thin liquids without s/s of aspiration. ongoing  2. Pt will orient x 4. Goal met  3. Pt will complete immediate memory tasks with 80% accuracy given min A. Goal not met provided only min A on average.  4.  Following a delay, pt will consistently recall 3 words/facts given mod A. Goal met.  5. Pt will follow complex commands with 70% accuracy. Goal met.  6. Pt will complete moderate level problem solving tasks with 80% accuracy given min A. Goal met.   7. Pt will list an average of 10 items in a category within one minute. Goal met.  8. Further evaluation of math/money management abilities.  Goal met.    Updated goals expected to be met by 5/30:  1. Pt will tolerate a regular consistency diet and thin liquids without s/s of aspiration.  2. Pt will complete immediate memory tasks with 80% accuracy given mod A.  3. Following a delay, pt will consistently recall 3 words/facts given min A.  4. Pt will complete moderate level problem solving tasks with 80% accuracy.  5. Pt will list an average of 12 items in a category within one minute.                                          Plan:   Patient to be seen Therapy Frequency: 5 x/week   Plan of Care expires: 06/08/17  Plan of Care reviewed with: patient  SLP Follow-up?: Yes  SLP - Next Visit Date: 05/22/17           MAETUS Mejia, CCC-SLP  05/23/2017     MATEUS Mejia, CCC-SLP  Speech Language Pathologist  [phone]  5/23/2017

## 2017-05-23 NOTE — PT/OT/SLP PROGRESS
Occupational Therapy  Treatment    Lilia Hidalgo   MRN: [id number]   Admitting Diagnosis: Debility    OT Date of Treatment: 17  Total Time (min): 51 min    Billable Minutes:  Self Care/Home Management 10, Therapeutic Activity 21 and Therapeutic Exercise 20    General Precautions: Standard, aspiration, fall  Orthopedic Precautions: N/A  Braces: N/A    Do you have any cultural, spiritual, Jew conflicts, given your current situation?: Jain    Subjective:  Communicated with nurse prior to session.  I don't want to have someone with me all the time.     Pain Ratin/10  Location - Side: Left     Location: knee  Pain Addressed: Cessation of Activity, Distraction       Objective:  Patient found with:  (up in w/c in activity room)    Functional Status:  MDS G  Bed Mobility Functional Status: SBA from EOB to supine  Transfer Functional Status: CGA sit to stand for balance  Walk in Room Functional Status: CGA from doorway to toilet ~12 feet  Walk in Corridor Functional Status: CGA from pt. Room to rehab gym ~50 feet; from rehab gym toward pt. Room ~21 feet  Toilet Use Functional Status: CGA for balance when standing to wipe private area  Personal Hygiene Functional Status: SBA to wash hands at sink while standing  Moving from seated to standing position: Not steady, but able to stabilize without staff assistance  Turning around and facing the opposite direction while walking: Not steady, but able to stabilize without staff assistance  Moving on and off the toilet: Not steady, only able to stabilize with staff assistance          OT Exercises: Rickshaw 5# 2 sets x 25 reps; 2# 2 sets x 25 reps    Additional Treatment:  Pt. Engaged in dynamic standing activity with CGA in rehab gym to increase standing endurance. Times as follows:   1st Trial: 5 minuets 4 seconds   2nd Trial: 2 minutes 15 seconds  Pt. Educated on safety techniques for w/c transfers including checking for locked brakes, scooting toward edge of  chair, and pushing up with both hands from chair. Pt. Required several verbal cues.    OT communicated to pt.'s daughter to discuss POC.     Patient left supine with call button in reach    ASSESSMENT:  Lilia Hidalgo is a 85 y.o. female with a medical diagnosis of Debility and presents with decreased self-care skills, endurance, and functional mobility. Pt. And family is eager for pt. To return to independent living with minimal assistance, however, OT recommends 24 hr supervision initially. .    Rehab identified problem list/impairments: impaired endurance, impaired self care skills, impaired functional mobilty, decreased lower extremity function, impaired cognition, pain    Rehab potential is good    Activity tolerance: Good    Discharge recommendations: home health OT     Barriers to discharge: Decreased caregiver support     Equipment recommendations: none     GOALS:    Occupational Therapy Goals        Problem: Occupational Therapy Goal    Goal Priority Disciplines Outcome Interventions   Occupational Therapy Goal     OT, PT/OT Ongoing (interventions implemented as appropriate)    Description:  Goals to be met by: 3 weeks     Patient will increase functional independence with ADLs by performing:    Feeding with Set-up Assistance.  MET 05-14  UE Dressing with Set-up Assistance. MET 05-14  LE Dressing with Supervision.  Grooming while standing with Supervision.  Toileting from bedside commode with Supervision for hygiene and clothing management.   Bathing from  shower chair/bench with Stand-by Assistance. MET sponge bath 05-14  Supine to sit with Modified [place]. MET 05/16  Stand pivot transfers with Supervision.  Toilet transfer to bedside commode with Supervision  Pt. Will be able to follow 2 step directions involving ADL tasks with  75% accuracy MET 05-14  Pt.. Will be independent with HEP for BUE as well as for improving FMC skills in left hand   .                         Plan:  Patient to be seen 5  x/week to address the above listed problems via self-care/home management, therapeutic exercises, therapeutic activities  Plan of Care expires: 06/08/17  Plan of Care reviewed with: patient, daughter    Brenda Pantojain, SOROD  05/23/2017

## 2017-05-23 NOTE — SUBJECTIVE & OBJECTIVE
Interval History:   Admit to SNF for therapy.  Monitor weights  Control pain.    5/16/17  Patient seen at bedside today  No complaints of pain.  Concerned about her inability to walk unassisted.    5/23/17  Patient seen at bedside.  She is reporting pain to her left knee worse with ambulation.  States pain is controlled with Tramadol.  She has no other complaints.    Review of Systems   Constitutional: Negative for appetite change, fatigue and fever.   Respiratory: Negative for cough and shortness of breath.    Cardiovascular: Negative for chest pain, palpitations and leg swelling.   Gastrointestinal: Negative for abdominal pain, constipation, diarrhea, nausea and vomiting.   Endocrine: Negative for polydipsia, polyphagia and polyuria.   Genitourinary: Negative for dysuria and frequency.   Musculoskeletal: Positive for arthralgias.        Left knee pain   Skin: Negative for rash.   Psychiatric/Behavioral: Negative for confusion and hallucinations.     Objective:     Vital Signs (Most Recent):  Temp: 98.1 °F (36.7 °C) (05/23/17 0800)  Pulse: 66 (05/23/17 0800)  Resp: 18 (05/23/17 0800)  BP: (!) 162/72 (05/23/17 0800)  SpO2: 95 % (05/23/17 0800) Vital Signs (24h Range):  Temp:  [96.6 °F (35.9 °C)-98.1 °F (36.7 °C)] 98.1 °F (36.7 °C)  Pulse:  [66-71] 66  Resp:  [18] 18  SpO2:  [95 %-96 %] 95 %  BP: (123-162)/(60-72) 162/72     Weight: 83 kg (182 lb 15.7 oz)  Body mass index is 30.45 kg/m².    Intake/Output Summary (Last 24 hours) at 05/23/17 1136  Last data filed at 05/22/17 1753   Gross per 24 hour   Intake              675 ml   Output              550 ml   Net              125 ml      Physical Exam   Constitutional: She is oriented to person, place, and time. She appears well-developed and well-nourished.   Cardiovascular: Normal rate, regular rhythm and intact distal pulses.  Exam reveals no gallop and no friction rub.    Murmur heard.  Pulmonary/Chest: Effort normal and breath sounds normal. No respiratory  distress.   Abdominal: Soft. Normal appearance and bowel sounds are normal. She exhibits no distension. There is no tenderness. There is no guarding.   Musculoskeletal: Normal range of motion. She exhibits no edema or tenderness.   Neurological: She is alert and oriented to person, place, and time. She has normal strength.   Skin: Skin is warm, dry and intact. No rash noted. No erythema.   Psychiatric: She has a normal mood and affect.       Significant Labs:   BMP:     Recent Labs  Lab 05/22/17  0529   GLU 89      K 3.5      CO2 31*   BUN 16   CREATININE 0.8   CALCIUM 8.8   MG 1.8     CBC:     Recent Labs  Lab 05/22/17  0529   WBC 4.58   HGB 12.7   HCT 39.4          Significant Imaging: n/a    Scheduled Meds:   atorvastatin  20 mg Oral Daily    benazepril  20 mg Oral Daily    escitalopram oxalate  20 mg Oral Daily    furosemide  40 mg Oral Daily    heparin (porcine)  5,000 Units Subcutaneous Q8H    metoprolol succinate  100 mg Oral Daily    pantoprazole  40 mg Oral Daily    tramadol  25 mg Oral BID     Continuous Infusions:   PRN Meds:.acetaminophen, aluminum-magnesium hydroxide-simethicone, olanzapine, ondansetron, ramelteon, senna-docusate 8.6-50 mg     Future Appointments  Date Time Provider Department Center   5/31/2017 10:10 AM LAB, APPOINTMENT [facility] IM [place]   6/5/2017 9:20 AM April Herrera MD [facility] [place]

## 2017-05-23 NOTE — PROGRESS NOTES
[facility]  Progress Note    Patient Name: Lliia Hidalgo  MRN: [id number]  Code Status: Full Code  Admission Date: 5/8/2017  Length of Stay: 15 days  Attending Physician: No att. providers found  Primary Care Provider: April Herrera MD    Subjective:     Principal Problem:Debility    HPI:  Patient is a 85 y.o. female with HLP, GERD, HTN who presents to SNF after hospitalization for septic encephalopathy, diverticulitis and diastolic CHF exacerbation. She presented with N/V and abdominal pain both of which have resolved. She is tolerating > 50% of her meals. She denies SOB or CP. Upon admit to SNF, she reported to the MD she previously had visual hallucinations which has now resolved.  She has chronic LE edema at home and wears compression stocking at home. 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 She has no pain at rest but only with ambulation which is relieved with tramadol.     Interval History:   Admit to SNF for therapy.  Monitor weights  Control pain.    5/16/17  Patient seen at bedside today  No complaints of pain.  Concerned about her inability to walk unassisted.    5/23/17  Patient seen at bedside.  She is reporting pain to her left knee worse with ambulation.  States pain is controlled with Tramadol.  She has no other complaints.    Review of Systems   Constitutional: Negative for appetite change, fatigue and fever.   Respiratory: Negative for cough and shortness of breath.    Cardiovascular: Negative for chest pain, palpitations and leg swelling.   Gastrointestinal: Negative for abdominal pain, constipation, diarrhea, nausea and vomiting.   Endocrine: Negative for polydipsia, polyphagia and polyuria.   Genitourinary: Negative for dysuria and frequency.   Musculoskeletal: Positive for arthralgias.        Left knee pain   Skin: Negative for rash.   Psychiatric/Behavioral: Negative for confusion and hallucinations.     Objective:     Vital Signs (Most  Recent):  Temp: 98.1 °F (36.7 °C) (05/23/17 0800)  Pulse: 66 (05/23/17 0800)  Resp: 18 (05/23/17 0800)  BP: (!) 162/72 (05/23/17 0800)  SpO2: 95 % (05/23/17 0800) Vital Signs (24h Range):  Temp:  [96.6 °F (35.9 °C)-98.1 °F (36.7 °C)] 98.1 °F (36.7 °C)  Pulse:  [66-71] 66  Resp:  [18] 18  SpO2:  [95 %-96 %] 95 %  BP: (123-162)/(60-72) 162/72     Weight: 83 kg (182 lb 15.7 oz)  Body mass index is 30.45 kg/m².    Intake/Output Summary (Last 24 hours) at 05/23/17 1136  Last data filed at 05/22/17 1753   Gross per 24 hour   Intake              675 ml   Output              550 ml   Net              125 ml      Physical Exam   Constitutional: She is oriented to person, place, and time. She appears well-developed and well-nourished.   Cardiovascular: Normal rate, regular rhythm and intact distal pulses.  Exam reveals no gallop and no friction rub.    Murmur heard.  Pulmonary/Chest: Effort normal and breath sounds normal. No respiratory distress.   Abdominal: Soft. Normal appearance and bowel sounds are normal. She exhibits no distension. There is no tenderness. There is no guarding.   Musculoskeletal: Normal range of motion. She exhibits no edema or tenderness.   Neurological: She is alert and oriented to person, place, and time. She has normal strength.   Skin: Skin is warm, dry and intact. No rash noted. No erythema.   Psychiatric: She has a normal mood and affect.       Significant Labs:   BMP:     Recent Labs  Lab 05/22/17  0529   GLU 89      K 3.5      CO2 31*   BUN 16   CREATININE 0.8   CALCIUM 8.8   MG 1.8     CBC:     Recent Labs  Lab 05/22/17  0529   WBC 4.58   HGB 12.7   HCT 39.4          Significant Imaging: n/a    Scheduled Meds:   atorvastatin  20 mg Oral Daily    benazepril  20 mg Oral Daily    escitalopram oxalate  20 mg Oral Daily    furosemide  40 mg Oral Daily    heparin (porcine)  5,000 Units Subcutaneous Q8H    metoprolol succinate  100 mg Oral Daily    pantoprazole  40 mg Oral  Daily    tramadol  25 mg Oral BID     Continuous Infusions:   PRN Meds:.acetaminophen, aluminum-magnesium hydroxide-simethicone, olanzapine, ondansetron, ramelteon, senna-docusate 8.6-50 mg     Future Appointments  Date Time Provider Department Center   5/31/2017 10:10 AM LAB, APPOINTMENT [facility]   6/5/2017 9:20 AM April Herrera MD [facility] [place]         Assessment/Plan:      Acute on chronic diastolic heart failure    No reports of SOB.  Lungs are clear  Continue Lasix 40 mg daily and monitor electrolytes.  Weight is stable.  Encourage use of yannick hose for LE edema which may also be due to venous insufficiency        Diverticulitis of large intestine without perforation or abscess without bleeding    Currently afebrile.  Last WBC was normal, last 4.58.  Has completed Cipro and Flagyl course.  Issue is resolved.        Septic encephalopathy    No further complaints of hallucinations.  Continue Zyprexa 2.5 mg PRN.         Depressed mood    Chronic.  Continue Lexapro 20 mg daily.        Primary osteoarthritis of knee    Chronic.  No complaints of pain today.  Change Tramadol 25 mg to bid for pain control.  Encourage limb elevation when not in therapy.        GERD (gastroesophageal reflux disease)    Chronic.  Continue Protonix 40 mg daily.        Hyperlipidemia    Chronic.  Continue atorvastatin 20 mg daily.        Essential hypertension    BP is 162/72 HR is 66.  Currently treated with benazepril 20 mg daily, Lasix 40 mg daily and Toprol  mg daily.  Weight today is stable.  Will increase benazepril to 40 mg daily (current home dosing)        * Debility    Participating with therapy.  She is currently at a [facility] for her adls and transfers.  Ambulating up to 150 feet.  Continue PT/OT to restore functional goals.  She reports she lives alone but at [facility].  Plans to return to same living arrangement after discharge.  Continue Heparin for DVT prevention.         Dysphagia-resolved as of 5/23/2017    Per MD note now on clear liquids.  Seen by speech, recommending regular diet with thin liquids.             Foreign Hendrix NP  [facility]

## 2017-05-23 NOTE — PLAN OF CARE
Problem: Patient Care Overview  Goal: Plan of Care Review  Outcome: Ongoing (interventions implemented as appropriate)  Pt a a o x 3, vss, reap effort even and unlabored. Voids per BSC. Call bell within reach, side rail sup x 3. Pt has remained free from falls. Will continue to monitor.   05/23/17 0521   Coping/Psychosocial   Plan Of Care Reviewed With patient

## 2017-05-23 NOTE — ASSESSMENT & PLAN NOTE
No reports of SOB.  Lungs are clear  Continue Lasix 40 mg daily and monitor electrolytes.  Weight is stable.  Encourage use of yannick hose for LE edema which may also be due to venous insufficiency

## 2017-05-23 NOTE — ASSESSMENT & PLAN NOTE
Currently afebrile.  Last WBC was normal, last 4.58.  Has completed Cipro and Flagyl course.  Issue is resolved.

## 2017-05-23 NOTE — ASSESSMENT & PLAN NOTE
Participating with therapy.  She is currently at a [facility] for her adls and transfers.  Ambulating up to 150 feet.  Continue PT/OT to restore functional goals.  She reports she lives alone but at [facility].  Plans to return to same living arrangement after discharge.  Continue Heparin for DVT prevention.

## 2017-05-23 NOTE — ASSESSMENT & PLAN NOTE
BP is 162/72 HR is 66.  Currently treated with benazepril 20 mg daily, Lasix 40 mg daily and Toprol  mg daily.  Weight today is stable.  Will increase benazepril to 40 mg daily (current home dosing)

## 2017-05-24 PROCEDURE — 25000003 PHARM REV CODE 250: Performed by: INTERNAL MEDICINE

## 2017-05-24 PROCEDURE — 97110 THERAPEUTIC EXERCISES: CPT

## 2017-05-24 PROCEDURE — 97530 THERAPEUTIC ACTIVITIES: CPT

## 2017-05-24 PROCEDURE — 97116 GAIT TRAINING THERAPY: CPT

## 2017-05-24 PROCEDURE — 11000004 HC SNF PRIVATE

## 2017-05-24 PROCEDURE — 25000003 PHARM REV CODE 250: Performed by: STUDENT IN AN ORGANIZED HEALTH CARE EDUCATION/TRAINING PROGRAM

## 2017-05-24 PROCEDURE — 97535 SELF CARE MNGMENT TRAINING: CPT

## 2017-05-24 PROCEDURE — 25000003 PHARM REV CODE 250: Performed by: NURSE PRACTITIONER

## 2017-05-24 PROCEDURE — 92507 TX SP LANG VOICE COMM INDIV: CPT

## 2017-05-24 RX ADMIN — TRAMADOL HYDROCHLORIDE 25 MG: 50 TABLET, FILM COATED ORAL at 09:05

## 2017-05-24 RX ADMIN — HEPARIN SODIUM 5000 UNITS: 5000 INJECTION, SOLUTION INTRAVENOUS; SUBCUTANEOUS at 05:05

## 2017-05-24 RX ADMIN — ATORVASTATIN CALCIUM 20 MG: 20 TABLET, FILM COATED ORAL at 09:05

## 2017-05-24 RX ADMIN — HEPARIN SODIUM 5000 UNITS: 5000 INJECTION, SOLUTION INTRAVENOUS; SUBCUTANEOUS at 09:05

## 2017-05-24 RX ADMIN — HEPARIN SODIUM 5000 UNITS: 5000 INJECTION, SOLUTION INTRAVENOUS; SUBCUTANEOUS at 03:05

## 2017-05-24 RX ADMIN — FUROSEMIDE 40 MG: 40 TABLET ORAL at 09:05

## 2017-05-24 RX ADMIN — BENAZEPRIL HYDROCHLORIDE 40 MG: 40 TABLET, COATED ORAL at 09:05

## 2017-05-24 RX ADMIN — RAMELTEON 8 MG: 8 TABLET, FILM COATED ORAL at 09:05

## 2017-05-24 RX ADMIN — ESCITALOPRAM 20 MG: 10 TABLET, FILM COATED ORAL at 09:05

## 2017-05-24 RX ADMIN — PANTOPRAZOLE SODIUM 40 MG: 40 TABLET, DELAYED RELEASE ORAL at 09:05

## 2017-05-24 RX ADMIN — METOPROLOL SUCCINATE 100 MG: 100 TABLET, EXTENDED RELEASE ORAL at 09:05

## 2017-05-24 RX ADMIN — ACETAMINOPHEN 650 MG: 325 TABLET ORAL at 04:05

## 2017-05-24 NOTE — PT/OT/SLP PROGRESS
"Physical Therapy  Treatment    Lilia Hidalgo   MRN: [id number]   Admitting Diagnosis: Debility    PT Received On: 17  Total Time (min): 53       Billable Minutes: 53    Gait [id number], Therapeutic Activity 15 and Therapeutic Exercise 23    Treatment Type: Treatment cleared with PT and NP to ace wrap L knee  PT/PTA: PTA     PTA Visit Number: 2       General Precautions: Standard, fall  Orthopedic Precautions: N/A   Braces: N/A    Do you have any cultural, spiritual, Sabianist conflicts, given your current situation?: Mosque    Subjective:  Communicated with nsg prior to session."looking everywhere for my button to call and go to the bathroom" button on side under by pillow agreeable to therapy      Pain Ratin/10 ("little at rest" inc with mobility/gait/WB)  Location - Side: Left  Location - Orientation: generalized  Location: knee (hip)  Pain Addressed: Pre-medicate for activity, Reposition, Distraction, Cessation of Activity, Nurse notified  Pain Rating Post-Intervention:  (inc with mobility/WB, ace wrap knee helped)    Objective:  Patient found in bed     Functional Status:  MDS G  Bed Mobility Functional Status: S-SBA  Transfer Functional Status: S-SBA  Walk in Corridor Functional Status: CGA-Min (A)  Dressing Functional Status: 1: S-SBA  Toilet Use Functional Status: S-SBA  Personal Hygiene Functional Status: S-SBA          Bed Mobility:  Supine>Sit: S    Transfers:  Sit<>Stand: with RW SBA  Stand Pivot Transfer: EOB>BSC>WC with RW SBA      Gait:  Amb with RW CG/close SBA ~75 ft limited by knee pain, followed by additional 100 ft with L knee ace wrap CG/close SBA with repts of ace wrap helping the pain/giving more support    Therex:  2x15 AP,LAQ,hip flex    Additional Treatment:  LBE x 15 min  toileting SBA with trfs with RW, SBA/set up with clothing mgmt,danny care and hand hygiene    Patient left up in chair with with ST.    Assessment:  Lilia Hidalgo is a 85 y.o. female with a medical diagnosis of " Debility.  Pt tolerated well, ace wrap on left knee provided some relief during gait, pt would continue to benefit from skilled PT services to improve overall functional mobility, strength and endurance.  .    Rehab identified problem list/impairments: impaired endurance, weakness, impaired self care skills, impaired functional mobilty, decreased lower extremity function, pain    Rehab potential is good.    Activity tolerance: Fair    Discharge recommendations: home health PT     Barriers to discharge: Decreased caregiver support    Equipment recommendations: none     GOALS:    Physical Therapy Goals        Problem: Physical Therapy Goal    Goal Priority Disciplines Outcome Goal Variances Interventions   Physical Therapy Goal     PT/OT, PT Ongoing (interventions implemented as appropriate)     Description:  Goals to be met by: 2-3 weeks     Patient will increase functional independence with mobility by performin. Supine to sit with supervision. Met 17  2. Sit to supine with supervision  3. Sit to stand transfer with supervision Met 17  4. Bed to chair transfer with supervision using Rolling Walker  5. Gait  x 150 feet with Supervision using Rolling Walker.   6. Wheelchair propulsion x 150 feet with Supervision using bilateral upper extremities  7. Ascend/Descend 4 inch curb step with Stand-by Assistance using Rolling Walker.  8. Stand for 5 minutes with Stand-by Assistance using Rolling Walker while performing a task.  9. Lower extremity exercise program x20 reps per handout, with supervision                       PLAN:    Patient to be seen  (5-6x/wk)  to address the above listed problems via gait training, therapeutic activities, therapeutic exercises, neuromuscular re-education, wheelchair management/training  Plan of Care expires: 17  Plan of Care reviewed with: patient    Luly Anderson, PTA  2017

## 2017-05-24 NOTE — PLAN OF CARE
Problem: Patient Care Overview  Goal: Plan of Care Review  Outcome: Ongoing (interventions implemented as appropriate)  Pt a a o x 3, vss, resp effort even and unlabored. Voids per BSC. Call bell within reach, side rail sup x 3, Pt has remained free from falls.    05/24/17 0502   Coping/Psychosocial   Plan Of Care Reviewed With patient    Will continue to monitor.

## 2017-05-24 NOTE — PT/OT/SLP PROGRESS
Occupational Therapy  Treatment    Lilia Hidalgo   MRN: [id number]   Admitting Diagnosis: Debility    OT Date of Treatment: 17  Total Time (min): 49 min    Billable Minutes:  Self Care/Home Management 49    General Precautions: Standard, aspiration, fall  Orthopedic Precautions: N/A  Braces: N/A    Do you have any cultural, spiritual, Restorationist conflicts, given your current situation?: Evangelical    Subjective:  Communicated with nurse prior to session.  I want to dry my hair.     Pain Ratin/10          Objective:  Patient found with:  (supine in bed)    Functional Status:  MDS G  Bed Mobility Functional Status: S from supine to EOB  Transfer Functional Status: SBA sit to stand at EOB  Walk in Room Functional Status: SBA from EOB to dresser to bathroom shower ~20 feet  Dressing Functional Status: LBD Min (A) to thread underwear while seated and for balance while standing to manage underwear over hips; UBD SBA to don bra and overhead dress while seated  Eating Functional Status: Set up (A)  Personal Hygiene Functional Status: Set up (A) to wash face and blow dry hair while seated  Bathing Functional Status: CGA for balance when standing to wash private areas in pt.'s walk in shower with built in bench  Moving from seated to standing position: Not steady, but able to stabilize without staff assistance  Walking (with assistive device if used): Not steady, but able to stabilize without staff assistance  Turning around and facing the opposite direction while walking: Not steady, but able to stabilize without staff assistance          Additional Treatment:  Pt. Educated on safety techniques for entering and exiting walk-in shower.    Patient left up in chair with call button in reach and eating breakfast.    ASSESSMENT:  Lilia Hidalgo is a 85 y.o. female with a medical diagnosis of Debility and presents with decreased self-care skills, endurance, and functional mobility. Pt. Has progressed in therapy, however pt.  Remains unsafe to return home alone. OT recommends 24hr supervision and light assist upon D/C. Pt. Would benefit from continued OT services to maximize safety and independence in ADL tasks.     Rehab identified problem list/impairments: impaired endurance, impaired self care skills, impaired functional mobilty, decreased lower extremity function, pain    Rehab potential is good    Activity tolerance: Good    Discharge recommendations: home health OT (for supervision and light assist)     Barriers to discharge: Decreased caregiver support     Equipment recommendations: none     GOALS:    Occupational Therapy Goals        Problem: Occupational Therapy Goal    Goal Priority Disciplines Outcome Interventions   Occupational Therapy Goal     OT, PT/OT Ongoing (interventions implemented as appropriate)    Description:  Goals to be met by: 3 weeks     Patient will increase functional independence with ADLs by performing:    Feeding with Set-up Assistance.  MET 05-14  UE Dressing with Set-up Assistance. MET 05-14  LE Dressing with Supervision.  Grooming while standing with Supervision.  Toileting from bedside commode with Supervision for hygiene and clothing management.   Bathing from  shower chair/bench with Stand-by Assistance. MET sponge bath 05-14  Supine to sit with Modified [place]. MET 05/16  Stand pivot transfers with Supervision.  Toilet transfer to bedside commode with Supervision  Pt. Will be able to follow 2 step directions involving ADL tasks with  75% accuracy MET 05-14  Pt.. Will be independent with HEP for BUE as well as for improving FMC skills in left hand   .                         Plan:  Patient to be seen 5 x/week to address the above listed problems via self-care/home management, therapeutic activities, therapeutic exercises  Plan of Care expires: 06/08/17  Plan of Care reviewed with: patient    ARMANI Carias  05/24/2017

## 2017-05-24 NOTE — PLAN OF CARE
Problem: Occupational Therapy Goal  Goal: Occupational Therapy Goal  Goals to be met by: 3 weeks     Patient will increase functional independence with ADLs by performing:    Feeding with Set-up Assistance.  MET 05-14  UE Dressing with Set-up Assistance. MET 05-14  LE Dressing with Supervision.  Grooming while standing with Supervision.  Toileting from bedside commode with Supervision for hygiene and clothing management.   Bathing from  shower chair/bench with Stand-by Assistance. MET sponge bath 05-14  Supine to sit with Modified [place]. MET 05/16  Stand pivot transfers with Supervision.  Toilet transfer to bedside commode with Supervision  Pt. Will be able to follow 2 step directions involving ADL tasks with  75% accuracy MET 05-14  Pt.. Will be independent with HEP for BUE as well as for improving FMC skills in left hand   .        Pt. Tolerated session well.

## 2017-05-24 NOTE — PLAN OF CARE
Problem: SLP Goal  Goal: SLP Goal  Speech Language Pathology Goals  Goals expected to be met by 5/16:  1. Pt will tolerate a regular consistency diet and thin liquids without s/s of aspiration. Ongoing  2. Pt will participate in speech/language/cognitive evaluation to determine need for further intervention.- goal met 5/10  ADDITIONAL GOALS:  2. Pt will orient x 4 given min-mod A. Goal met.  3. Pt will complete immediate memory tasks with 70% accuracy given min A. Goal met.  4. Following a delay, pt will recall 3 words/facts given mod A. Goal met on 2/4 trials.  5. Pt will follow complex commands with 70% accuracy. Ongoing  6. Pt will complete moderate level problem solving tasks with 80% accuracy given mod A. Goal met.  7. Pt will list an average of 10 items in a category within one minute. Ongoing  8. Further evaluation of reading, writing, visual spatial, and math/money management abilities. Reading, writing, and visual spatial abilities appear to be WFL. Math/money management abilities not yet assessed.    Updated goals expected to be met by 5/23:   1. Pt will tolerate a regular consistency diet and thin liquids without s/s of aspiration. ongoing  2. Pt will orient x 4. Goal met  3. Pt will complete immediate memory tasks with 80% accuracy given min A. Goal not met provided only min A on average.  4. Following a delay, pt will consistently recall 3 words/facts given mod A. Goal met.  5. Pt will follow complex commands with 70% accuracy. Goal met.  6. Pt will complete moderate level problem solving tasks with 80% accuracy given min A. Goal met.   7. Pt will list an average of 10 items in a category within one minute. Goal met.  8. Further evaluation of math/money management abilities.  Goal met.    Updated goals expected to be met by 5/30:  1. Pt will tolerate a regular consistency diet and thin liquids without s/s of aspiration.  2. Pt will complete immediate memory tasks with 80% accuracy given mod A.  3.  Following a delay, pt will consistently recall 3 words/facts given min A.  4. Pt will complete moderate level problem solving tasks with 80% accuracy.  5. Pt will list an average of 12 items in a category within one minute.                        Outcome: Ongoing (interventions implemented as appropriate)  Pt participated well in cognitive tx. Continue to recommend further ST services upon d/c.  MATEUS Mejia, CCC-SLP  Speech Language Pathologist  [phone]  5/24/2017

## 2017-05-25 LAB
ANION GAP SERPL CALC-SCNC: 7 MMOL/L
BASOPHILS # BLD AUTO: 0.05 K/UL
BASOPHILS NFR BLD: 1.1 %
BUN SERPL-MCNC: 19 MG/DL
CALCIUM SERPL-MCNC: 9.2 MG/DL
CHLORIDE SERPL-SCNC: 103 MMOL/L
CO2 SERPL-SCNC: 30 MMOL/L
CREAT SERPL-MCNC: 0.8 MG/DL
DIFFERENTIAL METHOD: NORMAL
EOSINOPHIL # BLD AUTO: 0.3 K/UL
EOSINOPHIL NFR BLD: 5.4 %
ERYTHROCYTE [DISTWIDTH] IN BLOOD BY AUTOMATED COUNT: 14 %
EST. GFR  (AFRICAN AMERICAN): >60 ML/MIN/1.73 M^2
EST. GFR  (NON AFRICAN AMERICAN): >60 ML/MIN/1.73 M^2
GLUCOSE SERPL-MCNC: 89 MG/DL
HCT VFR BLD AUTO: 40.4 %
HGB BLD-MCNC: 13.1 G/DL
LYMPHOCYTES # BLD AUTO: 1.6 K/UL
LYMPHOCYTES NFR BLD: 34 %
MAGNESIUM SERPL-MCNC: 1.9 MG/DL
MCH RBC QN AUTO: 28.7 PG
MCHC RBC AUTO-ENTMCNC: 32.4 %
MCV RBC AUTO: 89 FL
MONOCYTES # BLD AUTO: 0.5 K/UL
MONOCYTES NFR BLD: 10.8 %
NEUTROPHILS # BLD AUTO: 2.3 K/UL
NEUTROPHILS NFR BLD: 48.7 %
PHOSPHATE SERPL-MCNC: 3.8 MG/DL
PLATELET # BLD AUTO: 163 K/UL
PMV BLD AUTO: 11.5 FL
POTASSIUM SERPL-SCNC: 3.6 MMOL/L
RBC # BLD AUTO: 4.56 M/UL
SODIUM SERPL-SCNC: 140 MMOL/L
WBC # BLD AUTO: 4.65 K/UL

## 2017-05-25 PROCEDURE — 80048 BASIC METABOLIC PNL TOTAL CA: CPT

## 2017-05-25 PROCEDURE — 92507 TX SP LANG VOICE COMM INDIV: CPT

## 2017-05-25 PROCEDURE — 84100 ASSAY OF PHOSPHORUS: CPT

## 2017-05-25 PROCEDURE — 25000003 PHARM REV CODE 250: Performed by: INTERNAL MEDICINE

## 2017-05-25 PROCEDURE — 97530 THERAPEUTIC ACTIVITIES: CPT

## 2017-05-25 PROCEDURE — 11000004 HC SNF PRIVATE

## 2017-05-25 PROCEDURE — 97803 MED NUTRITION INDIV SUBSEQ: CPT

## 2017-05-25 PROCEDURE — 85025 COMPLETE CBC W/AUTO DIFF WBC: CPT

## 2017-05-25 PROCEDURE — 97110 THERAPEUTIC EXERCISES: CPT

## 2017-05-25 PROCEDURE — 25000003 PHARM REV CODE 250: Performed by: NURSE PRACTITIONER

## 2017-05-25 PROCEDURE — 97116 GAIT TRAINING THERAPY: CPT

## 2017-05-25 PROCEDURE — 92526 ORAL FUNCTION THERAPY: CPT

## 2017-05-25 PROCEDURE — 83735 ASSAY OF MAGNESIUM: CPT

## 2017-05-25 PROCEDURE — 36415 COLL VENOUS BLD VENIPUNCTURE: CPT

## 2017-05-25 PROCEDURE — 25000003 PHARM REV CODE 250: Performed by: STUDENT IN AN ORGANIZED HEALTH CARE EDUCATION/TRAINING PROGRAM

## 2017-05-25 RX ADMIN — BENAZEPRIL HYDROCHLORIDE 40 MG: 40 TABLET, COATED ORAL at 09:05

## 2017-05-25 RX ADMIN — METOPROLOL SUCCINATE 100 MG: 100 TABLET, EXTENDED RELEASE ORAL at 09:05

## 2017-05-25 RX ADMIN — ATORVASTATIN CALCIUM 20 MG: 20 TABLET, FILM COATED ORAL at 09:05

## 2017-05-25 RX ADMIN — HEPARIN SODIUM 5000 UNITS: 5000 INJECTION, SOLUTION INTRAVENOUS; SUBCUTANEOUS at 06:05

## 2017-05-25 RX ADMIN — FUROSEMIDE 40 MG: 40 TABLET ORAL at 09:05

## 2017-05-25 RX ADMIN — ACETAMINOPHEN 650 MG: 325 TABLET ORAL at 01:05

## 2017-05-25 RX ADMIN — RAMELTEON 8 MG: 8 TABLET, FILM COATED ORAL at 10:05

## 2017-05-25 RX ADMIN — TRAMADOL HYDROCHLORIDE 25 MG: 50 TABLET, FILM COATED ORAL at 10:05

## 2017-05-25 RX ADMIN — PANTOPRAZOLE SODIUM 40 MG: 40 TABLET, DELAYED RELEASE ORAL at 09:05

## 2017-05-25 RX ADMIN — ESCITALOPRAM 20 MG: 10 TABLET, FILM COATED ORAL at 09:05

## 2017-05-25 RX ADMIN — TRAMADOL HYDROCHLORIDE 25 MG: 50 TABLET, FILM COATED ORAL at 09:05

## 2017-05-25 RX ADMIN — HEPARIN SODIUM 5000 UNITS: 5000 INJECTION, SOLUTION INTRAVENOUS; SUBCUTANEOUS at 10:05

## 2017-05-25 RX ADMIN — HEPARIN SODIUM 5000 UNITS: 5000 INJECTION, SOLUTION INTRAVENOUS; SUBCUTANEOUS at 01:05

## 2017-05-25 NOTE — PROGRESS NOTES
[facility]  Adult Nutrition  Progress Note    SUMMARY     Recommendations    Recommendation/Intervention: Educate on fluid restriction   Goals: Pt to be complient with fluid restriction, Pt intake to meet 85% of EEN  Nutrition Goal Status: new  Communication of RD Recs: other (comment)    Continuum of Care Plan         Reason for Assessment    Reason for Assessment: RD follow-up  Diagnosis:  (Debility)  Relevent Medical History: HTN, Obesity, GERD, CHF, Diverticulitis, Dysphagia, Hyperlipidemia   Interdisciplinary Rounds: attended     General Information Comments: Pt states the CHF is new and she was unaware of fluid restriction    Nutrition Discharge Planning: Patient to consume 85% of EEN prior to discharge     Nutrition Prescription Ordered    Current Diet Order: Cardiac  Nutrition Order Comments: 1500ml fluid           Oral Nutrition Supplement: -     Evaluation of Received Nutrients/Fluid Intake       Energy Calories Required: meeting needs     Protein Required: meeting needs         Fluid Required: meeting needs     Tolerance: tolerating     Nutrition Risk Screen     Nutrition Risk Screen: no indicators present    Nutrition/Diet History       Typical Food/Fluid Intake: eats 2-3 meals per day, no special diet but limits portions so as not to gain weight  Food Preferences: likes to drink soda for lunch and dinner , no coffee, No cultural or Jehovah's witness needs relative to food        Factors Affecting Nutritional Intake: difficulty/impaired swallowing        Labs/Tests/Procedures/Meds       Pertinent Labs Reviewed: reviewed, pertinent     Pertinent Medications Reviewed: reviewed, pertinent       Physical Findings    Overall Physical Appearance:  (pale mild edema lower extremities)     Oral/Mouth Cavity: WDL       Anthropometrics    Height Method: Stated  Height (inches): 65 in  Weight Method: Standard Scale  Weight (kg): 82.6 kg     Ideal Body Weight (IBW), Female: 125 lb     % Ideal Body Weight,  Female (lb): 147.8 lb  BMI (kg/m2): 30.74  BMI Grade: 30 - 34.9- obesity - grade I, 25 - 29.9 - overweight          Estimated/Assessed Needs    Weight Used For Calorie Calculations: 83.8 kg (184 lb 11.9 oz)   Height (cm): 165.1 cm     Energy Need Method: Kcal/kg       RMR (Aynor-St. Jeor Equation): 1289.84        Weight Used For Protein Calculations: 56.8 kg (125 lb 3.5 oz)  Protein Requirements: 62-75    Fluid Need Method: other (see comments) (per MD 1500ml)             Assessment and Plan    No new Assessment & Plan notes have been filed under this hospital service since the last note was generated.  Service: Nutrition      Monitor and Evaluation    Food and Nutrient Intake: food and beverage intake  Food and Nutrient Adminstration: diet order  Knowledge/Beliefs/Attitudes: food and nutrition knowledge/skill     Anthropometric Measurements: weight change  Biochemical Data, Medical Tests and Procedures: gastrointestinal profile, electrolyte and renal panel  Nutrition-Focused Physical Findings: overall appearance  % Intake of Estimated Energy Needs: 75 - 100 %  % Meal Intake: 75%    Nutrition Risk    Level of Risk: other (see comments) (follow weekly)    Nutrition Follow-Up    RD Follow-up?: Yes

## 2017-05-25 NOTE — PT/OT/SLP PROGRESS
Speech Language Pathology  Treatment    Lilia Hidalgo   MRN: [id number]   Admitting Diagnosis: Debility    Diet recommendations: Solid Diet Level: Regular  Liquid Diet Level: Thin HOB to 90 degrees, Small bites/sips and 1 bite/sip at a time    SLP Treatment Date: 05/25/17  Speech Start Time: 0915     Speech Stop Time: 0945     Speech Total (min): 30 min       TREATMENT BILLABLE MINUTES:  Speech Therapy Individual 20 and Treatment Swallowing Dysfunction 10    Has the patient been evaluated by SLP for swallowing? : Yes  Keep patient NPO?: No   General Precautions: Standard, aspiration, fall          Subjective:  Awake & alert. Pain rating 2 in knee & hip. NSG at bedside.          Objective:    Pt tolerated cup sips of thin x5 while taking pills whole, one at a time with no s/s aspiration. Delayed cough x1 noted. NSG reports no difficulty observed with meals. All precautions reviewed, pt recalled all given min A. Pt named an average of 7 items in a concrete category in one minute over 4 trials. Pt immediately recalled 4 unrelated items with 70% acc IND'ly, 90% acc given min A. Strategies reviewed. Pt became distracted from tx tasks when there was talking outside of room.     FIM:                                 Assessment:  Lilia Hidalgo is a 85 y.o. female with a medical diagnosis of Debility and presents with cognitive linguistic impairment, ST is also monitoring pt for diet tolerance        Discharge recommendations: Discharge Facility/Level Of Care Needs: home health speech therapy     Goals:    SLP Goals        Problem: SLP Goal    Goal Priority Disciplines Outcome   SLP Goal     SLP Ongoing (interventions implemented as appropriate)   Description:  Speech Language Pathology Goals  Goals expected to be met by 5/16:  1. Pt will tolerate a regular consistency diet and thin liquids without s/s of aspiration. Ongoing  2. Pt will participate in speech/language/cognitive evaluation to determine need for further  intervention.- goal met 5/10  ADDITIONAL GOALS:  2. Pt will orient x 4 given min-mod A. Goal met.  3. Pt will complete immediate memory tasks with 70% accuracy given min A. Goal met.  4. Following a delay, pt will recall 3 words/facts given mod A. Goal met on 2/4 trials.  5. Pt will follow complex commands with 70% accuracy. Ongoing  6. Pt will complete moderate level problem solving tasks with 80% accuracy given mod A. Goal met.  7. Pt will list an average of 10 items in a category within one minute. Ongoing  8. Further evaluation of reading, writing, visual spatial, and math/money management abilities. Reading, writing, and visual spatial abilities appear to be WFL. Math/money management abilities not yet assessed.    Updated goals expected to be met by 5/23:   1. Pt will tolerate a regular consistency diet and thin liquids without s/s of aspiration. ongoing  2. Pt will orient x 4. Goal met  3. Pt will complete immediate memory tasks with 80% accuracy given min A. Goal not met provided only min A on average.  4. Following a delay, pt will consistently recall 3 words/facts given mod A. Goal met.  5. Pt will follow complex commands with 70% accuracy. Goal met.  6. Pt will complete moderate level problem solving tasks with 80% accuracy given min A. Goal met.   7. Pt will list an average of 10 items in a category within one minute. Goal met.  8. Further evaluation of math/money management abilities.  Goal met.    Updated goals expected to be met by 5/30:  1. Pt will tolerate a regular consistency diet and thin liquids without s/s of aspiration.  2. Pt will complete immediate memory tasks with 80% accuracy given mod A.  3. Following a delay, pt will consistently recall 3 words/facts given min A.  4. Pt will complete moderate level problem solving tasks with 80% accuracy.  5. Pt will list an average of 12 items in a category within one minute.                                          Plan:   Patient to be seen Therapy  Frequency: 5 x/week   Plan of Care expires: 06/08/17  Plan of Care reviewed with: patient  SLP Follow-up?: Yes  SLP - Next Visit Date: 05/22/17           Amanda Gautam CCC-SLP   [phone]  05/25/2017

## 2017-05-25 NOTE — PLAN OF CARE
Problem: Patient Care Overview  Goal: Plan of Care Review  Outcome: Ongoing (interventions implemented as appropriate)  Pt remained free from falls, injury and trauma throughout shift. Pt AAO x 4. Bed in lowest position and wheels locked. Pt instructed to call for assistance. Hourly rounds to monitor pt for safety and comfort. Personal items and call bell within reach. No signs or symptoms of distress.  Will continue to monitor pt.

## 2017-05-25 NOTE — PT/OT/SLP PROGRESS
"Occupational Therapy  Treatment    Lilia Hidalgo   MRN: [id number]   Admitting Diagnosis: Debility    OT Date of Treatment: 05/25/17  Total Time (min): 38 min    Billable Minutes:  Therapeutic Activity 15 and Therapeutic Exercise 23    General Precautions: Standard, aspiration, fall  Orthopedic Precautions: N/A  Braces: N/A    Do you have any cultural, spiritual, Anabaptism conflicts, given your current situation?: Yarsani    Subjective:  "Do you hear my knee popping?"    Pain/Comfort  Pain Rating 1: pt with c/o L knee pain throughout session; pt with c/o "popping" in L knee; did not rate pain but it did appear to affect her ability to perform functional mobility and standing tasks    Objective:  Pt presented up in w/c    Functional Status:  Transfer Functional Status:   -Pt completed sit->stand t/f requiring min A for rise using RW; pt had several failed attempts at achieving stand from w/c level; OT educated pt on technique and proper weight distribution to assist with sit->stand t/f's from low surfaces     Functional Mobility: ~50ft in hallway with SBA using RW; limited by L knee pain    OT Exercises: Bilateral Vick 3x10 max incline without added resistance     UE Ergometer 10min with one short rest break required 2* fatigue    Patient left up in chair with call button in reach    ASSESSMENT:  Lilia Hidalgo is a 85 y.o. female with a medical diagnosis of Debility.  Pt is limited by L knee pain and decreased strength/endurance for functional activities.  Pt is progressing towards her goals and will benefit from continued skilled OT services to maximize functional independence.  Pt would benefit from 24/7 SPV at d/c, but reports she does not have this available.      Rehab identified problem list/impairments: impaired endurance, impaired self care skills, impaired functional mobilty, decreased lower extremity function, pain    Rehab potential is good    Activity tolerance: Fair    Discharge recommendations: home " health OT (for supervision and light assist)     Barriers to discharge: Decreased caregiver support     Equipment recommendations: none     GOALS:    Occupational Therapy Goals        Problem: Occupational Therapy Goal    Goal Priority Disciplines Outcome Interventions   Occupational Therapy Goal     OT, PT/OT Ongoing (interventions implemented as appropriate)    Description:  Goals to be met by: 3 weeks     Patient will increase functional independence with ADLs by performing:    Feeding with Set-up Assistance.  MET 05-14  UE Dressing with Set-up Assistance. MET 05-14  LE Dressing with Supervision.  Grooming while standing with Supervision.  Toileting from bedside commode with Supervision for hygiene and clothing management.   Bathing from  shower chair/bench with Stand-by Assistance. MET sponge bath 05-14  Supine to sit with Modified [place]. MET 05/16  Stand pivot transfers with Supervision.  Toilet transfer to bedside commode with Supervision  Pt. Will be able to follow 2 step directions involving ADL tasks with  75% accuracy MET 05-14  Pt.. Will be independent with HEP for BUE as well as for improving FMC skills in left hand   .                         Plan:  Patient to be seen 5 x/week to address the above listed problems via self-care/home management, therapeutic activities, therapeutic exercises  Plan of Care expires: 06/08/17  Plan of Care reviewed with: patient    Gael MIR Gupta  05/25/2017

## 2017-05-25 NOTE — PLAN OF CARE
Problem: SLP Goal  Goal: SLP Goal  Speech Language Pathology Goals  Goals expected to be met by 5/16:  1. Pt will tolerate a regular consistency diet and thin liquids without s/s of aspiration. Ongoing  2. Pt will participate in speech/language/cognitive evaluation to determine need for further intervention.- goal met 5/10  ADDITIONAL GOALS:  2. Pt will orient x 4 given min-mod A. Goal met.  3. Pt will complete immediate memory tasks with 70% accuracy given min A. Goal met.  4. Following a delay, pt will recall 3 words/facts given mod A. Goal met on 2/4 trials.  5. Pt will follow complex commands with 70% accuracy. Ongoing  6. Pt will complete moderate level problem solving tasks with 80% accuracy given mod A. Goal met.  7. Pt will list an average of 10 items in a category within one minute. Ongoing  8. Further evaluation of reading, writing, visual spatial, and math/money management abilities. Reading, writing, and visual spatial abilities appear to be WFL. Math/money management abilities not yet assessed.    Updated goals expected to be met by 5/23:   1. Pt will tolerate a regular consistency diet and thin liquids without s/s of aspiration. ongoing  2. Pt will orient x 4. Goal met  3. Pt will complete immediate memory tasks with 80% accuracy given min A. Goal not met provided only min A on average.  4. Following a delay, pt will consistently recall 3 words/facts given mod A. Goal met.  5. Pt will follow complex commands with 70% accuracy. Goal met.  6. Pt will complete moderate level problem solving tasks with 80% accuracy given min A. Goal met.   7. Pt will list an average of 10 items in a category within one minute. Goal met.  8. Further evaluation of math/money management abilities.  Goal met.    Updated goals expected to be met by 5/30:  1. Pt will tolerate a regular consistency diet and thin liquids without s/s of aspiration.  2. Pt will complete immediate memory tasks with 80% accuracy given mod A.  3.  Following a delay, pt will consistently recall 3 words/facts given min A.  4. Pt will complete moderate level problem solving tasks with 80% accuracy.  5. Pt will list an average of 12 items in a category within one minute.                        Outcome: Ongoing (interventions implemented as appropriate)  Pt participated in ST session.     Amanda Gautam MS, CCC-SLP  Speech Language Pathologist  Pager: [phone]  5/25/2017

## 2017-05-25 NOTE — PT/OT/SLP PROGRESS
"Physical Therapy  Treatment    Lilia Hidalgo   MRN: [id number]   Admitting Diagnosis: Debility    PT Received On: 05/25/17  Total Time (min): 65       Billable Minutes:65    Gait [id number], Therapeutic Activity 20 and Therapeutic Exercise 30    Treatment Type: Treatment  PT/PTA: PTA     PTA Visit Number: 3       General Precautions: Standard, fall  Orthopedic Precautions: N/A   Braces: N/A    Do you have any cultural, spiritual, Buddhism conflicts, given your current situation?: Hoahaoism    Subjective:  "good to see you" agreeable to therapy      Pain/Comfort  Pain Rating 1: 5/10 (mostly with gait/WB also feels knee popping sometimes,NP nof)  Location - Side 1: Left  Location - Orientation 1: generalized  Location 1: knee  Pain Addressed 1: Pre-medicate for activity, Reposition, Distraction, Cessation of Activity, Nurse notified, Other (see comments) (NP and nsg notified of no chg in pain despite ace wrap and meds taken 1 hr prior to session)  Pain Rating Post-Intervention 1: 5/10    Objective:  Patient found with:  (in wc)       Functional Status:  MDS G  Transfer Functional Status: S-SBA  Walk in Room Functional Status: S-SBA  Walk in Corridor Functional Status: S-SBA  Locomotion on Unit Functional Status: S-SBA        Transfers:  Sit<>Stand: SBA with RW  Stand Pivot Transfer: SBA with RW from WC>BScommode>BSchair      Gait:  Amb with RW SBA ~ 116 ft no LOB,  Some pain throughout, mild antalgic gait noted, pt repts she feels her knee popping sometimes, nsg and NP notified, despite pain meds 1 hr prior to session and ace wrap to L knee, pain about the same as yesterday    Advanced Gait:  Curb Step:asc/jasmin 4" curb with RW CGA, vcs for safety/tech    Wheelchair Mobility:  Patient propels w/c ~75ft with BUE SBA vcs for tech     Therex:  2x15 reps AP,LAQ,hip flex    Additional Treatment:  LBE x 15 min  toileting trfs with RW SBA, pericare and clothing mgmt in std'g with RW SBA    Patient left up in chair with call " button in reach, nsg notified and belongings in reach.    Assessment:  Lilia Hidalgo is a 85 y.o. female with a medical diagnosis of Debility.  Pt tolerated well, pt demo good effort however gait distance remains limited by L knee pain, pt would continue to benefit from skilled PT services to improve overall functional mobility, strength and endurance.  .    Rehab identified problem list/impairments: impaired endurance, weakness, impaired self care skills, impaired functional mobilty, decreased lower extremity function, pain    Rehab potential is good.    Activity tolerance: Fair    Discharge recommendations: home health PT     Barriers to discharge: Decreased caregiver support    Equipment recommendations: none     GOALS:    Physical Therapy Goals        Problem: Physical Therapy Goal    Goal Priority Disciplines Outcome Goal Variances Interventions   Physical Therapy Goal     PT/OT, PT Ongoing (interventions implemented as appropriate)     Description:  Goals to be met by: 2-3 weeks     Patient will increase functional independence with mobility by performin. Supine to sit with supervision. Met 17  2. Sit to supine with supervision  3. Sit to stand transfer with supervision Met 17  4. Bed to chair transfer with supervision using Rolling Walker  5. Gait  x 150 feet with Supervision using Rolling Walker.   6. Wheelchair propulsion x 150 feet with Supervision using bilateral upper extremities  7. Ascend/Descend 4 inch curb step with Stand-by Assistance using Rolling Walker.  8. Stand for 5 minutes with Stand-by Assistance using Rolling Walker while performing a task.  9. Lower extremity exercise program x20 reps per handout, with supervision met                        PLAN:    Patient to be seen  (5-6x/wk)  to address the above listed problems via gait training, therapeutic activities, therapeutic exercises, neuromuscular re-education, wheelchair management/training  Plan of Care expires:  06/08/17  Plan of Care reviewed with: patient    Luly Anderson, PTA  05/25/2017

## 2017-05-25 NOTE — PLAN OF CARE
Problem: Occupational Therapy Goal  Goal: Occupational Therapy Goal  Goals to be met by: 3 weeks     Patient will increase functional independence with ADLs by performing:    Feeding with Set-up Assistance.  MET 05-14  UE Dressing with Set-up Assistance. MET 05-14  LE Dressing with Supervision.  Grooming while standing with Supervision.  Toileting from bedside commode with Supervision for hygiene and clothing management.   Bathing from  shower chair/bench with Stand-by Assistance. MET sponge bath 05-14  Supine to sit with Modified [place]. MET 05/16  Stand pivot transfers with Supervision.  Toilet transfer to bedside commode with Supervision  Pt. Will be able to follow 2 step directions involving ADL tasks with  75% accuracy MET 05-14  Pt.. Will be independent with HEP for BUE as well as for improving FMC skills in left hand   .        Outcome: Ongoing (interventions implemented as appropriate)  OT goals remain appropriate.  MIR Hebert  5/25/2017

## 2017-05-25 NOTE — PLAN OF CARE
Problem: Physical Therapy Goal  Goal: Physical Therapy Goal  Goals to be met by: 2-3 weeks     Patient will increase functional independence with mobility by performin. Supine to sit with supervision. Met 17  2. Sit to supine with supervision  3. Sit to stand transfer with supervision Met 17  4. Bed to chair transfer with supervision using Rolling Walker  5. Gait  x 150 feet with Supervision using Rolling Walker.   6. Wheelchair propulsion x 150 feet with Supervision using bilateral upper extremities  7. Ascend/Descend 4 inch curb step with Stand-by Assistance using Rolling Walker.  8. Stand for 5 minutes with Stand-by Assistance using Rolling Walker while performing a task.  9. Lower extremity exercise program x20 reps per handout, with supervision met      Outcome: Ongoing (interventions implemented as appropriate)  Goals remain appropriate

## 2017-05-26 PROCEDURE — 92526 ORAL FUNCTION THERAPY: CPT

## 2017-05-26 PROCEDURE — 92507 TX SP LANG VOICE COMM INDIV: CPT

## 2017-05-26 PROCEDURE — 97110 THERAPEUTIC EXERCISES: CPT

## 2017-05-26 PROCEDURE — 11000004 HC SNF PRIVATE

## 2017-05-26 PROCEDURE — 97530 THERAPEUTIC ACTIVITIES: CPT

## 2017-05-26 PROCEDURE — 25000003 PHARM REV CODE 250: Performed by: INTERNAL MEDICINE

## 2017-05-26 PROCEDURE — 97116 GAIT TRAINING THERAPY: CPT

## 2017-05-26 PROCEDURE — 25000003 PHARM REV CODE 250: Performed by: STUDENT IN AN ORGANIZED HEALTH CARE EDUCATION/TRAINING PROGRAM

## 2017-05-26 PROCEDURE — 25000003 PHARM REV CODE 250: Performed by: NURSE PRACTITIONER

## 2017-05-26 RX ORDER — TRAMADOL HYDROCHLORIDE 50 MG/1
25 TABLET ORAL EVERY 12 HOURS PRN
Qty: 15 TABLET | Refills: 0 | Status: SHIPPED | OUTPATIENT
Start: 2017-05-26 | End: 2017-06-14 | Stop reason: SDUPTHER

## 2017-05-26 RX ADMIN — RAMELTEON 8 MG: 8 TABLET, FILM COATED ORAL at 09:05

## 2017-05-26 RX ADMIN — ATORVASTATIN CALCIUM 20 MG: 20 TABLET, FILM COATED ORAL at 09:05

## 2017-05-26 RX ADMIN — TRAMADOL HYDROCHLORIDE 25 MG: 50 TABLET, FILM COATED ORAL at 09:05

## 2017-05-26 RX ADMIN — ACETAMINOPHEN 650 MG: 325 TABLET ORAL at 02:05

## 2017-05-26 RX ADMIN — BENAZEPRIL HYDROCHLORIDE 40 MG: 40 TABLET, COATED ORAL at 09:05

## 2017-05-26 RX ADMIN — ESCITALOPRAM 20 MG: 10 TABLET, FILM COATED ORAL at 09:05

## 2017-05-26 RX ADMIN — PANTOPRAZOLE SODIUM 40 MG: 40 TABLET, DELAYED RELEASE ORAL at 09:05

## 2017-05-26 RX ADMIN — METOPROLOL SUCCINATE 100 MG: 100 TABLET, EXTENDED RELEASE ORAL at 09:05

## 2017-05-26 RX ADMIN — HEPARIN SODIUM 5000 UNITS: 5000 INJECTION, SOLUTION INTRAVENOUS; SUBCUTANEOUS at 09:05

## 2017-05-26 RX ADMIN — HEPARIN SODIUM 5000 UNITS: 5000 INJECTION, SOLUTION INTRAVENOUS; SUBCUTANEOUS at 02:05

## 2017-05-26 RX ADMIN — FUROSEMIDE 40 MG: 40 TABLET ORAL at 09:05

## 2017-05-26 RX ADMIN — HEPARIN SODIUM 5000 UNITS: 5000 INJECTION, SOLUTION INTRAVENOUS; SUBCUTANEOUS at 05:05

## 2017-05-26 NOTE — PT/OT/SLP PROGRESS
"Physical Therapy  Treatment    Lilia Hidalgo   MRN: [id number]   Admitting Diagnosis: Debility    PT Received On: 05/26/17  Total Time (min): 53       Billable Minutes:53    Gait [id number], Therapeutic Activity 10 and Therapeutic Exercise 28    Treatment Type: Treatment  PT/PTA: PTA     PTA Visit Number: 4       General Precautions: Standard, fall  Orthopedic Precautions: N/A   Braces: N/A    Do you have any cultural, spiritual, Congregation conflicts, given your current situation?: Confucianist    Subjective:  Communicated with nsg prior to session.nsg present cleared for therapy  "I was just napping" agreeable to therapy    Pain/Comfort  Pain Rating 1: 6/10 (4/10 initially, inc 6/10 with ing gait distance/WB)  Location - Side 1: Left  Location - Orientation 1: generalized  Location 1: knee  Pain Addressed 1: Pre-medicate for activity, Nurse notified  Pain Rating Post-Intervention 1:  ("same")    Objective:   Patient found with:  (in bed)       Functional Status:  MDS G  Bed Mobility Functional Status: S-SBA  Transfer Functional Status: S-SBA  Walk in Room Functional Status: S-SBA  Walk in Corridor Functional Status: S-SBA          Bed Mobility:  Sit>Supine:S   Supine>Sit: S HOB flat no rail    Transfers:  Sit<>Stand: S with RW  Stand Pivot Transfer: S EOB>WC with RW      Gait:  Amb with RW SBA ~ 108 ft L knee ace wrapped for support and shoes donned, ed on step to gait pattern and using BUE support on RW when pain is bad to be safer and avoid L knee buckling, pt repts step to gait and ace wrap helps,   discussed with PT and NP if ace wrapped helped, she could purchase a velcro knee support she could lucian/doff her self.    Therex:  2x15 reps AP,GS,LAQ    Additional Treatment:  Leg press x 15 min    Patient left up in chair with call button in reach, nsg  notified and belongings in reach.    Assessment:  Lilia Hidalgo is a 85 y.o. female with a medical diagnosis of Debility.  Pt tolerated well, pt would continue to " benefit from skilled PT services to improve overall functional mobility, strength and endurance.  .    Rehab identified problem list/impairments: impaired endurance, weakness, impaired self care skills, impaired functional mobilty, decreased lower extremity function, pain    Rehab potential is good.    Activity tolerance: Fair    Discharge recommendations: home health PT     Barriers to discharge: Decreased caregiver support    Equipment recommendations: none     GOALS:    Physical Therapy Goals        Problem: Physical Therapy Goal    Goal Priority Disciplines Outcome Goal Variances Interventions   Physical Therapy Goal     PT/OT, PT Ongoing (interventions implemented as appropriate)     Description:  Goals to be met by: 2-3 weeks     Patient will increase functional independence with mobility by performin. Supine to sit with supervision. Met 17  2. Sit to supine with supervision Met  3. Sit to stand transfer with supervision Met 17  4. Bed to chair transfer with supervision using Rolling Walker Met  5. Gait  x 150 feet with Supervision using Rolling Walker.   6. Wheelchair propulsion x 150 feet with Supervision using bilateral upper extremities  7. Ascend/Descend 4 inch curb step with Stand-by Assistance using Rolling Walker.  8. Stand for 5 minutes with Stand-by Assistance using Rolling Walker while performing a task.  9. Lower extremity exercise program x20 reps per handout, with supervision met                         PLAN:    Patient to be seen  (5-6x/wk)  to address the above listed problems via gait training, therapeutic activities, therapeutic exercises, neuromuscular re-education, wheelchair management/training  Plan of Care expires: 17  Plan of Care reviewed with: patient    Luly Anderson, PTA  2017

## 2017-05-26 NOTE — CLINICAL REVIEW
Spoke with Daughter, Belkys Moser at [phone], this afternoon.  Advised x-ray did not indicate any new or acute findings to suggest fractures.  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  Recommend patient follow up with Orthopedics, Dr. [facility], after discharge for chronic management.    Imaging Results          X-Ray Knee 1 or 2 View Left (Final result)  Result time 05/25/17 14:47:55    Final result by Emre Rascon MD (05/25/17 14:47:55)                 Impression:     As above.      Electronically signed by: EMRE RASCON MD  Date:     05/25/17  Time:    14:47              Narrative:    Left knee, 2 views    Comparison: 12/15/16    Findings: Status post total knee arthroplasty.  No periprosthetic lucency or fracture.  Small joint effusion.                             X-Ray Hip 2 or 3 views Left (Final result)  Result time 05/25/17 14:47:25    Final result by Emre Rascon MD (05/25/17 14:47:25)                 Impression:        As above.      Electronically signed by: EMRE RASCON MD  Date:     05/25/17  Time:    14:47              Narrative:    Left hip radiographs    Images: 3    Comparison: 4/25/16    Findings:    Bones: No fracture.  No lytic or blastic lesion.  Joint: There is severe narrowing of hip cartilage space accompanied by subchondral sclerosis and osteophyte production.  Miscellaneous: Status post right total hip arthroplasty.  No evidence for complication on frontal views.

## 2017-05-26 NOTE — PLAN OF CARE
Problem: Physical Therapy Goal  Goal: Physical Therapy Goal  Goals to be met by: 2-3 weeks     Patient will increase functional independence with mobility by performin. Supine to sit with supervision. Met 17  2. Sit to supine with supervision Met  3. Sit to stand transfer with supervision Met 17  4. Bed to chair transfer with supervision using Rolling Walker Met  5. Gait  x 150 feet with Supervision using Rolling Walker.   6. Wheelchair propulsion x 150 feet with Supervision using bilateral upper extremities  7. Ascend/Descend 4 inch curb step with Stand-by Assistance using Rolling Walker.  8. Stand for 5 minutes with Stand-by Assistance using Rolling Walker while performing a task.  9. Lower extremity exercise program x20 reps per handout, with supervision met       Outcome: Ongoing (interventions implemented as appropriate)  Goals remain appropriate

## 2017-05-26 NOTE — PT/OT/SLP PROGRESS
Speech Language Pathology  Treatment    Lilia Hidalgo   MRN: [id number]   Admitting Diagnosis: Debility    Diet recommendations: Solid Diet Level: Regular  Liquid Diet Level: Thin HOB to 90 degrees, Small bites/sips and 1 bite/sip at a time    SLP Treatment Date: 05/26/17  Speech Start Time: 1100     Speech Stop Time: 1130     Speech Total (min): 30 min       TREATMENT BILLABLE MINUTES:  Speech Therapy Individual 20 and Treatment Swallowing Dysfunction 10    Has the patient been evaluated by SLP for swallowing? : Yes  Keep patient NPO?: No   General Precautions: Standard, fall          Subjective:  Awake & alert. No pain reported.          Objective:    Pt recalled 1/3 wds IND'ly following a 3 min filled delay, 3/3 given mod A (phonemic & categorical cueing). Pt was educated on strategies to improve recall & completed task again with increased accuracy. Pt provided solutions to medical & safety situation with 100% acc. Pt named an average of 10 items in a concrete category in 1 min IND'ly, improvement from yesterday. Pt tolerated thin liquid via cup sips x5 & solid trial (1 moises cracker) with no s/s aspiration & adequate oral phase of swallow. Swallow precautions were reviewed & pt recalled given min A.     FIM:                                 Assessment:  Lilia Hidalgo is a 85 y.o. female with a medical diagnosis of Debility and presents with cognitive linguistic impairment, dysphagia      Discharge recommendations: Discharge Facility/Level Of Care Needs: home health speech therapy     Goals:    SLP Goals        Problem: SLP Goal    Goal Priority Disciplines Outcome   SLP Goal     SLP Ongoing (interventions implemented as appropriate)   Description:  Speech Language Pathology Goals  Goals expected to be met by 5/16:  1. Pt will tolerate a regular consistency diet and thin liquids without s/s of aspiration. Ongoing  2. Pt will participate in speech/language/cognitive evaluation to determine need for further  intervention.- goal met 5/10  ADDITIONAL GOALS:  2. Pt will orient x 4 given min-mod A. Goal met.  3. Pt will complete immediate memory tasks with 70% accuracy given min A. Goal met.  4. Following a delay, pt will recall 3 words/facts given mod A. Goal met on 2/4 trials.  5. Pt will follow complex commands with 70% accuracy. Ongoing  6. Pt will complete moderate level problem solving tasks with 80% accuracy given mod A. Goal met.  7. Pt will list an average of 10 items in a category within one minute. Ongoing  8. Further evaluation of reading, writing, visual spatial, and math/money management abilities. Reading, writing, and visual spatial abilities appear to be WFL. Math/money management abilities not yet assessed.    Updated goals expected to be met by 5/23:   1. Pt will tolerate a regular consistency diet and thin liquids without s/s of aspiration. ongoing  2. Pt will orient x 4. Goal met  3. Pt will complete immediate memory tasks with 80% accuracy given min A. Goal not met provided only min A on average.  4. Following a delay, pt will consistently recall 3 words/facts given mod A. Goal met.  5. Pt will follow complex commands with 70% accuracy. Goal met.  6. Pt will complete moderate level problem solving tasks with 80% accuracy given min A. Goal met.   7. Pt will list an average of 10 items in a category within one minute. Goal met.  8. Further evaluation of math/money management abilities.  Goal met.    Updated goals expected to be met by 5/30:  1. Pt will tolerate a regular consistency diet and thin liquids without s/s of aspiration.  2. Pt will complete immediate memory tasks with 80% accuracy given mod A.  3. Following a delay, pt will consistently recall 3 words/facts given min A.  4. Pt will complete moderate level problem solving tasks with 80% accuracy.  5. Pt will list an average of 12 items in a category within one minute.                                          Plan:   Patient to be seen Therapy  Frequency: 5 x/week   Plan of Care expires: 06/08/17  Plan of Care reviewed with: patient  SLP Follow-up?: Yes  SLP - Next Visit Date: 05/22/17           Amanda Gautam CCC-SLP   [phone]  05/26/2017

## 2017-05-26 NOTE — PLAN OF CARE
05/26/2017  11:50 AM     05/26/17 1149   Medicare Message   Important Message from Medicare regarding Discharge Appeal Rights Explained to patient/caregiver   SHAREE unable to serve NOMNC to pt due to cognitive linguistic impairment.  SHAREE called pt's daughter (Belkys Moser [phone]) to notify of d/c date of 5/29/17 and appeal right.  Belkys expressed understanding and satisfaction regarding same.  SHAREE left copy of NOMNC with pt in room.  SHAREE faxed copy of NOMNC to [facility] (fx [phone]) and placed original in pt's blue chart in nurse's station.    Leon Chun, MAEGANSW  [id number]

## 2017-05-26 NOTE — PLAN OF CARE
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 Go on 6/5/2017.    Specialty:  Internal Medicine  Why:  Hospital follow up  Contact information:  [address]  [facility] [address]  [phone]                     I have seen and examined this patient face to face today. My clinical findings that support the need for the home health skilled services and home bound status are the following:  Weakness/numbness causing balance and gait disturbance due to Weakness/Debility making it taxing to leave home.    Allergies:  Review of patient's allergies indicates:   Allergen Reactions    Aspirin Nausea Only     Other reaction(s): esophageal pain    Celebrex  [celecoxib]      Other reaction(s): Rash    Ibuprofen      Other reaction(s): esophogeal pain    Steroid  [corticosteroids (glucocorticoids)]      Other reaction(s): Flushing (skin)    Sulfa dyne      Other reaction(s): esophogeal pain    Morphine  Hallucinations       Diet: cardiac diet    Activities: activity as tolerated    Nursing:   SN to complete comprehensive assessment including routine vital signs. Instruct on disease process and s/s of complications to report to MD. Review/verify medication list sent home with the patient at time of discharge  and instruct patient/caregiver as needed. Frequency may be adjusted depending on start of care date.    Notify MD if SBP > 160 or < 90; DBP > 90 or < 50; HR > 120 or < 50; Temp > 101;       CONSULTS:    Physical Therapy to evaluate and treat. Evaluate for home safety and equipment needs; Establish/upgrade home exercise program. Perform / instruct on therapeutic exercises, gait training, transfer training, and Range of Motion.  Occupational Therapy to evaluate and treat. Evaluate home environment for safety and equipment needs. Perform/Instruct on transfers, ADL training, ROM, and therapeutic exercises.  Speech Therapy  to evaluate and treat for  Cognition.  Aide to provide assistance with personal care, ADLs, and vital signs.    MISCELLANEOUS CARE:  Heart Failure:      SN to instruct on the following:    Instruct on the definition of CHF.   Instruct on the signs/sympoms of CHF to be reported.   Instruct on and monitor daily weights.   Instruct on factors that cause exacerbation.   Instruct on action, dose, schedule, and side effects of medications.   Instruct on diet as prescribed.   Instruct on activity allowed.   Instruct on life-style modifications for life long management of CHF   SN to assess compliance with daily weights, diet, medications, fluid retention,    safety precautions, activities permitted and life-style modifications.   Additional 1-2 SN visits per week as needed for signs and symptoms     of CHF exacerbation.      For Weight Gain > 2-3 lbs in 1 day or 4-6 lbs over 1 week notify PCP:      WOUND CARE ORDERS  n/a      Medications: Review discharge medications with patient and family and provide  education.      Current Discharge Medication List      CONTINUE these medications which have CHANGED    Details   tramadol (ULTRAM) 50 mg tablet Take 0.5 tablets (25 mg total) by mouth every 12 (twelve) hours as needed for Pain.  Qty: 15 tablet, Refills: 0         CONTINUE these medications which have NOT CHANGED    Details   acetaminophen (TYLENOL) 500 MG tablet Take 500 mg by mouth every 6 (six) hours as needed.      atorvastatin (LIPITOR) 20 MG tablet take 1 tablet by mouth once daily  Qty: 30 tablet, Refills: 11      benazepril (LOTENSIN) 40 MG tablet Take 1 tablet (40 mg total) by mouth once daily.  Qty: 90 tablet, Refills: 3      escitalopram oxalate (LEXAPRO) 20 MG tablet Take 1 tablet (20 mg total) by mouth once daily.  Qty: 30 tablet, Refills: 11      furosemide (LASIX) 40 MG tablet Take 1 tablet (40 mg total) by mouth once daily.  Qty: 90 tablet, Refills: 3    Associated Diagnoses: Swelling of lower extremity      melatonin 5 mg Tab Take 1 tablet by mouth every evening.       metoprolol succinate (TOPROL-XL) 100 MG 24 hr tablet Take 1 tablet (100 mg total) by mouth once daily.  Qty: 90 tablet, Refills: 3    Associated Diagnoses: Benign essential hypertension      MULTIVITAMIN W-MINERALS/LUTEIN (CENTRUM SILVER ORAL) Take by mouth once daily.      omeprazole (PRILOSEC) 20 MG capsule Take 1 capsule (20 mg total) by mouth once daily.  Qty: 90 capsule, Refills: 3    Associated Diagnoses: Gastroesophageal reflux disease, esophagitis presence not specified; Hiatal hernia      temazepam (RESTORIL) 15 mg Cap take 1 capsule by mouth at bedtime  Qty: 30 capsule, Refills: 5         STOP taking these medications       fish oil-omega-3 fatty acids 300-1,000 mg capsule Comments:   Reason for Stopping:         meloxicam (MOBIC) 15 MG tablet Comments:   Reason for Stopping:         triamcinolone acetonide 0.1% (KENALOG) 0.1 % cream Comments:   Reason for Stopping:               I certify that this patient is confined to  her home and needs physical therapy, speech therapy and occupational therapy.

## 2017-05-26 NOTE — PLAN OF CARE
Problem: SLP Goal  Goal: SLP Goal  Speech Language Pathology Goals  Goals expected to be met by 5/16:  1. Pt will tolerate a regular consistency diet and thin liquids without s/s of aspiration. Ongoing  2. Pt will participate in speech/language/cognitive evaluation to determine need for further intervention.- goal met 5/10  ADDITIONAL GOALS:  2. Pt will orient x 4 given min-mod A. Goal met.  3. Pt will complete immediate memory tasks with 70% accuracy given min A. Goal met.  4. Following a delay, pt will recall 3 words/facts given mod A. Goal met on 2/4 trials.  5. Pt will follow complex commands with 70% accuracy. Ongoing  6. Pt will complete moderate level problem solving tasks with 80% accuracy given mod A. Goal met.  7. Pt will list an average of 10 items in a category within one minute. Ongoing  8. Further evaluation of reading, writing, visual spatial, and math/money management abilities. Reading, writing, and visual spatial abilities appear to be WFL. Math/money management abilities not yet assessed.    Updated goals expected to be met by 5/23:   1. Pt will tolerate a regular consistency diet and thin liquids without s/s of aspiration. ongoing  2. Pt will orient x 4. Goal met  3. Pt will complete immediate memory tasks with 80% accuracy given min A. Goal not met provided only min A on average.  4. Following a delay, pt will consistently recall 3 words/facts given mod A. Goal met.  5. Pt will follow complex commands with 70% accuracy. Goal met.  6. Pt will complete moderate level problem solving tasks with 80% accuracy given min A. Goal met.   7. Pt will list an average of 10 items in a category within one minute. Goal met.  8. Further evaluation of math/money management abilities.  Goal met.    Updated goals expected to be met by 5/30:  1. Pt will tolerate a regular consistency diet and thin liquids without s/s of aspiration.  2. Pt will complete immediate memory tasks with 80% accuracy given mod A.  3.  Following a delay, pt will consistently recall 3 words/facts given min A.  4. Pt will complete moderate level problem solving tasks with 80% accuracy.  5. Pt will list an average of 12 items in a category within one minute.                        Outcome: Ongoing (interventions implemented as appropriate)  Pt participated in ST session.     Amanda Gautam MS, CCC-SLP  Speech Language Pathologist  Pager: [phone]  5/26/2017

## 2017-05-27 PROCEDURE — 25000003 PHARM REV CODE 250: Performed by: INTERNAL MEDICINE

## 2017-05-27 PROCEDURE — 11000004 HC SNF PRIVATE

## 2017-05-27 PROCEDURE — 97110 THERAPEUTIC EXERCISES: CPT

## 2017-05-27 PROCEDURE — 25000003 PHARM REV CODE 250: Performed by: STUDENT IN AN ORGANIZED HEALTH CARE EDUCATION/TRAINING PROGRAM

## 2017-05-27 PROCEDURE — 97530 THERAPEUTIC ACTIVITIES: CPT

## 2017-05-27 PROCEDURE — 97116 GAIT TRAINING THERAPY: CPT

## 2017-05-27 PROCEDURE — 25000003 PHARM REV CODE 250: Performed by: NURSE PRACTITIONER

## 2017-05-27 RX ADMIN — ESCITALOPRAM 20 MG: 10 TABLET, FILM COATED ORAL at 11:05

## 2017-05-27 RX ADMIN — TRAMADOL HYDROCHLORIDE 25 MG: 50 TABLET, FILM COATED ORAL at 09:05

## 2017-05-27 RX ADMIN — HEPARIN SODIUM 5000 UNITS: 5000 INJECTION, SOLUTION INTRAVENOUS; SUBCUTANEOUS at 02:05

## 2017-05-27 RX ADMIN — ATORVASTATIN CALCIUM 20 MG: 20 TABLET, FILM COATED ORAL at 11:05

## 2017-05-27 RX ADMIN — RAMELTEON 8 MG: 8 TABLET, FILM COATED ORAL at 09:05

## 2017-05-27 RX ADMIN — HEPARIN SODIUM 5000 UNITS: 5000 INJECTION, SOLUTION INTRAVENOUS; SUBCUTANEOUS at 09:05

## 2017-05-27 RX ADMIN — TRAMADOL HYDROCHLORIDE 25 MG: 50 TABLET, FILM COATED ORAL at 02:05

## 2017-05-27 RX ADMIN — METOPROLOL SUCCINATE 100 MG: 100 TABLET, EXTENDED RELEASE ORAL at 11:05

## 2017-05-27 RX ADMIN — HEPARIN SODIUM 5000 UNITS: 5000 INJECTION, SOLUTION INTRAVENOUS; SUBCUTANEOUS at 05:05

## 2017-05-27 RX ADMIN — FUROSEMIDE 40 MG: 40 TABLET ORAL at 11:05

## 2017-05-27 RX ADMIN — PANTOPRAZOLE SODIUM 40 MG: 40 TABLET, DELAYED RELEASE ORAL at 11:05

## 2017-05-27 RX ADMIN — BENAZEPRIL HYDROCHLORIDE 40 MG: 40 TABLET, COATED ORAL at 11:05

## 2017-05-27 RX ADMIN — STANDARDIZED SENNA CONCENTRATE AND DOCUSATE SODIUM 1 TABLET: 8.6; 5 TABLET, FILM COATED ORAL at 11:05

## 2017-05-27 NOTE — PT/OT/SLP PROGRESS
"Physical Therapy  Treatment    Lilia Hidalgo   MRN: [id number]   Admitting Diagnosis: Debility    PT Received On: 05/27/17  Total Time (min): 45       Billable Minutes:  Gait [id number], Therapeutic Activity 15 and Therapeutic Exercise 15    Treatment Type: Treatment  PT/PTA: PT     PTA Visit Number: 0       General Precautions: Standard, fall  Orthopedic Precautions: N/A   Braces: N/A    Do you have any cultural, spiritual, Restorationism conflicts, given your current situation?: [employer]    Subjective:  Communicated with pt and nurse prior to session. Pt was agreeable to PT Services. "I'm going home this week."     Pain/Comfort  Pain Rating 1: 6/10  Location - Side 1: Left  Location - Orientation 1: generalized  Location 1: knee  Pain Addressed 1: Distraction    Objective:  Patient found seated in wheelchair after using the restroom with nurse's assistance.        Functional Status:  MDS G  Transfer Functional Status: S-SBA  Walk in Room Functional Status: S-SBA  Walk in Corridor Functional Status: S-SBA    Transfers:  Sit<>Stand: SBA from wheelchair/mat  Stand Pivot Transfer: SBA WC<>mat    Gait:  Amb 75 feet x 2 trials with a seated rest break in between trials, RW, SBA.     Therex:  Seated- hip flexion, long arc quads, ankle pumps, gluteal sets x 20 reps  Standing- heel raises x 20 reps    Additional Treatment:  LBE x 10 minutes to improve LE strength and AROM.    Patient left up in chair with call button in reach. Pt was educated on the importance of calling for assistance when she needs to use the restroom. She verbalized understanding.    Assessment:  Lilia Hidalgo is a 85 y.o. female with a medical diagnosis of Debility.  Ms. Hidalgo did c/o pain in her L knee, but she stated that her pain was alleviated with rest. She will benefit from further PT services to ensure safety upon return home.    Rehab identified problem list/impairments: impaired endurance, weakness, impaired self care skills, impaired functional " mobilty, decreased lower extremity function, pain    Rehab potential is good.    Activity tolerance: Good    Discharge recommendations: home health PT     Barriers to discharge: Decreased caregiver support    Equipment recommendations: none     GOALS:    Physical Therapy Goals        Problem: Physical Therapy Goal    Goal Priority Disciplines Outcome Goal Variances Interventions   Physical Therapy Goal     PT/OT, PT Ongoing (interventions implemented as appropriate)     Description:  Goals to be met by: 2-3 weeks     Patient will increase functional independence with mobility by performin. Supine to sit with supervision. Met 17  2. Sit to supine with supervision Met  3. Sit to stand transfer with supervision Met 17  4. Bed to chair transfer with supervision using Rolling Walker Met  5. Gait  x 150 feet with Supervision using Rolling Walker.   6. Wheelchair propulsion x 150 feet with Supervision using bilateral upper extremities  7. Ascend/Descend 4 inch curb step with Stand-by Assistance using Rolling Walker.  8. Stand for 5 minutes with Stand-by Assistance using Rolling Walker while performing a task.  9. Lower extremity exercise program x20 reps per handout, with supervision met                         PLAN:    Patient to be seen  (5-6x/wk)  to address the above listed problems via gait training, therapeutic activities, therapeutic exercises, neuromuscular re-education, wheelchair management/training  Plan of Care expires: 17  Plan of Care reviewed with: patient    Patricia Moralez, PT  2017

## 2017-05-27 NOTE — PLAN OF CARE
Problem: Physical Therapy Goal  Goal: Physical Therapy Goal  Goals to be met by: 2-3 weeks     Patient will increase functional independence with mobility by performin. Supine to sit with supervision. Met 17  2. Sit to supine with supervision Met  3. Sit to stand transfer with supervision Met 17  4. Bed to chair transfer with supervision using Rolling Walker Met  5. Gait  x 150 feet with Supervision using Rolling Walker.   6. Wheelchair propulsion x 150 feet with Supervision using bilateral upper extremities  7. Ascend/Descend 4 inch curb step with Stand-by Assistance using Rolling Walker.  8. Stand for 5 minutes with Stand-by Assistance using Rolling Walker while performing a task.  9. Lower extremity exercise program x20 reps per handout, with supervision met        Outcome: Ongoing (interventions implemented as appropriate)  Goals remain appropriate.

## 2017-05-28 PROCEDURE — 25000003 PHARM REV CODE 250: Performed by: INTERNAL MEDICINE

## 2017-05-28 PROCEDURE — 11000004 HC SNF PRIVATE

## 2017-05-28 PROCEDURE — 25000003 PHARM REV CODE 250: Performed by: STUDENT IN AN ORGANIZED HEALTH CARE EDUCATION/TRAINING PROGRAM

## 2017-05-28 PROCEDURE — 25000003 PHARM REV CODE 250: Performed by: NURSE PRACTITIONER

## 2017-05-28 RX ADMIN — BENAZEPRIL HYDROCHLORIDE 40 MG: 40 TABLET, COATED ORAL at 10:05

## 2017-05-28 RX ADMIN — METOPROLOL SUCCINATE 100 MG: 100 TABLET, EXTENDED RELEASE ORAL at 10:05

## 2017-05-28 RX ADMIN — TRAMADOL HYDROCHLORIDE 25 MG: 50 TABLET, FILM COATED ORAL at 10:05

## 2017-05-28 RX ADMIN — HEPARIN SODIUM 5000 UNITS: 5000 INJECTION, SOLUTION INTRAVENOUS; SUBCUTANEOUS at 03:05

## 2017-05-28 RX ADMIN — HEPARIN SODIUM 5000 UNITS: 5000 INJECTION, SOLUTION INTRAVENOUS; SUBCUTANEOUS at 05:05

## 2017-05-28 RX ADMIN — RAMELTEON 8 MG: 8 TABLET, FILM COATED ORAL at 09:05

## 2017-05-28 RX ADMIN — PANTOPRAZOLE SODIUM 40 MG: 40 TABLET, DELAYED RELEASE ORAL at 10:05

## 2017-05-28 RX ADMIN — HEPARIN SODIUM 5000 UNITS: 5000 INJECTION, SOLUTION INTRAVENOUS; SUBCUTANEOUS at 09:05

## 2017-05-28 RX ADMIN — ATORVASTATIN CALCIUM 20 MG: 20 TABLET, FILM COATED ORAL at 10:05

## 2017-05-28 RX ADMIN — FUROSEMIDE 40 MG: 40 TABLET ORAL at 10:05

## 2017-05-28 RX ADMIN — TRAMADOL HYDROCHLORIDE 25 MG: 50 TABLET, FILM COATED ORAL at 09:05

## 2017-05-28 RX ADMIN — ESCITALOPRAM 20 MG: 10 TABLET, FILM COATED ORAL at 10:05

## 2017-05-28 NOTE — PLAN OF CARE
Problem: Patient Care Overview  Goal: Plan of Care Review  Pt free of fall and injury. Pain controlled with tramadol. Bed in lowest position, call light in reach. Will continue to monitor pt.     Problem: Fall Risk (Adult)  Goal: Absence of Falls  Patient will demonstrate the desired outcomes by discharge/transition of care.    05/28/17 1808   Fall Risk (Adult)   Absence of Falls making progress toward outcome       Problem: Mobility, Physical Impaired (Adult)  Goal: Enhanced Mobility Skills  Patient will demonstrate the desired outcomes by discharge/transition of care.    05/28/17 1808   Mobility, Physical Impaired (Adult)   Enhanced Mobility Skills making progress toward outcome       Problem: Pressure Ulcer Risk ([place] Scale) (Adult,Obstetrics,Pediatric)  Intervention: Prevent/Minimize Sheer/Friction Injuries   05/27/17 1925   Skin Interventions   Pressure Reduction Devices pressure-redistributing mattress utilized   Pressure Reduction Techniques frequent weight shift encouraged

## 2017-05-29 VITALS
TEMPERATURE: 99 F | WEIGHT: 177.5 LBS | HEART RATE: 69 BPM | OXYGEN SATURATION: 94 % | HEIGHT: 65 IN | SYSTOLIC BLOOD PRESSURE: 100 MMHG | RESPIRATION RATE: 18 BRPM | DIASTOLIC BLOOD PRESSURE: 50 MMHG | BODY MASS INDEX: 29.57 KG/M2

## 2017-05-29 LAB
ANION GAP SERPL CALC-SCNC: 8 MMOL/L
BASOPHILS # BLD AUTO: 0.03 K/UL
BASOPHILS NFR BLD: 0.5 %
BUN SERPL-MCNC: 16 MG/DL
CALCIUM SERPL-MCNC: 9.2 MG/DL
CHLORIDE SERPL-SCNC: 100 MMOL/L
CO2 SERPL-SCNC: 29 MMOL/L
CREAT SERPL-MCNC: 0.8 MG/DL
DIFFERENTIAL METHOD: NORMAL
EOSINOPHIL # BLD AUTO: 0.2 K/UL
EOSINOPHIL NFR BLD: 4 %
ERYTHROCYTE [DISTWIDTH] IN BLOOD BY AUTOMATED COUNT: 14 %
EST. GFR  (AFRICAN AMERICAN): >60 ML/MIN/1.73 M^2
EST. GFR  (NON AFRICAN AMERICAN): >60 ML/MIN/1.73 M^2
GLUCOSE SERPL-MCNC: 91 MG/DL
HCT VFR BLD AUTO: 40.7 %
HGB BLD-MCNC: 13.5 G/DL
LYMPHOCYTES # BLD AUTO: 1.5 K/UL
LYMPHOCYTES NFR BLD: 26.9 %
MAGNESIUM SERPL-MCNC: 1.8 MG/DL
MCH RBC QN AUTO: 29.2 PG
MCHC RBC AUTO-ENTMCNC: 33.2 %
MCV RBC AUTO: 88 FL
MONOCYTES # BLD AUTO: 0.6 K/UL
MONOCYTES NFR BLD: 10.9 %
NEUTROPHILS # BLD AUTO: 3.2 K/UL
NEUTROPHILS NFR BLD: 57.7 %
PHOSPHATE SERPL-MCNC: 3.8 MG/DL
PLATELET # BLD AUTO: 152 K/UL
PMV BLD AUTO: 11.4 FL
POTASSIUM SERPL-SCNC: 3.7 MMOL/L
RBC # BLD AUTO: 4.63 M/UL
SODIUM SERPL-SCNC: 137 MMOL/L
WBC # BLD AUTO: 5.51 K/UL

## 2017-05-29 PROCEDURE — 25000003 PHARM REV CODE 250: Performed by: NURSE PRACTITIONER

## 2017-05-29 PROCEDURE — 97110 THERAPEUTIC EXERCISES: CPT

## 2017-05-29 PROCEDURE — 83735 ASSAY OF MAGNESIUM: CPT

## 2017-05-29 PROCEDURE — 25000003 PHARM REV CODE 250: Performed by: INTERNAL MEDICINE

## 2017-05-29 PROCEDURE — 36415 COLL VENOUS BLD VENIPUNCTURE: CPT

## 2017-05-29 PROCEDURE — 97116 GAIT TRAINING THERAPY: CPT

## 2017-05-29 PROCEDURE — 92507 TX SP LANG VOICE COMM INDIV: CPT

## 2017-05-29 PROCEDURE — 99315 NF DSCHRG MGMT 30 MIN/LESS: CPT | Mod: ,,, | Performed by: INTERNAL MEDICINE

## 2017-05-29 PROCEDURE — 85025 COMPLETE CBC W/AUTO DIFF WBC: CPT

## 2017-05-29 PROCEDURE — 97530 THERAPEUTIC ACTIVITIES: CPT

## 2017-05-29 PROCEDURE — 84100 ASSAY OF PHOSPHORUS: CPT

## 2017-05-29 PROCEDURE — 97535 SELF CARE MNGMENT TRAINING: CPT

## 2017-05-29 PROCEDURE — 80048 BASIC METABOLIC PNL TOTAL CA: CPT

## 2017-05-29 RX ADMIN — PANTOPRAZOLE SODIUM 40 MG: 40 TABLET, DELAYED RELEASE ORAL at 09:05

## 2017-05-29 RX ADMIN — ESCITALOPRAM 20 MG: 10 TABLET, FILM COATED ORAL at 09:05

## 2017-05-29 RX ADMIN — ATORVASTATIN CALCIUM 20 MG: 20 TABLET, FILM COATED ORAL at 09:05

## 2017-05-29 RX ADMIN — HEPARIN SODIUM 5000 UNITS: 5000 INJECTION, SOLUTION INTRAVENOUS; SUBCUTANEOUS at 05:05

## 2017-05-29 RX ADMIN — FUROSEMIDE 40 MG: 40 TABLET ORAL at 09:05

## 2017-05-29 RX ADMIN — TRAMADOL HYDROCHLORIDE 25 MG: 50 TABLET, FILM COATED ORAL at 09:05

## 2017-05-29 RX ADMIN — METOPROLOL SUCCINATE 100 MG: 100 TABLET, EXTENDED RELEASE ORAL at 09:05

## 2017-05-29 RX ADMIN — BENAZEPRIL HYDROCHLORIDE 40 MG: 40 TABLET, COATED ORAL at 09:05

## 2017-05-29 NOTE — PLAN OF CARE
Problem: Occupational Therapy Goal  Goal: Occupational Therapy Goal  Goals to be met by: 3 weeks     Patient will increase functional independence with ADLs by performing:    Feeding with Set-up Assistance.  MET 05-14  UE Dressing with Set-up Assistance. MET 05-14  LE Dressing with Supervision.  Grooming while standing with Supervision.  Toileting from bedside commode with Supervision for hygiene and clothing management.   Bathing from  shower chair/bench with Stand-by Assistance. MET sponge bath 05-14  Supine to sit with Modified [place]. MET 05/16  Stand pivot transfers with Supervision.  Toilet transfer to bedside commode with Supervision  Pt. Will be able to follow 2 step directions involving ADL tasks with  75% accuracy MET 05-14  Pt.. Will be independent with HEP for BUE as well as for improving FMC skills in left hand   .        Pt. Tolerated session well. Pt. To be D/C'd from [facility] OT on this date.

## 2017-05-29 NOTE — PLAN OF CARE
Problem: Physical Therapy Goal  Goal: Physical Therapy Goal  Goals to be met by: 2-3 weeks     Patient will increase functional independence with mobility by performin. Supine to sit with supervision. Met 17  2. Sit to supine with supervision Met  3. Sit to stand transfer with supervision Met 17  4. Bed to chair transfer with supervision using Rolling Walker Met  5. Gait  x 150 feet with Supervision using Rolling Walker.   6. Wheelchair propulsion x 150 feet with Supervision using bilateral upper extremities Met 17  7. Ascend/Descend 4 inch curb step with Stand-by Assistance using Rolling Walker.  8. Stand for 5 minutes with Stand-by Assistance using Rolling Walker while performing a task.  9. Lower extremity exercise program x20 reps per handout, with supervision met        Outcome: Unable to achieve outcome(s) by discharge  Pt continues to require increased assistance for functional mobility due to increased L hip and knee pain and risk for falls

## 2017-05-29 NOTE — ASSESSMENT & PLAN NOTE
Participating with therapy.  She is currently at a [facility] for her adls and transfers.  Ambulating up to  feet.  Therapy feels she is safe for discharge and recommends home health.  I have spoken with her daughter several times last week, she plans to return her to [facility].  Daughter is also concerned about her cognition and speech is recommending Neuropsyc testing.  She will need to follow up as an outpatient to complete.

## 2017-05-29 NOTE — ASSESSMENT & PLAN NOTE
Currently afebrile.  Last WBC was normal, last 5.51.  Has completed Cipro and Flagyl course.  Issue is resolved.

## 2017-05-29 NOTE — PLAN OF CARE
Patient to discharge home ([facility]) today. Patient set up with [facility] per SHAREE Quintero.     Future Appointments  Date Time Provider Department Center   6/5/2017 9:20 AM April Herrera MD [facility]        05/29/17 1156   Final Note   Assessment Type Final Discharge Note   Discharge Disposition Home  ([facility])   Discharge planning education complete? Yes   What phone number can be called within the next 1-3 days to see how you are doing after discharge? [phone]   Hospital Follow Up  Appt(s) scheduled? Yes   Discharge plans and expectations educations in teach back method with documentation complete? Yes   Referral to / orders for Home Health Complete? Yes   Any social issues identified prior to discharge? No   Did you assess the readiness or willingness of the family or caregiver to support self management of care? Yes     Jaquelin Hewitt RN, CM Skilled  [id number]

## 2017-05-29 NOTE — PT/OT/SLP PROGRESS
"Speech Language Pathology  Treatment/discharge    Lilia Hidalgo   MRN: [id number]   Admitting Diagnosis: Debility    Diet recommendations: Solid Diet Level: Regular  Liquid Diet Level: Thin general aspiration precautions    SLP Treatment Date: 05/29/17  Speech Start Time: 1100     Speech Stop Time: 1128     Speech Total (min): 28 min       TREATMENT BILLABLE MINUTES:  Speech Therapy Individual 28    Has the patient been evaluated by SLP for swallowing? : Yes  Keep patient NPO?: No   General Precautions: Standard, fall          Subjective:  "Wake up." pt said to herself when answering orientation q's.          Objective:      Pt was oriented to month, RAMANA, place, and situation.  Min cues were needed to orient to year. Pt completed functional memory tasks recalling 3 elements from verbally presented paragraphs with 40% acc. Ind'ly/90% given cues.  Following a 3 minute delay, pt recalled 3 facts given mod A.  Pt compared 2 given items ind'ly and contrasted them given min-mod A.     Assessment:  Lilia Hidalgo is a 85 y.o. female with a medical diagnosis of Debility and presents with cognitive-linguistic deficits.     Diet recommendations:   Solid Diet Level: Regular    Liquid Diet Level: Thin        Discharge recommendations: Discharge Facility/Level Of Care Needs: home health speech therapy     Goals:    SLP Goals     Not on file          Multidisciplinary Problems (Resolved)        Problem: SLP Goal    Goal Priority Disciplines Outcome   SLP Goal   (Resolved)     SLP Outcome(s) achieved   Description:  Speech Language Pathology Goals  Goals expected to be met by 5/16:  1. Pt will tolerate a regular consistency diet and thin liquids without s/s of aspiration. Ongoing  2. Pt will participate in speech/language/cognitive evaluation to determine need for further intervention.- goal met 5/10  ADDITIONAL GOALS:  2. Pt will orient x 4 given min-mod A. Goal met.  3. Pt will complete immediate memory tasks with 70% accuracy " given min A. Goal met.  4. Following a delay, pt will recall 3 words/facts given mod A. Goal met on 2/4 trials.  5. Pt will follow complex commands with 70% accuracy. Ongoing  6. Pt will complete moderate level problem solving tasks with 80% accuracy given mod A. Goal met.  7. Pt will list an average of 10 items in a category within one minute. Ongoing  8. Further evaluation of reading, writing, visual spatial, and math/money management abilities. Reading, writing, and visual spatial abilities appear to be WFL. Math/money management abilities not yet assessed.    Updated goals expected to be met by 5/23:   1. Pt will tolerate a regular consistency diet and thin liquids without s/s of aspiration. ongoing  2. Pt will orient x 4. Goal met  3. Pt will complete immediate memory tasks with 80% accuracy given min A. Goal not met provided only min A on average.  4. Following a delay, pt will consistently recall 3 words/facts given mod A. Goal met.  5. Pt will follow complex commands with 70% accuracy. Goal met.  6. Pt will complete moderate level problem solving tasks with 80% accuracy given min A. Goal met.   7. Pt will list an average of 10 items in a category within one minute. Goal met.  8. Further evaluation of math/money management abilities.  Goal met.    Updated goals expected to be met by 5/30:  1. Pt will tolerate a regular consistency diet and thin liquids without s/s of aspiration.  2. Pt will complete immediate memory tasks with 80% accuracy given mod A.  3. Following a delay, pt will consistently recall 3 words/facts given min A.  4. Pt will complete moderate level problem solving tasks with 80% accuracy.  5. Pt will list an average of 12 items in a category within one minute.                                          Plan:   Patient to be seen Therapy Frequency: 5 x/week   Plan of Care expires: 06/08/17  Plan of Care reviewed with: patient  SLP Follow-up?: Yes  SLP - Next Visit Date: 05/22/17           Berlin HERRERA  MATEUS Holt, CCC-SLP  05/29/2017     MATEUS Mejia, CCC-SLP  Speech Language Pathologist  [phone]  5/29/2017

## 2017-05-29 NOTE — ASSESSMENT & PLAN NOTE
BP is 100/50 HR is 69.  Currently treated with benazepril 40 mg daily, Lasix 40 mg daily and Toprol  mg daily.  Weight is 80.5 kg today, down from 81.9 yesterday.  Patient to continue monitor her weight at discharge.  PCP need to evaluate dosing of Lasix at next follow up visit.  Patient to follow up with PCP on June 5th as planned for continue management of BP.

## 2017-05-29 NOTE — HOSPITAL COURSE
Admit to SNF for therapy.  Complete course of antibiotics for encephalopathy and diverticulitis.  Monitor weights.  5/16/17  Patient seen at bedside today  No complaints of pain.  Concerned about her inability to walk unassisted.    5/23/17  Patient seen at bedside.  She is reporting pain to her left knee worse with ambulation.  States pain is controlled with Tramadol.  She has no other complaints.    5/29/17  Patient seen prior to leaving, she has no complaints and is looking forward to going home as planned.

## 2017-05-29 NOTE — PLAN OF CARE
05/29/2017  10:30 AM    SHAREE sent home health referral (via [facility]) to [facility] per pt/daughter (Belkys) request.  Per Belkys, pt does not require BSC to be ordered as pt already has an elevated toilet seat and would not be able to fit a commode at bedside in apartment..  Belkys stated plans of considering BSC at a later time if deemed necessary following pt's return to [facility].  Belkys stated plans of accompanying and transporting pt home between 1 pm and 2 pm this afternoon.  Per request, SHAREE faxed d/c orders and medication list to management at [facility] (fx [phone]).    Leon Chun, [facility]  [phone]

## 2017-05-29 NOTE — ASSESSMENT & PLAN NOTE
No reports of SOB.  Lungs are clear  Continue Lasix 40 mg daily.  Patient to monitor her weight closely and follow up with her PCP for further recommendations.    Today's weight is down by 3 lbs from yesterday.  Previous weights were stable.  Encourage use of yannick hose for LE edema which may also be due to venous insufficiency

## 2017-05-29 NOTE — PT/OT/SLP PROGRESS
Occupational Therapy  Treatment/ D/C Summary    Lilia Hidalgo   MRN: [id number]   Admitting Diagnosis: Debility    OT Date of Treatment: 05/29/17  Total Time (min): 41 min    Billable Minutes:  Self Care/Home Management 41    General Precautions: Standard, fall  Orthopedic Precautions: N/A  Braces: N/A    Do you have any cultural, spiritual, Confucianism conflicts, given your current situation?: Christian    Subjective:  Communicated with nurse prior to session.  I am ready to go home today.     Pain/Comfort  Pain Rating 1: 6/10  Location - Side 1: Left  Location 1: knee  Pain Addressed 1: Distraction, Cessation of Activity    Objective:  Patient found with:  (supine in bed)    Functional Status:  MDS G  Bed Mobility Functional Status: S from supine to sit EOB with HOB elevated  Transfer Functional Status: SBA from sit to stand with RW  Walk in Room Functional Status: SBA from EOB to dresser to bathroom with RW  Dressing Functional Status: 1: LBD SBA for balance when standing to manage pants/underwear over hips; UBD Supervision to doff gown and don bra/shirt while seated  Eating Functional Status: Set up (A)  Personal Hygiene Functional Status: S to wash face, wash hands, brush hair, & brush teeth while seated at sink  Bathing Functional Status: Min (A) to wash L foot  Moving from seated to standing position: Not steady, but able to stabilize without staff assistance  Walking (with assistive device if used): Not steady, but able to stabilize without staff assistance  Turning around and facing the opposite direction while walking: Not steady, but able to stabilize without staff assistance            Additional Treatment:  Pt. Educated on need for supervision upon D/C.  Pt. Educated on safety during transfers.     Patient left up in chair with call button in reach    ASSESSMENT:  Lilia Hidalgo is a 85 y.o. female with a medical diagnosis of Debility and presents with decreased endurance, self-care skills, and functional  mobility. Pt. Has progressed in therapy, and is ready for D/C, however, pt. Will need supervision and light assist for safety during standing and mobility tasks. Pt. Would benefit from continued OT services to maximize safety and independence in ADL tasks upon D/C.    Rehab identified problem list/impairments: weakness, impaired endurance, impaired self care skills, impaired functional mobilty, decreased lower extremity function, pain    Rehab potential is good    Activity tolerance: Good    Discharge recommendations: home health OT (for supervision and light assist)     Barriers to discharge: Decreased caregiver support     Equipment recommendations: none     GOALS:    Occupational Therapy Goals        Problem: Occupational Therapy Goal    Goal Priority Disciplines Outcome Interventions   Occupational Therapy Goal     OT, PT/OT Ongoing (interventions implemented as appropriate)    Description:  Goals to be met by: 3 weeks     Patient will increase functional independence with ADLs by performing:    Feeding with Set-up Assistance.  MET 05-14  UE Dressing with Set-up Assistance. MET 05-14  LE Dressing with Supervision. NOT MET 05-29 SBA  Grooming while standing with Supervision. MET seated ;  SBA in stand 5-29 -17  Toileting from bedside commode with Supervision for hygiene and clothing management. NOT MET 05-29-17  Bathing from  shower chair/bench with Stand-by Assistance. MET sponge bath 05-14  Supine to sit with Modified Leonard. MET 05/16  Stand pivot transfers with Supervision. NOT MET 05-29  Toilet transfer to bedside commode with Supervision NOT MET 05-29  Pt. Will be able to follow 2 step directions involving ADL tasks with  75% accuracy MET 05-14  Pt.. Will be independent with HEP for BUE as well as for improving FMC skills in left hand NOT MET  .                          Plan:  Patient to be seen 5 x/week to address the above listed problems via self-care/home management, therapeutic exercises,  therapeutic activities  Plan of Care expires: 06/08/17  Plan of Care reviewed with: patient    Brenda Pantojain, SOT  05/29/2017

## 2017-05-29 NOTE — PT/OT/SLP PROGRESS
Physical Therapy  Treatment/Discharge     Lilia Hidalgo   MRN: [id number]   Admitting Diagnosis: Debility    PT Received On: 05/29/17  Total Time (min): 45       Billable Minutes:  Gait [id number], Therapeutic Activity 15 and Therapeutic Exercise 15    Treatment Type: Treatment  PT/PTA: PT     PTA Visit Number: 0       General Precautions: Standard, fall  Orthopedic Precautions: N/A   Braces: N/A    Do you have any cultural, spiritual, Worship conflicts, given your current situation?: Adventism    Subjective:  Communicated with pt prior to session. Pt reported that she felt ready to go home today and was planning to have increased assistance from staff at [facility] for ADLs.     Pain/Comfort  Pain Rating 1: 7/10  Location - Side 1: Left  Location 1: knee  Pain Addressed 1: Cessation of Activity    Objective:  Patient found sitting upright in wheelchair at bedside.      Functional Status:  MDS G  Bed Mobility Functional Status: S-SBA  Transfer Functional Status: S-SBA  Locomotion on Unit Functional Status: S-SBA  Moving from seated to standing position: Not steady, but able to stabilize without staff assistance  Walking (with assistive device if used): Not steady, but able to stabilize without staff assistance  Surface-to-surface transfer (transfer between bed and chair or wheelchair): Not steady, but able to stabilize without staff assistance          Bed Mobility:  Sit>Supine: supervision    Transfers:  Sit<>Stand: SBA to supervision from wheelchair   Stand Pivot Transfer: SBA to supervision wheelchair <> mat with RW    Gait:  Amb 30 feet with RW with instruction to achieve and maintain full upright posture with distance limited due to increased L knee pain    Wheelchair Mobility:  Patient propels w/c 150 feet with Mod I    Therex:  Seated therex including marching, LAQ, ankle pumps, and hip abduction and adduction 2x20    Balance:  Pt requires SBA for dynamic standing tasks without UE support      Patient  left supine with HOB elevated and call button in reach.    Assessment:  Lilia Hidalgo is a 85 y.o. female with a medical diagnosis of Debility.  Pt has demonstrated improving safety awareness which will help to improve safety upon return home. Pt daughter has arranged for pt to have increased assistance during ADLs upon return home and pt son will be rearranging some of the furniture to make apartment more accessible.     Rehab identified problem list/impairments: impaired endurance, weakness, impaired self care skills, impaired functional mobilty, decreased lower extremity function, pain      Activity tolerance: Good    Discharge recommendations: home health PT     Barriers to discharge: Decreased caregiver support    Equipment recommendations: none     GOALS:    Physical Therapy Goals        Problem: Physical Therapy Goal    Goal Priority Disciplines Outcome Goal Variances Interventions   Physical Therapy Goal     PT/OT, PT Unable to achieve outcome(s) by discharge     Description:  Goals to be met by: 2-3 weeks     Patient will increase functional independence with mobility by performin. Supine to sit with supervision. Met 17  2. Sit to supine with supervision Met  3. Sit to stand transfer with supervision Met 17  4. Bed to chair transfer with supervision using Rolling Walker Met  5. Gait  x 150 feet with Supervision using Rolling Walker.   6. Wheelchair propulsion x 150 feet with Supervision using bilateral upper extremities Met 17  7. Ascend/Descend 4 inch curb step with Stand-by Assistance using Rolling Walker.  8. Stand for 5 minutes with Stand-by Assistance using Rolling Walker while performing a task.  9. Lower extremity exercise program x20 reps per handout, with supervision met                          PLAN:    Pt will discharge on 17 to [facility] where she has an apartment and will have assistance from staff for ADLs and transport to meals in dining room.    Plan of Care expires: 06/08/17  Plan of Care reviewed with: patient    Micki D Padmaja, PT  05/29/2017

## 2017-05-29 NOTE — ASSESSMENT & PLAN NOTE
Chronic.  No complaints of pain today.  Continue Tramadol 25 mg to bid for pain control.  Recommend patient to follow up with Dr. [facility] after discharge for chonic management.

## 2017-05-29 NOTE — HPI
Patient is a 85 y.o. female with HLP, GERD, HTN who presents to SNF after hospitalization for septic encephalopathy, diverticulitis and diastolic CHF exacerbation. She presented with N/V and abdominal pain both of which have resolved. She is tolerating > 50% of her meals. She denies SOB or CP. Upon admit to SNF, she reported to the MD she previously had visual hallucinations which has now resolved.  She has chronic LE edema at home and wears compression stocking at home. 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 She has no pain at rest but only with ambulation which is relieved with tramadol.

## 2017-05-29 NOTE — PLAN OF CARE
Problem: SLP Goal  Goal: SLP Goal  Speech Language Pathology Goals  Goals expected to be met by 5/16:  1. Pt will tolerate a regular consistency diet and thin liquids without s/s of aspiration. Ongoing  2. Pt will participate in speech/language/cognitive evaluation to determine need for further intervention.- goal met 5/10  ADDITIONAL GOALS:  2. Pt will orient x 4 given min-mod A. Goal met.  3. Pt will complete immediate memory tasks with 70% accuracy given min A. Goal met.  4. Following a delay, pt will recall 3 words/facts given mod A. Goal met on 2/4 trials.  5. Pt will follow complex commands with 70% accuracy. Ongoing  6. Pt will complete moderate level problem solving tasks with 80% accuracy given mod A. Goal met.  7. Pt will list an average of 10 items in a category within one minute. Ongoing  8. Further evaluation of reading, writing, visual spatial, and math/money management abilities. Reading, writing, and visual spatial abilities appear to be WFL. Math/money management abilities not yet assessed.    Updated goals expected to be met by 5/23:   1. Pt will tolerate a regular consistency diet and thin liquids without s/s of aspiration. ongoing  2. Pt will orient x 4. Goal met  3. Pt will complete immediate memory tasks with 80% accuracy given min A. Goal not met provided only min A on average.  4. Following a delay, pt will consistently recall 3 words/facts given mod A. Goal met.  5. Pt will follow complex commands with 70% accuracy. Goal met.  6. Pt will complete moderate level problem solving tasks with 80% accuracy given min A. Goal met.   7. Pt will list an average of 10 items in a category within one minute. Goal met.  8. Further evaluation of math/money management abilities.  Goal met.    Updated goals expected to be met by 5/30:  1. Pt will tolerate a regular consistency diet and thin liquids without s/s of aspiration.  2. Pt will complete immediate memory tasks with 80% accuracy given mod A.  3.  Following a delay, pt will consistently recall 3 words/facts given min A.  4. Pt will complete moderate level problem solving tasks with 80% accuracy.  5. Pt will list an average of 12 items in a category within one minute.                        Outcome: Outcome(s) achieved Date Met: 05/29/17  Pt discharging to assisted living facility with plans for supervision. Cont ST upon d/c MATEUS Mejia, CCC-SLP  Speech Language Pathologist  [phone]  5/29/2017

## 2017-05-29 NOTE — DISCHARGE SUMMARY
[facility]  Discharge Summary      Patient Name: Lilia Hidalgo  MRN: [id number]  Admission Date: 5/8/2017  Hospital Length of Stay: 21 days  Discharge Date and Time:  05/29/2017 1:03 PM  Attending Physician: Crystal Sanders MD   Discharging Provider: Foreign Hendrix NP  Primary Care Provider: April Herrera MD    HPI:   Patient is a 85 y.o. female with HLP, GERD, HTN who presents to SNF after hospitalization for septic encephalopathy, diverticulitis and diastolic CHF exacerbation. She presented with N/V and abdominal pain both of which have resolved. She is tolerating > 50% of her meals. She denies SOB or CP. Upon admit to SNF, she reported to the MD she previously had visual hallucinations which has now resolved.  She has chronic LE edema at home and wears compression stocking at home. 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 She has no pain at rest but only with ambulation which is relieved with tramadol.     * No surgery found *      Hospital Course:   Admit to SNF for therapy.  Complete course of antibiotics for encephalopathy and diverticulitis.  Monitor weights.  5/16/17  Patient seen at bedside today  No complaints of pain.  Concerned about her inability to walk unassisted.    5/23/17  Patient seen at bedside.  She is reporting pain to her left knee worse with ambulation.  States pain is controlled with Tramadol.  She has no other complaints.    5/29/17  Patient seen prior to leaving, she has no complaints and is looking forward to going home as planned.     Consults         Status Ordering Provider     Inpatient consult to [facility]  Once     Provider:  (Not yet assigned)    Completed SHANICE LEBLANC          Significant Diagnostic Studies: Labs:   BMP:   Recent Labs  Lab 05/29/17  0447   GLU 91      K 3.7      CO2 29   BUN 16   CREATININE 0.8   CALCIUM 9.2   MG 1.8    and CBC   Recent Labs  Lab 05/29/17 0447   WBC 5.51   HGB 13.5   HCT 40.7         Imaging Results          X-Ray Knee 1 or 2 View Left (Final result)  Result time 05/25/17 14:47:55    Final result by Emre Rascon MD (05/25/17 14:47:55)                 Impression:     As above.      Electronically signed by: EMRE RASCON MD  Date:     05/25/17  Time:    14:47              Narrative:    Left knee, 2 views    Comparison: 12/15/16    Findings: Status post total knee arthroplasty.  No periprosthetic lucency or fracture.  Small joint effusion.                             X-Ray Hip 2 or 3 views Left (Final result)  Result time 05/25/17 14:47:25    Final result by Emre Rascon MD (05/25/17 14:47:25)                 Impression:        As above.      Electronically signed by: EMRE RASCON MD  Date:     05/25/17  Time:    14:47              Narrative:    Left hip radiographs    Images: 3    Comparison: 4/25/16    Findings:    Bones: No fracture.  No lytic or blastic lesion.  Joint: There is severe narrowing of hip cartilage space accompanied by subchondral sclerosis and osteophyte production.  Miscellaneous: Status post right total hip arthroplasty.  No evidence for complication on frontal views.                              Pending Diagnostic Studies:     None        Final Active Diagnoses:    Diagnosis Date Noted POA    PRINCIPAL PROBLEM:  Debility [R53.81] 02/13/2013 Yes    Diverticulitis of large intestine without perforation or abscess without bleeding [K57.32] 05/05/2017 Yes    Septic encephalopathy [G93.41] 05/05/2017 Yes    Acute on chronic diastolic heart failure [I50.33] 05/05/2017 Yes    Depressed mood [F32.9] 06/22/2016 Yes    Primary osteoarthritis of knee [M17.10] 02/05/2013 Yes    Hyperlipidemia [E78.5]  Yes    Essential hypertension [I10]  Yes    GERD (gastroesophageal reflux disease) [K21.9]  Yes      Problems Resolved During this Admission:    Diagnosis Date Noted Date Resolved POA    Dysphagia [R13.10] 05/09/2017 05/23/2017 Yes      Acute on chronic diastolic  heart failure    No reports of SOB.  Lungs are clear  Continue Lasix 40 mg daily.  Patient to monitor her weight closely and follow up with her PCP for further recommendations.    Today's weight is down by 3 lbs from yesterday.  Previous weights were stable.  Encourage use of yannick hose for LE edema which may also be due to venous insufficiency        Diverticulitis of large intestine without perforation or abscess without bleeding    Currently afebrile.  Last WBC was normal, last 5.51.  Has completed Cipro and Flagyl course.  Issue is resolved.        Septic encephalopathy    No further complaints of hallucinations.  Continue Zyprexa 2.5 mg PRN.         Depressed mood    Chronic.  Continue Lexapro 20 mg daily.        Primary osteoarthritis of knee    Chronic.  No complaints of pain today.  Continue Tramadol 25 mg to bid for pain control.  Recommend patient to follow up with Dr. [facility] after discharge for chonic management.          GERD (gastroesophageal reflux disease)    Chronic.  She will resume home Prilosec dosing at discharge.        Hyperlipidemia    Chronic.  Continue atorvastatin 20 mg daily.        Essential hypertension    BP is 100/50 HR is 69.  Currently treated with benazepril 40 mg daily, Lasix 40 mg daily and Toprol  mg daily.  Weight is 80.5 kg today, down from 81.9 yesterday.  Patient to continue monitor her weight at discharge.  PCP need to evaluate dosing of Lasix at next follow up visit.  Patient to follow up with PCP on June 5th as planned for continue management of BP.        * Debility    Participating with therapy.  She is currently at a [facility] for her adls and transfers.  Ambulating up to  feet.  Therapy feels she is safe for discharge and recommends home health.  I have spoken with her daughter several times last week, she plans to return her to [facility].  Daughter is also concerned about her cognition and speech is recommending Neuropsyc testing.  She will need to follow  "up as an outpatient to complete.            Discharged Condition: stable    Disposition: Home or Self Care    Follow Up:  Follow-up Information     April Herrera MD. Go on 6/5/2017.    Specialty:  Internal Medicine  Why:  Hospital follow up  Contact information:  [address]  [facility] [address]  [phone]             [facility] [place].    Specialty:  Home Health Services  Why:  Home Health Questions/Concerns  Contact information:  [address]  [address]  [place] [address]  [phone]             [facility].    Specialty:  DME Provider  Why:  Home Medical Equipment Questions/Concerns  Contact information:  [address]  [facility] [address]  [phone]                 Patient Instructions:     3 IN 1 COMMODE FOR HOME USE   Order Specific Question Answer Comments   Type: Standard    Height: 5' 5" (1.651 m)    Weight: 81.9 kg (180 lb 8.9 oz)    Does patient have medical equipment at home? walker, rolling    Does patient have medical equipment at home? rollator    Length of need (1-99 months): 99    Vendor: [facility] to pull from SNF closet   Expected Date of Delivery: 5/26/2017      Ambulatory consult to Neurology   Referral Priority: Routine Referral Type: Consultation   Referral Reason: Specialty Services Required    Requested Specialty: Neurology    Number of Visits Requested: 1      Diet general   Order Specific Question Answer Comments   Additional restrictions: Cardiac (Low Na/Chol)      Activity as tolerated     Call MD for:  temperature >100.4     Call MD for:  persistent nausea and vomiting or diarrhea     Call MD for:  severe uncontrolled pain     Call MD for:  redness, tenderness, or signs of infection (pain, swelling, redness, odor or green/yellow discharge around incision site)     Call MD for:  difficulty breathing or increased cough     Call MD for:  severe persistent headache     Call MD for:  persistent dizziness, light-headedness, " or visual disturbances     Call MD for:  increased confusion or weakness       Medications:  Reconciled Home Medications:   Current Discharge Medication List      CONTINUE these medications which have CHANGED    Details   tramadol (ULTRAM) 50 mg tablet Take 0.5 tablets (25 mg total) by mouth every 12 (twelve) hours as needed for Pain.  Qty: 15 tablet, Refills: 0         CONTINUE these medications which have NOT CHANGED    Details   acetaminophen (TYLENOL) 500 MG tablet Take 500 mg by mouth every 6 (six) hours as needed.      atorvastatin (LIPITOR) 20 MG tablet take 1 tablet by mouth once daily  Qty: 30 tablet, Refills: 11      benazepril (LOTENSIN) 40 MG tablet Take 1 tablet (40 mg total) by mouth once daily.  Qty: 90 tablet, Refills: 3      escitalopram oxalate (LEXAPRO) 20 MG tablet Take 1 tablet (20 mg total) by mouth once daily.  Qty: 30 tablet, Refills: 11      furosemide (LASIX) 40 MG tablet Take 1 tablet (40 mg total) by mouth once daily.  Qty: 90 tablet, Refills: 3    Associated Diagnoses: Swelling of lower extremity      melatonin 5 mg Tab Take 1 tablet by mouth every evening.       metoprolol succinate (TOPROL-XL) 100 MG 24 hr tablet Take 1 tablet (100 mg total) by mouth once daily.  Qty: 90 tablet, Refills: 3    Associated Diagnoses: Benign essential hypertension      MULTIVITAMIN W-MINERALS/LUTEIN (CENTRUM SILVER ORAL) Take by mouth once daily.      omeprazole (PRILOSEC) 20 MG capsule Take 1 capsule (20 mg total) by mouth once daily.  Qty: 90 capsule, Refills: 3    Associated Diagnoses: Gastroesophageal reflux disease, esophagitis presence not specified; Hiatal hernia      temazepam (RESTORIL) 15 mg Cap take 1 capsule by mouth at bedtime  Qty: 30 capsule, Refills: 5         STOP taking these medications       fish oil-omega-3 fatty acids 300-1,000 mg capsule Comments:   Reason for Stopping:         meloxicam (MOBIC) 15 MG tablet Comments:   Reason for Stopping:         triamcinolone acetonide 0.1%  (KENALOG) 0.1 % cream Comments:   Reason for Stopping:             Time spent on the discharge of patient: 30 minutes    Foreign Hendrix NP  [facility]

## 2017-05-30 ENCOUNTER — PATIENT OUTREACH (OUTPATIENT)
Dept: ADMINISTRATIVE | Facility: CLINIC | Age: 82
End: 2017-05-30
Payer: MEDICARE

## 2017-05-30 NOTE — PROGRESS NOTES
C3 nurse attempted to conduct a POST ACUTE 1 call with [facility].  Staff is unavailable at this time.

## 2017-05-31 ENCOUNTER — PATIENT OUTREACH (OUTPATIENT)
Dept: ADMINISTRATIVE | Facility: CLINIC | Age: 82
End: 2017-05-31
Payer: MEDICARE

## 2017-05-31 ENCOUNTER — TELEPHONE (OUTPATIENT)
Dept: ADMINISTRATIVE | Facility: CLINIC | Age: 82
End: 2017-05-31

## 2017-05-31 NOTE — PATIENT INSTRUCTIONS
Confusion  Confusion is a change in a persons ability to think clearly. There may be trouble recognizing familiar people and places or knowing what day it is. Memory, judgment, and decision-making may also be affected. In severe cases, the person may have limited or no response to being spoken to.  Confusion is usually a sign of an underlying problem. It may occur suddenly. Or it may develop gradually over time. Causes of confusion include brain injury, medicines, alcohol, withdrawal from certain medicines or illegal drugs, and infection. Heart attack and stroke may cause it. Confusion can also be a sign of dementia or a mental illness.  Treatment will depend on the cause of the problem. If the issue is a medicine, stopping the medicine may help. The healthcare provider will perform an evaluation and certain tests may be done. Follow up with the healthcare provider for the results.  Home care  · Be sure someone is with the confused person at all times. He or she should not be left alone or unsupervised.  · Tell the healthcare provider about all medicines that the person takes. These include prescription, over-the-counter, herbs, and supplements.  · Dehydration can increase confusion. Ask the healthcare provider how much fluid the person should be drinking. Offer liquids and ensure that they are taken.  · Keep all medicines in a secure place under the caregivers control. To prevent overdose, a confused person should take medicines only under the supervision of a caregiver.  · To help a person with confusion:  ¨ Establish a daily routine. Change can be a source of stress for someone with confusion. Make and keep a time schedule for common tasks such as bathing, dressing, taking medicines, meals, going for walks, shopping, naps and bed time.  ¨ Speak slowly and clearly with a gentle tone of voice. Use short simple words and sentences. Ask one question at a time. Do not interrupt, criticize or argue. Be calm and  supportive. Use friendly facial expressions. Use pointing and touching to help communicate. If there has been loss of long-term memory, do not ask questions about past events. This would only cause frustration for the person.  ¨ Use lists, signs, family photos, clocks and calendars as memory aids. Label cabinets and drawers. Try to distract, not confront, the patient. When he/she becomes frustrated or upset, redirect his/her attention to eating or some other activity of interest.  ¨ If this proves to be due to a permanent condition, talk to the healthcare provider or a  about getting a Power of  for healthcare and for financial decisions. It is best to do this while the person can still sign legal documents and make his or her own decisions. Otherwise, a court order will be required.  Follow-up care  Follow up with the person's healthcare provider or as advised for further testing or changes in medical care.  When to seek medical advice  Call the healthcare provider for any of the following:  · Frequent falling  · Refusal to eat or drink  · Violent behavior or behavior too difficult to manage at home  · New hallucinations or delusions  · Increased drowsiness  · Complaints of headache or numbness or weakness of the face, arm or leg  · Nausea or vomiting  · Slurred speech or trouble speaking, walking, or seeing  · Fainting spell, dizziness or seizure  · Unexplained fever over 100.4º F (38.0º C) or as directed by the healthcare provider  Date Last Reviewed: 8/23/2015 © 2000-2016 [employer]. [address] [place] [address], [place] [address]. All rights reserved. This information is not intended as a substitute for professional medical care. Always follow your healthcare professional's instructions.

## 2017-06-02 ENCOUNTER — TELEPHONE (OUTPATIENT)
Dept: PAIN MEDICINE | Facility: CLINIC | Age: 82
End: 2017-06-02

## 2017-06-02 NOTE — TELEPHONE ENCOUNTER
----- Message from Bud Wilson sent at 6/2/2017 10:43 AM CDT -----  X_  1st Request  _  2nd Request  _  3rd Request        Who: Belkys(Daughter)    Why: Former pt of Dr. Fung is having hip issues and left knee pain. Her PCP recommended surgery, but that is not a good option for her. She's allergic to steroid injections and she would like to know her options. She's taking 25 mg of Tramadol twice a day and getting home health phys. therapy through [facility]. Please call to discuss.    What Number to Call Back:[phone]    When to Expect a call back: (Before the end of the day)   -- if the call is after 12:00, the call back will be tomorrow.

## 2017-06-08 ENCOUNTER — TELEPHONE (OUTPATIENT)
Dept: ADMINISTRATIVE | Facility: CLINIC | Age: 82
End: 2017-06-08

## 2017-06-08 ENCOUNTER — TELEPHONE (OUTPATIENT)
Dept: INTERNAL MEDICINE | Facility: CLINIC | Age: 82
End: 2017-06-08

## 2017-06-08 DIAGNOSIS — M79.89 SWELLING OF LOWER EXTREMITY: ICD-10-CM

## 2017-06-08 RX ORDER — FUROSEMIDE 40 MG/1
60 TABLET ORAL DAILY
Qty: 90 TABLET | Refills: 3
Start: 2017-06-08 | End: 2017-06-14 | Stop reason: SDUPTHER

## 2017-06-08 NOTE — TELEPHONE ENCOUNTER
Please call and speak w/ daughter/son. She needs a hospital follow up ASAP. She can take AREX slot next Wednesday at 11am. In the mean time increase lasix to 40mg 1.5 pills daily till appt next Wednesday. Hold on increasing tramadol till appt visit.

## 2017-06-08 NOTE — PT/OT/SLP DISCHARGE
Occupational Therapy Discharge Summary    Lilia Hidalgo  MRN: [id number]   Septic encephalopathy   Patient Discharged from acute Occupational Therapy on 5/7/17.  Please refer to prior OT note dated on 5/8/17 for functional status.     Assessment:   Patient appropriate for care in another setting.  GOALS:    Occupational Therapy Goals        Problem: Occupational Therapy Goal    Goal Priority Disciplines Outcome Interventions   Occupational Therapy Goal     OT, PT/OT Ongoing (interventions implemented as appropriate)    Description:  Goals to be met by: 5/17/17     Patient will increase functional independence with ADLs by performing:    UE Dressing with Stand-by Assistance.  LE Dressing with Minimal Assistance and Assistive Devices as needed.  Grooming while standing with Contact Guard Assistance.  Toileting from toilet with Minimal Assistance for hygiene and clothing management.   Supine to sit with Minimal Assistance.  Stand pivot transfers with Contact Guard Assistance.  Toilet transfer to toilet with Contact Guard Assistance.  Upper extremity exercise program x10-15 reps per handout, with assistance as needed.                    Reasons for Discontinuation of Therapy Services  Transfer to alternate level of care.      Plan:  Patient Discharged to: Skilled Nursing Facility.    MIR Garcia  6/8/2017

## 2017-06-08 NOTE — TELEPHONE ENCOUNTER
Pt also wants in go back on 50mg of tramadol due to hip pain, taking 2000mg of tylenol daily, 75mg of tramadol daily (25 mg TID)

## 2017-06-08 NOTE — TELEPHONE ENCOUNTER
----- Message from Lucia Prince sent at 6/2/2017  8:12 AM CDT -----  Contact: Belkys [phone]  Patient's daughter would like a call back form the office to discuss patient health . Please call and advise, Thanks !

## 2017-06-08 NOTE — TELEPHONE ENCOUNTER
Pt was taking 40mg qd of lasix, she is back home now alternating 40 and 20 daily, daughter wants to know if she can go back on 40mg QD,please advise

## 2017-06-08 NOTE — TELEPHONE ENCOUNTER
----- Message from [place] sent at 6/8/2017  2:14 PM CDT -----  Contact: [facility]/[phone]  Currently the pat is taking furosemide (LASIX) 40 MG tablet every other day and furosemide (LASIX) 20 mg on opposite day, pt currently has 3 pulse  edema to both feet and her ankle measurements increase 1.5  Cm over there past two days. Should the pt take furosemide (LASIX) 40 MG tablet daily? Please call and advise.

## 2017-06-09 NOTE — TELEPHONE ENCOUNTER
Spoke to daughter Belkys and [facility], both are aware of increase to lasix and hold tramadol, appt info was also given to both.

## 2017-06-14 ENCOUNTER — OFFICE VISIT (OUTPATIENT)
Dept: INTERNAL MEDICINE | Facility: CLINIC | Age: 82
End: 2017-06-14
Payer: MEDICARE

## 2017-06-14 VITALS
TEMPERATURE: 98 F | WEIGHT: 185.13 LBS | SYSTOLIC BLOOD PRESSURE: 96 MMHG | HEIGHT: 65 IN | DIASTOLIC BLOOD PRESSURE: 60 MMHG | RESPIRATION RATE: 18 BRPM | BODY MASS INDEX: 30.84 KG/M2 | HEART RATE: 69 BPM

## 2017-06-14 DIAGNOSIS — I50.32 HEART FAILURE, DIASTOLIC, CHRONIC: ICD-10-CM

## 2017-06-14 DIAGNOSIS — K57.32 DIVERTICULITIS OF LARGE INTESTINE WITHOUT PERFORATION OR ABSCESS WITHOUT BLEEDING: ICD-10-CM

## 2017-06-14 DIAGNOSIS — I11.0 HYPERTENSIVE HEART DISEASE WITH HEART FAILURE: ICD-10-CM

## 2017-06-14 DIAGNOSIS — Z09 HOSPITAL DISCHARGE FOLLOW-UP: Primary | ICD-10-CM

## 2017-06-14 DIAGNOSIS — M17.0 PRIMARY OSTEOARTHRITIS OF BOTH KNEES: ICD-10-CM

## 2017-06-14 DIAGNOSIS — M79.89 SWELLING OF LOWER EXTREMITY: ICD-10-CM

## 2017-06-14 PROBLEM — I50.33 ACUTE ON CHRONIC DIASTOLIC HEART FAILURE: Status: RESOLVED | Noted: 2017-05-05 | Resolved: 2017-06-14

## 2017-06-14 PROBLEM — G93.41 SEPTIC ENCEPHALOPATHY: Status: RESOLVED | Noted: 2017-05-05 | Resolved: 2017-06-14

## 2017-06-14 PROCEDURE — 99499 UNLISTED E&M SERVICE: CPT | Mod: S$GLB,,, | Performed by: INTERNAL MEDICINE

## 2017-06-14 PROCEDURE — 99999 PR PBB SHADOW E&M-EST. PATIENT-LVL III: CPT | Mod: PBBFAC,,, | Performed by: INTERNAL MEDICINE

## 2017-06-14 RX ORDER — FUROSEMIDE 40 MG/1
40 TABLET ORAL DAILY
Qty: 90 TABLET | Refills: 3 | Status: SHIPPED | OUTPATIENT
Start: 2017-06-14 | End: 2018-07-26 | Stop reason: SDUPTHER

## 2017-06-14 RX ORDER — TRAMADOL HYDROCHLORIDE 50 MG/1
TABLET ORAL
Qty: 60 TABLET | Refills: 5 | Status: SHIPPED | OUTPATIENT
Start: 2017-06-14 | End: 2017-07-12

## 2017-06-14 RX ORDER — BENAZEPRIL HYDROCHLORIDE 20 MG/1
20 TABLET ORAL DAILY
Qty: 90 TABLET | Refills: 3 | Status: SHIPPED | OUTPATIENT
Start: 2017-06-14 | End: 2018-08-13 | Stop reason: SDUPTHER

## 2017-06-14 NOTE — PATIENT INSTRUCTIONS
Weigh yourself daily at the same time. If your weight goes up by 2-3lbs in 24 hours, then take another 1/2 pill of lasix 40mg daily until weight goes back to the baseline weight.

## 2017-06-14 NOTE — PROGRESS NOTES
Transitional Care Note  Subjective:       Patient ID: Lilia Hidalgo is a 85 y.o. female.  Chief Complaint: Hospital Follow Up; Leg Swelling (bilateral leg swelling); Knee Pain (referred knee pain due to hip arthrosis 6); and Hip Pain (hip arthritis 6)    Family and/or Caretaker present at visit?  Yes, daughter.  Diagnostic tests reviewed/disposition: No diagnosic tests pending after this hospitalization.  Disease/illness education: yes  Home health/community services discussion/referrals: Patient has home health established at [facility].   ST - working w/ pt in regards to memory  PT/OT  SN - twice a week.   Establishment or re-establishment of referral orders for community resources: No other necessary community resources.   Discussion with other health care providers: No discussion with other health care providers necessary.     HPI   Hospitalized 5/5/17-5/8/17 for septic encephalopathy due to diverticulitis and also diastolics heart failure exacerbation. Pt was placed on cipro and flagyl for diverticulitis. Given infection, pt was just placed on lasix PO.     She was then transitions to SNF 5/8/17-5/29/17.  Pt completed her course of antibiotics. Leukocytosis resolved. Hallucinations have also resolved. Discharge weight is 80.5.    After discharge, pt missed hosp f/u w/ me on 6/5/17.  nurse saw pt and she was taking lasix 40mg qod and 20mg on the alternating days w/ 3+ pitting BLE edema. Communicated this w/ office. Told to take 40mg 1.5 pills since 6/10/17 and here for hospital follow up today.    Today lower extremity edema is much better. Just started weighing herself yesterday. Pain in the knees and tenderness along the shin. Can't tolerate the compression stockings.     Reports BP from  is a little on the low side of low 100s for SBP. Pt has home PT/OT (last week) but pt is using the wheelchair for the most times except for walker in the apartment. Able to dress and bathe herself. Added laundry service  "to [employer]. Low sodium diet from dining room now.     OA of knees - tramadol bid prn previously. Thought this was causing confusion. Currently on 1/2 pill qam and then 1 pill at 1pm and then before bedtime. Also on tylenol 650mg TID. Gotten rid of restoril. Only on melatonin at night, which is helping her sleep.     Review of Systems    No abdominal pain, nausea/vomiting. BM daily. Good appetite.  Urinating normally.   No SOB/wheezing.   No rash. Still w/ bruising on the abdomen from the heparin injections in the hospital.   No fevers/chills.     Objective:      Physical Exam    BP 96/60   Pulse 69   Temp 97.9 °F (36.6 °C) (Oral)   Resp 18   Ht 5' 5" (1.651 m)   Wt 84 kg (185 lb 1.6 oz)   LMP  (LMP Unknown)   BMI 30.80 kg/m²     Gen - A+O to self. NAD, in wheelchair.   HEENT - PERRL, OP clear. MMM.   Neck - no LAD  CV - RRR, II/VI ELIJAH best at RUSB  Chest - CTAB, no wheezing/crackles.  Abd - S/NT/ND/+BS  Ext -2 + B DP and radial pulses. No LE edema.  MSK - Pain on palpation of the shins and knees. L knee swelling. Pain on palpation of hips.   Skin - bruising on the abdomen (nontender)    CT A/P 5/5/17  Impression   Mild diverticulitis of the sigmoid colon.    Reticulation and mosaic attenuation within the lung bases bilaterally, possibly consistent with aspiration pneumonitis, developing pneumonia, pulmonary edema or atelectasis.    Cholelithiasis without evidence of cholecystitis.     CT HEAD 5/5/17  Impression    No acute intracranial process, specifically without evidence of major territorial infarct or hemorrhage.     KNEE XR 5/25/17  Comparison: 12/15/16    Findings: Status post total knee arthroplasty.  No periprosthetic lucency or fracture.  Small joint effusion.   Impression    As above.     HIP XR 5/25/17  Findings:    Bones: No fracture.  No lytic or blastic lesion.  Joint: There is severe narrowing of hip cartilage space accompanied by subchondral sclerosis and osteophyte " production.  Miscellaneous: Status post right total hip arthroplasty.  No evidence for complication on frontal views.   Impression   As above.     Assessment/Plan       Lilia was seen today for hospital follow up, leg swelling, knee pain and hip pain.    Diagnoses and all orders for this visit:    Hospital discharge follow-up  -     furosemide (LASIX) 40 MG tablet; Take 1 tablet (40 mg total) by mouth once daily.  -     Comprehensive metabolic panel; Future; Expected date: 06/14/2017    Heart failure, diastolic, chronic - decrease from 1.5 pills to 1 pill daily.  Weigh yourself daily at the same time. If your weight goes up by 2-3lbs in 24 hours, then take another 1/2 pill of lasix 40mg daily until weight goes back to the baseline weight.   -     furosemide (LASIX) 40 MG tablet; Take 1 tablet (40 mg total) by mouth once daily.  -     Comprehensive metabolic panel; Future; Expected date: 06/14/2017    Diverticulitis of large intestine without perforation or abscess without bleeding - s/p antibiotics. Resolved now.     Swelling of lower extremity  -     furosemide (LASIX) 40 MG tablet; Take 1 tablet (40 mg total) by mouth once daily.  -     Comprehensive metabolic panel; Future; Expected date: 06/14/2017    Primary osteoarthritis of both knees and hips  -     tramadol (ULTRAM) 50 mg tablet; Take 1/2 pill every morning, 1/2 pill at 1pm and before bedtime.    Hypertensive heart disease with heart failure - BP on the low side. Dec benazepril 20mg daily. Skilled nurse to continue monitoring pt's BP.   -     furosemide (LASIX) 40 MG tablet; Take 1 tablet (40 mg total) by mouth once daily.  -     benazepril (LOTENSIN) 20 MG tablet; Take 1 tablet (20 mg total) by mouth once daily.  -     Comprehensive metabolic panel; Future; Expected date: 06/14/2017    Reviewed dx, labs, imaging and mgmt of HF.    45 minutes was spent on patient with over half the time was spent in coordination of care and/or counseling.    RTC in 2 mo,  sooner if needed.     April Herrera MD  Department of Internal Medicine - [facility]  12:13 PM

## 2017-06-28 PROCEDURE — 99495 TRANSJ CARE MGMT MOD F2F 14D: CPT | Mod: S$GLB,,, | Performed by: INTERNAL MEDICINE

## 2017-06-29 ENCOUNTER — TELEPHONE (OUTPATIENT)
Dept: INTERNAL MEDICINE | Facility: CLINIC | Age: 82
End: 2017-06-29

## 2017-06-29 NOTE — TELEPHONE ENCOUNTER
She's on tramadol already. Is it not controlling her pain? She has chronic knee and hip pain. Please see if they need a stronger medicine such as hydrocodone but this has the potential to cause confusion also (she said who pill of tramadol caused confusion in the past). I can refer her to pain management.

## 2017-06-29 NOTE — TELEPHONE ENCOUNTER
----- Message from Taya Santos sent at 6/28/2017 10:15 AM CDT -----  Contact: Micki/ [facility]/ [phone]   Micki is calling to let the doctor know the pt left knee is swollen and is in pain. She want to know if the doctor can order something for her. If not the might need to come in to see the doctor for the pain. Please call and advise     Thank you

## 2017-07-05 ENCOUNTER — TELEPHONE (OUTPATIENT)
Dept: INTERNAL MEDICINE | Facility: CLINIC | Age: 82
End: 2017-07-05

## 2017-07-05 NOTE — TELEPHONE ENCOUNTER
----- Message from Koki Wilson sent at 7/5/2017  4:35 PM CDT -----  Contact: Daughter/Belkys [phone]  Patient still in a lot of hip and knee pain after taking 100mg of tramadol and 2000mg of tylenol a day. Has a lot of swelling in legs. Taking 40mg of lasix a day every 3 days takes 60mg. Daughter wants to know if there is anything that can be done.    Please call and advise.    Thank You

## 2017-07-11 ENCOUNTER — TELEPHONE (OUTPATIENT)
Dept: INTERNAL MEDICINE | Facility: CLINIC | Age: 82
End: 2017-07-11

## 2017-07-11 DIAGNOSIS — M17.9 OSTEOARTHRITIS OF KNEE, UNSPECIFIED LATERALITY, UNSPECIFIED OSTEOARTHRITIS TYPE: ICD-10-CM

## 2017-07-11 DIAGNOSIS — M16.10 PRIMARY OSTEOARTHRITIS OF HIP, UNSPECIFIED LATERALITY: Primary | ICD-10-CM

## 2017-07-11 DIAGNOSIS — M17.0 PRIMARY OSTEOARTHRITIS OF BOTH KNEES: Primary | ICD-10-CM

## 2017-07-11 DIAGNOSIS — I10 BENIGN ESSENTIAL HYPERTENSION: ICD-10-CM

## 2017-07-11 DIAGNOSIS — K21.9 GASTROESOPHAGEAL REFLUX DISEASE, ESOPHAGITIS PRESENCE NOT SPECIFIED: ICD-10-CM

## 2017-07-11 DIAGNOSIS — K44.9 HIATAL HERNIA: ICD-10-CM

## 2017-07-11 NOTE — TELEPHONE ENCOUNTER
Pt having severe pain in hip, doesn't feel pain medication is working patient was not able to get out of bed this morning had to call for help. Want to know if tramadol or tylenol can be increased, or if there is some alternative medication that she can take in addition

## 2017-07-11 NOTE — TELEPHONE ENCOUNTER
----- Message from Taya Santos sent at 7/11/2017 10:45 AM CDT -----  Contact: Belkys/ Daughter/ [phone]   Belkys is calling receive some other pain medication. The pain medication that she has is not working. She would like to speak with somone in the office as soon as possible. Please call and advise     Thank you

## 2017-07-12 ENCOUNTER — TELEPHONE (OUTPATIENT)
Dept: PAIN MEDICINE | Facility: CLINIC | Age: 82
End: 2017-07-12

## 2017-07-12 ENCOUNTER — PATIENT MESSAGE (OUTPATIENT)
Dept: INTERNAL MEDICINE | Facility: CLINIC | Age: 82
End: 2017-07-12

## 2017-07-12 DIAGNOSIS — M16.0 PRIMARY OSTEOARTHRITIS OF BOTH HIPS: Primary | ICD-10-CM

## 2017-07-12 DIAGNOSIS — M25.562 CHRONIC PAIN OF LEFT KNEE: ICD-10-CM

## 2017-07-12 DIAGNOSIS — G89.29 CHRONIC PAIN OF LEFT KNEE: ICD-10-CM

## 2017-07-12 RX ORDER — METOPROLOL SUCCINATE 100 MG/1
TABLET, EXTENDED RELEASE ORAL
Qty: 90 TABLET | Refills: 3 | Status: SHIPPED | OUTPATIENT
Start: 2017-07-12 | End: 2018-06-27 | Stop reason: SDUPTHER

## 2017-07-12 RX ORDER — OMEPRAZOLE 20 MG/1
CAPSULE, DELAYED RELEASE ORAL
Qty: 90 CAPSULE | Refills: 3 | Status: SHIPPED | OUTPATIENT
Start: 2017-07-12 | End: 2018-06-27 | Stop reason: SDUPTHER

## 2017-07-12 RX ORDER — HYDROCODONE BITARTRATE AND ACETAMINOPHEN 5; 325 MG/1; MG/1
.5-1 TABLET ORAL EVERY 12 HOURS PRN
Qty: 45 TABLET | Refills: 0 | Status: SHIPPED | OUTPATIENT
Start: 2017-07-12 | End: 2017-08-10 | Stop reason: SDUPTHER

## 2017-07-12 NOTE — TELEPHONE ENCOUNTER
----- Message from Rosalee Frazier sent at 7/12/2017  8:11 AM CDT -----  _x 1st Request  _  2nd Request  _  3rd Request        Who: pt. Daughter too    Why: pt.daughter wants to know can pt. Be seen before July 25,2017.please call to discussx    What Number to Call Back:[phone]    When to Expect a call back: (With in 24 hours)

## 2017-07-12 NOTE — TELEPHONE ENCOUNTER
Patient's daughter was contacted staff left a message on voicemail informing her to contact the office regarding an earlier appt

## 2017-07-12 NOTE — TELEPHONE ENCOUNTER
Spoke w/ Belkys. Sent in norco 5-325mg 0.5-1 tablet BID prn. Watch for confusion and constipation.

## 2017-07-12 NOTE — TELEPHONE ENCOUNTER
----- Message from Ramya De Anda sent at 7/12/2017  8:00 AM CDT -----  Contact: Daughter, Belkys Mobile: [phone]   Pain management can not see her mother until July 25 th. What should she do pain wise until then? [facility] [phone] (Phone) or [phone] (Fax). Patient has some old Hydrocodone left at home, can she take this? She's in a lot of pain. She want to hear from you this morning and don't call in any medications until you speak with daughter Belkys. Also, she want to know if you can move up that appointment in pain management?

## 2017-07-19 ENCOUNTER — OFFICE VISIT (OUTPATIENT)
Dept: PHYSICAL MEDICINE AND REHAB | Facility: CLINIC | Age: 82
End: 2017-07-19
Payer: MEDICARE

## 2017-07-19 VITALS
SYSTOLIC BLOOD PRESSURE: 115 MMHG | BODY MASS INDEX: 30.82 KG/M2 | DIASTOLIC BLOOD PRESSURE: 72 MMHG | HEIGHT: 65 IN | WEIGHT: 185 LBS | HEART RATE: 73 BPM

## 2017-07-19 DIAGNOSIS — Z96.652 H/O TOTAL KNEE REPLACEMENT, LEFT: ICD-10-CM

## 2017-07-19 DIAGNOSIS — M25.562 CHRONIC PAIN OF LEFT KNEE: Primary | ICD-10-CM

## 2017-07-19 DIAGNOSIS — G89.29 CHRONIC PAIN OF LEFT KNEE: Primary | ICD-10-CM

## 2017-07-19 PROCEDURE — 1159F MED LIST DOCD IN RCRD: CPT | Mod: S$GLB,,, | Performed by: PHYSICAL MEDICINE & REHABILITATION

## 2017-07-19 PROCEDURE — 99999 PR PBB SHADOW E&M-EST. PATIENT-LVL II: CPT | Mod: PBBFAC,,, | Performed by: PHYSICAL MEDICINE & REHABILITATION

## 2017-07-19 PROCEDURE — 1157F ADVNC CARE PLAN IN RCRD: CPT | Mod: S$GLB,,, | Performed by: PHYSICAL MEDICINE & REHABILITATION

## 2017-07-19 PROCEDURE — 99204 OFFICE O/P NEW MOD 45 MIN: CPT | Mod: S$GLB,,, | Performed by: PHYSICAL MEDICINE & REHABILITATION

## 2017-07-19 PROCEDURE — 1125F AMNT PAIN NOTED PAIN PRSNT: CPT | Mod: S$GLB,,, | Performed by: PHYSICAL MEDICINE & REHABILITATION

## 2017-07-19 RX ORDER — LIDOCAINE 50 MG/G
1 PATCH TOPICAL DAILY PRN
Qty: 30 PATCH | Refills: 1 | Status: SHIPPED | OUTPATIENT
Start: 2017-07-19 | End: 2018-09-19

## 2017-07-19 RX ORDER — MELOXICAM 15 MG/1
TABLET ORAL
COMMUNITY
Start: 2017-07-11 | End: 2017-08-01

## 2017-07-19 NOTE — PROGRESS NOTES
[facility]    CHIEF COMPLAINT:   Chief Complaint   Patient presents with    Hip Pain     left hip pain      HISTORY OF PRESENT ILLNESS: Lilia Hidalgo is an 86 y.o. female who presents to me for evaluation of her left knee pain. She is accompanied today by her son and daughter. She is s/p left total knee replacement (11 years ago) and has been having worsening left knee pain over the past year. Over the past year, the pain has become progressively severe causing a significant decline in functional mobility so that she transitioned from independently walking, to needing an assistive walker, to now being wheelchair bound (beginning 2 weeks ago). Along with this decrease in functional mobility, she now needs full assistance with her ADLs at her assisted-living facility. While her decline in mobility also coincides with a recent hospitalization with subsequent inpatient rehabilitation for deconditioning, she and her family feel that her main obstacle to regaining her independence is her knee pain, since it prevents her from walking and prevents her from participating in therapies. In regards to her pain, it is primarily located over the anterior aspect of the knee and does not radiate. The pain is worse with activity, and it is associated with weakness 2/2 to pain. She denies any numbness or tingling. She has been taking oral opioid pain medications, but they cause her to be excessively drowsy and inadequately address the pain. She also states that she has occasional pain in the anterior aspect of the left hip, but says it is negligible compared to the knee. She saw her orthopedic surgeon 9 months ago, who stated that she would not be a surgical candidate 2/2 medical comorbidities.    Review of Systems   Constitutional: Negative for fever.   HENT: Negative for drooling.    Eyes: Negative for discharge.   Respiratory: Negative for choking.    Cardiovascular: Negative for chest pain.   Genitourinary:  Negative for flank pain.   Skin: Negative for wound.   Allergic/Immunologic: Negative for immunocompromised state.   Neurological: Negative for tremors and syncope.   Psychiatric/Behavioral: Negative for behavioral problems.     Past Medical History:   Past Medical History:   Diagnosis Date    Arthritis     GERD (gastroesophageal reflux disease)     Hyperlipidemia     Hypertension     Joint pain     Knee pain, left 08/2016    pain with walking or standing    PUD (peptic ulcer disease)      Past Surgical History:   Past Surgical History:   Procedure Laterality Date    APPENDECTOMY      COLONOSCOPY      08    EYE SURGERY      bilateral cataract    HYSTERECTOMY      UNKNOWN BSO    JOINT REPLACEMENT      right hip replacement    JOINT REPLACEMENT      right foot tendon surgery    KNEE ARTHROPLASTY Left 2001    thumb surgery Left 09/2015    UPPER GASTROINTESTINAL ENDOSCOPY      2013       Family History:   Family History   Problem Relation Age of Onset    Heart disease Mother     Heart disease Father     Asthma Father     Cancer Brother      breast    Heart disease Brother     Melanoma Neg Hx     Psoriasis Neg Hx     Lupus Neg Hx     Eczema Neg Hx     Acne Neg Hx     Breast cancer Neg Hx     Ovarian cancer Neg Hx     Cervical cancer Neg Hx     Endometrial cancer Neg Hx     Vaginal cancer Neg Hx        Medications:   Current Outpatient Prescriptions on File Prior to Visit   Medication Sig Dispense Refill    acetaminophen (TYLENOL) 500 MG tablet Take 500 mg by mouth every 6 (six) hours as needed.      atorvastatin (LIPITOR) 20 MG tablet take 1 tablet by mouth once daily 30 tablet 11    benazepril (LOTENSIN) 20 MG tablet Take 1 tablet (20 mg total) by mouth once daily. 90 tablet 3    escitalopram oxalate (LEXAPRO) 20 MG tablet Take 1 tablet (20 mg total) by mouth once daily. 30 tablet 11    furosemide (LASIX) 40 MG tablet Take 1 tablet (40 mg total) by mouth once daily. 90 tablet 3     "hydrocodone-acetaminophen 5-325mg (NORCO) 5-325 mg per tablet Take 0.5-1 tablets by mouth every 12 (twelve) hours as needed for Pain. 45 tablet 0    melatonin 5 mg Tab Take 1 tablet by mouth every evening.       metoprolol succinate (TOPROL-XL) 100 MG 24 hr tablet take 1 tablet by mouth once daily 90 tablet 3    MULTIVITAMIN W-MINERALS/LUTEIN (CENTRUM SILVER ORAL) Take by mouth once daily.      omeprazole (PRILOSEC) 20 MG capsule take 1 capsule by mouth once daily 90 capsule 3     No current facility-administered medications on file prior to visit.        Allergies:   Review of patient's allergies indicates:   Allergen Reactions    Aspirin Nausea Only     Other reaction(s): esophageal pain    Celebrex  [celecoxib]      Other reaction(s): Rash    Ibuprofen      Other reaction(s): esophogeal pain    Steroid  [corticosteroids (glucocorticoids)]      Other reaction(s): Flushing (skin)    Sulfa dyne      Other reaction(s): esophogeal pain    Morphine Hallucinations       Social History:   Social History     Social History    Marital status:      Spouse name: N/A    Number of children: N/A    Years of education: N/A     Social History Main Topics    Smoking status: Never Smoker    Smokeless tobacco: Never Used    Alcohol use No    Drug use: No    Sexual activity: Not Currently     Birth control/ protection: None     Other Topics Concern    Are You Pregnant Or Think You May Be? No    Breast-Feeding No     Social History Narrative    None     Lilia lives in an assisted living facility in [place], [place].    PHYSICAL EXAMINATION:   General    Vitals:    07/19/17 1120   BP: 115/72   Pulse: 73   Weight: 83.9 kg (185 lb)   Height: 5' 5" (1.651 m)     Constitutional: Oriented to person, place, and time. No apparent distress. Appears well-developed and well-nourished. Pleasant.  Head: Normocephalic and atraumatic.   Eyes: Right eye exhibits no discharge. Left eye exhibits no discharge. No scleral " icterus.   Pulmonary/Chest: Effort normal. No respiratory distress.   Abdominal: There is no guarding.   Neurological: Alert and oriented to person, place, and time.   Psychiatric: Behavior is normal.   Right Ankle Exam     Muscle Strength   Dorsiflexion:  5/5  Plantar flexion:  5/5      Left Ankle Exam     Muscle Strength   Dorsiflexion:  4/5   Plantar flexion:  4/5       Right Knee Exam     Comments:  Knee flexion/ extension strength: 5/5      Left Knee Exam     Tenderness   The patient is experiencing tenderness in the lateral joint line, lateral retinaculum, medial joint line, patellar tendon and patella (diffuse tenderness to palpation about all aspects of knee, most notably over patella).    Range of Motion   Extension: 15 (limited by pain)   Flexion: 100 (limited by pain)     Tests   Lachman:  Anterior - negative    Posterior - negative  Drawer:       Anterior - negative     Posterior - negative  Varus: negative  Valgus: negative  Patellar Apprehension: negative    Other   Erythema: absent  Scars: present  Left knee sensation: hypersensitivity over knee.  Pulse: present  Swelling: mild (diffuse 2/2 to edema)  Effusion: no effusion present    Comments:  Knee flexion/ extension strength: 4/5      Left Hip Exam     Comments:  Unable to evaluate as patient was too distracted by knee pain        INSPECTION: There is no diffuse swelling 2/2 dependent edema in bilateral lower extremities. Scar noted over left knee.  GAIT/DYNAMIC: Patient was not able to ambulate independently. She was in a manual wheelchair. She was unable to climb onto the exam table with assistance.  Overall, exam somewhat limited secondary to a inability to transfer to the examining table.    Imaging  Left knee xray (5/25/17): Status post total knee arthroplasty.  No periprosthetic lucency or fracture.  Small joint effusion.  Left hip xray (5/25/17): No fracture.  No lytic or blastic lesion. Joint: There is severe narrowing of hip cartilage space  accompanied by subchondral sclerosis and osteophyte production. Miscellaneous: Status post right total hip arthroplasty.  No evidence for complication on frontal views.    Data Reviewed: X-rays    ASSESSMENT:   Encounter Diagnoses   Name Primary?    Chronic pain of left knee Yes    H/O total knee replacement, left      PLAN:   1. Time was spent reviewing the above diagnosis in depth with Lilia today, including acute management and rehabilitation.     2. Being that Lilia is not a surgical candidate and the family would like to focus on pain control, we discussed the option of genicular nerve ablation for her left knee pain.  I believe she is a good candidate for this procedure in hopes of giving her some long-term pain reduction and the avoidance of opiate use.  After discussion both Lilia and her family decided that they would like to go ahead with this option.    3. Will plan for genicular nerve block at next earliest available appointment. If patient has success from the block, we will proceed with genicular nerve ablation at a future date. f unsuccessful, we will look into other options as well as further investigate her hip as a possible pain generator.    4. In the meantime while Lilia waits for appointment, it is appropriate for her to continue taking her hydrocodone as prescribed. It is also acceptable for her to take OTC aleve as directed as needed for pain. I also prescribed her lidoderm patches to be applied to the knee.    5. Return at next available appointment for genicular nerve block.     The patient was initially seen and examined by [facility] resident PGY 1, Dr. Estiven Yang.    The above note was completed, in part, with the aid of Dragon dictation software/hardware. Translation errors may be present.

## 2017-07-24 DIAGNOSIS — G89.29 CHRONIC PAIN OF LEFT KNEE: Primary | ICD-10-CM

## 2017-07-24 DIAGNOSIS — M25.562 CHRONIC PAIN OF LEFT KNEE: Primary | ICD-10-CM

## 2017-07-24 RX ORDER — SODIUM CHLORIDE, SODIUM LACTATE, POTASSIUM CHLORIDE, CALCIUM CHLORIDE 600; 310; 30; 20 MG/100ML; MG/100ML; MG/100ML; MG/100ML
INJECTION, SOLUTION INTRAVENOUS CONTINUOUS
Status: CANCELLED | OUTPATIENT
Start: 2017-07-24

## 2017-07-24 RX ORDER — MIDAZOLAM HYDROCHLORIDE 1 MG/ML
2 INJECTION INTRAMUSCULAR; INTRAVENOUS ONCE AS NEEDED
Status: CANCELLED | OUTPATIENT
Start: 2017-07-24 | End: 2017-07-24

## 2017-07-24 RX ORDER — LIDOCAINE HYDROCHLORIDE 10 MG/ML
1 INJECTION, SOLUTION EPIDURAL; INFILTRATION; INTRACAUDAL; PERINEURAL ONCE
Status: CANCELLED | OUTPATIENT
Start: 2017-07-24 | End: 2017-07-24

## 2017-07-26 ENCOUNTER — PATIENT MESSAGE (OUTPATIENT)
Dept: SURGERY | Facility: HOSPITAL | Age: 82
End: 2017-07-26

## 2017-08-01 RX ORDER — NAPROXEN SODIUM 220 MG
220 TABLET ORAL
COMMUNITY
End: 2017-08-25 | Stop reason: ALTCHOICE

## 2017-08-03 ENCOUNTER — HOSPITAL ENCOUNTER (OUTPATIENT)
Facility: HOSPITAL | Age: 82
Discharge: HOME | End: 2017-08-03
Attending: PHYSICAL MEDICINE & REHABILITATION | Admitting: PHYSICAL MEDICINE & REHABILITATION
Payer: MEDICARE

## 2017-08-03 ENCOUNTER — HOSPITAL ENCOUNTER (OUTPATIENT)
Dept: RADIOLOGY | Facility: HOSPITAL | Age: 82
Discharge: HOME | End: 2017-08-03
Attending: PHYSICAL MEDICINE & REHABILITATION | Admitting: PHYSICAL MEDICINE & REHABILITATION
Payer: MEDICARE

## 2017-08-03 ENCOUNTER — SURGERY (OUTPATIENT)
Age: 82
End: 2017-08-03

## 2017-08-03 DIAGNOSIS — M17.10 PRIMARY OSTEOARTHRITIS OF KNEE, UNSPECIFIED LATERALITY: Primary | ICD-10-CM

## 2017-08-03 DIAGNOSIS — M25.562 CHRONIC PAIN OF LEFT KNEE: ICD-10-CM

## 2017-08-03 DIAGNOSIS — G89.29 CHRONIC PAIN OF LEFT KNEE: ICD-10-CM

## 2017-08-03 DIAGNOSIS — M25.562 LEFT KNEE PAIN: ICD-10-CM

## 2017-08-03 PROCEDURE — 63600175 PHARM REV CODE 636 W HCPCS: Mod: PO | Performed by: PHYSICAL MEDICINE & REHABILITATION

## 2017-08-03 PROCEDURE — 76000 FLUOROSCOPY <1 HR PHYS/QHP: CPT | Mod: TC,PO

## 2017-08-03 PROCEDURE — 25000003 PHARM REV CODE 250: Mod: PO | Performed by: PHYSICAL MEDICINE & REHABILITATION

## 2017-08-03 PROCEDURE — 64450 NJX AA&/STRD OTHER PN/BRANCH: CPT | Mod: PO | Performed by: PHYSICAL MEDICINE & REHABILITATION

## 2017-08-03 PROCEDURE — 64450 NJX AA&/STRD OTHER PN/BRANCH: CPT | Mod: ,,, | Performed by: PHYSICAL MEDICINE & REHABILITATION

## 2017-08-03 RX ORDER — BUPIVACAINE HYDROCHLORIDE 2.5 MG/ML
INJECTION, SOLUTION EPIDURAL; INFILTRATION; INTRACAUDAL
Status: DISCONTINUED | OUTPATIENT
Start: 2017-08-03 | End: 2017-08-03 | Stop reason: HOSPADM

## 2017-08-03 RX ORDER — MIDAZOLAM HYDROCHLORIDE 2 MG/2ML
INJECTION, SOLUTION INTRAMUSCULAR; INTRAVENOUS
Status: DISCONTINUED | OUTPATIENT
Start: 2017-08-03 | End: 2017-08-03 | Stop reason: HOSPADM

## 2017-08-03 RX ORDER — LIDOCAINE HYDROCHLORIDE 10 MG/ML
INJECTION INFILTRATION; PERINEURAL
Status: DISCONTINUED | OUTPATIENT
Start: 2017-08-03 | End: 2017-08-03 | Stop reason: HOSPADM

## 2017-08-03 RX ORDER — MIDAZOLAM HYDROCHLORIDE 5 MG/ML
2 INJECTION INTRAMUSCULAR; INTRAVENOUS ONCE AS NEEDED
Status: DISCONTINUED | OUTPATIENT
Start: 2017-08-03 | End: 2017-08-03 | Stop reason: HOSPADM

## 2017-08-03 RX ORDER — SODIUM CHLORIDE, SODIUM LACTATE, POTASSIUM CHLORIDE, CALCIUM CHLORIDE 600; 310; 30; 20 MG/100ML; MG/100ML; MG/100ML; MG/100ML
INJECTION, SOLUTION INTRAVENOUS CONTINUOUS
Status: DISCONTINUED | OUTPATIENT
Start: 2017-08-03 | End: 2017-08-03 | Stop reason: HOSPADM

## 2017-08-03 RX ORDER — LIDOCAINE HYDROCHLORIDE 10 MG/ML
1 INJECTION, SOLUTION EPIDURAL; INFILTRATION; INTRACAUDAL; PERINEURAL ONCE
Status: COMPLETED | OUTPATIENT
Start: 2017-08-03 | End: 2017-08-03

## 2017-08-03 RX ADMIN — SODIUM CHLORIDE, SODIUM LACTATE, POTASSIUM CHLORIDE, AND CALCIUM CHLORIDE: .6; .31; .03; .02 INJECTION, SOLUTION INTRAVENOUS at 12:08

## 2017-08-03 RX ADMIN — LIDOCAINE HYDROCHLORIDE 10 ML: 10 INJECTION, SOLUTION INFILTRATION; PERINEURAL at 01:08

## 2017-08-03 RX ADMIN — BUPIVACAINE HYDROCHLORIDE 10 ML: 2.5 INJECTION, SOLUTION EPIDURAL; INFILTRATION; INTRACAUDAL; PERINEURAL at 01:08

## 2017-08-03 RX ADMIN — LIDOCAINE HYDROCHLORIDE: 10 INJECTION, SOLUTION EPIDURAL; INFILTRATION; INTRACAUDAL; PERINEURAL at 12:08

## 2017-08-03 RX ADMIN — MIDAZOLAM HYDROCHLORIDE 1 MG: 1 INJECTION, SOLUTION INTRAMUSCULAR; INTRAVENOUS at 01:08

## 2017-08-03 NOTE — OP NOTE
Operative Note       Surgery Date: 8/3/2017     Surgeon(s) and Role:     * Steve Norton MD - Primary    Pre-op Diagnosis:  Chronic pain of left knee [M25.562, G89.29]    Post-op Diagnosis: Post-Op Diagnosis Codes:     * Chronic pain of left knee [M25.562, G89.29]    Procedure(s) (LRB):  DIAGNOSTIC BLOCK ORF THE GENICULAR BRANCHES TO THE LEFT KNEE (Right) x 3 nerve branches  Fluoroscopic guidance    Anesthesia: RN IV Sedation    Procedure in Detail/Findings:    Description of Procedure:     A 86 y.o. male with chronic left knee pain presents for an elective genicular nerve branch block X3 for the left knee. We discussed risks, benefits, and alternatives. She has failed conservation care. We are going to see if a water-cooled radiofrequency ablation procedure to the genicular nerve branches would benefit her knee pain. Patient's verbal and written consent was obtained. She was brought to the fluoro scopic suite, placed in supine position. Left knee was prepped in the usual sterile fashion. Using AP and lateral views, with 1 % lidocaine with a 27g needle, starting with a skin wheal down to the periosteum the area was injected for local anesthesia in each of the four sites of the genicular branches. Next, a 25-gauge needle was introduced down to the proximal medial tibial metaphysis. A similar needle was placed at the lateral aspect of the distal femur. A third introducer needle was guided to the medial aspect of the distal femur. All 3 needles being placed near or around the genicular nerve origin prior to entering the left knee joint. AP views were obtained to assure proper location then lateral fluoroscopic views were obtained to assure the needle was at the appropriate mid shaft location. Once this was obtained, then the stylette was removed in each needle and 1cc of 0.25% Marcaine was injected. The stylette was replaced after each injection. The needles were then removed and a sterile dressing with  antibiotic ointment was applied. The patient was returned to the recovery area in stable condition. There were no complications. Neurovascular exam was performed and revealed no injury. Patient was discharged to follow up in the clinic in several days. We discussed postoperative protocol including pain diary recording and voiced understanding.     Estimated Blood Loss:  Minimal           Specimens     None                         Condition: Good    Attestation:  I performed the procedure.           Discharge Note    Admit Date: 8/3/2017    Attending Physician: Steve Norton MD     Discharge Physician: Steve Norton MD    Final Diagnosis: Post-Op Diagnosis Codes:     * Chronic pain of left knee [M25.562, G89.29]    Disposition: Home or Self Care, pt discharged in good condition and will follow up by phone in 3 days.    Patient Instructions:   Current Discharge Medication List      CONTINUE these medications which have NOT CHANGED    Details   atorvastatin (LIPITOR) 20 MG tablet take 1 tablet by mouth once daily  Qty: 30 tablet, Refills: 11      benazepril (LOTENSIN) 20 MG tablet Take 1 tablet (20 mg total) by mouth once daily.  Qty: 90 tablet, Refills: 3    Associated Diagnoses: Hypertensive heart disease with heart failure      furosemide (LASIX) 40 MG tablet Take 1 tablet (40 mg total) by mouth once daily.  Qty: 90 tablet, Refills: 3    Associated Diagnoses: Swelling of lower extremity; Heart failure, diastolic, chronic; Hypertensive heart disease with heart failure; Hospital discharge follow-up      hydrocodone-acetaminophen 5-325mg (NORCO) 5-325 mg per tablet Take 0.5-1 tablets by mouth every 12 (twelve) hours as needed for Pain.  Qty: 45 tablet, Refills: 0    Associated Diagnoses: Primary osteoarthritis of both knees      lidocaine (LIDODERM) 5 % Place 1 patch onto the skin daily as needed. Remove & Discard patch within 12 hours or as directed by MD  Qty: 30 patch, Refills: 1      melatonin 5 mg  Tab Take 1 tablet by mouth every evening.       metoprolol succinate (TOPROL-XL) 100 MG 24 hr tablet take 1 tablet by mouth once daily  Qty: 90 tablet, Refills: 3    Associated Diagnoses: Benign essential hypertension      MULTIVITAMIN W-MINERALS/LUTEIN (CENTRUM SILVER ORAL) Take by mouth once daily.      naproxen sodium (ANAPROX) 220 MG tablet Take 220 mg by mouth 3 (three) times daily with meals.       omeprazole (PRILOSEC) 20 MG capsule take 1 capsule by mouth once daily  Qty: 90 capsule, Refills: 3    Associated Diagnoses: Gastroesophageal reflux disease, esophagitis presence not specified; Hiatal hernia             Discharge Procedure Orders (must include Diet, Follow-up, Activity)    Discharge Procedure Orders (must include Diet, Follow-up, Activity)  Diet general     Activity as tolerated     Shower on day dressing removed (No bath)     Ice to affected area     Call MD for:  temperature >100.4     Call MD for:  persistent nausea and vomiting     Call MD for:  severe uncontrolled pain     Call MD for:  difficulty breathing, headache or visual disturbances     Call MD for:  redness, tenderness, or signs of infection (pain, swelling, redness, odor or green/yellow discharge around incision site)     Call MD for:  hives     Call MD for:  persistent dizziness or light-headedness     Call MD for:  extreme fatigue     No dressing needed        Discharge Date: 8/3/17

## 2017-08-03 NOTE — DISCHARGE INSTRUCTIONS
Procedural Sedation (Adult)  You have been given medicine by vein to make you sleep during your surgery. This may have included both a pain medicine and sleeping medicine. Most of the effects have worn off. But you may still have some drowsiness for the next 6 to 8 hours.  Home care  Follow these guidelines when you get home:  · For the next 8 hours, you should be watched by a responsible adult. This person should make sure your condition is not getting worse.  · Don't take any medicine by mouth for pain or for sleep during the next 4 hours. These might react with the medicines you were given in the hospital. This could cause a much stronger response than usual.  · Don't drink any alcohol for the next 24 hours.  · Don't drive, operate dangerous machinery, or make important business or personal decisions during the next 24 hours.  Follow-up care  Follow up with your healthcare provider if you are not alert and back to your usual level of activity within 12 hours.  When to seek medical advice  Call your healthcare provider right away if any of these occur:  · Drowsiness gets worse  · Weakness or dizziness gets worse  · Repeated vomiting  · You cannot be awakened   Date Last Reviewed: 10/18/2016  © 1202-3484 [employer]. [address] [place] [address], [place] [address]. All rights reserved. This information is not intended as a substitute for professional medical care. Always follow your healthcare professional's instructions.      Fill out pain diary

## 2017-08-03 NOTE — H&P (VIEW-ONLY)
[facility]    CHIEF COMPLAINT:   Chief Complaint   Patient presents with    Hip Pain     left hip pain      HISTORY OF PRESENT ILLNESS: Lilia Hidalgo is an 86 y.o. female who presents to me for evaluation of her left knee pain. She is accompanied today by her son and daughter. She is s/p left total knee replacement (11 years ago) and has been having worsening left knee pain over the past year. Over the past year, the pain has become progressively severe causing a significant decline in functional mobility so that she transitioned from independently walking, to needing an assistive walker, to now being wheelchair bound (beginning 2 weeks ago). Along with this decrease in functional mobility, she now needs full assistance with her ADLs at her assisted-living facility. While her decline in mobility also coincides with a recent hospitalization with subsequent inpatient rehabilitation for deconditioning, she and her family feel that her main obstacle to regaining her independence is her knee pain, since it prevents her from walking and prevents her from participating in therapies. In regards to her pain, it is primarily located over the anterior aspect of the knee and does not radiate. The pain is worse with activity, and it is associated with weakness 2/2 to pain. She denies any numbness or tingling. She has been taking oral opioid pain medications, but they cause her to be excessively drowsy and inadequately address the pain. She also states that she has occasional pain in the anterior aspect of the left hip, but says it is negligible compared to the knee. She saw her orthopedic surgeon 9 months ago, who stated that she would not be a surgical candidate 2/2 medical comorbidities.    Review of Systems   Constitutional: Negative for fever.   HENT: Negative for drooling.    Eyes: Negative for discharge.   Respiratory: Negative for choking.    Cardiovascular: Negative for chest pain.   Genitourinary:  Negative for flank pain.   Skin: Negative for wound.   Allergic/Immunologic: Negative for immunocompromised state.   Neurological: Negative for tremors and syncope.   Psychiatric/Behavioral: Negative for behavioral problems.     Past Medical History:   Past Medical History:   Diagnosis Date    Arthritis     GERD (gastroesophageal reflux disease)     Hyperlipidemia     Hypertension     Joint pain     Knee pain, left 08/2016    pain with walking or standing    PUD (peptic ulcer disease)      Past Surgical History:   Past Surgical History:   Procedure Laterality Date    APPENDECTOMY      COLONOSCOPY      08    EYE SURGERY      bilateral cataract    HYSTERECTOMY      UNKNOWN BSO    JOINT REPLACEMENT      right hip replacement    JOINT REPLACEMENT      right foot tendon surgery    KNEE ARTHROPLASTY Left 2001    thumb surgery Left 09/2015    UPPER GASTROINTESTINAL ENDOSCOPY      2013       Family History:   Family History   Problem Relation Age of Onset    Heart disease Mother     Heart disease Father     Asthma Father     Cancer Brother      breast    Heart disease Brother     Melanoma Neg Hx     Psoriasis Neg Hx     Lupus Neg Hx     Eczema Neg Hx     Acne Neg Hx     Breast cancer Neg Hx     Ovarian cancer Neg Hx     Cervical cancer Neg Hx     Endometrial cancer Neg Hx     Vaginal cancer Neg Hx        Medications:   Current Outpatient Prescriptions on File Prior to Visit   Medication Sig Dispense Refill    acetaminophen (TYLENOL) 500 MG tablet Take 500 mg by mouth every 6 (six) hours as needed.      atorvastatin (LIPITOR) 20 MG tablet take 1 tablet by mouth once daily 30 tablet 11    benazepril (LOTENSIN) 20 MG tablet Take 1 tablet (20 mg total) by mouth once daily. 90 tablet 3    escitalopram oxalate (LEXAPRO) 20 MG tablet Take 1 tablet (20 mg total) by mouth once daily. 30 tablet 11    furosemide (LASIX) 40 MG tablet Take 1 tablet (40 mg total) by mouth once daily. 90 tablet 3     "hydrocodone-acetaminophen 5-325mg (NORCO) 5-325 mg per tablet Take 0.5-1 tablets by mouth every 12 (twelve) hours as needed for Pain. 45 tablet 0    melatonin 5 mg Tab Take 1 tablet by mouth every evening.       metoprolol succinate (TOPROL-XL) 100 MG 24 hr tablet take 1 tablet by mouth once daily 90 tablet 3    MULTIVITAMIN W-MINERALS/LUTEIN (CENTRUM SILVER ORAL) Take by mouth once daily.      omeprazole (PRILOSEC) 20 MG capsule take 1 capsule by mouth once daily 90 capsule 3     No current facility-administered medications on file prior to visit.        Allergies:   Review of patient's allergies indicates:   Allergen Reactions    Aspirin Nausea Only     Other reaction(s): esophageal pain    Celebrex  [celecoxib]      Other reaction(s): Rash    Ibuprofen      Other reaction(s): esophogeal pain    Steroid  [corticosteroids (glucocorticoids)]      Other reaction(s): Flushing (skin)    Sulfa dyne      Other reaction(s): esophogeal pain    Morphine Hallucinations       Social History:   Social History     Social History    Marital status:      Spouse name: N/A    Number of children: N/A    Years of education: N/A     Social History Main Topics    Smoking status: Never Smoker    Smokeless tobacco: Never Used    Alcohol use No    Drug use: No    Sexual activity: Not Currently     Birth control/ protection: None     Other Topics Concern    Are You Pregnant Or Think You May Be? No    Breast-Feeding No     Social History Narrative    None     Lilia lives in an assisted living facility in [place], [place].    PHYSICAL EXAMINATION:   General    Vitals:    07/19/17 1120   BP: 115/72   Pulse: 73   Weight: 83.9 kg (185 lb)   Height: 5' 5" (1.651 m)     Constitutional: Oriented to person, place, and time. No apparent distress. Appears well-developed and well-nourished. Pleasant.  Head: Normocephalic and atraumatic.   Eyes: Right eye exhibits no discharge. Left eye exhibits no discharge. No scleral " icterus.   Pulmonary/Chest: Effort normal. No respiratory distress.   Abdominal: There is no guarding.   Neurological: Alert and oriented to person, place, and time.   Psychiatric: Behavior is normal.   Right Ankle Exam     Muscle Strength   Dorsiflexion:  5/5  Plantar flexion:  5/5      Left Ankle Exam     Muscle Strength   Dorsiflexion:  4/5   Plantar flexion:  4/5       Right Knee Exam     Comments:  Knee flexion/ extension strength: 5/5      Left Knee Exam     Tenderness   The patient is experiencing tenderness in the lateral joint line, lateral retinaculum, medial joint line, patellar tendon and patella (diffuse tenderness to palpation about all aspects of knee, most notably over patella).    Range of Motion   Extension: 15 (limited by pain)   Flexion: 100 (limited by pain)     Tests   Lachman:  Anterior - negative    Posterior - negative  Drawer:       Anterior - negative     Posterior - negative  Varus: negative  Valgus: negative  Patellar Apprehension: negative    Other   Erythema: absent  Scars: present  Left knee sensation: hypersensitivity over knee.  Pulse: present  Swelling: mild (diffuse 2/2 to edema)  Effusion: no effusion present    Comments:  Knee flexion/ extension strength: 4/5      Left Hip Exam     Comments:  Unable to evaluate as patient was too distracted by knee pain        INSPECTION: There is no diffuse swelling 2/2 dependent edema in bilateral lower extremities. Scar noted over left knee.  GAIT/DYNAMIC: Patient was not able to ambulate independently. She was in a manual wheelchair. She was unable to climb onto the exam table with assistance.  Overall, exam somewhat limited secondary to a inability to transfer to the examining table.    Imaging  Left knee xray (5/25/17): Status post total knee arthroplasty.  No periprosthetic lucency or fracture.  Small joint effusion.  Left hip xray (5/25/17): No fracture.  No lytic or blastic lesion. Joint: There is severe narrowing of hip cartilage space  accompanied by subchondral sclerosis and osteophyte production. Miscellaneous: Status post right total hip arthroplasty.  No evidence for complication on frontal views.    Data Reviewed: X-rays    ASSESSMENT:   Encounter Diagnoses   Name Primary?    Chronic pain of left knee Yes    H/O total knee replacement, left      PLAN:   1. Time was spent reviewing the above diagnosis in depth with Lilia today, including acute management and rehabilitation.     2. Being that Lilia is not a surgical candidate and the family would like to focus on pain control, we discussed the option of genicular nerve ablation for her left knee pain.  I believe she is a good candidate for this procedure in hopes of giving her some long-term pain reduction and the avoidance of opiate use.  After discussion both Lilia and her family decided that they would like to go ahead with this option.    3. Will plan for genicular nerve block at next earliest available appointment. If patient has success from the block, we will proceed with genicular nerve ablation at a future date. f unsuccessful, we will look into other options as well as further investigate her hip as a possible pain generator.    4. In the meantime while Lilia waits for appointment, it is appropriate for her to continue taking her hydrocodone as prescribed. It is also acceptable for her to take OTC aleve as directed as needed for pain. I also prescribed her lidoderm patches to be applied to the knee.    5. Return at next available appointment for genicular nerve block.     The patient was initially seen and examined by [facility] resident PGY 1, Dr. Estiven Yang.    The above note was completed, in part, with the aid of Dragon dictation software/hardware. Translation errors may be present.

## 2017-08-04 ENCOUNTER — TELEPHONE (OUTPATIENT)
Dept: PHYSICAL MEDICINE AND REHAB | Facility: CLINIC | Age: 82
End: 2017-08-04

## 2017-08-04 VITALS
SYSTOLIC BLOOD PRESSURE: 170 MMHG | HEART RATE: 68 BPM | OXYGEN SATURATION: 100 % | RESPIRATION RATE: 18 BRPM | DIASTOLIC BLOOD PRESSURE: 80 MMHG | HEIGHT: 65 IN | WEIGHT: 160 LBS | TEMPERATURE: 98 F | BODY MASS INDEX: 26.66 KG/M2

## 2017-08-04 NOTE — TELEPHONE ENCOUNTER
----- Message from Beverly Gupta sent at 8/4/2017  2:12 PM CDT -----  Contact: Daughter Belkys Moser [phone]  Her mother has not gotten any relief from the procedure. Should she be feeling it by now?  Please call her.    Thank you!

## 2017-08-07 RX ORDER — TRAMADOL HYDROCHLORIDE 50 MG/1
50 TABLET ORAL EVERY 4 HOURS PRN
Qty: 45 TABLET | Refills: 0 | Status: SHIPPED | OUTPATIENT
Start: 2017-08-07 | End: 2017-08-10

## 2017-08-08 ENCOUNTER — PATIENT MESSAGE (OUTPATIENT)
Dept: INTERNAL MEDICINE | Facility: CLINIC | Age: 82
End: 2017-08-08

## 2017-08-08 DIAGNOSIS — M17.0 PRIMARY OSTEOARTHRITIS OF BOTH KNEES: ICD-10-CM

## 2017-08-10 ENCOUNTER — PATIENT MESSAGE (OUTPATIENT)
Dept: PHYSICAL MEDICINE AND REHAB | Facility: CLINIC | Age: 82
End: 2017-08-10

## 2017-08-10 DIAGNOSIS — M25.552 PAIN OF BOTH HIP JOINTS: Primary | ICD-10-CM

## 2017-08-10 DIAGNOSIS — M25.551 PAIN OF BOTH HIP JOINTS: Primary | ICD-10-CM

## 2017-08-10 RX ORDER — HYDROCODONE BITARTRATE AND ACETAMINOPHEN 5; 325 MG/1; MG/1
.5-1 TABLET ORAL EVERY 8 HOURS PRN
Qty: 45 TABLET | Refills: 0 | Status: SHIPPED | OUTPATIENT
Start: 2017-08-10 | End: 2017-12-14 | Stop reason: SDUPTHER

## 2017-08-15 ENCOUNTER — OFFICE VISIT (OUTPATIENT)
Dept: PHYSICAL MEDICINE AND REHAB | Facility: CLINIC | Age: 82
End: 2017-08-15
Payer: MEDICARE

## 2017-08-15 ENCOUNTER — HOSPITAL ENCOUNTER (OUTPATIENT)
Dept: RADIOLOGY | Facility: HOSPITAL | Age: 82
Discharge: HOME | End: 2017-08-15
Attending: PHYSICAL MEDICINE & REHABILITATION
Payer: MEDICARE

## 2017-08-15 ENCOUNTER — TELEPHONE (OUTPATIENT)
Dept: PHYSICAL MEDICINE AND REHAB | Facility: CLINIC | Age: 82
End: 2017-08-15

## 2017-08-15 VITALS
WEIGHT: 160 LBS | DIASTOLIC BLOOD PRESSURE: 72 MMHG | HEART RATE: 65 BPM | HEIGHT: 65 IN | BODY MASS INDEX: 26.66 KG/M2 | SYSTOLIC BLOOD PRESSURE: 153 MMHG

## 2017-08-15 DIAGNOSIS — M16.12 PRIMARY OSTEOARTHRITIS OF LEFT HIP: ICD-10-CM

## 2017-08-15 DIAGNOSIS — M25.552 LEFT HIP PAIN: Primary | ICD-10-CM

## 2017-08-15 DIAGNOSIS — M25.552 PAIN OF BOTH HIP JOINTS: ICD-10-CM

## 2017-08-15 DIAGNOSIS — M25.551 PAIN OF BOTH HIP JOINTS: ICD-10-CM

## 2017-08-15 PROCEDURE — 3008F BODY MASS INDEX DOCD: CPT | Mod: S$GLB,,, | Performed by: PHYSICAL MEDICINE & REHABILITATION

## 2017-08-15 PROCEDURE — 99999 PR PBB SHADOW E&M-EST. PATIENT-LVL III: CPT | Mod: PBBFAC,,, | Performed by: PHYSICAL MEDICINE & REHABILITATION

## 2017-08-15 PROCEDURE — 1125F AMNT PAIN NOTED PAIN PRSNT: CPT | Mod: S$GLB,,, | Performed by: PHYSICAL MEDICINE & REHABILITATION

## 2017-08-15 PROCEDURE — 1159F MED LIST DOCD IN RCRD: CPT | Mod: S$GLB,,, | Performed by: PHYSICAL MEDICINE & REHABILITATION

## 2017-08-15 PROCEDURE — 99213 OFFICE O/P EST LOW 20 MIN: CPT | Mod: 25,S$GLB,, | Performed by: PHYSICAL MEDICINE & REHABILITATION

## 2017-08-15 PROCEDURE — 1157F ADVNC CARE PLAN IN RCRD: CPT | Mod: S$GLB,,, | Performed by: PHYSICAL MEDICINE & REHABILITATION

## 2017-08-15 PROCEDURE — 72110 X-RAY EXAM L-2 SPINE 4/>VWS: CPT | Mod: 26,,, | Performed by: RADIOLOGY

## 2017-08-15 PROCEDURE — 20611 DRAIN/INJ JOINT/BURSA W/US: CPT | Mod: S$GLB,,, | Performed by: PHYSICAL MEDICINE & REHABILITATION

## 2017-08-15 PROCEDURE — 99499 UNLISTED E&M SERVICE: CPT | Mod: S$GLB,,, | Performed by: PHYSICAL MEDICINE & REHABILITATION

## 2017-08-15 RX ORDER — TRAMADOL HYDROCHLORIDE 50 MG/1
50 TABLET ORAL EVERY 12 HOURS PRN
COMMUNITY
End: 2017-09-01 | Stop reason: SDUPTHER

## 2017-08-15 NOTE — PROGRESS NOTES
[facility]    CHIEF COMPLAINT:   Chief Complaint   Patient presents with    Hip Pain     eval left hip pain     HISTORY OF PRESENT ILLNESS: Lilia Hidalgo is a 86 y.o. female who presents to me for evaluation of L hip and knee pain. I performed a L knee genicular radiofrequency ablation test block on 8/3/17. Unfortunately she did not get any relief from it. She is still having L knee pain as well as L hip pain. She does have severe osteoarthritis in her L hip. She says the pain in both her knee and hip is mostly when she is weightbearing, and is having a lot of difficulty walking. She also has a hard time rolling onto her L side in bed because of pain. She rates it as a 6/10. She uses a walker around her apartment and a wheelchair for going out on longer trips. She is having trouble putting any weight on her L leg due to pain. She has noticed some swelling of her L knee. She is not currently doing any home exercise program or formal physical therapy.     The pain in her hip is located in her inguinal area and does not radiate. The pain in her L knee is mostly on the medial side and does not radiate.     She has a history of a L knee replacement in 2001, and thus no injections have been done in that knee since. She is currently taking Tramadol and hydrocodone for pain relief. She says this is helping only a small amount.     Review of Systems   Constitutional: Negative for fever.   HENT: Negative for drooling.    Eyes: Negative for discharge.   Respiratory: Negative for choking.    Cardiovascular: Negative for chest pain.   Genitourinary: Negative for flank pain.   Skin: Negative for wound.   Allergic/Immunologic: Negative for immunocompromised state.   Neurological: Negative for tremors and syncope.   Psychiatric/Behavioral: Negative for behavioral problems.     Past Medical History:   Past Medical History:   Diagnosis Date    Arthritis     CHF (congestive heart failure)     Encounter for  "blood transfusion     GERD (gastroesophageal reflux disease)     Hyperlipidemia     Hypertension     Insomnia     Joint pain     Knee pain, left 08/2016    pain with walking or standing    PUD (peptic ulcer disease)        Past Surgical History:   Past Surgical History:   Procedure Laterality Date    APPENDECTOMY      COLONOSCOPY      08    EYE SURGERY      bilateral cataract    FINGER SURGERY      LT thumb surgery--"arthritis surgery"    HYSTERECTOMY      UNKNOWN BSO    JOINT REPLACEMENT      right hip replacement    KNEE ARTHROPLASTY Left 2001    TONSILLECTOMY      TUMOR EXCISION      esophageal tumor removal     UPPER GASTROINTESTINAL ENDOSCOPY      2013       Family History:   Family History   Problem Relation Age of Onset    Heart disease Mother     Heart disease Father     Asthma Father     Cancer Brother      breast    Heart disease Brother     Melanoma Neg Hx     Psoriasis Neg Hx     Lupus Neg Hx     Eczema Neg Hx     Acne Neg Hx     Breast cancer Neg Hx     Ovarian cancer Neg Hx     Cervical cancer Neg Hx     Endometrial cancer Neg Hx     Vaginal cancer Neg Hx        Medications:   Current Outpatient Prescriptions on File Prior to Visit   Medication Sig Dispense Refill    atorvastatin (LIPITOR) 20 MG tablet take 1 tablet by mouth once daily 30 tablet 11    benazepril (LOTENSIN) 20 MG tablet Take 1 tablet (20 mg total) by mouth once daily. 90 tablet 3    furosemide (LASIX) 40 MG tablet Take 1 tablet (40 mg total) by mouth once daily. 90 tablet 3    hydrocodone-acetaminophen 5-325mg (NORCO) 5-325 mg per tablet Take 0.5-1 tablets by mouth every 8 (eight) hours as needed for Pain. 45 tablet 0    lidocaine (LIDODERM) 5 % Place 1 patch onto the skin daily as needed. Remove & Discard patch within 12 hours or as directed by MD 30 patch 1    melatonin 5 mg Tab Take 1 tablet by mouth every evening.       metoprolol succinate (TOPROL-XL) 100 MG 24 hr tablet take 1 tablet by mouth " "once daily 90 tablet 3    MULTIVITAMIN W-MINERALS/LUTEIN (CENTRUM SILVER ORAL) Take by mouth once daily.      naproxen sodium (ANAPROX) 220 MG tablet Take 220 mg by mouth 3 (three) times daily with meals.       omeprazole (PRILOSEC) 20 MG capsule take 1 capsule by mouth once daily 90 capsule 3     No current facility-administered medications on file prior to visit.        Allergies:   Review of patient's allergies indicates:   Allergen Reactions    Aspirin Nausea Only and Other (See Comments)     Other reaction(s): esophageal pain    Celebrex [celecoxib] Rash    Morphine Hallucinations    Steroid [corticosteroids (glucocorticoids)] Other (See Comments)     Other reaction(s): Flushing (skin)    Sulfa dyne Other (See Comments)     Other reaction(s): esophogeal pain       Social History:   Social History     Social History    Marital status:      Spouse name: N/A    Number of children: N/A    Years of education: N/A     Social History Main Topics    Smoking status: Never Smoker    Smokeless tobacco: Never Used    Alcohol use No    Drug use:      Types: Hydrocodone    Sexual activity: Not Currently     Birth control/ protection: None     Other Topics Concern    Are You Pregnant Or Think You May Be? No    Breast-Feeding No     Social History Narrative    None     PHYSICAL EXAMINATION:   General    Vitals:    08/15/17 1023   BP: (!) 153/72   Pulse: 65   Weight: 72.6 kg (160 lb)   Height: 5' 5" (1.651 m)     Constitutional: Oriented to person, place, and time. No apparent distress. Appears well-developed and well-nourished. Pleasant.  HENT:   Head: Normocephalic and atraumatic.   Eyes: Right eye exhibits no discharge. Left eye exhibits no discharge. No scleral icterus.   Pulmonary/Chest: Effort normal. No respiratory distress.   Abdominal: There is no guarding.   Neurological: Alert and oriented to person, place, and time.   Psychiatric: Behavior is normal.   Ortho Exam  INSPECTION: There is no " swelling, ecchymoses or gross deformity of L knee or L hip. No erythema, excess heat or any signs of infection of L knee or L hip.    PALPATION: Diffuse TTP of L knee structures as well as L hip structures. Hip pain mostly located in inguinal area through passive hip ROM.   LIGAMENTOUS LAXITY AND STABILITY: Negative JOHN test. Positive FADIR on L - pain in inguinal area. No pain with SI joint compression.   ROM: full passive knee extension and flexion, although painful.   NEURO/STRENGTH: diminished LE reflexes, 1/4 b/l. 5-/5 in RLE major muscle groups. Left LE unable to accurately test d/t pain.   GAIT/DYNAMIC: antalgic gait, bears weight mostly on R leg. Only able to ambulate a few steps d/t pain.     Imaging:  X-ray of lumbar spine from 8/15/2017: Intervertebral disc height loss is noted at the L2-L3 and L4-L5 levels. A mild anterior listhesis is noted of 3 mm of the L2 on L3 and L3 on L4 vertebral bodies and 4 mm of the L4 on L5 vertebral body. Facet arthropathy is noted at the L4-L5 and L5-S1 levels.    A screw from a right sided hip replacement is thought to be partially visualized.    X-ray of the left hip from 5/25/2017: No fracture.  No lytic or blastic lesion.  Joint: There is severe narrowing of hip cartilage space accompanied by subchondral sclerosis and osteophyte production.  Miscellaneous: Status post right total hip arthroplasty.  No evidence for complication on frontal views.    Data Reviewed: X-ray    Supportive Actions: Independent visualization of images or test specimens    ASSESSMENT: Osteoarthritis of left hip    PLAN:     1. Time was spent reviewing the above diagnosis in depth with Lilia today, including acute management and rehabilitation.     2. Ms. Hidalgo unfortunately did not respond to the genicular nerve diagnostic block on the left.  She is however still very tender over the knee structures today on exam.  She also possesses tenderness proximally over the quadriceps and hip  structures.  She does have obvious significant left hip osteoarthritis seen on x-ray.  Provocative maneuvers about the left hip did elicit pain today.  We discussed in detail with her son that her options are somewhat limited.  She has been deemed not a surgical candidate by her primary care physician.  They do not wish to pursue surgery at this time.  We discussed intervening locally at the left hip in the form of injection therapy.  She reports a prior history of heart racing with the use of corticosteroids.  As such, we will avoid its use performed a anesthetic only injection of lidocaine and Marcaine into the left hip joint today to assess her level of pain reduction.  If she does get significant relief this points to intra-articular pain generation indicated then consider more seriously the femoral and obturator nerve radiofrequency ablation procedure.  If she does not receive relief, this could indicate lumbar spine etiology, or other diffuse pain syndrome.  She does live on the [place] and wishes to pursue pain management care closer to her home.  I recommended that she see one of my physical medicine rehabilitation colleagues we will help facilitate her getting set to see Dr. Ugalde. I will call her son who was present at the visit today to get a pain report.    3. Referred to PM&R at [facility] - Dr. Ugalde - to manage and optimize pharmacological treatment of L LE pain.    Total time spent with pt was 45 minutes with > 50% of time spent in counseling. 30 minutes spent in history and initial exam, and then 15 minutes spent performing hip injection. Patient was initially seen and examined by [facility] PGY-I resident, Dr. Mercy Sousa, and then by myself. As the supervising and teaching physician, I personally evaluated and examined the patient and reviewed the resident's physical exam, assessment/plan and agree with the clinic note as written and then edited/addended by myself as above.

## 2017-08-16 ENCOUNTER — PATIENT MESSAGE (OUTPATIENT)
Dept: PHYSICAL MEDICINE AND REHAB | Facility: CLINIC | Age: 82
End: 2017-08-16

## 2017-08-16 ENCOUNTER — PATIENT MESSAGE (OUTPATIENT)
Dept: INTERNAL MEDICINE | Facility: CLINIC | Age: 82
End: 2017-08-16

## 2017-08-16 ENCOUNTER — TELEPHONE (OUTPATIENT)
Dept: PHYSICAL MEDICINE AND REHAB | Facility: CLINIC | Age: 82
End: 2017-08-16

## 2017-08-16 NOTE — TELEPHONE ENCOUNTER
----- Message from Lakesha London sent at 8/16/2017  3:27 PM CDT -----  Contact: Pt daughter Belkys can be reached at [phone]  Belkys called to request to keep appt for August 25th and cancel Sept appt.  However when cancelling appt for Sept the referral would not attach please be so kind to assist in this matter.    Also pt is calling have questions concerning medication hydrocodone-acetaminophen 5-325mg (NORCO) 5-325 mg per tablet and tramadol (ULTRAM) 50 mg tablet. Belkys wants to know if there any other form of treatment for pt. For her age.    Please contact daughter.    Thank you!

## 2017-08-16 NOTE — PROCEDURES
Large Joint Aspiration/Injection  Date/Time: 8/15/2017 2:21 PM  Performed by: MELINDA MELGAR  Authorized by: MELINDA MELGAR     Consent Done?:  Yes (Verbal)  Indications:  Pain and diagnostic evaluation  Procedure site marked: Yes    Timeout: Prior to procedure the correct patient, procedure, and site was verified      Location:  Hip  Site:  L hip joint  Prep: Patient was prepped and draped in usual sterile fashion    Ultrasonic Guidance for needle placement: Yes  Images are saved and documented.  Needle size:  22 G  Approach: Needle in plane, distal to proximal.  Patient tolerance:  Patient tolerated the procedure well with no immediate complications    Additional Comments: Ultrasound guidance was used for correct needle placement, the images were saved will be uploaded to EMR.  Mr. of 4 cc of 1% lidocaine with 1 cc of 0.25% Marcaine.

## 2017-08-23 ENCOUNTER — PATIENT MESSAGE (OUTPATIENT)
Dept: INTERNAL MEDICINE | Facility: CLINIC | Age: 82
End: 2017-08-23

## 2017-08-23 DIAGNOSIS — R35.0 URINARY FREQUENCY: Primary | ICD-10-CM

## 2017-08-23 DIAGNOSIS — M25.569 KNEE PAIN, UNSPECIFIED CHRONICITY, UNSPECIFIED LATERALITY: Primary | ICD-10-CM

## 2017-08-24 ENCOUNTER — PATIENT MESSAGE (OUTPATIENT)
Dept: INTERNAL MEDICINE | Facility: CLINIC | Age: 82
End: 2017-08-24

## 2017-08-24 NOTE — TELEPHONE ENCOUNTER
General Outpatient  [facility]      HOME HEALTH ORDERS  FACE TO FACE ENCOUNTER    Patient Name: Lilia Hidalgo  YOB: 1931    PCP: April Herrera MD   PCP Address: [address] / [place] [address]  PCP Phone Number: [phone]  PCP Fax: [phone]    Encounter Date: 8/23/17    Admit to Home Health    Diagnoses:  No diagnosis found.    Allergies:Review of patient's allergies indicates:   -- Aspirin -- Nausea Only and Other (See                            Comments)    --  Other reaction(s): esophageal pain   -- Celebrex [celecoxib] -- Rash   -- Morphine -- Hallucinations   -- Steroid [corticosteroids (glucocorticoids)] -- Other (See Comments)    --  Other reaction(s): Flushing (skin)   -- Sulfa dyne -- Other (See Comments)    --  Other reaction(s): esophogeal pain    Medications: Review current medications with patient and family and provide education.      Current Outpatient Prescriptions:  atorvastatin (LIPITOR) 20 MG tablet, take 1 tablet by mouth once daily, Disp: 30 tablet, Rfl: 11  benazepril (LOTENSIN) 20 MG tablet, Take 1 tablet (20 mg total) by mouth once daily., Disp: 90 tablet, Rfl: 3  furosemide (LASIX) 40 MG tablet, Take 1 tablet (40 mg total) by mouth once daily., Disp: 90 tablet, Rfl: 3  hydrocodone-acetaminophen 5-325mg (NORCO) 5-325 mg per tablet, Take 0.5-1 tablets by mouth every 8 (eight) hours as needed for Pain. (Patient taking differently: Take 0.5-1 tablets by mouth every 12 (twelve) hours as needed for Pain (1/2 tab by mouth at bedtime). ), Disp: 45 tablet, Rfl: 0  lidocaine (LIDODERM) 5 %, Place 1 patch onto the skin daily as needed. Remove & Discard patch within 12 hours or as directed by MD, Disp: 30 patch, Rfl: 1  melatonin 5 mg Tab, Take 1 tablet by mouth every evening. , Disp: , Rfl:   metoprolol succinate (TOPROL-XL) 100 MG 24 hr tablet, take 1 tablet by mouth once daily, Disp: 90 tablet, Rfl: 3  MULTIVITAMIN W-MINERALS/LUTEIN (CENTRUM SILVER ORAL), Take  by mouth once daily., Disp: , Rfl:   naproxen sodium (ANAPROX) 220 MG tablet, Take 220 mg by mouth 3 (three) times daily with meals. , Disp: , Rfl:   omeprazole (PRILOSEC) 20 MG capsule, take 1 capsule by mouth once daily, Disp: 90 capsule, Rfl: 3  tramadol (ULTRAM) 50 mg tablet, Take 50 mg by mouth every 12 (twelve) hours as needed for Pain (1 tab by mouth in AM and one mid day)., Disp: , Rfl:     No current facility-administered medications for this visit.     Cannot display discharge medications since this is not an admission.        Follow Up Appointments:  8/25/2017  9:40 AM    Girish Ugalde MD     [facility]      I have seen and examined this patient within the last 30 days. My clinical findings that support the need for the home health skilled services and home bound status are the following:  Weakness/numbness causing balance and gait disturbance due to Weakness/Debility making it taxing to leave home.  Requiring assistive device to leave home due to unsteady gait caused by  Weakness/Debility.     Nursing:   SN to complete comprehensive assessment including routine vital signs. Instruct on disease process and s/s of complications to report to MD. Review/verify medication list sent home with the patient at time of discharge  and instruct patient/caregiver as needed. Frequency may be adjusted depending on start of care date. Notify MD if SBP > 160 or < 90; DBP > 90 or < 50; HR > 120 or < 50; Temp > 101; O2 < 88%; Other:       N/A    Diet:   cardiac diet    Activities:   activity as tolerated    Labs:  N/A      Referrals/ Consults  Physical Therapy to evaluate and treat. Evaluate for home safety and equipment needs; Establish/upgrade home exercise program. Perform / instruct on therapeutic exercises, gait training, transfer training, and Range of Motion.    Home Health Aide:  Nursing Weekly and Physical Therapy Weekly    I certify that this patient is confined to her home and needs  intermittent skilled nursing care and physical therapy.

## 2017-08-25 ENCOUNTER — OFFICE VISIT (OUTPATIENT)
Dept: PHYSICAL MEDICINE AND REHAB | Facility: CLINIC | Age: 82
End: 2017-08-25
Payer: MEDICARE

## 2017-08-25 ENCOUNTER — LAB VISIT (OUTPATIENT)
Dept: LAB | Facility: HOSPITAL | Age: 82
End: 2017-08-25
Payer: MEDICARE

## 2017-08-25 VITALS — DIASTOLIC BLOOD PRESSURE: 70 MMHG | HEART RATE: 70 BPM | HEIGHT: 65 IN | SYSTOLIC BLOOD PRESSURE: 150 MMHG

## 2017-08-25 DIAGNOSIS — M43.10 SPONDYLOLISTHESIS, UNSPECIFIED SPINAL REGION: ICD-10-CM

## 2017-08-25 DIAGNOSIS — M47.816 SPONDYLOSIS OF LUMBAR REGION WITHOUT MYELOPATHY OR RADICULOPATHY: ICD-10-CM

## 2017-08-25 DIAGNOSIS — M25.562 CHRONIC PAIN OF LEFT KNEE: Primary | ICD-10-CM

## 2017-08-25 DIAGNOSIS — R35.0 URINARY FREQUENCY: ICD-10-CM

## 2017-08-25 DIAGNOSIS — M25.552 LEFT HIP PAIN: ICD-10-CM

## 2017-08-25 DIAGNOSIS — M16.12 PRIMARY OSTEOARTHRITIS OF LEFT HIP: ICD-10-CM

## 2017-08-25 DIAGNOSIS — Z96.652 H/O TOTAL KNEE REPLACEMENT, LEFT: ICD-10-CM

## 2017-08-25 DIAGNOSIS — G89.29 CHRONIC PAIN OF LEFT KNEE: Primary | ICD-10-CM

## 2017-08-25 LAB
AMORPH CRY UR QL COMP ASSIST: ABNORMAL
BACTERIA #/AREA URNS AUTO: ABNORMAL /HPF
BILIRUB UR QL STRIP: NEGATIVE
CLARITY UR REFRACT.AUTO: ABNORMAL
COLOR UR AUTO: YELLOW
GLUCOSE UR QL STRIP: NEGATIVE
HGB UR QL STRIP: NEGATIVE
KETONES UR QL STRIP: NEGATIVE
LEUKOCYTE ESTERASE UR QL STRIP: ABNORMAL
MICROSCOPIC COMMENT: ABNORMAL
NITRITE UR QL STRIP: NEGATIVE
PH UR STRIP: 8 [PH] (ref 5–8)
PROT UR QL STRIP: NEGATIVE
RBC #/AREA URNS AUTO: 1 /HPF (ref 0–4)
SP GR UR STRIP: 1.01 (ref 1–1.03)
SQUAMOUS #/AREA URNS AUTO: 1 /HPF
URN SPEC COLLECT METH UR: ABNORMAL
UROBILINOGEN UR STRIP-ACNC: 1 EU/DL
WBC #/AREA URNS AUTO: 9 /HPF (ref 0–5)

## 2017-08-25 PROCEDURE — 99215 OFFICE O/P EST HI 40 MIN: CPT | Mod: S$GLB,,, | Performed by: PHYSICAL MEDICINE & REHABILITATION

## 2017-08-25 PROCEDURE — 87086 URINE CULTURE/COLONY COUNT: CPT

## 2017-08-25 PROCEDURE — 1159F MED LIST DOCD IN RCRD: CPT | Mod: S$GLB,,, | Performed by: PHYSICAL MEDICINE & REHABILITATION

## 2017-08-25 PROCEDURE — 1157F ADVNC CARE PLAN IN RCRD: CPT | Mod: S$GLB,,, | Performed by: PHYSICAL MEDICINE & REHABILITATION

## 2017-08-25 PROCEDURE — 3008F BODY MASS INDEX DOCD: CPT | Mod: S$GLB,,, | Performed by: PHYSICAL MEDICINE & REHABILITATION

## 2017-08-25 PROCEDURE — 81001 URINALYSIS AUTO W/SCOPE: CPT

## 2017-08-25 PROCEDURE — 99499 UNLISTED E&M SERVICE: CPT | Mod: S$GLB,,, | Performed by: PHYSICAL MEDICINE & REHABILITATION

## 2017-08-25 PROCEDURE — 99999 PR PBB SHADOW E&M-EST. PATIENT-LVL II: CPT | Mod: PBBFAC,,, | Performed by: PHYSICAL MEDICINE & REHABILITATION

## 2017-08-25 RX ORDER — CELECOXIB 200 MG/1
200 CAPSULE ORAL DAILY
Qty: 30 CAPSULE | Refills: 1 | Status: SHIPPED | OUTPATIENT
Start: 2017-08-25 | End: 2017-12-22

## 2017-08-25 RX ORDER — DEXTROMETHORPHAN HYDROBROMIDE, GUAIFENESIN 5; 100 MG/5ML; MG/5ML
650 LIQUID ORAL EVERY 8 HOURS
Refills: 0 | COMMUNITY
Start: 2017-08-25 | End: 2019-01-17 | Stop reason: SDUPTHER

## 2017-08-25 NOTE — PROGRESS NOTES
Subjective:       Patient ID: Lilia Hidalgo is a 86 y.o. female.    Chief Complaint: No chief complaint on file.    HPI     Mrs. Hidalgo is an 86-year-old white female who was referred to the Physical   Medicine Clinic for help with pain management.  Her past medical history is   significant for hypertension, depression, remote peptic ulcer disease, but   currently asymptomatic, arthritis and debility.  The patient had hospitalization   from 05/05/2017, to 05/08/2017, for septic encephalopathy.  She subsequently   stayed in a Skilled Nursing Facility again from 05/08/2017, to 05/29/2017.  She   was discharged to an Assisted Living Facility.    The patient is status post left total knee arthroplasty about 11 years ago due   to osteoarthritis.  She had persistent knee pain after her surgery.  Her pain   has been waxing and waning, but progressively worse over the past several   months.  No surgical intervention was deemed necessary.  She was seen recently   by the Physical Medicine and Rehabilitation Service, Dr. Norton for   procedural pain management.  On 08/03/2017, she underwent diagnostic block of   the left genicular nerves branches.  She had only minimal relief.  On   08/15/2017, she underwent left hip injection with anesthetics for diagnostic   purposes, but only with mild relief.  She was consequently referred to our   clinic for help with conservative pain management.    The patient complains of left knee pain.  It is a constant aching pain.  It is   aggravated by standing or walking.  It is better with sitting down or lying   down.  Her maximum pain is 7-8/10 and minimum 3-4/10.  Today, it is 5-6/10.  The   patient has occasional swelling of her left knee.    The patient also complains of deep left hip/groin pain.  Her left hip x-rays on   05/25/2017, were positive for severe degenerative changes.  She is not a good   surgical candidate per Orthopedic Surgery.  The patient also had x-rays of the   lumbar  spine done on 08/15/2017.  It was positive for multilevel degenerative   changes with facet arthropathy and multilevel spondylolisthesis.  The patient;   however, denies any low back pain or buttock pain.  Her left groin pain is a   constant pain described as soreness and sharp sensations.  The pain occasionally   shoots down anteriorly to the area above the left knee.  She denies, however,   any radiation distally to her legs.  She denies any dysesthetic sensations in   her legs.  Her hip pain is aggravated by standing or walking.  It is better with   sitting down or lying down.  Her maximum pain is 7-8/10 and minimum 1-2/10.    Today, it is 1-2/10.    As noted above, the patient lives in an Assisted Living Facility.  She has an   aide to assist her with activities of daily living daily.  She can ambulate   short distances with a rolling walker with supervision.  She is restricted by   left lower extremity pain and by fatigue.    She is currently taking Tylenol 650 mg 3 times per day.  She takes naproxen 220   mg p.r.n., usually once per day.  She has been taking tramadol, often along with   the Tylenol at 3 times per day.  It is helping to some extent; however, the   patient and her daughter are concerned about potential sedation.      MS/HN  dd: 08/25/2017 14:29:07 (CDT)  td: 08/26/2017 03:23:50 (CDT)  Doc ID   #[id number]  Job ID #[id number]    CC:       Review of Systems   Constitutional: Positive for fatigue.   Eyes: Negative for visual disturbance.   Respiratory: Negative for shortness of breath.    Cardiovascular: Negative for chest pain.   Gastrointestinal: Negative for blood in stool, constipation, nausea and vomiting.   Genitourinary: Positive for difficulty urinating.   Musculoskeletal: Positive for gait problem and joint swelling. Negative for arthralgias, back pain and neck pain.   Neurological: Negative for dizziness and headaches.   Psychiatric/Behavioral: Negative for behavioral problems and sleep  disturbance.       Objective:      Physical Exam   Constitutional: She is oriented to person, place, and time. She appears well-developed and well-nourished.   Coming to the clinic in a transport propelled by daughter.     HENT:   Head: Normocephalic and atraumatic.   Neck: Normal range of motion.   Cardiovascular: Normal rate, regular rhythm and normal heart sounds.    Pulmonary/Chest: Effort normal and breath sounds normal.   Abdominal: Soft.   Musculoskeletal:   BUE:  ROM:full.  Strength:    RUE: 5 to 5-/5 at shoulder abduction,  elbow flexion,  elbow extension,  hand .   LUE: 5 to 5-/5 at shoulder abduction,  elbow flexion,  elbow extension,  hand .  Sensation to pinprick:   RUE: intact.   LUE: intact.  DTR:    RUE: +1 biceps, +1 triceps.   LUE:  +1 biceps, +1 triceps.  Frank:   RUE: -ve.   LUE: -ve.    BLE:  ROM: decreased at knees.  Healed Lt TKA scar.  +ve Rt knee crepitus.   Strength:    RLE: 4/5 at hip flexion, 5- knee extension, 5 ankle DF, 5 PF.   LLE: 4-/5 at hip flexion, 4 knee extension, 5 ankle DF, 5 PF.  Sensation to pinprick:     RLE: intact.      LLE: intact.   DTR:     RLE: +1 knee, +1 ankle.    LLE: +1 knee, +1 ankle.  SLR (sitting):      RLE: -ve.      LLE: -ve.     Mild tenderness over lumbar spine.         Neurological: She is alert and oriented to person, place, and time.   Skin: Skin is warm.   Psychiatric: She has a normal mood and affect. Her behavior is normal.   Vitals reviewed.      Assessment:       1. Chronic pain of left knee    2. H/O total knee replacement, left    3. Primary osteoarthritis of left hip    4. Left hip pain    5. Spondylosis of lumbar region without myelopathy or radiculopathy    6. Spondylolisthesis, unspecified spinal region        Plan:     - Start celecoxib (CELEBREX) 200 MG capsule; Take 1 capsule (200 mg total) by mouth once daily.  - Continue acetaminophen (TYLENOL) 650 MG TbSR; Take 1 tablet (650 mg total) by mouth every 8 (eight) hours.  - If no  relief, may start Cymbalta.  - Return in about 2 months (around 10/25/2017).     This was a 40 minute visit, 50% of which was spent educating the patient about the diagnosis and the treatment plan including medication adjustment.

## 2017-08-26 LAB
BACTERIA UR CULT: NORMAL
BACTERIA UR CULT: NORMAL

## 2017-08-28 ENCOUNTER — TELEPHONE (OUTPATIENT)
Dept: INTERNAL MEDICINE | Facility: CLINIC | Age: 82
End: 2017-08-28

## 2017-08-28 NOTE — TELEPHONE ENCOUNTER
Please call daughter to see let her know that urine culture was contaminated as it grew out multiple organisms. What symptoms are she having for the urinary tract?

## 2017-08-28 NOTE — TELEPHONE ENCOUNTER
----- Message from Rocio Rogel sent at 8/28/2017  4:34 PM CDT -----  Contact: pt [phone]  Patient is returning a phone call.  Who left a message for the patient: nurse  Does patient know what this is regarding:  A message was left  Comments:

## 2017-08-28 NOTE — TELEPHONE ENCOUNTER
Please advise, daughter states patient is not having any symptoms. She states mother is not having any complaints or symptoms but occasionally in the morning she cant make it to the bathroom to urinate in time but daughter states she believes its from her morning dose of Lasix.

## 2017-08-31 RX ORDER — ATORVASTATIN CALCIUM 20 MG/1
20 TABLET, FILM COATED ORAL DAILY
Qty: 90 TABLET | Refills: 3 | Status: SHIPPED | OUTPATIENT
Start: 2017-08-31 | End: 2018-09-07 | Stop reason: SDUPTHER

## 2017-09-01 ENCOUNTER — PATIENT MESSAGE (OUTPATIENT)
Dept: PHYSICAL MEDICINE AND REHAB | Facility: CLINIC | Age: 82
End: 2017-09-01

## 2017-09-01 RX ORDER — TRAMADOL HYDROCHLORIDE 50 MG/1
50 TABLET ORAL EVERY 12 HOURS PRN
Qty: 60 TABLET | Refills: 0 | Status: SHIPPED | OUTPATIENT
Start: 2017-09-01 | End: 2017-09-18 | Stop reason: SDUPTHER

## 2017-09-05 ENCOUNTER — PATIENT MESSAGE (OUTPATIENT)
Dept: PHYSICAL MEDICINE AND REHAB | Facility: CLINIC | Age: 82
End: 2017-09-05

## 2017-09-06 ENCOUNTER — PATIENT MESSAGE (OUTPATIENT)
Dept: INTERNAL MEDICINE | Facility: CLINIC | Age: 82
End: 2017-09-06

## 2017-09-06 RX ORDER — TRAMADOL HYDROCHLORIDE 50 MG/1
TABLET ORAL
Qty: 45 TABLET | Refills: 0 | OUTPATIENT
Start: 2017-09-06

## 2017-09-18 ENCOUNTER — PATIENT MESSAGE (OUTPATIENT)
Dept: PHYSICAL MEDICINE AND REHAB | Facility: CLINIC | Age: 82
End: 2017-09-18

## 2017-09-18 RX ORDER — TRAMADOL HYDROCHLORIDE 50 MG/1
50 TABLET ORAL EVERY 6 HOURS PRN
Qty: 120 TABLET | Refills: 0 | Status: SHIPPED | OUTPATIENT
Start: 2017-09-21 | End: 2017-10-16 | Stop reason: SDUPTHER

## 2017-09-18 RX ORDER — TRAMADOL HYDROCHLORIDE 50 MG/1
TABLET ORAL
Qty: 60 TABLET | Refills: 0 | OUTPATIENT
Start: 2017-09-18

## 2017-09-18 NOTE — PT/OT/SLP EVAL
"Speech Language Pathology  Evaluation    Lilia Hidalgo   MRN: [id number]   Admitting Diagnosis: Debility    Diet recommendations: Solid Diet Level: Regular  Liquid Diet Level: Thin HOB to 90 degrees, Small bites/sips, Alternating bites/sips and 1 bite/sip at a time    SLP Treatment Date: 05/10/17  Speech Start Time: 1059     Speech Stop Time: 1131     Speech Total (min): 32 min       TREATMENT BILLABLE MINUTES:  Eval 32     Diagnosis: Debility  S/p septic encephalopathy    Past Medical History:   Diagnosis Date    Arthritis     GERD (gastroesophageal reflux disease)     Hyperlipidemia     Hypertension     Joint pain     Knee pain, left 08/2016    pain with walking or standing    PUD (peptic ulcer disease)      Past Surgical History:   Procedure Laterality Date    APPENDECTOMY      COLONOSCOPY      08    EYE SURGERY      bilateral cataract    HYSTERECTOMY      UNKNOWN BSO    JOINT REPLACEMENT      right hip replacement    JOINT REPLACEMENT      right foot tendon surgery    KNEE ARTHROPLASTY Left 2001    thumb surgery Left 09/2015    UPPER GASTROINTESTINAL ENDOSCOPY      2013       Has the patient been evaluated by SLP for swallowing? : Yes  Keep patient NPO?: No General Precautions: Standard, aspiration, fall          Social Hx: Patient lives at [facility].  Pt has not driven in the past 6 weeks. Pt attributes this to her knee problems, but pt's daughter is concerned for safety when driving due to gradual cognitive decline over the past several months.       Subjective:  "At my age, what can I expect?" pt stated when SLP discussed memory limitations identified during cognitive evaluation.                         Objective:        Oral Musculature Evaluation  Oral Musculature: WFL  Dentition: present and adequate  Mucosal Quality: good  Mandibular Strength and Mobility: WFL  Oral Labial Strength and Mobility: WFL  Lingual Strength and Mobility: WFL  Velar Elevation: WFL  Buccal Strength and " Mobility: WFL  Volitional Cough: WFL  Volitional Swallow: elicited; timely; good elevation/excursion  Voice Prior to PO Intake: dry, clear     Cognitive Status:  Behavioral Observations: alert, appropriate and cooperative-; some mild confusion observed  Memory and Orientation: Pt oriented to month, year, and place, but not fully oriented to situation. Recall of general information was 67% acc. Ind'ly/83% given cues.  Pt immediately recalled up to 5 digits in a series.  Following a 3 minute delay, she recalled 3/3 unrelated words with cues (0/3 ind'ly).  Attention: fair  Problem Solving: Problem solving q's were answered with 70% acc. Ind'ly/100% given cues.  Multiple causes for a hypothetical situation were generated given cues due to repetition of 1st response. Pt compared/contrasted 2 given items with supervision. Pt completed a 4 word sequencing task accurately.  [place] convergent categorization tasks completed on 3/3 trials.  Pragmatics: wfl  Executive Function: organization and planning    Auditory Comprehension: Pt answered complex yes/no q's accurately. She was able to follow a 2-step command, but not a 3 step command.    Verbal Expression: Some word finding difficulty and errors were noted in spontaneous speech. During a word fluency task, pt listed 9 items in a category in one minute given cue (repetition of response x 1).    Motor Speech: wfl    Voice: wfl    Reading: not yet assessed    Writing: not yet assessed    Visual-Spatial: not yet assessed      Additional Treatment:  Spoke to daughter (Belkys ) over the phone who expressed concern about observed cognitive changes over the past several months.  She has observed increasing confusion and difficulty performing some higher level cognitive tasks, such as money management tasks.  Daughter is interested in pt being evaluated by neurology and plans to discuss this further with NP after IDT meeting tomorrow.  Daughter is aware of safety concerns with pt  returning to assisted living environment upon d/c given pt's cognitive limitations.     Pt was observed taking a few cup sips of thin water without overt s/s of aspiration.       Assessment:  Lilia Hidalgo is a 85 y.o. female with a medical diagnosis of Debility and presents with cognitive-linguistic deficits and mild dysphagia.          Discharge recommendations: Discharge Facility/Level Of Care Needs: home health speech therapy     Goals:   SLP Goals        Problem: SLP Goal    Goal Priority Disciplines Outcome   SLP Goal     SLP    Description:  Speech Language Pathology Goals  Goals expected to be met by 5/16:  1. Pt will tolerate a regular consistency diet and thin liquids without s/s of aspiration.  2. Pt will participate in speech/language/cognitive evaluation to determine need for further intervention.- goal met 5/10  ADDITIONAL GOALS:  2. Pt will orient x 4 given min-mod A.  3. Pt will complete immediate memory tasks with 70% accuracy given min A.  4. Following a delay, pt will recall 3 words/facts given mod A.  5. Pt will follow complex commands with 70% accuracy.  6. Pt will complete moderate level problem solving tasks with 80% accuracy given mod A.  7. Pt will list an average of 10 items in a category within one minute.  8. Further evaluation of reading, writing, visual spatial, and math/money management abilities.                     Plan:   Patient to be seen Therapy Frequency: 5 x/week   Plan of Care expires: 06/08/17  Plan of Care reviewed with: patient, daughter  SLP Follow-up?: Yes              MATEUS Mejia, MANUEL-SLP  05/10/2017      MATEUS Mejia, CCC-SLP  Speech Language Pathologist  [phone]  5/10/2017               Walk in

## 2017-09-26 ENCOUNTER — TELEPHONE (OUTPATIENT)
Dept: INTERNAL MEDICINE | Facility: CLINIC | Age: 82
End: 2017-09-26

## 2017-10-06 RX ORDER — ESCITALOPRAM OXALATE 20 MG/1
TABLET ORAL
Qty: 30 TABLET | Refills: 11 | Status: SHIPPED | OUTPATIENT
Start: 2017-10-06 | End: 2017-11-11 | Stop reason: SDUPTHER

## 2017-10-17 ENCOUNTER — PATIENT MESSAGE (OUTPATIENT)
Dept: PHYSICAL MEDICINE AND REHAB | Facility: CLINIC | Age: 82
End: 2017-10-17

## 2017-10-17 RX ORDER — TRAMADOL HYDROCHLORIDE 50 MG/1
50 TABLET ORAL EVERY 6 HOURS PRN
Qty: 120 TABLET | Refills: 0 | Status: SHIPPED | OUTPATIENT
Start: 2017-10-17 | End: 2017-11-10 | Stop reason: SDUPTHER

## 2017-10-17 NOTE — TELEPHONE ENCOUNTER
08/25/17 last  Office visit   09/18/17 last Rx refill  11/02/17 RTC    Hi Dr. Ugalde,   Can you refill my mother's tramadol? She is taking 3 50 mg tablets a day.     Thank you,   Lina eBnavides

## 2017-11-10 ENCOUNTER — PATIENT MESSAGE (OUTPATIENT)
Dept: PHYSICAL MEDICINE AND REHAB | Facility: CLINIC | Age: 82
End: 2017-11-10

## 2017-11-10 RX ORDER — TRAMADOL HYDROCHLORIDE 50 MG/1
50-100 TABLET ORAL 3 TIMES DAILY PRN
Qty: 150 TABLET | Refills: 0 | Status: SHIPPED | OUTPATIENT
Start: 2017-11-10 | End: 2017-12-19 | Stop reason: SDUPTHER

## 2017-11-11 RX ORDER — ESCITALOPRAM OXALATE 20 MG/1
20 TABLET ORAL DAILY
Qty: 90 TABLET | Refills: 3 | Status: SHIPPED | OUTPATIENT
Start: 2017-11-11 | End: 2018-01-19 | Stop reason: ALTCHOICE

## 2017-11-28 ENCOUNTER — PATIENT MESSAGE (OUTPATIENT)
Dept: INTERNAL MEDICINE | Facility: CLINIC | Age: 82
End: 2017-11-28

## 2017-12-01 ENCOUNTER — TELEPHONE (OUTPATIENT)
Dept: PHYSICAL MEDICINE AND REHAB | Facility: CLINIC | Age: 82
End: 2017-12-01

## 2017-12-01 NOTE — TELEPHONE ENCOUNTER
----- Message from Tracey Thomason sent at 12/1/2017  9:01 AM CST -----  Contact: Daughter  States PT need an appt in January, instead of her scheduled appt.     Call

## 2017-12-14 ENCOUNTER — PATIENT MESSAGE (OUTPATIENT)
Dept: INTERNAL MEDICINE | Facility: CLINIC | Age: 82
End: 2017-12-14

## 2017-12-14 ENCOUNTER — TELEPHONE (OUTPATIENT)
Dept: PHYSICAL MEDICINE AND REHAB | Facility: CLINIC | Age: 82
End: 2017-12-14

## 2017-12-14 ENCOUNTER — PATIENT MESSAGE (OUTPATIENT)
Dept: PHYSICAL MEDICINE AND REHAB | Facility: CLINIC | Age: 82
End: 2017-12-14

## 2017-12-14 DIAGNOSIS — M17.0 PRIMARY OSTEOARTHRITIS OF BOTH KNEES: ICD-10-CM

## 2017-12-14 RX ORDER — HYDROCODONE BITARTRATE AND ACETAMINOPHEN 5; 325 MG/1; MG/1
.5-1 TABLET ORAL EVERY 8 HOURS PRN
Qty: 45 TABLET | Refills: 0 | Status: SHIPPED | OUTPATIENT
Start: 2017-12-14 | End: 2017-12-22

## 2017-12-14 NOTE — TELEPHONE ENCOUNTER
Priscilla, can you pt Ms. Hidalgo on the schedule for January 2 at 10am? It's a day that's on hold. Please make it a 40 minute slot. Thanks!

## 2017-12-18 ENCOUNTER — PATIENT MESSAGE (OUTPATIENT)
Dept: INTERNAL MEDICINE | Facility: CLINIC | Age: 82
End: 2017-12-18

## 2017-12-19 ENCOUNTER — PATIENT MESSAGE (OUTPATIENT)
Dept: PHYSICAL MEDICINE AND REHAB | Facility: CLINIC | Age: 82
End: 2017-12-19

## 2017-12-19 RX ORDER — TRAMADOL HYDROCHLORIDE 50 MG/1
50-100 TABLET ORAL 3 TIMES DAILY PRN
Qty: 150 TABLET | Refills: 0 | Status: SHIPPED | OUTPATIENT
Start: 2017-12-19 | End: 2018-01-23 | Stop reason: SDUPTHER

## 2017-12-21 ENCOUNTER — PATIENT MESSAGE (OUTPATIENT)
Dept: INTERNAL MEDICINE | Facility: CLINIC | Age: 82
End: 2017-12-21

## 2017-12-22 ENCOUNTER — OFFICE VISIT (OUTPATIENT)
Dept: INTERNAL MEDICINE | Facility: CLINIC | Age: 82
End: 2017-12-22
Payer: MEDICARE

## 2017-12-22 ENCOUNTER — LAB VISIT (OUTPATIENT)
Dept: LAB | Facility: HOSPITAL | Age: 82
End: 2017-12-22
Attending: INTERNAL MEDICINE
Payer: MEDICARE

## 2017-12-22 VITALS
HEART RATE: 68 BPM | SYSTOLIC BLOOD PRESSURE: 118 MMHG | BODY MASS INDEX: 26.67 KG/M2 | DIASTOLIC BLOOD PRESSURE: 62 MMHG | TEMPERATURE: 98 F | WEIGHT: 160.06 LBS | HEIGHT: 65 IN | RESPIRATION RATE: 16 BRPM

## 2017-12-22 DIAGNOSIS — R32 URINARY INCONTINENCE, UNSPECIFIED TYPE: ICD-10-CM

## 2017-12-22 DIAGNOSIS — G31.84 MILD COGNITIVE IMPAIRMENT: ICD-10-CM

## 2017-12-22 DIAGNOSIS — M16.12 PRIMARY OSTEOARTHRITIS OF LEFT HIP: ICD-10-CM

## 2017-12-22 DIAGNOSIS — M16.12 PRIMARY OSTEOARTHRITIS OF LEFT HIP: Primary | ICD-10-CM

## 2017-12-22 LAB
ALBUMIN SERPL BCP-MCNC: 3.2 G/DL
ALP SERPL-CCNC: 67 U/L
ALT SERPL W/O P-5'-P-CCNC: 13 U/L
ANION GAP SERPL CALC-SCNC: 8 MMOL/L
AST SERPL-CCNC: 22 U/L
BACTERIA #/AREA URNS AUTO: ABNORMAL /HPF
BILIRUB SERPL-MCNC: 0.5 MG/DL
BILIRUB UR QL STRIP: NEGATIVE
BUN SERPL-MCNC: 14 MG/DL
CALCIUM SERPL-MCNC: 8.8 MG/DL
CAOX CRY UR QL COMP ASSIST: ABNORMAL
CHLORIDE SERPL-SCNC: 105 MMOL/L
CLARITY UR REFRACT.AUTO: CLEAR
CO2 SERPL-SCNC: 28 MMOL/L
COLOR UR AUTO: YELLOW
CREAT SERPL-MCNC: 0.7 MG/DL
EST. GFR  (AFRICAN AMERICAN): >60 ML/MIN/1.73 M^2
EST. GFR  (NON AFRICAN AMERICAN): >60 ML/MIN/1.73 M^2
GLUCOSE SERPL-MCNC: 91 MG/DL
GLUCOSE UR QL STRIP: NEGATIVE
HGB UR QL STRIP: NEGATIVE
HYALINE CASTS UR QL AUTO: 22 /LPF
KETONES UR QL STRIP: NEGATIVE
LEUKOCYTE ESTERASE UR QL STRIP: ABNORMAL
MICROSCOPIC COMMENT: ABNORMAL
NITRITE UR QL STRIP: NEGATIVE
PH UR STRIP: 5 [PH] (ref 5–8)
POTASSIUM SERPL-SCNC: 4 MMOL/L
PROT SERPL-MCNC: 6.5 G/DL
PROT UR QL STRIP: NEGATIVE
RBC #/AREA URNS AUTO: 4 /HPF (ref 0–4)
SODIUM SERPL-SCNC: 141 MMOL/L
SP GR UR STRIP: 1.01 (ref 1–1.03)
SQUAMOUS #/AREA URNS AUTO: 1 /HPF
URN SPEC COLLECT METH UR: ABNORMAL
UROBILINOGEN UR STRIP-ACNC: NEGATIVE EU/DL
WBC #/AREA URNS AUTO: 10 /HPF (ref 0–5)

## 2017-12-22 PROCEDURE — 81001 URINALYSIS AUTO W/SCOPE: CPT

## 2017-12-22 PROCEDURE — 87088 URINE BACTERIA CULTURE: CPT

## 2017-12-22 PROCEDURE — 80053 COMPREHEN METABOLIC PANEL: CPT

## 2017-12-22 PROCEDURE — 87077 CULTURE AEROBIC IDENTIFY: CPT

## 2017-12-22 PROCEDURE — 87186 SC STD MICRODIL/AGAR DIL: CPT

## 2017-12-22 PROCEDURE — 99215 OFFICE O/P EST HI 40 MIN: CPT | Mod: S$GLB,,, | Performed by: INTERNAL MEDICINE

## 2017-12-22 PROCEDURE — 99999 PR PBB SHADOW E&M-EST. PATIENT-LVL IV: CPT | Mod: PBBFAC,,, | Performed by: INTERNAL MEDICINE

## 2017-12-22 PROCEDURE — 87086 URINE CULTURE/COLONY COUNT: CPT

## 2017-12-22 PROCEDURE — 36415 COLL VENOUS BLD VENIPUNCTURE: CPT

## 2017-12-22 RX ORDER — DONEPEZIL HYDROCHLORIDE 5 MG/1
5 TABLET, FILM COATED ORAL NIGHTLY
Qty: 30 TABLET | Refills: 3 | Status: SHIPPED | OUTPATIENT
Start: 2017-12-22 | End: 2018-04-20 | Stop reason: SDUPTHER

## 2017-12-22 NOTE — PROGRESS NOTES
Subjective:       Patient ID: Lilia Hidalgo is a 86 y.o. female.    Chief Complaint: Knee Pain (8) and Hip Pain (8)    HPI accompanied by daughter and caretaker, Fabiana (there M-F from 8am to 1pm)    Things daughter would like addressed today:  1.  dementia - trouble with concentration, memory, staring into space,  getting progressively worse rather quickly     2. urinary incontinence - although the new higher toilet seat with handles is helping some, she has been having accidents during the night in bed (we will try to address with Depends) and also in bed while trying to get up in the morning...  btw, she has a sitter from 8-1pm every day to help get her up and going, help with bathing, dressing.    She has a bathroom accident almost daily.     3.would like to discuss the possibility of Lewy Body dementia   (she has a very hard time moving her body which is not due to arthritis pain alone) - shuffling and very slow walk, feeling of falling even with a walker, stiff muscles, stooped posture,  also urinary incontinence issues     Of note, pt is known to me. Known OA of hips that are severe. Seen in ortho - last seen Dr. [facility] 12/22/16, in which left hip replacement was suggested but pt and family declines.   S/p nerve block at genicular branches of the L knee - [facility]17 [facility]. No improvement in pain. Referred to Dr. Ugalde, whom she saw 8/28/17. Started on celebrex 200mg daily and continued on tylenol.   Pt is already on tramadol 50-100mg up to TID. Did not like norco in the past. Meloxicam did not work to control the pain in the past.     Fabiana reports that pt can have paranoia at times. Anxious. No crying. Reports pt is sensitive.     Still w/ urinary incontinence. Higher toilet seat w/ handrails and that helps w/ pain when standing from sitting. Pt and daughter doesn't think they need a bedside commode as the room is pretty small. Pt cannot make it to the bathroom  "before urination. Nightly she has been wetting the bed every night and caretaker has to wash her sheets.     Pt does walk around with the     Review of Systems   Constitutional: Negative for activity change and unexpected weight change.   HENT: Negative for hearing loss, rhinorrhea and trouble swallowing.    Eyes: Negative for discharge and visual disturbance.   Respiratory: Negative for chest tightness and wheezing.    Cardiovascular: Negative for chest pain and palpitations.   Gastrointestinal: Negative for blood in stool, constipation, diarrhea and vomiting.   Endocrine: Negative for polydipsia and polyuria.   Genitourinary: Negative for difficulty urinating, dysuria, hematuria and menstrual problem.   Musculoskeletal: Positive for arthralgias and joint swelling. Negative for neck pain.   Neurological: Negative for weakness and headaches.   Psychiatric/Behavioral: Positive for confusion. Negative for dysphoric mood.       Objective:      Physical Exam    /62   Pulse 68   Temp 98 °F (36.7 °C) (Oral)   Resp 16   Ht 5' 5" (1.651 m)   Wt 72.6 kg (160 lb 0.9 oz) Comment: last weight , unable to stand  LMP  (LMP Unknown)   BMI 26.63 kg/m²     Gen - A+OX4, NAD  HEENT - PERRL, OP clear. MMM.   Neck - no LAD  CV - RRR, II/VI ELIJAH best at RUSB  CHEST - CTAB, no wheezing/rhonchi  Abd - S/NT/ND/+BS  Ext -2 + B radial pulses. 1+ nonpitting edema.   MSK - pain on palpation of the L hip and also L knee. No muscle pain on palpation.   Neuro - MMSE 28 of 30    Labs reviewed.     Assessment/Plan     Lilia was seen today for knee pain and hip pain.    Diagnoses and all orders for this visit:    Primary osteoarthritis of left hip - f/u w/ Dr. Ugalde. Pt is given tramadol 50mg TID. Recommended increasing it to 4 pills in a day. Discussed w/ daughter, caretaker and pt about expectations of the hip pain. Pt has been tried on injections, geniculate nerve blocks, NSAIDs, tramadol, norco and none of it seems to control the " pain. Discussed that it seem surgery is the best option for pain control at this time. Belkys will go home and discuss w/ Lina. Neuro exam today is 28 of 30, forgetting 2 of the 3 items on intermediate recall. Pt likely does have some mild cognitive impairment. We can try Aricept at Belkys's request but I don't believe it'll help with her pain.   -     Comprehensive metabolic panel; Future    Urinary incontinence, unspecified type  -     Urinalysis Microscopic  -     Urinalysis  -     Urine culture    Mild cognitive impairment  -     Comprehensive metabolic panel; Future  -     Urinalysis Microscopic  -     Urinalysis  -     Urine culture  -     CT Head Without Contrast; Future  -     donepezil (ARICEPT) 5 MG tablet; Take 1 tablet (5 mg total) by mouth every evening.    45 minutes was spent on patient with over half the time was spent in coordination of care and/or counseling.    Return in about 4 months (around 4/22/2018).      April Herrera MD  Department of Internal Medicine - [facility]  11:45 AM

## 2017-12-22 NOTE — PROGRESS NOTES
1.  dementia - trouble with concentration, memory, staring into space,  getting progressively worse rather quickly   2. urinary incontinence - although the new higher toilet seat with handles is helping some, she has been having accidents during the night in bed (we will try to address with Depends) and also in bed while trying to get up in the morning...  btw, she has a sitter from 8-1pm every day to help get her up and going, help with bathing, dressing.    She has a bathroom accident almost daily.   3.would like to discuss the possibility of Lewy Body dementia   (she has a very hard time moving her body which is not due to arthritis pain alone) - shuffling and very slow walk, feeling of falling even with a walker, stiff muscles, stooped posture,  also urinary incontinence issues

## 2017-12-25 LAB — BACTERIA UR CULT: NORMAL

## 2017-12-26 ENCOUNTER — TELEPHONE (OUTPATIENT)
Dept: INTERNAL MEDICINE | Facility: CLINIC | Age: 82
End: 2017-12-26

## 2017-12-26 DIAGNOSIS — B37.9 YEAST INFECTION: ICD-10-CM

## 2017-12-26 DIAGNOSIS — N30.00 ACUTE CYSTITIS WITHOUT HEMATURIA: Primary | ICD-10-CM

## 2017-12-26 RX ORDER — NITROFURANTOIN 25; 75 MG/1; MG/1
100 CAPSULE ORAL 2 TIMES DAILY
Qty: 10 CAPSULE | Refills: 0 | Status: SHIPPED | OUTPATIENT
Start: 2017-12-26 | End: 2017-12-31

## 2017-12-26 NOTE — TELEPHONE ENCOUNTER
Please call pt/pt caregiver. Her urine culture did show an infection. I am sending in macrobid antibiotic to her pharmacy. Take twice a day for 5 days.

## 2017-12-27 RX ORDER — FLUCONAZOLE 150 MG/1
150 TABLET ORAL DAILY
Qty: 1 TABLET | Refills: 0 | Status: SHIPPED | OUTPATIENT
Start: 2017-12-27 | End: 2017-12-28

## 2018-01-05 ENCOUNTER — PATIENT MESSAGE (OUTPATIENT)
Dept: INTERNAL MEDICINE | Facility: CLINIC | Age: 83
End: 2018-01-05

## 2018-01-05 DIAGNOSIS — M17.0 PRIMARY OSTEOARTHRITIS OF BOTH KNEES: Primary | ICD-10-CM

## 2018-01-05 DIAGNOSIS — M16.0 PRIMARY OSTEOARTHRITIS OF BOTH HIPS: ICD-10-CM

## 2018-01-05 DIAGNOSIS — I50.30 HEART FAILURE WITH PRESERVED EJECTION FRACTION: ICD-10-CM

## 2018-01-19 ENCOUNTER — OFFICE VISIT (OUTPATIENT)
Dept: PHYSICAL MEDICINE AND REHAB | Facility: CLINIC | Age: 83
End: 2018-01-19
Payer: MEDICARE

## 2018-01-19 VITALS — HEART RATE: 70 BPM | DIASTOLIC BLOOD PRESSURE: 67 MMHG | HEIGHT: 65 IN | SYSTOLIC BLOOD PRESSURE: 126 MMHG

## 2018-01-19 DIAGNOSIS — M43.10 SPONDYLOLISTHESIS, UNSPECIFIED SPINAL REGION: ICD-10-CM

## 2018-01-19 DIAGNOSIS — M25.562 CHRONIC PAIN OF LEFT KNEE: Primary | ICD-10-CM

## 2018-01-19 DIAGNOSIS — M25.552 LEFT HIP PAIN: ICD-10-CM

## 2018-01-19 DIAGNOSIS — Z96.652 H/O TOTAL KNEE REPLACEMENT, LEFT: ICD-10-CM

## 2018-01-19 DIAGNOSIS — G89.29 CHRONIC PAIN OF LEFT KNEE: Primary | ICD-10-CM

## 2018-01-19 DIAGNOSIS — M16.12 PRIMARY OSTEOARTHRITIS OF LEFT HIP: ICD-10-CM

## 2018-01-19 DIAGNOSIS — M47.816 SPONDYLOSIS OF LUMBAR REGION WITHOUT MYELOPATHY OR RADICULOPATHY: ICD-10-CM

## 2018-01-19 PROCEDURE — 99999 PR PBB SHADOW E&M-EST. PATIENT-LVL II: CPT | Mod: PBBFAC,,, | Performed by: PHYSICAL MEDICINE & REHABILITATION

## 2018-01-19 PROCEDURE — 99214 OFFICE O/P EST MOD 30 MIN: CPT | Mod: S$GLB,,, | Performed by: PHYSICAL MEDICINE & REHABILITATION

## 2018-01-19 RX ORDER — DULOXETIN HYDROCHLORIDE 30 MG/1
30 CAPSULE, DELAYED RELEASE ORAL DAILY
Qty: 30 CAPSULE | Refills: 2 | Status: SHIPPED | OUTPATIENT
Start: 2018-01-19 | End: 2018-04-20 | Stop reason: SDUPTHER

## 2018-01-19 RX ORDER — HYDROCODONE BITARTRATE AND ACETAMINOPHEN 5; 325 MG/1; MG/1
1 TABLET ORAL DAILY PRN
Qty: 30 TABLET | Refills: 0 | Status: SHIPPED | OUTPATIENT
Start: 2018-01-19 | End: 2018-02-18

## 2018-01-19 NOTE — PROGRESS NOTES
Subjective:       Patient ID: Lilia Hidalgo is a 86 y.o. female.    Chief Complaint: No chief complaint on file.    HPI     HISTORY OF PRESENT ILLNESS:  Mrs. iHdalgo is an 86-year-old white female with past   medical history of HTN, depression, remote peptic ulcer disease, septic   encephalopathy in 05/2017, arthritis, and debility.  She presented to the   [facility] on 08/25/2017 for chronic persistent left knee pain   following TKA and left hip pain due to osteoarthritis.  She was started on   Celebrex, p.r.n. Tylenol and p.r.n. tramadol.    The patient is coming today to the clinic for followup.  She is accompanied by   her daughter and her aide.  Her left knee pain has been waxing and waning, but   recently worse.  This could be attributed partly to the weather.  Her pain is an   almost constant aching sensation in the knee.  It is aggravated by   weightbearing and better with sitting down or lying down.  Her maximum pain is   7-8/10 and minimum 1-2/10.  Today, it is 3-4/10.  The patient reports occasional   mild swelling and warmth of her knee.  She has also been complaining of left   hip pain, usually related to weightbearing.  She denies any back pain or   radicular symptoms.    The patient was taking Celebrex, but stopped it secondary to lack of relief.    She takes Tylenol 650 mg p.r.n., usually three times per day.  She takes   tramadol 50 mg p.r.n., usually three times per day.  She reports only modest   relief with this regimen.  She had a course of physical therapy about six months   ago.  Her aide is working with her in her apartment with a simple home exercise   program.      MS/HN  dd: 01/19/2018 12:02:42 (CST)  td: 01/20/2018 05:13:51 (CST)  Doc ID   #[id number]  Job ID #[id number]    CC:           Review of Systems   Constitutional: Positive for fatigue.   Eyes: Negative for visual disturbance.   Respiratory: Negative for shortness of breath.    Cardiovascular: Negative for chest pain.    Gastrointestinal: Negative for blood in stool, constipation, nausea and vomiting.   Genitourinary: Positive for difficulty urinating.   Musculoskeletal: Positive for gait problem and joint swelling. Negative for arthralgias, back pain and neck pain.   Neurological: Negative for dizziness and headaches.   Psychiatric/Behavioral: Negative for behavioral problems and sleep disturbance.       Objective:      Physical Exam   Constitutional: She is oriented to person, place, and time. She appears well-developed and well-nourished.   Coming to the clinic in a transport propelled by daughter.     HENT:   Head: Normocephalic and atraumatic.   Neck: Normal range of motion.   Musculoskeletal:   BUE:  ROM:full.  Strength:    RUE: 5 to 5-/5 at shoulder abduction,  elbow flexion,  elbow extension,  hand .   LUE: 5 to 5-/5 at shoulder abduction,  elbow flexion,  elbow extension,  hand .  Sensation to pinprick:   RUE: intact.   LUE: intact.      BLE:  ROM: decreased at knees.  Healed Lt TKA scar.  +ve Rt knee crepitus.   Strength:    RLE: 4/5 at hip flexion, 5- knee extension, 5 ankle DF, 5 PF.   LLE: 4-/5 at hip flexion, 4 knee extension, 5 ankle DF, 5 PF.  Sensation to pinprick:     RLE: intact.      LLE: intact.   SLR (sitting):      RLE: -ve.      LLE: -ve.     -ve tenderness over lumbar spine.         Neurological: She is alert and oriented to person, place, and time.   Skin: Skin is warm.   Psychiatric: She has a normal mood and affect. Her behavior is normal.   Vitals reviewed.      Assessment:       1. Chronic pain of left knee    2. H/O total knee replacement, left    3. Primary osteoarthritis of left hip    4. Left hip pain    5. Spondylosis of lumbar region without myelopathy or radiculopathy    6. Spondylolisthesis, unspecified spinal region        Plan:     - Celecoxib was discontinued (ineffective).  - Discontinue Lexapro.  - Start DULoxetine (CYMBALTA) 30 MG capsule; Take 1 capsule (30 mg total) by mouth  once daily.  - Continue acetaminophen (TYLENOL) 650 MG TbSR; Take 1 tablet (650 mg total) by mouth every 8 (eight) hours.  - Continue tramadol 50 mg po tid prn for moderate pain.  - Start hydrocodone-acetaminophen 5-325mg (NORCO) 5-325 mg per tablet; Take 1 tablet by mouth daily as needed (severe pain) mostly at night.  .- Regular home exercise program was encouraged.  - Follow-up in about 3 months (around 4/19/2018).      This was a 25 minute visit, more than 50% of which was spent counseling the patient about the diagnosis and the treatment plan.

## 2018-01-23 ENCOUNTER — PATIENT MESSAGE (OUTPATIENT)
Dept: PHYSICAL MEDICINE AND REHAB | Facility: CLINIC | Age: 83
End: 2018-01-23

## 2018-01-23 RX ORDER — TRAMADOL HYDROCHLORIDE 50 MG/1
50-100 TABLET ORAL 3 TIMES DAILY PRN
Qty: 150 TABLET | Refills: 0 | Status: SHIPPED | OUTPATIENT
Start: 2018-01-23 | End: 2018-03-01 | Stop reason: SDUPTHER

## 2018-03-01 ENCOUNTER — PATIENT MESSAGE (OUTPATIENT)
Dept: INTERNAL MEDICINE | Facility: CLINIC | Age: 83
End: 2018-03-01

## 2018-03-01 ENCOUNTER — PATIENT MESSAGE (OUTPATIENT)
Dept: PHYSICAL MEDICINE AND REHAB | Facility: CLINIC | Age: 83
End: 2018-03-01

## 2018-03-01 DIAGNOSIS — R32 URINARY INCONTINENCE, UNSPECIFIED TYPE: Primary | ICD-10-CM

## 2018-03-01 RX ORDER — TRAMADOL HYDROCHLORIDE 50 MG/1
50-100 TABLET ORAL 3 TIMES DAILY PRN
Qty: 150 TABLET | Refills: 0 | Status: SHIPPED | OUTPATIENT
Start: 2018-03-01 | End: 2018-04-09 | Stop reason: SDUPTHER

## 2018-03-07 ENCOUNTER — OFFICE VISIT (OUTPATIENT)
Dept: UROLOGY | Facility: CLINIC | Age: 83
End: 2018-03-07
Payer: MEDICARE

## 2018-03-07 VITALS — SYSTOLIC BLOOD PRESSURE: 131 MMHG | DIASTOLIC BLOOD PRESSURE: 56 MMHG | HEART RATE: 75 BPM

## 2018-03-07 DIAGNOSIS — N39.46 MIXED STRESS AND URGE URINARY INCONTINENCE: Primary | ICD-10-CM

## 2018-03-07 PROCEDURE — 51701 INSERT BLADDER CATHETER: CPT | Mod: S$GLB,,, | Performed by: PHYSICIAN ASSISTANT

## 2018-03-07 PROCEDURE — 87086 URINE CULTURE/COLONY COUNT: CPT

## 2018-03-07 PROCEDURE — 99499 UNLISTED E&M SERVICE: CPT | Mod: S$GLB,,, | Performed by: PHYSICIAN ASSISTANT

## 2018-03-07 PROCEDURE — 99214 OFFICE O/P EST MOD 30 MIN: CPT | Mod: 25,S$GLB,, | Performed by: PHYSICIAN ASSISTANT

## 2018-03-07 PROCEDURE — 99999 PR PBB SHADOW E&M-EST. PATIENT-LVL III: CPT | Mod: PBBFAC,,, | Performed by: PHYSICIAN ASSISTANT

## 2018-03-07 NOTE — LETTER
March 7, 2018      April Herrera MD  [address]  [facility] [address]           [facility] 4th Floor  [address]  [facility] [address]  Phone: [phone]          Patient: Lilia Hidalgo   MR Number: [id number]   YOB: 1931   Date of Visit: 3/7/2018       Dear Dr. April Herrera:    Thank you for referring Lilia Hidalgo to me for evaluation. Attached you will find relevant portions of my assessment and plan of care.    If you have questions, please do not hesitate to call me. I look forward to following Lilia Hidalgo along with you.    Sincerely,    Mary Mcintyre PA-C    Enclosure  CC:  No Recipients    If you would like to receive this communication electronically, please contact [email or web address] or [phone] to request more information on [employer] Link access.    For providers and/or their staff who would like to refer a patient to [facility], please contact us through our one-stop-shop provider referral line, [facility], at [phone].    If you feel you have received this communication in error or would no longer like to receive these types of communications, please e-mail [email or web address]

## 2018-03-07 NOTE — PROGRESS NOTES
CHIEF COMPLAINT:    Mrs. Hidalgo is a 86 y.o. female presenting for urinary incontinence.  PRESENTING ILLNESS:    Lilia Hidalgo is a 86 y.o. female who presents for urinary incontinence.  She reports urinary incontinence with walking, getting up from bed and urgency.  A couple of weeks ago she has had to wear depends all day.  Prior to this she was just wearing them at night.  She also wears a pad with her depends.  She has knee and hip pain which limits her mobility.  She has a raised toilet and a bedside commode to make it easier for her.  Her depends are typically soaked in the morning.  She also has pads on the bed that are soaked as well.  Care giver states that it seems like she voids more at night and may not be emptying her bladder all the way.  The daughter believes that her mom is not always feeling the sensation of needing to void at night.  (Patient was not able to verify if she has nocturnal enuresis).  She changes her depends once during the day but more often at night.  Her fluid intake is low about 16oz/day. She does not like to drink water.  She has hot tea in the a.m. and ice tea at noon.  She drinks 1oz of water at night with her nighttime medications.      She takes lasix in the morning due to congested heart failure.    REVIEW OF SYSTEMS:    Constitutional: Negative for fever and chills.   HENT: Negative for hearing loss.   Eyes: Negative for visual disturbance.   Respiratory: Negative for shortness of breath.   Cardiovascular: Negative for chest pain.   Gastrointestinal: Negative for vomiting, and constipation.   Genitourinary:  See HPI  Neurological: Negative for dizziness.   Hematological: Does not bruise/bleed easily.   Psychiatric/Behavioral: Positive for confusion. (takes aricept)    PATIENT HISTORY:    Past Medical History:   Diagnosis Date    Arthritis     CHF (congestive heart failure)     Encounter for blood transfusion     GERD (gastroesophageal reflux disease)     Hyperlipidemia  "    Hypertension     Insomnia     Joint pain     Knee pain, left 08/2016    pain with walking or standing    PUD (peptic ulcer disease)        Past Surgical History:   Procedure Laterality Date    APPENDECTOMY      COLONOSCOPY      08    EYE SURGERY      bilateral cataract    FINGER SURGERY      LT thumb surgery--"arthritis surgery"    HYSTERECTOMY      UNKNOWN BSO    JOINT REPLACEMENT      right hip replacement    KNEE ARTHROPLASTY Left 2001    TONSILLECTOMY      TUMOR EXCISION      esophageal tumor removal     UPPER GASTROINTESTINAL ENDOSCOPY      2013       Family History   Problem Relation Age of Onset    Heart disease Mother     Heart disease Father     Asthma Father     Cancer Brother      breast    Heart disease Brother     Melanoma Neg Hx     Psoriasis Neg Hx     Lupus Neg Hx     Eczema Neg Hx     Acne Neg Hx     Breast cancer Neg Hx     Ovarian cancer Neg Hx     Cervical cancer Neg Hx     Endometrial cancer Neg Hx     Vaginal cancer Neg Hx        Social History     Social History    Marital status:      Spouse name: N/A    Number of children: N/A    Years of education: N/A     Occupational History    Not on file.     Social History Main Topics    Smoking status: Never Smoker    Smokeless tobacco: Never Used    Alcohol use No    Drug use: Yes     Types: Hydrocodone    Sexual activity: Not Currently     Birth control/ protection: None     Other Topics Concern    Are You Pregnant Or Think You May Be? No    Breast-Feeding No     Social History Narrative    No narrative on file       Allergies:  Aspirin; Celebrex [celecoxib]; Morphine; Steroid [corticosteroids (glucocorticoids)]; and Sulfa dyne    Medications:    Current Outpatient Prescriptions:     acetaminophen (TYLENOL) 650 MG TbSR, Take 1 tablet (650 mg total) by mouth every 8 (eight) hours., Disp: , Rfl: 0    atorvastatin (LIPITOR) 20 MG tablet, Take 1 tablet (20 mg total) by mouth once daily., Disp: 90 " tablet, Rfl: 3    benazepril (LOTENSIN) 20 MG tablet, Take 1 tablet (20 mg total) by mouth once daily., Disp: 90 tablet, Rfl: 3    donepezil (ARICEPT) 5 MG tablet, Take 1 tablet (5 mg total) by mouth every evening., Disp: 30 tablet, Rfl: 3    DULoxetine (CYMBALTA) 30 MG capsule, Take 1 capsule (30 mg total) by mouth once daily., Disp: 30 capsule, Rfl: 2    FLUZONE HIGH-DOSE 2017-18, PF, 180 mcg/0.5 mL vaccine, inject 0.5 milliliter intramuscularly, Disp: , Rfl: 0    furosemide (LASIX) 40 MG tablet, Take 1 tablet (40 mg total) by mouth once daily. (Patient taking differently: Take 40 mg by mouth once daily. ), Disp: 90 tablet, Rfl: 3    lidocaine (LIDODERM) 5 %, Place 1 patch onto the skin daily as needed. Remove & Discard patch within 12 hours or as directed by MD, Disp: 30 patch, Rfl: 1    melatonin 5 mg Tab, Take 1 tablet by mouth every evening. , Disp: , Rfl:     metoprolol succinate (TOPROL-XL) 100 MG 24 hr tablet, take 1 tablet by mouth once daily, Disp: 90 tablet, Rfl: 3    mirabegron (MYRBETRIQ) 25 mg Tb24 ER tablet, Take 1 tablet (25 mg total) by mouth once daily., Disp: 30 tablet, Rfl: 11    MULTIVITAMIN W-MINERALS/LUTEIN (CENTRUM SILVER ORAL), Take by mouth once daily., Disp: , Rfl:     omeprazole (PRILOSEC) 20 MG capsule, take 1 capsule by mouth once daily, Disp: 90 capsule, Rfl: 3    traMADol (ULTRAM) 50 mg tablet, Take 1-2 tablets ( mg total) by mouth 3 (three) times daily as needed for Pain (maximum 5 tablets per day)., Disp: 150 tablet, Rfl: 0    PHYSICAL EXAMINATION:    Constitutional: She appears well-developed and well-nourished.  She is in no apparent distress.    Eyes: No scleral icterus noted bilaterally.  No discharge noted bilaterally    Cardiovascular: Normal rate.  Pitting edema noted in lower extremities bilaterally    Pulmonary/Chest: Effort normal. No respiratory distress.     Abdominal:  She exhibits no distension.      Neurological: She is alert and oriented to person,  place, and time.     Skin: Skin is warm and dry.     Psych: Cooperative with normal affect.    Genitourinary:  Normal external female genitalia  Urethral meatus is normal  Consent verbally obtained.  Betadine prep was applied to the urethral meatus. An in and out cath was performed after voiding.  The PVR was 35 ml.      Physical Exam      LABS:    U/a: 1.020, pH 5, + leuks, otherwise negative.      IMPRESSION:    Encounter Diagnoses   Name Primary?    Mixed stress and urge urinary incontinence Yes       PLAN:  Urine culture.  Will call with results and treat if positive.   Discussed overactive bladder medications.  Given patients history of confusion, I am reluctant to start an anticholinergic.  Trospium may be an option given it is least likely to cross the blood brain barrier and cause confusion.    Also discussed myrbetriq but informed patient and family that medication may be expensive.  A rx of myrbetriq was sent to the pharmacy.  Side effects discussed including the potential to raise BP.  Daughter states patient's BP is controlled.  BP today not elevated.  Patient instructed to notify clinic if price is too expensive.      She will follow up in 3 weeks if culture is +.  If negative, she will follow up in 6 weeks after initiation of OAB medications.      Mary Mcintyre PA-C

## 2018-03-08 LAB — BACTERIA UR CULT: NO GROWTH

## 2018-03-09 ENCOUNTER — TELEPHONE (OUTPATIENT)
Dept: UROLOGY | Facility: CLINIC | Age: 83
End: 2018-03-09

## 2018-03-09 NOTE — TELEPHONE ENCOUNTER
Patient's daughter was notified of urine culture results.  She states that myrbetriq is affordable and she will pick it up tomorrow.  She was advised to monitor her BP for the first 2 weeks and to discontinue if BP becomes elevated.  Daughter voiced understanding.  Patient will follow up in 6 weeks.

## 2018-03-14 ENCOUNTER — PATIENT MESSAGE (OUTPATIENT)
Dept: INTERNAL MEDICINE | Facility: CLINIC | Age: 83
End: 2018-03-14

## 2018-04-02 ENCOUNTER — OFFICE VISIT (OUTPATIENT)
Dept: INTERNAL MEDICINE | Facility: CLINIC | Age: 83
End: 2018-04-02
Payer: MEDICARE

## 2018-04-02 ENCOUNTER — TELEPHONE (OUTPATIENT)
Dept: INTERNAL MEDICINE | Facility: CLINIC | Age: 83
End: 2018-04-02

## 2018-04-02 VITALS
BODY MASS INDEX: 29.27 KG/M2 | WEIGHT: 175.69 LBS | DIASTOLIC BLOOD PRESSURE: 66 MMHG | SYSTOLIC BLOOD PRESSURE: 132 MMHG | HEART RATE: 76 BPM | HEIGHT: 65 IN

## 2018-04-02 DIAGNOSIS — I10 HYPERTENSION, ESSENTIAL: ICD-10-CM

## 2018-04-02 DIAGNOSIS — R21 RASH AND NONSPECIFIC SKIN ERUPTION: ICD-10-CM

## 2018-04-02 DIAGNOSIS — R60.0 BILATERAL LOWER EXTREMITY EDEMA: Primary | ICD-10-CM

## 2018-04-02 PROCEDURE — 99999 PR PBB SHADOW E&M-EST. PATIENT-LVL IV: CPT | Mod: PBBFAC,,, | Performed by: PHYSICIAN ASSISTANT

## 2018-04-02 PROCEDURE — 99499 UNLISTED E&M SERVICE: CPT | Mod: S$PBB,,, | Performed by: PHYSICIAN ASSISTANT

## 2018-04-02 PROCEDURE — 99214 OFFICE O/P EST MOD 30 MIN: CPT | Mod: S$GLB,,, | Performed by: PHYSICIAN ASSISTANT

## 2018-04-02 RX ORDER — TRIAMCINOLONE ACETONIDE 1 MG/G
CREAM TOPICAL 2 TIMES DAILY
Qty: 80 G | Refills: 0 | Status: SHIPPED | OUTPATIENT
Start: 2018-04-02 | End: 2018-09-19

## 2018-04-02 RX ORDER — MUPIROCIN 20 MG/G
OINTMENT TOPICAL 2 TIMES DAILY
Qty: 30 G | Refills: 0 | Status: SHIPPED | OUTPATIENT
Start: 2018-04-02 | End: 2021-03-15

## 2018-04-02 NOTE — PATIENT INSTRUCTIONS
Lasix: alternate 20 and 40 mg x 1 week to reduce lower extremity edema, then resume 20 mg daily.        Leg Swelling in Both Legs    Swelling of the feet, ankles, and legs is called edema. It is caused by excess fluid that has collected in the tissues. Extra fluid in the body settles in the lowest part because of gravity. This is why the legs and feet are most affected.  Some of the causes for edema include:  · Disease of the heart like congestive heart failure  · Standing or sitting for long periods of time  · Infection of the feet or legs  · Blood pooling in the veins of your legs (venous insufficiency)  · Dilated veins in your lower leg (varicose veins)  · Garters or other clothing that is tight on your legs. This will cause blood to pool in your legs because the clothing limits blood flow.  · Some medicines such as hormones like birth control pills, some blood pressure medicines like calcium channel blockers (amlodipine) and steroids, some antidepressants like MAO inhibitors and tricyclics  · Menstrual periods that cause you to retain fluids  · Many types of renal disease  · Liver failure or cirrhosis  · Pregnancy, some swelling is normal, but a sudden increase in leg swelling or weight gain can be a sign of a dangerous complication of pregnancy  · Poor nutrition  · Thyroid disease  Medical treatment will depend on what is causing the swelling in your legs. Your healthcare provider may prescribe water pills (diuretics) to get rid of the extra fluid.  Home care  Follow these guidelines when caring for yourself at home:  · Don't wear clothing like garters that is tight on your legs.  · Keep your legs up while lying or sitting.  · If infection, injury, or recent surgery is causing the swelling, stay off your legs as much as possible until symptoms get better.  · If your healthcare provider says that your leg swelling is caused by venous insufficiency or varicose veins, don't sit or  one place for long  periods of time. Take breaks and walk about every few hours. Brisk walking is a good exercise. It helps circulate the blood that has collected in your leg. Talk with your provider about using support stockings to stop daytime leg swelling.  · If your provider says that heart disease is causing your leg swelling, follow a low-salt diet to stop extra fluid from staying in your body. You may also need medicine.  Follow-up care  Follow up with your healthcare provider, or as advised.  When to seek medical advice  Call your healthcare provider right away if any of these occur:  · New shortness of breath or chest pain  · Shortness of breath or chest pain that gets worse  · Swelling in both legs or ankles that gets worse  · Swelling of the abdomen  · Redness, warmth, or swelling in one leg  · Fever of 100.4ºF (38ºC) or higher, or as directed by your healthcare provider  · Yellow color to your skin or eyes  · Rapid, unexplained weight gain  · Having to sleep upright or use an increased number of pillows  Date Last Reviewed: 3/31/2016  © 4424-8661 [employer]. [address] [place] [address], [place] [address]. All rights reserved. This information is not intended as a substitute for professional medical care. Always follow your healthcare professional's instructions.

## 2018-04-02 NOTE — PROGRESS NOTES
"Subjective:       Patient ID: Lilia Hidalgo is a 86 y.o. female.        Chief Complaint: Foot Swelling; Joint Swelling; Blurred Vision (L eye); and Mass (all over red itchy)    Lilia Hidalgo is an established patient of April Herrera MD here today for urgent care visit.    Her sitter and daughter are both present and assist in giving history.    Her sitter noted that this morning her lower extremity edema was worsened from baseline.  No chest pain or shortness of breath.  No cough or wheeze.  8 weeks ago she was on Lasix 40 mg alternating with 20 mg but it caused to much urinary incontinence so she went back down to Lasix 20 mg daily.  She is not checking daily weights.  Watches her sodium intake somewhat but some of her food is prepared and she cannot control the sodium.  Lives at [facility].    Excoriated, picked areas on arms and chest.  She scratches them because they are pruritic and they get irritated.      1 week ago noted that her vision from her left eye is somewhat "cloudy."  No blurred vision or double vision.  No headache.  No N/V.  No eye redness.  She is seeing her ophthalmologist tomorrow.      With chronic knee and hip pain.  Follows with Dr. Ugalde.  Using Tramadol for pain control.  Typically takes it TID.             Review of Systems   Constitutional: Negative for chills, diaphoresis, fatigue and fever.   HENT: Negative for congestion and sore throat.    Eyes: Negative for visual disturbance.   Respiratory: Negative for cough, chest tightness and shortness of breath.    Cardiovascular: Positive for leg swelling. Negative for chest pain and palpitations.   Gastrointestinal: Negative for abdominal pain, blood in stool, constipation, diarrhea, nausea and vomiting.   Genitourinary: Negative for dysuria, frequency, hematuria and urgency.   Musculoskeletal: Positive for arthralgias. Negative for back pain.   Skin: Positive for rash.   Neurological: Negative for dizziness, syncope, weakness and " headaches.   Psychiatric/Behavioral: Negative for dysphoric mood and sleep disturbance. The patient is not nervous/anxious.        Objective:      Physical Exam   Constitutional: She appears well-developed and well-nourished. No distress.   HENT:   Head: Normocephalic and atraumatic.   Right Ear: Tympanic membrane and external ear normal.   Left Ear: Tympanic membrane and external ear normal.   Nose: Nose normal.   Mouth/Throat: Oropharynx is clear and moist.   Eyes: Conjunctivae are normal. Pupils are equal, round, and reactive to light.   Cardiovascular: Normal rate and regular rhythm.  Exam reveals no gallop.    Murmur heard.  Pulmonary/Chest: Effort normal and breath sounds normal. No respiratory distress.   Abdominal: Soft. Normal appearance. There is no tenderness. There is no rebound, no guarding and no CVA tenderness.   Musculoskeletal: She exhibits no edema.   1+ bilateral pitting edema, intact pedal pulses   Neurological: She is alert.   Skin: Skin is warm and dry. She is not diaphoretic.   Scattered excoriated lesions on arms and chest   Psychiatric: She has a normal mood and affect.   Nursing note and vitals reviewed.      Assessment:       1. Bilateral lower extremity edema    2. Hypertension, essential    3. Rash and nonspecific skin eruption        Plan:       Lilia was seen today for foot swelling, joint swelling, blurred vision and mass.    Diagnoses and all orders for this visit:    Bilateral lower extremity edema        -     Increase Lasix to 40 mg alternating 20 mg x 1 week, then reduce back down to Lasix 20 mg daily.  ED prompts discussed.  Keep legs elevated.  Watch salt intake.    Hypertension, essential        -     Well controlled.    Rash and nonspecific skin eruption  -     triamcinolone acetonide 0.1% (KENALOG) 0.1 % cream; Apply topically 2 (two) times daily.  -     mupirocin (BACTROBAN) 2 % ointment; Apply topically 2 (two) times daily.    Pt has been given instructions populated  "from [facility] database and has verbalized understanding of the after visit summary and information contained wherein.    Follow up with a primary care provider. May go to ER for acute shortness of breath, lightheadedness, fever, or any other emergent complaints or changes in condition.    "This note will be shared with the patient"    Future Appointments  Date Time Provider Department Center   4/17/2018 1:00 PM Yancy Pearce OD [facility] [place]   4/20/2018 11:20 AM [facility]               "

## 2018-04-07 ENCOUNTER — PATIENT MESSAGE (OUTPATIENT)
Dept: PHYSICAL MEDICINE AND REHAB | Facility: CLINIC | Age: 83
End: 2018-04-07

## 2018-04-09 RX ORDER — TRAMADOL HYDROCHLORIDE 50 MG/1
TABLET ORAL
Qty: 150 TABLET | Refills: 0 | Status: SHIPPED | OUTPATIENT
Start: 2018-04-09 | End: 2018-04-30 | Stop reason: SDUPTHER

## 2018-04-10 ENCOUNTER — TELEPHONE (OUTPATIENT)
Dept: UROLOGY | Facility: CLINIC | Age: 83
End: 2018-04-10

## 2018-04-10 ENCOUNTER — PATIENT MESSAGE (OUTPATIENT)
Dept: PHYSICAL MEDICINE AND REHAB | Facility: CLINIC | Age: 83
End: 2018-04-10

## 2018-04-10 NOTE — TELEPHONE ENCOUNTER
Pt's daughter stated that patient has other health issues going on and will call back to schedule 6 week f/u appt.

## 2018-04-10 NOTE — TELEPHONE ENCOUNTER
----- Message from Mary Mcintyre PA-C sent at 3/9/2018  5:09 PM CST -----  Please call to schedule 6 week follow up.

## 2018-04-13 ENCOUNTER — TELEPHONE (OUTPATIENT)
Dept: OPHTHALMOLOGY | Facility: CLINIC | Age: 83
End: 2018-04-13

## 2018-04-13 NOTE — TELEPHONE ENCOUNTER
----- Message from Gena Turner sent at 4/13/2018  9:41 AM CDT -----  Contact: Belkys(Daughter)  Pt's daughter called to get more information about the procedure. Belkys can be reached at [phone].

## 2018-04-20 ENCOUNTER — OFFICE VISIT (OUTPATIENT)
Dept: PHYSICAL MEDICINE AND REHAB | Facility: CLINIC | Age: 83
End: 2018-04-20
Payer: MEDICARE

## 2018-04-20 VITALS
BODY MASS INDEX: 30.81 KG/M2 | WEIGHT: 184.94 LBS | DIASTOLIC BLOOD PRESSURE: 53 MMHG | HEART RATE: 77 BPM | SYSTOLIC BLOOD PRESSURE: 102 MMHG | HEIGHT: 65 IN

## 2018-04-20 DIAGNOSIS — G31.84 MILD COGNITIVE IMPAIRMENT: ICD-10-CM

## 2018-04-20 DIAGNOSIS — M16.12 PRIMARY OSTEOARTHRITIS OF LEFT HIP: ICD-10-CM

## 2018-04-20 DIAGNOSIS — M43.10 SPONDYLOLISTHESIS, UNSPECIFIED SPINAL REGION: ICD-10-CM

## 2018-04-20 DIAGNOSIS — M25.562 CHRONIC PAIN OF LEFT KNEE: Primary | ICD-10-CM

## 2018-04-20 DIAGNOSIS — Z96.652 H/O TOTAL KNEE REPLACEMENT, LEFT: ICD-10-CM

## 2018-04-20 DIAGNOSIS — M25.552 LEFT HIP PAIN: ICD-10-CM

## 2018-04-20 DIAGNOSIS — M47.816 SPONDYLOSIS OF LUMBAR REGION WITHOUT MYELOPATHY OR RADICULOPATHY: ICD-10-CM

## 2018-04-20 DIAGNOSIS — G89.29 CHRONIC PAIN OF LEFT KNEE: Primary | ICD-10-CM

## 2018-04-20 DIAGNOSIS — M54.5 CHRONIC MIDLINE LOW BACK PAIN, WITH SCIATICA PRESENCE UNSPECIFIED: ICD-10-CM

## 2018-04-20 DIAGNOSIS — G89.29 CHRONIC MIDLINE LOW BACK PAIN, WITH SCIATICA PRESENCE UNSPECIFIED: ICD-10-CM

## 2018-04-20 PROCEDURE — 99214 OFFICE O/P EST MOD 30 MIN: CPT | Mod: S$GLB,,, | Performed by: PHYSICAL MEDICINE & REHABILITATION

## 2018-04-20 PROCEDURE — 99999 PR PBB SHADOW E&M-EST. PATIENT-LVL III: CPT | Mod: PBBFAC,,, | Performed by: PHYSICAL MEDICINE & REHABILITATION

## 2018-04-20 RX ORDER — DULOXETIN HYDROCHLORIDE 30 MG/1
CAPSULE, DELAYED RELEASE ORAL
Qty: 30 CAPSULE | Refills: 2 | Status: SHIPPED | OUTPATIENT
Start: 2018-04-20 | End: 2018-07-13 | Stop reason: SDUPTHER

## 2018-04-20 NOTE — PROGRESS NOTES
Subjective:       Patient ID: Lilia Hidalgo is a 86 y.o. female.    Chief Complaint: No chief complaint on file.    HPI     HISTORY OF PRESENT ILLNESS:  Mrs. 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  She is   followed up in the [facility] Clinic for chronic persistent left knee   pain following TKA, and left hip pain secondary to OA.  Her last visit was on   01/19/2018.  She was previously on Celebrex, but was stopped due to being   ineffective and to a rash.  She was maintained on Cymbalta, p.r.n. Tylenol and   p.r.n. tramadol.  She was also on hydrocodone/APAP p.r.n. for severe pain.    The patient comes today to the clinic for followup, accompanied by her aide and   her son.  She continues to complain of a severe left knee pain.  It is an almost   constant aching pain.  It is aggravated by walking, although she is restricted   in her mobility.  Her maximum pain is 4-5/10 and minimum 3/10.  Today, it is   3-4/10.  The patient continues to also complain of moderate left hip pain,   mostly with weightbearing.  She also admits to having intermittent low back   pain, mostly localized, but with a vague radiation to her left hip and knee.    She has been ambulating with a rolling walker about 20-30 feet, but is   restricted mostly by pain and fatigue.    She is currently taking Cymbalta 30 mg p.o. once per day, Tylenol 500 mg p.o.   three times per daily and tramadol 50 mg p.r.n., usually two tablets in the   morning, one in the afternoon and two at bedtime.  She has not taken hydrocodone   for a few months.      MS/HN  dd: 04/20/2018 12:07:09 (CDT)  td: 04/21/2018 03:17:38 (CDT)  Doc ID   #[id number]  Job ID #[id number]    CC:         Review of Systems   Constitutional: Positive for activity change and fatigue.   Eyes: Negative for visual disturbance.   Respiratory:  Negative for shortness of breath.    Cardiovascular: Negative for chest pain.   Gastrointestinal: Negative for blood in stool, constipation, nausea and vomiting.   Genitourinary: Positive for difficulty urinating.   Musculoskeletal: Positive for gait problem and joint swelling. Negative for arthralgias, back pain and neck pain.   Neurological: Negative for dizziness and headaches.   Psychiatric/Behavioral: Negative for behavioral problems and sleep disturbance.       Objective:      Physical Exam   Constitutional: She appears well-developed and well-nourished.   Coming to the clinic in a transport propelled by aide.     HENT:   Head: Normocephalic and atraumatic.   Neck: Normal range of motion.   Musculoskeletal:   BLE:  ROM: decreased at knees.  Healed Lt TKA scar.  +ve Rt knee crepitus.   Strength:    RLE: 4/5 at hip flexion, 5- knee extension, 5- ankle DF, 5- PF.   LLE: 2/5 at hip flexion, 3 knee extension, 4 ankle DF, 4 PF.  Sensation to pinprick:     RLE: intact.      LLE: intact.   SLR (sitting):      RLE: -ve.      LLE: -ve.     -ve tenderness over lumbar spine.         Neurological: She is alert.   Needs occasional visual cues to follow simple commands.   Skin: Skin is warm.   Psychiatric: She has a normal mood and affect. Her behavior is normal.   Vitals reviewed.      Assessment:       1. Chronic pain of left knee    2. H/O total knee replacement, left    3. Primary osteoarthritis of left hip    4. Left hip pain    5. Spondylosis of lumbar region without myelopathy or radiculopathy    6. Spondylolisthesis, unspecified spinal region        Plan:       -  MRI Lumbar Spine Without Contrast; Future (due to weakness disproportionate to hip & knee pain with h/o advanced DJD of lumbar spine)  - Continue DULoxetine (CYMBALTA) 30 MG capsule; Take 1 capsule (30 mg total) by mouth once daily.  - Continue acetaminophen (TYLENOL) 500 MG TbSR; Take 1 tablet (500 mg total) by mouth every 8 (eight) hours.  - Continue  tramadol 50 mg po, 2 qam, 1 qpm, 2 qhs.  - Start topical compound ointment to Lt knee 3-4 times per day.  - Follow-up in about 2 months (around 6/20/2018).      This was a 25 minute visit, more than 50% of which was spent counseling the patient about the diagnosis and the treatment plan.

## 2018-04-23 RX ORDER — DONEPEZIL HYDROCHLORIDE 5 MG/1
TABLET, FILM COATED ORAL
Qty: 30 TABLET | Refills: 5 | Status: SHIPPED | OUTPATIENT
Start: 2018-04-23 | End: 2018-08-12 | Stop reason: SDUPTHER

## 2018-04-24 ENCOUNTER — PATIENT MESSAGE (OUTPATIENT)
Dept: PHYSICAL MEDICINE AND REHAB | Facility: CLINIC | Age: 83
End: 2018-04-24

## 2018-04-24 RX ORDER — TRAMADOL HYDROCHLORIDE 50 MG/1
TABLET ORAL
Qty: 150 TABLET | Refills: 0 | OUTPATIENT
Start: 2018-04-24

## 2018-04-27 ENCOUNTER — PROCEDURE VISIT (OUTPATIENT)
Dept: OPHTHALMOLOGY | Facility: CLINIC | Age: 83
End: 2018-04-27
Payer: MEDICARE

## 2018-04-27 DIAGNOSIS — H26.492 POSTERIOR CAPSULAR OPACIFICATION VISUALLY SIGNIFICANT, LEFT EYE: Primary | ICD-10-CM

## 2018-04-27 PROCEDURE — 66821 AFTER CATARACT LASER SURGERY: CPT | Mod: LT,S$GLB,, | Performed by: OPHTHALMOLOGY

## 2018-04-27 PROCEDURE — 92004 COMPRE OPH EXAM NEW PT 1/>: CPT | Mod: S$GLB,,, | Performed by: OPHTHALMOLOGY

## 2018-04-27 RX ORDER — TOBRAMYCIN AND DEXAMETHASONE 3; 1 MG/ML; MG/ML
SUSPENSION/ DROPS OPHTHALMIC
Refills: 0 | COMMUNITY
Start: 2018-04-03 | End: 2018-12-12

## 2018-04-27 RX ORDER — PREDNISOLONE ACETATE 10 MG/ML
1 SUSPENSION/ DROPS OPHTHALMIC 4 TIMES DAILY
Qty: 5 ML | Refills: 0 | Status: SHIPPED | OUTPATIENT
Start: 2018-04-27 | End: 2018-05-01

## 2018-04-27 RX ORDER — LIDOCAINE AND PRILOCAINE 25; 25 MG/G; MG/G
CREAM TOPICAL
COMMUNITY
Start: 2018-04-23 | End: 2018-10-17 | Stop reason: SDUPTHER

## 2018-04-27 NOTE — LETTER
[facility] Ophthalmology  [address]  [facility] [address]  Phone: [phone]  Fax: [phone]   April 27, 2018    Adal Villanueva, OD  [facility]  [address] [place] [address]    Patient: Lilia Hidalgo   MR Number: [id number]   YOB: 1931   Date of Visit: 4/27/2018       Dear Dr. Villanueva:    Thank you for referring Lilia Hidalgo to me for evaluation. Here is my assessment and plan of care:    Hx Cataract Sx OU 2012 by Dr Lucas - AGAPITO OU    Posterior capsular opacification visually significant, left eye  - discussed risks, benefits, and alternatives to laser surgery - pt wishes to proceed with yag laser  - Informed consent obtained and correct eye(s) verified with patient.  - Intraocular Pressure to be taken 10- 30 minutes post procedure.   - PF QID x 4 days then d/c  - f/up as scheduled with Dr. Villanueva       Below you will find my full exam findings. If you have questions, please do not hesitate to call me. I look forward to following Ms. Lilia Hidalgo along with you.    Sincerely,        Candy De Jesus MD       CC  No Recipients             Base Eye Exam     Visual Acuity (Snellen - Linear)       Right Left    Dist sc 20/30 20/70          Tonometry (Applanation, 10:29 AM)       Right Left    Pressure 14 14          Pupils       Pupils Dark Light Shape React APD    Right PERRL 4 2 Round Brisk None    Left PERRL 4 2 Round Brisk None          Visual Fields       Right Left     Full Full          Extraocular Movement       Right Left     Full Full          Neuro/Psych     Oriented x3:  Yes    Mood/Affect:  Normal            Slit Lamp and Fundus Exam     Slit Lamp Exam       Right Left    Lids/Lashes Normal Normal    Conjunctiva/Sclera White and quiet White and quiet    Cornea Clear Clear    Anterior Chamber Deep and quiet Deep and quiet    Iris Round and reactive Round and reactive    Lens pciol toric, tr peripheral pco pciol toric, 2+ PCO    Vitreous Normal  Normal          Fundus Exam       Right Left    Disc Normal Normal    Macula Normal Normal    Vessels Normal Normal    Periphery Normal Normal            Refraction     Manifest Refraction       Sphere Cylinder Axis Dist VA    Right -0.50 +0.50 180 20/25    Left    20/400    Pt states vision in left eye comes and goes

## 2018-04-27 NOTE — PROGRESS NOTES
HPI     Referred by Dr. Adal Villanueva    Hx Cataract Sx OU 2012 by Dr Lucas    C/[facility] Vision OD is very blurry      Last edited by Candy De Jesus MD on 4/27/2018 10:47 AM. (History)            Assessment /Plan     For exam results, see Encounter Report.    Posterior capsular opacification visually significant, left eye  -     Yag Capsulotomy - OS - Left Eye    Other orders  -     prednisoLONE acetate (PRED FORTE) 1 % DrpS; Place 1 drop into the left eye 4 (four) times daily. 1 drop left eye four times a day for four days then stop.  Dispense: 5 mL; Refill: 0      Hx Cataract Sx OU 2012 by Dr Lucas    Visually significant posterior capsular opacity present.  OS  - discussed risks, benefits, and alternatives to laser surgery - pt wishes to proceed with yag laser  - Informed consent obtained and correct eye(s) verified with patient.  - Intraocular Pressure to be taken 10- 30 minutes post procedure.   - PF QID x 4 days then d/c  - f/up as scheduled with Dr. Villanueva    DIAGNOSIS: Visually significant posterior capsular opacity    PROCEDURE: YAG Laser Capsulotomy     COMPLICATIONS: none     DESCRIPTION OF PROCEDURE IN DETAIL:  1 drop of topical Proparacaine and Iopidine instilled, and eye(s) dilated with 1% Tropicamide 2.5% Phenylephrine. YAG laser applied to posterior capsule in cruciate pattern.      DISPOSITION:  Patient tolerated procedure well.

## 2018-04-28 ENCOUNTER — PATIENT MESSAGE (OUTPATIENT)
Dept: PHYSICAL MEDICINE AND REHAB | Facility: CLINIC | Age: 83
End: 2018-04-28

## 2018-04-30 RX ORDER — TRAMADOL HYDROCHLORIDE 50 MG/1
TABLET ORAL
Qty: 150 TABLET | Refills: 1 | Status: SHIPPED | OUTPATIENT
Start: 2018-04-30 | End: 2018-06-18 | Stop reason: SDUPTHER

## 2018-05-01 NOTE — TELEPHONE ENCOUNTER
Refill sent to Pharmacy on yesterday.    ----- Message from Lena Cheek sent at 4/30/2018  5:24 PM CDT -----  Contact: Pt's daughter Lina   Pt's daughter is calling in regards to when pt's prescription will be called in. Per daughter pt will be out of medication tomorrow.     Pt's daughter would like a call back [phone].    Thank you

## 2018-05-01 NOTE — TELEPHONE ENCOUNTER
----- Message from Blaire Bailey sent at 5/1/2018 12:02 PM CDT -----  Contact: Pt daughter Belkys Rizvi says the medication has been increased to 5 a day and needs to get a refill with the increased dosage    traMADol (ULTRAM) 50 mg tablet    Belkys can be reached at [phone] thanks

## 2018-05-02 ENCOUNTER — HOSPITAL ENCOUNTER (OUTPATIENT)
Dept: RADIOLOGY | Facility: HOSPITAL | Age: 83
Discharge: HOME | End: 2018-05-02
Attending: PHYSICAL MEDICINE & REHABILITATION
Payer: MEDICARE

## 2018-05-02 DIAGNOSIS — M54.5 CHRONIC MIDLINE LOW BACK PAIN, WITH SCIATICA PRESENCE UNSPECIFIED: ICD-10-CM

## 2018-05-02 DIAGNOSIS — M47.816 SPONDYLOSIS OF LUMBAR REGION WITHOUT MYELOPATHY OR RADICULOPATHY: ICD-10-CM

## 2018-05-02 DIAGNOSIS — G89.29 CHRONIC MIDLINE LOW BACK PAIN, WITH SCIATICA PRESENCE UNSPECIFIED: ICD-10-CM

## 2018-05-02 PROCEDURE — 72148 MRI LUMBAR SPINE W/O DYE: CPT | Mod: 26,,, | Performed by: RADIOLOGY

## 2018-05-02 PROCEDURE — 72148 MRI LUMBAR SPINE W/O DYE: CPT | Mod: TC

## 2018-05-04 ENCOUNTER — TELEPHONE (OUTPATIENT)
Dept: OPHTHALMOLOGY | Facility: CLINIC | Age: 83
End: 2018-05-04

## 2018-05-04 NOTE — TELEPHONE ENCOUNTER
----- Message from Jimena Malloy MA sent at 4/27/2018  1:02 PM CDT -----  MD MARY Pierre Staff         pls call daughter next wk to ensure pt doing better s/p charan childs

## 2018-05-04 NOTE — TELEPHONE ENCOUNTER
Spoke to pt daughter and pt is doing well s/p YAG laser.  Advised pt daughter to call with any concerns. Pt daughter understood.

## 2018-05-04 NOTE — TELEPHONE ENCOUNTER
LM for pt or daughter to call back to let us know how pt is doing s/p YAG laser with Dr De Jesus.

## 2018-05-14 ENCOUNTER — PES CALL (OUTPATIENT)
Dept: ADMINISTRATIVE | Facility: CLINIC | Age: 83
End: 2018-05-14

## 2018-05-22 ENCOUNTER — PATIENT MESSAGE (OUTPATIENT)
Dept: PHYSICAL MEDICINE AND REHAB | Facility: CLINIC | Age: 83
End: 2018-05-22

## 2018-06-13 ENCOUNTER — PATIENT MESSAGE (OUTPATIENT)
Dept: PHYSICAL MEDICINE AND REHAB | Facility: CLINIC | Age: 83
End: 2018-06-13

## 2018-06-13 DIAGNOSIS — M25.552 LEFT HIP PAIN: ICD-10-CM

## 2018-06-13 DIAGNOSIS — M25.562 CHRONIC PAIN OF LEFT KNEE: Primary | ICD-10-CM

## 2018-06-13 DIAGNOSIS — I11.0 HYPERTENSIVE HEART DISEASE WITH HEART FAILURE: ICD-10-CM

## 2018-06-13 DIAGNOSIS — M47.816 SPONDYLOSIS OF LUMBAR REGION WITHOUT MYELOPATHY OR RADICULOPATHY: ICD-10-CM

## 2018-06-13 DIAGNOSIS — M16.12 PRIMARY OSTEOARTHRITIS OF LEFT HIP: ICD-10-CM

## 2018-06-13 DIAGNOSIS — G89.29 CHRONIC PAIN OF LEFT KNEE: Primary | ICD-10-CM

## 2018-06-13 DIAGNOSIS — R53.81 DEBILITY: ICD-10-CM

## 2018-06-13 DIAGNOSIS — Z96.652 H/O TOTAL KNEE REPLACEMENT, LEFT: ICD-10-CM

## 2018-06-18 DIAGNOSIS — M25.569 KNEE PAIN, UNSPECIFIED CHRONICITY, UNSPECIFIED LATERALITY: Primary | ICD-10-CM

## 2018-06-18 RX ORDER — TRAMADOL HYDROCHLORIDE 50 MG/1
TABLET ORAL
Qty: 150 TABLET | Refills: 1 | Status: SHIPPED | OUTPATIENT
Start: 2018-06-18 | End: 2018-07-11 | Stop reason: SDUPTHER

## 2018-06-18 NOTE — TELEPHONE ENCOUNTER
----- Message from Yaron Eric sent at 6/18/2018  8:15 AM CDT -----  Needs Advice    Reason for call: Belkys is requesting the pt see the doctor later than 07/03, but earlier than September.  Communication Preference: Belkys (daughter) @ [phone]  Additional Information: Belkys is asking refill for traMADol (ULTRAM) 50 mg tablet go to pharmacy below, and Belkys is also asking for PT home health.    [place] [address] [place] [address]  Phone: [phone] and Fax: [phone]

## 2018-06-19 ENCOUNTER — PATIENT MESSAGE (OUTPATIENT)
Dept: PHYSICAL MEDICINE AND REHAB | Facility: CLINIC | Age: 83
End: 2018-06-19

## 2018-06-27 DIAGNOSIS — K44.9 HIATAL HERNIA: ICD-10-CM

## 2018-06-27 DIAGNOSIS — I10 BENIGN ESSENTIAL HYPERTENSION: ICD-10-CM

## 2018-06-27 DIAGNOSIS — K21.9 GASTROESOPHAGEAL REFLUX DISEASE, ESOPHAGITIS PRESENCE NOT SPECIFIED: ICD-10-CM

## 2018-06-27 RX ORDER — METOPROLOL SUCCINATE 100 MG/1
100 TABLET, EXTENDED RELEASE ORAL DAILY
Qty: 90 TABLET | Refills: 3 | Status: SHIPPED | OUTPATIENT
Start: 2018-06-27 | End: 2019-02-04 | Stop reason: SDUPTHER

## 2018-06-27 RX ORDER — OMEPRAZOLE 20 MG/1
20 CAPSULE, DELAYED RELEASE ORAL DAILY
Qty: 90 CAPSULE | Refills: 3 | Status: ON HOLD | OUTPATIENT
Start: 2018-06-27 | End: 2019-05-31 | Stop reason: SDUPTHER

## 2018-07-06 ENCOUNTER — TELEPHONE (OUTPATIENT)
Dept: ADMINISTRATIVE | Facility: CLINIC | Age: 83
End: 2018-07-06

## 2018-07-06 NOTE — PROGRESS NOTES
[facility] Dr. Girish Ugalde. PT and OT services.

## 2018-07-10 ENCOUNTER — PATIENT MESSAGE (OUTPATIENT)
Dept: PHYSICAL MEDICINE AND REHAB | Facility: CLINIC | Age: 83
End: 2018-07-10

## 2018-07-11 DIAGNOSIS — M25.569 KNEE PAIN, UNSPECIFIED CHRONICITY, UNSPECIFIED LATERALITY: ICD-10-CM

## 2018-07-11 RX ORDER — TRAMADOL HYDROCHLORIDE 50 MG/1
TABLET ORAL
Qty: 150 TABLET | Refills: 1 | Status: SHIPPED | OUTPATIENT
Start: 2018-07-18 | End: 2018-08-10 | Stop reason: SDUPTHER

## 2018-07-13 ENCOUNTER — PATIENT MESSAGE (OUTPATIENT)
Dept: PHYSICAL MEDICINE AND REHAB | Facility: CLINIC | Age: 83
End: 2018-07-13

## 2018-07-13 RX ORDER — DULOXETIN HYDROCHLORIDE 30 MG/1
30 CAPSULE, DELAYED RELEASE ORAL DAILY
Qty: 30 CAPSULE | Refills: 2 | Status: SHIPPED | OUTPATIENT
Start: 2018-07-13 | End: 2018-08-06 | Stop reason: SDUPTHER

## 2018-07-26 ENCOUNTER — PATIENT MESSAGE (OUTPATIENT)
Dept: INTERNAL MEDICINE | Facility: CLINIC | Age: 83
End: 2018-07-26

## 2018-07-26 DIAGNOSIS — Z09 HOSPITAL DISCHARGE FOLLOW-UP: ICD-10-CM

## 2018-07-26 DIAGNOSIS — I50.32 HEART FAILURE, DIASTOLIC, CHRONIC: ICD-10-CM

## 2018-07-26 DIAGNOSIS — I11.0 HYPERTENSIVE HEART DISEASE WITH HEART FAILURE: ICD-10-CM

## 2018-07-26 DIAGNOSIS — M79.89 SWELLING OF LOWER EXTREMITY: ICD-10-CM

## 2018-07-26 RX ORDER — FUROSEMIDE 40 MG/1
40 TABLET ORAL DAILY
Qty: 90 TABLET | Refills: 3 | Status: SHIPPED | OUTPATIENT
Start: 2018-07-26 | End: 2019-01-10 | Stop reason: SDUPTHER

## 2018-08-06 RX ORDER — DULOXETIN HYDROCHLORIDE 30 MG/1
CAPSULE, DELAYED RELEASE ORAL
Qty: 90 CAPSULE | Refills: 1 | Status: SHIPPED | OUTPATIENT
Start: 2018-08-06 | End: 2018-11-16 | Stop reason: SDUPTHER

## 2018-08-10 ENCOUNTER — PATIENT MESSAGE (OUTPATIENT)
Dept: PHYSICAL MEDICINE AND REHAB | Facility: CLINIC | Age: 83
End: 2018-08-10

## 2018-08-10 DIAGNOSIS — M25.569 KNEE PAIN, UNSPECIFIED CHRONICITY, UNSPECIFIED LATERALITY: ICD-10-CM

## 2018-08-10 RX ORDER — TRAMADOL HYDROCHLORIDE 50 MG/1
TABLET ORAL
Qty: 150 TABLET | Refills: 1 | Status: SHIPPED | OUTPATIENT
Start: 2018-08-11 | End: 2019-02-04 | Stop reason: SDUPTHER

## 2018-08-12 ENCOUNTER — PATIENT MESSAGE (OUTPATIENT)
Dept: INTERNAL MEDICINE | Facility: CLINIC | Age: 83
End: 2018-08-12

## 2018-08-12 DIAGNOSIS — I11.0 HYPERTENSIVE HEART DISEASE WITH HEART FAILURE: ICD-10-CM

## 2018-08-12 DIAGNOSIS — G31.84 MILD COGNITIVE IMPAIRMENT: ICD-10-CM

## 2018-08-13 ENCOUNTER — PATIENT MESSAGE (OUTPATIENT)
Dept: INTERNAL MEDICINE | Facility: CLINIC | Age: 83
End: 2018-08-13

## 2018-08-13 DIAGNOSIS — I11.0 HYPERTENSIVE HEART DISEASE WITH HEART FAILURE: ICD-10-CM

## 2018-08-13 DIAGNOSIS — G31.84 MILD COGNITIVE IMPAIRMENT: ICD-10-CM

## 2018-08-13 RX ORDER — DONEPEZIL HYDROCHLORIDE 5 MG/1
TABLET, FILM COATED ORAL
Qty: 90 TABLET | Refills: 3 | Status: SHIPPED | OUTPATIENT
Start: 2018-08-13 | End: 2018-08-13 | Stop reason: SDUPTHER

## 2018-08-13 RX ORDER — BENAZEPRIL HYDROCHLORIDE 20 MG/1
20 TABLET ORAL DAILY
Qty: 90 TABLET | Refills: 0 | Status: SHIPPED | OUTPATIENT
Start: 2018-08-13 | End: 2018-08-13 | Stop reason: SDUPTHER

## 2018-08-13 RX ORDER — DONEPEZIL HYDROCHLORIDE 5 MG/1
5 TABLET, FILM COATED ORAL NIGHTLY
Qty: 90 TABLET | Refills: 3 | Status: SHIPPED | OUTPATIENT
Start: 2018-08-13 | End: 2018-10-08 | Stop reason: SDUPTHER

## 2018-08-13 RX ORDER — BENAZEPRIL HYDROCHLORIDE 20 MG/1
20 TABLET ORAL DAILY
Qty: 90 TABLET | Refills: 0 | Status: SHIPPED | OUTPATIENT
Start: 2018-08-13 | End: 2019-04-16

## 2018-08-27 ENCOUNTER — PATIENT MESSAGE (OUTPATIENT)
Dept: PHYSICAL MEDICINE AND REHAB | Facility: CLINIC | Age: 83
End: 2018-08-27

## 2018-08-28 ENCOUNTER — PATIENT MESSAGE (OUTPATIENT)
Dept: PHYSICAL MEDICINE AND REHAB | Facility: CLINIC | Age: 83
End: 2018-08-28

## 2018-08-28 ENCOUNTER — PATIENT MESSAGE (OUTPATIENT)
Dept: INTERNAL MEDICINE | Facility: CLINIC | Age: 83
End: 2018-08-28

## 2018-08-29 RX ORDER — METHYLPREDNISOLONE 4 MG/1
TABLET ORAL
Qty: 1 PACKAGE | Refills: 0 | Status: SHIPPED | OUTPATIENT
Start: 2018-08-29 | End: 2018-09-19

## 2018-08-30 ENCOUNTER — TELEPHONE (OUTPATIENT)
Dept: INTERNAL MEDICINE | Facility: CLINIC | Age: 83
End: 2018-08-30

## 2018-08-30 ENCOUNTER — PATIENT MESSAGE (OUTPATIENT)
Dept: INTERNAL MEDICINE | Facility: CLINIC | Age: 83
End: 2018-08-30

## 2018-08-30 DIAGNOSIS — Z91.81 AT RISK FOR FALLING: ICD-10-CM

## 2018-08-30 DIAGNOSIS — I10 ESSENTIAL HYPERTENSION: Primary | ICD-10-CM

## 2018-08-30 DIAGNOSIS — I11.0 HYPERTENSIVE HEART DISEASE WITH HEART FAILURE: ICD-10-CM

## 2018-08-30 DIAGNOSIS — F03.90 DEMENTIA WITHOUT BEHAVIORAL DISTURBANCE, UNSPECIFIED DEMENTIA TYPE: ICD-10-CM

## 2018-09-04 ENCOUNTER — PATIENT MESSAGE (OUTPATIENT)
Dept: PHYSICAL MEDICINE AND REHAB | Facility: CLINIC | Age: 83
End: 2018-09-04

## 2018-09-04 ENCOUNTER — PATIENT MESSAGE (OUTPATIENT)
Dept: INTERNAL MEDICINE | Facility: CLINIC | Age: 83
End: 2018-09-04

## 2018-09-05 RX ORDER — FENTANYL 12.5 UG/1
1 PATCH TRANSDERMAL
Qty: 10 PATCH | Refills: 0 | Status: SHIPPED | OUTPATIENT
Start: 2018-09-05 | End: 2018-10-08 | Stop reason: SDUPTHER

## 2018-09-07 ENCOUNTER — PATIENT MESSAGE (OUTPATIENT)
Dept: INTERNAL MEDICINE | Facility: CLINIC | Age: 83
End: 2018-09-07

## 2018-09-07 RX ORDER — ATORVASTATIN CALCIUM 20 MG/1
20 TABLET, FILM COATED ORAL DAILY
Qty: 90 TABLET | Refills: 0 | Status: SHIPPED | OUTPATIENT
Start: 2018-09-07 | End: 2019-01-04 | Stop reason: SDUPTHER

## 2018-09-13 ENCOUNTER — PES CALL (OUTPATIENT)
Dept: ADMINISTRATIVE | Facility: CLINIC | Age: 83
End: 2018-09-13

## 2018-09-17 ENCOUNTER — PATIENT MESSAGE (OUTPATIENT)
Dept: PHYSICAL MEDICINE AND REHAB | Facility: CLINIC | Age: 83
End: 2018-09-17

## 2018-09-19 ENCOUNTER — PATIENT MESSAGE (OUTPATIENT)
Dept: INTERNAL MEDICINE | Facility: CLINIC | Age: 83
End: 2018-09-19

## 2018-09-19 ENCOUNTER — OFFICE VISIT (OUTPATIENT)
Dept: INTERNAL MEDICINE | Facility: CLINIC | Age: 83
End: 2018-09-19
Payer: MEDICARE

## 2018-09-19 VITALS
HEART RATE: 85 BPM | SYSTOLIC BLOOD PRESSURE: 128 MMHG | HEIGHT: 65 IN | DIASTOLIC BLOOD PRESSURE: 70 MMHG | OXYGEN SATURATION: 96 % | BODY MASS INDEX: 30.78 KG/M2
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
## 2018-09-19 DIAGNOSIS — S00.86XA BUG BITE OF FACE WITH INFECTION, INITIAL ENCOUNTER: Primary | ICD-10-CM

## 2018-09-19 DIAGNOSIS — L08.9 BUG BITE OF FACE WITH INFECTION, INITIAL ENCOUNTER: Primary | ICD-10-CM

## 2018-09-19 PROBLEM — K57.92 DIVERTICULITIS: Status: RESOLVED | Noted: 2017-05-08 | Resolved: 2018-09-19

## 2018-09-19 PROBLEM — K57.32 DIVERTICULITIS OF LARGE INTESTINE WITHOUT PERFORATION OR ABSCESS WITHOUT BLEEDING: Status: RESOLVED | Noted: 2017-05-05 | Resolved: 2018-09-19

## 2018-09-19 PROBLEM — M25.562 CHRONIC PAIN OF LEFT KNEE: Status: RESOLVED | Noted: 2017-08-03 | Resolved: 2018-09-19

## 2018-09-19 PROBLEM — G89.29 CHRONIC PAIN OF LEFT KNEE: Status: RESOLVED | Noted: 2017-08-03 | Resolved: 2018-09-19

## 2018-09-19 PROCEDURE — 99214 OFFICE O/P EST MOD 30 MIN: CPT | Mod: S$PBB,,, | Performed by: INTERNAL MEDICINE

## 2018-09-19 PROCEDURE — 1101F PT FALLS ASSESS-DOCD LE1/YR: CPT | Mod: CPTII,,, | Performed by: INTERNAL MEDICINE

## 2018-09-19 PROCEDURE — 99999 PR PBB SHADOW E&M-EST. PATIENT-LVL III: CPT | Mod: PBBFAC,,, | Performed by: INTERNAL MEDICINE

## 2018-09-19 PROCEDURE — 99213 OFFICE O/P EST LOW 20 MIN: CPT | Mod: PBBFAC | Performed by: INTERNAL MEDICINE

## 2018-09-19 RX ORDER — DOXYCYCLINE 100 MG/1
100 CAPSULE ORAL EVERY 12 HOURS
Qty: 30 CAPSULE | Refills: 0 | Status: SHIPPED | OUTPATIENT
Start: 2018-09-19 | End: 2018-10-04

## 2018-09-19 NOTE — PROGRESS NOTES
INTERNAL MEDICINE CLINIC - SAME DAY APPOINTMENT  Progress Note    PRESENTING HISTORY     PCP: April Herrera MD  Chief Complaint/Reason for Visit:     Chief Complaint   Patient presents with    Facial Swelling     History of Present Illness & ROS : Ms. Lilia Hidalgo is a 87 y.o. female.      She had probably a bug bite on left face on Saturday.  Progressively more red and painful.    They put benadryl cream.    No fever.    PAST HISTORY:     Past Medical History:   Diagnosis Date    Aortic stenosis 6/17/2015    Arthritis     Atrophic vaginitis 2/18/2016    Bilateral edema of lower extremity 6/22/2016    CHF (congestive heart failure)     Cholelithiasis without cholecystitis 5/5/2017    Chronic pain of left knee 8/3/2017    Depressed mood 6/22/2016    Diverticulitis of large intestine without perforation or abscess without bleeding 5/5/2017    Encounter for blood transfusion     Essential hypertension     GERD (gastroesophageal reflux disease)     Hyperlipidemia     Hypertension     Hypertensive heart disease with heart failure 7/14/2015    Insomnia     Joint pain     Knee pain, left 08/2016    pain with walking or standing    Primary osteoarthritis of knee 2/5/2013    PUD (peptic ulcer disease)     S/P knee replacement 2/13/2013    Slow transit constipation 2/18/2016       Past Surgical History:   Procedure Laterality Date    APPENDECTOMY      ARTHROPLASTY, KNEE, TOTAL Left 2/6/2013    Performed by [facility] OR 2ND FLR    COLONOSCOPY      08    diagnostic block of  the genicular branches to the left knee Left 8/3/2017    [facility]    EGD (ESOPHAGOGASTRODUODENOSCOPY) N/A 1/6/2014    Performed by Madan Beltran [facility] at [facility] ENDO (4TH FLR)    EGD (ESOPHAGOGASTRODUODENOSCOPY) N/A 1/28/2013    Performed by Yuniel Montana MD at [facility] ENDO (2ND FLR)    EYE SURGERY      bilateral cataract    FINGER SURGERY      LT thumb  "surgery--"arthritis surgery"    HYSTERECTOMY      UNKNOWN BSO    JOINT REPLACEMENT      right hip replacement    KNEE ARTHROPLASTY Left 2001    TONSILLECTOMY      TUMOR EXCISION      esophageal tumor removal     UPPER GASTROINTESTINAL ENDOSCOPY      2013       Family History   Problem Relation Age of Onset    Heart disease Mother     Heart disease Father     Asthma Father     Cancer Brother         breast    Heart disease Brother     Melanoma Neg Hx     Psoriasis Neg Hx     Lupus Neg Hx     Eczema Neg Hx     Acne Neg Hx     Breast cancer Neg Hx     Ovarian cancer Neg Hx     Cervical cancer Neg Hx     Endometrial cancer Neg Hx     Vaginal cancer Neg Hx        Social History     Socioeconomic History    Marital status:      Spouse name: Not on file    Number of children: Not on file    Years of education: Not on file    Highest education level: Not on file   Social Needs    Financial resource strain: Not on file    Food insecurity - worry: Not on file    Food insecurity - inability: Not on file    Transportation needs - medical: Not on file    Transportation needs - non-medical: Not on file   Occupational History    Not on file   Tobacco Use    Smoking status: Never Smoker    Smokeless tobacco: Never Used   Substance and Sexual Activity    Alcohol use: No    Drug use: Yes     Types: Hydrocodone    Sexual activity: Not Currently     Birth control/protection: None   Other Topics Concern    Are you pregnant or think you may be? No    Breast-feeding No   Social History Narrative    Not on file       MEDICATIONS & ALLERGIES:     Current Outpatient Medications on File Prior to Visit   Medication Sig Dispense Refill    acetaminophen (TYLENOL) 650 MG TbSR Take 1 tablet (650 mg total) by mouth every 8 (eight) hours.  0    atorvastatin (LIPITOR) 20 MG tablet Take 1 tablet (20 mg total) by mouth once daily. 90 tablet 0    benazepril (LOTENSIN) 20 MG tablet Take 1 tablet (20 mg " total) by mouth once daily. 90 tablet 0    donepezil (ARICEPT) 5 MG tablet Take 1 tablet (5 mg total) by mouth every evening. 90 tablet 3    DULoxetine (CYMBALTA) 30 MG capsule TAKE 1 CAPSULE(30 MG) BY MOUTH EVERY DAY 90 capsule 1    fentaNYL (DURAGESIC) 12 mcg/hr PT72 Place 1 patch onto the skin every 72 hours. 10 patch 0    furosemide (LASIX) 40 MG tablet Take 1 tablet (40 mg total) by mouth once daily. 90 tablet 3    lidocaine-prilocaine (EMLA) cream       melatonin 5 mg Tab Take 1 tablet by mouth every evening.       methylPREDNISolone (MEDROL DOSEPACK) 4 mg tablet use as directed 1 Package 0    metoprolol succinate (TOPROL-XL) 100 MG 24 hr tablet Take 1 tablet (100 mg total) by mouth once daily. 90 tablet 3    mirabegron (MYRBETRIQ) 25 mg Tb24 ER tablet Take 1 tablet (25 mg total) by mouth once daily. 30 tablet 11    MULTIVITAMIN W-MINERALS/LUTEIN (CENTRUM SILVER ORAL) Take by mouth once daily.      mupirocin (BACTROBAN) 2 % ointment Apply topically 2 (two) times daily. 30 g 0    omeprazole (PRILOSEC) 20 MG capsule Take 1 capsule (20 mg total) by mouth once daily. 90 capsule 3    tobramycin-dexamethasone 0.3-0.1% (TOBRADEX) 0.3-0.1 % DrpS instill 1 drop into both eyes four times a day (SHAKE WELL)  0    traMADol (ULTRAM) 50 mg tablet take 1 to 2 tablets by mouth three times a day if needed for pain 150 tablet 1     Review of patient's allergies indicates:   Allergen Reactions    Aspirin Nausea Only and Other (See Comments)     Other reaction(s): esophageal pain    Celebrex [celecoxib] Rash    Morphine Hallucinations    Steroid [corticosteroids (glucocorticoids)] Other (See Comments)     Other reaction(s): Flushing (skin)    Sulfa dyne Other (See Comments)     Other reaction(s): esophogeal pain       Medications Reconciliation:   I have reconciled the patient's home medications with the patient/family. I have updated all changes.  Refer to After-Visit Medication List.    OBJECTIVE:     Vital  Signs:  Vitals:    09/19/18 1329   BP: 128/70   Pulse: 85     Wt Readings from Last 1 Encounters:   04/20/18 1125 83.9 kg (184 lb 15.5 oz)     Body mass index is 30.78 kg/m².     Physical Exam:      No drainage. No abscess.    Laboratory  Lab Results   Component Value Date    WBC 5.51 05/29/2017    HGB 13.5 05/29/2017    HCT 40.7 05/29/2017     05/29/2017    CHOL 189 02/14/2016    TRIG 177 (H) 02/14/2016    HDL 40 02/14/2016    ALT 13 12/22/2017    AST 22 12/22/2017     12/22/2017    K 4.0 12/22/2017     12/22/2017    CREATININE 0.7 12/22/2017    BUN 14 12/22/2017    CO2 28 12/22/2017    TSH 1.229 05/05/2017    INR 1.0 05/05/2017       ASSESSMENT & PLAN:     Bug bite of face with infection, initial encounter  Plan:  -     doxycycline (MONODOX) 100 MG capsule; Take 1 capsule (100 mg total) by mouth every 12 (twelve) hours. for 15 days  Dispense: 30 capsule; Refill: 0    Instructions for the patient:  1. Take Doxycyline 100 mg 1 tablet twice daily with food for 5-15 days until facial redness resolved.  2. Take Claritin 1 daily until facial redness resolved.    Scheduled Follow-up :  Future Appointments   Date Time Provider Department Center   10/1/2018  2:40 PM Girish Ugalde MD [facility] PHYSMED [facility]   10/9/2018  1:15 PM Pati Sherwood NP [facility] NEURO [facility]   12/12/2018 11:00 AM ANNUAL WELLNESS VISIT-NURSE PRACTITIONER, NOM 1 [facility]       After Visit Medication List :     Medication List           Accurate as of 9/19/18  1:42 PM. If you have any questions, ask your nurse or doctor.               START taking these medications    doxycycline 100 MG capsule  Commonly known as:  MONODOX  Take 1 capsule (100 mg total) by mouth every 12 (twelve) hours. for 15 days  Started by:  Joao Cifuentes MD        CONTINUE taking these medications    acetaminophen 650 MG Tbsr  Commonly known as:  TYLENOL  Take 1 tablet (650 mg total) by mouth every 8 (eight) hours.     atorvastatin 20 MG  tablet  Commonly known as:  LIPITOR  Take 1 tablet (20 mg total) by mouth once daily.     benazepril 20 MG tablet  Commonly known as:  LOTENSIN  Take 1 tablet (20 mg total) by mouth once daily.     CENTRUM SILVER ORAL     donepezil 5 MG tablet  Commonly known as:  ARICEPT  Take 1 tablet (5 mg total) by mouth every evening.     DULoxetine 30 MG capsule  Commonly known as:  CYMBALTA  TAKE 1 CAPSULE(30 MG) BY MOUTH EVERY DAY     fentaNYL 12 mcg/hr Pt72  Commonly known as:  DURAGESIC  Place 1 patch onto the skin every 72 hours.     furosemide 40 MG tablet  Commonly known as:  LASIX  Take 1 tablet (40 mg total) by mouth once daily.     lidocaine-prilocaine cream  Commonly known as:  EMLA     melatonin 5 mg Tab     methylPREDNISolone 4 mg tablet  Commonly known as:  MEDROL DOSEPACK  use as directed     metoprolol succinate 100 MG 24 hr tablet  Commonly known as:  TOPROL-XL  Take 1 tablet (100 mg total) by mouth once daily.     mirabegron 25 mg Tb24 ER tablet  Commonly known as:  MYRBETRIQ  Take 1 tablet (25 mg total) by mouth once daily.     mupirocin 2 % ointment  Commonly known as:  BACTROBAN  Apply topically 2 (two) times daily.     omeprazole 20 MG capsule  Commonly known as:  PRILOSEC  Take 1 capsule (20 mg total) by mouth once daily.     tobramycin-dexamethasone 0.3-0.1% 0.3-0.1 % Drps  Commonly known as:  TOBRADEX     traMADol 50 mg tablet  Commonly known as:  ULTRAM  take 1 to 2 tablets by mouth three times a day if needed for pain        STOP taking these medications    FLUZONE HIGH-DOSE 2017-18 (PF) 180 mcg/0.5 mL vaccine  Generic drug:  influenza  Stopped by:  Joao Cifuentes MD     lidocaine 5 %  Commonly known as:  LIDODERM  Stopped by:  Joao Cifuentes MD     triamcinolone acetonide 0.1% 0.1 % cream  Commonly known as:  KENALOG  Stopped by:  Joao Cifuentes MD           Where to Get Your Medications      These medications were sent to [facility] [address] [facility], [place] - [address] [facility]  [place] [address]  [address] [address]    Phone:  [phone]   · doxycycline 100 MG capsule         Signing Physician:  Joao Cifuentes MD

## 2018-09-19 NOTE — PATIENT INSTRUCTIONS
1. Take Doxycyline 100 mg 1 tablet twice daily with food for 5-15 days until facial redness resolved.  2. Take Claritin 1 daily until facial redness resolved.

## 2018-09-26 ENCOUNTER — PATIENT MESSAGE (OUTPATIENT)
Dept: NEUROLOGY | Facility: CLINIC | Age: 83
End: 2018-09-26

## 2018-09-27 NOTE — TELEPHONE ENCOUNTER
Carla- this lady needs new pt appt in [facility] clinic please.   Please call her dtr- Belkys Moser to schedule.

## 2018-10-01 ENCOUNTER — OFFICE VISIT (OUTPATIENT)
Dept: PHYSICAL MEDICINE AND REHAB | Facility: CLINIC | Age: 83
End: 2018-10-01
Payer: MEDICARE

## 2018-10-01 ENCOUNTER — HOSPITAL ENCOUNTER (OUTPATIENT)
Dept: RADIOLOGY | Facility: HOSPITAL | Age: 83
Discharge: HOME | End: 2018-10-01
Attending: PHYSICAL MEDICINE & REHABILITATION
Payer: MEDICARE

## 2018-10-01 VITALS
DIASTOLIC BLOOD PRESSURE: 64 MMHG | SYSTOLIC BLOOD PRESSURE: 125 MMHG | HEIGHT: 65 IN | HEART RATE: 81 BPM | BODY MASS INDEX: 30.78 KG/M2

## 2018-10-01 DIAGNOSIS — M47.816 SPONDYLOSIS OF LUMBAR REGION WITHOUT MYELOPATHY OR RADICULOPATHY: ICD-10-CM

## 2018-10-01 DIAGNOSIS — M25.511 CHRONIC RIGHT SHOULDER PAIN: ICD-10-CM

## 2018-10-01 DIAGNOSIS — M25.552 LEFT HIP PAIN: ICD-10-CM

## 2018-10-01 DIAGNOSIS — G89.29 CHRONIC RIGHT SHOULDER PAIN: ICD-10-CM

## 2018-10-01 DIAGNOSIS — G89.29 CHRONIC PAIN OF LEFT KNEE: Primary | ICD-10-CM

## 2018-10-01 DIAGNOSIS — M25.562 CHRONIC PAIN OF LEFT KNEE: Primary | ICD-10-CM

## 2018-10-01 DIAGNOSIS — M16.12 PRIMARY OSTEOARTHRITIS OF LEFT HIP: ICD-10-CM

## 2018-10-01 DIAGNOSIS — Z96.652 H/O TOTAL KNEE REPLACEMENT, LEFT: ICD-10-CM

## 2018-10-01 PROCEDURE — 73502 X-RAY EXAM HIP UNI 2-3 VIEWS: CPT | Mod: TC,LT

## 2018-10-01 PROCEDURE — 1101F PT FALLS ASSESS-DOCD LE1/YR: CPT | Mod: CPTII,,, | Performed by: PHYSICAL MEDICINE & REHABILITATION

## 2018-10-01 PROCEDURE — 73030 X-RAY EXAM OF SHOULDER: CPT | Mod: 26,RT,, | Performed by: RADIOLOGY

## 2018-10-01 PROCEDURE — 99214 OFFICE O/P EST MOD 30 MIN: CPT | Mod: S$PBB,,, | Performed by: PHYSICAL MEDICINE & REHABILITATION

## 2018-10-01 PROCEDURE — 99999 PR PBB SHADOW E&M-EST. PATIENT-LVL III: CPT | Mod: PBBFAC,,, | Performed by: PHYSICAL MEDICINE & REHABILITATION

## 2018-10-01 PROCEDURE — 73030 X-RAY EXAM OF SHOULDER: CPT | Mod: TC,RT

## 2018-10-01 PROCEDURE — 73502 X-RAY EXAM HIP UNI 2-3 VIEWS: CPT | Mod: 26,LT,, | Performed by: RADIOLOGY

## 2018-10-01 PROCEDURE — 99213 OFFICE O/P EST LOW 20 MIN: CPT | Mod: PBBFAC,25 | Performed by: PHYSICAL MEDICINE & REHABILITATION

## 2018-10-01 NOTE — PROGRESS NOTES
Subjective:       Patient ID: Lilia Hidalgo is a 87 y.o. female.    Chief Complaint: No chief complaint on file.    HPI     HISTORY OF PRESENT ILLNESS:  Ms. 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  She is   followed up in the Physical Medicine Clinic for chronic persistent left knee   pain following TKA, and chronic left hip pain secondary to OA.  Her last visit   was on 04/20/2018.  She was maintained on Cymbalta, p.r.n. Tylenol and p.r.n.   tramadol.  MRI of the lumbar spine was ordered.  It was done on 05/02/2018 and   was positive for mild multilevel degenerative changes with mild spinal canal   stenosis at L2-L3 and L3-L4.    The patient came today to the clinic for followup, accompanied by her daughter   and her aide.  Her left knee pain has been quite the same.  It is an   intermittent aching pain aggravated by weightbearing.  Her maximum pain is   6-7/10 and minimum 1-2/10.  Today, it is 3-4/10.    Her left hip pain has been worse.  It is a constant aching pain aggravated by   weightbearing and by transfers.  She started complaining about two months ago of   right shoulder pain.  There is no history of preceding trauma.  It is a   constant aching pain in the deltoids.  It is aggravated by shoulder movement and   better with rest.  Her maximum pain is 7-8/10 and minimum 5-6/10.  Today, it is   5-6/10.    She is currently taking Cymbalta 30 mg p.o. once per day.  She was started on   fentanyl 12 mcg per hour patch every 72 hours.  The patient and her daughter   report that the pain has been better since it was started.  She takes Tylenol   p.r.n., but very infrequently.  She is not getting any more tramadol.      MS/HN  dd: 10/01/2018 15:20:33 (CDT)  td: 10/02/2018 04:27:05 (CDT)  Doc ID   #[id number]  Job ID #[id number]    CC:           Review of Systems    Constitutional: Positive for activity change and fatigue.   Eyes: Negative for visual disturbance.   Respiratory: Negative for shortness of breath.    Cardiovascular: Negative for chest pain.   Gastrointestinal: Negative for blood in stool, constipation, nausea and vomiting.   Genitourinary: Positive for difficulty urinating.   Musculoskeletal: Positive for gait problem and joint swelling. Negative for arthralgias, back pain and neck pain.   Neurological: Positive for headaches. Negative for dizziness.   Psychiatric/Behavioral: Negative for behavioral problems and sleep disturbance.       Objective:      Physical Exam   Constitutional: She appears well-developed and well-nourished.   Coming to the clinic in a transport propelled by aide.     HENT:   Head: Normocephalic and atraumatic.   Neck: Normal range of motion.   Musculoskeletal:   BUE:  ROM:full.  Strength:    RUE: 3/5 at shoulder abduction, 4 elbow flexion, 4 elbow extension, 4 hand .   LUE: 4/5 at shoulder abduction, 4 elbow flexion, 4 elbow extension, 4 hand .      Impingement Signs:  Neer:  RUE: +ve    LUE: -ve  Trotter: RUE: +ve    LUE: -ve   +ve tenderness Rt deltoid.    BLE:  ROM: decreased at knees.  Healed Lt TKA scar.  +ve Rt knee crepitus.   Strength:    RLE: 4/5 at hip flexion, 5- knee extension, 5- ankle DF, 5- PF.   LLE: 3/5 at hip flexion, 3+ knee extension, 4 ankle DF, 4 PF.  Sensation to pinprick:     RLE: intact.      LLE: intact.   SLR (sitting):      RLE: -ve.      LLE: -ve.     -ve tenderness over lumbar spine.         Neurological: She is alert.   Needs occasional visual cues to follow simple commands.   Skin: Skin is warm.   Psychiatric: She has a normal mood and affect. Her behavior is normal.   Vitals reviewed.        IMAGING STUDIES:    MRI LUMBAR SPINE WITHOUT CONTRAST (5/2/18):    CLINICAL HISTORY:  chronic LBP. BLE weakness, L>R; Spondylosis without myelopathy or radiculopathy, lumbar region    TECHNIQUE:  Multiplanar,  multisequence MR images were acquired from the thoracolumbar junction to the sacrum without contrast.    COMPARISON:  X-ray lumbar spine 08/15/2017.    FINDINGS:  Alignment: Normal.    Vertebrae: Normal marrow signal. No fracture.    Cord: Normal.  Conus terminates at L1.    Degenerative findings:    T12-L1: No significant spinal canal stenosis or neuroforaminal narrowing.    L1-L2: Mild facet arthropathy.  Small disc bulge.  No significant spinal canal stenosis or neuroforaminal narrowing.    L2-L3: Disc desiccation.  Slight loss of disc height.  Small disc bulge.  Facet arthropathy.  Ligamentum flavum thickening.  Mild spinal canal stenosis.  No significant neuroforaminal narrowing.    L3-L4: Facet arthropathy.  Small disc bulge.  Ligamentum flavum thickening.  Mild spinal canal stenosis.  No significant neuroforaminal narrowing.    L4-L5: Facet arthropathy.  Small disc bulge.  No significant spinal canal stenosis or neuroforaminal narrowing.    L5-S1: Mild facet arthropathy.  No disc bulge.  No significant spinal canal stenosis or neuroforaminal narrowing.    Paraspinal muscles & soft tissues: Unremarkable.    Small probable simple hepatic cyst noted.    Impression      Mild multilevel lumbar spondylosis as above, noting mild spinal canal stenosis at L2-3 and L3-4.      Assessment:       1. Chronic pain of left knee    2. H/O total knee replacement, left    3. Primary osteoarthritis of left hip    4. Left hip pain    5. Spondylosis of lumbar region without myelopathy or radiculopathy        Plan:       - MRI findings were discussed with the patient.  - X-ray Shoulder 2 or More Views Right; Future  - X-Ray Hip 2 or 3 views Left; Future  - Continue DULoxetine (CYMBALTA) 30 MG capsule; Take 1 capsule (30 mg total) by mouth once daily.  - Continue acetaminophen (TYLENOL) 500 MG TbSR; Take 1 tablet (500 mg total) by mouth every 8 (eight) hours.  - Continue fentanyl 12 mcg/hr patch q 3 days.  - Start Voltaren gel to Rt  shoulder 3-4 times per day.  - Follow-up in about 4 months (around 2/1/2019).      This was a 25 minute visit, more than 50% of which was spent counseling the patient about the diagnosis and the treatment plan.

## 2018-10-08 ENCOUNTER — PATIENT MESSAGE (OUTPATIENT)
Dept: PHYSICAL MEDICINE AND REHAB | Facility: CLINIC | Age: 83
End: 2018-10-08

## 2018-10-08 DIAGNOSIS — G31.84 MILD COGNITIVE IMPAIRMENT: ICD-10-CM

## 2018-10-08 RX ORDER — DONEPEZIL HYDROCHLORIDE 5 MG/1
TABLET, FILM COATED ORAL
Qty: 90 TABLET | Refills: 3 | Status: SHIPPED | OUTPATIENT
Start: 2018-10-08 | End: 2019-01-29 | Stop reason: SDUPTHER

## 2018-10-08 RX ORDER — FENTANYL 12.5 UG/1
1 PATCH TRANSDERMAL
Qty: 10 PATCH | Refills: 0 | Status: SHIPPED | OUTPATIENT
Start: 2018-10-09 | End: 2018-11-12 | Stop reason: SDUPTHER

## 2018-10-17 RX ORDER — LIDOCAINE AND PRILOCAINE 25; 25 MG/G; MG/G
CREAM TOPICAL
Qty: 30 G | Refills: 3 | Status: SHIPPED | OUTPATIENT
Start: 2018-10-17 | End: 2021-03-15

## 2018-10-17 NOTE — TELEPHONE ENCOUNTER
04/23/18 last Rx refill  10/01/18 last office visit  02/04/19 RTC          ----- Message from Bing Banuelos sent at 10/17/2018  4:24 PM CDT -----  Rx Refill/Request     Is this a Refill or New Rx:  refill  Rx Name and Strength:  lidocaine-prilocaine (EMLA) cream  Preferred Pharmacy with phone number:     [id number] - [place] - [facility] [address] AT [facility] [address]  [facility] [place] [address]  Phone: [phone] Fax: [phone]    Communication Preference:Belkys (dtiona)  @ [phone]  Additional Information:

## 2018-10-30 ENCOUNTER — PATIENT MESSAGE (OUTPATIENT)
Dept: INTERNAL MEDICINE | Facility: CLINIC | Age: 83
End: 2018-10-30

## 2018-10-31 ENCOUNTER — PATIENT MESSAGE (OUTPATIENT)
Dept: INTERNAL MEDICINE | Facility: CLINIC | Age: 83
End: 2018-10-31

## 2018-11-01 ENCOUNTER — PATIENT MESSAGE (OUTPATIENT)
Dept: INTERNAL MEDICINE | Facility: CLINIC | Age: 83
End: 2018-11-01

## 2018-11-01 NOTE — TELEPHONE ENCOUNTER
Filled out form. Please attach med list. Copy and scan copy. Mail original to daughter and let Belkys know when done. Thanks!

## 2018-11-05 ENCOUNTER — PATIENT MESSAGE (OUTPATIENT)
Dept: INTERNAL MEDICINE | Facility: CLINIC | Age: 83
End: 2018-11-05

## 2018-11-06 ENCOUNTER — PATIENT MESSAGE (OUTPATIENT)
Dept: NEUROLOGY | Facility: CLINIC | Age: 83
End: 2018-11-06

## 2018-11-06 ENCOUNTER — PATIENT MESSAGE (OUTPATIENT)
Dept: INTERNAL MEDICINE | Facility: CLINIC | Age: 83
End: 2018-11-06

## 2018-11-06 ENCOUNTER — TELEPHONE (OUTPATIENT)
Dept: INTERNAL MEDICINE | Facility: CLINIC | Age: 83
End: 2018-11-06

## 2018-11-06 NOTE — TELEPHONE ENCOUNTER
----- Message from Zofia Knowles sent at 11/6/2018  2:28 PM CST -----  Contact: Shira Pt's daughter [phone]  Pt's daughter is calling to speak with Alycia in regards to some paperwork that she was supposed to be receiving for the pt. She stated that if it can be faxed you can send it over to [phone].Please call back and advise.      Thanks

## 2018-11-08 DIAGNOSIS — I11.0 HYPERTENSIVE HEART DISEASE WITH HEART FAILURE: ICD-10-CM

## 2018-11-08 RX ORDER — BENAZEPRIL HYDROCHLORIDE 20 MG/1
TABLET ORAL
Qty: 90 TABLET | Refills: 3 | Status: SHIPPED | OUTPATIENT
Start: 2018-11-08 | End: 2019-03-25 | Stop reason: SDUPTHER

## 2018-11-12 ENCOUNTER — PATIENT MESSAGE (OUTPATIENT)
Dept: PHYSICAL MEDICINE AND REHAB | Facility: CLINIC | Age: 83
End: 2018-11-12

## 2018-11-12 RX ORDER — FENTANYL 12.5 UG/1
1 PATCH TRANSDERMAL
Qty: 10 PATCH | Refills: 0 | Status: SHIPPED | OUTPATIENT
Start: 2018-11-13 | End: 2018-12-06 | Stop reason: SDUPTHER

## 2018-11-18 RX ORDER — DULOXETIN HYDROCHLORIDE 30 MG/1
CAPSULE, DELAYED RELEASE ORAL
Qty: 90 CAPSULE | Refills: 0 | Status: SHIPPED | OUTPATIENT
Start: 2018-11-18 | End: 2019-01-15 | Stop reason: SDUPTHER

## 2018-12-06 RX ORDER — FENTANYL 12.5 UG/1
1 PATCH TRANSDERMAL
Qty: 10 PATCH | Refills: 0 | Status: SHIPPED | OUTPATIENT
Start: 2018-12-13 | End: 2019-01-07 | Stop reason: SDUPTHER

## 2018-12-06 NOTE — TELEPHONE ENCOUNTER
11/12/18 last Rx refill-  10/0/18 last office visit  02/04/19 RTC        ----- Message from Yaron Eric sent at 12/6/2018 11:42 AM CST -----  Rx Refill/Request     Is this a Refill or New Rx: Refill  Rx Name and Strength: fentaNYL (DURAGESIC) 12 mcg/hr PT72  Preferred Pharmacy with phone number: see below  Communication Preference: Belkys (daughter) @ [phone]  Additional Information: Belkys is asking this be completed today, bc pt will need her next patch on Monday    [facility] - [address] - [address]  [address]  [address]  [place] [address]  Phone: [phone] Fax: [phone]

## 2018-12-12 ENCOUNTER — OFFICE VISIT (OUTPATIENT)
Dept: INTERNAL MEDICINE | Facility: CLINIC | Age: 83
End: 2018-12-12
Payer: MEDICARE

## 2018-12-12 ENCOUNTER — IMMUNIZATION (OUTPATIENT)
Dept: PHARMACY | Facility: CLINIC | Age: 83
End: 2018-12-12
Payer: MEDICARE

## 2018-12-12 VITALS
BODY MASS INDEX: 30.78 KG/M2 | HEART RATE: 76 BPM | HEIGHT: 65 IN | SYSTOLIC BLOOD PRESSURE: 118 MMHG | DIASTOLIC BLOOD PRESSURE: 72 MMHG | OXYGEN SATURATION: 96 %

## 2018-12-12 DIAGNOSIS — R45.89 DEPRESSED MOOD: ICD-10-CM

## 2018-12-12 DIAGNOSIS — M25.511 CHRONIC RIGHT SHOULDER PAIN: ICD-10-CM

## 2018-12-12 DIAGNOSIS — Z23 NEED FOR 23-POLYVALENT PNEUMOCOCCAL POLYSACCHARIDE VACCINE: ICD-10-CM

## 2018-12-12 DIAGNOSIS — I35.0 AORTIC VALVE STENOSIS, ETIOLOGY OF CARDIAC VALVE DISEASE UNSPECIFIED: ICD-10-CM

## 2018-12-12 DIAGNOSIS — Z00.00 ENCOUNTER FOR PREVENTIVE HEALTH EXAMINATION: Primary | ICD-10-CM

## 2018-12-12 DIAGNOSIS — M17.12 PRIMARY OSTEOARTHRITIS OF LEFT KNEE: ICD-10-CM

## 2018-12-12 DIAGNOSIS — I10 ESSENTIAL HYPERTENSION: ICD-10-CM

## 2018-12-12 DIAGNOSIS — I11.0 HYPERTENSIVE HEART DISEASE WITH HEART FAILURE: ICD-10-CM

## 2018-12-12 DIAGNOSIS — E66.9 OBESITY (BMI 30-39.9): ICD-10-CM

## 2018-12-12 DIAGNOSIS — M25.552 CHRONIC LEFT HIP PAIN: ICD-10-CM

## 2018-12-12 DIAGNOSIS — G89.29 CHRONIC LEFT HIP PAIN: ICD-10-CM

## 2018-12-12 DIAGNOSIS — Z23 NEED FOR TDAP VACCINATION: ICD-10-CM

## 2018-12-12 DIAGNOSIS — G89.29 CHRONIC RIGHT SHOULDER PAIN: ICD-10-CM

## 2018-12-12 DIAGNOSIS — I70.0 ATHEROSCLEROSIS OF AORTA: ICD-10-CM

## 2018-12-12 PROCEDURE — G0439 PPPS, SUBSEQ VISIT: HCPCS | Mod: HCNC,S$GLB,, | Performed by: NURSE PRACTITIONER

## 2018-12-12 PROCEDURE — 99999 PR PBB SHADOW E&M-EST. PATIENT-LVL IV: CPT | Mod: PBBFAC,HCNC,, | Performed by: NURSE PRACTITIONER

## 2018-12-12 NOTE — PATIENT INSTRUCTIONS
Counseling and Referral of Other Preventative  (Italic type indicates deductible and co-insurance are waived)    Patient Name: Lilia Hidalgo  Today's Date: 12/12/2018    Health Maintenance       Date Due Completion Date    TETANUS VACCINE 06/19/1949--ordered today ---    Pneumococcal Vaccine (65+ Low/Medium Risk) (2 of 2 - PPSV23) 11/02/2017--ordered today 11/2/2016    Zoster Vaccine 02/17/2019 (Originally 6/19/1991) ---    Lipid Panel 02/14/2021 2/14/2016        Orders Placed This Encounter   Procedures    Ambulatory Referral to Physical/Occupational Therapy     The following information is provided to all patients.  This information is to help you find resources for any of the problems found today that may be affecting your health:                Living healthy guide: www.[facility].[place].gov      Understanding Diabetes: www.diabetes.org      Eating healthy: www.cdc.gov/healthyweight      [facility] home safety checklist: www.cdc.gov/steadi/patient.html      Agency on Aging: www.goea.[place].gov      Alcoholics anonymous (AA): www.aa.org      Physical Activity: www.kurt.nih.gov/mn3zmaf      Tobacco use: www.quitwithusla.org

## 2018-12-12 NOTE — PROGRESS NOTES
"Lilia Hidalgo presented for a  Medicare AWV and comprehensive Health Risk Assessment today. The following components were reviewed and updated:    · Medical history  · Family History  · Social history  · Allergies and Current Medications  · Health Risk Assessment  · Health Maintenance  · Care Team     ** See Completed Assessments for Annual Wellness Visit within the encounter summary.**       The following assessments were completed:  · Living Situation  · CAGE  · Depression Screening  · Timed Get Up and Go  · Whisper Test  · Cognitive Function Screening        · Nutrition Screening  · ADL Screening  · PAQ Screening    Vitals:    12/12/18 1109   BP: 118/72   BP Location: Left arm   Patient Position: Sitting   BP Method: Large (Manual)   Pulse: 76   SpO2: 96%   Height: 5' 5" (1.651 m)     Body mass index is 30.78 kg/m².  Physical Exam   Constitutional: She is oriented to person, place, and time. Vital signs are normal. She appears well-developed and well-nourished.   obese   HENT:   Head: Normocephalic.   Right Ear: Hearing, tympanic membrane, external ear and ear canal normal.   Left Ear: Hearing, tympanic membrane, external ear and ear canal normal.   Eyes: Conjunctivae, EOM and lids are normal. Pupils are equal, round, and reactive to light. Lids are everted and swept, no foreign bodies found.   Neck: Trachea normal, normal range of motion and full passive range of motion without pain. Neck supple. No JVD present. Carotid bruit is not present.   Cardiovascular: Normal rate, regular rhythm, S1 normal, S2 normal, normal heart sounds, intact distal pulses and normal pulses.   Pulmonary/Chest: Effort normal and breath sounds normal.   Abdominal: Soft. Normal appearance and bowel sounds are normal. There is no hepatosplenomegaly.   Musculoskeletal: She exhibits tenderness.   Left knee and hip tenderness, right shoulder pain, limited ROM    Wheelchair bound, impaired mobility   Neurological: She is alert and oriented to " person, place, and time. She has normal strength and normal reflexes.   Skin: Skin is warm, dry and intact. Capillary refill takes less than 2 seconds.   Psychiatric: She has a normal mood and affect. Her speech is normal and behavior is normal. Judgment and thought content normal. Cognition and memory are normal.   Nursing note and vitals reviewed.        Diagnoses and health risks identified today and associated recommendations/orders:    1. Encounter for preventive health examination  Exam done    Health Maintenance updated    Records reviewed    2. Atherosclerosis of aorta  Chronic, followed by PCP    3. Hypertensive heart disease with heart failure  Stable, followed by PCP    Take medications as prescribed.    Monitor BP at home, goal BP < or = 140/80, call office if consistently above this range.    Follow low salt DASH diet and exercise.    BMI reviewed.    Go to ED if Headaches, blurred vision, chest pain, or SOB occurs along with elevated readings > or = 160/90.    4. Essential hypertension  Stable, followed by PCP    Take medications as prescribed.    Monitor BP at home, goal BP < or = 140/80, call office if consistently above this range.    Follow low salt DASH diet and exercise.    BMI reviewed.    Go to ED if Headaches, blurred vision, chest pain, or SOB occurs along with elevated readings > or = 160/90.    5. Aortic valve stenosis, etiology of cardiac valve disease unspecified  Chronic, followed by PCP and cardiology    6. Depressed mood  Stable, followed by PCP    No SI or HI reported    7. Need for 23-polyvalent pneumococcal polysaccharide vaccine  Given today    8. Need for Tdap vaccination  Given today    9. BMI 30.0-30.9,adult  BMI reviewed    10. Obesity (BMI 30-39.9)  BMI reviewed.    Diet and exercise to lose weight.    11. Primary osteoarthritis of left knee  Sees Pain Management, has upcoming appt    Daughter requested PT with [facility] since she is in that living facility    -  Ambulatory Referral to Physical/Occupational Therapy    12. Chronic right shoulder pain  Sees Pain Management, has upcoming appt    Daughter requested PT with [facility] since she is in that living facility    - Ambulatory Referral to Physical/Occupational Therapy    13. Chronic left hip pain  Sees Pain Management, has upcoming appt    Daughter requested PT with [facility] since she is in that living facility    - Ambulatory Referral to Physical/Occupational Therapy      Provided Lilia with a 5-10 year written screening schedule and personal prevention plan. Recommendations were developed using the USPSTF age appropriate recommendations. Education, counseling, and referrals were provided as needed. After Visit Summary printed and given to patient which includes a list of additional screenings\tests needed.    Follow-up next available with PCP Dr. Herrera for f/u on chronic issues.    Mechelle Monzon DNP

## 2019-01-03 ENCOUNTER — OFFICE VISIT (OUTPATIENT)
Dept: INTERNAL MEDICINE | Facility: CLINIC | Age: 84
End: 2019-01-03
Payer: MEDICARE

## 2019-01-03 VITALS — DIASTOLIC BLOOD PRESSURE: 50 MMHG | HEART RATE: 76 BPM | SYSTOLIC BLOOD PRESSURE: 132 MMHG | OXYGEN SATURATION: 93 %

## 2019-01-03 DIAGNOSIS — H61.23 BILATERAL IMPACTED CERUMEN: Primary | ICD-10-CM

## 2019-01-03 PROCEDURE — 1101F PT FALLS ASSESS-DOCD LE1/YR: CPT | Mod: CPTII,HCNC,S$GLB, | Performed by: NURSE PRACTITIONER

## 2019-01-03 PROCEDURE — 99999 PR PBB SHADOW E&M-EST. PATIENT-LVL IV: CPT | Mod: PBBFAC,HCNC,, | Performed by: NURSE PRACTITIONER

## 2019-01-03 PROCEDURE — 99213 OFFICE O/P EST LOW 20 MIN: CPT | Mod: HCNC,S$GLB,, | Performed by: NURSE PRACTITIONER

## 2019-01-03 PROCEDURE — 99213 PR OFFICE/OUTPT VISIT, EST, LEVL III, 20-29 MIN: ICD-10-PCS | Mod: HCNC,S$GLB,, | Performed by: NURSE PRACTITIONER

## 2019-01-03 PROCEDURE — 1101F PR PT FALLS ASSESS DOC 0-1 FALLS W/OUT INJ PAST YR: ICD-10-PCS | Mod: CPTII,HCNC,S$GLB, | Performed by: NURSE PRACTITIONER

## 2019-01-03 PROCEDURE — 99999 PR PBB SHADOW E&M-EST. PATIENT-LVL IV: ICD-10-PCS | Mod: PBBFAC,HCNC,, | Performed by: NURSE PRACTITIONER

## 2019-01-03 NOTE — PROGRESS NOTES
"Subjective:       Patient ID: Lilia Hidalgo is a 87 y.o. female.    Chief Complaint: Cerumen Impaction    HPI:  86 yo female that presents to clinic today with complaints of bilateral ear fullness.    States that this has been ongoing for 6 days.  States that she thinks she has a "wax build up in both ears."  Denies and pain just states that sounds are "muffled."  Has tried over the counter ear drops but states no relief.  Denies any fever, SOB, chest pain, n/v or dizziness.  States that appetite and energy level are good.    Review of Systems   Constitutional: Negative for activity change, appetite change, fatigue and fever.   HENT: Positive for hearing loss. Negative for congestion, ear pain, rhinorrhea, sinus pressure, sinus pain, sneezing and sore throat.    Respiratory: Negative for apnea, cough, shortness of breath and wheezing.    Cardiovascular: Negative for chest pain, palpitations and leg swelling.   Gastrointestinal: Negative for abdominal distention, abdominal pain, constipation, diarrhea, nausea and vomiting.   Musculoskeletal: Negative for arthralgias, back pain, myalgias, neck pain and neck stiffness.   Skin: Negative for color change and rash.   Neurological: Negative for dizziness, light-headedness, numbness and headaches.   Psychiatric/Behavioral: Negative for behavioral problems.       Objective:      Physical Exam   Constitutional: She is oriented to person, place, and time. She appears well-developed and well-nourished. No distress.   HENT:   Right Ear: External ear normal.   Left Ear: External ear normal.   Bilateral cerumen impactions.   Neck: Normal range of motion. Neck supple. No thyromegaly present.   Cardiovascular: Normal rate, regular rhythm, normal heart sounds and intact distal pulses.   No murmur heard.  Pulmonary/Chest: Effort normal and breath sounds normal. No stridor. No respiratory distress. She has no wheezes. She has no rales.   Abdominal: Soft. Bowel sounds are normal. She " exhibits no distension and no mass. There is no tenderness.   Lymphadenopathy:     She has no cervical adenopathy.   Neurological: She is alert and oriented to person, place, and time.   Psychiatric: Her behavior is normal.       Assessment:       1. Bilateral impacted cerumen        Plan:       1. Bilateral impacted cerumen    -Bilateral cerumen impaction removal done in clinic.  Patient tolerated procedure well.  -Both TMs are intact with no signs of infection.  Patient states immediate relief of symptoms.  -Patient can help with wax removal by using hydrogen peroxide mixed with water to help soften and remove wax when build ups occur.  -Encouraged to refrain from putting cotton or anything else in ears.

## 2019-01-04 RX ORDER — ATORVASTATIN CALCIUM 20 MG/1
20 TABLET, FILM COATED ORAL DAILY
Qty: 30 TABLET | Refills: 3 | Status: SHIPPED | OUTPATIENT
Start: 2019-01-04 | End: 2019-05-24 | Stop reason: SDUPTHER

## 2019-01-05 ENCOUNTER — PATIENT MESSAGE (OUTPATIENT)
Dept: PHYSICAL MEDICINE AND REHAB | Facility: CLINIC | Age: 84
End: 2019-01-05

## 2019-01-07 RX ORDER — FENTANYL 12.5 UG/1
1 PATCH TRANSDERMAL
Qty: 10 PATCH | Refills: 0 | Status: SHIPPED | OUTPATIENT
Start: 2019-01-07 | End: 2019-02-04 | Stop reason: SDUPTHER

## 2019-01-07 NOTE — TELEPHONE ENCOUNTER
12/06/18 last Rx refill  10/01/18 last office visit  02/04/19 RTC    ----- Message from Kristyn Polanco sent at 1/7/2019  8:36 AM CST -----  Contact: eBlkys (Daughter) [phone]  Rx Refill/Request     Refill Rx:      Rx Name and Strength:  fentaNYL (DURAGESIC) 12 mcg/hr PT72    Preferred Pharmacy with phone number:     [facility] [place] [address] - [address]  [address]  [address]  [place] [address]  Phone: [phone] Fax: [phone]      Communication Preference:PHONE     Additional Information:

## 2019-01-09 DIAGNOSIS — N39.46 MIXED STRESS AND URGE URINARY INCONTINENCE: ICD-10-CM

## 2019-01-10 DIAGNOSIS — M79.89 SWELLING OF LOWER EXTREMITY: ICD-10-CM

## 2019-01-10 DIAGNOSIS — I11.0 HYPERTENSIVE HEART DISEASE WITH HEART FAILURE: ICD-10-CM

## 2019-01-10 DIAGNOSIS — I50.32 HEART FAILURE, DIASTOLIC, CHRONIC: ICD-10-CM

## 2019-01-10 DIAGNOSIS — Z09 HOSPITAL DISCHARGE FOLLOW-UP: ICD-10-CM

## 2019-01-10 RX ORDER — FUROSEMIDE 40 MG/1
40 TABLET ORAL DAILY
Qty: 30 TABLET | Refills: 3 | Status: SHIPPED | OUTPATIENT
Start: 2019-01-10 | End: 2019-05-15 | Stop reason: SDUPTHER

## 2019-01-15 RX ORDER — DULOXETIN HYDROCHLORIDE 30 MG/1
CAPSULE, DELAYED RELEASE ORAL
Qty: 30 CAPSULE | Refills: 2 | Status: SHIPPED | OUTPATIENT
Start: 2019-01-15 | End: 2019-02-04 | Stop reason: SDUPTHER

## 2019-01-16 DIAGNOSIS — N39.46 MIXED STRESS AND URGE URINARY INCONTINENCE: ICD-10-CM

## 2019-01-17 ENCOUNTER — PATIENT MESSAGE (OUTPATIENT)
Dept: PHYSICAL MEDICINE AND REHAB | Facility: CLINIC | Age: 84
End: 2019-01-17

## 2019-01-17 RX ORDER — DEXTROMETHORPHAN HYDROBROMIDE, GUAIFENESIN 5; 100 MG/5ML; MG/5ML
650 LIQUID ORAL EVERY 6 HOURS PRN
Qty: 120 TABLET | Refills: 6 | Status: SHIPPED | OUTPATIENT
Start: 2019-01-17 | End: 2019-01-17 | Stop reason: SDUPTHER

## 2019-01-17 RX ORDER — DEXTROMETHORPHAN HYDROBROMIDE, GUAIFENESIN 5; 100 MG/5ML; MG/5ML
650 LIQUID ORAL EVERY 6 HOURS PRN
Qty: 120 TABLET | Refills: 6 | Status: SHIPPED | OUTPATIENT
Start: 2019-01-17 | End: 2019-11-12 | Stop reason: SDUPTHER

## 2019-01-29 ENCOUNTER — PATIENT MESSAGE (OUTPATIENT)
Dept: PHYSICAL MEDICINE AND REHAB | Facility: CLINIC | Age: 84
End: 2019-01-29

## 2019-01-29 DIAGNOSIS — G31.84 MILD COGNITIVE IMPAIRMENT: ICD-10-CM

## 2019-01-29 RX ORDER — DONEPEZIL HYDROCHLORIDE 5 MG/1
5 TABLET, FILM COATED ORAL NIGHTLY
Qty: 90 TABLET | Refills: 3 | Status: SHIPPED | OUTPATIENT
Start: 2019-01-29 | End: 2019-10-01 | Stop reason: SDUPTHER

## 2019-02-04 ENCOUNTER — OFFICE VISIT (OUTPATIENT)
Dept: PHYSICAL MEDICINE AND REHAB | Facility: CLINIC | Age: 84
End: 2019-02-04
Payer: MEDICARE

## 2019-02-04 VITALS
SYSTOLIC BLOOD PRESSURE: 123 MMHG | WEIGHT: 184 LBS | BODY MASS INDEX: 29.57 KG/M2 | HEART RATE: 74 BPM | DIASTOLIC BLOOD PRESSURE: 70 MMHG | HEIGHT: 66 IN

## 2019-02-04 DIAGNOSIS — M25.511 CHRONIC RIGHT SHOULDER PAIN: ICD-10-CM

## 2019-02-04 DIAGNOSIS — I10 BENIGN ESSENTIAL HYPERTENSION: ICD-10-CM

## 2019-02-04 DIAGNOSIS — M25.569 KNEE PAIN, UNSPECIFIED CHRONICITY, UNSPECIFIED LATERALITY: ICD-10-CM

## 2019-02-04 DIAGNOSIS — G89.29 CHRONIC PAIN OF LEFT KNEE: Primary | ICD-10-CM

## 2019-02-04 DIAGNOSIS — M16.12 PRIMARY OSTEOARTHRITIS OF LEFT HIP: ICD-10-CM

## 2019-02-04 DIAGNOSIS — G89.29 CHRONIC RIGHT SHOULDER PAIN: ICD-10-CM

## 2019-02-04 DIAGNOSIS — Z96.652 H/O TOTAL KNEE REPLACEMENT, LEFT: ICD-10-CM

## 2019-02-04 DIAGNOSIS — M47.816 SPONDYLOSIS OF LUMBAR REGION WITHOUT MYELOPATHY OR RADICULOPATHY: ICD-10-CM

## 2019-02-04 DIAGNOSIS — M25.552 LEFT HIP PAIN: ICD-10-CM

## 2019-02-04 DIAGNOSIS — M25.562 CHRONIC PAIN OF LEFT KNEE: Primary | ICD-10-CM

## 2019-02-04 PROCEDURE — 1101F PR PT FALLS ASSESS DOC 0-1 FALLS W/OUT INJ PAST YR: ICD-10-PCS | Mod: HCNC,CPTII,S$GLB, | Performed by: PHYSICAL MEDICINE & REHABILITATION

## 2019-02-04 PROCEDURE — 99999 PR PBB SHADOW E&M-EST. PATIENT-LVL III: CPT | Mod: PBBFAC,HCNC,, | Performed by: PHYSICAL MEDICINE & REHABILITATION

## 2019-02-04 PROCEDURE — 99214 OFFICE O/P EST MOD 30 MIN: CPT | Mod: HCNC,S$GLB,, | Performed by: PHYSICAL MEDICINE & REHABILITATION

## 2019-02-04 PROCEDURE — 99999 PR PBB SHADOW E&M-EST. PATIENT-LVL III: ICD-10-PCS | Mod: PBBFAC,HCNC,, | Performed by: PHYSICAL MEDICINE & REHABILITATION

## 2019-02-04 PROCEDURE — 1101F PT FALLS ASSESS-DOCD LE1/YR: CPT | Mod: HCNC,CPTII,S$GLB, | Performed by: PHYSICAL MEDICINE & REHABILITATION

## 2019-02-04 PROCEDURE — 99214 PR OFFICE/OUTPT VISIT, EST, LEVL IV, 30-39 MIN: ICD-10-PCS | Mod: HCNC,S$GLB,, | Performed by: PHYSICAL MEDICINE & REHABILITATION

## 2019-02-04 RX ORDER — TRAMADOL HYDROCHLORIDE 50 MG/1
50 TABLET ORAL SEE ADMIN INSTRUCTIONS
Qty: 90 TABLET | Refills: 3 | Status: ON HOLD | OUTPATIENT
Start: 2019-02-04 | End: 2019-05-31 | Stop reason: HOSPADM

## 2019-02-04 RX ORDER — ACETAMINOPHEN 500 MG
500 TABLET ORAL EVERY 6 HOURS PRN
Qty: 100 TABLET | Refills: 5 | Status: SHIPPED | OUTPATIENT
Start: 2019-02-04 | End: 2019-04-16

## 2019-02-04 RX ORDER — METOPROLOL SUCCINATE 100 MG/1
100 TABLET, EXTENDED RELEASE ORAL DAILY
Qty: 30 TABLET | Refills: 11 | Status: ON HOLD | OUTPATIENT
Start: 2019-02-04 | End: 2019-05-31 | Stop reason: SDUPTHER

## 2019-02-04 RX ORDER — DULOXETIN HYDROCHLORIDE 30 MG/1
CAPSULE, DELAYED RELEASE ORAL
Qty: 30 CAPSULE | Refills: 3 | Status: SHIPPED | OUTPATIENT
Start: 2019-02-04 | End: 2019-04-16

## 2019-02-04 RX ORDER — FENTANYL 12.5 UG/1
1 PATCH TRANSDERMAL
Qty: 10 PATCH | Refills: 0 | Status: SHIPPED | OUTPATIENT
Start: 2019-02-04 | End: 2019-03-04 | Stop reason: SDUPTHER

## 2019-02-04 NOTE — PROGRESS NOTES
Subjective:       Patient ID: Lilia Hidalgo is a 87 y.o. female.    Chief Complaint: No chief complaint on file.    HPI     HISTORY OF PRESENT ILLNESS:  Ms. Hidalgo is an 87-year-old white female with past   medical history of HTN, depression, remote peptic ulcer, septic encephalopathy   in 2017, dementia, OA and debility.  She is followed up in the Physical Medicine   Clinic for chronic persistent left knee pain after a total knee arthroplasty   and left hip pain due to osteoarthritis.  She also has a history of DJD of the   lumbar spine with intermittent back pain.  Her last visit to the Physical   Medicine Clinic was on 10/01/2018.  She was maintained on Cymbalta, p.r.n.   Tylenol and fentanyl patch.  X-rays of the back, shoulder and left hip were   obtained.  Her x-rays of the hips shows stable right hip replacement and   advanced osteoarthritis of the left hip.  Her right shoulder x-ray was positive   for degenerative changes.    The patient is coming today to the clinic for followup.  She is accompanied by   her daughter who is helping with the history.  The patient is currently residing   at [facility].  The daughter reports that the   nurses check on her mother and give her medications.    Her left hip pain has been recently worse.  It is a constant aching pain   aggravated by weightbearing.  Her maximum pain is 8-9/10 and minimum 3-4/10.    Today, it is 5-6/10.    She has been complaining also of left knee pain.  It is a constant aching pain   aggravated by weightbearing.  Her maximum knee pain is 8-9/10 and minimum   3-4/10.  Today, it is 5-6/10.    He is currently taking Cymbalta 30 mg p.o. once per day.  She takes Tylenol 500   mg p.r.n. a couple of times per day.  She gets fentanyl patch 12 mcg per hour   every three days.  Previously, she was on tramadol, but has not been getting any   recently.  She reports suboptimal relief with this regimen.      MS/KRANTHI  dd: 02/04/2019  16:39:07 (CST)  td: 02/05/2019 14:10:38 (CST)  Doc ID   #[id number]  Job ID #[id number]    CC:             Review of Systems   Constitutional: Positive for activity change and fatigue.   Eyes: Negative for visual disturbance.   Respiratory: Negative for shortness of breath.    Cardiovascular: Negative for chest pain.   Gastrointestinal: Negative for constipation, nausea and vomiting.   Genitourinary: Positive for difficulty urinating.   Musculoskeletal: Positive for gait problem and joint swelling. Negative for arthralgias, back pain and neck pain.   Neurological: Negative for dizziness and headaches.   Psychiatric/Behavioral: Negative for behavioral problems and sleep disturbance.       Objective:      Physical Exam   Constitutional: She appears well-developed and well-nourished.   Coming to the clinic in a transport propelled by aide.     HENT:   Head: Normocephalic and atraumatic.   Neck: Normal range of motion.   Musculoskeletal:   BUE:  ROM:full.  Strength:    RUE: 3/5 at shoulder abduction, 4 elbow flexion, 4 elbow extension, 4 hand .   LUE: 4/5 at shoulder abduction, 4 elbow flexion, 4 elbow extension, 4 hand .      BLE:  ROM: decreased at knees.  Healed Lt TKA scar.  +ve Rt knee crepitus.   Strength:    RLE: 4/5 at hip flexion, 5- knee extension, 5- ankle DF, 5- PF.   LLE: 3/5 at hip flexion, 3+ knee extension, 4 ankle DF, 4 PF.  Sensation to pinprick:     RLE: intact.      LLE: intact.   SLR (sitting):      RLE: -ve.      LLE: -ve.            Neurological: She is alert.   Needs occasional visual cues to follow simple commands.   Skin: Skin is warm.   Psychiatric: She has a normal mood and affect. Her behavior is normal.   Vitals reviewed.          Assessment:       1. Chronic pain of left knee    2. H/O total knee replacement, left    3. Primary osteoarthritis of left hip    4. Left hip pain    5. Spondylosis of lumbar region without myelopathy or radiculopathy    6. Chronic right shoulder pain         Plan:         - Continue DULoxetine (CYMBALTA) 30 MG capsule; Take 1 capsule (30 mg total) by mouth once daily.  - Continue acetaminophen (TYLENOL) 500 MG tablet; Take 1 tablet (500 mg total) by mouth every 6 (six) hours as needed (Mild pain).  - Continue DULoxetine (CYMBALTA) 30 MG capsule; TAKE 1 CAPSULE(30 MG) BY MOUTH EVERY DAY  - Continue fentaNYL (DURAGESIC) 12 mcg/hr PT72; Place 1 patch onto the skin every 72 hours.  - Restart traMADol (ULTRAM) 50 mg tablet; Take 1 tablet (50 mg total) by mouth As instructed for Pain. Take one tablets twice per day, and 1 tablet at bedtime as needed.  -  Follow-up in about 3 months (around 5/4/2019).    This was a 25 minute visit, more than 50% of which was spent counseling the patient about the diagnosis and the treatment plan.

## 2019-02-07 ENCOUNTER — PATIENT MESSAGE (OUTPATIENT)
Dept: INTERNAL MEDICINE | Facility: CLINIC | Age: 84
End: 2019-02-07

## 2019-02-08 ENCOUNTER — PATIENT MESSAGE (OUTPATIENT)
Dept: INTERNAL MEDICINE | Facility: CLINIC | Age: 84
End: 2019-02-08

## 2019-02-11 NOTE — TELEPHONE ENCOUNTER
Can take appt in the next few weeks then when there is a later appt available and pt can make. Let her know that it'll take the place of her annual.

## 2019-02-18 ENCOUNTER — PATIENT MESSAGE (OUTPATIENT)
Dept: INTERNAL MEDICINE | Facility: CLINIC | Age: 84
End: 2019-02-18

## 2019-02-19 ENCOUNTER — PATIENT MESSAGE (OUTPATIENT)
Dept: INTERNAL MEDICINE | Facility: CLINIC | Age: 84
End: 2019-02-19

## 2019-03-04 DIAGNOSIS — G89.29 CHRONIC PAIN OF LEFT KNEE: ICD-10-CM

## 2019-03-04 DIAGNOSIS — M25.562 CHRONIC PAIN OF LEFT KNEE: ICD-10-CM

## 2019-03-04 DIAGNOSIS — M25.552 LEFT HIP PAIN: ICD-10-CM

## 2019-03-04 RX ORDER — FENTANYL 12.5 UG/1
1 PATCH TRANSDERMAL
Qty: 10 PATCH | Refills: 0 | Status: SHIPPED | OUTPATIENT
Start: 2019-03-06 | End: 2019-04-01 | Stop reason: SDUPTHER

## 2019-03-04 NOTE — TELEPHONE ENCOUNTER
02/04/19 last Rx refill  02/04/19 last office visit  05/20/19 RTC      ----- Message from Kristyn Polanco sent at 3/4/2019  9:15 AM CST -----  Contact: Belkys (Daughter) [phone]  Rx Refill/Request     Refill Rx:      Rx Name and Strength:  fentaNYL (DURAGESIC) 12 mcg/hr PT72    Preferred Pharmacy with phone number:     [facility] [address]  [address]  [address]  [place] [address]  Phone: [phone] Fax: [phone]      Communication Preference:PHONE    Additional Information:

## 2019-03-25 DIAGNOSIS — I11.0 HYPERTENSIVE HEART DISEASE WITH HEART FAILURE: ICD-10-CM

## 2019-03-25 RX ORDER — BENAZEPRIL HYDROCHLORIDE 20 MG/1
TABLET ORAL
Qty: 90 TABLET | Refills: 3 | Status: ON HOLD | OUTPATIENT
Start: 2019-03-25 | End: 2019-05-09 | Stop reason: SDUPTHER

## 2019-04-01 DIAGNOSIS — M25.552 LEFT HIP PAIN: ICD-10-CM

## 2019-04-01 DIAGNOSIS — G89.29 CHRONIC PAIN OF LEFT KNEE: ICD-10-CM

## 2019-04-01 DIAGNOSIS — M25.562 CHRONIC PAIN OF LEFT KNEE: ICD-10-CM

## 2019-04-01 RX ORDER — FENTANYL 12.5 UG/1
1 PATCH TRANSDERMAL
Qty: 10 PATCH | Refills: 0 | Status: SHIPPED | OUTPATIENT
Start: 2019-04-05 | End: 2019-05-06 | Stop reason: SDUPTHER

## 2019-04-01 NOTE — TELEPHONE ENCOUNTER
Last visit: 02/04/2019  Next visit: 05/20/2019  Last refill Fentanyl: 03/06/2019    ----- Message from Berlin Baldwin sent at 4/1/2019  8:13 AM CDT -----  Contact: Shira ( daughter ) @ [phone]  Rx Refill/Request     Is this a Refill or New Rx:  Yes   Rx Name and Strength:  fentaNYL (DURAGESIC) 12 mcg/hr PT72    Preferred Pharmacy with phone number:     [facility] - [place] [address] [address]  [address]  [address]  [place] [address]  Phone: [phone] Fax: [phone]

## 2019-04-16 ENCOUNTER — OFFICE VISIT (OUTPATIENT)
Dept: INTERNAL MEDICINE | Facility: CLINIC | Age: 84
End: 2019-04-16
Payer: MEDICARE

## 2019-04-16 ENCOUNTER — LAB VISIT (OUTPATIENT)
Dept: LAB | Facility: HOSPITAL | Age: 84
End: 2019-04-16
Attending: INTERNAL MEDICINE
Payer: MEDICARE

## 2019-04-16 VITALS
BODY MASS INDEX: 32.34 KG/M2 | HEIGHT: 66 IN | TEMPERATURE: 98 F | SYSTOLIC BLOOD PRESSURE: 122 MMHG | WEIGHT: 201.25 LBS | DIASTOLIC BLOOD PRESSURE: 74 MMHG | HEART RATE: 72 BPM

## 2019-04-16 DIAGNOSIS — L57.0 ACTINIC KERATOSES: ICD-10-CM

## 2019-04-16 DIAGNOSIS — N32.81 OAB (OVERACTIVE BLADDER): ICD-10-CM

## 2019-04-16 DIAGNOSIS — E78.5 HYPERLIPIDEMIA, UNSPECIFIED HYPERLIPIDEMIA TYPE: ICD-10-CM

## 2019-04-16 DIAGNOSIS — G89.29 CHRONIC PAIN OF LEFT KNEE: ICD-10-CM

## 2019-04-16 DIAGNOSIS — I10 ESSENTIAL HYPERTENSION: ICD-10-CM

## 2019-04-16 DIAGNOSIS — G89.29 CHRONIC LEFT HIP PAIN: ICD-10-CM

## 2019-04-16 DIAGNOSIS — B37.2 CANDIDAL INTERTRIGO: ICD-10-CM

## 2019-04-16 DIAGNOSIS — M25.552 CHRONIC LEFT HIP PAIN: ICD-10-CM

## 2019-04-16 DIAGNOSIS — Z00.00 ANNUAL PHYSICAL EXAM: Primary | ICD-10-CM

## 2019-04-16 DIAGNOSIS — F03.90 DEMENTIA WITHOUT BEHAVIORAL DISTURBANCE, UNSPECIFIED DEMENTIA TYPE: ICD-10-CM

## 2019-04-16 DIAGNOSIS — F41.9 ANXIETY: ICD-10-CM

## 2019-04-16 DIAGNOSIS — R32 URINARY INCONTINENCE, UNSPECIFIED TYPE: ICD-10-CM

## 2019-04-16 DIAGNOSIS — M25.562 CHRONIC PAIN OF LEFT KNEE: ICD-10-CM

## 2019-04-16 LAB
ALBUMIN SERPL BCP-MCNC: 3.3 G/DL (ref 3.5–5.2)
ALP SERPL-CCNC: 72 U/L (ref 55–135)
ALT SERPL W/O P-5'-P-CCNC: 11 U/L (ref 10–44)
ANION GAP SERPL CALC-SCNC: 10 MMOL/L (ref 8–16)
AST SERPL-CCNC: 15 U/L (ref 10–40)
BASOPHILS # BLD AUTO: 0.04 K/UL (ref 0–0.2)
BASOPHILS NFR BLD: 0.6 % (ref 0–1.9)
BILIRUB SERPL-MCNC: 0.5 MG/DL (ref 0.1–1)
BUN SERPL-MCNC: 15 MG/DL (ref 8–23)
CALCIUM SERPL-MCNC: 9.4 MG/DL (ref 8.7–10.5)
CHLORIDE SERPL-SCNC: 102 MMOL/L (ref 95–110)
CHOLEST SERPL-MCNC: 170 MG/DL (ref 120–199)
CHOLEST/HDLC SERPL: 3.5 {RATIO} (ref 2–5)
CO2 SERPL-SCNC: 30 MMOL/L (ref 23–29)
CREAT SERPL-MCNC: 0.8 MG/DL (ref 0.5–1.4)
DIFFERENTIAL METHOD: ABNORMAL
EOSINOPHIL # BLD AUTO: 0.2 K/UL (ref 0–0.5)
EOSINOPHIL NFR BLD: 3.2 % (ref 0–8)
ERYTHROCYTE [DISTWIDTH] IN BLOOD BY AUTOMATED COUNT: 14.5 % (ref 11.5–14.5)
EST. GFR  (AFRICAN AMERICAN): >60 ML/MIN/1.73 M^2
EST. GFR  (NON AFRICAN AMERICAN): >60 ML/MIN/1.73 M^2
GLUCOSE SERPL-MCNC: 92 MG/DL (ref 70–110)
HCT VFR BLD AUTO: 43.2 % (ref 37–48.5)
HDLC SERPL-MCNC: 48 MG/DL (ref 40–75)
HDLC SERPL: 28.2 % (ref 20–50)
HGB BLD-MCNC: 13.9 G/DL (ref 12–16)
LDLC SERPL CALC-MCNC: 58.6 MG/DL (ref 63–159)
LYMPHOCYTES # BLD AUTO: 1.2 K/UL (ref 1–4.8)
LYMPHOCYTES NFR BLD: 16.7 % (ref 18–48)
MCH RBC QN AUTO: 28.3 PG (ref 27–31)
MCHC RBC AUTO-ENTMCNC: 32.2 G/DL (ref 32–36)
MCV RBC AUTO: 88 FL (ref 82–98)
MONOCYTES # BLD AUTO: 0.6 K/UL (ref 0.3–1)
MONOCYTES NFR BLD: 7.8 % (ref 4–15)
NEUTROPHILS # BLD AUTO: 5.2 K/UL (ref 1.8–7.7)
NEUTROPHILS NFR BLD: 71.6 % (ref 38–73)
NONHDLC SERPL-MCNC: 122 MG/DL
PLATELET # BLD AUTO: 218 K/UL (ref 150–350)
PMV BLD AUTO: 10.3 FL (ref 9.2–12.9)
POTASSIUM SERPL-SCNC: 4 MMOL/L (ref 3.5–5.1)
PROT SERPL-MCNC: 6.8 G/DL (ref 6–8.4)
RBC # BLD AUTO: 4.92 M/UL (ref 4–5.4)
SODIUM SERPL-SCNC: 142 MMOL/L (ref 136–145)
TRIGL SERPL-MCNC: 317 MG/DL (ref 30–150)
TSH SERPL DL<=0.005 MIU/L-ACNC: 0.79 UIU/ML (ref 0.4–4)
WBC # BLD AUTO: 7.19 K/UL (ref 3.9–12.7)

## 2019-04-16 PROCEDURE — 80061 LIPID PANEL: CPT | Mod: HCNC

## 2019-04-16 PROCEDURE — 84443 ASSAY THYROID STIM HORMONE: CPT | Mod: HCNC

## 2019-04-16 PROCEDURE — 99397 PER PM REEVAL EST PAT 65+ YR: CPT | Mod: HCNC,S$GLB,, | Performed by: INTERNAL MEDICINE

## 2019-04-16 PROCEDURE — 80053 COMPREHEN METABOLIC PANEL: CPT | Mod: HCNC

## 2019-04-16 PROCEDURE — 36415 COLL VENOUS BLD VENIPUNCTURE: CPT | Mod: HCNC

## 2019-04-16 PROCEDURE — 99397 PR PREVENTIVE VISIT,EST,65 & OVER: ICD-10-PCS | Mod: HCNC,S$GLB,, | Performed by: INTERNAL MEDICINE

## 2019-04-16 PROCEDURE — 99999 PR PBB SHADOW E&M-EST. PATIENT-LVL III: ICD-10-PCS | Mod: PBBFAC,HCNC,, | Performed by: INTERNAL MEDICINE

## 2019-04-16 PROCEDURE — 99999 PR PBB SHADOW E&M-EST. PATIENT-LVL III: CPT | Mod: PBBFAC,HCNC,, | Performed by: INTERNAL MEDICINE

## 2019-04-16 PROCEDURE — 85025 COMPLETE CBC W/AUTO DIFF WBC: CPT | Mod: HCNC

## 2019-04-16 RX ORDER — NYSTATIN 100000 [USP'U]/G
POWDER TOPICAL 4 TIMES DAILY
Qty: 60 G | Refills: 11 | Status: ON HOLD | OUTPATIENT
Start: 2019-04-16 | End: 2019-05-09 | Stop reason: HOSPADM

## 2019-04-16 RX ORDER — ESCITALOPRAM OXALATE 20 MG/1
TABLET ORAL
Qty: 90 TABLET | Refills: 3 | Status: SHIPPED | OUTPATIENT
Start: 2019-04-16 | End: 2019-10-08 | Stop reason: SDUPTHER

## 2019-04-16 NOTE — PROGRESS NOTES
INTERNAL MEDICINE ANNUAL VISIT NOTE      CHIEF COMPLAINT     ANNUAL    HPI     Lilia Hidalgo is a 87 y.o. C female who presents for her annual exam.    I had last seen pt 12/2017.     Currently at [facility] assisted living. There is help w/ bathing. No falls. Have been sitting mostly in the wheelchair due to pain when walking. Does sometimes walk in the walker. S/p PT x 8 weeks at [facility]. Helped w/ arm strength. Did not help w/ the L knee and L hip pain.     Dementia - aricept 5mg nightly.   Pt was supposed to see neuropsych. No consult in the visit notes. No upcoming appts.     Severe OA of hips. Seen Dr. [facility] in the past who suggested hip replacement but family declines.   Follows w/ Dr. Ugalde for chronic L knee, L hip pain. Last seen him 2/5/19  On cymbalta 30mg daily (changed from lexapro to cymbalta as there was a pain component), tylenol prn, fentanyl patch q 72 hours. Restarted on tramadol 50mg BID and 1 tab at bedtime as needed. F/u in 3 mo.     Daughter c/o getting very upset when something goes wrong. There is also some anxiety. Thinks the lexapro works better.     HTN - lasix 40mg daily, benazepril 20mg daily, toprol xl 100mg daily    HLD - atorvastatin 20mg daily.     OAB - myrbetriq 25mg daily.  Reports burning when urinating. Has a new lined underwear/pads at [employer]. Has to change the liners 4x/day). C/o redness and stinging around the groin area.     Past Medical History:  Past Medical History:   Diagnosis Date    Aortic stenosis 6/17/2015    Arthritis     Atrophic vaginitis 2/18/2016    Bilateral edema of lower extremity 6/22/2016    CHF (congestive heart failure)     Cholelithiasis without cholecystitis 5/5/2017    Chronic pain of left knee 8/3/2017    Depressed mood 6/22/2016    Diverticulitis of large intestine without perforation or abscess without bleeding 5/5/2017    Encounter for blood transfusion     Essential hypertension     GERD  "(gastroesophageal reflux disease)     Hyperlipidemia     Hypertension     Hypertensive heart disease with heart failure 7/14/2015    Insomnia     Joint pain     Knee pain, left 08/2016    pain with walking or standing    Primary osteoarthritis of knee 2/5/2013    PUD (peptic ulcer disease)     S/P knee replacement 2/13/2013    Slow transit constipation 2/18/2016       Past Surgical History:  Past Surgical History:   Procedure Laterality Date    APPENDECTOMY      ARTHROPLASTY, KNEE, TOTAL Left 2/6/2013    Performed by [facility] at [facility] OR 2ND FLR    COLONOSCOPY      08    diagnostic block of  the genicular branches to the left knee Left 8/3/2017    Performed by Steve Norton MD at [facility] OR    EGD (ESOPHAGOGASTRODUODENOSCOPY) N/A 1/6/2014    Performed by Madan Beltran MD at [facility] ENDO (4TH FLR)    EGD (ESOPHAGOGASTRODUODENOSCOPY) N/A 1/28/2013    Performed by Yuniel Montana MD at [facility] ENDO (2ND FLR)    EYE SURGERY      bilateral cataract    FINGER SURGERY      LT thumb surgery--"arthritis surgery"    HYSTERECTOMY      UNKNOWN BSO    JOINT REPLACEMENT      right hip replacement    KNEE ARTHROPLASTY Left 2001    TONSILLECTOMY      TUMOR EXCISION      esophageal tumor removal     UPPER GASTROINTESTINAL ENDOSCOPY      2013       Allergies:  Review of patient's allergies indicates:   Allergen Reactions    Aspirin Nausea Only and Other (See Comments)     Other reaction(s): esophageal pain    Celebrex [celecoxib] Rash    Morphine Hallucinations    Steroid [corticosteroids (glucocorticoids)] Other (See Comments)     Other reaction(s): Flushing (skin)    Sulfa dyne Other (See Comments)     Other reaction(s): esophogeal pain       Home Medications:  Prior to Admission medications    Medication Sig Start Date End Date Taking? Authorizing Provider   acetaminophen (TYLENOL) 500 MG tablet Take 1 tablet (500 mg total) by mouth every 6 (six) hours as needed (Mild pain). 2/4/19   " Girish Ugalde MD   acetaminophen (TYLENOL) 650 MG TbSR Take 1 tablet (650 mg total) by mouth every 6 (six) hours as needed (last dose may be left at bedside for patient to take at night). 1/17/19   Girish Ugalde MD   atorvastatin (LIPITOR) 20 MG tablet Take 1 tablet (20 mg total) by mouth once daily. 1/4/19   April Herrera MD   benazepril (LOTENSIN) 20 MG tablet Take 1 tablet (20 mg total) by mouth once daily. 8/13/18 8/13/19  Marycarmen Marte MD   benazepril (LOTENSIN) 20 MG tablet TAKE 1 TABLET(20 MG) BY MOUTH EVERY DAY 3/25/19   April Herrera MD   donepezil (ARICEPT) 5 MG tablet Take 1 tablet (5 mg total) by mouth every evening. 1/29/19   April Herrera MD   DULoxetine (CYMBALTA) 30 MG capsule TAKE 1 CAPSULE(30 MG) BY MOUTH EVERY DAY 2/4/19   Girish Ugalde MD   fentaNYL (DURAGESIC) 12 mcg/hr PT72 Place 1 patch onto the skin every 72 hours. 4/5/19 5/5/19  Girish Ugalde MD   furosemide (LASIX) 40 MG tablet Take 1 tablet (40 mg total) by mouth once daily. 1/10/19 1/10/20  April Herrera MD   lidocaine-prilocaine (EMLA) cream Apply topically as needed. Apply to painful areas 3 times per day as needed 10/17/18   Girish Ugalde MD   melatonin 5 mg Tab Take 1 tablet by mouth every evening.     Historical Provider, MD   metoprolol succinate (TOPROL-XL) 100 MG 24 hr tablet Take 1 tablet (100 mg total) by mouth once daily. 2/4/19   April Herrera MD   mirabegron (MYRBETRIQ) 25 mg Tb24 ER tablet Take 1 tablet (25 mg total) by mouth once daily. 1/17/19 1/17/20  April Herrera MD   MULTIVITAMIN W-MINERALS/LUTEIN (CENTRUM SILVER ORAL) Take by mouth once daily.    Historical Provider, MD   mupirocin (BACTROBAN) 2 % ointment Apply topically 2 (two) times daily. 4/2/18   Nellie Amezqutia PA-C   omeprazole (PRILOSEC) 20 MG capsule Take 1 capsule (20 mg total) by mouth once daily. 6/27/18   April Herrera MD   traMADol (ULTRAM) 50 mg tablet Take 1 tablet (50 mg total) by mouth As instructed for Pain. Take one tablets twice per day, and 1  "tablet at bedtime as needed. 2/4/19 3/6/19  Girish Ugalde MD       Family History:  Family History   Problem Relation Age of Onset    Heart disease Mother     Heart disease Father     Asthma Father     Cancer Brother         breast    Heart disease Brother     Melanoma Neg Hx     Psoriasis Neg Hx     Lupus Neg Hx     Eczema Neg Hx     Acne Neg Hx     Breast cancer Neg Hx     Ovarian cancer Neg Hx     Cervical cancer Neg Hx     Endometrial cancer Neg Hx     Vaginal cancer Neg Hx        Social History:  Social History     Tobacco Use    Smoking status: Never Smoker    Smokeless tobacco: Never Used   Substance Use Topics    Alcohol use: No    Drug use: Yes     Types: Hydrocodone       Review of Systems:  Review of Systems   Constitutional: Negative for activity change and unexpected weight change.   HENT: Negative for hearing loss, rhinorrhea and trouble swallowing.    Eyes: Negative for discharge and visual disturbance.   Respiratory: Negative for chest tightness and wheezing.    Cardiovascular: Negative for chest pain and palpitations.   Gastrointestinal: Negative for blood in stool, constipation, diarrhea and vomiting.   Endocrine: Negative for polydipsia and polyuria.   Genitourinary: Negative for difficulty urinating, dysuria, hematuria and menstrual problem.   Musculoskeletal: Negative for arthralgias, joint swelling and neck pain.   Neurological: Negative for weakness and headaches.   Psychiatric/Behavioral: Negative for confusion and dysphoric mood.       Health Maintainence:   HM reviewed.    PHYSICAL EXAM     /74 (BP Location: Left arm, Patient Position: Sitting, BP Method: Large (Manual))   Pulse 72   Temp 98 °F (36.7 °C)   Ht 5' 6" (1.676 m)   Wt 91.3 kg (201 lb 4.5 oz)   LMP  (LMP Unknown)   BMI 32.49 kg/m²     GEN - A+OX3, NAD. Does have some repetitive questions.  HEENT - PERRL, EOMI, OP clear. MMM.   Neck - No thyromegaly or cervical LAD. No thyroid masses felt.  CV " done - RRR, II/VI ELIAJH best at [facility].   Chest - CTAB, no wheezing or rhonchi  Abd - S/NT/ND/+BS.   Ext - 2+B radial pulses. B LE 2+ pitting edema.   MSK - No spinal tenderness to palpation. Difficulty standing from sitting. Pain on the L knee and also the L hip.   Skin - some redness around the groin area. Actinic horn anterior neck. Some nodules on the forehead    LABS     Previous labs reviewed.    ASSESSMENT/PLAN     Lilia Hidalgo is a 87 y.o. female with  Lilia was seen today for annual exam.    Diagnoses and all orders for this visit:    Annual physical exam - [facility].     Essential hypertension - Stable and controlled. Continue current medications.  -     Comprehensive metabolic panel; Future; Expected date: 04/16/2019  -     TSH; Future; Expected date: 04/16/2019    Hyperlipidemia, unspecified hyperlipidemia type - cont atorva. Not fasting but ok to repeat lipids.   -     Comprehensive metabolic panel; Future; Expected date: 04/16/2019  -     Lipid panel; Future; Expected date: 04/16/2019    Chronic pain of left knee/Chronic left hip pain - cont current meds. F/u w/ Dr. Ugalde    BMI 32.49,adult - some weight gain. Doing ok.   -     CBC auto differential; Future; Expected date: 04/16/2019    Candidal intertrigo  -     nystatin (MYCOSTATIN) powder; Apply topically 4 (four) times daily.    OAB (overactive bladder) w/ urinary incontinence. Discussed the need to keep area dry to prevent burning sensation of the skin. Recommended timed urination q 2 hours whether or not she feels like she has to urinate.   -     Urinalysis; Future  -     Urinalysis Microscopic; Future    Dementia without behavioral disturbance, unspecified dementia type - aricept 5mg nightly.     Anxiety - stop cymbalta. Restart lexapro as it was helping more.   -     escitalopram oxalate (LEXAPRO) 20 MG tablet; Take 1/2 pill nightly for a week and then 1 pill nightly onwards.    Skin cancer screening/actinic keratosis - refer to derm.     RTC in 12  months, sooner if needed and depending on labs.    April Herrera MD  Department of Internal Medicine - [facility] [place]  1:05 PM

## 2019-04-16 NOTE — PATIENT INSTRUCTIONS
Urinate every 2 hours no matter whether you feel like you have to urinate or not.   Use the nystatin powder 4x/day.

## 2019-04-17 ENCOUNTER — LAB VISIT (OUTPATIENT)
Dept: LAB | Facility: HOSPITAL | Age: 84
End: 2019-04-17
Attending: INTERNAL MEDICINE
Payer: MEDICARE

## 2019-04-17 DIAGNOSIS — N32.81 OAB (OVERACTIVE BLADDER): ICD-10-CM

## 2019-04-17 LAB
BACTERIA #/AREA URNS AUTO: ABNORMAL /HPF
BILIRUB UR QL STRIP: NEGATIVE
CLARITY UR REFRACT.AUTO: CLEAR
COLOR UR AUTO: ABNORMAL
GLUCOSE UR QL STRIP: NEGATIVE
HGB UR QL STRIP: ABNORMAL
KETONES UR QL STRIP: NEGATIVE
LEUKOCYTE ESTERASE UR QL STRIP: ABNORMAL
MICROSCOPIC COMMENT: ABNORMAL
NITRITE UR QL STRIP: POSITIVE
PH UR STRIP: 6 [PH] (ref 5–8)
PROT UR QL STRIP: NEGATIVE
RBC #/AREA URNS AUTO: 8 /HPF (ref 0–4)
SP GR UR STRIP: 1.01 (ref 1–1.03)
SQUAMOUS #/AREA URNS AUTO: 3 /HPF
URN SPEC COLLECT METH UR: ABNORMAL
WBC #/AREA URNS AUTO: 33 /HPF (ref 0–5)

## 2019-04-17 PROCEDURE — 81001 URINALYSIS AUTO W/SCOPE: CPT | Mod: HCNC

## 2019-04-18 ENCOUNTER — TELEPHONE (OUTPATIENT)
Dept: INTERNAL MEDICINE | Facility: CLINIC | Age: 84
End: 2019-04-18

## 2019-04-18 ENCOUNTER — PATIENT MESSAGE (OUTPATIENT)
Dept: INTERNAL MEDICINE | Facility: CLINIC | Age: 84
End: 2019-04-18

## 2019-04-18 RX ORDER — FLUCONAZOLE 150 MG/1
150 TABLET ORAL DAILY
Qty: 1 TABLET | Refills: 0 | Status: SHIPPED | OUTPATIENT
Start: 2019-04-18 | End: 2019-04-19

## 2019-04-18 RX ORDER — CIPROFLOXACIN 500 MG/1
500 TABLET ORAL EVERY 12 HOURS
Qty: 14 TABLET | Refills: 0 | Status: SHIPPED | OUTPATIENT
Start: 2019-04-18 | End: 2019-04-25

## 2019-04-18 NOTE — LETTER
[facility] - Internal Medicine  [address]  [facility] [address]  Phone: [phone]  Fax: [phone]     2019    To Whom It May Concern:    Ms. Lilia Hidalgo ( 31) was seen yesterday and found to have a UTI. I sent in Cipro 500mg to be taken twice daily with food. I also sent in 1 dose of diflucan to take x 1 dose for yeast infection. While she is taking cipro and diflucan, please hold her aricept and lexapro as these medicines can interact. She may resume Aricept and Lexapro at the completion of the antibiotics.     If you have any questions, please contact the office at [phone].    Sincerely,    April Herrera MD  Department of Internal Medicine - [facility]

## 2019-04-18 NOTE — TELEPHONE ENCOUNTER
Sent in cipro. Sent in diflucan.     Alycia, can you fax the letter to [facility] so they'd administer the meds? Thanks!

## 2019-04-18 NOTE — TELEPHONE ENCOUNTER
----- Message from Carlito Fields sent at 4/18/2019 11:35 AM CDT -----  Contact: Daughter/ [employer] [phone]  She received the results of the urinalysis but, needs clarification.    Thank you

## 2019-04-22 ENCOUNTER — TELEPHONE (OUTPATIENT)
Dept: DERMATOLOGY | Facility: CLINIC | Age: 84
End: 2019-04-22

## 2019-04-22 NOTE — TELEPHONE ENCOUNTER
----- Message from Anna Yoni sent at 4/22/2019 12:49 PM CDT -----  Contact: pts daughter-Belkys at [phone]  Needs Advice    Reason for call:Daughter is calling in ref to skin ca ck/  Spots on forehead and neck.  Full body scan.  Prefers main campus.  Requesting May 20 [address] after noon if possible.  Pt saw Jam back in 2014.        Communication Preference:call [facility] at [phone]    Additional Information:

## 2019-05-06 ENCOUNTER — OFFICE VISIT (OUTPATIENT)
Dept: DERMATOLOGY | Facility: CLINIC | Age: 84
End: 2019-05-06
Payer: MEDICARE

## 2019-05-06 DIAGNOSIS — L82.0 INFLAMED SEBORRHEIC KERATOSIS: ICD-10-CM

## 2019-05-06 DIAGNOSIS — G89.29 CHRONIC PAIN OF LEFT KNEE: ICD-10-CM

## 2019-05-06 DIAGNOSIS — M25.552 LEFT HIP PAIN: ICD-10-CM

## 2019-05-06 DIAGNOSIS — M25.562 CHRONIC PAIN OF LEFT KNEE: ICD-10-CM

## 2019-05-06 DIAGNOSIS — L91.8 SKIN TAG: ICD-10-CM

## 2019-05-06 DIAGNOSIS — L30.4 INTERTRIGO: Primary | ICD-10-CM

## 2019-05-06 PROCEDURE — 1101F PT FALLS ASSESS-DOCD LE1/YR: CPT | Mod: CPTII,S$GLB,, | Performed by: DERMATOLOGY

## 2019-05-06 PROCEDURE — 99202 PR OFFICE/OUTPT VISIT, NEW, LEVL II, 15-29 MIN: ICD-10-PCS | Mod: S$GLB,,, | Performed by: DERMATOLOGY

## 2019-05-06 PROCEDURE — 99202 OFFICE O/P NEW SF 15 MIN: CPT | Mod: S$GLB,,, | Performed by: DERMATOLOGY

## 2019-05-06 PROCEDURE — 99999 PR PBB SHADOW E&M-EST. PATIENT-LVL II: CPT | Mod: PBBFAC,,, | Performed by: DERMATOLOGY

## 2019-05-06 PROCEDURE — 99999 PR PBB SHADOW E&M-EST. PATIENT-LVL II: ICD-10-PCS | Mod: PBBFAC,,, | Performed by: DERMATOLOGY

## 2019-05-06 PROCEDURE — 1101F PR PT FALLS ASSESS DOC 0-1 FALLS W/OUT INJ PAST YR: ICD-10-PCS | Mod: CPTII,S$GLB,, | Performed by: DERMATOLOGY

## 2019-05-06 RX ORDER — FENTANYL 12.5 UG/1
1 PATCH TRANSDERMAL
Qty: 10 PATCH | Refills: 0 | Status: ON HOLD | OUTPATIENT
Start: 2019-05-07 | End: 2019-06-04 | Stop reason: SDUPTHER

## 2019-05-06 RX ORDER — NYSTATIN 100000 [USP'U]/G
POWDER TOPICAL 2 TIMES DAILY
Qty: 60 G | Refills: 5 | Status: SHIPPED | OUTPATIENT
Start: 2019-05-06 | End: 2020-07-17

## 2019-05-06 NOTE — PROGRESS NOTES
Subjective:       Patient ID:  Lilia Hidalgo is a 87 y.o. female who presents for   Chief Complaint   Patient presents with    Skin Check     ubse     HPI    88 yo female here for evaluation of several skin growths. First are cluster of crusted spots on left upper forehead that are scabbed over and she picks at.  She also has a skin tag on her neck that she repeatedly picks at.    Would like refill for nystatin powder for intertrigo of groin area to use.  No rash presently.  Here with daughter today.  Otherwise The patient denies any moles or growths of the skin that are rapidly growing, hurting, itching, bleeding, or changing colors.    Review of Systems   Skin: Positive for wears hat. Negative for daily sunscreen use, activity-related sunscreen use and recent sunburn.   Hematologic/Lymphatic: Does not bruise/bleed easily.        Objective:    Physical Exam   Constitutional: She appears well-developed and well-nourished. No distress.   Neurological: She is alert and oriented to person, place, and time. She is not disoriented.   Psychiatric: She has a normal mood and affect.   Skin:   Areas Examined (abnormalities noted in diagram):   Scalp / Hair Palpated and Inspected  Head / Face Inspection Performed  Neck Inspection Performed  Chest / Axilla Inspection Performed  Abdomen Inspection Performed  Back Inspection Performed  RUE Inspected  LUE Inspection Performed                       Diagram Legend     Erythematous scaling macule/papule c/w actinic keratosis       Vascular papule c/w angioma      Pigmented verrucoid papule/plaque c/w seborrheic keratosis      Yellow umbilicated papule c/w sebaceous hyperplasia      Irregularly shaped tan macule c/w lentigo     1-2 mm smooth white papules consistent with Milia      Movable subcutaneous cyst with punctum c/w epidermal inclusion cyst      Subcutaneous movable cyst c/w pilar cyst      Firm pink to brown papule c/w dermatofibroma      Pedunculated fleshy papule(s)  c/w skin tag(s)      Evenly pigmented macule c/w junctional nevus     Mildly variegated pigmented, slightly irregular-bordered macule c/w mildly atypical nevus      Flesh colored to evenly pigmented papule c/w intradermal nevus       Pink pearly papule/plaque c/w basal cell carcinoma      Erythematous hyperkeratotic cursted plaque c/w SCC      Surgical scar with no sign of skin cancer recurrence      Open and closed comedones      Inflammatory papules and pustules      Verrucoid papule consistent consistent with wart     Erythematous eczematous patches and plaques     Dystrophic onycholytic nail with subungual debris c/w onychomycosis     Umbilicated papule    Erythematous-base heme-crusted tan verrucoid plaque consistent with inflamed seborrheic keratosis     Erythematous Silvery Scaling Plaque c/w Psoriasis     See annotation      Assessment / Plan:        Intertrigo - currently clear - pt would like to use for prevention  Zinc oxide paste another OTC option  -     nystatin (MYCOSTATIN) powder; Apply topically 2 (two) times daily. To groin area  Dispense: 60 g; Refill: 5    Inflamed seborrheic keratosis  Offered LN2 declines    Skin tag  Repeated picking, rubbing, and scratching of the skin can exacerbate the condition and lead to pigmentary changes and scarring.  The patient was urged to stop these behaviors.  Will likely fall off on its own due to irritation           No follow-ups on file.

## 2019-05-06 NOTE — TELEPHONE ENCOUNTER
Last Rx refill-----04/01/19  Last office visit--02/04/19  Next office visit--05/20/19        ----- Message from Bing Banuelos sent at 5/6/2019  8:19 AM CDT -----  Rx Refill/Request     Is this a Refill or New Rx:  Refill    Rx Name and Strength:  fentaNYL (DURAGESIC) 12 mcg/hr PT72    Preferred Pharmacy with phone number:     [facility] - [facility] - [address]  [address]  [address]  [place] [address]  Phone: [phone] Fax: [phone]  ,  Communication Preference: [facility] (dtr) @ [phone]  Additional Information:

## 2019-05-08 ENCOUNTER — HOSPITAL ENCOUNTER (OUTPATIENT)
Facility: HOSPITAL | Age: 84
Discharge: HOME | End: 2019-05-09
Attending: EMERGENCY MEDICINE | Admitting: INTERNAL MEDICINE
Payer: MEDICARE

## 2019-05-08 DIAGNOSIS — R07.9 CHEST PAIN: ICD-10-CM

## 2019-05-08 DIAGNOSIS — I11.0 HYPERTENSIVE HEART DISEASE WITH HEART FAILURE: ICD-10-CM

## 2019-05-08 LAB
ALBUMIN SERPL BCP-MCNC: 3.3 G/DL (ref 3.5–5.2)
ALP SERPL-CCNC: 157 U/L (ref 55–135)
ALT SERPL W/O P-5'-P-CCNC: 102 U/L (ref 10–44)
ANION GAP SERPL CALC-SCNC: 10 MMOL/L (ref 8–16)
APTT BLDCRRT: 22.6 SEC (ref 21–32)
AST SERPL-CCNC: 275 U/L (ref 10–40)
BASOPHILS # BLD AUTO: 0.04 K/UL (ref 0–0.2)
BASOPHILS # BLD AUTO: 0.04 K/UL (ref 0–0.2)
BASOPHILS NFR BLD: 0.5 % (ref 0–1.9)
BASOPHILS NFR BLD: 0.8 % (ref 0–1.9)
BILIRUB SERPL-MCNC: 1.3 MG/DL (ref 0.1–1)
BNP SERPL-MCNC: 125 PG/ML (ref 0–99)
BUN SERPL-MCNC: 14 MG/DL (ref 8–23)
CALCIUM SERPL-MCNC: 9.1 MG/DL (ref 8.7–10.5)
CHLORIDE SERPL-SCNC: 102 MMOL/L (ref 95–110)
CO2 SERPL-SCNC: 28 MMOL/L (ref 23–29)
CREAT SERPL-MCNC: 0.8 MG/DL (ref 0.5–1.4)
DIFFERENTIAL METHOD: ABNORMAL
DIFFERENTIAL METHOD: ABNORMAL
EOSINOPHIL # BLD AUTO: 0.1 K/UL (ref 0–0.5)
EOSINOPHIL # BLD AUTO: 0.1 K/UL (ref 0–0.5)
EOSINOPHIL NFR BLD: 1.4 % (ref 0–8)
EOSINOPHIL NFR BLD: 2.3 % (ref 0–8)
ERYTHROCYTE [DISTWIDTH] IN BLOOD BY AUTOMATED COUNT: 13.9 % (ref 11.5–14.5)
ERYTHROCYTE [DISTWIDTH] IN BLOOD BY AUTOMATED COUNT: 14 % (ref 11.5–14.5)
EST. GFR  (AFRICAN AMERICAN): >60 ML/MIN/1.73 M^2
EST. GFR  (NON AFRICAN AMERICAN): >60 ML/MIN/1.73 M^2
GLUCOSE SERPL-MCNC: 104 MG/DL (ref 70–110)
HCT VFR BLD AUTO: 38.5 % (ref 37–48.5)
HCT VFR BLD AUTO: 45 % (ref 37–48.5)
HGB BLD-MCNC: 13.1 G/DL (ref 12–16)
HGB BLD-MCNC: 14.2 G/DL (ref 12–16)
IMM GRANULOCYTES # BLD AUTO: 0.02 K/UL (ref 0–0.04)
IMM GRANULOCYTES # BLD AUTO: 0.07 K/UL (ref 0–0.04)
IMM GRANULOCYTES NFR BLD AUTO: 0.2 % (ref 0–0.5)
IMM GRANULOCYTES NFR BLD AUTO: 1.3 % (ref 0–0.5)
INR PPP: 1 (ref 0.8–1.2)
LYMPHOCYTES # BLD AUTO: 0.9 K/UL (ref 1–4.8)
LYMPHOCYTES # BLD AUTO: 1.3 K/UL (ref 1–4.8)
LYMPHOCYTES NFR BLD: 10.8 % (ref 18–48)
LYMPHOCYTES NFR BLD: 23.9 % (ref 18–48)
MCH RBC QN AUTO: 27.8 PG (ref 27–31)
MCH RBC QN AUTO: 28.5 PG (ref 27–31)
MCHC RBC AUTO-ENTMCNC: 31.6 G/DL (ref 32–36)
MCHC RBC AUTO-ENTMCNC: 34 G/DL (ref 32–36)
MCV RBC AUTO: 84 FL (ref 82–98)
MCV RBC AUTO: 88 FL (ref 82–98)
MONOCYTES # BLD AUTO: 0.5 K/UL (ref 0.3–1)
MONOCYTES # BLD AUTO: 0.6 K/UL (ref 0.3–1)
MONOCYTES NFR BLD: 7.1 % (ref 4–15)
MONOCYTES NFR BLD: 9.9 % (ref 4–15)
NEUTROPHILS # BLD AUTO: 3.3 K/UL (ref 1.8–7.7)
NEUTROPHILS # BLD AUTO: 6.8 K/UL (ref 1.8–7.7)
NEUTROPHILS NFR BLD: 61.8 % (ref 38–73)
NEUTROPHILS NFR BLD: 80 % (ref 38–73)
NRBC BLD-RTO: 0 /100 WBC
NRBC BLD-RTO: 0 /100 WBC
PLATELET # BLD AUTO: 171 K/UL (ref 150–350)
PLATELET # BLD AUTO: 197 K/UL (ref 150–350)
PMV BLD AUTO: 10.8 FL (ref 9.2–12.9)
PMV BLD AUTO: 9.9 FL (ref 9.2–12.9)
POTASSIUM SERPL-SCNC: 3.7 MMOL/L (ref 3.5–5.1)
PROT SERPL-MCNC: 7.3 G/DL (ref 6–8.4)
PROTHROMBIN TIME: 10.7 SEC (ref 9–12.5)
RBC # BLD AUTO: 4.6 M/UL (ref 4–5.4)
RBC # BLD AUTO: 5.11 M/UL (ref 4–5.4)
SODIUM SERPL-SCNC: 140 MMOL/L (ref 136–145)
TROPONIN I SERPL DL<=0.01 NG/ML-MCNC: 0.11 NG/ML (ref 0–0.03)
TROPONIN I SERPL DL<=0.01 NG/ML-MCNC: 0.12 NG/ML (ref 0–0.03)
WBC # BLD AUTO: 5.27 K/UL (ref 3.9–12.7)
WBC # BLD AUTO: 8.46 K/UL (ref 3.9–12.7)

## 2019-05-08 PROCEDURE — 99285 EMERGENCY DEPT VISIT HI MDM: CPT | Mod: ,,, | Performed by: EMERGENCY MEDICINE

## 2019-05-08 PROCEDURE — 84484 ASSAY OF TROPONIN QUANT: CPT | Mod: 91,HCNC

## 2019-05-08 PROCEDURE — 93010 EKG 12-LEAD: ICD-10-PCS | Mod: HCNC,,, | Performed by: INTERNAL MEDICINE

## 2019-05-08 PROCEDURE — G0378 HOSPITAL OBSERVATION PER HR: HCPCS | Mod: HCNC

## 2019-05-08 PROCEDURE — 94761 N-INVAS EAR/PLS OXIMETRY MLT: CPT | Mod: HCNC

## 2019-05-08 PROCEDURE — 85025 COMPLETE CBC W/AUTO DIFF WBC: CPT | Mod: HCNC

## 2019-05-08 PROCEDURE — 93010 ELECTROCARDIOGRAM REPORT: CPT | Mod: HCNC,,, | Performed by: INTERNAL MEDICINE

## 2019-05-08 PROCEDURE — 99220 PR INITIAL OBSERVATION CARE,LEVL III: CPT | Mod: HCNC,,, | Performed by: PHYSICIAN ASSISTANT

## 2019-05-08 PROCEDURE — 63600175 PHARM REV CODE 636 W HCPCS: Mod: HCNC | Performed by: EMERGENCY MEDICINE

## 2019-05-08 PROCEDURE — 25000003 PHARM REV CODE 250: Mod: HCNC | Performed by: EMERGENCY MEDICINE

## 2019-05-08 PROCEDURE — 84484 ASSAY OF TROPONIN QUANT: CPT | Mod: HCNC

## 2019-05-08 PROCEDURE — 83880 ASSAY OF NATRIURETIC PEPTIDE: CPT | Mod: HCNC

## 2019-05-08 PROCEDURE — 80053 COMPREHEN METABOLIC PANEL: CPT | Mod: HCNC

## 2019-05-08 PROCEDURE — 99285 PR EMERGENCY DEPT VISIT,LEVEL V: ICD-10-PCS | Mod: ,,, | Performed by: EMERGENCY MEDICINE

## 2019-05-08 PROCEDURE — 93005 ELECTROCARDIOGRAM TRACING: CPT | Mod: HCNC

## 2019-05-08 PROCEDURE — 85610 PROTHROMBIN TIME: CPT | Mod: HCNC

## 2019-05-08 PROCEDURE — 85730 THROMBOPLASTIN TIME PARTIAL: CPT | Mod: HCNC

## 2019-05-08 PROCEDURE — 96374 THER/PROPH/DIAG INJ IV PUSH: CPT | Mod: HCNC

## 2019-05-08 PROCEDURE — S0028 INJECTION, FAMOTIDINE, 20 MG: HCPCS | Mod: HCNC | Performed by: EMERGENCY MEDICINE

## 2019-05-08 PROCEDURE — 99285 EMERGENCY DEPT VISIT HI MDM: CPT | Mod: 25,HCNC

## 2019-05-08 PROCEDURE — 96376 TX/PRO/DX INJ SAME DRUG ADON: CPT | Mod: HCNC

## 2019-05-08 PROCEDURE — 99220 PR INITIAL OBSERVATION CARE,LEVL III: ICD-10-PCS | Mod: HCNC,,, | Performed by: PHYSICIAN ASSISTANT

## 2019-05-08 RX ORDER — HEPARIN SODIUM,PORCINE/D5W 25000/250
12 INTRAVENOUS SOLUTION INTRAVENOUS CONTINUOUS
Status: DISCONTINUED | OUTPATIENT
Start: 2019-05-08 | End: 2019-05-09

## 2019-05-08 RX ORDER — ACETAMINOPHEN 325 MG/1
650 TABLET ORAL EVERY 4 HOURS PRN
Status: DISCONTINUED | OUTPATIENT
Start: 2019-05-09 | End: 2019-05-09 | Stop reason: HOSPADM

## 2019-05-08 RX ORDER — FAMOTIDINE 10 MG/ML
20 INJECTION INTRAVENOUS
Status: COMPLETED | OUTPATIENT
Start: 2019-05-08 | End: 2019-05-08

## 2019-05-08 RX ORDER — FUROSEMIDE 10 MG/ML
20 INJECTION INTRAMUSCULAR; INTRAVENOUS
Status: COMPLETED | OUTPATIENT
Start: 2019-05-08 | End: 2019-05-08

## 2019-05-08 RX ORDER — PANTOPRAZOLE SODIUM 40 MG/1
40 TABLET, DELAYED RELEASE ORAL DAILY
Status: DISCONTINUED | OUTPATIENT
Start: 2019-05-09 | End: 2019-05-09 | Stop reason: HOSPADM

## 2019-05-08 RX ORDER — ONDANSETRON 8 MG/1
8 TABLET, ORALLY DISINTEGRATING ORAL EVERY 8 HOURS PRN
Status: DISCONTINUED | OUTPATIENT
Start: 2019-05-09 | End: 2019-05-09 | Stop reason: HOSPADM

## 2019-05-08 RX ORDER — BENAZEPRIL HYDROCHLORIDE 20 MG/1
20 TABLET ORAL DAILY
Status: DISCONTINUED | OUTPATIENT
Start: 2019-05-09 | End: 2019-05-08

## 2019-05-08 RX ORDER — PROMETHAZINE HYDROCHLORIDE 25 MG/1
25 SUPPOSITORY RECTAL EVERY 6 HOURS PRN
Status: DISCONTINUED | OUTPATIENT
Start: 2019-05-09 | End: 2019-05-09 | Stop reason: HOSPADM

## 2019-05-08 RX ORDER — SODIUM CHLORIDE 0.9 % (FLUSH) 0.9 %
10 SYRINGE (ML) INJECTION
Status: DISCONTINUED | OUTPATIENT
Start: 2019-05-09 | End: 2019-05-09 | Stop reason: HOSPADM

## 2019-05-08 RX ADMIN — FAMOTIDINE 20 MG: 10 INJECTION, SOLUTION INTRAVENOUS at 07:05

## 2019-05-08 RX ADMIN — FUROSEMIDE 20 MG: 10 INJECTION, SOLUTION INTRAMUSCULAR; INTRAVENOUS at 09:05

## 2019-05-08 NOTE — PROVIDER PROGRESS NOTES - EMERGENCY DEPT.
Encounter Date: 5/8/2019    ED Physician Progress Notes       SCRIBE NOTE: I, Austin Landeros, am scribing for, and in the presence of,  Dr. Arambula.  Physician Statement: I, Dr. Arambula, personally performed the services described in this documentation as scribed by Austin Landeros in my presence, and it is both accurate and complete.      EKG - STEMI Decision  Initial Reading: No STEMI present.

## 2019-05-08 NOTE — ED TRIAGE NOTES
Pt presents today with CC epigastric pain. Pt states that around 3pm, pt was wheeling herself around in wheelchair and experienced sharp epigastric pain toward left side. Denies dizziness or cold sweats. Pt states pain lasted for about 1.5 hours. Pt denies SOB. Pt given aspirin via EMS. Denies chest/epigastric pain at this time. Pt with PMH CHF.

## 2019-05-08 NOTE — ED PROVIDER NOTES
Encounter Date: 5/8/2019    SCRIBE #1 NOTE: I, Austin Landeros, am scribing for, and in the presence of,  Dr. Arambula. I have scribed the following portions of the note - the EKG reading. Other sections scribed: HPI, ROS.       History     Chief Complaint   Patient presents with    Chest Pain     left sided chest pain described as burning. Pt took 324 aspirin prior to arrival. Chest pain is relieved at this time.      Lilia Hidalgo is a 87 y.o. female with PMHx of HTN, GERD, and peptic ulcers who presents to the ED via EMS with a chief complaint of chest pain that began about 3 hours ago. Patient lives in assisted living facility in [facility]. She just finished playing bingo when she was heading back back to her apartment in her wheelchair when she to experience an extreme sharp left-sided chest pain. States it was more difficult using her wheelchair than usual and her pain started on her way back. She described her chest pain as 9/10 in severity and did not radiate. This occurred today at 3:00 PM and lasted about an hour. She has not had any similar prior experiences. Her chest pain has since resolved but is now having abdominal pain. EMS gave the patient aspirin PTA. She also has diarrhea, but is at about baseline. Denies nausea, vomiting, SOB, back discomfort, headache, fever, chills, difficulty swallowing, coughing, urinary symptoms, rashes, or bruising.    She has to avoid heavily seasoned food. Today, she had a scrambled egg, two small sausages, and a biscuit for breakfast. She also had apple juice, but no coffee. For lunch, she had baked chicken with potatoes, and iced tea. Patient still has her gallbladder. Daughter does not think there is a FHx of gallbladder disease. 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 She has taken all of her blood pressure medications today. She mostly uses a wheelchair. States about 3 weeks ago she had dysuria, went to her PCP and was treated  with antibiotics. No one around her has had diarrhea or abdominal pain.     The history is provided by the patient, medical records and a relative.     Review of patient's allergies indicates:   Allergen Reactions    Aspirin Nausea Only and Other (See Comments)     Other reaction(s): esophageal pain    Celebrex [celecoxib] Rash    Morphine Hallucinations    Steroid [corticosteroids (glucocorticoids)] Other (See Comments)     Other reaction(s): Flushing (skin)    Sulfa dyne Other (See Comments)     Other reaction(s): esophogeal pain     Past Medical History:   Diagnosis Date    Aortic stenosis 6/17/2015    Arthritis     Atrophic vaginitis 2/18/2016    Bilateral edema of lower extremity 6/22/2016    CHF (congestive heart failure)     Cholelithiasis without cholecystitis 5/5/2017    Chronic pain of left knee 8/3/2017    Depressed mood 6/22/2016    Diverticulitis of large intestine without perforation or abscess without bleeding 5/5/2017    Encounter for blood transfusion     Essential hypertension     GERD (gastroesophageal reflux disease)     Hyperlipidemia     Hypertension     Hypertensive heart disease with heart failure 7/14/2015    Insomnia     Joint pain     Knee pain, left 08/2016    pain with walking or standing    Primary osteoarthritis of knee 2/5/2013    PUD (peptic ulcer disease)     S/P knee replacement 2/13/2013    Slow transit constipation 2/18/2016     Past Surgical History:   Procedure Laterality Date    APPENDECTOMY      ARTHROPLASTY, KNEE, TOTAL Left 2/6/2013    [facility] OR 2ND FLR    COLONOSCOPY      08    diagnostic block of  the genicular branches to the left knee Left 8/3/2017    [facility]    EGD (ESOPHAGOGASTRODUODENOSCOPY) N/A 1/6/2014    Performed by Madan [facility]tran MD at [facility] ENDO (4TH FLR)    EGD (ESOPHAGOGASTRODUODENOSCOPY) N/A 1/28/2013    Performed by Yuniel Montana MD at [facility]  "ENDO (2ND FLR)    EYE SURGERY      bilateral cataract    FINGER SURGERY      LT thumb surgery--"arthritis surgery"    HYSTERECTOMY      UNKNOWN BSO    JOINT REPLACEMENT      right hip replacement    KNEE ARTHROPLASTY Left 2001    TONSILLECTOMY      TUMOR EXCISION      esophageal tumor removal     UPPER GASTROINTESTINAL ENDOSCOPY      2013     Family History   Problem Relation Age of Onset    Heart disease Mother     Heart disease Father     Asthma Father     Cancer Brother         breast    Heart disease Brother     Melanoma Neg Hx     Psoriasis Neg Hx     Lupus Neg Hx     Eczema Neg Hx     Acne Neg Hx     Breast cancer Neg Hx     Ovarian cancer Neg Hx     Cervical cancer Neg Hx     Endometrial cancer Neg Hx     Vaginal cancer Neg Hx      Social History     Tobacco Use    Smoking status: Never Smoker    Smokeless tobacco: Never Used   Substance Use Topics    Alcohol use: No    Drug use: Never     Types: Hydrocodone     Review of Systems   Constitutional: Negative for chills and fever.   HENT: Negative for trouble swallowing.    Eyes: Negative for pain.   Respiratory: Negative for cough and shortness of breath.    Cardiovascular: Positive for chest pain.   Gastrointestinal: Positive for abdominal pain and diarrhea. Negative for nausea and vomiting.   Genitourinary: Negative for dysuria.   Musculoskeletal: Negative for back pain.   Skin: Negative for rash.   Neurological: Negative for headaches.       Physical Exam     Initial Vitals [05/08/19 1706]   BP Pulse Resp Temp SpO2   (!) 190/78 78 18 98.1 °F (36.7 °C) 97 %      MAP       --         Physical Exam    Nursing note and vitals reviewed.  Constitutional: She is cooperative. She does not appear ill.   HENT:   Head: Normocephalic and atraumatic.   Mouth/Throat: Oropharynx is clear and moist.   Eyes: Conjunctivae and lids are normal.   Neck: Normal range of motion. Neck supple. No JVD present.   Cardiovascular: Normal rate and regular " rhythm.   Murmur heard.   Systolic murmur is present with a grade of 2/6.  Pulmonary/Chest: Breath sounds normal. No accessory muscle usage. No tachypnea. No respiratory distress.   Abdominal: She exhibits no distension. There is no tenderness.   Musculoskeletal: Normal range of motion.   Neurological: She is alert. No cranial nerve deficit. GCS eye subscore is 4. GCS verbal subscore is 5. GCS motor subscore is 6.   Skin: Skin is warm and dry.         ED Course   Procedures  Labs Reviewed   CBC W/ AUTO DIFFERENTIAL - Abnormal; Notable for the following components:       Result Value    Mean Corpuscular Hemoglobin Conc 31.6 (*)     Lymph # 0.9 (*)     Gran% 80.0 (*)     Lymph% 10.8 (*)     All other components within normal limits   COMPREHENSIVE METABOLIC PANEL - Abnormal; Notable for the following components:    Albumin 3.3 (*)     Total Bilirubin 1.3 (*)     Alkaline Phosphatase 157 (*)      (*)      (*)     All other components within normal limits   TROPONIN I - Abnormal; Notable for the following components:    Troponin I 0.106 (*)     All other components within normal limits   TROPONIN I - Abnormal; Notable for the following components:    Troponin I 0.119 (*)     All other components within normal limits   B-TYPE NATRIURETIC PEPTIDE - Abnormal; Notable for the following components:     (*)     All other components within normal limits     EKG Readings: (Independently Interpreted)   Initial Reading: No STEMI. Rhythm: Normal Sinus Rhythm. Heart Rate: 75 bpm. Ectopy: No Ectopy.     ECG Results          EKG 12-lead (Final result)  Result time 05/10/19 08:53:07    Final result by Interface, Lab In [facility] (05/10/19 08:53:07)                 Narrative:    Test Reason : R07.9,    Vent. Rate : 074 BPM     Atrial Rate : 074 BPM     P-R Int : 132 ms          QRS Dur : 072 ms      QT Int : 388 ms       P-R-T Axes : 051 054 -21 degrees     QTc Int : 430 ms    Normal sinus rhythm  Nonspecific ST  and/or T wave abnormalities  Abnormal ECG  Since previous tracing - no significant change  Confirmed by fellow Estelita WOOD (Unofficial Fellow's Report), Otilio ([id number]) on  5/9/2019 10:18:51 AM  Confirmed by Liana WOOD, Brad ([id number]) on 5/10/2019 8:53:01 AM    Referred By: LIZ   SELF           Confirmed By:Brad Gaines MD                            Imaging Results          X-Ray Chest AP Portable (Final result)  Result time 05/08/19 18:41:38    Final result by Praveena Shah MD (05/08/19 18:41:38)                 Impression:      Minor scarring and/or subsegmental atelectasis unchanged.  No edema or pneumothorax.      Electronically signed by: Praveena Shah  Date:    05/08/2019  Time:    18:41             Narrative:    EXAMINATION:  XR CHEST AP PORTABLE    CLINICAL HISTORY:  Chest Pain;    TECHNIQUE:  Single frontal view of the chest was performed.    COMPARISON:  05/05/2017    FINDINGS:  Single frontal portable view.  The heart is probably normal size.  There is scattered reticular in perihilar streaky opacities similar previous exam.  This could be from senescent change or minor scarring.  No definite consolidation or interstitial edema.  No pneumothorax or pleural effusion or cavitary lesion.  The hilar shadows and trachea appear normal.  No abdominal free air.  No acute fracture.                              X-Rays:   Independently Interpreted Readings:   Chest X-Ray: No cardiomegaly present. No increase in vascular markings. No pleural effusions. No consolidations.      Medical Decision Making:   History:   Old Medical Records: I decided to obtain old medical records.  Initial Assessment:   87 year-old female who was exerting herself in a wheelchair when she had an onset of 9/10 chest pain. Described as a sharp pain across her lower chest and epigastrium that lasted about an hour.   Independently Interpreted Test(s):   I have ordered and independently interpreted X-rays - see prior notes.  I have ordered and  independently interpreted EKG Reading(s) - see prior notes  Clinical Tests:   Lab Tests: Ordered and Reviewed  Radiological Study: Ordered and Reviewed  Medical Tests: Ordered and Reviewed            Scribe Attestation:   Scribe #1: I performed the above scribed service and the documentation accurately describes the services I performed. I attest to the accuracy of the note.    Attending Attestation:             Attending ED Notes:   Patient presenting to the ED because of the onset of his this after a bingo event patient has significant chest discomfort patient evaluated in the ED showed elevated troponins though she has had this in the past patient seen by Cardiology and the patient is admitted to the Internal Medicine service for further care             Clinical Impression:       ICD-10-CM ICD-9-CM   1. Chest pain R07.9 786.50   2. Hypertensive heart disease with heart failure I11.0 402.91     428.9         Disposition:   Disposition: Placed in Observation  Condition: Serious                        Gianni Arambula MD  05/13/19 [id number]

## 2019-05-09 VITALS
RESPIRATION RATE: 18 BRPM | TEMPERATURE: 98 F | DIASTOLIC BLOOD PRESSURE: 70 MMHG | SYSTOLIC BLOOD PRESSURE: 153 MMHG | HEART RATE: 82 BPM | WEIGHT: 203 LBS | BODY MASS INDEX: 33.82 KG/M2 | HEIGHT: 65 IN | OXYGEN SATURATION: 96 %

## 2019-05-09 PROBLEM — E11.00 DM HYPEROSMOLARITY TYPE II: Status: ACTIVE | Noted: 2019-05-09

## 2019-05-09 PROBLEM — R07.89 ATYPICAL CHEST PAIN: Status: ACTIVE | Noted: 2019-05-08

## 2019-05-09 PROBLEM — I16.0 HYPERTENSIVE URGENCY: Status: ACTIVE | Noted: 2019-05-09

## 2019-05-09 LAB
ANION GAP SERPL CALC-SCNC: 9 MMOL/L (ref 8–16)
APTT BLDCRRT: 35.2 SEC (ref 21–32)
APTT BLDCRRT: 43.7 SEC (ref 21–32)
ASCENDING AORTA: 2.38 CM
AV INDEX (PROSTH): 0.39
AV MEAN GRADIENT: 23.29 MMHG
AV PEAK GRADIENT: 39.94 MMHG
AV VALVE AREA: 1.35 CM2
AV VELOCITY RATIO: 0.36
BASOPHILS # BLD AUTO: 0.06 K/UL (ref 0–0.2)
BASOPHILS NFR BLD: 0.8 % (ref 0–1.9)
BSA FOR ECHO PROCEDURE: 2.05 M2
BUN SERPL-MCNC: 11 MG/DL (ref 8–23)
CALCIUM SERPL-MCNC: 9.3 MG/DL (ref 8.7–10.5)
CHLORIDE SERPL-SCNC: 102 MMOL/L (ref 95–110)
CO2 SERPL-SCNC: 30 MMOL/L (ref 23–29)
CREAT SERPL-MCNC: 0.7 MG/DL (ref 0.5–1.4)
CV ECHO LV RWT: 0.43 CM
DIFFERENTIAL METHOD: ABNORMAL
DOP CALC AO PEAK VEL: 3.16 M/S
DOP CALC AO VTI: 76.39 CM
DOP CALC LVOT AREA: 3.46 CM2
DOP CALC LVOT DIAMETER: 2.1 CM
DOP CALC LVOT PEAK VEL: 1.14 M/S
DOP CALC LVOT STROKE VOLUME: 103.06 CM3
DOP CALCLVOT PEAK VEL VTI: 29.77 CM
E WAVE DECELERATION TIME: 240.74 MSEC
E/A RATIO: 0.84
E/E' RATIO: 13.33
ECHO LV POSTERIOR WALL: 0.98 CM (ref 0.6–1.1)
EOSINOPHIL # BLD AUTO: 0.2 K/UL (ref 0–0.5)
EOSINOPHIL NFR BLD: 2.8 % (ref 0–8)
ERYTHROCYTE [DISTWIDTH] IN BLOOD BY AUTOMATED COUNT: 13.9 % (ref 11.5–14.5)
EST. GFR  (AFRICAN AMERICAN): >60 ML/MIN/1.73 M^2
EST. GFR  (NON AFRICAN AMERICAN): >60 ML/MIN/1.73 M^2
FRACTIONAL SHORTENING: 43 % (ref 28–44)
GLUCOSE SERPL-MCNC: 88 MG/DL (ref 70–110)
HCT VFR BLD AUTO: 44.2 % (ref 37–48.5)
HGB BLD-MCNC: 14.4 G/DL (ref 12–16)
IMM GRANULOCYTES # BLD AUTO: 0.03 K/UL (ref 0–0.04)
IMM GRANULOCYTES NFR BLD AUTO: 0.4 % (ref 0–0.5)
INTERVENTRICULAR SEPTUM: 0.96 CM (ref 0.6–1.1)
LA MAJOR: 5.29 CM
LA MINOR: 4.5 CM
LA WIDTH: 4.23 CM
LEFT ATRIUM SIZE: 4 CM
LEFT ATRIUM VOLUME INDEX: 35.1 ML/M2
LEFT ATRIUM VOLUME: 69.94 CM3
LEFT INTERNAL DIMENSION IN SYSTOLE: 2.62 CM (ref 2.1–4)
LEFT VENTRICLE DIASTOLIC VOLUME INDEX: 47.91 ML/M2
LEFT VENTRICLE DIASTOLIC VOLUME: 95.38 ML
LEFT VENTRICLE MASS INDEX: 75.5 G/M2
LEFT VENTRICLE SYSTOLIC VOLUME INDEX: 12.7 ML/M2
LEFT VENTRICLE SYSTOLIC VOLUME: 25.19 ML
LEFT VENTRICULAR INTERNAL DIMENSION IN DIASTOLE: 4.56 CM (ref 3.5–6)
LEFT VENTRICULAR MASS: 150.2 G
LV LATERAL E/E' RATIO: 12.5
LV SEPTAL E/E' RATIO: 14.29
LYMPHOCYTES # BLD AUTO: 1.1 K/UL (ref 1–4.8)
LYMPHOCYTES NFR BLD: 15.5 % (ref 18–48)
MCH RBC QN AUTO: 27.9 PG (ref 27–31)
MCHC RBC AUTO-ENTMCNC: 32.6 G/DL (ref 32–36)
MCV RBC AUTO: 86 FL (ref 82–98)
MONOCYTES # BLD AUTO: 0.6 K/UL (ref 0.3–1)
MONOCYTES NFR BLD: 8.8 % (ref 4–15)
MV PEAK A VEL: 1.19 M/S
MV PEAK E VEL: 1 M/S
NEUTROPHILS # BLD AUTO: 5.1 K/UL (ref 1.8–7.7)
NEUTROPHILS NFR BLD: 71.7 % (ref 38–73)
NRBC BLD-RTO: 0 /100 WBC
PLATELET # BLD AUTO: 211 K/UL (ref 150–350)
PMV BLD AUTO: 10.5 FL (ref 9.2–12.9)
POTASSIUM SERPL-SCNC: 3.5 MMOL/L (ref 3.5–5.1)
RA MAJOR: 4.71 CM
RA PRESSURE: 3 MMHG
RA WIDTH: 3.46 CM
RBC # BLD AUTO: 5.16 M/UL (ref 4–5.4)
RIGHT VENTRICULAR END-DIASTOLIC DIMENSION: 3.35 CM
SINUS: 2.69 CM
SODIUM SERPL-SCNC: 141 MMOL/L (ref 136–145)
STJ: 2.61 CM
TDI LATERAL: 0.08
TDI SEPTAL: 0.07
TDI: 0.08
TROPONIN I SERPL DL<=0.01 NG/ML-MCNC: 0.09 NG/ML (ref 0–0.03)
TROPONIN I SERPL DL<=0.01 NG/ML-MCNC: 0.11 NG/ML (ref 0–0.03)
WBC # BLD AUTO: 7.08 K/UL (ref 3.9–12.7)

## 2019-05-09 PROCEDURE — 85730 THROMBOPLASTIN TIME PARTIAL: CPT | Mod: 91,HCNC

## 2019-05-09 PROCEDURE — G0378 HOSPITAL OBSERVATION PER HR: HCPCS | Mod: HCNC

## 2019-05-09 PROCEDURE — 99225 PR SUBSEQUENT OBSERVATION CARE,LEVEL II: ICD-10-PCS | Mod: HCNC,GC,, | Performed by: INTERNAL MEDICINE

## 2019-05-09 PROCEDURE — 85025 COMPLETE CBC W/AUTO DIFF WBC: CPT | Mod: HCNC

## 2019-05-09 PROCEDURE — 63600175 PHARM REV CODE 636 W HCPCS: Mod: HCNC | Performed by: INTERNAL MEDICINE

## 2019-05-09 PROCEDURE — 99225 PR SUBSEQUENT OBSERVATION CARE,LEVEL II: CPT | Mod: HCNC,GC,, | Performed by: INTERNAL MEDICINE

## 2019-05-09 PROCEDURE — 25000003 PHARM REV CODE 250: Mod: HCNC | Performed by: STUDENT IN AN ORGANIZED HEALTH CARE EDUCATION/TRAINING PROGRAM

## 2019-05-09 PROCEDURE — 63600175 PHARM REV CODE 636 W HCPCS: Mod: HCNC | Performed by: PHYSICIAN ASSISTANT

## 2019-05-09 PROCEDURE — 99217 PR OBSERVATION CARE DISCHARGE: ICD-10-PCS | Mod: HCNC,,, | Performed by: HOSPITALIST

## 2019-05-09 PROCEDURE — 25000003 PHARM REV CODE 250: Mod: HCNC | Performed by: PHYSICIAN ASSISTANT

## 2019-05-09 PROCEDURE — 99217 PR OBSERVATION CARE DISCHARGE: CPT | Mod: HCNC,,, | Performed by: HOSPITALIST

## 2019-05-09 PROCEDURE — 36415 COLL VENOUS BLD VENIPUNCTURE: CPT | Mod: HCNC

## 2019-05-09 PROCEDURE — 85730 THROMBOPLASTIN TIME PARTIAL: CPT | Mod: HCNC

## 2019-05-09 PROCEDURE — 84484 ASSAY OF TROPONIN QUANT: CPT | Mod: HCNC

## 2019-05-09 PROCEDURE — 80048 BASIC METABOLIC PNL TOTAL CA: CPT | Mod: HCNC

## 2019-05-09 RX ORDER — ATORVASTATIN CALCIUM 20 MG/1
40 TABLET, FILM COATED ORAL DAILY
Status: DISCONTINUED | OUTPATIENT
Start: 2019-05-09 | End: 2019-05-09 | Stop reason: HOSPADM

## 2019-05-09 RX ORDER — TRAMADOL HYDROCHLORIDE 50 MG/1
50 TABLET ORAL SEE ADMIN INSTRUCTIONS
Status: DISCONTINUED | OUTPATIENT
Start: 2019-05-09 | End: 2019-05-09

## 2019-05-09 RX ORDER — ATORVASTATIN CALCIUM 20 MG/1
20 TABLET, FILM COATED ORAL DAILY
Status: DISCONTINUED | OUTPATIENT
Start: 2019-05-09 | End: 2019-05-09

## 2019-05-09 RX ORDER — NIFEDIPINE 30 MG/1
30 TABLET, EXTENDED RELEASE ORAL ONCE
Status: COMPLETED | OUTPATIENT
Start: 2019-05-09 | End: 2019-05-09

## 2019-05-09 RX ORDER — FUROSEMIDE 40 MG/1
40 TABLET ORAL DAILY
Status: DISCONTINUED | OUTPATIENT
Start: 2019-05-09 | End: 2019-05-09 | Stop reason: HOSPADM

## 2019-05-09 RX ORDER — CLOPIDOGREL BISULFATE 75 MG/1
75 TABLET ORAL DAILY
Status: DISCONTINUED | OUTPATIENT
Start: 2019-05-09 | End: 2019-05-09

## 2019-05-09 RX ORDER — METOPROLOL SUCCINATE 100 MG/1
100 TABLET, EXTENDED RELEASE ORAL DAILY
Status: DISCONTINUED | OUTPATIENT
Start: 2019-05-09 | End: 2019-05-09

## 2019-05-09 RX ORDER — ASPIRIN 325 MG
325 TABLET ORAL DAILY
Status: DISCONTINUED | OUTPATIENT
Start: 2019-05-09 | End: 2019-05-09

## 2019-05-09 RX ORDER — FENTANYL 12.5 UG/1
1 PATCH TRANSDERMAL
Status: DISCONTINUED | OUTPATIENT
Start: 2019-05-09 | End: 2019-05-09 | Stop reason: HOSPADM

## 2019-05-09 RX ORDER — HYDRALAZINE HYDROCHLORIDE 20 MG/ML
10 INJECTION INTRAMUSCULAR; INTRAVENOUS ONCE
Status: COMPLETED | OUTPATIENT
Start: 2019-05-09 | End: 2019-05-09

## 2019-05-09 RX ORDER — BENAZEPRIL HYDROCHLORIDE 20 MG/1
TABLET ORAL
Qty: 90 TABLET | Refills: 3 | Status: SHIPPED | OUTPATIENT
Start: 2019-05-09 | End: 2019-10-21

## 2019-05-09 RX ORDER — HYDRALAZINE HYDROCHLORIDE 25 MG/1
25 TABLET, FILM COATED ORAL EVERY 8 HOURS PRN
Status: DISCONTINUED | OUTPATIENT
Start: 2019-05-09 | End: 2019-05-09 | Stop reason: HOSPADM

## 2019-05-09 RX ORDER — ESCITALOPRAM OXALATE 20 MG/1
20 TABLET ORAL NIGHTLY
Status: DISCONTINUED | OUTPATIENT
Start: 2019-05-09 | End: 2019-05-09 | Stop reason: HOSPADM

## 2019-05-09 RX ORDER — MICONAZOLE NITRATE 2 %
POWDER (GRAM) TOPICAL 2 TIMES DAILY
Status: DISCONTINUED | OUTPATIENT
Start: 2019-05-09 | End: 2019-05-09 | Stop reason: HOSPADM

## 2019-05-09 RX ORDER — BENAZEPRIL HYDROCHLORIDE 5 MG/1
20 TABLET ORAL DAILY
Status: DISCONTINUED | OUTPATIENT
Start: 2019-05-09 | End: 2019-05-09

## 2019-05-09 RX ORDER — NITROGLYCERIN 0.4 MG/1
0.4 TABLET SUBLINGUAL EVERY 5 MIN PRN
Qty: 30 TABLET | Refills: 1 | Status: SHIPPED | OUTPATIENT
Start: 2019-05-09 | End: 2020-07-17

## 2019-05-09 RX ORDER — BENAZEPRIL HYDROCHLORIDE 5 MG/1
20 TABLET ORAL DAILY
Status: DISCONTINUED | OUTPATIENT
Start: 2019-05-09 | End: 2019-05-09 | Stop reason: HOSPADM

## 2019-05-09 RX ORDER — TRAMADOL HYDROCHLORIDE 50 MG/1
50 TABLET ORAL EVERY 6 HOURS PRN
Status: DISCONTINUED | OUTPATIENT
Start: 2019-05-09 | End: 2019-05-09 | Stop reason: HOSPADM

## 2019-05-09 RX ORDER — DONEPEZIL HYDROCHLORIDE 5 MG/1
5 TABLET, FILM COATED ORAL NIGHTLY
Status: DISCONTINUED | OUTPATIENT
Start: 2019-05-09 | End: 2019-05-09 | Stop reason: HOSPADM

## 2019-05-09 RX ORDER — METOPROLOL SUCCINATE 100 MG/1
100 TABLET, EXTENDED RELEASE ORAL DAILY
Status: DISCONTINUED | OUTPATIENT
Start: 2019-05-09 | End: 2019-05-09 | Stop reason: HOSPADM

## 2019-05-09 RX ADMIN — DONEPEZIL HYDROCHLORIDE 5 MG: 5 TABLET, FILM COATED ORAL at 01:05

## 2019-05-09 RX ADMIN — MICONAZOLE NITRATE: 20 POWDER TOPICAL at 09:05

## 2019-05-09 RX ADMIN — ONDANSETRON 8 MG: 8 TABLET, ORALLY DISINTEGRATING ORAL at 07:05

## 2019-05-09 RX ADMIN — ASPIRIN 325 MG ORAL TABLET 325 MG: 325 PILL ORAL at 09:05

## 2019-05-09 RX ADMIN — TRAMADOL HYDROCHLORIDE 50 MG: 50 TABLET, FILM COATED ORAL at 03:05

## 2019-05-09 RX ADMIN — METOPROLOL SUCCINATE 100 MG: 100 TABLET, EXTENDED RELEASE ORAL at 03:05

## 2019-05-09 RX ADMIN — ATORVASTATIN CALCIUM 40 MG: 20 TABLET, FILM COATED ORAL at 09:05

## 2019-05-09 RX ADMIN — NIFEDIPINE 30 MG: 30 TABLET, FILM COATED, EXTENDED RELEASE ORAL at 09:05

## 2019-05-09 RX ADMIN — HEPARIN SODIUM AND DEXTROSE 14 UNITS/KG/HR: 10000; 5 INJECTION INTRAVENOUS at 10:05

## 2019-05-09 RX ADMIN — HYDRALAZINE HYDROCHLORIDE 10 MG: 20 INJECTION INTRAMUSCULAR; INTRAVENOUS at 06:05

## 2019-05-09 RX ADMIN — ESCITALOPRAM OXALATE 20 MG: 20 TABLET ORAL at 01:05

## 2019-05-09 RX ADMIN — FUROSEMIDE 40 MG: 40 TABLET ORAL at 09:05

## 2019-05-09 RX ADMIN — HYDRALAZINE HYDROCHLORIDE 25 MG: 25 TABLET, FILM COATED ORAL at 01:05

## 2019-05-09 RX ADMIN — HEPARIN SODIUM AND DEXTROSE 12 UNITS/KG/HR: 10000; 5 INJECTION INTRAVENOUS at 12:05

## 2019-05-09 RX ADMIN — HUMAN ALBUMIN MICROSPHERES AND PERFLUTREN 0.66 MG: 10; .22 INJECTION, SOLUTION INTRAVENOUS at 12:05

## 2019-05-09 RX ADMIN — PANTOPRAZOLE SODIUM 40 MG: 40 TABLET, DELAYED RELEASE ORAL at 09:05

## 2019-05-09 RX ADMIN — FENTANYL 1 PATCH: 12 PATCH TRANSDERMAL at 02:05

## 2019-05-09 RX ADMIN — CLOPIDOGREL 75 MG: 75 TABLET, FILM COATED ORAL at 09:05

## 2019-05-09 RX ADMIN — BENAZEPRIL HYDROCHLORIDE 20 MG: 5 TABLET ORAL at 03:05

## 2019-05-09 RX ADMIN — ACETAMINOPHEN 650 MG: 325 TABLET ORAL at 10:05

## 2019-05-09 NOTE — PLAN OF CARE
W/C van transportation set up via [facility] for 445pm to [facility]. Notified pt's daughter Belkys and facility that patient will be returning today. Copy of the face sheet, nursing orders and H&P given to pt's nurse

## 2019-05-09 NOTE — ASSESSMENT & PLAN NOTE
-Initially presented with approximately one hour duration of chest pain in a bandlike pattern at the nipple line after eating meal. Associated with worsening on exertion (wheelchair use) but did not relieve until few minutes after rest.   -Differentials are broad and include pain secondary to arthritic changes/costochondritis secondary to recent initiation of wheelchair usage vs secondary to history of GERD/peptic ulcer disease vs myocardial injury with stable troponin leak related to aortic stenosis and LVH with history of HTN.   -Presentation notable for EKG without ischemic changes, however troponin elevated to 0.119, however did not peak on trending and remained flat  -Admitted to hospital medicine with treatment for NSTEMI, and has been subsequently pain free since admission.   -Exam notable for associated aortic stenosis murmur with chest wall pain reproducible on chest wall palpation.  -Follow-up ECHO with mild aortic stenosis, normal EF, and no wall motion abnormalities.   -Given reproducibility of pain on palpation, seems most likely secondary to arthritis of chest wall joints exacerbated by recent wheelchair use initiation.   -After discussion with family / patient and as patient now pain free and with flat troponin elevation, they do not want to pursue any aggressive evaluation (stress test / LHC)    Recommendations:   -No need for anti-platelet therapy.   -Please discontinue heparin drip.   -See no need for changes to home regimen of ACE-I, Statin, beta-blocker, and Lasix already taking prior to admission.   -Pursue treatment for costochondritis and continue treatment for baseline GERD/peptic ulcer disease.   -Goal SBP <140. Continue home antihypertensives.   -Low Na diet given HTN.

## 2019-05-09 NOTE — PROGRESS NOTES
VEE Patten notified Pt states they are in excruciating pain. Pt flinches away from sheets being drawn over knee and groans from light touch to knee. Tramadol 50 ordered and given. Will continue to monitor.

## 2019-05-09 NOTE — CONSULTS
[facility]  Interventional Cardiology  Consult Note    Patient Name: Lilia Hidalgo  MRN: [id number]  Admission Date: 5/8/2019  Hospital Length of Stay: 0 days  Code Status: Prior   Attending Provider: Mukesh Hui MD  Consulting Provider: Hussain Gustafson III, MD  Primary Care Physician: April Herrera MD  Principal Problem:<principal problem not specified>    Patient information was obtained from patient, past medical records and ER records.     Inpatient consult to Cardiology  Consult performed by: Hussain Gustafson III, MD  Consult ordered by: Gianni Arambula MD        Subjective:     Chief Complaint:  Chest pain     HPI:  Pt is a 86 yo F w/ PMH of HTN, GERD, and PUD who presents to ED w/ c/o severe 9/10 band-like chest pain w/o radiation which lasted for a few hours on the day of presentation to the ED.  She mentions she has not had cp like this before and denies any cardiac history.  She lives in an assisted living facility and mentions the pain started when she was wheeling herself back to her room in a wheelchair.  The pain was constant and had no relieving factors however upon presentation to the ED, she was chest pain free.  She notes compliance w/ her medications.  Of note, she is found to have a chronically elevated troponin however has not had an ischemic w/u in the past or been evaluated by a cardiologist.  She has had an echo which notes normal LVEF and mild to mod AS.  Cardiology is consulted for evaluation of her cp and elevated troponin.  Pt currently denies cp, sob, orthopnea, PND, presyncope, LOC, claudication, and palpitations.  She admits to BLE edema.       Past Medical History:   Diagnosis Date    Aortic stenosis 6/17/2015    Arthritis     Atrophic vaginitis 2/18/2016    Bilateral edema of lower extremity 6/22/2016    CHF (congestive heart failure)     Cholelithiasis without cholecystitis 5/5/2017    Chronic pain of left knee 8/3/2017    Depressed mood  "6/22/2016    Diverticulitis of large intestine without perforation or abscess without bleeding 5/5/2017    Encounter for blood transfusion     Essential hypertension     GERD (gastroesophageal reflux disease)     Hyperlipidemia     Hypertension     Hypertensive heart disease with heart failure 7/14/2015    Insomnia     Joint pain     Knee pain, left 08/2016    pain with walking or standing    Primary osteoarthritis of knee 2/5/2013    PUD (peptic ulcer disease)     S/P knee replacement 2/13/2013    Slow transit constipation 2/18/2016       Past Surgical History:   Procedure Laterality Date    APPENDECTOMY      ARTHROPLASTY, KNEE, TOTAL Left 2/6/2013    [facility] 2ND FLR    COLONOSCOPY      08    diagnostic block of  the genicular branches to the left knee Left 8/3/2017    [facility]    EGD (ESOPHAGOGASTRODUODENOSCOPY) N/A 1/6/2014    Performed by Madan [facility] at [facility] ENDO (4TH FLR)    EGD (ESOPHAGOGASTRODUODENOSCOPY) N/A 1/28/2013    Performed by Yuniel Montana MD at [facility] ENDO (2ND FLR)    EYE SURGERY      bilateral cataract    FINGER SURGERY      LT thumb surgery--"arthritis surgery"    HYSTERECTOMY      UNKNOWN BSO    JOINT REPLACEMENT      right hip replacement    KNEE ARTHROPLASTY Left 2001    TONSILLECTOMY      TUMOR EXCISION      esophageal tumor removal     UPPER GASTROINTESTINAL ENDOSCOPY      2013       Review of patient's allergies indicates:   Allergen Reactions    Aspirin Nausea Only and Other (See Comments)     Other reaction(s): esophageal pain    Celebrex [celecoxib] Rash    Morphine Hallucinations    Steroid [corticosteroids (glucocorticoids)] Other (See Comments)     Other reaction(s): Flushing (skin)    Sulfa dyne Other (See Comments)     Other reaction(s): esophogeal pain         (Not in a hospital admission)  Family History     Problem Relation (Age of Onset)    Asthma Father    " Cancer Brother    Heart disease Mother, Father, Brother        Tobacco Use    Smoking status: Never Smoker    Smokeless tobacco: Never Used   Substance and Sexual Activity    Alcohol use: No    Drug use: Never     Types: Hydrocodone    Sexual activity: Not Currently     Birth control/protection: None     Review of Systems   Constitution: Negative for diaphoresis and fever.   HENT: Negative for congestion and hearing loss.    Eyes: Negative for blurred vision and pain.   Cardiovascular: Positive for chest pain and leg swelling. Negative for claudication, dyspnea on exertion, near-syncope, orthopnea, palpitations, paroxysmal nocturnal dyspnea and syncope.   Respiratory: Negative for shortness of breath and sleep disturbances due to breathing.    Hematologic/Lymphatic: Negative for bleeding problem. Does not bruise/bleed easily.   Skin: Negative for color change and poor wound healing.   Musculoskeletal: Positive for arthritis.   Gastrointestinal: Negative for abdominal pain and nausea.   Genitourinary: Negative for bladder incontinence and flank pain.   Neurological: Negative for focal weakness and light-headedness.     Objective:     Vital Signs (Most Recent):  Temp: 98.3 °F (36.8 °C) (05/08/19 2128)  Pulse: 76 (05/08/19 2146)  Resp: (!) 21 (05/08/19 2131)  BP: (!) 158/68 (05/08/19 2146)  SpO2: 96 % (05/08/19 2146) Vital Signs (24h Range):  Temp:  [98.1 °F (36.7 °C)-98.3 °F (36.8 °C)] 98.3 °F (36.8 °C)  Pulse:  [72-82] 76  Resp:  [16-23] 21  SpO2:  [96 %-97 %] 96 %  BP: (158-217)/(68-88) 158/68     Weight: 86.2 kg (190 lb)  Body mass index is 32.61 kg/m².    SpO2: 96 %  O2 Device (Oxygen Therapy): room air      Intake/Output Summary (Last 24 hours) at 5/8/2019 2226  Last data filed at 5/8/2019 2135  Gross per 24 hour   Intake --   Output 500 ml   Net -500 ml       Lines/Drains/Airways     Drain            Female External Urinary Catheter 05/08/19 1801 less than 1 day          Peripheral Intravenous Line                  Peripheral IV - Single Lumen 05/08/19 1800 20 G Left Forearm less than 1 day                Physical Exam   Constitutional: She is oriented to person, place, and time. She appears well-developed and well-nourished.   HENT:   Head: Normocephalic and atraumatic.   Mouth/Throat: Oropharynx is clear and moist.   Eyes: Pupils are equal, round, and reactive to light. EOM are normal. No scleral icterus.   Neck: Normal range of motion. Neck supple. No JVD present.   Cardiovascular: Normal rate, regular rhythm, S1 normal, S2 normal and intact distal pulses. Exam reveals no gallop and no friction rub.   Murmur heard.  Systolic murmur at 2nd Rt intercostal space w/ radiation to carotids, + S1/S2   Pulmonary/Chest: Effort normal and breath sounds normal. No respiratory distress. She has no wheezes. She has no rales. She exhibits no tenderness.   Abdominal: Soft. Bowel sounds are normal. She exhibits no distension and no mass. There is no tenderness. There is no rebound.   Musculoskeletal: Normal range of motion. She exhibits edema. She exhibits no tenderness.   1+ BLE pitting edema w/ tenderness   Neurological: She is alert and oriented to person, place, and time. She displays normal reflexes. Coordination normal.   Skin: Skin is warm and dry. She is not diaphoretic. No pallor.   Psychiatric: She has a normal mood and affect. Her behavior is normal. Judgment normal.       Significant Labs:   BMP:   Recent Labs   Lab 05/08/19  1820         K 3.7      CO2 28   BUN 14   CREATININE 0.8   CALCIUM 9.1   , CMP   Recent Labs   Lab 05/08/19  1820      K 3.7      CO2 28      BUN 14   CREATININE 0.8   CALCIUM 9.1   PROT 7.3   ALBUMIN 3.3*   BILITOT 1.3*   ALKPHOS 157*   *   *   ANIONGAP 10   ESTGFRAFRICA >60.0   EGFRNONAA >60.0   , CBC   Recent Labs   Lab 05/08/19  1820   WBC 8.46   HGB 14.2   HCT 45.0      , Lipid Panel No results for input(s): CHOL, HDL, LDLCALC, TRIG,  CHOLHDL in the last 48 hours., Troponin   Recent Labs   Lab 05/08/19  1820   TROPONINI 0.119*  0.106*    and All pertinent lab results from the last 24 hours have been reviewed.    Significant Imaging: Echocardiogram:   2D echo with color flow doppler:   Results for orders placed or performed during the hospital encounter of 02/12/16   2D Echocardiogram with Color Flow Doppler   Result Value Ref Range    QEF 70 55 - 65    Diastolic Dysfunction Yes (A)     Aortic Valve Stenosis MILD TO MODERATE (A)     Est. PA Systolic Pressure 35     Tricuspid Valve Regurgitation TRIVIAL     and Transthoracic echo (TTE) complete (Cupid Only): No results found for this or any previous visit., EKG: reviewed.  NSR, nonspecific ST changes, no significant changes compared w/ prior studies and X-Ray: CXR: X-Ray Chest 1 View (CXR): No results found for this visit on 05/08/19. and X-Ray Chest PA and Lateral (CXR): No results found for this visit on 05/08/19.    Assessment and Plan:     Chest pain  Atypical features however w/ positive troponin (known to have chronic trop elevation however no w/u in past), thus would treat for NSTEMI for now and follow troponin.  - echo to eval cardiac function  - continue medical therapy  - start heparin gtt and monitor clinically for bleeding  - should consider noninvasive ischemic w/u (if trop elevation plateaus) v. LHC if trop follows 4th universal definition of MI trop pattern     Bilateral edema of lower extremity  May be related to diastolic dysfunction v. Venous insufficiency.  - check echo as above  - may benefit from compression stockings  - continue medical therapy    Aortic stenosis  Murmur noted on exam, mild to moderate on last echo in 2016.  No clinical signs of severe AS.  - repeat echo as above    Essential hypertension  Elevated.  Goal < 140 mmHg.  - continue medical therapy        VTE Risk Mitigation (From admission, onward)        Ordered     heparin 25,000 units in dextrose 5% (100  units/ml) IV bolus from bag INITIAL BOLUS (max bolus 4000 units)  Once      05/08/19 2211     heparin 25,000 units in dextrose 5% 250 mL (100 units/mL) infusion LOW INTENSITY nomogram - [facility]  Continuous      05/08/19 2211     heparin 25,000 units in dextrose 5% (100 units/ml) IV bolus from bag - ADDITIONAL PRN BOLUS - 60 units/kg (max bolus 4000 units)  As needed (PRN)      05/08/19 2211     heparin 25,000 units in dextrose 5% (100 units/ml) IV bolus from bag - ADDITIONAL PRN BOLUS - 30 units/kg (max bolus 4000 units)  As needed (PRN)      05/08/19 2211          Thank you for your consult. I will follow-up with patient. Please contact us if you have any additional questions.    Hussain Gustafson III, MD  Interventional Cardiology   [facility]

## 2019-05-09 NOTE — SUBJECTIVE & OBJECTIVE
"Past Medical History:   Diagnosis Date    Aortic stenosis 6/17/2015    Arthritis     Atrophic vaginitis 2/18/2016    Bilateral edema of lower extremity 6/22/2016    CHF (congestive heart failure)     Cholelithiasis without cholecystitis 5/5/2017    Chronic pain of left knee 8/3/2017    Depressed mood 6/22/2016    Diverticulitis of large intestine without perforation or abscess without bleeding 5/5/2017    Encounter for blood transfusion     Essential hypertension     GERD (gastroesophageal reflux disease)     Hyperlipidemia     Hypertension     Hypertensive heart disease with heart failure 7/14/2015    Insomnia     Joint pain     Knee pain, left 08/2016    pain with walking or standing    Primary osteoarthritis of knee 2/5/2013    PUD (peptic ulcer disease)     S/P knee replacement 2/13/2013    Slow transit constipation 2/18/2016       Past Surgical History:   Procedure Laterality Date    APPENDECTOMY      ARTHROPLASTY, KNEE, TOTAL Left 2/6/2013    Performed by [facility] at [facility] OR 2ND FLR    COLONOSCOPY      08    diagnostic block of  the genicular branches to the left knee Left 8/3/2017    Performed by Steve Norton MD at [facility] OR    EGD (ESOPHAGOGASTRODUODENOSCOPY) N/A 1/6/2014    Performed by Madan Beltran MD at [facility] ENDO (4TH FLR)    EGD (ESOPHAGOGASTRODUODENOSCOPY) N/A 1/28/2013    Performed by Yuniel Montana MD at [facility] ENDO (2ND FLR)    EYE SURGERY      bilateral cataract    FINGER SURGERY      LT thumb surgery--"arthritis surgery"    HYSTERECTOMY      UNKNOWN BSO    JOINT REPLACEMENT      right hip replacement    KNEE ARTHROPLASTY Left 2001    TONSILLECTOMY      TUMOR EXCISION      esophageal tumor removal     UPPER GASTROINTESTINAL ENDOSCOPY      2013       Review of patient's allergies indicates:   Allergen Reactions    Aspirin Nausea Only and Other (See Comments)     Other reaction(s): esophageal pain    Celebrex [celecoxib] Rash    Morphine " Hallucinations    Steroid [corticosteroids (glucocorticoids)] Other (See Comments)     Other reaction(s): Flushing (skin)    Sulfa dyne Other (See Comments)     Other reaction(s): esophogeal pain         (Not in a hospital admission)  Family History     Problem Relation (Age of Onset)    Asthma Father    Cancer Brother    Heart disease Mother, Father, Brother        Tobacco Use    Smoking status: Never Smoker    Smokeless tobacco: Never Used   Substance and Sexual Activity    Alcohol use: No    Drug use: Never     Types: Hydrocodone    Sexual activity: Not Currently     Birth control/protection: None     Review of Systems   Constitution: Negative for diaphoresis and fever.   HENT: Negative for congestion and hearing loss.    Eyes: Negative for blurred vision and pain.   Cardiovascular: Positive for chest pain and leg swelling. Negative for claudication, dyspnea on exertion, near-syncope, orthopnea, palpitations, paroxysmal nocturnal dyspnea and syncope.   Respiratory: Negative for shortness of breath and sleep disturbances due to breathing.    Hematologic/Lymphatic: Negative for bleeding problem. Does not bruise/bleed easily.   Skin: Negative for color change and poor wound healing.   Musculoskeletal: Positive for arthritis.   Gastrointestinal: Negative for abdominal pain and nausea.   Genitourinary: Negative for bladder incontinence and flank pain.   Neurological: Negative for focal weakness and light-headedness.     Objective:     Vital Signs (Most Recent):  Temp: 98.3 °F (36.8 °C) (05/08/19 2128)  Pulse: 76 (05/08/19 2146)  Resp: (!) 21 (05/08/19 2131)  BP: (!) 158/68 (05/08/19 2146)  SpO2: 96 % (05/08/19 2146) Vital Signs (24h Range):  Temp:  [98.1 °F (36.7 °C)-98.3 °F (36.8 °C)] 98.3 °F (36.8 °C)  Pulse:  [72-82] 76  Resp:  [16-23] 21  SpO2:  [96 %-97 %] 96 %  BP: (158-217)/(68-88) 158/68     Weight: 86.2 kg (190 lb)  Body mass index is 32.61 kg/m².    SpO2: 96 %  O2 Device (Oxygen Therapy): room  air      Intake/Output Summary (Last 24 hours) at 5/8/2019 2226  Last data filed at 5/8/2019 2135  Gross per 24 hour   Intake --   Output 500 ml   Net -500 ml       Lines/Drains/Airways     Drain            Female External Urinary Catheter 05/08/19 1801 less than 1 day          Peripheral Intravenous Line                 Peripheral IV - Single Lumen 05/08/19 1800 20 G Left Forearm less than 1 day                Physical Exam   Constitutional: She is oriented to person, place, and time. She appears well-developed and well-nourished.   HENT:   Head: Normocephalic and atraumatic.   Mouth/Throat: Oropharynx is clear and moist.   Eyes: Pupils are equal, round, and reactive to light. EOM are normal. No scleral icterus.   Neck: Normal range of motion. Neck supple. No JVD present.   Cardiovascular: Normal rate, regular rhythm, S1 normal, S2 normal and intact distal pulses. Exam reveals no gallop and no friction rub.   Murmur heard.  Systolic murmur at 2nd Rt intercostal space w/ radiation to carotids, + S1/S2   Pulmonary/Chest: Effort normal and breath sounds normal. No respiratory distress. She has no wheezes. She has no rales. She exhibits no tenderness.   Abdominal: Soft. Bowel sounds are normal. She exhibits no distension and no mass. There is no tenderness. There is no rebound.   Musculoskeletal: Normal range of motion. She exhibits edema. She exhibits no tenderness.   1+ BLE pitting edema w/ tenderness   Neurological: She is alert and oriented to person, place, and time. She displays normal reflexes. Coordination normal.   Skin: Skin is warm and dry. She is not diaphoretic. No pallor.   Psychiatric: She has a normal mood and affect. Her behavior is normal. Judgment normal.       Significant Labs:   BMP:   Recent Labs   Lab 05/08/19  1820         K 3.7      CO2 28   BUN 14   CREATININE 0.8   CALCIUM 9.1   , CMP   Recent Labs   Lab 05/08/19  1820      K 3.7      CO2 28      BUN 14    CREATININE 0.8   CALCIUM 9.1   PROT 7.3   ALBUMIN 3.3*   BILITOT 1.3*   ALKPHOS 157*   *   *   ANIONGAP 10   ESTGFRAFRICA >60.0   EGFRNONAA >60.0   , CBC   Recent Labs   Lab 05/08/19  1820   WBC 8.46   HGB 14.2   HCT 45.0      , Lipid Panel No results for input(s): CHOL, HDL, LDLCALC, TRIG, CHOLHDL in the last 48 hours., Troponin   Recent Labs   Lab 05/08/19  1820   TROPONINI 0.119*  0.106*    and All pertinent lab results from the last 24 hours have been reviewed.    Significant Imaging: Echocardiogram:   2D echo with color flow doppler:   Results for orders placed or performed during the hospital encounter of 02/12/16   2D Echocardiogram with Color Flow Doppler   Result Value Ref Range    QEF 70 55 - 65    Diastolic Dysfunction Yes (A)     Aortic Valve Stenosis MILD TO MODERATE (A)     Est. PA Systolic Pressure 35     Tricuspid Valve Regurgitation TRIVIAL     and Transthoracic echo (TTE) complete (Cupid Only): No results found for this or any previous visit., EKG: reviewed.  NSR, nonspecific ST changes, no significant changes compared w/ prior studies and X-Ray: CXR: X-Ray Chest 1 View (CXR): No results found for this visit on 05/08/19. and X-Ray Chest PA and Lateral (CXR): No results found for this visit on 05/08/19.

## 2019-05-09 NOTE — ED NOTES
Patient's daughter, Belkys, requesting to have RN call with updates on POC. Phone number [phone].

## 2019-05-09 NOTE — PLAN OF CARE
Problem: Adult Inpatient Plan of Care  Goal: Plan of Care Review  Outcome: Ongoing (interventions implemented as appropriate)  Pt AAOx4 and VSS. Pt came into ED reporting left sided chest pain at nursing home. Was given ASA in ambulance and pain subsided. Currently trending Pt's troponin and monitoring BPs. Pt may go for procedures tomorrow and while trending troponin is NPO. Pt educated on fall risk overnight,pt remained free from falls/trauma/injury. Denies chest pain, SOB, palpitations, dizziness, pain, or discomfort. Plan of care reviewed with pt, all questions answered. Bed locked in lowest position, call bell within reach, no acute distress noted, will continue to monitor.

## 2019-05-09 NOTE — HPI
Ms. Hidalgo is a 86 y/o F with PMHx of HTN, GERD, arthritis, and PUD being admitted to medicine for chest pain. She presented to the ED via EMS complaining of chest pain that began around 3:00 PM and lasted 1 hour. EMS administered ASA prior to arrival. Patient lives in assisted living facility in [facility]. She had just finished playing [employer] and was wheeling herself back to her apartment in her wheelchair when she started to experience severe sharp band-like CP 9/10 without radiation. Pain was exacerbated by exertion (use of wheelchair) and lessened with cessation of exertion. She denies prior hx of similar CP symptoms. She is now CP free. She also admits to diarrhea (baseline) and bilateral LE edema. Denies N/V, SOB, ABD pain, back pain, headache, fever, chills, urinary symptoms. Of note, she is found to have a chronically elevated troponin however has not had an ischemic w/u in the past or been evaluated by a cardiologist.  She has had an echo which notes normal LVEF and mild to mod AS.    Patient reports compliance with her medications, and states she has taken her BP medications today. Patient avoids heavily salted foods. She uses her wheelchair for the majority 2/2 arthritic pain.      In the ED, vitals notable for /86. Labs notable for troponin 0.119 (near patient's baseline), , , , Alk Phos 157, T-bili 1.3. CXR unremarkable. EKG NSR, no ST elevation or depression.

## 2019-05-09 NOTE — ASSESSMENT & PLAN NOTE
Murmur noted on exam, mild to moderate on last echo in 2016.  No clinical signs of severe AS.  - repeat echo as above

## 2019-05-09 NOTE — ASSESSMENT & PLAN NOTE
Patient admitted with chest pain on exertion while using her wheelchair at the nursing home. Her chest pain resolved prior to presentation to the ED.  - EKG without ischemic changes  - troponin 0.119->0.09, continue to trend  - cardiology consulted, recs appreciated:  Atypical features however w/ positive troponin (known to have chronic trop elevation however no w/u in past), thus would treat for NSTEMI for now and follow troponin.  - echo to eval cardiac function  - continue medical therapy  - start heparin gtt and monitor clinically for bleeding  - should consider noninvasive ischemic w/u (if trop elevation plateaus) v. LHC if trop follows 4th universal definition of MI trop pattern   - NPO at MN

## 2019-05-09 NOTE — HPI
Pt is a 88 yo F w/ PMH of HTN, GERD, and PUD who presents to ED w/ c/o severe 9/10 band-like chest pain w/o radiation which lasted for a few hours on the day of presentation to the ED.  She mentions she has not had cp like this before and denies any cardiac history.  She lives in an assisted living facility and mentions the pain started when she was wheeling herself back to her room in a wheelchair.  The pain was constant and had no relieving factors however upon presentation to the ED, she was chest pain free.  She notes compliance w/ her medications.  Of note, she is found to have a chronically elevated troponin however has not had an ischemic w/u in the past or been evaluated by a cardiologist.  She has had an echo which notes normal LVEF and mild to mod AS.  Cardiology is consulted for evaluation of her cp and elevated troponin.  Pt currently denies cp, sob, orthopnea, PND, presyncope, LOC, claudication, and palpitations.  She admits to BLE edema.

## 2019-05-09 NOTE — PLAN OF CARE
[facility]     Department of Hospital Medicine     [facility] [place] [address]     [phone] after hours  [phone] fax       NURSING HOME ORDERS    05/09/2019    Admit to Nursing Home:  senior living Bed    Diagnoses:  Active Hospital Problems    Diagnosis  POA    *Chest pain [R07.9]  Yes    Bilateral edema of lower extremity [R60.0]  Yes    Depressed mood [F32.9]  Yes    Aortic stenosis [I35.0]  Yes    Essential hypertension [I10]  Yes    GERD (gastroesophageal reflux disease) [K21.9]  Yes    Arthritis [M19.90]  Yes    Hyperlipidemia [E78.5]  Yes      Resolved Hospital Problems   No resolved problems to display.       Patient is homebound due to:  Chest pain    Allergies:  Review of patient's allergies indicates:   Allergen Reactions    Aspirin Nausea Only and Other (See Comments)     Other reaction(s): esophageal pain    Celebrex [celecoxib] Rash    Morphine Hallucinations    Steroid [corticosteroids (glucocorticoids)] Other (See Comments)     Other reaction(s): Flushing (skin)    Sulfa dyne Other (See Comments)     Other reaction(s): esophogeal pain       Vitals:      Every shift (Skilled Nursing patients)    Diet: Low Salt Diet   Supplement:  1 can every three times a day with meals                         Type:      Ensure       Acitivities:    - Up in a chair each morning as tolerated   - Ambulate with assistance to bathroom   - Scheduled walks once each shift (every 8 hours)   - May use walker, cane, or self-propelled wheelchair    LABS:  Per facility protocol    Nursing Precautions:    - Aspiration precautions:             - Total assistance with meals            -  Upright 90 degrees befor during and after meals             -  Suction at bedside          - Fall precautions per nursing home protocol   - Decubitus precautions:        -  for positioning   - Pressure reducing foam mattress   - Turn patient every two hours. Use wedge  pillows to anchor patient    CONSULTS:     Physical Therapy to evaluate and treat     Occupational Therapy to evaluate and treat     Nutrition to evaluate and recommend diet     Psychiatry to evaluate and follow patients for delirium    MISCELLANEOUS CARE:   Routine Skin for Bedridden Patients:  Apply moisture barrier cream to all    skin folds and wet areas in perineal area daily and after baths and                           all bowel movements.             Lilia Hidalgo   Home Medication Instructions TIM:[id number]    Printed on:05/09/19 6282   Medication Information                      acetaminophen (TYLENOL) 650 MG TbSR  Take 1 tablet (650 mg total) by mouth every 6 (six) hours as needed (last dose may be left at bedside for patient to take at night).             atorvastatin (LIPITOR) 20 MG tablet  Take 1 tablet (20 mg total) by mouth once daily.             benazepril (LOTENSIN) 20 MG tablet  TAKE 2 TABLETS(40 MG) BY MOUTH EVERY DAY             donepezil (ARICEPT) 5 MG tablet  Take 1 tablet (5 mg total) by mouth every evening.             escitalopram oxalate (LEXAPRO) 20 MG tablet  Take 1/2 pill nightly for a week and then 1 pill nightly onwards.             fentaNYL (DURAGESIC) 12 mcg/hr PT72  Place 1 patch onto the skin every 72 hours.             furosemide (LASIX) 40 MG tablet  Take 1 tablet (40 mg total) by mouth once daily.             lidocaine-prilocaine (EMLA) cream  Apply topically as needed. Apply to painful areas 3 times per day as needed             melatonin 5 mg Tab  Take 1 tablet by mouth every evening.              metoprolol succinate (TOPROL-XL) 100 MG 24 hr tablet  Take 1 tablet (100 mg total) by mouth once daily.             MULTIVITAMIN W-MINERALS/LUTEIN (CENTRUM SILVER ORAL)  Take by mouth once daily.             mupirocin (BACTROBAN) 2 % ointment  Apply topically 2 (two) times daily.             nitroGLYCERIN (NITROSTAT) 0.4 MG SL tablet  Place 1 tablet (0.4 mg total) under the  tongue every 5 (five) minutes as needed for Chest pain (do not take more than 3 tablets in a row).             nystatin (MYCOSTATIN) powder  Apply topically 2 (two) times daily. To groin area             omeprazole (PRILOSEC) 20 MG capsule  Take 1 capsule (20 mg total) by mouth once daily.             traMADol (ULTRAM) 50 mg tablet  Take 1 tablet (50 mg total) by mouth As instructed for Pain. Take one tablets twice per day, and 1 tablet at bedtime as needed.                   _________________________________  Robin Biggs MD  05/09/2019

## 2019-05-09 NOTE — PROGRESS NOTES
VEE Camacho notified of Pt sys BP of 189/79. PA ordered 10mg hydralazine IVP. Will continue to monitor.

## 2019-05-09 NOTE — ASSESSMENT & PLAN NOTE
May be related to diastolic dysfunction v. Venous insufficiency.  - check echo as above  - may benefit from compression stockings  - continue Lasix 40 mg qd, Toprol  mg qd

## 2019-05-09 NOTE — ASSESSMENT & PLAN NOTE
May be related to diastolic dysfunction v. Venous insufficiency.  - check echo as above  - may benefit from compression stockings  - continue medical therapy

## 2019-05-09 NOTE — PLAN OF CARE
PCP- DR. DOMINIK GUADARRAMA    PT HAS A RIDE HOME AND FAMILY SUPPORT WITH ADULT DAUGHTER.     Patient transferred from [facility], and scheduled to transfer back today.      05/09/19 1421   Discharge Assessment   Assessment Type Discharge Planning Assessment   Confirmed/corrected address and phone number on facesheet? Yes   Assessment information obtained from? Patient   Expected Length of Stay (days) 2   Communicated expected length of stay with patient/caregiver yes   Prior to hospitilization cognitive status: Alert/Oriented   Prior to hospitalization functional status: Assistive Equipment   Current cognitive status: Alert/Oriented   Current Functional Status: Assistive Equipment   Is patient able to care for self after discharge? Yes   Patient's perception of discharge disposition long-term care facility   Readmission Within the Last 30 Days no previous admission in last 30 days   Patient currently being followed by outpatient case management? No   Patient currently receives any other outside agency services? No   Equipment Currently Used at Home walker, standard;wheelchair;grab bar;bath bench   Do you have any problems affording any of your prescribed medications? No   Is the patient taking medications as prescribed? yes   Does the patient have transportation home? Yes   Transportation Anticipated family or friend will provide   Does the patient receive services at the [facility]? No   Discharge Plan A Return to nursing home   Discharge Plan B Return to Nursing Home   Patient/Family in Agreement with Plan yes

## 2019-05-09 NOTE — ASSESSMENT & PLAN NOTE
Atypical features however w/ positive troponin (known to have chronic trop elevation however no w/u in past), thus would treat for NSTEMI for now and follow troponin.  - echo to eval cardiac function  - continue medical therapy  - start heparin gtt and monitor clinically for bleeding  - should consider noninvasive ischemic w/u (if trop elevation plateaus) v. LHC if trop follows 4th universal definition of MI trop pattern

## 2019-05-09 NOTE — PROGRESS NOTES
VEE Camacho updated after hydralazine IVP. PT /77 after 30 minutes. VSS. PA informed troponin up to 0.108 from 0.094. Potassium of 3.5. Awaiting orders. Will continue to monitor.

## 2019-05-09 NOTE — PROGRESS NOTES
[facility]  Cardiology  Progress Note    Patient Name: Lilia Hidalgo  MRN: [id number]  Admission Date: 5/8/2019  Hospital Length of Stay: 0 days  Code Status: Full Code   Attending Physician: Mukesh Hui MD   Primary Care Physician: April Herrera MD  Expected Discharge Date:   Principal Problem:Atypical chest pain    Subjective:     Hospital Course:   No notes on file    Interval History:   Ms. Lilia Hidalgo is a 87 year old female with PMH of aortic stenosis, HTN, and GERD admitted on 05/08 for chest pain with mildly elevated flat troponin (peak 0.12) and cardiology subsequently consulted for further management of her possible ACS.    Since admission patient has been chest pain free, no anti-anginals or Morphine given during this time. Troponin remains flat at 0.12 without peak. EKG without any acute ST changes. At lengthy discussion with patient and daughter (Belkys), both of them stated they did not wish to pursue any further ischemic evaluation and would not want intervention at this time, fortunately no intervention or additional therapy is needed. She has small myocardial injury in the setting of accelerated hypertension / severe arthritic pain.     Review of Systems   Constitution: Negative for chills, decreased appetite, diaphoresis, fever, weight gain and weight loss.   Eyes: Negative for blurred vision.   Cardiovascular: Negative for chest pain, dyspnea on exertion, irregular heartbeat, leg swelling, near-syncope, orthopnea and palpitations.   Respiratory: Negative for cough, shortness of breath, snoring and wheezing.    Musculoskeletal: Positive for arthritis.   Gastrointestinal: Negative for abdominal pain, nausea and vomiting.   Genitourinary: Negative for bladder incontinence and urgency.     Objective:     Vital Signs (Most Recent):  Temp: 98.7 °F (37.1 °C) (05/09/19 1231)  Pulse: 72 (05/09/19 1231)  Resp: 18 (05/09/19 1231)  BP: (!) 126/58 (05/09/19 1231)  SpO2: (!) 93 % (05/09/19  1231) Vital Signs (24h Range):  Temp:  [97.7 °F (36.5 °C)-98.7 °F (37.1 °C)] 98.7 °F (37.1 °C)  Pulse:  [71-89] 72  Resp:  [16-26] 18  SpO2:  [93 %-98 %] 93 %  BP: (126-217)/(58-88) 126/58     Weight: 92.5 kg (203 lb 14.8 oz)  Body mass index is 35 kg/m².     SpO2: (!) 93 %  O2 Device (Oxygen Therapy): room air      Intake/Output Summary (Last 24 hours) at 5/9/2019 1241  Last data filed at 5/8/2019 2226  Gross per 24 hour   Intake --   Output 1100 ml   Net -1100 ml       Lines/Drains/Airways     Drain            Female External Urinary Catheter 05/08/19 1801 less than 1 day          Peripheral Intravenous Line                 Peripheral IV - Single Lumen 05/08/19 1800 20 G Left Forearm less than 1 day                Physical Exam   Constitutional: She is oriented to person, place, and time. She appears well-developed and well-nourished. No distress.   HENT:   Head: Normocephalic and atraumatic.   Mouth/Throat: Oropharynx is clear and moist.   Eyes: Pupils are equal, round, and reactive to light. Conjunctivae and EOM are normal. No scleral icterus.   Neck: Normal range of motion. Neck supple. No JVD present. No thyromegaly present.   Cardiovascular: Normal rate, regular rhythm, S1 normal, S2 normal and intact distal pulses. Exam reveals no gallop and no friction rub.   Murmur heard.  Pulses:       Carotid pulses are 2+ on the right side, and 2+ on the left side.       Radial pulses are 2+ on the right side, and 2+ on the left side.   Mid-peaking systolic murmur at 2nd right intercostal space with radiation to carotids. Normal S1, S2.    Pulmonary/Chest: Effort normal and breath sounds normal. No respiratory distress. She has no wheezes. She has no rales. She exhibits no tenderness.   Abdominal: Soft. Bowel sounds are normal. She exhibits no distension and no mass. There is no tenderness. There is no rebound.   Musculoskeletal: Normal range of motion. She exhibits edema. She exhibits no tenderness.   There is  pre-sacral and B/L lower extremity edema. Chest wall and lower sternum is tender to palpation.    Neurological: She is alert and oriented to person, place, and time. She displays normal reflexes. Coordination normal.   Skin: Skin is warm and dry. She is not diaphoretic. No pallor.   Psychiatric: She has a normal mood and affect. Her behavior is normal. Judgment normal.       Significant Labs:   CMP   Recent Labs   Lab 05/08/19  1820 05/09/19  0408    141   K 3.7 3.5    102   CO2 28 30*    88   BUN 14 11   CREATININE 0.8 0.7   CALCIUM 9.1 9.3   PROT 7.3  --    ALBUMIN 3.3*  --    BILITOT 1.3*  --    ALKPHOS 157*  --    *  --    *  --    ANIONGAP 10 9   ESTGFRAFRICA >60.0 >60.0   EGFRNONAA >60.0 >60.0   , CBC   Recent Labs   Lab 05/08/19  1820 05/08/19  2336 05/09/19  0408   WBC 8.46 5.27 7.08   HGB 14.2 13.1 14.4   HCT 45.0 38.5 44.2    171 211   , INR   Recent Labs   Lab 05/08/19  2328   INR 1.0    and Lipid Panel No results for input(s): CHOL, HDL, LDLCALC, TRIG, CHOLHDL in the last 48 hours.    Significant Imaging: Echocardiogram: Transthoracic echo (TTE) complete (Cupid Only): No results found for this or any previous visit.    Assessment and Plan:     * Atypical chest pain  -Initially presented with approximately one hour duration of chest pain in a bandlike pattern at the nipple line after eating meal. Associated with worsening on exertion (wheelchair use) but did not relieve until few minutes after rest.   -Differentials are broad and include pain secondary to arthritic changes/costochondritis secondary to recent initiation of wheelchair usage vs secondary to history of GERD/peptic ulcer disease vs myocardial injury with stable troponin leak related to aortic stenosis and LVH with history of HTN.   -Presentation notable for EKG without ischemic changes, however troponin elevated to 0.119, however did not peak on trending and remained flat  -Admitted to hospital medicine  with treatment for NSTEMI, and has been subsequently pain free since admission.   -Exam notable for associated aortic stenosis murmur with chest wall pain reproducible on chest wall palpation.  -Follow-up ECHO with mild aortic stenosis, normal EF, and no wall motion abnormalities.   -Given reproducibility of pain on palpation, seems most likely secondary to arthritis of chest wall joints exacerbated by recent wheelchair use initiation.   -After discussion with family / patient and as patient now pain free and with flat troponin elevation, they do not want to pursue any aggressive evaluation (stress test / LHC)    Recommendations:   -No need for anti-platelet therapy.   -Please discontinue heparin drip.   -See no need for changes to home regimen of ACE-I, Statin, beta-blocker, and Lasix already taking prior to admission.   -Pursue treatment for costochondritis and continue treatment for baseline GERD/peptic ulcer disease.   -Goal SBP <140. Continue home antihypertensives.   -Low Na diet given HTN.         Aortic stenosis  See assessment for chest pain.     Essential hypertension  See assessment for chest pain.         VTE Risk Mitigation (From admission, onward)        Ordered     IP VTE HIGH RISK PATIENT  Once      05/08/19 2312     Place HAILEY hose  Until discontinued      05/08/19 2312     Place sequential compression device  Until discontinued      05/08/19 2312          Shaan Thompson MD  Cardiology  [facility]

## 2019-05-09 NOTE — PROGRESS NOTES
VEE Patten notified of Pt BP of 205/82. Confirmed in both arms automatically and manually. PA to order medications. Pt asymptomatic at time. Will continue to monitor

## 2019-05-09 NOTE — SUBJECTIVE & OBJECTIVE
Interval History:   Ms. Lilia Hidalgo is a 87 year old female with PMH of aortic stenosis, HTN, and GERD admitted on 05/08 for chest pain with mildly elevated flat troponin (peak 0.12) and cardiology subsequently consulted for further management of her possible ACS.    Since admission patient has been chest pain free, no anti-anginals or Morphine given during this time. Troponin remains flat at 0.12 without peak. EKG without any acute ST changes. At lengthy discussion with patient and daughter (Belkys), both of them stated they did not wish to pursue any further ischemic evaluation and would not want intervention at this time, fortunately no intervention or additional therapy is needed. She has small myocardial injury in the setting of accelerated hypertension / severe arthritic pain.     Review of Systems   Constitution: Negative for chills, decreased appetite, diaphoresis, fever, weight gain and weight loss.   Eyes: Negative for blurred vision.   Cardiovascular: Negative for chest pain, dyspnea on exertion, irregular heartbeat, leg swelling, near-syncope, orthopnea and palpitations.   Respiratory: Negative for cough, shortness of breath, snoring and wheezing.    Musculoskeletal: Positive for arthritis.   Gastrointestinal: Negative for abdominal pain, nausea and vomiting.   Genitourinary: Negative for bladder incontinence and urgency.     Objective:     Vital Signs (Most Recent):  Temp: 98.7 °F (37.1 °C) (05/09/19 1231)  Pulse: 72 (05/09/19 1231)  Resp: 18 (05/09/19 1231)  BP: (!) 126/58 (05/09/19 1231)  SpO2: (!) 93 % (05/09/19 1231) Vital Signs (24h Range):  Temp:  [97.7 °F (36.5 °C)-98.7 °F (37.1 °C)] 98.7 °F (37.1 °C)  Pulse:  [71-89] 72  Resp:  [16-26] 18  SpO2:  [93 %-98 %] 93 %  BP: (126-217)/(58-88) 126/58     Weight: 92.5 kg (203 lb 14.8 oz)  Body mass index is 35 kg/m².     SpO2: (!) 93 %  O2 Device (Oxygen Therapy): room air      Intake/Output Summary (Last 24 hours) at 5/9/2019 1241  Last data filed at  5/8/2019 2226  Gross per 24 hour   Intake --   Output 1100 ml   Net -1100 ml       Lines/Drains/Airways     Drain            Female External Urinary Catheter 05/08/19 1801 less than 1 day          Peripheral Intravenous Line                 Peripheral IV - Single Lumen 05/08/19 1800 20 G Left Forearm less than 1 day                Physical Exam   Constitutional: She is oriented to person, place, and time. She appears well-developed and well-nourished. No distress.   HENT:   Head: Normocephalic and atraumatic.   Mouth/Throat: Oropharynx is clear and moist.   Eyes: Pupils are equal, round, and reactive to light. Conjunctivae and EOM are normal. No scleral icterus.   Neck: Normal range of motion. Neck supple. No JVD present. No thyromegaly present.   Cardiovascular: Normal rate, regular rhythm, S1 normal, S2 normal and intact distal pulses. Exam reveals no gallop and no friction rub.   Murmur heard.  Pulses:       Carotid pulses are 2+ on the right side, and 2+ on the left side.       Radial pulses are 2+ on the right side, and 2+ on the left side.   Mid-peaking systolic murmur at 2nd right intercostal space with radiation to carotids. Normal S1, S2.    Pulmonary/Chest: Effort normal and breath sounds normal. No respiratory distress. She has no wheezes. She has no rales. She exhibits no tenderness.   Abdominal: Soft. Bowel sounds are normal. She exhibits no distension and no mass. There is no tenderness. There is no rebound.   Musculoskeletal: Normal range of motion. She exhibits edema. She exhibits no tenderness.   There is pre-sacral and B/L lower extremity edema. Chest wall and lower sternum is tender to palpation.    Neurological: She is alert and oriented to person, place, and time. She displays normal reflexes. Coordination normal.   Skin: Skin is warm and dry. She is not diaphoretic. No pallor.   Psychiatric: She has a normal mood and affect. Her behavior is normal. Judgment normal.       Significant Labs:   CMP    Recent Labs   Lab 05/08/19  1820 05/09/19  0408    141   K 3.7 3.5    102   CO2 28 30*    88   BUN 14 11   CREATININE 0.8 0.7   CALCIUM 9.1 9.3   PROT 7.3  --    ALBUMIN 3.3*  --    BILITOT 1.3*  --    ALKPHOS 157*  --    *  --    *  --    ANIONGAP 10 9   ESTGFRAFRICA >60.0 >60.0   EGFRNONAA >60.0 >60.0   , CBC   Recent Labs   Lab 05/08/19  1820 05/08/19  2336 05/09/19  0408   WBC 8.46 5.27 7.08   HGB 14.2 13.1 14.4   HCT 45.0 38.5 44.2    171 211   , INR   Recent Labs   Lab 05/08/19  2328   INR 1.0    and Lipid Panel No results for input(s): CHOL, HDL, LDLCALC, TRIG, CHOLHDL in the last 48 hours.    Significant Imaging: Echocardiogram: Transthoracic echo (TTE) complete (Cupid Only): No results found for this or any previous visit.

## 2019-05-09 NOTE — H&P
[facility] Hospital Medicine  History & Physical    Patient Name: Lilia Hidalgo  MRN: [id number]  Admission Date: 5/8/2019  Attending Physician: Mukesh Hui MD   Primary Care Provider: April Herrera MD    [facility] Medicine Team: [facility] MED S Irene Patten PA-C     Patient information was obtained from patient, past medical records and ER records.     Subjective:     Principal Problem:Chest pain    Chief Complaint:   Chief Complaint   Patient presents with    Chest Pain     left sided chest pain described as burning. Pt took 324 aspirin prior to arrival. Chest pain is relieved at this time.         HPI: Ms. Hidalgo is a 86 y/o F with PMHx of HTN, GERD, arthritis, and PUD being admitted to medicine for chest pain. She presented to the ED via EMS complaining of chest pain that began around 3:00 PM and lasted 1 hour. EMS administered ASA prior to arrival. Patient lives in assisted living facility in [facility]. She had just finished playing [employer] and was wheeling herself back to her apartment in her wheelchair when she started to experience severe sharp band-like CP 9/10 without radiation. Pain was exacerbated by exertion (use of wheelchair) and lessened with cessation of exertion. She denies prior hx of similar CP symptoms. She is now CP free. She also admits to diarrhea (baseline) and bilateral LE edema. Denies N/V, SOB, ABD pain, back pain, headache, fever, chills, urinary symptoms. Of note, she is found to have a chronically elevated troponin however has not had an ischemic w/u in the past or been evaluated by a cardiologist.  She has had an echo which notes normal LVEF and mild to mod AS.    Patient reports compliance with her medications, and states she has taken her BP medications today. Patient avoids heavily salted foods. She uses her wheelchair for the majority 2/2 arthritic pain.      In the ED, vitals notable for /86. Labs notable for troponin 0.119 (near patient's baseline),  ", , , Alk Phos 157, T-bili 1.3. CXR unremarkable. EKG NSR, no ST elevation or depression.      Past Medical History:   Diagnosis Date    Aortic stenosis 6/17/2015    Arthritis     Atrophic vaginitis 2/18/2016    Bilateral edema of lower extremity 6/22/2016    CHF (congestive heart failure)     Cholelithiasis without cholecystitis 5/5/2017    Chronic pain of left knee 8/3/2017    Depressed mood 6/22/2016    Diverticulitis of large intestine without perforation or abscess without bleeding 5/5/2017    Encounter for blood transfusion     Essential hypertension     GERD (gastroesophageal reflux disease)     Hyperlipidemia     Hypertension     Hypertensive heart disease with heart failure 7/14/2015    Insomnia     Joint pain     Knee pain, left 08/2016    pain with walking or standing    Primary osteoarthritis of knee 2/5/2013    PUD (peptic ulcer disease)     S/P knee replacement 2/13/2013    Slow transit constipation 2/18/2016       Past Surgical History:   Procedure Laterality Date    APPENDECTOMY      ARTHROPLASTY, KNEE, TOTAL Left 2/6/2013    [facility] OR 2ND FLR    COLONOSCOPY      08    diagnostic block of  the genicular branches to the left knee Left 8/3/2017    [facility]    EGD (ESOPHAGOGASTRODUODENOSCOPY) N/A 1/6/2014    Performed by Madan Beltran MD at [facility] ENDO (4TH FLR)    EGD (ESOPHAGOGASTRODUODENOSCOPY) N/A 1/28/2013    Performed by Yuniel Montana MD at [facility] ENDO (2ND FLR)    EYE SURGERY      bilateral cataract    FINGER SURGERY      LT thumb surgery--"arthritis surgery"    HYSTERECTOMY      UNKNOWN BSO    JOINT REPLACEMENT      right hip replacement    KNEE ARTHROPLASTY Left 2001    TONSILLECTOMY      TUMOR EXCISION      esophageal tumor removal     UPPER GASTROINTESTINAL ENDOSCOPY      2013       Review of patient's allergies indicates:   Allergen Reactions    Aspirin " Nausea Only and Other (See Comments)     Other reaction(s): esophageal pain    Celebrex [celecoxib] Rash    Morphine Hallucinations    Steroid [corticosteroids (glucocorticoids)] Other (See Comments)     Other reaction(s): Flushing (skin)    Sulfa dyne Other (See Comments)     Other reaction(s): esophogeal pain       No current facility-administered medications on file prior to encounter.      Current Outpatient Medications on File Prior to Encounter   Medication Sig    acetaminophen (TYLENOL) 650 MG TbSR Take 1 tablet (650 mg total) by mouth every 6 (six) hours as needed (last dose may be left at bedside for patient to take at night).    atorvastatin (LIPITOR) 20 MG tablet Take 1 tablet (20 mg total) by mouth once daily.    benazepril (LOTENSIN) 20 MG tablet TAKE 1 TABLET(20 MG) BY MOUTH EVERY DAY    donepezil (ARICEPT) 5 MG tablet Take 1 tablet (5 mg total) by mouth every evening.    escitalopram oxalate (LEXAPRO) 20 MG tablet Take 1/2 pill nightly for a week and then 1 pill nightly onwards.    fentaNYL (DURAGESIC) 12 mcg/hr PT72 Place 1 patch onto the skin every 72 hours.    furosemide (LASIX) 40 MG tablet Take 1 tablet (40 mg total) by mouth once daily.    melatonin 5 mg Tab Take 1 tablet by mouth every evening.     metoprolol succinate (TOPROL-XL) 100 MG 24 hr tablet Take 1 tablet (100 mg total) by mouth once daily.    MULTIVITAMIN W-MINERALS/LUTEIN (CENTRUM SILVER ORAL) Take by mouth once daily.    nystatin (MYCOSTATIN) powder Apply topically 2 (two) times daily. To groin area    omeprazole (PRILOSEC) 20 MG capsule Take 1 capsule (20 mg total) by mouth once daily.    traMADol (ULTRAM) 50 mg tablet Take 1 tablet (50 mg total) by mouth As instructed for Pain. Take one tablets twice per day, and 1 tablet at bedtime as needed.    lidocaine-prilocaine (EMLA) cream Apply topically as needed. Apply to painful areas 3 times per day as needed    mupirocin (BACTROBAN) 2 % ointment Apply topically 2  (two) times daily.    nystatin (MYCOSTATIN) powder Apply topically 4 (four) times daily.     Family History     Problem Relation (Age of Onset)    Asthma Father    Cancer Brother    Heart disease Mother, Father, Brother        Tobacco Use    Smoking status: Never Smoker    Smokeless tobacco: Never Used   Substance and Sexual Activity    Alcohol use: No    Drug use: Never     Types: Hydrocodone    Sexual activity: Not Currently     Birth control/protection: None     Review of Systems   Constitutional: Negative for appetite change, chills, diaphoresis and fatigue.   HENT: Negative for congestion, postnasal drip, rhinorrhea and sore throat.    Eyes: Negative for photophobia, pain and redness.   Respiratory: Negative for cough, choking and shortness of breath.    Cardiovascular: Positive for chest pain. Negative for palpitations and leg swelling.   Gastrointestinal: Positive for diarrhea. Negative for abdominal pain, constipation, nausea and vomiting.   Genitourinary: Negative for dysuria, flank pain, frequency and urgency.   Musculoskeletal: Positive for arthralgias. Negative for back pain, myalgias and neck pain.   Skin: Negative for color change, rash and wound.   Neurological: Negative for dizziness, syncope, weakness, numbness and headaches.   Psychiatric/Behavioral: Negative for confusion, decreased concentration and sleep disturbance. The patient is not nervous/anxious.      Objective:     Vital Signs (Most Recent):  Temp: 98.3 °F (36.8 °C) (05/08/19 2128)  Pulse: 76 (05/08/19 2146)  Resp: (!) 21 (05/08/19 2131)  BP: (!) 158/68 (05/08/19 2146)  SpO2: 96 % (05/08/19 2146) Vital Signs (24h Range):  Temp:  [98.1 °F (36.7 °C)-98.3 °F (36.8 °C)] 98.3 °F (36.8 °C)  Pulse:  [72-82] 76  Resp:  [16-23] 21  SpO2:  [96 %-97 %] 96 %  BP: (158-217)/(68-88) 158/68     Weight: 86.2 kg (190 lb)  Body mass index is 32.61 kg/m².    Physical Exam   Constitutional: She is oriented to person, place, and time. She appears  well-developed and well-nourished. No distress.   HENT:   Head: Normocephalic and atraumatic.   Right Ear: External ear normal.   Left Ear: External ear normal.   Eyes: Conjunctivae and EOM are normal.   Neck: Normal range of motion. Neck supple. No JVD present. No tracheal deviation present.   Cardiovascular: Normal rate, regular rhythm and intact distal pulses.   Murmur (Grade 2/6 systolic murmur) heard.  Pulmonary/Chest: Effort normal and breath sounds normal. No respiratory distress. She has no wheezes.   Abdominal: Soft. Bowel sounds are normal. There is no tenderness. There is no guarding.   Musculoskeletal: She exhibits edema (1+ bilateral LE edema with TTP) and tenderness.   Neurological: She is alert and oriented to person, place, and time.   Skin: Skin is warm and dry. She is not diaphoretic.   Psychiatric: She has a normal mood and affect. Her behavior is normal. Judgment and thought content normal.         CRANIAL NERVES     CN III, IV, VI   Extraocular motions are normal.        Significant Labs:   BMP:   Recent Labs   Lab 05/08/19  1820         K 3.7      CO2 28   BUN 14   CREATININE 0.8   CALCIUM 9.1     CBC:   Recent Labs   Lab 05/08/19  1820   WBC 8.46   HGB 14.2   HCT 45.0        Troponin:   Recent Labs   Lab 05/08/19  1820   TROPONINI 0.119*  0.106*     All pertinent labs within the past 24 hours have been reviewed.    Significant Imaging: I have reviewed all pertinent imaging results/findings within the past 24 hours.    Assessment/Plan:     * Chest pain  Patient admitted with chest pain on exertion while using her wheelchair at the nursing home. Her chest pain resolved prior to presentation to the ED.  - EKG without ischemic changes  - troponin 0.119->0.09, continue to trend  - cardiology consulted, recs appreciated:  Atypical features however w/ positive troponin (known to have chronic trop elevation however no w/u in past), thus would treat for NSTEMI for now and  follow troponin.  - echo to eval cardiac function  - continue medical therapy  - start heparin gtt and monitor clinically for bleeding  - should consider noninvasive ischemic w/u (if trop elevation plateaus) v. LHC if trop follows 4th universal definition of MI trop pattern   - NPO at MN    Essential hypertension  - elevated in ED, most recent 190/81 mmHg  - goal < 140  - continue Lotensin 20 mg qd    Bilateral edema of lower extremity  May be related to diastolic dysfunction v. Venous insufficiency.  - check echo as above  - may benefit from compression stockings  - continue Lasix 40 mg qd, Toprol  mg qd    Aortic stenosis  Murmur noted on exam, mild to moderate on last echo in 2016.  No clinical signs of severe AS.  - repeat echo as above    Hyperlipidemia  - continue Lipitor 20 mg qd    Arthritis  - home meds: Tylenol 650 mg q 6 hrs prn for pain, Fentanyl 12 mcg/hr patch (changed q 72 hours),  - PRNs    Depressed mood  - continue Lexapro 20 mg every night, Aricept 5 mg every night, Melatonin 5 mg every night    GERD (gastroesophageal reflux disease)  - continue Prilosec 20 mg qd    VTE Risk Mitigation (From admission, onward)        Ordered     heparin 25,000 units in dextrose 5% (100 units/ml) IV bolus from bag INITIAL BOLUS (max bolus 4000 units)  Once      05/08/19 2211     heparin 25,000 units in dextrose 5% 250 mL (100 units/mL) infusion LOW INTENSITY nomogram - [facility]  Continuous      05/08/19 2211     heparin 25,000 units in dextrose 5% (100 units/ml) IV bolus from bag - ADDITIONAL PRN BOLUS - 60 units/kg (max bolus 4000 units)  As needed (PRN)      05/08/19 2211     heparin 25,000 units in dextrose 5% (100 units/ml) IV bolus from bag - ADDITIONAL PRN BOLUS - 30 units/kg (max bolus 4000 units)  As needed (PRN)      05/08/19 2211             Irene Patten PA-C  Department of Hospital Medicine   [facility]

## 2019-05-09 NOTE — ED NOTES
Telemetry Verification   Patient placed on Telemetry Box  Verified with War Room  Box # [id number]   Monitor Tech    Rate    Rhythm

## 2019-05-09 NOTE — ED NOTES
"Lilia Hidalgo, a 87 y.o. female presents to the ED with CC left sided chest pain. Pt states she was rolling herself back to room at nursing home when she experienced "terriblly sharp" epigastric pain. Denies radiation of CP. Denies SOB. Pt received aspirin via EMS. Denies CP upon arrival to ED. Pt with PMH CHF and GERD.    Patient identifiers verified verbally with patient and correct for Lilia Hidalgo.    LOC/ APPEARANCE: The patient is AAOx4. Pt is speaking appropriately, no slurred speech. Pt changed into hospital gown. Continuous cardiac monitor, cont pulse ox, and auto BP cuff applied to patient. Pt is clean and well groomed. No JVD visible. Pt reports pain level of 0. Pt updated on POC. Bed low and locked with side rails up x2, call bell in pt reach. Pt's daughter at bedside.    SKIN: Skin is warm dry and intact, and color is consistent with ethnicity. Capillary refill <3 seconds. No breakdown or brusing visible. Mucus membranes moist, acyanotic.    RESPIRATORY: Airway is open and patent. Respirations-spontaneous, unlabored, regular rate, equal bilaterally on inspiration and expiration. No accessory muscle use noted. Lungs clear to auscultation in all fields bilaterally anterior and posterior.     CARDIAC: Patient has regular heart rate.  Dependent edema noted to BLE. Pt states "that's normal swelling for me." Patient has no c/o chest pain. Peripheral pulses present equal and strong throughout.    ABDOMEN: Soft and non-tender to palpation with no distention noted. Normoactive bowel sounds x4 quadrants. Pt reports several loose stools last night and this AM. Pt reports normal appetite.     NEUROLOGIC: Eyes open spontaneously and facial expression symmetrical. Pt behavior appropriate to situation, and pt follows commands. Pt reports sensation present in all extremities when touched with a finger, denies any numbness or tingling. PERRLA.    MUSCULOSKELETAL: Spontaneous movement noted to all extremities. Hand " " equal and leg strength strong +5 bilaterally. Pt with generalized weakness; pt states she normally ambulates via wheelchair.    : No complaints of frequency, burning, urgency or blood in the urine. Pt states "Sometimes I don't know when I have to pee, but sometimes I do."  "

## 2019-05-09 NOTE — NURSING
Patient discharged per MD ordered. Peripheral IV and telemetry removed. Daughter Belkys notified of discharge and transportation set up by SHAREE. Patient acknowledges she is being discharged and is in agreement. Will continue to monitor.

## 2019-05-09 NOTE — ASSESSMENT & PLAN NOTE
- home meds: Tylenol 650 mg q 6 hrs prn for pain, Fentanyl 12 mcg/hr patch (changed q 72 hours),  - PRNs

## 2019-05-09 NOTE — SUBJECTIVE & OBJECTIVE
"Past Medical History:   Diagnosis Date    Aortic stenosis 6/17/2015    Arthritis     Atrophic vaginitis 2/18/2016    Bilateral edema of lower extremity 6/22/2016    CHF (congestive heart failure)     Cholelithiasis without cholecystitis 5/5/2017    Chronic pain of left knee 8/3/2017    Depressed mood 6/22/2016    Diverticulitis of large intestine without perforation or abscess without bleeding 5/5/2017    Encounter for blood transfusion     Essential hypertension     GERD (gastroesophageal reflux disease)     Hyperlipidemia     Hypertension     Hypertensive heart disease with heart failure 7/14/2015    Insomnia     Joint pain     Knee pain, left 08/2016    pain with walking or standing    Primary osteoarthritis of knee 2/5/2013    PUD (peptic ulcer disease)     S/P knee replacement 2/13/2013    Slow transit constipation 2/18/2016       Past Surgical History:   Procedure Laterality Date    APPENDECTOMY      ARTHROPLASTY, KNEE, TOTAL Left 2/6/2013    Performed by [facility] at [facility] OR 2ND FLR    COLONOSCOPY      08    diagnostic block of  the genicular branches to the left knee Left 8/3/2017    Performed by Steve [facility] at [facility]    EGD (ESOPHAGOGASTRODUODENOSCOPY) N/A 1/6/2014    Performed by Madan Beltran MD at [facility] ENDO (4TH FLR)    EGD (ESOPHAGOGASTRODUODENOSCOPY) N/A 1/28/2013    Performed by Yuniel Montana MD at [facility] ENDO (2ND FLR)    EYE SURGERY      bilateral cataract    FINGER SURGERY      LT thumb surgery--"arthritis surgery"    HYSTERECTOMY      UNKNOWN BSO    JOINT REPLACEMENT      right hip replacement    KNEE ARTHROPLASTY Left 2001    TONSILLECTOMY      TUMOR EXCISION      esophageal tumor removal     UPPER GASTROINTESTINAL ENDOSCOPY      2013       Review of patient's allergies indicates:   Allergen Reactions    Aspirin Nausea Only and Other (See Comments)     Other reaction(s): esophageal pain    Celebrex [celecoxib] Rash    Morphine " Hallucinations    Steroid [corticosteroids (glucocorticoids)] Other (See Comments)     Other reaction(s): Flushing (skin)    Sulfa dyne Other (See Comments)     Other reaction(s): esophogeal pain       No current facility-administered medications on file prior to encounter.      Current Outpatient Medications on File Prior to Encounter   Medication Sig    acetaminophen (TYLENOL) 650 MG TbSR Take 1 tablet (650 mg total) by mouth every 6 (six) hours as needed (last dose may be left at bedside for patient to take at night).    atorvastatin (LIPITOR) 20 MG tablet Take 1 tablet (20 mg total) by mouth once daily.    benazepril (LOTENSIN) 20 MG tablet TAKE 1 TABLET(20 MG) BY MOUTH EVERY DAY    donepezil (ARICEPT) 5 MG tablet Take 1 tablet (5 mg total) by mouth every evening.    escitalopram oxalate (LEXAPRO) 20 MG tablet Take 1/2 pill nightly for a week and then 1 pill nightly onwards.    fentaNYL (DURAGESIC) 12 mcg/hr PT72 Place 1 patch onto the skin every 72 hours.    furosemide (LASIX) 40 MG tablet Take 1 tablet (40 mg total) by mouth once daily.    melatonin 5 mg Tab Take 1 tablet by mouth every evening.     metoprolol succinate (TOPROL-XL) 100 MG 24 hr tablet Take 1 tablet (100 mg total) by mouth once daily.    MULTIVITAMIN W-MINERALS/LUTEIN (CENTRUM SILVER ORAL) Take by mouth once daily.    nystatin (MYCOSTATIN) powder Apply topically 2 (two) times daily. To groin area    omeprazole (PRILOSEC) 20 MG capsule Take 1 capsule (20 mg total) by mouth once daily.    traMADol (ULTRAM) 50 mg tablet Take 1 tablet (50 mg total) by mouth As instructed for Pain. Take one tablets twice per day, and 1 tablet at bedtime as needed.    lidocaine-prilocaine (EMLA) cream Apply topically as needed. Apply to painful areas 3 times per day as needed    mupirocin (BACTROBAN) 2 % ointment Apply topically 2 (two) times daily.    nystatin (MYCOSTATIN) powder Apply topically 4 (four) times daily.     Family History     Problem  Relation (Age of Onset)    Asthma Father    Cancer Brother    Heart disease Mother, Father, Brother        Tobacco Use    Smoking status: Never Smoker    Smokeless tobacco: Never Used   Substance and Sexual Activity    Alcohol use: No    Drug use: Never     Types: Hydrocodone    Sexual activity: Not Currently     Birth control/protection: None     Review of Systems   Constitutional: Negative for appetite change, chills, diaphoresis and fatigue.   HENT: Negative for congestion, postnasal drip, rhinorrhea and sore throat.    Eyes: Negative for photophobia, pain and redness.   Respiratory: Negative for cough, choking and shortness of breath.    Cardiovascular: Positive for chest pain. Negative for palpitations and leg swelling.   Gastrointestinal: Positive for diarrhea. Negative for abdominal pain, constipation, nausea and vomiting.   Genitourinary: Negative for dysuria, flank pain, frequency and urgency.   Musculoskeletal: Positive for arthralgias. Negative for back pain, myalgias and neck pain.   Skin: Negative for color change, rash and wound.   Neurological: Negative for dizziness, syncope, weakness, numbness and headaches.   Psychiatric/Behavioral: Negative for confusion, decreased concentration and sleep disturbance. The patient is not nervous/anxious.      Objective:     Vital Signs (Most Recent):  Temp: 98.3 °F (36.8 °C) (05/08/19 2128)  Pulse: 76 (05/08/19 2146)  Resp: (!) 21 (05/08/19 2131)  BP: (!) 158/68 (05/08/19 2146)  SpO2: 96 % (05/08/19 2146) Vital Signs (24h Range):  Temp:  [98.1 °F (36.7 °C)-98.3 °F (36.8 °C)] 98.3 °F (36.8 °C)  Pulse:  [72-82] 76  Resp:  [16-23] 21  SpO2:  [96 %-97 %] 96 %  BP: (158-217)/(68-88) 158/68     Weight: 86.2 kg (190 lb)  Body mass index is 32.61 kg/m².    Physical Exam   Constitutional: She is oriented to person, place, and time. She appears well-developed and well-nourished. No distress.   HENT:   Head: Normocephalic and atraumatic.   Right Ear: External ear normal.    Left Ear: External ear normal.   Eyes: Conjunctivae and EOM are normal.   Neck: Normal range of motion. Neck supple. No JVD present. No tracheal deviation present.   Cardiovascular: Normal rate, regular rhythm and intact distal pulses.   Murmur (Grade 2/6 systolic murmur) heard.  Pulmonary/Chest: Effort normal and breath sounds normal. No respiratory distress. She has no wheezes.   Abdominal: Soft. Bowel sounds are normal. There is no tenderness. There is no guarding.   Musculoskeletal: She exhibits edema (1+ bilateral LE edema with TTP) and tenderness.   Neurological: She is alert and oriented to person, place, and time.   Skin: Skin is warm and dry. She is not diaphoretic.   Psychiatric: She has a normal mood and affect. Her behavior is normal. Judgment and thought content normal.         CRANIAL NERVES     CN III, IV, VI   Extraocular motions are normal.        Significant Labs:   BMP:   Recent Labs   Lab 05/08/19  1820         K 3.7      CO2 28   BUN 14   CREATININE 0.8   CALCIUM 9.1     CBC:   Recent Labs   Lab 05/08/19  1820   WBC 8.46   HGB 14.2   HCT 45.0        Troponin:   Recent Labs   Lab 05/08/19  1820   TROPONINI 0.119*  0.106*     All pertinent labs within the past 24 hours have been reviewed.    Significant Imaging: I have reviewed all pertinent imaging results/findings within the past 24 hours.

## 2019-05-09 NOTE — PLAN OF CARE
SW sent referral to [facility] for return to facility. SW to schedule transport once report info is given.    3:08pm SW called [facility] to follow up on transfer back. SHAREE informed by admission that charge is currently under review and will follow up.

## 2019-05-10 ENCOUNTER — TELEPHONE (OUTPATIENT)
Dept: INTERNAL MEDICINE | Facility: CLINIC | Age: 84
End: 2019-05-10

## 2019-05-10 NOTE — DISCHARGE SUMMARY
DISCHARGE SUMMARY  Hospital Medicine    Team: [facility]    Patient Name: Lilia Hidalgo  YOB: 1931    Admit Date: 5/8/2019    Discharge Date: 05/09/2019     Discharge Attending Physician: Robin Biggs    Resident on Service: Robin Biggs    Chief Complaint: Chest Pain    Princilpal Diagnoses:  Active Hospital Problems    Diagnosis  POA    *Atypical chest pain [R07.89]  Yes    Hypertensive urgency [I16.0]  Yes    Bilateral edema of lower extremity [R60.0]  Yes    Depressed mood [F32.9]  Yes    Aortic stenosis [I35.0]  Yes    Essential hypertension [I10]  Yes    GERD (gastroesophageal reflux disease) [K21.9]  Yes    Arthritis [M19.90]  Yes    Hyperlipidemia [E78.5]  Yes      Resolved Hospital Problems   No resolved problems to display.       Discharged Condition: Admit problems have stabilized      HOSPITAL COURSE:      Initial Presentation:  Ms. Hidalgo is a 88 y/o F with PMHx of HTN, GERD, arthritis, and PUD being admitted to medicine for chest pain. She presented to the ED via EMS complaining of chest pain that began around 3:00 PM and lasted 1 hour. EMS administered ASA prior to arrival. Patient lives in assisted living facility in [facility]. She had just finished playing bingo and was wheeling herself back to her apartment in her wheelchair when she started to experience severe sharp band-like CP 9/10 without radiation. Pain was exacerbated by exertion (use of wheelchair) and lessened with cessation of exertion. She denies prior hx of similar CP symptoms. She is now CP free. She also admits to diarrhea (baseline) and bilateral LE edema. Denies N/V, SOB, ABD pain, back pain, headache, fever, chills, urinary symptoms. Of note, she is found to have a chronically elevated troponin however has not had an ischemic w/u in the past or been evaluated by a cardiologist.  She has had an echo which notes normal LVEF and mild to mod AS.     Patient reports compliance with her medications, and  states she has taken her BP medications today. Patient avoids heavily salted foods. She uses her wheelchair for the majority 2/2 arthritic pain.      In the ED, vitals notable for /86. Labs notable for troponin 0.119 (near patient's baseline), , , , Alk Phos 157, T-bili 1.3. CXR unremarkable. EKG NSR, no ST elevation or depression.      Course of Principle Problem for Admission:  Ms. Hidalgo was admitted to hospital medicine due to atyical chest pain and elevated troponin from her baseline with concern for NSTEMI. EKG with no ischemic changes, cardiology was consulted and patient was started on heparin gtt as well as high intensity statin and plavix. Upon further evaluation by cardiology and primary team in the morning, patient remained chest pain free during her entire hospital admission and had some chest wall pain reproducible on palpation. Repeat echocardiogram showed mild aortic stenosis, normal EF and no wall motion abnormalities. Given reproducibility of pain on palpation and hypertensive urgency, it was deemed that troponemia was secondary to that and not ACS so heparin and plavix was discontinued. Benazapril was increased from 20 mg daily to 40 mg daily for better blood pressure control, otherwise patient was discharged on her same medications (statin, BB, lasix) and counseled on a low salt diet. She was also discharged with PRN nitroglycerin for any further episodes of chest pain.     Other Medical Problems Addressed in the Hospital:    Hypertensive Urgency  - elevated in ED, SBP as high at 204  - goal < 140  - increased benazapril from 20 daily to 40 daily  - elevated BP could be the reason troponin doubled from baseline, will send message to PCP Dr. Herrera about HTN medication change     Bilateral edema of lower extremity  May be related to diastolic dysfunction v. Venous insufficiency.  - continue Lasix 40 mg qd, Toprol  mg qd     Aortic stenosis  Murmur noted on exam, mild to  moderate on last echo in 2016.  No clinical signs of severe AS.     Hyperlipidemia  - continue Lipitor 20 mg qd     Arthritis  - home meds: Tylenol 650 mg q 6 hrs prn for pain, Fentanyl 12 mcg/hr patch   -  Follow up with Dr. Ugalde in two weeks     Depressed mood  - continue Lexapro 20 mg every night, Aricept 5 mg every night, Melatonin 5 mg every night     GERD (gastroesophageal reflux disease)  - continue Prilosec 20 mg qd        CONSULTS: Cardiology    Vitals:    05/09/19 1516 05/09/19 1526 05/09/19 1600 05/09/19 1945   BP: 139/63   (!) 153/70   BP Location:       Patient Position: Sitting      Pulse: 77 83 82 82   Resp: 18   18   Temp: 98.7 °F (37.1 °C)   98 °F (36.7 °C)   TempSrc: Oral   Oral   SpO2: (!) 94%   96%   Weight:       Height:         Physical Exam  Patient seen & examined on day of discharge in stable condition.     Last CBC/BMP/HgbA1c (if applicable):  Recent Results (from the past 336 hour(s))   CBC auto differential    Collection Time: 05/09/19  4:08 AM   Result Value Ref Range    WBC 7.08 3.90 - 12.70 K/uL    Hemoglobin 14.4 12.0 - 16.0 g/dL    Hematocrit 44.2 37.0 - 48.5 %    Platelets 211 150 - 350 K/uL   CBC auto differential    Collection Time: 05/08/19 11:36 PM   Result Value Ref Range    WBC 5.27 3.90 - 12.70 K/uL    Hemoglobin 13.1 12.0 - 16.0 g/dL    Hematocrit 38.5 37.0 - 48.5 %    Platelets 171 150 - 350 K/uL   CBC auto differential    Collection Time: 05/08/19  6:20 PM   Result Value Ref Range    WBC 8.46 3.90 - 12.70 K/uL    Hemoglobin 14.2 12.0 - 16.0 g/dL    Hematocrit 45.0 37.0 - 48.5 %    Platelets 197 150 - 350 K/uL     Recent Results (from the past 336 hour(s))   Basic metabolic panel    Collection Time: 05/09/19  4:08 AM   Result Value Ref Range    Sodium 141 136 - 145 mmol/L    Potassium 3.5 3.5 - 5.1 mmol/L    Chloride 102 95 - 110 mmol/L    CO2 30 (H) 23 - 29 mmol/L    BUN, Bld 11 8 - 23 mg/dL    Creatinine 0.7 0.5 - 1.4 mg/dL    Calcium 9.3 8.7 - 10.5 mg/dL    Anion Gap  9 8 - 16 mmol/L     No results found for: HGBA1C    Other Pertinent Lab Findings:  None    Pertinent/Significant Diagnostic Studies:    2D Echo 5/9/19  · Normal left ventricular systolic function. The estimated ejection fraction is 65%  · Concentric left ventricular remodeling.  · No wall motion abnormalities.  · Indeterminate left ventricular diastolic function.  · Normal right ventricular systolic function.  · Mild left atrial enlargement.  · Moderate aortic valve stenosis.  · Aortic valve area is 1.35 cm2; peak velocity is 3.2 m/s; mean gradient is 23 mmHg.  · Normal central venous pressure (3 mm Hg).    Special Treatments/Procedures: None    Disposition:    SNF      Future Scheduled Appointments:  Future Appointments   Date Time Provider Department Center   5/20/2019  3:40 PM Girish Ugalde MD [facility]       Follow-up Plans from This Hospitalization:  Follow Up with PCP Dr. April Herrera    Discharge Medication List:     Lilia Hidalgo   Home Medication Instructions TIM:[id number]    Printed on:05/09/19 2122   Medication Information                      acetaminophen (TYLENOL) 650 MG TbSR  Take 1 tablet (650 mg total) by mouth every 6 (six) hours as needed (last dose may be left at bedside for patient to take at night).             atorvastatin (LIPITOR) 20 MG tablet  Take 1 tablet (20 mg total) by mouth once daily.             benazepril (LOTENSIN) 20 MG tablet  TAKE 2 TABLETS(40 MG) BY MOUTH EVERY DAY             donepezil (ARICEPT) 5 MG tablet  Take 1 tablet (5 mg total) by mouth every evening.             escitalopram oxalate (LEXAPRO) 20 MG tablet  Take 1/2 pill nightly for a week and then 1 pill nightly onwards.             fentaNYL (DURAGESIC) 12 mcg/hr PT72  Place 1 patch onto the skin every 72 hours.             furosemide (LASIX) 40 MG tablet  Take 1 tablet (40 mg total) by mouth once daily.             lidocaine-prilocaine (EMLA) cream  Apply topically as needed. Apply to painful areas  3 times per day as needed             melatonin 5 mg Tab  Take 1 tablet by mouth every evening.              metoprolol succinate (TOPROL-XL) 100 MG 24 hr tablet  Take 1 tablet (100 mg total) by mouth once daily.             MULTIVITAMIN W-MINERALS/LUTEIN (CENTRUM SILVER ORAL)  Take by mouth once daily.             mupirocin (BACTROBAN) 2 % ointment  Apply topically 2 (two) times daily.             nitroGLYCERIN (NITROSTAT) 0.4 MG SL tablet  Place 1 tablet (0.4 mg total) under the tongue every 5 (five) minutes as needed for Chest pain (do not take more than 3 tablets in a row).             nystatin (MYCOSTATIN) powder  Apply topically 2 (two) times daily. To groin area             omeprazole (PRILOSEC) 20 MG capsule  Take 1 capsule (20 mg total) by mouth once daily.             traMADol (ULTRAM) 50 mg tablet  Take 1 tablet (50 mg total) by mouth As instructed for Pain. Take one tablets twice per day, and 1 tablet at bedtime as needed.                 Patient Instructions:  Discharge Procedure Orders   Diet Cardiac   Order Comments: Low sodium diet     Activity as tolerated       At the time of discharge patient was told to take all medications as prescribed, to keep all followup appointments, and to call their primary care physician or return to the emergency room if they have any worsening or concerning symptoms.    Signing Physician:  Robin Biggs MD

## 2019-05-15 DIAGNOSIS — M79.89 SWELLING OF LOWER EXTREMITY: ICD-10-CM

## 2019-05-15 DIAGNOSIS — I11.0 HYPERTENSIVE HEART DISEASE WITH HEART FAILURE: ICD-10-CM

## 2019-05-15 DIAGNOSIS — I50.32 HEART FAILURE, DIASTOLIC, CHRONIC: ICD-10-CM

## 2019-05-15 DIAGNOSIS — Z09 HOSPITAL DISCHARGE FOLLOW-UP: ICD-10-CM

## 2019-05-15 RX ORDER — FUROSEMIDE 40 MG/1
TABLET ORAL
Qty: 30 TABLET | Refills: 11 | Status: SHIPPED | OUTPATIENT
Start: 2019-05-15 | End: 2019-11-20 | Stop reason: SDUPTHER

## 2019-05-20 ENCOUNTER — OFFICE VISIT (OUTPATIENT)
Dept: PHYSICAL MEDICINE AND REHAB | Facility: CLINIC | Age: 84
End: 2019-05-20
Payer: MEDICARE

## 2019-05-20 VITALS
HEIGHT: 65 IN | HEART RATE: 66 BPM | SYSTOLIC BLOOD PRESSURE: 156 MMHG | DIASTOLIC BLOOD PRESSURE: 71 MMHG | BODY MASS INDEX: 33.78 KG/M2

## 2019-05-20 DIAGNOSIS — M25.552 LEFT HIP PAIN: ICD-10-CM

## 2019-05-20 DIAGNOSIS — G89.29 CHRONIC RIGHT SHOULDER PAIN: ICD-10-CM

## 2019-05-20 DIAGNOSIS — M16.12 PRIMARY OSTEOARTHRITIS OF LEFT HIP: ICD-10-CM

## 2019-05-20 DIAGNOSIS — Z96.652 H/O TOTAL KNEE REPLACEMENT, LEFT: ICD-10-CM

## 2019-05-20 DIAGNOSIS — M47.816 SPONDYLOSIS OF LUMBAR REGION WITHOUT MYELOPATHY OR RADICULOPATHY: ICD-10-CM

## 2019-05-20 DIAGNOSIS — G89.29 CHRONIC PAIN OF LEFT KNEE: Primary | ICD-10-CM

## 2019-05-20 DIAGNOSIS — M25.511 CHRONIC RIGHT SHOULDER PAIN: ICD-10-CM

## 2019-05-20 DIAGNOSIS — M25.562 CHRONIC PAIN OF LEFT KNEE: Primary | ICD-10-CM

## 2019-05-20 PROCEDURE — 1101F PT FALLS ASSESS-DOCD LE1/YR: CPT | Mod: HCNC,CPTII,S$GLB, | Performed by: PHYSICAL MEDICINE & REHABILITATION

## 2019-05-20 PROCEDURE — 99214 PR OFFICE/OUTPT VISIT, EST, LEVL IV, 30-39 MIN: ICD-10-PCS | Mod: HCNC,S$GLB,, | Performed by: PHYSICAL MEDICINE & REHABILITATION

## 2019-05-20 PROCEDURE — 99999 PR PBB SHADOW E&M-EST. PATIENT-LVL III: ICD-10-PCS | Mod: PBBFAC,HCNC,, | Performed by: PHYSICAL MEDICINE & REHABILITATION

## 2019-05-20 PROCEDURE — 1101F PR PT FALLS ASSESS DOC 0-1 FALLS W/OUT INJ PAST YR: ICD-10-PCS | Mod: HCNC,CPTII,S$GLB, | Performed by: PHYSICAL MEDICINE & REHABILITATION

## 2019-05-20 PROCEDURE — 99214 OFFICE O/P EST MOD 30 MIN: CPT | Mod: HCNC,S$GLB,, | Performed by: PHYSICAL MEDICINE & REHABILITATION

## 2019-05-20 PROCEDURE — 99999 PR PBB SHADOW E&M-EST. PATIENT-LVL III: CPT | Mod: PBBFAC,HCNC,, | Performed by: PHYSICAL MEDICINE & REHABILITATION

## 2019-05-20 NOTE — PROGRESS NOTES
Subjective:       Patient ID: Lilia Hidalgo is a 87 y.o. female.    Chief Complaint: No chief complaint on file.    HPI     HISTORY OF PRESENT ILLNESS:  Ms. Hidalgo is an 87-year-old white female with past   medical history of hypertension, depression, remote peptic ulcer, septic   encephalopathy in 2017, dementia, osteoarthritis and debility.  She is [facility]   [facility] for chronic persistent left knee pain after   total knee arthroplasty and for left hip pain due to osteoarthritis.  Her last   visit to the clinic was on 02/04/2019.  She was maintained on fentanyl patches,   p.r.n.  Tylenol and p.r.n. tramadol.    The patient is known to the clinic for followup.  She is accompanied by her   daughter who is helping with the history.  The patient continues to reside at   [facility] where she gets her medications.  She reports   that her left hip pain has been slightly worse.  It is a constant aching pain.    It is aggravated by standing or walking with a walker.  Her maximum pain is   5-6/10 and minimum 3-4/10.  Today, it is 3-4/10.    She continues to complain of left knee pain.  It is an almost constant aching   pain.  It is aggravated by weightbearing.  Her maximum pain is 5-6/10 and   minimum 3-4/10.  Today, it is 3-4/10.  The patient used to have intermittent low   back pain, but this has not been a problem since last visit.    She is currently getting fentanyl patch 12 mcg per hour every three days.  She   takes Tylenol 650 mg p.r.n. for mild pain, usually four times per day and   tramadol 50 mg p.r.n. for moderate pain, usually three times per day.      MS/HN  dd: 05/20/2019 15:56:14 (CDT)  td: 05/21/2019 08:51:38 (CDT)  Doc ID   #[id number]  Job ID #[id number]    CC:               Review of Systems   Constitutional: Positive for fatigue.   Eyes: Negative for visual disturbance.   Respiratory: Negative for shortness of breath.    Cardiovascular: Negative for chest  pain.   Gastrointestinal: Negative for constipation, nausea and vomiting.   Genitourinary: Positive for difficulty urinating.   Musculoskeletal: Positive for gait problem and joint swelling. Negative for arthralgias, back pain and neck pain.   Neurological: Negative for dizziness and headaches.   Psychiatric/Behavioral: Negative for behavioral problems and sleep disturbance.       Objective:      Physical Exam   Constitutional: She appears well-developed and well-nourished.   Coming to the clinic in a transport propelled by aide.     HENT:   Head: Normocephalic and atraumatic.   Neck: Normal range of motion.   Musculoskeletal:   BUE:  ROM:full.  Strength:    RUE: 3/5 at shoulder abduction, 4 elbow flexion, 4 elbow extension, 4 hand .   LUE: 4/5 at shoulder abduction, 4 elbow flexion, 4 elbow extension, 4 hand .      BLE:  ROM: decreased at knees.  Healed Lt TKA scar.  +ve Rt knee crepitus.   Strength:    RLE: 4/5 at hip flexion, 5- knee extension, 5- ankle DF, 5- PF.   LLE: 3/5 at hip flexion, 3+ knee extension, 4 ankle DF, 4 PF.  Sensation to pinprick:     RLE: intact.      LLE: intact.   SLR (sitting):      RLE: -ve.      LLE: -ve.            Neurological: She is alert.   Needs occasional visual cues to follow simple commands.   Skin: Skin is warm.   Psychiatric: She has a normal mood and affect. Her behavior is normal.   Vitals reviewed.          Assessment:       1. Chronic pain of left knee    2. H/O total knee replacement, left    3. Primary osteoarthritis of left hip    4. Left hip pain    5. Spondylosis of lumbar region without myelopathy or radiculopathy    6. Chronic right shoulder pain        Plan:       - Continue acetaminophen (TYLENOL) 500 MG tablet; Take 1 tablet (500 mg total) by mouth every 6 (six) hours as needed (Mild pain).  - Continue fentaNYL (DURAGESIC) 12 mcg/hr PT72; Place 1 patch onto the skin every 72 hours.  - Continue traMADol (ULTRAM) 50 mg tablet; Take 1 tablet (50 mg total) by  mouth tid prn.  -  Follow up in about 4 months (around 9/20/2019).    This was a 25 minute visit, more than 50% of which was spent counseling the patient about the diagnosis and the treatment plan.

## 2019-05-24 RX ORDER — ATORVASTATIN CALCIUM 20 MG/1
TABLET, FILM COATED ORAL
Qty: 30 TABLET | Refills: 11 | Status: SHIPPED | OUTPATIENT
Start: 2019-05-24 | End: 2019-09-21 | Stop reason: SDUPTHER

## 2019-05-27 ENCOUNTER — HOSPITAL ENCOUNTER (INPATIENT)
Facility: HOSPITAL | Age: 84
LOS: 9 days | Discharge: NURSING, REHAB OR LONG-TERM CARE | DRG: 092 | End: 2019-06-05
Attending: EMERGENCY MEDICINE | Admitting: HOSPITALIST
Payer: MEDICARE

## 2019-05-27 DIAGNOSIS — K44.9 HIATAL HERNIA: ICD-10-CM

## 2019-05-27 DIAGNOSIS — R41.82 ALTERED MENTAL STATUS: ICD-10-CM

## 2019-05-27 DIAGNOSIS — M25.552 LEFT HIP PAIN: ICD-10-CM

## 2019-05-27 DIAGNOSIS — G93.41 ENCEPHALOPATHY, METABOLIC: ICD-10-CM

## 2019-05-27 DIAGNOSIS — K21.9 GASTROESOPHAGEAL REFLUX DISEASE, ESOPHAGITIS PRESENCE NOT SPECIFIED: ICD-10-CM

## 2019-05-27 DIAGNOSIS — G89.29 CHRONIC PAIN OF LEFT KNEE: ICD-10-CM

## 2019-05-27 DIAGNOSIS — R50.9 FEVER, UNSPECIFIED FEVER CAUSE: Primary | ICD-10-CM

## 2019-05-27 DIAGNOSIS — M25.562 CHRONIC PAIN OF LEFT KNEE: ICD-10-CM

## 2019-05-27 DIAGNOSIS — R07.9 CHEST PAIN: ICD-10-CM

## 2019-05-27 DIAGNOSIS — I10 BENIGN ESSENTIAL HYPERTENSION: ICD-10-CM

## 2019-05-27 LAB
25(OH)D3+25(OH)D2 SERPL-MCNC: 37 NG/ML (ref 30–96)
ALBUMIN SERPL BCP-MCNC: 3.5 G/DL (ref 3.5–5.2)
ALLENS TEST: ABNORMAL
ALP SERPL-CCNC: 90 U/L (ref 55–135)
ALT SERPL W/O P-5'-P-CCNC: 11 U/L (ref 10–44)
ANION GAP SERPL CALC-SCNC: 12 MMOL/L (ref 8–16)
APAP SERPL-MCNC: <3 UG/ML (ref 10–20)
AST SERPL-CCNC: 17 U/L (ref 10–40)
BACTERIA #/AREA URNS AUTO: ABNORMAL /HPF
BASOPHILS # BLD AUTO: 0.03 K/UL (ref 0–0.2)
BASOPHILS NFR BLD: 0.3 % (ref 0–1.9)
BILIRUB SERPL-MCNC: 0.7 MG/DL (ref 0.1–1)
BILIRUB UR QL STRIP: NEGATIVE
BUN SERPL-MCNC: 10 MG/DL (ref 8–23)
CALCIUM SERPL-MCNC: 9.5 MG/DL (ref 8.7–10.5)
CHLORIDE SERPL-SCNC: 102 MMOL/L (ref 95–110)
CLARITY UR REFRACT.AUTO: ABNORMAL
CO2 SERPL-SCNC: 25 MMOL/L (ref 23–29)
COLOR UR AUTO: YELLOW
CREAT SERPL-MCNC: 0.7 MG/DL (ref 0.5–1.4)
CRP SERPL-MCNC: 10 MG/L (ref 0–8.2)
DIFFERENTIAL METHOD: ABNORMAL
EOSINOPHIL # BLD AUTO: 0 K/UL (ref 0–0.5)
EOSINOPHIL NFR BLD: 0 % (ref 0–8)
ERYTHROCYTE [DISTWIDTH] IN BLOOD BY AUTOMATED COUNT: 13.9 % (ref 11.5–14.5)
ERYTHROCYTE [SEDIMENTATION RATE] IN BLOOD BY WESTERGREN METHOD: 44 MM/HR (ref 0–36)
EST. GFR  (AFRICAN AMERICAN): >60 ML/MIN/1.73 M^2
EST. GFR  (NON AFRICAN AMERICAN): >60 ML/MIN/1.73 M^2
GLUCOSE SERPL-MCNC: 127 MG/DL (ref 70–110)
GLUCOSE UR QL STRIP: NEGATIVE
HCO3 UR-SCNC: 34.9 MMOL/L (ref 24–28)
HCT VFR BLD AUTO: 46.6 % (ref 37–48.5)
HGB BLD-MCNC: 15.2 G/DL (ref 12–16)
HGB UR QL STRIP: NEGATIVE
HYALINE CASTS UR QL AUTO: 1 /LPF
IMM GRANULOCYTES # BLD AUTO: 0.03 K/UL (ref 0–0.04)
IMM GRANULOCYTES NFR BLD AUTO: 0.3 % (ref 0–0.5)
INFLUENZA A, MOLECULAR: NEGATIVE
INFLUENZA B, MOLECULAR: NEGATIVE
KETONES UR QL STRIP: ABNORMAL
LACTATE SERPL-SCNC: 1.3 MMOL/L (ref 0.5–2.2)
LDH SERPL L TO P-CCNC: 1.47 MMOL/L (ref 0.5–2.2)
LEUKOCYTE ESTERASE UR QL STRIP: NEGATIVE
LYMPHOCYTES # BLD AUTO: 0.7 K/UL (ref 1–4.8)
LYMPHOCYTES NFR BLD: 6.9 % (ref 18–48)
MAGNESIUM SERPL-MCNC: 1.6 MG/DL (ref 1.6–2.6)
MCH RBC QN AUTO: 28.5 PG (ref 27–31)
MCHC RBC AUTO-ENTMCNC: 32.6 G/DL (ref 32–36)
MCV RBC AUTO: 87 FL (ref 82–98)
MICROSCOPIC COMMENT: ABNORMAL
MONOCYTES # BLD AUTO: 0.3 K/UL (ref 0.3–1)
MONOCYTES NFR BLD: 3.1 % (ref 4–15)
NEUTROPHILS # BLD AUTO: 8.6 K/UL (ref 1.8–7.7)
NEUTROPHILS NFR BLD: 89.4 % (ref 38–73)
NITRITE UR QL STRIP: NEGATIVE
NRBC BLD-RTO: 0 /100 WBC
PCO2 BLDA: 53.2 MMHG (ref 35–45)
PH SMN: 7.42 [PH] (ref 7.35–7.45)
PH UR STRIP: 7 [PH] (ref 5–8)
PHOSPHATE SERPL-MCNC: 2.8 MG/DL (ref 2.7–4.5)
PLATELET # BLD AUTO: 201 K/UL (ref 150–350)
PMV BLD AUTO: 11.5 FL (ref 9.2–12.9)
PO2 BLDA: 29 MMHG (ref 40–60)
POC BE: 10 MMOL/L
POC SATURATED O2: 54 % (ref 95–100)
POC TCO2: 36 MMOL/L (ref 24–29)
POCT GLUCOSE: 134 MG/DL (ref 70–110)
POTASSIUM SERPL-SCNC: 3.7 MMOL/L (ref 3.5–5.1)
PROCALCITONIN SERPL IA-MCNC: 0.02 NG/ML
PROT SERPL-MCNC: 7.4 G/DL (ref 6–8.4)
PROT UR QL STRIP: ABNORMAL
RBC # BLD AUTO: 5.34 M/UL (ref 4–5.4)
RBC #/AREA URNS AUTO: 5 /HPF (ref 0–4)
SAMPLE: ABNORMAL
SITE: ABNORMAL
SODIUM SERPL-SCNC: 139 MMOL/L (ref 136–145)
SP GR UR STRIP: 1.02 (ref 1–1.03)
SPECIMEN SOURCE: NORMAL
SQUAMOUS #/AREA URNS AUTO: 0 /HPF
TSH SERPL DL<=0.005 MIU/L-ACNC: 1.41 UIU/ML (ref 0.4–4)
URN SPEC COLLECT METH UR: ABNORMAL
VIT B12 SERPL-MCNC: 458 PG/ML (ref 210–950)
WBC # BLD AUTO: 9.62 K/UL (ref 3.9–12.7)
WBC #/AREA URNS AUTO: 2 /HPF (ref 0–5)

## 2019-05-27 PROCEDURE — 25000003 PHARM REV CODE 250: Mod: HCNC | Performed by: EMERGENCY MEDICINE

## 2019-05-27 PROCEDURE — 81001 URINALYSIS AUTO W/SCOPE: CPT | Mod: HCNC

## 2019-05-27 PROCEDURE — 80053 COMPREHEN METABOLIC PANEL: CPT | Mod: HCNC

## 2019-05-27 PROCEDURE — 25000003 PHARM REV CODE 250: Mod: HCNC | Performed by: PHYSICIAN ASSISTANT

## 2019-05-27 PROCEDURE — 93010 ELECTROCARDIOGRAM REPORT: CPT | Mod: HCNC,,, | Performed by: INTERNAL MEDICINE

## 2019-05-27 PROCEDURE — 83735 ASSAY OF MAGNESIUM: CPT | Mod: HCNC

## 2019-05-27 PROCEDURE — 99291 CRITICAL CARE FIRST HOUR: CPT | Mod: 25,HCNC

## 2019-05-27 PROCEDURE — 84100 ASSAY OF PHOSPHORUS: CPT | Mod: HCNC

## 2019-05-27 PROCEDURE — 84425 ASSAY OF VITAMIN B-1: CPT | Mod: HCNC

## 2019-05-27 PROCEDURE — 82306 VITAMIN D 25 HYDROXY: CPT | Mod: HCNC

## 2019-05-27 PROCEDURE — 87502 INFLUENZA DNA AMP PROBE: CPT | Mod: HCNC

## 2019-05-27 PROCEDURE — 83605 ASSAY OF LACTIC ACID: CPT | Mod: HCNC

## 2019-05-27 PROCEDURE — 99291 CRITICAL CARE FIRST HOUR: CPT | Mod: HCNC,,, | Performed by: EMERGENCY MEDICINE

## 2019-05-27 PROCEDURE — 99223 PR INITIAL HOSPITAL CARE,LEVL III: ICD-10-PCS | Mod: HCNC,AI,, | Performed by: PHYSICIAN ASSISTANT

## 2019-05-27 PROCEDURE — 86592 SYPHILIS TEST NON-TREP QUAL: CPT | Mod: HCNC

## 2019-05-27 PROCEDURE — 93010 EKG 12-LEAD: ICD-10-PCS | Mod: HCNC,,, | Performed by: INTERNAL MEDICINE

## 2019-05-27 PROCEDURE — 63600175 PHARM REV CODE 636 W HCPCS: Mod: HCNC | Performed by: EMERGENCY MEDICINE

## 2019-05-27 PROCEDURE — 87040 BLOOD CULTURE FOR BACTERIA: CPT | Mod: 59,HCNC

## 2019-05-27 PROCEDURE — 80329 ANALGESICS NON-OPIOID 1 OR 2: CPT | Mod: HCNC

## 2019-05-27 PROCEDURE — 11000001 HC ACUTE MED/SURG PRIVATE ROOM: Mod: HCNC

## 2019-05-27 PROCEDURE — 99223 1ST HOSP IP/OBS HIGH 75: CPT | Mod: HCNC,AI,, | Performed by: PHYSICIAN ASSISTANT

## 2019-05-27 PROCEDURE — 93005 ELECTROCARDIOGRAM TRACING: CPT | Mod: HCNC

## 2019-05-27 PROCEDURE — 82607 VITAMIN B-12: CPT | Mod: HCNC

## 2019-05-27 PROCEDURE — 85025 COMPLETE CBC W/AUTO DIFF WBC: CPT | Mod: HCNC

## 2019-05-27 PROCEDURE — 86140 C-REACTIVE PROTEIN: CPT | Mod: HCNC

## 2019-05-27 PROCEDURE — 99291 PR CRITICAL CARE, E/M 30-74 MINUTES: ICD-10-PCS | Mod: HCNC,,, | Performed by: EMERGENCY MEDICINE

## 2019-05-27 PROCEDURE — 96366 THER/PROPH/DIAG IV INF ADDON: CPT | Mod: HCNC

## 2019-05-27 PROCEDURE — 84443 ASSAY THYROID STIM HORMONE: CPT | Mod: HCNC

## 2019-05-27 PROCEDURE — 82962 GLUCOSE BLOOD TEST: CPT | Mod: HCNC

## 2019-05-27 PROCEDURE — 84145 PROCALCITONIN (PCT): CPT | Mod: HCNC

## 2019-05-27 PROCEDURE — 85652 RBC SED RATE AUTOMATED: CPT | Mod: HCNC

## 2019-05-27 PROCEDURE — 99900035 HC TECH TIME PER 15 MIN (STAT): Mod: HCNC

## 2019-05-27 PROCEDURE — 96365 THER/PROPH/DIAG IV INF INIT: CPT | Mod: HCNC

## 2019-05-27 RX ORDER — ACETAMINOPHEN 500 MG
1000 TABLET ORAL
Status: COMPLETED | OUTPATIENT
Start: 2019-05-27 | End: 2019-05-27

## 2019-05-27 RX ORDER — LIDOCAINE 50 MG/G
2 PATCH TOPICAL
Status: DISCONTINUED | OUTPATIENT
Start: 2019-05-27 | End: 2019-06-05 | Stop reason: HOSPADM

## 2019-05-27 RX ORDER — PANTOPRAZOLE SODIUM 40 MG/1
40 TABLET, DELAYED RELEASE ORAL DAILY
Status: DISCONTINUED | OUTPATIENT
Start: 2019-05-28 | End: 2019-06-05 | Stop reason: HOSPADM

## 2019-05-27 RX ORDER — NALOXONE HCL 0.4 MG/ML
0.4 VIAL (ML) INJECTION
Status: COMPLETED | OUTPATIENT
Start: 2019-05-27 | End: 2019-05-27

## 2019-05-27 RX ORDER — HYDRALAZINE HYDROCHLORIDE 25 MG/1
25 TABLET, FILM COATED ORAL EVERY 6 HOURS PRN
Status: DISCONTINUED | OUTPATIENT
Start: 2019-05-27 | End: 2019-06-05 | Stop reason: HOSPADM

## 2019-05-27 RX ORDER — LIDOCAINE AND PRILOCAINE 25; 25 MG/G; MG/G
CREAM TOPICAL
Status: DISCONTINUED | OUTPATIENT
Start: 2019-05-27 | End: 2019-06-05 | Stop reason: HOSPADM

## 2019-05-27 RX ORDER — MICONAZOLE NITRATE 2 %
POWDER (GRAM) TOPICAL 2 TIMES DAILY
Status: DISCONTINUED | OUTPATIENT
Start: 2019-05-27 | End: 2019-06-05 | Stop reason: HOSPADM

## 2019-05-27 RX ORDER — DONEPEZIL HYDROCHLORIDE 5 MG/1
5 TABLET, FILM COATED ORAL NIGHTLY
Status: DISCONTINUED | OUTPATIENT
Start: 2019-05-27 | End: 2019-06-05 | Stop reason: HOSPADM

## 2019-05-27 RX ORDER — FUROSEMIDE 40 MG/1
40 TABLET ORAL DAILY
Status: DISCONTINUED | OUTPATIENT
Start: 2019-05-27 | End: 2019-06-05 | Stop reason: HOSPADM

## 2019-05-27 RX ORDER — BENAZEPRIL HYDROCHLORIDE 20 MG/1
20 TABLET ORAL
Status: COMPLETED | OUTPATIENT
Start: 2019-05-27 | End: 2019-05-27

## 2019-05-27 RX ORDER — ATORVASTATIN CALCIUM 20 MG/1
20 TABLET, FILM COATED ORAL DAILY
Status: DISCONTINUED | OUTPATIENT
Start: 2019-05-27 | End: 2019-06-05 | Stop reason: HOSPADM

## 2019-05-27 RX ORDER — ESCITALOPRAM OXALATE 20 MG/1
20 TABLET ORAL NIGHTLY
Status: DISCONTINUED | OUTPATIENT
Start: 2019-05-27 | End: 2019-06-05 | Stop reason: HOSPADM

## 2019-05-27 RX ORDER — SODIUM CHLORIDE 0.9 % (FLUSH) 0.9 %
10 SYRINGE (ML) INJECTION
Status: DISCONTINUED | OUTPATIENT
Start: 2019-05-27 | End: 2019-06-05 | Stop reason: HOSPADM

## 2019-05-27 RX ORDER — METOPROLOL SUCCINATE 100 MG/1
100 TABLET, EXTENDED RELEASE ORAL DAILY
Status: DISCONTINUED | OUTPATIENT
Start: 2019-05-28 | End: 2019-05-29

## 2019-05-27 RX ORDER — ESCITALOPRAM OXALATE 5 MG/1
20 TABLET ORAL DAILY
Status: DISCONTINUED | OUTPATIENT
Start: 2019-05-27 | End: 2019-05-27

## 2019-05-27 RX ORDER — NITROGLYCERIN 0.4 MG/1
0.4 TABLET SUBLINGUAL EVERY 5 MIN PRN
Status: DISCONTINUED | OUTPATIENT
Start: 2019-05-27 | End: 2019-06-05 | Stop reason: HOSPADM

## 2019-05-27 RX ORDER — METOPROLOL TARTRATE 100 MG/1
100 TABLET ORAL
Status: COMPLETED | OUTPATIENT
Start: 2019-05-27 | End: 2019-05-27

## 2019-05-27 RX ORDER — BENAZEPRIL HYDROCHLORIDE 20 MG/1
40 TABLET ORAL DAILY
Status: DISCONTINUED | OUTPATIENT
Start: 2019-05-28 | End: 2019-06-05 | Stop reason: HOSPADM

## 2019-05-27 RX ADMIN — CEFTRIAXONE 2 G: 2 INJECTION, SOLUTION INTRAVENOUS at 09:05

## 2019-05-27 RX ADMIN — DONEPEZIL HYDROCHLORIDE 5 MG: 5 TABLET, FILM COATED ORAL at 09:05

## 2019-05-27 RX ADMIN — ATORVASTATIN CALCIUM 20 MG: 10 TABLET, FILM COATED ORAL at 02:05

## 2019-05-27 RX ADMIN — NALOXONE HYDROCHLORIDE 0.4 MG: 0.4 INJECTION, SOLUTION INTRAMUSCULAR; INTRAVENOUS; SUBCUTANEOUS at 10:05

## 2019-05-27 RX ADMIN — FUROSEMIDE 40 MG: 40 TABLET ORAL at 02:05

## 2019-05-27 RX ADMIN — ESCITALOPRAM OXALATE 20 MG: 20 TABLET ORAL at 09:05

## 2019-05-27 RX ADMIN — ACETAMINOPHEN 1000 MG: 500 TABLET ORAL at 11:05

## 2019-05-27 RX ADMIN — BENAZEPRIL HYDROCHLORIDE 20 MG: 20 TABLET, FILM COATED ORAL at 11:05

## 2019-05-27 RX ADMIN — METOPROLOL TARTRATE 100 MG: 100 TABLET ORAL at 11:05

## 2019-05-27 RX ADMIN — MICONAZOLE NITRATE: 20 POWDER TOPICAL at 09:05

## 2019-05-27 RX ADMIN — SODIUM CHLORIDE 500 ML: 0.9 INJECTION, SOLUTION INTRAVENOUS at 08:05

## 2019-05-27 RX ADMIN — LIDOCAINE 2 PATCH: 50 PATCH TOPICAL at 02:05

## 2019-05-27 NOTE — ED NOTES
Pt with diaper wet with urine.  Pt cleaned, diaper changed.  Redness noted to buttock, blanches with touch.

## 2019-05-27 NOTE — ED NOTES
Pt with no response to Narcan, remains confused.  Temp 100.9.  Dr. Garcia aware of both.  New orders received.

## 2019-05-27 NOTE — ED NOTES
While applying lidocaine patch to left knee, noted fentanyl patch to left knee.  Originally reports by EMS patch was removed.  Paged IM team to inform of fentanyl patch removal.  Informed VEE Agrawal of finding.

## 2019-05-27 NOTE — ED NOTES
Patient resting in stretcher and is in NAD at this time. Pt is awake alert and confused, VSS, respirations even and unlabored. Pt's daughter updated on plan of care. Bed low and locked with side rails up x2, call bell in pt reach.  Will continue to monitor.

## 2019-05-27 NOTE — PROGRESS NOTES
Pt arrived to the floor from ED on stretcher with transporter . Pt is alert and confused . Only know her daughter . Pt is resting in the bed and she is sleepy arouse to voice . Pt admitted to room 651 , bed at low position and nursing call within reach . Pt's daughter at bedside . V/s stable . Will keep monitoring .

## 2019-05-27 NOTE — ASSESSMENT & PLAN NOTE
Chronic OA of L hip, chronic L knee pain.  Will hold tramadol and fentanyl patch.  Tylenol prn, lidocaine patches.

## 2019-05-27 NOTE — HPI
"Per admitting provider:   88 y/o F from [facility] with PMH HTN, mod aortic valve stenosis, HTN, HLD, dementia, chronic left knee pain and OA of left hip (on fentanyl patch, tramadol tid prn, tylenol) admitted for evaluation of AMS.  History obtained from pt's daughter Lina at the bedside.  Pt's daughter noted pt "slightly off" on Saturday and it was difficult to carry on a conversation.  Pt was also noted to be napping more frequently over the weekend which is unusual for her.  +HA which is not a common compliant per daughter.  This morning pt at [facility] living pt would not swallow her medications as she was not comprehending the instructions.  Low grade fever at assisted living.  No chills, diaphoresis, N/V/D.    In the ED, T 100.9 F, /86.  Procal negative.  Flu negative.  WBC WNL, lactic acid negative.  Tylenol level <3.  UA fairly unremarkable.  CT head no significant change from prior.  Continued cerebral volume loss with mild moderate patchy hypoattenuation supratentorial white matter suggestive for chronic ischemic change.  No evidence for acute intracranial hemorrhage or significant new abnormal parenchymal attenuation allowing for motion limitation.  CXR Mild cardiomegaly and accentuation of the basilar interstitial markings.  No significant airspace consolidation or pleural effusion identified.  Pt given naloxone with minimal improvement.  "

## 2019-05-27 NOTE — ED PROVIDER NOTES
Encounter Date: 5/27/2019    SCRIBE #1 NOTE: I, Tammie Moise, am scribing for, and in the presence of,  Dr. Garcia. I have scribed the entire note.       History     Chief Complaint   Patient presents with    Altered Mental Status     Pt confused and unable to take meds this morning. Baseline is AAOX4.  Pt had a temp of 100.5.     The patient is a 87 y.o. female with past medical history of CHF, HTN, GERD, HLD, aortic stenosis, joint pain, arthritis and a left knee replacement resenting with a chief complaint of altered mental status. Patient is in assisted living facility, and it is reported that usually she is very self-sustaining. However, her daughter reported that she was very confused yesterday, and this morning the nurses found her with a low grade fever and unable to recall her own name or understand simple orders such as taking her medication. She was last seen as normal on Saturday, 05/25/19. She responds to gentle stimuli as if it pains her. The daughter recounted a similar episode a few years ago when she had diverticulosis. It should be noted that the patient was recently treated for a UTI.       The history is provided by medical records, the nursing home and a relative. The history is limited by the condition of the patient.     Review of patient's allergies indicates:   Allergen Reactions    Aspirin Nausea Only and Other (See Comments)     Other reaction(s): esophageal pain    Celebrex [celecoxib] Rash    Morphine Hallucinations    Steroid [corticosteroids (glucocorticoids)] Other (See Comments)     Other reaction(s): Flushing (skin)    Sulfa dyne Other (See Comments)     Other reaction(s): esophogeal pain     Past Medical History:   Diagnosis Date    Aortic stenosis 6/17/2015    Arthritis     Atrophic vaginitis 2/18/2016    Bilateral edema of lower extremity 6/22/2016    CHF (congestive heart failure)     Cholelithiasis without cholecystitis 5/5/2017    Chronic pain of left knee  "8/3/2017    Depressed mood 6/22/2016    Diverticulitis of large intestine without perforation or abscess without bleeding 5/5/2017    Encounter for blood transfusion     Essential hypertension     GERD (gastroesophageal reflux disease)     Hyperlipidemia     Hypertension     Hypertensive heart disease with heart failure 7/14/2015    Insomnia     Joint pain     Knee pain, left 08/2016    pain with walking or standing    Primary osteoarthritis of knee 2/5/2013    PUD (peptic ulcer disease)     S/P knee replacement 2/13/2013    Slow transit constipation 2/18/2016     Past Surgical History:   Procedure Laterality Date    APPENDECTOMY      ARTHROPLASTY, KNEE, TOTAL Left 2/6/2013    Performed by [facility] at [facility] OR 2ND FLR    COLONOSCOPY      08    diagnostic block of  the genicular branches to the left knee Left 8/3/2017    Performed by Steve [facility] at [facility] OR    EGD (ESOPHAGOGASTRODUODENOSCOPY) N/A 1/6/2014    Performed by Madan Beltran MD at [facility] ENDO (4TH FLR)    EGD (ESOPHAGOGASTRODUODENOSCOPY) N/A 1/28/2013    Performed by Yuniel Montana MD at [facility] ENDO (2ND FLR)    EYE SURGERY      bilateral cataract    FINGER SURGERY      LT thumb surgery--"arthritis surgery"    HYSTERECTOMY      UNKNOWN BSO    JOINT REPLACEMENT      right hip replacement    KNEE ARTHROPLASTY Left 2001    TONSILLECTOMY      TUMOR EXCISION      esophageal tumor removal     UPPER GASTROINTESTINAL ENDOSCOPY      2013     Family History   Problem Relation Age of Onset    Heart disease Mother     Heart disease Father     Asthma Father     Cancer Brother         breast    Heart disease Brother     Melanoma Neg Hx     Psoriasis Neg Hx     Lupus Neg Hx     Eczema Neg Hx     Acne Neg Hx     Breast cancer Neg Hx     Ovarian cancer Neg Hx     Cervical cancer Neg Hx     Endometrial cancer Neg Hx     Vaginal cancer Neg Hx      Social History     Tobacco Use    Smoking status: Never " Smoker    Smokeless tobacco: Never Used   Substance Use Topics    Alcohol use: No    Drug use: Never     Types: Hydrocodone     Review of Systems   Unable to perform ROS: Mental status change   Psychiatric/Behavioral: Positive for confusion.       Physical Exam     Initial Vitals [05/27/19 0833]   BP Pulse Resp Temp SpO2   (!) 199/86 80 18 98.7 °F (37.1 °C) 95 %      MAP       --         Physical Exam    Nursing note and vitals reviewed.  Constitutional: She appears well-developed and well-nourished.   Elderly. She makes pained exclamations with any movement or touching.    HENT:   Head: Normocephalic and atraumatic.   Dry mucus membranes.   Eyes: Conjunctivae are normal.   Neck: Normal range of motion.   Cardiovascular: Normal rate, regular rhythm and normal heart sounds.   Pulmonary/Chest: Breath sounds normal. No respiratory distress.   Abdominal: Soft. Bowel sounds are normal. She exhibits no distension.   Musculoskeletal: Normal range of motion.   Neurological: She is alert.   Patient is not oriented, she does not follow commands and has confused speech. There is no focal deficit.   Skin: Skin is warm.         ED Course   Procedures  Labs Reviewed   CBC W/ AUTO DIFFERENTIAL - Abnormal; Notable for the following components:       Result Value    Gran # (ANC) 8.6 (*)     Lymph # 0.7 (*)     Gran% 89.4 (*)     Lymph% 6.9 (*)     Mono% 3.1 (*)     All other components within normal limits   URINALYSIS, REFLEX TO URINE CULTURE - Abnormal; Notable for the following components:    Appearance, UA Hazy (*)     Protein, UA 3+ (*)     Ketones, UA 1+ (*)     All other components within normal limits    Narrative:     Preferred Collection Type->Urine, Clean Catch   COMPREHENSIVE METABOLIC PANEL - Abnormal; Notable for the following components:    Glucose 127 (*)     All other components within normal limits   URINALYSIS MICROSCOPIC - Abnormal; Notable for the following components:    RBC, UA 5 (*)     All other  components within normal limits    Narrative:     Preferred Collection Type->Urine, Clean Catch   POCT GLUCOSE - Abnormal; Notable for the following components:    POCT Glucose 134 (*)     All other components within normal limits   ISTAT PROCEDURE - Abnormal; Notable for the following components:    POC PCO2 53.2 (*)     POC PO2 29 (*)     POC HCO3 34.9 (*)     POC SATURATED O2 54 (*)     POC TCO2 36 (*)     All other components within normal limits   INFLUENZA A & B BY MOLECULAR   CULTURE, BLOOD   CULTURE, BLOOD   PROCALCITONIN   PHOSPHORUS   MAGNESIUM   TSH   VITAMIN D   SEDIMENTATION RATE   C-REACTIVE PROTEIN   RPR   VITAMIN B1   VITAMIN B12   VITAMIN B6   TSH    Narrative:     ADD-ON ORDER VID[facility] #[id number], TSH #[id number]; PER MAISHA ABBASI PA-C 05/27/2019  12:00   add on [id number] per Dr. Branch  05/27/2019  12:22 Pankaj   ACETAMINOPHEN LEVEL   VITAMIN B12   LACTIC ACID, PLASMA   VITAMIN D   VITAMIN B1   SEDIMENTATION RATE   VITAMIN B12   RPR     EKG Readings: (Independently Interpreted)   Initial Reading: No STEMI. Rhythm: Normal Sinus Rhythm. Heart Rate: 85. Ectopy: No Ectopy. ST Segments: Normal ST Segments.     ECG Results          EKG 12-lead (Final result)  Result time 05/27/19 10:27:23    Final result by Interface, Lab In [facility] (05/27/19 10:27:23)                 Narrative:    Test Reason : R41.82,    Vent. Rate : 085 BPM     Atrial Rate : 085 BPM     P-R Int : 158 ms          QRS Dur : 078 ms      QT Int : 380 ms       P-R-T Axes : 080 071 054 degrees     QTc Int : 452 ms    Normal sinus rhythm  Nonspecific ST and T wave abnormality  Abnormal ECG  When compared with ECG of 08-MAY-2019 17:11,  No significant change was found  Confirmed by YUMIKO WOOD, ANDRADE ([id number]) on 5/27/2019 10:27:14 AM    Referred By: [email or web address]   SELF           Confirmed By:ANDRADE CALDERON MD                            Imaging Results          CT Head Without Contrast (Final result)  Result time 05/27/19 10:29:54    Final  result by Benigno Balderrama DO (05/27/19 10:29:54)                 Impression:      No significant change from prior.  Continued cerebral volume loss with mild moderate patchy hypoattenuation supratentorial white matter suggestive for chronic ischemic change.    No evidence for acute intracranial hemorrhage or significant new abnormal parenchymal attenuation allowing for motion limitation.  Further evaluation as warranted clinically      Electronically signed by: Benigno Balderrama DO  Date:    05/27/2019  Time:    10:29             Narrative:    EXAMINATION:  CT HEAD WITHOUT CONTRAST    CLINICAL HISTORY:  Confusion/delirium, altered LOC, unexplained;    TECHNIQUE:  Multiple sequential 5 mm axial images of the head without contrast.  Coronal and sagittal reformatted imaging from the axial acquisition.    COMPARISON:  05/05/2017    FINDINGS:  Continued age-appropriate generalized cerebral volume loss with mild moderate patchy hypoattenuation supratentorial white matter while nonspecific suggestive for chronic ischemic change.  There is no evidence for acute intracranial hemorrhage or sulcal effacement allowing for patient motion.  Visualized paranasal sinuses and mastoid air cells are clear..                               X-Ray Chest AP Portable (Final result)  Result time 05/27/19 09:56:56    Final result by Salvador Cintron MD (05/27/19 09:56:56)                 Impression:      See above      Electronically signed by: Salvador Cintron MD  Date:    05/27/2019  Time:    09:56             Narrative:    EXAMINATION:  XR CHEST AP PORTABLE    CLINICAL HISTORY:  Sepsis;    TECHNIQUE:  Single frontal view of the chest was performed.    COMPARISON:  N 05/08/2019 one    FINDINGS:  Mild cardiomegaly and accentuation of the basilar interstitial markings.  No significant airspace consolidation or pleural effusion identified                              X-Rays:   Independently Interpreted Readings:   Chest X-Ray: Normal heart size.  No  infiltrates.  No acute abnormalities.   Head CT: No hemorrhage.  No skull fracture.  No acute stroke.     Medical Decision Making:   History:   Old Medical Records: I decided to obtain old medical records.  Independently Interpreted Test(s):   I have ordered and independently interpreted X-rays - see prior notes.  I have ordered and independently interpreted EKG Reading(s) - see prior notes  Clinical Tests:   Lab Tests: Ordered and Reviewed  Radiological Study: Ordered and Reviewed  Medical Tests: Ordered and Reviewed  ED Management:  Emergent evaluation of altered mental status. Does not meet stroke code criteria. Had fever reported at nursing facility. High suspicion for infectious etiology. No signs of trauma, unlikely acute intracranial process or stroke but will get precautionary head CT. Will imitate sepsis protocol orders and give IV antibiotics, will not give 30 per kilo bolus due to severe heart failure.             Scribe Attestation:   Scribe #1: I performed the above scribed service and the documentation accurately describes the services I performed. I attest to the accuracy of the note.    Attending Attestation:         Attending Critical Care:   Critical Care Times:   Direct Patient Care (initial evaluation, reassessments, and time considering the case)................................................................15 minutes.   Additional History from reviewing old medical records or taking additional history from the family, EMS, PCP, etc.......................10 minutes.   Ordering, Reviewing, and Interpreting Diagnostic Studies...............................................................................................................5 minutes.   Documentation..................................................................................................................................................................................5 minutes.   Consultation with other Physicians.  .................................................................................................................................................5 minutes.   Consultation with the patient's family directly relating to the patient's condition, care, and DNR status (when patient unable)......5 minutes.   ==============================================================  · Total Critical Care Time - exclusive of procedural time: 45 minutes.  ==============================================================  Critical care was necessary to treat or prevent imminent or life-threatening deterioration of the following conditions: sepsis.       Attending ED Notes:   Labs reviewed, no clear etiology of her symptoms. In the setting of fever, still high suspicion for infectious etiology. elevated BP treated with oral meds. Patient is not at her baseline. Will admit for further observation.              Clinical Impression:       ICD-10-CM ICD-9-CM   1. Fever, unspecified fever cause R50.9 780.60   2. Altered mental status R41.82 780.97   3. Chest pain R07.9 786.50         Disposition:   Disposition: Admitted  Condition: Jenifer Garcia MD  05/27/19 [id number]

## 2019-05-27 NOTE — ASSESSMENT & PLAN NOTE
Pt recently discharged 5/9/19 for CP.  BP meds adjusted, benazepril increased from 20 mg to 40 mg daily.  Toprol  mg continued.  BP upon arrival 199/86.  Pt is at assisted living facility, possible noncompliance with beds.  Will closely monitor BP after receiving home meds.  Prn hydralazine for now.  Will add additional agent if indicated, do not want to lower BP too rapidly.  No allergy to ACE/ARB.

## 2019-05-27 NOTE — H&P
"[facility] Medicine  History & Physical    Patient Name: Lilia Hidalgo  MRN: [id number]  Admission Date: 5/27/2019  Attending Physician: Shawna Branch*   Primary Care Provider: April Herrera MD    [facility] Medicine Team: [facility] MED Y Rebeca Johnson PA-C     Patient information was obtained from patient, relative(s), past medical records and ER records.     Subjective:     Principal Problem:Encephalopathy, metabolic    Chief Complaint:   Chief Complaint   Patient presents with    Altered Mental Status     Pt confused and unable to take meds this morning. Baseline is AAOX4.  Pt had a temp of 100.5.        HPI: 86 y/o F from [facility] with PMH HTN, mod aortic valve stenosis, HTN, HLD, dementia, chronic left knee pain and OA of left hip (on fentanyl patch, tramadol tid prn, tylenol) admitted for evaluation of AMS.  History obtained from pt's daughter Lina at the bedside.  Pt's daughter noted pt "slightly off" on Saturday and it was difficult to carry on a conversation.  Pt was also noted to be napping more frequently over the weekend which is unusual for her.  +HA which is not a common compliant per daughter.  This morning pt at [facility] pt would not swallow her medications as she was not comprehending the instructions.  Low grade fever at assisted living.  No chills, diaphoresis, N/V/D.    In the ED, T 100.9 F, /86.  Procal negative.  Flu negative.  WBC WNL, lactic acid negative.  Tylenol level <3.  UA fairly unremarkable.  CT head no significant change from prior.  Continued cerebral volume loss with mild moderate patchy hypoattenuation supratentorial white matter suggestive for chronic ischemic change.  No evidence for acute intracranial hemorrhage or significant new abnormal parenchymal attenuation allowing for motion limitation.  CXR Mild cardiomegaly and accentuation of the basilar interstitial markings.  No significant airspace consolidation or " "pleural effusion identified.  Pt given naloxone with minimal improvement.    Past Medical History:   Diagnosis Date    Aortic stenosis 6/17/2015    Arthritis     Atrophic vaginitis 2/18/2016    Bilateral edema of lower extremity 6/22/2016    CHF (congestive heart failure)     Cholelithiasis without cholecystitis 5/5/2017    Chronic pain of left knee 8/3/2017    Depressed mood 6/22/2016    Diverticulitis of large intestine without perforation or abscess without bleeding 5/5/2017    Encounter for blood transfusion     Essential hypertension     GERD (gastroesophageal reflux disease)     Hyperlipidemia     Hypertension     Hypertensive heart disease with heart failure 7/14/2015    Insomnia     Joint pain     Knee pain, left 08/2016    pain with walking or standing    Primary osteoarthritis of knee 2/5/2013    PUD (peptic ulcer disease)     S/P knee replacement 2/13/2013    Slow transit constipation 2/18/2016       Past Surgical History:   Procedure Laterality Date    APPENDECTOMY      ARTHROPLASTY, KNEE, TOTAL Left 2/6/2013    [facility] OR 2ND FLR    COLONOSCOPY      08    diagnostic block of  the genicular branches to the left knee Left 8/3/2017    [facility]    EGD (ESOPHAGOGASTRODUODENOSCOPY) N/A 1/6/2014    Performed by Madan Beltran MD at [facility] ENDO (4TH FLR)    EGD (ESOPHAGOGASTRODUODENOSCOPY) N/A 1/28/2013    Performed by Yuniel Montana MD at [facility] ENDO (2ND FLR)    EYE SURGERY      bilateral cataract    FINGER SURGERY      LT thumb surgery--"arthritis surgery"    HYSTERECTOMY      UNKNOWN BSO    JOINT REPLACEMENT      right hip replacement    KNEE ARTHROPLASTY Left 2001    TONSILLECTOMY      TUMOR EXCISION      esophageal tumor removal     UPPER GASTROINTESTINAL ENDOSCOPY      2013       Review of patient's allergies indicates:   Allergen Reactions    Aspirin Nausea Only and Other (See Comments)     " Other reaction(s): esophageal pain    Celebrex [celecoxib] Rash    Morphine Hallucinations    Steroid [corticosteroids (glucocorticoids)] Other (See Comments)     Other reaction(s): Flushing (skin)    Sulfa dyne Other (See Comments)     Other reaction(s): esophogeal pain       No current facility-administered medications on file prior to encounter.      Current Outpatient Medications on File Prior to Encounter   Medication Sig    acetaminophen (TYLENOL) 650 MG TbSR Take 1 tablet (650 mg total) by mouth every 6 (six) hours as needed (last dose may be left at bedside for patient to take at night).    atorvastatin (LIPITOR) 20 MG tablet TAKE 1 TABLET BY MOUTH ONCE DAILY.    benazepril (LOTENSIN) 20 MG tablet TAKE 2 TABLETS(40 MG) BY MOUTH EVERY DAY    donepezil (ARICEPT) 5 MG tablet Take 1 tablet (5 mg total) by mouth every evening.    escitalopram oxalate (LEXAPRO) 20 MG tablet Take 1/2 pill nightly for a week and then 1 pill nightly onwards.    fentaNYL (DURAGESIC) 12 mcg/hr PT72 Place 1 patch onto the skin every 72 hours.    furosemide (LASIX) 40 MG tablet TAKE 1 TABLET BY MOUTH ONCE DAILY.    lidocaine-prilocaine (EMLA) cream Apply topically as needed. Apply to painful areas 3 times per day as needed    melatonin 5 mg Tab Take 1 tablet by mouth every evening.     metoprolol succinate (TOPROL-XL) 100 MG 24 hr tablet Take 1 tablet (100 mg total) by mouth once daily.    MULTIVITAMIN W-MINERALS/LUTEIN (CENTRUM SILVER ORAL) Take by mouth once daily.    mupirocin (BACTROBAN) 2 % ointment Apply topically 2 (two) times daily.    nitroGLYCERIN (NITROSTAT) 0.4 MG SL tablet Place 1 tablet (0.4 mg total) under the tongue every 5 (five) minutes as needed for Chest pain (do not take more than 3 tablets in a row).    nystatin (MYCOSTATIN) powder Apply topically 2 (two) times daily. To groin area    omeprazole (PRILOSEC) 20 MG capsule Take 1 capsule (20 mg total) by mouth once daily.    traMADol (ULTRAM) 50 mg  tablet Take 1 tablet (50 mg total) by mouth As instructed for Pain. Take one tablets twice per day, and 1 tablet at bedtime as needed.     Family History     Problem Relation (Age of Onset)    Asthma Father    Cancer Brother    Heart disease Mother, Father, Brother        Tobacco Use    Smoking status: Never Smoker    Smokeless tobacco: Never Used   Substance and Sexual Activity    Alcohol use: No    Drug use: Never     Types: Hydrocodone    Sexual activity: Not Currently     Birth control/protection: None     Review of Systems   Unable to perform ROS: Acuity of condition   HENT: Negative for drooling, sore throat, trouble swallowing and voice change.    Eyes: Negative for photophobia, pain, redness and visual disturbance.   Respiratory: Negative for cough, shortness of breath, wheezing and stridor.    Cardiovascular: Positive for leg swelling (chronic). Negative for chest pain.   Gastrointestinal: Negative for abdominal distention, abdominal pain, diarrhea and vomiting.   Neurological: Positive for headaches. Negative for tremors, syncope, facial asymmetry, speech difficulty and weakness.   Psychiatric/Behavioral: Positive for confusion. Negative for agitation, hallucinations and sleep disturbance.     Objective:     Vital Signs (Most Recent):  Temp: 97.6 °F (36.4 °C) (05/27/19 1256)  Pulse: 75 (05/27/19 1301)  Resp: 16 (05/27/19 1232)  BP: (!) 167/72 (05/27/19 1301)  SpO2: (!) 93 % (05/27/19 1330) Vital Signs (24h Range):  Temp:  [97.6 °F (36.4 °C)-100.9 °F (38.3 °C)] 97.6 °F (36.4 °C)  Pulse:  [75-93] 75  Resp:  [16-18] 16  SpO2:  [93 %-96 %] 93 %  BP: (167-199)/(70-98) 167/72     Weight: 99.8 kg (220 lb)  Body mass index is 35.51 kg/m².    Physical Exam   Constitutional: She appears well-developed and well-nourished.   HENT:   Head: Normocephalic and atraumatic.   Eyes: Pupils are equal, round, and reactive to light. EOM are normal.   Neck: Normal range of motion. Neck supple. No tracheal deviation present.  No thyromegaly present.   Cardiovascular: Normal rate and regular rhythm.   No murmur heard.  Pulmonary/Chest: Effort normal and breath sounds normal. She has no wheezes.   Abdominal: Soft. Bowel sounds are normal. There is no tenderness.   Musculoskeletal: Normal range of motion. She exhibits edema (b/l chronic 1+ edema). She exhibits no tenderness.   Lymphadenopathy:     She has no cervical adenopathy.   Neurological: She is alert. She has normal reflexes. No cranial nerve deficit. GCS eye subscore is 4. GCS verbal subscore is 4. GCS motor subscore is 6.   Oriented to person, daughter.  Not oriented to place.  She is generally not oriented to time.   Skin: Skin is warm and dry.   Psychiatric: She has a normal mood and affect. Her behavior is normal.   Nursing note and vitals reviewed.        CRANIAL NERVES     CN III, IV, VI   Pupils are equal, round, and reactive to light.  Extraocular motions are normal.        Significant Labs: All pertinent labs within the past 24 hours have been reviewed.    Significant Imaging: I have reviewed all pertinent imaging results/findings within the past 24 hours.    Assessment/Plan:     * Encephalopathy, metabolic  Differential includes infectious encephalopathy (septic encephalopathy 2017 diverticulitis), medication noncompliance/HTN encephalopathy, narcotic overdose, TIA/stroke  Procal negative.  Flu negative.  WBC WNL, lactic acid negative.  Tylenol level <3.  UA fairly unremarkable.    CT head no significant change from prior.  Continued cerebral volume loss with mild moderate patchy hypoattenuation supratentorial white matter suggestive for chronic ischemic change.  No evidence for acute intracranial hemorrhage or significant new abnormal parenchymal attenuation allowing for motion limitation.    CXR Mild cardiomegaly and accentuation of the basilar interstitial markings.  No significant airspace consolidation or pleural effusion identified.  Pt given naloxone with minimal  improvement.  Pt given ceftriaxone 2 gm in the ED.  Pt improving at time of hospital medicine admission.  Discussed case with Dr. Felipe, no additional imaging or antibiotics at this time.  Ordered ESR, CRP.  Vit D, B1, B6, B12, TSH.  Neuro checks q 4 hrs.  Recommend MRI as next step if deficit or worsening of mental status.    Hypertensive urgency  Pt recently discharged 5/9/19 for CP.  BP meds adjusted, benazepril increased from 20 mg to 40 mg daily.  Toprol  mg continued.  BP upon arrival 199/86.  Pt is at assisted living facility, possible noncompliance with beds.  Will closely monitor BP after receiving home meds.  Prn hydralazine for now.  Will add additional agent if indicated, do not want to lower BP too rapidly.  No allergy to ACE/ARB.      Essential hypertension  As above      Depressed mood  Continue Lexapro 20 mg every night, Aricept 5 mg every night, Melatonin 5 mg every night      Hypertensive heart disease with heart failure  Last echo 5/9/19, Diastolic, EF 65%      Aortic stenosis  5/9/19 moderate AV stenosis  Continue toprol  mg    GERD (gastroesophageal reflux disease)  Continue PPI      Arthritis  Chronic OA of L hip, chronic L knee pain.  Will hold tramadol and fentanyl patch.  Tylenol prn, lidocaine patches.      Hyperlipidemia  Continue atorvastatin.        VTE Risk Mitigation (From admission, onward)        Ordered     Place sequential compression device  Until discontinued      05/27/19 1213     IP VTE HIGH RISK PATIENT  Once      05/27/19 1213             Rebeca Johnson PA-C  Department of Hospital Medicine   [facility]

## 2019-05-27 NOTE — ED TRIAGE NOTES
Lilia Hidalgo, a 87 y.o. female presents to the ED w/ complaint of AMS since this morning upon wakening. Patient is usually fully functional, patient unable to tell us her name at this time but follows commands.  Patient has bilateral lower extremity swelling.    Triage note:  Chief Complaint   Patient presents with    Altered Mental Status     Pt confused and unable to take meds this morning. Baseline is AAOX4.  Pt had a temp of 100.5.     Review of patient's allergies indicates:   Allergen Reactions    Aspirin Nausea Only and Other (See Comments)     Other reaction(s): esophageal pain    Celebrex [celecoxib] Rash    Morphine Hallucinations    Steroid [corticosteroids (glucocorticoids)] Other (See Comments)     Other reaction(s): Flushing (skin)    Sulfa dyne Other (See Comments)     Other reaction(s): esophogeal pain     Past Medical History:   Diagnosis Date    Aortic stenosis 6/17/2015    Arthritis     Atrophic vaginitis 2/18/2016    Bilateral edema of lower extremity 6/22/2016    CHF (congestive heart failure)     Cholelithiasis without cholecystitis 5/5/2017    Chronic pain of left knee 8/3/2017    Depressed mood 6/22/2016    Diverticulitis of large intestine without perforation or abscess without bleeding 5/5/2017    Encounter for blood transfusion     Essential hypertension     GERD (gastroesophageal reflux disease)     Hyperlipidemia     Hypertension     Hypertensive heart disease with heart failure 7/14/2015    Insomnia     Joint pain     Knee pain, left 08/2016    pain with walking or standing    Primary osteoarthritis of knee 2/5/2013    PUD (peptic ulcer disease)     S/P knee replacement 2/13/2013    Slow transit constipation 2/18/2016     LOC: Patient name and date of birth verified. The patient is awake, alert and aware of environment with an appropriate affect, the patient is disoriented x 3  APPEARANCE: Patient resting comfortably, patient is clean and well groomed,  patient's clothing is properly fastened.  SKIN: The skin is warm and dry, color consistent with ethnicity, patient has normal skin turgor and moist mucus membranes, skin intact, no breakdown or bruising noted.  MUSCULOSKELETAL: Patient moving all extremities well, swelling noted to bilateral lower extremities  RESPIRATORY: Respirations are spontaneous, patient has a normal effort and rate, no accessory muscle use noted.  CARDIAC: Patient has a normal rate and rhythm, 2+edema to lower extremities  NEUROLOGIC: Eyes open spontaneously,follows commands, facial expression symmetrical, bilateral hand grasp equal and even, purposeful motor response noted, normal sensation in all extremities when touched with a finger.  UROLOGY: Patient has strong odor coming from brief, unable to report any burning or pain with urination.

## 2019-05-27 NOTE — H&P
"[facility] Hospital Medicine  History & Physical    Patient Name: Lilia Hidalgo  MRN: [id number]  Admission Date: 5/27/2019  Attending Physician: Shawna Branch*   Primary Care Provider: April Herrera MD    [facility] Medicine Team: [facility] MED Y Rebeca Johnson PA-C     Patient information was obtained from patient, relative(s), past medical records and ER records.     Subjective:     Principal Problem:Encephalopathy, metabolic    Chief Complaint:   Chief Complaint   Patient presents with    Altered Mental Status     Pt confused and unable to take meds this morning. Baseline is AAOX4.  Pt had a temp of 100.5.        HPI: 88 y/o F from [facility] with PMH HTN, mod aortic valve stenosis, HTN, HLD, dementia, chronic left knee pain and OA of left hip (on fentanyl patch, tramadol tid prn, tylenol) admitted for evaluation of AMS.  History obtained from pt's daughter Lina at the bedside.  Pt's daughter noted pt "slightly off" on Saturday and it was difficult to carry on a conversation.  Pt was also noted to be napping more frequently over the weekend which is unusual for her.  +HA which is not a common compliant per daughter.  This morning pt at [facility] pt would not swallow her medications as she was not comprehending the instructions.  Low grade fever at assisted living.  No chills, diaphoresis, N/V/D.    In the ED, T 100.9 F, /86.  Procal negative.  Flu negative.  WBC WNL, lactic acid negative.  Tylenol level <3.  UA fairly unremarkable.  CT head no significant change from prior.  Continued cerebral volume loss with mild moderate patchy hypoattenuation supratentorial white matter suggestive for chronic ischemic change.  No evidence for acute intracranial hemorrhage or significant new abnormal parenchymal attenuation allowing for motion limitation.  CXR Mild cardiomegaly and accentuation of the basilar interstitial markings.  No significant airspace consolidation or " "pleural effusion identified.  Pt given naloxone with minimal improvement.    Past Medical History:   Diagnosis Date    Aortic stenosis 6/17/2015    Arthritis     Atrophic vaginitis 2/18/2016    Bilateral edema of lower extremity 6/22/2016    CHF (congestive heart failure)     Cholelithiasis without cholecystitis 5/5/2017    Chronic pain of left knee 8/3/2017    Depressed mood 6/22/2016    Diverticulitis of large intestine without perforation or abscess without bleeding 5/5/2017    Encounter for blood transfusion     Essential hypertension     GERD (gastroesophageal reflux disease)     Hyperlipidemia     Hypertension     Hypertensive heart disease with heart failure 7/14/2015    Insomnia     Joint pain     Knee pain, left 08/2016    pain with walking or standing    Primary osteoarthritis of knee 2/5/2013    PUD (peptic ulcer disease)     S/P knee replacement 2/13/2013    Slow transit constipation 2/18/2016       Past Surgical History:   Procedure Laterality Date    APPENDECTOMY      ARTHROPLASTY, KNEE, TOTAL Left 2/6/2013    [facility] 2ND FLR    COLONOSCOPY      08    diagnostic block of  the genicular branches to the left knee Left 8/3/2017    [facility]    EGD (ESOPHAGOGASTRODUODENOSCOPY) N/A 1/6/2014    Performed by Madan Beltran MD at [facility] ENDO (4TH FLR)    EGD (ESOPHAGOGASTRODUODENOSCOPY) N/A 1/28/2013    Performed by Yuniel Montana MD at [facility] ENDO (2ND FLR)    EYE SURGERY      bilateral cataract    FINGER SURGERY      LT thumb surgery--"arthritis surgery"    HYSTERECTOMY      UNKNOWN BSO    JOINT REPLACEMENT      right hip replacement    KNEE ARTHROPLASTY Left 2001    TONSILLECTOMY      TUMOR EXCISION      esophageal tumor removal     UPPER GASTROINTESTINAL ENDOSCOPY      2013       Review of patient's allergies indicates:   Allergen Reactions    Aspirin Nausea Only and Other (See Comments)     " Other reaction(s): esophageal pain    Celebrex [celecoxib] Rash    Morphine Hallucinations    Steroid [corticosteroids (glucocorticoids)] Other (See Comments)     Other reaction(s): Flushing (skin)    Sulfa dyne Other (See Comments)     Other reaction(s): esophogeal pain       No current facility-administered medications on file prior to encounter.      Current Outpatient Medications on File Prior to Encounter   Medication Sig    acetaminophen (TYLENOL) 650 MG TbSR Take 1 tablet (650 mg total) by mouth every 6 (six) hours as needed (last dose may be left at bedside for patient to take at night).    atorvastatin (LIPITOR) 20 MG tablet TAKE 1 TABLET BY MOUTH ONCE DAILY.    benazepril (LOTENSIN) 20 MG tablet TAKE 2 TABLETS(40 MG) BY MOUTH EVERY DAY    donepezil (ARICEPT) 5 MG tablet Take 1 tablet (5 mg total) by mouth every evening.    escitalopram oxalate (LEXAPRO) 20 MG tablet Take 1/2 pill nightly for a week and then 1 pill nightly onwards.    fentaNYL (DURAGESIC) 12 mcg/hr PT72 Place 1 patch onto the skin every 72 hours.    furosemide (LASIX) 40 MG tablet TAKE 1 TABLET BY MOUTH ONCE DAILY.    lidocaine-prilocaine (EMLA) cream Apply topically as needed. Apply to painful areas 3 times per day as needed    melatonin 5 mg Tab Take 1 tablet by mouth every evening.     metoprolol succinate (TOPROL-XL) 100 MG 24 hr tablet Take 1 tablet (100 mg total) by mouth once daily.    MULTIVITAMIN W-MINERALS/LUTEIN (CENTRUM SILVER ORAL) Take by mouth once daily.    mupirocin (BACTROBAN) 2 % ointment Apply topically 2 (two) times daily.    nitroGLYCERIN (NITROSTAT) 0.4 MG SL tablet Place 1 tablet (0.4 mg total) under the tongue every 5 (five) minutes as needed for Chest pain (do not take more than 3 tablets in a row).    nystatin (MYCOSTATIN) powder Apply topically 2 (two) times daily. To groin area    omeprazole (PRILOSEC) 20 MG capsule Take 1 capsule (20 mg total) by mouth once daily.    traMADol (ULTRAM) 50 mg  tablet Take 1 tablet (50 mg total) by mouth As instructed for Pain. Take one tablets twice per day, and 1 tablet at bedtime as needed.     Family History     Problem Relation (Age of Onset)    Asthma Father    Cancer Brother    Heart disease Mother, Father, Brother        Tobacco Use    Smoking status: Never Smoker    Smokeless tobacco: Never Used   Substance and Sexual Activity    Alcohol use: No    Drug use: Never     Types: Hydrocodone    Sexual activity: Not Currently     Birth control/protection: None     Review of Systems   Unable to perform ROS: Acuity of condition   HENT: Negative for drooling, sore throat, trouble swallowing and voice change.    Eyes: Negative for photophobia, pain, redness and visual disturbance.   Respiratory: Negative for cough, shortness of breath, wheezing and stridor.    Cardiovascular: Positive for leg swelling (chronic). Negative for chest pain.   Gastrointestinal: Negative for abdominal distention, abdominal pain, diarrhea and vomiting.   Neurological: Positive for headaches. Negative for tremors, syncope, facial asymmetry, speech difficulty and weakness.   Psychiatric/Behavioral: Positive for confusion. Negative for agitation, hallucinations and sleep disturbance.     Objective:     Vital Signs (Most Recent):  Temp: 97.6 °F (36.4 °C) (05/27/19 1256)  Pulse: 75 (05/27/19 1301)  Resp: 16 (05/27/19 1232)  BP: (!) 167/72 (05/27/19 1301)  SpO2: (!) 93 % (05/27/19 1330) Vital Signs (24h Range):  Temp:  [97.6 °F (36.4 °C)-100.9 °F (38.3 °C)] 97.6 °F (36.4 °C)  Pulse:  [75-93] 75  Resp:  [16-18] 16  SpO2:  [93 %-96 %] 93 %  BP: (167-199)/(70-98) 167/72     Weight: 99.8 kg (220 lb)  Body mass index is 35.51 kg/m².    Physical Exam   Constitutional: She appears well-developed and well-nourished.   HENT:   Head: Normocephalic and atraumatic.   Eyes: Pupils are equal, round, and reactive to light. EOM are normal.   Neck: Normal range of motion. Neck supple. No tracheal deviation present.  No thyromegaly present.   Cardiovascular: Normal rate and regular rhythm.   No murmur heard.  Pulmonary/Chest: Effort normal and breath sounds normal. She has no wheezes.   Abdominal: Soft. Bowel sounds are normal. There is no tenderness.   Musculoskeletal: Normal range of motion. She exhibits edema (b/l chronic 1+ edema). She exhibits no tenderness.   Lymphadenopathy:     She has no cervical adenopathy.   Neurological: She is alert. She has normal reflexes. No cranial nerve deficit. GCS eye subscore is 4. GCS verbal subscore is 4. GCS motor subscore is 6.   Oriented to person, daughter.  Not oriented to place.  She is generally not oriented to time.   Skin: Skin is warm and dry.   Psychiatric: She has a normal mood and affect. Her behavior is normal.   Nursing note and vitals reviewed.        CRANIAL NERVES     CN III, IV, VI   Pupils are equal, round, and reactive to light.  Extraocular motions are normal.        Significant Labs: All pertinent labs within the past 24 hours have been reviewed.    Significant Imaging: I have reviewed all pertinent imaging results/findings within the past 24 hours.    Assessment/Plan:     * Encephalopathy, metabolic  Differential includes infectious encephalopathy (septic encephalopathy 2017 diverticulitis), medication noncompliance/HTN encephalopathy, narcotic overdose, TIA/stroke  Procal negative.  Flu negative.  WBC WNL, lactic acid negative.  Tylenol level <3.  UA fairly unremarkable.    CT head no significant change from prior.  Continued cerebral volume loss with mild moderate patchy hypoattenuation supratentorial white matter suggestive for chronic ischemic change.  No evidence for acute intracranial hemorrhage or significant new abnormal parenchymal attenuation allowing for motion limitation.    CXR Mild cardiomegaly and accentuation of the basilar interstitial markings.  No significant airspace consolidation or pleural effusion identified.  Pt given naloxone with minimal  improvement.  Pt given ceftriaxone 2 gm in the ED.  Pt improving at time of hospital medicine admission.  Discussed case with Dr. Felipe, no additional imaging or antibiotics at this time.  Ordered ESR, CRP.  Vit D, B1, B6, B12, TSH.  Neuro checks q 4 hrs.  Recommend MRI as next step if deficit or worsening of mental status.  1500 RN called to report additional fentanyl patch found behind L knee in addition to patch that was taken off of her arm earlier.  5/20 physical medicine and rehab AVS states 1 patch every 72 hrs.    Hypertensive urgency  Pt recently discharged 5/9/19 for CP.  BP meds adjusted, benazepril increased from 20 mg to 40 mg daily.  Toprol  mg continued.  BP upon arrival 199/86.  Pt is at assisted living facility, possible noncompliance with beds.  Will closely monitor BP after receiving home meds.  Prn hydralazine for now.  Will add additional agent if indicated, do not want to lower BP too rapidly.  No allergy to ACE/ARB.      Essential hypertension  As above      Depressed mood  Continue Lexapro 20 mg every night, Aricept 5 mg every night, Melatonin 5 mg every night      Hypertensive heart disease with heart failure  Last echo 5/9/19, Diastolic, EF 65%      Aortic stenosis  5/9/19 moderate AV stenosis  Continue toprol  mg    GERD (gastroesophageal reflux disease)  Continue PPI      Arthritis  Chronic OA of L hip, chronic L knee pain.  Will hold tramadol and fentanyl patch.  Tylenol prn, lidocaine patches.      Hyperlipidemia  Continue atorvastatin.        VTE Risk Mitigation (From admission, onward)        Ordered     Place sequential compression device  Until discontinued      05/27/19 1213     IP VTE HIGH RISK PATIENT  Once      05/27/19 1213             Rebeca Johnson PA-C  Department of Hospital Medicine   [facility]

## 2019-05-27 NOTE — SUBJECTIVE & OBJECTIVE
"Past Medical History:   Diagnosis Date    Aortic stenosis 6/17/2015    Arthritis     Atrophic vaginitis 2/18/2016    Bilateral edema of lower extremity 6/22/2016    CHF (congestive heart failure)     Cholelithiasis without cholecystitis 5/5/2017    Chronic pain of left knee 8/3/2017    Depressed mood 6/22/2016    Diverticulitis of large intestine without perforation or abscess without bleeding 5/5/2017    Encounter for blood transfusion     Essential hypertension     GERD (gastroesophageal reflux disease)     Hyperlipidemia     Hypertension     Hypertensive heart disease with heart failure 7/14/2015    Insomnia     Joint pain     Knee pain, left 08/2016    pain with walking or standing    Primary osteoarthritis of knee 2/5/2013    PUD (peptic ulcer disease)     S/P knee replacement 2/13/2013    Slow transit constipation 2/18/2016       Past Surgical History:   Procedure Laterality Date    APPENDECTOMY      ARTHROPLASTY, KNEE, TOTAL Left 2/6/2013    [facility] OR 2ND FLR    COLONOSCOPY      08    diagnostic block of  the genicular branches to the left knee Left 8/3/2017    [facility]    EGD (ESOPHAGOGASTRODUODENOSCOPY) N/A 1/6/2014    Performed by Madan Beltran [facility] at [facility] ENDO (4TH FLR)    EGD (ESOPHAGOGASTRODUODENOSCOPY) N/A 1/28/2013    Performed by Yuniel Montana MD at [facility] ENDO (2ND FLR)    EYE SURGERY      bilateral cataract    FINGER SURGERY      LT thumb surgery--"arthritis surgery"    HYSTERECTOMY      UNKNOWN BSO    JOINT REPLACEMENT      right hip replacement    KNEE ARTHROPLASTY Left 2001    TONSILLECTOMY      TUMOR EXCISION      esophageal tumor removal     UPPER GASTROINTESTINAL ENDOSCOPY      2013       Review of patient's allergies indicates:   Allergen Reactions    Aspirin Nausea Only and Other (See Comments)     Other reaction(s): esophageal pain    Celebrex [celecoxib] Rash    Morphine " Hallucinations    Steroid [corticosteroids (glucocorticoids)] Other (See Comments)     Other reaction(s): Flushing (skin)    Sulfa dyne Other (See Comments)     Other reaction(s): esophogeal pain       No current facility-administered medications on file prior to encounter.      Current Outpatient Medications on File Prior to Encounter   Medication Sig    acetaminophen (TYLENOL) 650 MG TbSR Take 1 tablet (650 mg total) by mouth every 6 (six) hours as needed (last dose may be left at bedside for patient to take at night).    atorvastatin (LIPITOR) 20 MG tablet TAKE 1 TABLET BY MOUTH ONCE DAILY.    benazepril (LOTENSIN) 20 MG tablet TAKE 2 TABLETS(40 MG) BY MOUTH EVERY DAY    donepezil (ARICEPT) 5 MG tablet Take 1 tablet (5 mg total) by mouth every evening.    escitalopram oxalate (LEXAPRO) 20 MG tablet Take 1/2 pill nightly for a week and then 1 pill nightly onwards.    fentaNYL (DURAGESIC) 12 mcg/hr PT72 Place 1 patch onto the skin every 72 hours.    furosemide (LASIX) 40 MG tablet TAKE 1 TABLET BY MOUTH ONCE DAILY.    lidocaine-prilocaine (EMLA) cream Apply topically as needed. Apply to painful areas 3 times per day as needed    melatonin 5 mg Tab Take 1 tablet by mouth every evening.     metoprolol succinate (TOPROL-XL) 100 MG 24 hr tablet Take 1 tablet (100 mg total) by mouth once daily.    MULTIVITAMIN W-MINERALS/LUTEIN (CENTRUM SILVER ORAL) Take by mouth once daily.    mupirocin (BACTROBAN) 2 % ointment Apply topically 2 (two) times daily.    nitroGLYCERIN (NITROSTAT) 0.4 MG SL tablet Place 1 tablet (0.4 mg total) under the tongue every 5 (five) minutes as needed for Chest pain (do not take more than 3 tablets in a row).    nystatin (MYCOSTATIN) powder Apply topically 2 (two) times daily. To groin area    omeprazole (PRILOSEC) 20 MG capsule Take 1 capsule (20 mg total) by mouth once daily.    traMADol (ULTRAM) 50 mg tablet Take 1 tablet (50 mg total) by mouth As instructed for Pain. Take one  tablets twice per day, and 1 tablet at bedtime as needed.     Family History     Problem Relation (Age of Onset)    Asthma Father    Cancer Brother    Heart disease Mother, Father, Brother        Tobacco Use    Smoking status: Never Smoker    Smokeless tobacco: Never Used   Substance and Sexual Activity    Alcohol use: No    Drug use: Never     Types: Hydrocodone    Sexual activity: Not Currently     Birth control/protection: None     Review of Systems   Unable to perform ROS: Acuity of condition   HENT: Negative for drooling, sore throat, trouble swallowing and voice change.    Eyes: Negative for photophobia, pain, redness and visual disturbance.   Respiratory: Negative for cough, shortness of breath, wheezing and stridor.    Cardiovascular: Positive for leg swelling (chronic). Negative for chest pain.   Gastrointestinal: Negative for abdominal distention, abdominal pain, diarrhea and vomiting.   Neurological: Positive for headaches. Negative for tremors, syncope, facial asymmetry, speech difficulty and weakness.   Psychiatric/Behavioral: Positive for confusion. Negative for agitation, hallucinations and sleep disturbance.     Objective:     Vital Signs (Most Recent):  Temp: 97.6 °F (36.4 °C) (05/27/19 1256)  Pulse: 75 (05/27/19 1301)  Resp: 16 (05/27/19 1232)  BP: (!) 167/72 (05/27/19 1301)  SpO2: (!) 93 % (05/27/19 1330) Vital Signs (24h Range):  Temp:  [97.6 °F (36.4 °C)-100.9 °F (38.3 °C)] 97.6 °F (36.4 °C)  Pulse:  [75-93] 75  Resp:  [16-18] 16  SpO2:  [93 %-96 %] 93 %  BP: (167-199)/(70-98) 167/72     Weight: 99.8 kg (220 lb)  Body mass index is 35.51 kg/m².    Physical Exam   Constitutional: She appears well-developed and well-nourished.   HENT:   Head: Normocephalic and atraumatic.   Eyes: Pupils are equal, round, and reactive to light. EOM are normal.   Neck: Normal range of motion. Neck supple. No tracheal deviation present. No thyromegaly present.   Cardiovascular: Normal rate and regular rhythm.    No murmur heard.  Pulmonary/Chest: Effort normal and breath sounds normal. She has no wheezes.   Abdominal: Soft. Bowel sounds are normal. There is no tenderness.   Musculoskeletal: Normal range of motion. She exhibits edema (b/l chronic 1+ edema). She exhibits no tenderness.   Lymphadenopathy:     She has no cervical adenopathy.   Neurological: She is alert. She has normal reflexes. No cranial nerve deficit. GCS eye subscore is 4. GCS verbal subscore is 4. GCS motor subscore is 6.   Oriented to person, daughter.  Not oriented to place.  She is generally not oriented to time.   Skin: Skin is warm and dry.   Psychiatric: She has a normal mood and affect. Her behavior is normal.   Nursing note and vitals reviewed.        CRANIAL NERVES     CN III, IV, VI   Pupils are equal, round, and reactive to light.  Extraocular motions are normal.        Significant Labs: All pertinent labs within the past 24 hours have been reviewed.    Significant Imaging: I have reviewed all pertinent imaging results/findings within the past 24 hours.

## 2019-05-27 NOTE — ED NOTES
Pt now more alert, remains confused.  Daughter at bedside states pt with dementia, but is usually able to state her name and date of birth.  Pt and daughter updated on plan of care and v/u.  Will continue to monitor

## 2019-05-28 LAB
ANION GAP SERPL CALC-SCNC: 9 MMOL/L (ref 8–16)
BASOPHILS # BLD AUTO: 0.05 K/UL (ref 0–0.2)
BASOPHILS NFR BLD: 0.6 % (ref 0–1.9)
BUN SERPL-MCNC: 11 MG/DL (ref 8–23)
CALCIUM SERPL-MCNC: 9.5 MG/DL (ref 8.7–10.5)
CHLORIDE SERPL-SCNC: 101 MMOL/L (ref 95–110)
CO2 SERPL-SCNC: 30 MMOL/L (ref 23–29)
CREAT SERPL-MCNC: 0.7 MG/DL (ref 0.5–1.4)
DIFFERENTIAL METHOD: NORMAL
EOSINOPHIL # BLD AUTO: 0.1 K/UL (ref 0–0.5)
EOSINOPHIL NFR BLD: 0.9 % (ref 0–8)
ERYTHROCYTE [DISTWIDTH] IN BLOOD BY AUTOMATED COUNT: 13.9 % (ref 11.5–14.5)
EST. GFR  (AFRICAN AMERICAN): >60 ML/MIN/1.73 M^2
EST. GFR  (NON AFRICAN AMERICAN): >60 ML/MIN/1.73 M^2
GLUCOSE SERPL-MCNC: 89 MG/DL (ref 70–110)
HCT VFR BLD AUTO: 41.1 % (ref 37–48.5)
HGB BLD-MCNC: 13.5 G/DL (ref 12–16)
IMM GRANULOCYTES # BLD AUTO: 0.04 K/UL (ref 0–0.04)
IMM GRANULOCYTES NFR BLD AUTO: 0.4 % (ref 0–0.5)
LYMPHOCYTES # BLD AUTO: 1.7 K/UL (ref 1–4.8)
LYMPHOCYTES NFR BLD: 18.4 % (ref 18–48)
MCH RBC QN AUTO: 28.1 PG (ref 27–31)
MCHC RBC AUTO-ENTMCNC: 32.8 G/DL (ref 32–36)
MCV RBC AUTO: 86 FL (ref 82–98)
MONOCYTES # BLD AUTO: 0.9 K/UL (ref 0.3–1)
MONOCYTES NFR BLD: 10.3 % (ref 4–15)
NEUTROPHILS # BLD AUTO: 6.3 K/UL (ref 1.8–7.7)
NEUTROPHILS NFR BLD: 69.4 % (ref 38–73)
NRBC BLD-RTO: 0 /100 WBC
PLATELET # BLD AUTO: 196 K/UL (ref 150–350)
PMV BLD AUTO: 10.5 FL (ref 9.2–12.9)
POTASSIUM SERPL-SCNC: 3.6 MMOL/L (ref 3.5–5.1)
RBC # BLD AUTO: 4.8 M/UL (ref 4–5.4)
RPR SER QL: NORMAL
SODIUM SERPL-SCNC: 140 MMOL/L (ref 136–145)
WBC # BLD AUTO: 9.02 K/UL (ref 3.9–12.7)

## 2019-05-28 PROCEDURE — 97165 OT EVAL LOW COMPLEX 30 MIN: CPT | Mod: HCNC

## 2019-05-28 PROCEDURE — 97161 PT EVAL LOW COMPLEX 20 MIN: CPT | Mod: HCNC

## 2019-05-28 PROCEDURE — 97530 THERAPEUTIC ACTIVITIES: CPT | Mod: HCNC

## 2019-05-28 PROCEDURE — 11000001 HC ACUTE MED/SURG PRIVATE ROOM: Mod: HCNC

## 2019-05-28 PROCEDURE — 25000003 PHARM REV CODE 250: Mod: HCNC | Performed by: PHYSICIAN ASSISTANT

## 2019-05-28 PROCEDURE — 80048 BASIC METABOLIC PNL TOTAL CA: CPT | Mod: HCNC

## 2019-05-28 PROCEDURE — 36415 COLL VENOUS BLD VENIPUNCTURE: CPT | Mod: HCNC

## 2019-05-28 PROCEDURE — 99233 SBSQ HOSP IP/OBS HIGH 50: CPT | Mod: HCNC,,, | Performed by: HOSPITALIST

## 2019-05-28 PROCEDURE — 85025 COMPLETE CBC W/AUTO DIFF WBC: CPT | Mod: HCNC

## 2019-05-28 PROCEDURE — G0378 HOSPITAL OBSERVATION PER HR: HCPCS | Mod: HCNC

## 2019-05-28 PROCEDURE — 99233 PR SUBSEQUENT HOSPITAL CARE,LEVL III: ICD-10-PCS | Mod: HCNC,,, | Performed by: HOSPITALIST

## 2019-05-28 RX ORDER — FENTANYL 12.5 UG/1
1 PATCH TRANSDERMAL
Status: DISCONTINUED | OUTPATIENT
Start: 2019-05-29 | End: 2019-06-05 | Stop reason: HOSPADM

## 2019-05-28 RX ORDER — ACETAMINOPHEN 325 MG/1
650 TABLET ORAL EVERY 6 HOURS PRN
Status: DISCONTINUED | OUTPATIENT
Start: 2019-05-28 | End: 2019-06-05 | Stop reason: HOSPADM

## 2019-05-28 RX ADMIN — FUROSEMIDE 40 MG: 40 TABLET ORAL at 09:05

## 2019-05-28 RX ADMIN — MICONAZOLE NITRATE: 20 POWDER TOPICAL at 09:05

## 2019-05-28 RX ADMIN — LIDOCAINE 2 PATCH: 50 PATCH TOPICAL at 02:05

## 2019-05-28 RX ADMIN — PANTOPRAZOLE SODIUM 40 MG: 40 TABLET, DELAYED RELEASE ORAL at 09:05

## 2019-05-28 RX ADMIN — LIDOCAINE AND PRILOCAINE: 25; 25 CREAM TOPICAL at 09:05

## 2019-05-28 RX ADMIN — DONEPEZIL HYDROCHLORIDE 5 MG: 5 TABLET, FILM COATED ORAL at 09:05

## 2019-05-28 RX ADMIN — BENAZEPRIL HYDROCHLORIDE 40 MG: 20 TABLET, FILM COATED ORAL at 09:05

## 2019-05-28 RX ADMIN — ATORVASTATIN CALCIUM 20 MG: 10 TABLET, FILM COATED ORAL at 09:05

## 2019-05-28 RX ADMIN — ESCITALOPRAM OXALATE 20 MG: 20 TABLET ORAL at 08:05

## 2019-05-28 RX ADMIN — MULTIPLE VITAMINS W/ MINERALS TAB 1 TABLET: TAB at 09:05

## 2019-05-28 RX ADMIN — METOPROLOL SUCCINATE 100 MG: 100 TABLET, EXTENDED RELEASE ORAL at 09:05

## 2019-05-28 RX ADMIN — ACETAMINOPHEN 650 MG: 325 TABLET ORAL at 07:05

## 2019-05-28 RX ADMIN — ACETAMINOPHEN 650 MG: 325 TABLET ORAL at 08:05

## 2019-05-28 RX ADMIN — MICONAZOLE NITRATE: 20 POWDER TOPICAL at 02:05

## 2019-05-28 NOTE — PLAN OF CARE
Problem: Physical Therapy Goal  Goal: Physical Therapy Goal  Goals to be met by: 2019     Patient will increase functional independence with mobility by performin. Supine to sit with Modified Coleman  2. Sit to supine with Modified Coleman  3. Sit to stand transfer with Supervision  4. Bed to chair transfer with Supervision using LRAD  5. Gait  x 100 feet with Stand-by Assistance using LRAD     Outcome: Ongoing (interventions implemented as appropriate)  Eval completed and POC established    Estiven Pires PT,DPT  2019

## 2019-05-28 NOTE — PLAN OF CARE
Problem: Adult Inpatient Plan of Care  Goal: Plan of Care Review  Outcome: Ongoing (interventions implemented as appropriate)     05/28/19 7396   Plan of Care Review   Plan of Care Reviewed With patient;son   Pt is A,A,O x 2 today , stable V/S . Pt C/O pain in her back and PRN tylenol given as needed . Pt is able to turn in the bed with minimal assist , incontinence care done as needed , skin remains intact . Pt was up in the chair during the day . Pt remains free of falls and injuries . Fall precautions maintained , son at the bedside and tele sitter in the room .

## 2019-05-28 NOTE — PLAN OF CARE
05/28/19 1619   Discharge Assessment   Assessment Type Discharge Planning Assessment   Confirmed/corrected address and phone number on facesheet? Yes   Assessment information obtained from? Patient   Expected Length of Stay (days) 3   Communicated expected length of stay with patient/caregiver yes   Prior to hospitilization cognitive status: Alert/Oriented   Prior to hospitalization functional status: Independent   Current cognitive status: Alert/Oriented   Current Functional Status: Independent   Lives With facility resident  (assisted living )   Able to Return to Prior Arrangements yes   Is patient able to care for self after discharge? Unable to determine at this time (comments)   Who are your caregiver(s) and their phone number(s)? Belkys [phone]   Patient's perception of discharge disposition home health   Readmission Within the Last 30 Days no previous admission in last 30 days   Patient currently being followed by outpatient case management? No   Patient currently receives any other outside agency services? No   Equipment Currently Used at Home walker, rolling;wheelchair   Do you have any problems affording any of your prescribed medications? No   Is the patient taking medications as prescribed? yes   Does the patient have transportation home? No   Transportation Anticipated health plan transportation   Does the patient receive services at the [facility]? No   Discharge Plan A Home Health   Discharge Plan B Skilled Nursing Facility   DME Needed Upon Discharge  none   Patient/Family in Agreement with Plan yes     Pt currently in [facility]  Per Family will need W/C lift can at discharge

## 2019-05-28 NOTE — PROGRESS NOTES
Hospital Medicine  Progress note    Team: [facility] HOSP MED R Jacky Cooper MD  Admit Date: 5/27/2019  MARLO 5/30/2019  Length of Stay:  LOS: 1 day   Code status: Full Code    Principal Problem:  Encephalopathy, metabolic    Overview:    Interval hx: Patient seen and examined at bedside, mental status changed resolved. Fentanyl patch resumed to avoid opioid withdrawal. Seen by PT/OT, who recommends home health.     ROS     HENT: Negative for drooling, sore throat, trouble swallowing and voice change.    Eyes: Negative for photophobia, pain, redness and visual disturbance.   Respiratory: Negative for cough, shortness of breath, wheezing and stridor.    Cardiovascular: Positive for leg swelling (chronic). Negative for chest pain.   Gastrointestinal: Negative for abdominal distention, abdominal pain, diarrhea and vomiting.   Neurological: Positive for headaches. Negative for tremors, syncope, facial asymmetry, speech difficulty and weakness.   Psychiatric/Behavioral: Positive for confusion. Negative for agitation, hallucinations and sleep disturbance.     PEx  Temp:  [97.5 °F (36.4 °C)-98.7 °F (37.1 °C)]   Pulse:  []   Resp:  [17-18]   BP: (129-160)/(60-70)   SpO2:  [93 %-96 %]     Intake/Output Summary (Last 24 hours) at 5/28/2019 1813  Last data filed at 5/28/2019 1800  Gross per 24 hour   Intake 990 ml   Output --   Net 990 ml       Constitutional: She appears well-developed and well-nourished.   HENT:   Head: Normocephalic and atraumatic.   Eyes: Pupils are equal, round, and reactive to light. EOM are normal.   Neck: Normal range of motion. Neck supple. No tracheal deviation present. No thyromegaly present.   Cardiovascular: Normal rate and regular rhythm.   No murmur heard.  Pulmonary/Chest: Effort normal and breath sounds normal. She has no wheezes.   Abdominal: Soft. Bowel sounds are normal. There is no tenderness.   Musculoskeletal: Normal range of motion. She exhibits edema (b/l chronic 1+ edema). She  exhibits no tenderness.   Lymphadenopathy:     She has no cervical adenopathy.   Neurological: She is alert. She has normal reflexes. No cranial nerve deficit. GCS eye subscore is 4. GCS verbal subscore is 4. GCS motor subscore is 6.   Oriented to person, daughter.  Not oriented to place.  She is generally not oriented to time.   Skin: Skin is warm and dry.   Psychiatric: She has a normal mood and affect. Her behavior is normal.   Nursing note and vitals reviewed.    Recent Labs   Lab 05/27/19  0852 05/28/19  0527   WBC 9.62 9.02   HGB 15.2 13.5   HCT 46.6 41.1    196     Recent Labs   Lab 05/27/19  1018 05/28/19  0527    140   K 3.7 3.6    101   CO2 25 30*   BUN 10 11   CREATININE 0.7 0.7   * 89   CALCIUM 9.5 9.5   MG 1.6  --    PHOS 2.8  --      Recent Labs   Lab 05/27/19  1018   ALKPHOS 90   ALT 11   AST 17   ALBUMIN 3.5   PROT 7.4   BILITOT 0.7        No results for input(s): CPK, CPKMB, MB, TROPONINI in the last 72 hours.  Recent Labs   Lab 05/27/19  0853   POCTGLUCOSE 134*     No results found for: HGBA1C    Scheduled Meds:   atorvastatin  20 mg Oral Daily    benazepril  40 mg Oral Daily    donepezil  5 mg Oral QHS    escitalopram oxalate  20 mg Oral QHS    furosemide  40 mg Oral Daily    lidocaine  2 patch Transdermal Q24H    metoprolol succinate  100 mg Oral Daily    miconazole NITRATE 2 %   Topical (Top) BID    multivit-iron-FA-calcium-mins  1 tablet Oral Daily    pantoprazole  40 mg Oral Daily     Continuous Infusions:  As Needed:  acetaminophen, INV hydrALAZINE, lidocaine-prilocaine, nitroGLYCERIN, sodium chloride 0.9%, sodium chloride 0.9%    Active Hospital Problems    Diagnosis  POA    *Encephalopathy, metabolic [G93.41]  Unknown    Fever [R50.9]  Yes    Hypertensive urgency [I16.0]  Yes    Depressed mood [F32.9]  Yes    Hypertensive heart disease with heart failure [I11.0]  Yes    Aortic stenosis [I35.0]  Yes    Essential hypertension [I10]  Yes     Hyperlipidemia [E78.5]  Yes    GERD (gastroesophageal reflux disease) [K21.9]  Yes    Arthritis [M19.90]  Yes      Resolved Hospital Problems   No resolved problems to display.         Assessment and Plan    Encephalopathy, metabolic  Differential includes infectious encephalopathy (septic encephalopathy 2017 diverticulitis), medication noncompliance/HTN encephalopathy, narcotic overdose, TIA/stroke  Procal negative.  Flu negative.  WBC WNL, lactic acid negative.  Tylenol level <3.  UA fairly unremarkable.    CT head no significant change from prior.  Continued cerebral volume loss with mild moderate patchy hypoattenuation supratentorial white matter suggestive for chronic ischemic change.  No evidence for acute intracranial hemorrhage or significant new abnormal parenchymal attenuation allowing for motion limitation.    CXR Mild cardiomegaly and accentuation of the basilar interstitial markings.  No significant airspace consolidation or pleural effusion identified.  Pt given naloxone with minimal improvement.  Pt given ceftriaxone 2 gm in the ED.  Pt improving at time of hospital medicine admission.  Discussed case with Dr. Felipe, no additional imaging or antibiotics at this time.  Neuro checks q 4 hrs.  Recommend MRI as next step if deficit or worsening of mental status.  1500 RN called to report additional fentanyl patch found behind L knee in addition to patch that was taken off of her arm earlier.  5/20 physical medicine and rehab AVS states 1 patch every 72 hrs.     Hypertensive urgency  Pt recently discharged 5/9/19 for CP.  BP meds adjusted, benazepril increased from 20 mg to 40 mg daily.  Toprol  mg continued.  BP upon arrival 199/86.  Pt is at assisted living facility, possible noncompliance with beds.  Will closely monitor BP after receiving home meds.  Prn hydralazine for now.  Will add additional agent if indicated, do not want to lower BP too rapidly.  No allergy to ACE/ARB.        Essential  hypertension  As above        Depressed mood  Continue Lexapro 20 mg every night, Aricept 5 mg every night, Melatonin 5 mg every night      Hypertensive heart disease with heart failure  Last echo 5/9/19, Diastolic, EF 65%        Aortic stenosis  5/9/19 moderate AV stenosis  Continue toprol  mg     GERD (gastroesophageal reflux disease)  Continue PPI     Arthritis  Chronic OA of L hip, chronic L knee pain.  Hold tramadol, resume fentanyl patch   Tylenol prn, lidocaine patches.        Hyperlipidemia  Continue atorvastatin.    High Risk Conditions  HTN urgency, chronic pain      Diet:  Cardiac diet  GI PPx:   DVT PPx:    HAILEY/SCD    Goals of Care: Full code  Discharge plan: pending improvement in mental status     Time (minutes) spent in care of the patient (Greater than 1/2 spent in direct face-to-face contact) 35 minutes     Jacky Samuel MD  Staff Hospitalist  Department of Hospital Medicine  [facility]   [phone]

## 2019-05-28 NOTE — PT/OT/SLP EVAL
"Physical Therapy Evaluation    Patient Name:  Lilia Hidalgo   MRN:  [id number]    Recommendations:     Discharge Recommendations:  home health PT, home health OT   Discharge Equipment Recommendations: none   Barriers to discharge: Decreased caregiver support    Assessment:     Lilia Hidalgo is a 87 y.o. female admitted with a medical diagnosis of Encephalopathy, metabolic.  She presents with the following impairments/functional limitations:  weakness, impaired self care skills, impaired balance, impaired functional mobilty, gait instability, impaired cognition, decreased upper extremity function, decreased lower extremity function, decreased safety awareness.  Pt demo decreased functional mobility secondary to weakness and impaired cognition.  Performed bed mobility c mod A, transfer c CGA and gait c CGA. Pt able to ambulate short distance and demo decreased gait speed, unsteadiness, flat foot contact, increased lateral trunk lean and c/o fatigue. Pt safe to transfer to/from bedside chair c assistance of 1x person. Pt would benefit from continued skilled acute PT 3x/wk to improve functional mobility.  Recommending pt receive PT services in HH setting following d/c from hospital once medically cleared.      Rehab Prognosis: Good; patient would benefit from acute skilled PT services to address these deficits and reach maximum level of function.    Recent Surgery: * No surgery found *      Plan:     During this hospitalization, patient to be seen 3 x/week to address the identified rehab impairments via gait training, therapeutic activities, therapeutic exercises, neuromuscular re-education and progress toward the following goals:    · Plan of Care Expires:  06/22/19    Subjective     Chief Complaint: confusion; weakness  Patient/Family Comments/goals: "I'm suppose to be helping somebody by going here." - when asked why pt in hospital  Pain/Comfort:  · Pain Rating 1: 0/10    Patients cultural, spiritual, Episcopalian " conflicts given the current situation: no    Living Environment:  Pt [facility]  PTA reports no falls.  Prior to admission, patients level of function was requiring assistance c bathing; ambulating c Rollator.  Equipment used at home: wheelchair, rollator.  DME owned (not currently used): none.  Upon discharge, patient will have assistance from self c limited assistance from [facility] staff.    Objective:     Communicated with RN and OT prior to session.  Patient found HOB elevated with peripheral IV, telemetry  upon PT entry to room.    General Precautions: Standard, fall, seizure, aspiration   Orthopedic Precautions:N/A   Braces: N/A     Exams:  · Cognitive Exam:  Patient is oriented to Person, Place and Time  · RLE ROM: WFL  · RLE Strength: grossly 4/5  · LLE ROM: WFL  · LLE Strength: WFL    Functional Mobility:  · Bed Mobility:     · Rolling Right: moderate assistance  · Scooting: moderate assistance  · Supine to Sit: moderate assistance  · Transfers:     · Sit to Stand:  contact guard assistance with hand-held assist  · Bed to Chair: contact guard assistance with  rolling walker  using  Step Transfer  · Gait: 6 steps to bedside chair c RW CGA   · demo decreased gait speed, unsteadiness, flat foot contact, increased lateral trunk lean and c/o fatigue  · Balance: sitting (S); standing (CGA)      Therapeutic Activities and Exercises:   Pt educated on: PT role/POC; safety c mobility; benefits of OOB activities; performing therex; d/c recs - v/u  -sat EOB x5mins  -sit<>stand 3x from bed in lowest position  -bedding changed d/t soiling    AM-PAC 6 CLICK MOBILITY  Total Score:14     Patient left up in chair with all lines intact, call button in reach, chair alarm on, RN notified and son present.    GOALS:   Multidisciplinary Problems     Physical Therapy Goals        Problem: Physical Therapy Goal    Goal Priority Disciplines Outcome Goal Variances Interventions   Physical Therapy Goal      PT, PT/OT Ongoing (interventions implemented as appropriate)     Description:  Goals to be met by: 2019     Patient will increase functional independence with mobility by performin. Supine to sit with Modified [place]  2. Sit to supine with Modified [place]  3. Sit to stand transfer with Supervision  4. Bed to chair transfer with Supervision using LRAD  5. Gait  x 100 feet with Stand-by Assistance using LRAD                       History:     Past Medical History:   Diagnosis Date    Aortic stenosis 2015    Arthritis     Atrophic vaginitis 2016    Bilateral edema of lower extremity 2016    CHF (congestive heart failure)     Cholelithiasis without cholecystitis 2017    Chronic pain of left knee 8/3/2017    Depressed mood 2016    Diverticulitis of large intestine without perforation or abscess without bleeding 2017    Encounter for blood transfusion     Essential hypertension     GERD (gastroesophageal reflux disease)     Hyperlipidemia     Hypertension     Hypertensive heart disease with heart failure 2015    Insomnia     Joint pain     Knee pain, left 2016    pain with walking or standing    Primary osteoarthritis of knee 2013    PUD (peptic ulcer disease)     S/P knee replacement 2013    Slow transit constipation 2016       Past Surgical History:   Procedure Laterality Date    APPENDECTOMY      ARTHROPLASTY, KNEE, TOTAL Left 2013    [facility] 2ND FLR    COLONOSCOPY      08    diagnostic block of  the genicular branches to the left knee Left 8/3/2017    [facility]    EGD (ESOPHAGOGASTRODUODENOSCOPY) N/A 2014    Performed by Madan [facility]tran [facility] at [facility] ENDO (4TH FLR)    EGD (ESOPHAGOGASTRODUODENOSCOPY) N/A 2013    Performed by Yuniel Montana MD at [facility] ENDO (2ND FLR)    EYE SURGERY      bilateral cataract    FINGER SURGERY       "LT thumb surgery--"arthritis surgery"    HYSTERECTOMY      UNKNOWN BSO    JOINT REPLACEMENT      right hip replacement    KNEE ARTHROPLASTY Left 2001    TONSILLECTOMY      TUMOR EXCISION      esophageal tumor removal     UPPER GASTROINTESTINAL ENDOSCOPY      2013       Time Tracking:     PT Received On: 05/28/19  PT Start Time: 0906     PT Stop Time: 0925  PT Total Time (min): 19 min     Billable Minutes: Evaluation 10 min and Therapeutic Activity 8 min      Estiven Pires, PT  05/28/2019  "

## 2019-05-28 NOTE — PLAN OF CARE
Problem: Occupational Therapy Goal  Goal: Occupational Therapy Goal  Goals to be met by: 6/20    Patient will increase functional independence with ADLs by performing:    UE Dressing with [place].  LE Dressing with [place].  Grooming while seated with Set-up Assistance.  Toileting from toilet with Contact Guard Assistance for hygiene and clothing management.   Bathing from  edge of bed with Contact Guard Assistance.    Outcome: Ongoing (interventions implemented as appropriate)  Evaluation complete and goals set.  Cont with POC  Meri Herndon OT  5/28/2019

## 2019-05-28 NOTE — PT/OT/SLP EVAL
Occupational Therapy   Evaluation    Name: Lilia Hidalgo  MRN: [id number]  Admitting Diagnosis:  Encephalopathy, metabolic      Recommendations:     Discharge Recommendations: home with home health  Discharge Equipment Recommendations:  none  Barriers to discharge:  Decreased caregiver support    Assessment:     Lilia Hidlago is a 87 y.o. female with a medical diagnosis of Encephalopathy, metabolic.  She presents supine with son present.  Pt is a resident of [facility] and reports was able to complete basic dressing and toillting without facility staff assist.  Shower assist 3x week with min A. Pt with decreased self care performance and with some confusion inhibiting following directions. Mod A for supine to sit edge of bed and mod+ A for edge of bed stand pivot to chair.   Pt with potential for gains and return to prior level of function.   . Performance deficits affecting function: weakness, impaired endurance, impaired self care skills, impaired functional mobilty, gait instability, impaired balance, impaired cognition.      Rehab Prognosis: Good; patient would benefit from acute skilled OT services to address these deficits and reach maximum level of function.       Plan:     Patient to be seen 3 x/week to address the above listed problems via self-care/home management, therapeutic activities, therapeutic exercises  · Plan of Care Expires: 06/28/19  · Plan of Care Reviewed with: patient, son    Subjective     Chief Complaint: decreased self care   Patient/Family Comments/goals: return to home     Occupational Profile:  Living Environment: Pt lives in [facility]   Previous level of function: pt able to complete basic self care but with min A for safe shower completion   Equipment Used at Home:  wheelchair, rollator  Assistance upon Discharge: faclity staff and adult children involved     Pain/Comfort:  · Pain Rating 1: 5/10  · Location - Orientation 1: generalized  · Location 1: back    Patients  cultural, spiritual, Restorationist conflicts given the current situation: no    Objective:     Communicated with: RN prior to session.  Patient found supine with peripheral IV, telemetry upon OT entry to room.    General Precautions: Standard, fall   Orthopedic Precautions:N/A   Braces: N/A     Occupational Performance:    Bed Mobility:    · Patient completed Rolling/Turning to Right with moderate assistance  · Patient completed Scooting/Bridging with moderate assistance  · Patient completed Supine to Sit with moderate assistance    Functional Mobility/Transfers:  · Patient completed Sit <> Stand Transfer with minimum assistance  with  no assistive device   · Patient completed Bed <> Chair Transfer using Step Transfer technique with moderate assistance with hand-held assist    Activities of Daily Living:  · Feeding:  modified independence set up   · Upper Body Dressing: minimum assistance    · Lower Body Dressing: maximal assistance    · Toileting: maximal assistance      Cognitive/Visual Perceptual:  Cognitive/Psychosocial Skills:     -       Oriented to: Person, Place, Time and Situation   -       Follows Commands/attention:Follows one-step commands  -       Communication: clear/fluent  -       Memory: Impaired STM and Impaired LTM  -       Safety awareness/insight to disability: intact   -       Mood/Affect/Coping skills/emotional control: Appropriate to situation    Physical Exam:  Upper Extremity Range of Motion:  -       Right Upper Extremity: WFL  -       Left Upper Extremity: WFL  Upper Extremity Strength:    -       Right Upper Extremity: WFL  -       Left Upper Extremity: WFL    AMPAC 6 Click ADL:  AMPAC Total Score: 16    Treatment & Education:  Evaluation complete and goals set.  Pt educated on safety, role of OT, importance of increased participation in self care for gains , expectations for participation, expectations for gains, POC, energy conservation, caregiver strain. White board updated.      Education:    Patient left supine with all lines intact    GOALS:   Multidisciplinary Problems     Occupational Therapy Goals        Problem: Occupational Therapy Goal    Goal Priority Disciplines Outcome Interventions   Occupational Therapy Goal     OT, PT/OT Ongoing (interventions implemented as appropriate)    Description:  Goals to be met by: 6/20    Patient will increase functional independence with ADLs by performing:    UE Dressing with [place].  LE Dressing with [place].  Grooming while seated with Set-up Assistance.  Toileting from toilet with Contact Guard Assistance for hygiene and clothing management.   Bathing from  edge of bed with Contact Guard Assistance.                      History:     Past Medical History:   Diagnosis Date    Aortic stenosis 6/17/2015    Arthritis     Atrophic vaginitis 2/18/2016    Bilateral edema of lower extremity 6/22/2016    CHF (congestive heart failure)     Cholelithiasis without cholecystitis 5/5/2017    Chronic pain of left knee 8/3/2017    Depressed mood 6/22/2016    Diverticulitis of large intestine without perforation or abscess without bleeding 5/5/2017    Encounter for blood transfusion     Essential hypertension     GERD (gastroesophageal reflux disease)     Hyperlipidemia     Hypertension     Hypertensive heart disease with heart failure 7/14/2015    Insomnia     Joint pain     Knee pain, left 08/2016    pain with walking or standing    Primary osteoarthritis of knee 2/5/2013    PUD (peptic ulcer disease)     S/P knee replacement 2/13/2013    Slow transit constipation 2/18/2016       Past Surgical History:   Procedure Laterality Date    APPENDECTOMY      ARTHROPLASTY, KNEE, TOTAL Left 2/6/2013    [facility]    COLONOSCOPY      08    diagnostic block of  the genicular branches to the left knee Left 8/3/2017    [facility] at [facility] OR    EGD  "(ESOPHAGOGASTRODUODENOSCOPY) N/A 1/6/2014    Performed [facility] aMdan [facility]an [facility] at [facility] ENDO (4TH FLR)    EGD (ESOPHAGOGASTRODUODENOSCOPY) N/A 1/28/2013    Performed by Yuniel Montana MD at [facility] ENDO (2ND FLR)    EYE SURGERY      bilateral cataract    FINGER SURGERY      LT thumb surgery--"arthritis surgery"    HYSTERECTOMY      UNKNOWN BSO    JOINT REPLACEMENT      right hip replacement    KNEE ARTHROPLASTY Left 2001    TONSILLECTOMY      TUMOR EXCISION      esophageal tumor removal     UPPER GASTROINTESTINAL ENDOSCOPY      2013       Time Tracking:     OT Date of Treatment: 05/28/19  OT Start Time: 0905  OT Stop Time: 0925  OT Total Time (min): 20 min    Billable Minutes:Evaluation 20    Meri Herndon, OT  5/28/2019    "

## 2019-05-28 NOTE — PROGRESS NOTES
Reported to Med Team R that patient Complained of back pain, but unable to give a number to pain. New order received.

## 2019-05-29 PROBLEM — F03.90 DEMENTIA: Status: ACTIVE | Noted: 2019-05-29

## 2019-05-29 LAB
ANION GAP SERPL CALC-SCNC: 9 MMOL/L (ref 8–16)
BACTERIA #/AREA URNS AUTO: ABNORMAL /HPF
BASOPHILS # BLD AUTO: 0.05 K/UL (ref 0–0.2)
BASOPHILS NFR BLD: 0.7 % (ref 0–1.9)
BILIRUB UR QL STRIP: NEGATIVE
BUN SERPL-MCNC: 9 MG/DL (ref 8–23)
CALCIUM SERPL-MCNC: 9.5 MG/DL (ref 8.7–10.5)
CHLORIDE SERPL-SCNC: 103 MMOL/L (ref 95–110)
CLARITY UR REFRACT.AUTO: ABNORMAL
CO2 SERPL-SCNC: 27 MMOL/L (ref 23–29)
COLOR UR AUTO: YELLOW
CREAT SERPL-MCNC: 0.7 MG/DL (ref 0.5–1.4)
DIFFERENTIAL METHOD: ABNORMAL
EOSINOPHIL # BLD AUTO: 0.1 K/UL (ref 0–0.5)
EOSINOPHIL NFR BLD: 1.8 % (ref 0–8)
ERYTHROCYTE [DISTWIDTH] IN BLOOD BY AUTOMATED COUNT: 13.6 % (ref 11.5–14.5)
EST. GFR  (AFRICAN AMERICAN): >60 ML/MIN/1.73 M^2
EST. GFR  (NON AFRICAN AMERICAN): >60 ML/MIN/1.73 M^2
GLUCOSE SERPL-MCNC: 104 MG/DL (ref 70–110)
GLUCOSE UR QL STRIP: NEGATIVE
HCT VFR BLD AUTO: 41.8 % (ref 37–48.5)
HGB BLD-MCNC: 13.7 G/DL (ref 12–16)
HGB UR QL STRIP: ABNORMAL
IMM GRANULOCYTES # BLD AUTO: 0.01 K/UL (ref 0–0.04)
IMM GRANULOCYTES NFR BLD AUTO: 0.1 % (ref 0–0.5)
KETONES UR QL STRIP: NEGATIVE
LEUKOCYTE ESTERASE UR QL STRIP: ABNORMAL
LYMPHOCYTES # BLD AUTO: 1.2 K/UL (ref 1–4.8)
LYMPHOCYTES NFR BLD: 17.5 % (ref 18–48)
MCH RBC QN AUTO: 28.2 PG (ref 27–31)
MCHC RBC AUTO-ENTMCNC: 32.8 G/DL (ref 32–36)
MCV RBC AUTO: 86 FL (ref 82–98)
MICROSCOPIC COMMENT: ABNORMAL
MONOCYTES # BLD AUTO: 0.7 K/UL (ref 0.3–1)
MONOCYTES NFR BLD: 9.6 % (ref 4–15)
NEUTROPHILS # BLD AUTO: 4.8 K/UL (ref 1.8–7.7)
NEUTROPHILS NFR BLD: 70.3 % (ref 38–73)
NITRITE UR QL STRIP: NEGATIVE
NRBC BLD-RTO: 0 /100 WBC
PH UR STRIP: 8 [PH] (ref 5–8)
PLATELET # BLD AUTO: 206 K/UL (ref 150–350)
PMV BLD AUTO: 10.9 FL (ref 9.2–12.9)
POTASSIUM SERPL-SCNC: 3.3 MMOL/L (ref 3.5–5.1)
PROT UR QL STRIP: NEGATIVE
RBC # BLD AUTO: 4.86 M/UL (ref 4–5.4)
RBC #/AREA URNS AUTO: 17 /HPF (ref 0–4)
SODIUM SERPL-SCNC: 139 MMOL/L (ref 136–145)
SP GR UR STRIP: 1 (ref 1–1.03)
SQUAMOUS #/AREA URNS AUTO: 1 /HPF
URN SPEC COLLECT METH UR: ABNORMAL
WBC # BLD AUTO: 6.85 K/UL (ref 3.9–12.7)
WBC #/AREA URNS AUTO: 2 /HPF (ref 0–5)

## 2019-05-29 PROCEDURE — 99222 1ST HOSP IP/OBS MODERATE 55: CPT | Mod: HCNC,,, | Performed by: PSYCHIATRY & NEUROLOGY

## 2019-05-29 PROCEDURE — 25000003 PHARM REV CODE 250: Mod: HCNC | Performed by: PHYSICIAN ASSISTANT

## 2019-05-29 PROCEDURE — 81001 URINALYSIS AUTO W/SCOPE: CPT | Mod: HCNC

## 2019-05-29 PROCEDURE — 80048 BASIC METABOLIC PNL TOTAL CA: CPT | Mod: HCNC

## 2019-05-29 PROCEDURE — 99233 PR SUBSEQUENT HOSPITAL CARE,LEVL III: ICD-10-PCS | Mod: HCNC,,, | Performed by: HOSPITALIST

## 2019-05-29 PROCEDURE — 25000003 PHARM REV CODE 250: Mod: HCNC | Performed by: HOSPITALIST

## 2019-05-29 PROCEDURE — 99222 PR INITIAL HOSPITAL CARE,LEVL II: ICD-10-PCS | Mod: HCNC,,, | Performed by: PSYCHIATRY & NEUROLOGY

## 2019-05-29 PROCEDURE — 36415 COLL VENOUS BLD VENIPUNCTURE: CPT | Mod: HCNC

## 2019-05-29 PROCEDURE — 11000001 HC ACUTE MED/SURG PRIVATE ROOM: Mod: HCNC

## 2019-05-29 PROCEDURE — 85025 COMPLETE CBC W/AUTO DIFF WBC: CPT | Mod: HCNC

## 2019-05-29 PROCEDURE — 99233 SBSQ HOSP IP/OBS HIGH 50: CPT | Mod: HCNC,,, | Performed by: HOSPITALIST

## 2019-05-29 RX ORDER — HYDRALAZINE HYDROCHLORIDE 25 MG/1
25 TABLET, FILM COATED ORAL ONCE
Status: COMPLETED | OUTPATIENT
Start: 2019-05-29 | End: 2019-05-29

## 2019-05-29 RX ORDER — METOPROLOL SUCCINATE 100 MG/1
200 TABLET, EXTENDED RELEASE ORAL DAILY
Status: DISCONTINUED | OUTPATIENT
Start: 2019-05-30 | End: 2019-06-05 | Stop reason: HOSPADM

## 2019-05-29 RX ORDER — POTASSIUM CHLORIDE 20 MEQ/15ML
40 SOLUTION ORAL ONCE
Status: COMPLETED | OUTPATIENT
Start: 2019-05-29 | End: 2019-05-29

## 2019-05-29 RX ADMIN — MICONAZOLE NITRATE: 20 POWDER TOPICAL at 09:05

## 2019-05-29 RX ADMIN — MULTIPLE VITAMINS W/ MINERALS TAB 1 TABLET: TAB at 09:05

## 2019-05-29 RX ADMIN — LIDOCAINE 2 PATCH: 50 PATCH TOPICAL at 01:05

## 2019-05-29 RX ADMIN — METOPROLOL SUCCINATE 100 MG: 100 TABLET, EXTENDED RELEASE ORAL at 09:05

## 2019-05-29 RX ADMIN — POTASSIUM CHLORIDE 40 MEQ: 20 SOLUTION ORAL at 11:05

## 2019-05-29 RX ADMIN — HYDRALAZINE HYDROCHLORIDE 25 MG: 25 TABLET, FILM COATED ORAL at 04:05

## 2019-05-29 RX ADMIN — ESCITALOPRAM OXALATE 20 MG: 20 TABLET ORAL at 09:05

## 2019-05-29 RX ADMIN — FUROSEMIDE 40 MG: 40 TABLET ORAL at 09:05

## 2019-05-29 RX ADMIN — BENAZEPRIL HYDROCHLORIDE 40 MG: 20 TABLET, FILM COATED ORAL at 09:05

## 2019-05-29 RX ADMIN — ACETAMINOPHEN 650 MG: 325 TABLET ORAL at 07:05

## 2019-05-29 RX ADMIN — FENTANYL 1 PATCH: 12 PATCH TRANSDERMAL at 11:05

## 2019-05-29 RX ADMIN — DONEPEZIL HYDROCHLORIDE 5 MG: 5 TABLET, FILM COATED ORAL at 09:05

## 2019-05-29 RX ADMIN — PANTOPRAZOLE SODIUM 40 MG: 40 TABLET, DELAYED RELEASE ORAL at 09:05

## 2019-05-29 RX ADMIN — HYDRALAZINE HYDROCHLORIDE 25 MG: 25 TABLET, FILM COATED ORAL at 09:05

## 2019-05-29 RX ADMIN — ATORVASTATIN CALCIUM 20 MG: 10 TABLET, FILM COATED ORAL at 09:05

## 2019-05-29 NOTE — ASSESSMENT & PLAN NOTE
-Patient with memory deficits > 3 years  Currently taking Donepezil x 2+ years  -Patient with worsening confusions, drowsiness and problems with word finding on Monday  Patient alert and oriented x 4, thought process linear, speech appropriate with full range of affect  -CAM-ICU (-)  Pan (-) SIG E CAPS  No objective signs of psychosis, corine, delirium or depression with SI  -CT Head: Continued cerebral volume loss with mild moderate patchy hypoattenuation supratentorial white matter suggestive for chronic ischemic change. No acute changes  -Continue home Lexapro and Donepezil  -Recommend follow up with memory specialist in the outpatient setting  Phone numbers for [facility] and [facility] given to patient's daughter  -Recommend follow up with PCP for management of Anxiety   -Thank you for the consult! Psychiatry will sign off.

## 2019-05-29 NOTE — SUBJECTIVE & OBJECTIVE
"     Patient History           Medical as of 5/29/2019     Past Medical History     Diagnosis Date Comments Source    Aortic stenosis 6/17/2015 -- Provider    Arthritis -- -- Provider    Atrophic vaginitis 2/18/2016 -- Provider    Bilateral edema of lower extremity 6/22/2016 -- Provider    CHF (congestive heart failure) -- -- Provider    Cholelithiasis without cholecystitis 5/5/2017 -- Provider    Chronic pain of left knee 8/3/2017 -- Provider    Depressed mood 6/22/2016 -- Provider    Diverticulitis of large intestine without perforation or abscess without bleeding 5/5/2017 -- Provider    Encounter for blood transfusion -- -- Provider    Essential hypertension -- -- Provider    GERD (gastroesophageal reflux disease) -- -- Provider    Hyperlipidemia -- -- Provider    Hypertension -- -- Provider    Hypertensive heart disease with heart failure 7/14/2015 -- Provider    Insomnia -- -- Provider    Joint pain -- -- Provider    Knee pain, left 08/2016 pain with walking or standing Provider    Primary osteoarthritis of knee 2/5/2013 -- Provider    PUD (peptic ulcer disease) -- -- Provider    S/P knee replacement 2/13/2013 -- Provider    Slow transit constipation 2/18/2016 -- Provider                  Surgical as of 5/29/2019     Past Surgical History     Procedure Laterality Date Comments Source    EYE SURGERY -- -- bilateral cataract Provider    APPENDECTOMY -- -- -- Provider    COLONOSCOPY -- -- 08 Provider    UPPER GASTROINTESTINAL ENDOSCOPY -- -- 2013 Provider    HYSTERECTOMY -- -- UNKNOWN BSO Provider    KNEE ARTHROPLASTY Left 2001 -- Provider    JOINT REPLACEMENT -- -- right hip replacement Provider    FINGER SURGERY -- -- LT thumb surgery--"arthritis surgery" Provider    TONSILLECTOMY -- -- -- Provider    TUMOR EXCISION -- -- esophageal tumor removal  Provider                  Family as of 5/29/2019     Problem Relation Name Age of Onset Comments Source    Heart disease Mother -- -- -- Provider    Heart disease " Father -- -- -- Provider    Asthma Father -- -- -- Provider    Cancer Brother -- -- breast Provider    Heart disease Brother -- -- -- Provider    Melanoma Neg Hx -- -- -- Provider    Psoriasis Neg Hx -- -- -- Provider    Lupus Neg Hx -- -- -- Provider    Eczema Neg Hx -- -- -- Provider    Acne Neg Hx -- -- -- Provider    Breast cancer Neg Hx -- -- -- Provider    Ovarian cancer Neg Hx -- -- -- Provider    Cervical cancer Neg Hx -- -- -- Provider    Endometrial cancer Neg Hx -- -- -- Provider    Vaginal cancer Neg Hx -- -- -- Provider            Tobacco Use as of 5/29/2019     Smoking Status Smoking Start Date Smoking Quit Date Packs/Day Years Used    Never Smoker -- -- -- --    Types Comments Smokeless Tobacco Status Smokeless Tobacco Quit Date Source    -- -- Never Used -- Provider            Alcohol Use as of 5/29/2019     Alcohol Use Drinks/Week Alcohol/Week Comments Source    No -- -- -- Provider    Frequency Standard Drinks Binge Drinking        -- -- --              Drug Use as of 5/29/2019     Drug Use Types Frequency Comments Source    Never  Hydrocodone -- -- Provider            Sexual Activity as of 5/29/2019     Sexually Active Birth Control Partners Comments Source    Not Currently None -- -- Provider            Activities of Daily Living as of 5/29/2019     Activities of Daily Living Question Response Comments Source    Are you pregnant or think you may be? No -- Provider    Breast-feeding No -- Provider            Social Documentation as of 5/29/2019    None           Occupational as of 5/29/2019    None           Socioeconomic as of 5/29/2019     Marital Status Spouse Name Number of Children Years Education Education Level Preferred Language Ethnicity Race Source     -- -- -- -- English /White White Provider    Financial Resource Strain Food Insecurity: Worry Food Insecurity: Inability Transportation Needs: Medical Transportation Needs: Non-medical    -- -- -- -- --            Pertinent  "History     Question Response Comments    Lives with -- --    Place in Birth Order -- --    Lives in -- --    Number of Siblings -- --    Raised by -- --    Legal Involvement -- --    Childhood Trauma -- --    Criminal History of -- --    Financial Status -- --    Highest Level of Education -- --    Does patient have access to a firearm? -- --     Service -- --    Primary Leisure Activity -- --    Spirituality -- --        Past Medical History:   Diagnosis Date    Aortic stenosis 6/17/2015    Arthritis     Atrophic vaginitis 2/18/2016    Bilateral edema of lower extremity 6/22/2016    CHF (congestive heart failure)     Cholelithiasis without cholecystitis 5/5/2017    Chronic pain of left knee 8/3/2017    Depressed mood 6/22/2016    Diverticulitis of large intestine without perforation or abscess without bleeding 5/5/2017    Encounter for blood transfusion     Essential hypertension     GERD (gastroesophageal reflux disease)     Hyperlipidemia     Hypertension     Hypertensive heart disease with heart failure 7/14/2015    Insomnia     Joint pain     Knee pain, left 08/2016    pain with walking or standing    Primary osteoarthritis of knee 2/5/2013    PUD (peptic ulcer disease)     S/P knee replacement 2/13/2013    Slow transit constipation 2/18/2016     Past Surgical History:   Procedure Laterality Date    APPENDECTOMY      ARTHROPLASTY, KNEE, TOTAL Left 2/6/2013    Performed by [facility] at [facility] OR 2ND FLR    COLONOSCOPY      08    diagnostic block of  the genicular branches to the left knee Left 8/3/2017    [facility]    EGD (ESOPHAGOGASTRODUODENOSCOPY) N/A 1/6/2014    Performed by Madan Beltran MD at [facility] ENDO (4TH FLR)    EGD (ESOPHAGOGASTRODUODENOSCOPY) N/A 1/28/2013    Performed by Yuniel Montana MD at [facility] ENDO (2ND FLR)    EYE SURGERY      bilateral cataract    FINGER SURGERY      LT thumb surgery--"arthritis " "surgery"    HYSTERECTOMY      UNKNOWN BSO    JOINT REPLACEMENT      right hip replacement    KNEE ARTHROPLASTY Left 2001    TONSILLECTOMY      TUMOR EXCISION      esophageal tumor removal     UPPER GASTROINTESTINAL ENDOSCOPY      2013     Family History     Problem Relation (Age of Onset)    Asthma Father    Cancer Brother    Heart disease Mother, Father, Brother        Tobacco Use    Smoking status: Never Smoker    Smokeless tobacco: Never Used   Substance and Sexual Activity    Alcohol use: No    Drug use: Never     Types: Hydrocodone    Sexual activity: Not Currently     Birth control/protection: None     Review of patient's allergies indicates:   Allergen Reactions    Aspirin Nausea Only and Other (See Comments)     Other reaction(s): esophageal pain    Celebrex [celecoxib] Rash    Morphine Hallucinations    Steroid [corticosteroids (glucocorticoids)] Other (See Comments)     Other reaction(s): Flushing (skin)    Sulfa dyne Other (See Comments)     Other reaction(s): esophogeal pain       No current facility-administered medications on file prior to encounter.      Current Outpatient Medications on File Prior to Encounter   Medication Sig    acetaminophen (TYLENOL) 650 MG TbSR Take 1 tablet (650 mg total) by mouth every 6 (six) hours as needed (last dose may be left at bedside for patient to take at night).    atorvastatin (LIPITOR) 20 MG tablet TAKE 1 TABLET BY MOUTH ONCE DAILY.    benazepril (LOTENSIN) 20 MG tablet TAKE 2 TABLETS(40 MG) BY MOUTH EVERY DAY    donepezil (ARICEPT) 5 MG tablet Take 1 tablet (5 mg total) by mouth every evening.    escitalopram oxalate (LEXAPRO) 20 MG tablet Take 1/2 pill nightly for a week and then 1 pill nightly onwards.    fentaNYL (DURAGESIC) 12 mcg/hr PT72 Place 1 patch onto the skin every 72 hours.    furosemide (LASIX) 40 MG tablet TAKE 1 TABLET BY MOUTH ONCE DAILY.    lidocaine-prilocaine (EMLA) cream Apply topically as needed. Apply to painful areas " "3 times per day as needed    melatonin 5 mg Tab Take 1 tablet by mouth every evening.     metoprolol succinate (TOPROL-XL) 100 MG 24 hr tablet Take 1 tablet (100 mg total) by mouth once daily.    MULTIVITAMIN W-MINERALS/LUTEIN (CENTRUM SILVER ORAL) Take by mouth once daily.    mupirocin (BACTROBAN) 2 % ointment Apply topically 2 (two) times daily.    nitroGLYCERIN (NITROSTAT) 0.4 MG SL tablet Place 1 tablet (0.4 mg total) under the tongue every 5 (five) minutes as needed for Chest pain (do not take more than 3 tablets in a row).    nystatin (MYCOSTATIN) powder Apply topically 2 (two) times daily. To groin area    omeprazole (PRILOSEC) 20 MG capsule Take 1 capsule (20 mg total) by mouth once daily.    traMADol (ULTRAM) 50 mg tablet Take 1 tablet (50 mg total) by mouth As instructed for Pain. Take one tablets twice per day, and 1 tablet at bedtime as needed.     Psychotherapeutics (From admission, onward)    Start     Stop Route Frequency Ordered    05/27/19 2100  escitalopram oxalate tablet 20 mg      -- Oral Nightly 05/27/19 1359        Review of Systems    Objective:     Vital Signs (Most Recent):  Temp: 98 °F (36.7 °C) (05/29/19 1309)  Pulse: 83 (05/29/19 1309)  Resp: 18 (05/29/19 1309)  BP: (!) 151/95 (05/29/19 1309)  SpO2: (!) 93 % (05/29/19 1309) Vital Signs (24h Range):  Temp:  [96.8 °F (36 °C)-98.6 °F (37 °C)] 98 °F (36.7 °C)  Pulse:  [70-83] 83  Resp:  [16-20] 18  SpO2:  [93 %-96 %] 93 %  BP: (136-191)/(60-95) 151/95     Height: 5' 6" (167.6 cm)  Weight: 99.8 kg (220 lb)  Body mass index is 35.51 kg/m².      Intake/Output Summary (Last 24 hours) at 5/29/2019 1332  Last data filed at 5/28/2019 1800  Gross per 24 hour   Intake 280 ml   Output --   Net 280 ml       Physical Exam      Mental Status Exam:  Appearance: unremarkable, age appropriate, well groomed  Behavior/Cooperation:  friendly and cooperative  Speech:  normal tone, normal rate, normal pitch, normal volume  Mood: euthymic  Affect:  " Full  Thought Process: linear  Thought Content: normal, no suicidality, no homicidality, delusions, or paranoia  Sensorium:  Intact   Alert and Oriented: person, place, situation, time/date  Memory: ; Named 0/4 past presidents.   Attention/concentration: WORLD forward and backward. Passes SAVEAHAART  Insight:  Fair  Judgment:Fair  Significant Labs:   Last 72 Hours:   Recent Lab Results  (Last 5 results in the past 72 hours)      05/29/19  0758   05/28/19  0527   05/27/19  1237   05/27/19  1113   05/27/19  1018        Influenza A, Molecular       Negative       Influenza B, Molecular       Negative       Procalcitonin         0.02  Comment:  A concentration < 0.25 ng/mL represents a low risk bacterial   infection.  Procalcitonin may not be accurate among patients with localized   infection, recent trauma or major surgery, immunosuppressed state,   invasive fungal infection, renal dysfunction. Decisions regarding   initiation or continuation of antibiotic therapy should not be based   solely on procalcitonin levels.       Acetaminophen (Tylenol), Serum         <3.0  Comment:  Toxic Levels:  Adults (4 hr post-ingestion).........>150 ug/mL  Adults (12 hr post-ingestion)........>40 ug/mL  Peds (2 hr post-ingestion, liquid)...>225 ug/mL       Albumin         3.5     Alkaline Phosphatase         90     ALT         11     Anion Gap 9 9     12     AST         17     Baso # 0.05 0.05           Basophil% 0.7 0.6           BILIRUBIN TOTAL         0.7  Comment:  For infants and newborns, interpretation of results should be based  on gestational age, weight and in agreement with clinical  observations.  Premature Infant recommended reference ranges:  Up to 24 hours.............<8.0 mg/dL  Up to 48 hours............<12.0 mg/dL  3-5 days..................<15.0 mg/dL  6-29 days.................<15.0 mg/dL       BUN, Bld 9 11     10     Calcium 9.5 9.5     9.5     Chloride 103 101     102     CO2 27 30     25     Creatinine 0.7 0.7      0.7     CRP         10.0     Differential Method Automated Automated           eGFR if  >60.0 >60.0     >60.0     eGFR if non  >60.0  Comment:  Calculation used to obtain the estimated glomerular filtration  rate (eGFR) is the CKD-EPI equation.    >60.0  Comment:  Calculation used to obtain the estimated glomerular filtration  rate (eGFR) is the CKD-EPI equation.        >60.0  Comment:  Calculation used to obtain the estimated glomerular filtration  rate (eGFR) is the CKD-EPI equation.        Eos # 0.1 0.1           Eosinophil% 1.8 0.9           Flu A & B Source       NP       Glucose 104 89     127     Gran # (ANC) 4.8 6.3           Gran% 70.3 69.4           Hematocrit 41.8 41.1           Hemoglobin 13.7 13.5           Immature Grans (Abs) 0.01  Comment:  Mild elevation in immature granulocytes is non specific and   can be seen in a variety of conditions including stress response,   acute inflammation, trauma and pregnancy. Correlation with other   laboratory and clinical findings is essential.   0.04  Comment:  Mild elevation in immature granulocytes is non specific and   can be seen in a variety of conditions including stress response,   acute inflammation, trauma and pregnancy. Correlation with other   laboratory and clinical findings is essential.             Immature Granulocytes 0.1 0.4           Lactate, [place]     1.3  Comment:  Falsely low lactic acid results can be found in samples   containing >=13.0 mg/dL total bilirubin and/or >=3.5 mg/dL   direct bilirubin.           Lymph # 1.2 1.7           Lymph% 17.5 18.4           Magnesium         1.6     MCH 28.2 28.1           MCHC 32.8 32.8           MCV 86 86           Mono # 0.7 0.9           Mono% 9.6 10.3           MPV 10.9 10.5           nRBC 0 0           Phosphorus         2.8     Platelets 206 196           Potassium 3.3 3.6     3.7     PROTEIN TOTAL         7.4     RBC 4.86 4.80           RDW 13.6 13.9           RPR          Non-reactive     Sodium 139 140     139     TSH         1.405     Vit D, 25-Hydroxy         37  Comment:  Vitamin D deficiency.........<10 ng/mL                              Vitamin D insufficiency......10-29 ng/mL       Vitamin D sufficiency........> or equal to 30 ng/mL  Vitamin D toxicity............>100 ng/mL       Vitamin B-12         458     WBC 6.85 9.02                                Significant Imaging: I have reviewed all pertinent imaging results/findings within the past 24 hours.

## 2019-05-29 NOTE — PLAN OF CARE
Problem: Adult Inpatient Plan of Care  Goal: Plan of Care Review  Outcome: Ongoing (interventions implemented as appropriate)  Patient is oriented to self, place, and time. Patient confused at times. Vital signs stable. Hydralazine administered for elevated blood pressure. Fentanyl and lidocaine patches administered for complaints of leg pain. No falls throughout shift. Patient repositioned every 2 hours.

## 2019-05-29 NOTE — CONSULTS
[facility]  Psychiatry  Consult Note    Patient Name: Lilia Hidalgo  MRN: [id number]   Code Status: Full Code  Admission Date: 5/27/2019  Hospital Length of Stay: 1 days  Attending Physician: Jacky Cooper MD  Primary Care Provider: April Herrera MD    Current Legal Status: N/A    Patient information was obtained from patient, relative(s), past medical records and ER records.   Inpatient consult to Psychiatry  Consult performed by: Dm Harris DO  Consult ordered by: Jacky Cooper MD        Subjective:     Principal Problem:Encephalopathy, metabolic    Chief Complaint:  AMS     HPI: Lilia Hidalgo is a 87 y.o. female with past psychiatric history of Dementia (on Donepezil) anxiety well controlled on Lexapro who presented to the [facility] ED due to worsening confusion.  Psychiatry was originally consulted to address the patient's symptoms of delirium. Per patient's daughter, patient lives in an assisted living community, dresses herself, has difficulty ambulating 2/2 arthritis, engages appropriately with peers in her community, receives her medications from staff at assisted living. Monday morning, patient had issues with word finding, affect was blunted, patient was drowsy with worsening confusion. States an incident a few years ago in the setting of UTI. Medical work up unremarkable this hospital encounter. However, patient did present febrile. Upon, my evaluation patient's thought process is linear and organized, she is alert and oriented x 4, attentive to conversation, affect is full. She correctly spells WORLD forward and backwards. Unable to name any of the last 4 presidents. Denies SI/HI/AVH. Per patient and nursing, patient slept approximately 6 hours. Appetite is normal.       Collateral:  Talked to patient's daughter. States patient dresses herself, has limited mobility 2/2 arthritis, Monday morning assisted living called patient's mother stating she was confused, blank stare, word  confusion, drowsy. States an incident a few years ago     Psychiatric Review Of Systems - Is patient experiencing or having changes in:  sleep: no  appetite: no  weight: no  energy/anergy: no  interest/pleasure/anhedonia: no  somatic symptoms: no  anxiety/panic: history of anxiety, but currently anxious  guilty/hopelessness: no  concentration: Passes SAVEAHAART  S.I.B.s/risky behavior: no  any drugs: no  alcohol: no     Past Psychiatric History:  Previous Medication Trials: yes, Lexapro  Previous Psychiatric Hospitalizations: no   Previous Suicide Attempts: no   History of Violence: no  Outpatient Psychiatrist: no    Social History:  Marital Status:   Children: 3   Employment Status/Info: retired  Education: high school diploma/GED  Special Ed: no  Housing Status: Assisted Living  History of phys/sexual abuse: no  Access to gun: no    Substance Abuse History:  Recreational Drugs: none  Use of Alcohol: denied  Rehab History:no   Tobacco Use:no  Legal consequences of chemical use: no    Legal History:  Past Charges/Incarcerations:no  Pending charges:no     Psychosocial Stressors: health.   Functioning Relationships: good support system    Family Psychiatric History:   Denies          Hospital Course: No notes on file         Patient History           Medical as of 5/29/2019     Past Medical History     Diagnosis Date Comments Source    Aortic stenosis 6/17/2015 -- Provider    Arthritis -- -- Provider    Atrophic vaginitis 2/18/2016 -- Provider    Bilateral edema of lower extremity 6/22/2016 -- Provider    CHF (congestive heart failure) -- -- Provider    Cholelithiasis without cholecystitis 5/5/2017 -- Provider    Chronic pain of left knee 8/3/2017 -- Provider    Depressed mood 6/22/2016 -- Provider    Diverticulitis of large intestine without perforation or abscess without bleeding 5/5/2017 -- Provider    Encounter for blood transfusion -- -- Provider    Essential hypertension -- -- Provider    GERD  "(gastroesophageal reflux disease) -- -- Provider    Hyperlipidemia -- -- Provider    Hypertension -- -- Provider    Hypertensive heart disease with heart failure 7/14/2015 -- Provider    Insomnia -- -- Provider    Joint pain -- -- Provider    Knee pain, left 08/2016 pain with walking or standing Provider    Primary osteoarthritis of knee 2/5/2013 -- Provider    PUD (peptic ulcer disease) -- -- Provider    S/P knee replacement 2/13/2013 -- Provider    Slow transit constipation 2/18/2016 -- Provider                  Surgical as of 5/29/2019     Past Surgical History     Procedure Laterality Date Comments Source    EYE SURGERY -- -- bilateral cataract Provider    APPENDECTOMY -- -- -- Provider    COLONOSCOPY -- -- 08 Provider    UPPER GASTROINTESTINAL ENDOSCOPY -- -- 2013 Provider    HYSTERECTOMY -- -- UNKNOWN BSO Provider    KNEE ARTHROPLASTY Left 2001 -- Provider    JOINT REPLACEMENT -- -- right hip replacement Provider    FINGER SURGERY -- -- LT thumb surgery--"arthritis surgery" Provider    TONSILLECTOMY -- -- -- Provider    TUMOR EXCISION -- -- esophageal tumor removal  Provider                  Family as of 5/29/2019     Problem Relation Name Age of Onset Comments Source    Heart disease Mother -- -- -- Provider    Heart disease Father -- -- -- Provider    Asthma Father -- -- -- Provider    Cancer Brother -- -- breast Provider    Heart disease Brother -- -- -- Provider    Melanoma Neg Hx -- -- -- Provider    Psoriasis Neg Hx -- -- -- Provider    Lupus Neg Hx -- -- -- Provider    Eczema Neg Hx -- -- -- Provider    Acne Neg Hx -- -- -- Provider    Breast cancer Neg Hx -- -- -- Provider    Ovarian cancer Neg Hx -- -- -- Provider    Cervical cancer Neg Hx -- -- -- Provider    Endometrial cancer Neg Hx -- -- -- Provider    Vaginal cancer Neg Hx -- -- -- Provider            Tobacco Use as of 5/29/2019     Smoking Status Smoking Start Date Smoking Quit Date Packs/Day Years Used    Never Smoker -- -- -- --    Types " Comments Smokeless Tobacco Status Smokeless Tobacco Quit Date Source    -- -- Never Used -- Provider            Alcohol Use as of 5/29/2019     Alcohol Use Drinks/Week Alcohol/Week Comments Source    No -- -- -- Provider    Frequency Standard Drinks Binge Drinking        -- -- --              Drug Use as of 5/29/2019     Drug Use Types Frequency Comments Source    Never  Hydrocodone -- -- Provider            Sexual Activity as of 5/29/2019     Sexually Active Birth Control Partners Comments Source    Not Currently None -- -- Provider            Activities of Daily Living as of 5/29/2019     Activities of Daily Living Question Response Comments Source    Are you pregnant or think you may be? No -- Provider    Breast-feeding No -- Provider            Social Documentation as of 5/29/2019    None           Occupational as of 5/29/2019    None           Socioeconomic as of 5/29/2019     Marital Status Spouse Name Number of Children Years Education Education Level Preferred Language Ethnicity Race Source     -- -- -- -- English /White White Provider    Financial Resource Strain Food Insecurity: Worry Food Insecurity: Inability Transportation Needs: Medical Transportation Needs: Non-medical    -- -- -- -- --            Pertinent History     Question Response Comments    Lives with -- --    Place in Birth Order -- --    Lives in -- --    Number of Siblings -- --    Raised by -- --    Legal Involvement -- --    Childhood Trauma -- --    Criminal History of -- --    Financial Status -- --    Highest Level of Education -- --    Does patient have access to a firearm? -- --     Service -- --    Primary Leisure Activity -- --    Spirituality -- --        Past Medical History:   Diagnosis Date    Aortic stenosis 6/17/2015    Arthritis     Atrophic vaginitis 2/18/2016    Bilateral edema of lower extremity 6/22/2016    CHF (congestive heart failure)     Cholelithiasis without cholecystitis 5/5/2017     "Chronic pain of left knee 8/3/2017    Depressed mood 6/22/2016    Diverticulitis of large intestine without perforation or abscess without bleeding 5/5/2017    Encounter for blood transfusion     Essential hypertension     GERD (gastroesophageal reflux disease)     Hyperlipidemia     Hypertension     Hypertensive heart disease with heart failure 7/14/2015    Insomnia     Joint pain     Knee pain, left 08/2016    pain with walking or standing    Primary osteoarthritis of knee 2/5/2013    PUD (peptic ulcer disease)     S/P knee replacement 2/13/2013    Slow transit constipation 2/18/2016     Past Surgical History:   Procedure Laterality Date    APPENDECTOMY      ARTHROPLASTY, KNEE, TOTAL Left 2/6/2013    [facility] 2ND [facility]    COLONOSCOPY      08    diagnostic block of  the genicular branches to the left knee Left 8/3/2017    [facility]    EGD (ESOPHAGOGASTRODUODENOSCOPY) N/A 1/6/2014    Performed by Madan Beltran [facility] at [facility] ENDO (4TH FLR)    EGD (ESOPHAGOGASTRODUODENOSCOPY) N/A 1/28/2013    Performed by Yuniel Montana MD at [facility] ENDO (2ND FLR)    EYE SURGERY      bilateral cataract    FINGER SURGERY      LT thumb surgery--"arthritis surgery"    HYSTERECTOMY      UNKNOWN BSO    JOINT REPLACEMENT      right hip replacement    KNEE ARTHROPLASTY Left 2001    TONSILLECTOMY      TUMOR EXCISION      esophageal tumor removal     UPPER GASTROINTESTINAL ENDOSCOPY      2013     Family History     Problem Relation (Age of Onset)    Asthma Father    Cancer Brother    Heart disease Mother, Father, Brother        Tobacco Use    Smoking status: Never Smoker    Smokeless tobacco: Never Used   Substance and Sexual Activity    Alcohol use: No    Drug use: Never     Types: Hydrocodone    Sexual activity: Not Currently     Birth control/protection: None     Review of patient's allergies indicates:   Allergen Reactions    " Aspirin Nausea Only and Other (See Comments)     Other reaction(s): esophageal pain    Celebrex [celecoxib] Rash    Morphine Hallucinations    Steroid [corticosteroids (glucocorticoids)] Other (See Comments)     Other reaction(s): Flushing (skin)    Sulfa dyne Other (See Comments)     Other reaction(s): esophogeal pain       No current facility-administered medications on file prior to encounter.      Current Outpatient Medications on File Prior to Encounter   Medication Sig    acetaminophen (TYLENOL) 650 MG TbSR Take 1 tablet (650 mg total) by mouth every 6 (six) hours as needed (last dose may be left at bedside for patient to take at night).    atorvastatin (LIPITOR) 20 MG tablet TAKE 1 TABLET BY MOUTH ONCE DAILY.    benazepril (LOTENSIN) 20 MG tablet TAKE 2 TABLETS(40 MG) BY MOUTH EVERY DAY    donepezil (ARICEPT) 5 MG tablet Take 1 tablet (5 mg total) by mouth every evening.    escitalopram oxalate (LEXAPRO) 20 MG tablet Take 1/2 pill nightly for a week and then 1 pill nightly onwards.    fentaNYL (DURAGESIC) 12 mcg/hr PT72 Place 1 patch onto the skin every 72 hours.    furosemide (LASIX) 40 MG tablet TAKE 1 TABLET BY MOUTH ONCE DAILY.    lidocaine-prilocaine (EMLA) cream Apply topically as needed. Apply to painful areas 3 times per day as needed    melatonin 5 mg Tab Take 1 tablet by mouth every evening.     metoprolol succinate (TOPROL-XL) 100 MG 24 hr tablet Take 1 tablet (100 mg total) by mouth once daily.    MULTIVITAMIN W-MINERALS/LUTEIN (CENTRUM SILVER ORAL) Take by mouth once daily.    mupirocin (BACTROBAN) 2 % ointment Apply topically 2 (two) times daily.    nitroGLYCERIN (NITROSTAT) 0.4 MG SL tablet Place 1 tablet (0.4 mg total) under the tongue every 5 (five) minutes as needed for Chest pain (do not take more than 3 tablets in a row).    nystatin (MYCOSTATIN) powder Apply topically 2 (two) times daily. To groin area    omeprazole (PRILOSEC) 20 MG capsule Take 1 capsule (20 mg total)  "by mouth once daily.    traMADol (ULTRAM) 50 mg tablet Take 1 tablet (50 mg total) by mouth As instructed for Pain. Take one tablets twice per day, and 1 tablet at bedtime as needed.     Psychotherapeutics (From admission, onward)    Start     Stop Route Frequency Ordered    05/27/19 2100  escitalopram oxalate tablet 20 mg      -- Oral Nightly 05/27/19 1359        Review of Systems    Objective:     Vital Signs (Most Recent):  Temp: 98 °F (36.7 °C) (05/29/19 1309)  Pulse: 83 (05/29/19 1309)  Resp: 18 (05/29/19 1309)  BP: (!) 151/95 (05/29/19 1309)  SpO2: (!) 93 % (05/29/19 1309) Vital Signs (24h Range):  Temp:  [96.8 °F (36 °C)-98.6 °F (37 °C)] 98 °F (36.7 °C)  Pulse:  [70-83] 83  Resp:  [16-20] 18  SpO2:  [93 %-96 %] 93 %  BP: (136-191)/(60-95) 151/95     Height: 5' 6" (167.6 cm)  Weight: 99.8 kg (220 lb)  Body mass index is 35.51 kg/m².      Intake/Output Summary (Last 24 hours) at 5/29/2019 1332  Last data filed at 5/28/2019 1800  Gross per 24 hour   Intake 280 ml   Output --   Net 280 ml       Physical Exam      Mental Status Exam:  Appearance: unremarkable, age appropriate, well groomed  Behavior/Cooperation:  friendly and cooperative  Speech:  normal tone, normal rate, normal pitch, normal volume  Mood: euthymic  Affect:  Full  Thought Process: linear  Thought Content: normal, no suicidality, no homicidality, delusions, or paranoia  Sensorium:  Intact   Alert and Oriented: person, place, situation, time/date  Memory: ; Named 0/4 past presidents.   Attention/concentration: WORLD forward and backward. Passes SAVEAHAART  Insight:  Fair  Judgment:Fair  Significant Labs:   Last 72 Hours:   Recent Lab Results  (Last 5 results in the past 72 hours)      05/29/19  0758   05/28/19  0527   05/27/19  1237   05/27/19  1113   05/27/19  1018        Influenza A, Molecular       Negative       Influenza B, Molecular       Negative       Procalcitonin         0.02  Comment:  A concentration < 0.25 ng/mL represents a low risk " bacterial   infection.  Procalcitonin may not be accurate among patients with localized   infection, recent trauma or major surgery, immunosuppressed state,   invasive fungal infection, renal dysfunction. Decisions regarding   initiation or continuation of antibiotic therapy should not be based   solely on procalcitonin levels.       Acetaminophen (Tylenol), Serum         <3.0  Comment:  Toxic Levels:  Adults (4 hr post-ingestion).........>150 ug/mL  Adults (12 hr post-ingestion)........>40 ug/mL  Peds (2 hr post-ingestion, liquid)...>225 ug/mL       Albumin         3.5     Alkaline Phosphatase         90     ALT         11     Anion Gap 9 9     12     AST         17     Baso # 0.05 0.05           Basophil% 0.7 0.6           BILIRUBIN TOTAL         0.7  Comment:  For infants and newborns, interpretation of results should be based  on gestational age, weight and in agreement with clinical  observations.  Premature Infant recommended reference ranges:  Up to 24 hours.............<8.0 mg/dL  Up to 48 hours............<12.0 mg/dL  3-5 days..................<15.0 mg/dL  6-29 days.................<15.0 mg/dL       BUN, Bld 9 11     10     Calcium 9.5 9.5     9.5     Chloride 103 101     102     CO2 27 30     25     Creatinine 0.7 0.7     0.7     CRP         10.0     Differential Method Automated Automated           eGFR if  >60.0 >60.0     >60.0     eGFR if non  >60.0  Comment:  Calculation used to obtain the estimated glomerular filtration  rate (eGFR) is the CKD-EPI equation.    >60.0  Comment:  Calculation used to obtain the estimated glomerular filtration  rate (eGFR) is the CKD-EPI equation.        >60.0  Comment:  Calculation used to obtain the estimated glomerular filtration  rate (eGFR) is the CKD-EPI equation.        Eos # 0.1 0.1           Eosinophil% 1.8 0.9           Flu A & B Source       NP       Glucose 104 89     127     Gran # (ANC) 4.8 6.3           Gran% 70.3 69.4            Hematocrit 41.8 41.1           Hemoglobin 13.7 13.5           Immature Grans (Abs) 0.01  Comment:  Mild elevation in immature granulocytes is non specific and   can be seen in a variety of conditions including stress response,   acute inflammation, trauma and pregnancy. Correlation with other   laboratory and clinical findings is essential.   0.04  Comment:  Mild elevation in immature granulocytes is non specific and   can be seen in a variety of conditions including stress response,   acute inflammation, trauma and pregnancy. Correlation with other   laboratory and clinical findings is essential.             Immature Granulocytes 0.1 0.4           Lactate, [place]     1.3  Comment:  Falsely low lactic acid results can be found in samples   containing >=13.0 mg/dL total bilirubin and/or >=3.5 mg/dL   direct bilirubin.           Lymph # 1.2 1.7           Lymph% 17.5 18.4           Magnesium         1.6     MCH 28.2 28.1           MCHC 32.8 32.8           MCV 86 86           Mono # 0.7 0.9           Mono% 9.6 10.3           MPV 10.9 10.5           nRBC 0 0           Phosphorus         2.8     Platelets 206 196           Potassium 3.3 3.6     3.7     PROTEIN TOTAL         7.4     RBC 4.86 4.80           RDW 13.6 13.9           RPR         Non-reactive     Sodium 139 140     139     TSH         1.405     Vit D, 25-Hydroxy         37  Comment:  Vitamin D deficiency.........<10 ng/mL                              Vitamin D insufficiency......10-29 ng/mL       Vitamin D sufficiency........> or equal to 30 ng/mL  Vitamin D toxicity............>100 ng/mL       Vitamin B-12         458     WBC 6.85 9.02                                Significant Imaging: I have reviewed all pertinent imaging results/findings within the past 24 hours.    Assessment/Plan:     Dementia  -Patient with memory deficits > 3 years  Currently taking Donepezil x 2+ years  -Patient with worsening confusions, drowsiness and problems with word finding on  Monday  Patient alert and oriented x 4, thought process linear, speech appropriate with full range of affect  -CAM-ICU (-)  Pan (-) SIG E CAPS  No objective signs of psychosis, corine, delirium or depression with SI  -CT Head: Continued cerebral volume loss with mild moderate patchy hypoattenuation supratentorial white matter suggestive for chronic ischemic change. No acute changes  -Continue home Lexapro and Donepezil  -Recommend follow up with memory specialist in the outpatient setting  Phone numbers for [facility] and [facility] given to patient's daughter  -Recommend follow up with PCP for management of Anxiety   -Thank you for the consult! Psychiatry will sign off.         Dm Harris, DO   Psychiatry  [facility]

## 2019-05-29 NOTE — PROGRESS NOTES
Hospital Medicine  Progress note    [place] [facility] [place] Jacky Cooper MD  Admit Date: 5/27/2019  MARLO 5/30/2019  Length of Stay:  LOS: 1 day   Code status: Full Code    Principal Problem:  Encephalopathy, metabolic    Overview:    Interval hx: Patient seen and examined at bedside, as per daughter patient is still altered and disoriented. Psychiatry consulted for delirium co management. UA repeated given low grade fever on admission.     ROS     HENT: Negative for drooling, sore throat, trouble swallowing and voice change.    Eyes: Negative for photophobia, pain, redness and visual disturbance.   Respiratory: Negative for cough, shortness of breath, wheezing and stridor.    Cardiovascular: Positive for leg swelling (chronic). Negative for chest pain.   Gastrointestinal: Negative for abdominal distention, abdominal pain, diarrhea and vomiting.   Neurological: Positive for headaches. Negative for tremors, syncope, facial asymmetry, speech difficulty and weakness.   Psychiatric/Behavioral: Positive for confusion. Negative for agitation, hallucinations and sleep disturbance.     PEx  Temp:  [96.8 °F (36 °C)-98.6 °F (37 °C)]   Pulse:  [70-80]   Resp:  [16-20]   BP: (136-191)/(60-82)   SpO2:  [93 %-96 %]     Intake/Output Summary (Last 24 hours) at 5/29/2019 1241  Last data filed at 5/28/2019 1800  Gross per 24 hour   Intake 280 ml   Output --   Net 280 ml       Constitutional: She appears well-developed and well-nourished.   HENT:   Head: Normocephalic and atraumatic.   Eyes: Pupils are equal, round, and reactive to light. EOM are normal.   Neck: Normal range of motion. Neck supple. No tracheal deviation present. No thyromegaly present.   Cardiovascular: Normal rate and regular rhythm.   No murmur heard.  Pulmonary/Chest: Effort normal and breath sounds normal. She has no wheezes.   Abdominal: Soft. Bowel sounds are normal. There is no tenderness.   Musculoskeletal: Normal range of motion. She exhibits edema (b/l  chronic 1+ edema). She exhibits no tenderness.   Lymphadenopathy:     She has no cervical adenopathy.   Neurological: She is alert. She has normal reflexes. No cranial nerve deficit. GCS eye subscore is 4. GCS verbal subscore is 4. GCS motor subscore is 6.   Oriented to person, daughter.  Not oriented to place.  She is generally not oriented to time.   Skin: Skin is warm and dry.   Psychiatric: She has a normal mood and affect. Her behavior is normal.   Nursing note and vitals reviewed.    Recent Labs   Lab 05/27/19  0852 05/28/19  0527 05/29/19  0758   WBC 9.62 9.02 6.85   HGB 15.2 13.5 13.7   HCT 46.6 41.1 41.8    196 206     Recent Labs   Lab 05/27/19  1018 05/28/19  0527 05/29/19  0758    140 139   K 3.7 3.6 3.3*    101 103   CO2 25 30* 27   BUN 10 11 9   CREATININE 0.7 0.7 0.7   * 89 104   CALCIUM 9.5 9.5 9.5   MG 1.6  --   --    PHOS 2.8  --   --      Recent Labs   Lab 05/27/19  1018   ALKPHOS 90   ALT 11   AST 17   ALBUMIN 3.5   PROT 7.4   BILITOT 0.7        No results for input(s): CPK, CPKMB, MB, TROPONINI in the last 72 hours.  Recent Labs   Lab 05/27/19  0853   POCTGLUCOSE 134*     No results found for: HGBA1C    Scheduled Meds:   atorvastatin  20 mg Oral Daily    benazepril  40 mg Oral Daily    donepezil  5 mg Oral QHS    escitalopram oxalate  20 mg Oral QHS    fentaNYL  1 patch Transdermal Q72H    furosemide  40 mg Oral Daily    lidocaine  2 patch Transdermal Q24H    [START ON 5/30/2019] metoprolol succinate  200 mg Oral Daily    miconazole NITRATE 2 %   Topical (Top) BID    multivit-iron-FA-calcium-mins  1 tablet Oral Daily    pantoprazole  40 mg Oral Daily     Continuous Infusions:  As Needed:  acetaminophen, INV hydrALAZINE, lidocaine-prilocaine, nitroGLYCERIN, sodium chloride 0.9%, sodium chloride 0.9%    Active Hospital Problems    Diagnosis  POA    *Encephalopathy, metabolic [G93.41]  Unknown    Fever [R50.9]  Yes    Hypertensive urgency [I16.0]  Yes     Depressed mood [F32.9]  Yes    Hypertensive heart disease with heart failure [I11.0]  Yes    Aortic stenosis [I35.0]  Yes    Essential hypertension [I10]  Yes    Hyperlipidemia [E78.5]  Yes    GERD (gastroesophageal reflux disease) [K21.9]  Yes    Arthritis [M19.90]  Yes      Resolved Hospital Problems   No resolved problems to display.         Assessment and Plan    Encephalopathy, metabolic  Differential includes infectious encephalopathy (septic encephalopathy 2017 diverticulitis), medication noncompliance/HTN encephalopathy, narcotic overdose, TIA/stroke  Procal negative.  Flu negative.  WBC WNL, lactic acid negative.  Tylenol level <3.  UA fairly unremarkable.     CT head no significant change from prior.  Continued cerebral volume loss with mild moderate patchy hypoattenuation supratentorial white matter suggestive for chronic ischemic change.  No evidence for acute intracranial hemorrhage or significant new abnormal parenchymal attenuation allowing for motion limitation.    CXR Mild cardiomegaly and accentuation of the basilar interstitial markings.  No significant airspace consolidation or pleural effusion identified.  Pt given naloxone with minimal improvement.  Pt given ceftriaxone 2 gm in the ED  Cont fentanyl patch   Neurologically intact  Low grade fever resolved, all infectious workup has been negative  Psychiatry consult for delirium management     Hypertensive urgency  Pt recently discharged 5/9/19 for CP.  BP meds adjusted, benazepril increased from 20 mg to 40 mg daily.  Toprol XL increased to 200 mg daily      Essential hypertension  As above        Depressed mood  Continue Lexapro 20 mg every night, Aricept 5 mg every night, Melatonin 5 mg every night      Hypertensive heart disease with heart failure  Last echo 5/9/19, Diastolic, EF 65%        Aortic stenosis  5/9/19 moderate AV stenosis  Continue toprol  mg     GERD (gastroesophageal reflux disease)  Continue  PPI     Arthritis  Chronic OA of L hip, chronic L knee pain.  Hold tramadol, resume fentanyl patch   Tylenol prn, lidocaine patches.        Hyperlipidemia  Continue atorvastatin.    High Risk Conditions  HTN urgency, chronic pain      Diet:  Cardiac diet  GI PPx:   DVT PPx:    HAILEY/SCD    Goals of Care: Full code  Discharge plan: pending improvement in mental status     Time (minutes) spent in care of the patient (Greater than 1/2 spent in direct face-to-face contact) 35 minutes     Jacky Samuel MD  Staff Hospitalist  Department of Hospital Medicine  [facility]   [phone]

## 2019-05-29 NOTE — HPI
Lilia Hidalgo is a 87 y.o. female with past psychiatric history of Dementia (on Donepezil) anxiety well controlled on Lexapro who presented to the [facility] ED due to worsening confusion.  Psychiatry was originally consulted to address the patient's symptoms of delirium. Per patient's daughter, patient lives in an assisted living community, dresses herself, has difficulty ambulating 2/2 arthritis, engages appropriately with peers in her community, receives her medications from staff at assisted living. Monday morning, patient had issues with word finding, affect was blunted, patient was drowsy with worsening confusion. States an incident a few years ago in the setting of UTI. Medical work up unremarkable this hospital encounter. However, patient did present febrile. Upon, my evaluation patient's thought process is linear and organized, she is alert and oriented x 4, attentive to conversation, affect is full. She correctly spells WORLD forward and backwards. Unable to name any of the last 4 presidents. Denies SI/HI/AVH. Per patient and nursing, patient slept approximately 6 hours. Appetite is normal.       Collateral:  Talked to patient's daughter. States patient dresses herself, has limited mobility 2/2 arthritis, Monday morning assisted living called patient's mother stating she was confused, blank stare, word confusion, drowsy. States an incident a few years ago     Psychiatric Review Of Systems - Is patient experiencing or having changes in:  sleep: no  appetite: no  weight: no  energy/anergy: no  interest/pleasure/anhedonia: no  somatic symptoms: no  anxiety/panic: history of anxiety, but currently anxious  guilty/hopelessness: no  concentration: Passes SAVEJdguanjiaAART  S.I.B.s/risky behavior: no  any drugs: no  alcohol: no     Past Psychiatric History:  Previous Medication Trials: yes, Lexapro  Previous Psychiatric Hospitalizations: no   Previous Suicide Attempts: no   History of Violence: no  Outpatient Psychiatrist:  no    Social History:  Marital Status:   Children: 3   Employment Status/Info: retired  Education: high school diploma/GED  Special Ed: no  Housing Status: Assisted Living  History of phys/sexual abuse: no  Access to gun: no    Substance Abuse History:  Recreational Drugs: none  Use of Alcohol: denied  Rehab History:no   Tobacco Use:no  Legal consequences of chemical use: no    Legal History:  Past Charges/Incarcerations:no  Pending charges:no     Psychosocial Stressors: health.   Functioning Relationships: good support system    Family Psychiatric History:   Denies

## 2019-05-29 NOTE — PLAN OF CARE
"Problem: Adult Inpatient Plan of Care  Goal: Plan of Care Review  Outcome: Ongoing (interventions implemented as appropriate)  Patient orientation waxes and wanes x1- x3.Hypertensive in the AM patient refusing PRN BP medications. Patient refused blood draw stating "god will take care of it". No complaints of pain in the morning. Will continue to monitor       "

## 2019-05-30 LAB
ANION GAP SERPL CALC-SCNC: 9 MMOL/L (ref 8–16)
BASOPHILS # BLD AUTO: 0.06 K/UL (ref 0–0.2)
BASOPHILS NFR BLD: 0.6 % (ref 0–1.9)
BUN SERPL-MCNC: 9 MG/DL (ref 8–23)
CALCIUM SERPL-MCNC: 9.4 MG/DL (ref 8.7–10.5)
CHLORIDE SERPL-SCNC: 102 MMOL/L (ref 95–110)
CO2 SERPL-SCNC: 29 MMOL/L (ref 23–29)
CREAT SERPL-MCNC: 0.7 MG/DL (ref 0.5–1.4)
DIFFERENTIAL METHOD: ABNORMAL
EOSINOPHIL # BLD AUTO: 0.1 K/UL (ref 0–0.5)
EOSINOPHIL NFR BLD: 1.3 % (ref 0–8)
ERYTHROCYTE [DISTWIDTH] IN BLOOD BY AUTOMATED COUNT: 13.7 % (ref 11.5–14.5)
EST. GFR  (AFRICAN AMERICAN): >60 ML/MIN/1.73 M^2
EST. GFR  (NON AFRICAN AMERICAN): >60 ML/MIN/1.73 M^2
GLUCOSE SERPL-MCNC: 104 MG/DL (ref 70–110)
HCT VFR BLD AUTO: 44.5 % (ref 37–48.5)
HGB BLD-MCNC: 14.6 G/DL (ref 12–16)
IMM GRANULOCYTES # BLD AUTO: 0.05 K/UL (ref 0–0.04)
IMM GRANULOCYTES NFR BLD AUTO: 0.5 % (ref 0–0.5)
LYMPHOCYTES # BLD AUTO: 1.4 K/UL (ref 1–4.8)
LYMPHOCYTES NFR BLD: 13 % (ref 18–48)
MCH RBC QN AUTO: 28 PG (ref 27–31)
MCHC RBC AUTO-ENTMCNC: 32.8 G/DL (ref 32–36)
MCV RBC AUTO: 85 FL (ref 82–98)
MONOCYTES # BLD AUTO: 1 K/UL (ref 0.3–1)
MONOCYTES NFR BLD: 9.6 % (ref 4–15)
NEUTROPHILS # BLD AUTO: 7.8 K/UL (ref 1.8–7.7)
NEUTROPHILS NFR BLD: 75 % (ref 38–73)
NRBC BLD-RTO: 0 /100 WBC
PLATELET # BLD AUTO: 229 K/UL (ref 150–350)
PMV BLD AUTO: 10.9 FL (ref 9.2–12.9)
POTASSIUM SERPL-SCNC: 3.5 MMOL/L (ref 3.5–5.1)
RBC # BLD AUTO: 5.21 M/UL (ref 4–5.4)
SODIUM SERPL-SCNC: 140 MMOL/L (ref 136–145)
VIT B1 BLD-MCNC: 43 UG/L (ref 38–122)
WBC # BLD AUTO: 10.4 K/UL (ref 3.9–12.7)

## 2019-05-30 PROCEDURE — 99233 PR SUBSEQUENT HOSPITAL CARE,LEVL III: ICD-10-PCS | Mod: HCNC,,, | Performed by: HOSPITALIST

## 2019-05-30 PROCEDURE — 80048 BASIC METABOLIC PNL TOTAL CA: CPT | Mod: HCNC

## 2019-05-30 PROCEDURE — 11000001 HC ACUTE MED/SURG PRIVATE ROOM: Mod: HCNC

## 2019-05-30 PROCEDURE — 25000003 PHARM REV CODE 250: Mod: HCNC | Performed by: PHYSICIAN ASSISTANT

## 2019-05-30 PROCEDURE — 36415 COLL VENOUS BLD VENIPUNCTURE: CPT | Mod: HCNC

## 2019-05-30 PROCEDURE — 97530 THERAPEUTIC ACTIVITIES: CPT | Mod: HCNC

## 2019-05-30 PROCEDURE — 25000003 PHARM REV CODE 250: Mod: HCNC | Performed by: HOSPITALIST

## 2019-05-30 PROCEDURE — 85025 COMPLETE CBC W/AUTO DIFF WBC: CPT | Mod: HCNC

## 2019-05-30 PROCEDURE — 99233 SBSQ HOSP IP/OBS HIGH 50: CPT | Mod: HCNC,,, | Performed by: HOSPITALIST

## 2019-05-30 PROCEDURE — 97116 GAIT TRAINING THERAPY: CPT | Mod: HCNC

## 2019-05-30 PROCEDURE — 97535 SELF CARE MNGMENT TRAINING: CPT | Mod: HCNC

## 2019-05-30 RX ADMIN — PANTOPRAZOLE SODIUM 40 MG: 40 TABLET, DELAYED RELEASE ORAL at 06:05

## 2019-05-30 RX ADMIN — ATORVASTATIN CALCIUM 20 MG: 10 TABLET, FILM COATED ORAL at 06:05

## 2019-05-30 RX ADMIN — MICONAZOLE NITRATE: 20 POWDER TOPICAL at 09:05

## 2019-05-30 RX ADMIN — METOPROLOL SUCCINATE 200 MG: 100 TABLET, EXTENDED RELEASE ORAL at 08:05

## 2019-05-30 RX ADMIN — HYDRALAZINE HYDROCHLORIDE 25 MG: 25 TABLET, FILM COATED ORAL at 09:05

## 2019-05-30 RX ADMIN — DONEPEZIL HYDROCHLORIDE 5 MG: 5 TABLET, FILM COATED ORAL at 09:05

## 2019-05-30 RX ADMIN — MICONAZOLE NITRATE: 20 POWDER TOPICAL at 08:05

## 2019-05-30 RX ADMIN — LIDOCAINE AND PRILOCAINE: 25; 25 CREAM TOPICAL at 02:05

## 2019-05-30 RX ADMIN — LIDOCAINE 2 PATCH: 50 PATCH TOPICAL at 02:05

## 2019-05-30 RX ADMIN — FUROSEMIDE 40 MG: 40 TABLET ORAL at 06:05

## 2019-05-30 RX ADMIN — BENAZEPRIL HYDROCHLORIDE 40 MG: 20 TABLET, FILM COATED ORAL at 06:05

## 2019-05-30 RX ADMIN — ESCITALOPRAM OXALATE 20 MG: 20 TABLET ORAL at 09:05

## 2019-05-30 RX ADMIN — MULTIPLE VITAMINS W/ MINERALS TAB 1 TABLET: TAB at 08:05

## 2019-05-30 NOTE — PT/OT/SLP PROGRESS
"Physical Therapy Treatment    Patient Name:  Lilia Hidalgo   MRN:  [id number]    Recommendations:     Discharge Recommendations:  nursing facility, skilled (changed per discussion with cosigning therapist)  Discharge Equipment Recommendations: none   Barriers to discharge: Decreased caregiver support    Assessment:     Lilia Hidalgo is a 87 y.o. female admitted with a medical diagnosis of Encephalopathy, metabolic.  She presents with the following impairments/functional limitations:  weakness, impaired endurance, impaired self care skills, impaired functional mobilty, gait instability, impaired balance, impaired cognition. Pt tolerated session well with focus on transfers and gait training. Pt continues to demonstrate pleasant confusion to situation and with command following at times. Pt requiring Mod A to stand from bedside chair this day and Min to Mod A for ambulation in hallway with RW. Pts progress discussed with PT and OT with PT cosigning for change in discharge disposition to SNF as pt is unsafe to return to [facility] with current assistance needed. Pt will continue to benefit from therapy services to address impairments listed above.     Rehab Prognosis: Good; patient would benefit from acute skilled PT services to address these deficits and reach maximum level of function.    Recent Surgery: * No surgery found *      Plan:     During this hospitalization, patient to be seen 3 x/week to address the identified rehab impairments via gait training, therapeutic activities, therapeutic exercises, neuromuscular re-education and progress toward the following goals:    · Plan of Care Expires:  06/22/19    Subjective     Chief Complaint: pain in R hand  Patient/Family Comments/goals: "What is this thing? Why does it have to be in my hand?" - referring to IV in R hand  Pain/Comfort:  · Pain Rating 1: (unrated pain in R hand at IV site)      Objective:     Communicated with NSG prior to session.  Patient found up in chair " with peripheral IV, telemetry upon PTA entry to room.     General Precautions: Standard, fall   Orthopedic Precautions:N/A   Braces: N/A     Functional Mobility:  · Transfers:     · Sit to Stand:  moderate assistance with rolling walker  · Gait: Pt ambulates 32 ft and 4 ft with RW and Mod A. Pt is unstable and with B flexed knees in all phases. L worse than R. Pt with crepitus of B hips and knees prominent with ambulation this day.       AM-PAC 6 CLICK MOBILITY  Turning over in bed (including adjusting bedclothes, sheets and blankets)?: 2  Sitting down on and standing up from a chair with arms (e.g., wheelchair, bedside commode, etc.): 3  Moving from lying on back to sitting on the side of the bed?: 2  Moving to and from a bed to a chair (including a wheelchair)?: 3  Need to walk in hospital room?: 2  Climbing 3-5 steps with a railing?: 2  Basic Mobility Total Score: 14       Therapeutic Activities and Exercises:   Pt assisted with functional mobility as noted above.   Pt stands x 3 trials with RW and Mod A to elevate from bedside chair.   Pt educated on safety with all mobility, assistance needed, use of call bell for assistance, and PT POC.     Patient left up in chair with all lines intact, call button in reach, chair alarm on, PCT notified and avasys camera present.    GOALS:   Multidisciplinary Problems     Physical Therapy Goals        Problem: Physical Therapy Goal    Goal Priority Disciplines Outcome Goal Variances Interventions   Physical Therapy Goal     PT, PT/OT Ongoing (interventions implemented as appropriate)     Description:  Goals to be met by: 2019     Patient will increase functional independence with mobility by performin. Supine to sit with Modified [place]  2. Sit to supine with Modified [place]  3. Sit to stand transfer with Supervision  4. Bed to chair transfer with Supervision using LRAD  5. Gait  x 100 feet with Stand-by Assistance using LRAD                       Time  Tracking:     PT Received On: 05/30/19  PT Start Time: 1014     PT Stop Time: 1038  PT Total Time (min): 24 min     Billable Minutes: Gait Training 14 and Therapeutic Activity 10    Treatment Type: Treatment  PT/PTA: PTA     PTA Visit Number: 1     Jacek Feldman PTA  05/30/2019

## 2019-05-30 NOTE — NURSING
Upon assessment of pt. Lidocaine patches removed and balled up on bedside table. Pt stated she thought it was kleenex on her leg. Pt educated not to remove patches. Patches re-applied. Pt verbalizes understandings.

## 2019-05-30 NOTE — PHYSICIAN QUERY
"PT Name: Lilia Hidalgo  MR #: [id number]    Physician Query Form - Heart  Condition Clarification     CDS: Tracie Bennett RN  Contact information: [email or web address]  This form is a permanent document in the medical record.     Query Date: May 30, 2019    By submitting this query, we are merely seeking further clarification of documentation. Please utilize your independent clinical judgment when addressing the question(s) below.    The medical record contains the following   Indicators     Supporting Clinical Findings Location in Medical Record    BNP     x EF Last echo 5/9/19, Diastolic, EF 65%   H&P 5/27    Radiology findings Mild cardiomegaly and accentuation of the basilar interstitial markings.  No significant airspace consolidation or pleural effusion identified   CXR 5/27    Echo Results      "Ascites" documented      "SOB" or "BROWN" documented      "Hypoxia" documented     x Heart Failure documented Hypertensive heart disease with heart failure    H&P 5/27   x "Edema" documented She exhibits edema  (b/l chronic 1+ edema).    H&P 5/27    Diuretics/Meds      Treatment:      Other:      Heart failure (HF) can be acute, chronic or both. It is generally further specificed as systolic, diastolic, or combined. Lastly, it is important to identify an underlying etiology if known or suspected.     Common clues to acute exacerbation:  Rapidly progressive symptoms (w/in 2 weeks of presentation), using IV diuretics to treat, using supplemental O2, pulmonary edema on Xray, MI w/in 4 weeks, and/or BNP >500    Systolic Heart Failure: is defined as chart documentation of a left ventricular ejection fraction (LVEF) less than 40%     Diastolic Heart Failure: is defined as a left ventricular ejection fraction (LVEF) greater than 40%   +      Evidence of diastolic dysfunction on echocardiography OR    Right heart catheterization wedge pressure above 12 mm Hg OR    Left heart catheterization left ventricular end diastolic " pressure 18 mm Hg or above.    References: *American Heart Association    The clinical guidelines noted below are only system guidelines, and do not replace the providers clinical judgment.     Provider, please further specify the acuity of the heart failure diagnosis associated with above clinical findings                              [  X  ] Chronic Diastolic Heart Failure - Pre-existing diastolic HF diagnosis.  EF > 40%  without  acute HF symptoms documented             [   ] Other Type of Heart Failure (please specify type): _________________________    [   ] Other (please specify): ___________________________________    [  ] Clinically Undetermined                          Please document in your progress notes daily for the duration of treatment until resolved and include in your discharge summary.

## 2019-05-30 NOTE — PROGRESS NOTES
Hospital Medicine  Progress note    [place] [facility] [place] [facility] [place] Jacky Cooper MD  Admit Date: 5/27/2019  MARLO 5/30/2019  Length of Stay:  LOS: 2 days   Code status: Full Code    Principal Problem:  Encephalopathy, metabolic    Overview:    Interval hx: Patient seen and examined at bedside, as per daughter patient is much more alert and conversation. PT/OT recommended Skilled Nursing facility, possible discharge tomorrow    ROS     HENT: Negative for drooling, sore throat, trouble swallowing and voice change.    Eyes: Negative for photophobia, pain, redness and visual disturbance.   Respiratory: Negative for cough, shortness of breath, wheezing and stridor.    Cardiovascular: Positive for leg swelling (chronic). Negative for chest pain.   Gastrointestinal: Negative for abdominal distention, abdominal pain, diarrhea and vomiting.   Neurological: Positive for headaches. Negative for tremors, syncope, facial asymmetry, speech difficulty and weakness.   Psychiatric/Behavioral: Positive for confusion. Negative for agitation, hallucinations and sleep disturbance.     PEx  Temp:  [97.5 °F (36.4 °C)-99 °F (37.2 °C)]   Pulse:  [75-88]   Resp:  [18-20]   BP: (150-206)/(69-89)   SpO2:  [93 %-98 %]     Intake/Output Summary (Last 24 hours) at 5/30/2019 1820  Last data filed at 5/30/2019 1640  Gross per 24 hour   Intake 300 ml   Output 551 ml   Net -251 ml       Constitutional: She appears well-developed and well-nourished.   HENT:   Head: Normocephalic and atraumatic.   Eyes: Pupils are equal, round, and reactive to light. EOM are normal.   Neck: Normal range of motion. Neck supple. No tracheal deviation present. No thyromegaly present.   Cardiovascular: Normal rate and regular rhythm.   No murmur heard.  Pulmonary/Chest: Effort normal and breath sounds normal. She has no wheezes.   Abdominal: Soft. Bowel sounds are normal. There is no tenderness.   Musculoskeletal: Normal range of motion. She exhibits edema (b/l chronic 1+ edema).  She exhibits no tenderness.   Lymphadenopathy:     She has no cervical adenopathy.   Neurological: She is alert. She has normal reflexes. No cranial nerve deficit. GCS eye subscore is 4. GCS verbal subscore is 4. GCS motor subscore is 6.   Oriented to person, daughter.  Not oriented to place.  She is generally not oriented to time.   Skin: Skin is warm and dry.   Psychiatric: She has a normal mood and affect. Her behavior is normal.   Nursing note and vitals reviewed.    Recent Labs   Lab 05/28/19 0527 05/29/19 0758 05/30/19  0414   WBC 9.02 6.85 10.40   HGB 13.5 13.7 14.6   HCT 41.1 41.8 44.5    206 229     Recent Labs   Lab 05/27/19  1018 05/28/19 0527 05/29/19 0758 05/30/19  0414    140 139 140   K 3.7 3.6 3.3* 3.5    101 103 102   CO2 25 30* 27 29   BUN 10 11 9 9   CREATININE 0.7 0.7 0.7 0.7   * 89 104 104   CALCIUM 9.5 9.5 9.5 9.4   MG 1.6  --   --   --    PHOS 2.8  --   --   --      Recent Labs   Lab 05/27/19  1018   ALKPHOS 90   ALT 11   AST 17   ALBUMIN 3.5   PROT 7.4   BILITOT 0.7        No results for input(s): CPK, CPKMB, MB, TROPONINI in the last 72 hours.  Recent Labs   Lab 05/27/19  0853   POCTGLUCOSE 134*     No results found for: HGBA1C    Scheduled Meds:   atorvastatin  20 mg Oral Daily    benazepril  40 mg Oral Daily    donepezil  5 mg Oral QHS    escitalopram oxalate  20 mg Oral QHS    fentaNYL  1 patch Transdermal Q72H    furosemide  40 mg Oral Daily    lidocaine  2 patch Transdermal Q24H    metoprolol succinate  200 mg Oral Daily    miconazole NITRATE 2 %   Topical (Top) BID    multivit-iron-FA-calcium-mins  1 tablet Oral Daily    pantoprazole  40 mg Oral Daily     Continuous Infusions:  As Needed:  acetaminophen, INV hydrALAZINE, lidocaine-prilocaine, nitroGLYCERIN, sodium chloride 0.9%, sodium chloride 0.9%    Active Hospital Problems    Diagnosis  POA    *Encephalopathy, metabolic [G93.41]  Unknown    Dementia [F03.90]  Unknown    Fever [R50.9]  Yes     Hypertensive urgency [I16.0]  Yes    Depressed mood [F32.9]  Yes    Hypertensive heart disease with heart failure [I11.0]  Yes    Aortic stenosis [I35.0]  Yes    Essential hypertension [I10]  Yes    Hyperlipidemia [E78.5]  Yes    GERD (gastroesophageal reflux disease) [K21.9]  Yes    Arthritis [M19.90]  Yes      Resolved Hospital Problems   No resolved problems to display.         Assessment and Plan    Encephalopathy, metabolic  Differential includes infectious encephalopathy (septic encephalopathy 2017 diverticulitis), medication noncompliance/HTN encephalopathy, narcotic overdose, TIA/stroke  Procal negative.  Flu negative.  WBC WNL, lactic acid negative.  Tylenol level <3.  UA fairly unremarkable.     CT head no significant change from prior.  Continued cerebral volume loss with mild moderate patchy hypoattenuation supratentorial white matter suggestive for chronic ischemic change.  No evidence for acute intracranial hemorrhage or significant new abnormal parenchymal attenuation allowing for motion limitation.    CXR Mild cardiomegaly and accentuation of the basilar interstitial markings.  No significant airspace consolidation or pleural effusion identified.  Pt given naloxone with minimal improvement.  Pt given ceftriaxone 2 gm in the ED  Cont fentanyl patch   Neurologically intact  Low grade fever resolved, all infectious workup has been negative  Psychiatry consult for delirium management     Hypertensive urgency  Pt recently discharged 5/9/19 for CP.  BP meds adjusted, benazepril increased from 20 mg to 40 mg daily.  Toprol XL increased to 200 mg daily      Essential hypertension  As above        Depressed mood  Continue Lexapro 20 mg every night, Aricept 5 mg every night, Melatonin 5 mg every night      Hypertensive heart disease with heart failure  Last echo 5/9/19, Diastolic, EF 65%        Aortic stenosis  5/9/19 moderate AV stenosis  Continue toprol  mg     GERD (gastroesophageal reflux  disease)  Continue PPI     Arthritis  Chronic OA of L hip, chronic L knee pain.  Hold tramadol, resume fentanyl patch   Tylenol prn, lidocaine patches.        Hyperlipidemia  Continue atorvastatin.    High Risk Conditions  HTN urgency, chronic pain      Diet:  Cardiac diet  GI PPx:   DVT PPx:    HAILEY/SCD    Goals of Care: Full code  Discharge plan: pending improvement in mental status     Time (minutes) spent in care of the patient (Greater than 1/2 spent in direct face-to-face contact) 35 minutes     Jacky Samuel MD  Staff Hospitalist  Department of Hospital Medicine  [facility]   [phone]

## 2019-05-30 NOTE — PLAN OF CARE
Problem: Adult Inpatient Plan of Care  Goal: Plan of Care Review  Outcome: Ongoing (interventions implemented as appropriate)  Patient is incredibly delirious; only oriented to place and has difficultly with reorientation for remaining three areas of mentation. Patient attempted to get out of bed twice and operates under the perception that she is at her apartment. Patient also displayed persistent irritability with POC stating that she would rather bypass care interventions just so she could sleep.     Cluster care provided to promote restful environment. Remote sitter given RN Spectra Link extension due to the increased noise level from floor waxing and inability to hear clinical alarms; bed alarms active. Distractions minimized.

## 2019-05-30 NOTE — PLAN OF CARE
05/30/19 1609   Post-Acute Status   Post-Acute Authorization Placement   Post-Acute Placement Status Referrals Sent     Referral sent to Poli Cardoza

## 2019-05-30 NOTE — PT/OT/SLP PROGRESS
Occupational Therapy   Treatment    Name: Lilia Hidalgo  MRN: [id number]  Admitting Diagnosis:  Encephalopathy, metabolic       Recommendations:     Discharge Recommendations: rehabilitation facility, nursing facility, skilled  Discharge Equipment Recommendations:  none  Barriers to discharge:  Decreased caregiver support    Assessment:     Lilia Hidalgo is a 87 y.o. female with a medical diagnosis of Encephalopathy, metabolic.  She presents supine and on bedpan.  Pt required Mod+A for rolling to L/R for pericare and hygiene.  Pt with max A for supine to sit edge of bed with verbal cues for safety and ensuring pt comfort with task as she is overwhelmed by change and activity all at once. Pt with max A for squat pivot edge of bed transfer to chair.  Pt with potential for gains but will require time and effort and continued cognitive gains for safety  Performance deficits affecting function are impaired endurance, weakness, impaired self care skills, impaired functional mobilty, gait instability, impaired cognition.     Rehab Prognosis:  Good; patient would benefit from acute skilled OT services to address these deficits and reach maximum level of function.       Plan:     Patient to be seen 3 x/week to address the above listed problems via therapeutic exercises, therapeutic activities, self-care/home management  · Plan of Care Expires: 06/28/19  · Plan of Care Reviewed with: patient    Subjective     Pain/Comfort:  · Pain Rating 1: 0/10    Objective:     Communicated with: RN prior to session.  Patient found supine with peripheral IV upon OT entry to room.    General Precautions: Standard, fall   Orthopedic Precautions:N/A   Braces: N/A     Occupational Performance:     Bed Mobility:    · Patient completed Rolling/Turning to Left with  moderate assistance  · Patient completed Rolling/Turning to Right with moderate assistance  · Patient completed Scooting/Bridging with moderate assistance  · Patient completed Supine  to Sit with maximal assistance     Functional Mobility/Transfers:  · Patient completed Sit <> Stand Transfer with maximal assistance  with  hand-held assist   · Patient completed Bed <> Chair Transfer using Stand Pivot technique with maximal assistance with hand-held assist    Activities of Daily Living:  · Upper Body Dressing: moderate assistance    · Toileting: maximal assistance        [facility] 6 Click ADL: 16    Treatment & Education:  Pt educated on safety, role of OT, importance of increased participation in self care for gains , expectations for participation, expectations for gains, POC, energy conservation, caregiver strain. White board updated.   - ADL training  - bed mobility training  - transfer training    Patient left supine with all lines intactEducation:      GOALS:   Multidisciplinary Problems     Occupational Therapy Goals        Problem: Occupational Therapy Goal    Goal Priority Disciplines Outcome Interventions   Occupational Therapy Goal     OT, PT/OT Ongoing (interventions implemented as appropriate)    Description:  Goals to be met by: 6/20    Patient will increase functional independence with ADLs by performing:    UE Dressing with Victor.  LE Dressing with Victor.  Grooming while seated with Set-up Assistance.  Toileting from toilet with Contact Guard Assistance for hygiene and clothing management.   Bathing from  edge of bed with Contact Guard Assistance.                      Time Tracking:     OT Date of Treatment: 05/30/19  OT Start Time: 0940  OT Stop Time: 1005  OT Total Time (min): 25 min    Billable Minutes:Self Care/Home Management 13  Therapeutic Activity 12    Meri Herndon, OT  5/30/2019

## 2019-05-30 NOTE — NURSING
Patient SBP remains persistently high this AM.    Spoke with VEE Cooper with Med Team R. Provider instructed RN to administer 0900 antihypertensives (benazepril, furosemide, and Toprol XL) at this time.    Will continue to monitor.

## 2019-05-30 NOTE — NURSING
Patient SBP severely elevated, but remains asymptomatic (see V/S flowsheet). Spoke with THELMA Sloan    Orders for one-time dose of 25 mg of oral hydralazine. Will continue to monitor.

## 2019-05-30 NOTE — PLAN OF CARE
Problem: Occupational Therapy Goal  Goal: Occupational Therapy Goal  Goals to be met by: 6/20    Patient will increase functional independence with ADLs by performing:    UE Dressing with [place].  LE Dressing with [place].  Grooming while seated with Set-up Assistance.  Toileting from toilet with Contact Guard Assistance for hygiene and clothing management.   Bathing from  edge of bed with Contact Guard Assistance.     Outcome: Ongoing (interventions implemented as appropriate)  Goals addressed and unmet.  Cont with POC  Meri Herndon OT  5/30/2019

## 2019-05-31 PROCEDURE — 99233 PR SUBSEQUENT HOSPITAL CARE,LEVL III: ICD-10-PCS | Mod: HCNC,,, | Performed by: HOSPITALIST

## 2019-05-31 PROCEDURE — 99233 SBSQ HOSP IP/OBS HIGH 50: CPT | Mod: HCNC,,, | Performed by: HOSPITALIST

## 2019-05-31 PROCEDURE — 25000003 PHARM REV CODE 250: Mod: HCNC | Performed by: PHYSICIAN ASSISTANT

## 2019-05-31 PROCEDURE — 25000003 PHARM REV CODE 250: Mod: HCNC | Performed by: HOSPITALIST

## 2019-05-31 PROCEDURE — 11000001 HC ACUTE MED/SURG PRIVATE ROOM: Mod: HCNC

## 2019-05-31 PROCEDURE — 86580 TB INTRADERMAL TEST: CPT | Mod: HCNC | Performed by: HOSPITALIST

## 2019-05-31 PROCEDURE — 30200315 PPD INTRADERMAL TEST REV CODE 302: Mod: HCNC | Performed by: HOSPITALIST

## 2019-05-31 RX ORDER — OMEPRAZOLE 20 MG/1
40 CAPSULE, DELAYED RELEASE ORAL DAILY
Qty: 90 CAPSULE | Refills: 3 | Status: SHIPPED | OUTPATIENT
Start: 2019-05-31 | End: 2019-09-21 | Stop reason: SDUPTHER

## 2019-05-31 RX ORDER — METOPROLOL SUCCINATE 100 MG/1
200 TABLET, EXTENDED RELEASE ORAL DAILY
Qty: 30 TABLET | Refills: 11 | Status: SHIPPED | OUTPATIENT
Start: 2019-05-31 | End: 2020-05-07 | Stop reason: SDUPTHER

## 2019-05-31 RX ADMIN — LIDOCAINE 2 PATCH: 50 PATCH TOPICAL at 07:05

## 2019-05-31 RX ADMIN — ACETAMINOPHEN 650 MG: 325 TABLET ORAL at 05:05

## 2019-05-31 RX ADMIN — ESCITALOPRAM OXALATE 20 MG: 20 TABLET ORAL at 08:05

## 2019-05-31 RX ADMIN — Medication 5 UNITS: at 06:05

## 2019-05-31 RX ADMIN — DONEPEZIL HYDROCHLORIDE 5 MG: 5 TABLET, FILM COATED ORAL at 08:05

## 2019-05-31 RX ADMIN — FUROSEMIDE 40 MG: 40 TABLET ORAL at 10:05

## 2019-05-31 RX ADMIN — METOPROLOL SUCCINATE 200 MG: 100 TABLET, EXTENDED RELEASE ORAL at 10:05

## 2019-05-31 RX ADMIN — ACETAMINOPHEN 650 MG: 325 TABLET ORAL at 08:05

## 2019-05-31 RX ADMIN — ATORVASTATIN CALCIUM 20 MG: 10 TABLET, FILM COATED ORAL at 10:05

## 2019-05-31 RX ADMIN — MICONAZOLE NITRATE: 20 POWDER TOPICAL at 10:05

## 2019-05-31 RX ADMIN — PANTOPRAZOLE SODIUM 40 MG: 40 TABLET, DELAYED RELEASE ORAL at 10:05

## 2019-05-31 RX ADMIN — BENAZEPRIL HYDROCHLORIDE 40 MG: 20 TABLET, FILM COATED ORAL at 10:05

## 2019-05-31 RX ADMIN — MULTIPLE VITAMINS W/ MINERALS TAB 1 TABLET: TAB at 10:05

## 2019-05-31 NOTE — PLAN OF CARE
Patient has been accepted to [facility]. SHAREE spoke to Deana in admissions and was informed that the facility is still waiting on authorization. SHAREE will continue to follow.    Abhijit Shi, SHERRY  [facility]  [id number]

## 2019-05-31 NOTE — PROGRESS NOTES
Hospital Medicine  Progress note    Team: [facility] Jacky Cooper MD  Admit Date: 5/27/2019  MARLO 5/31/2019  Length of Stay:  LOS: 3 days   Code status: Full Code    Principal Problem:  Encephalopathy, metabolic    Overview:    Interval hx: Patient seen and examined at bedside, as per daughter patient is much more alert and conversation. Awaiting insurance authorization prior to discharge. Most likely Monday.     ROS     HENT: Negative for drooling, sore throat, trouble swallowing and voice change.    Eyes: Negative for photophobia, pain, redness and visual disturbance.   Respiratory: Negative for cough, shortness of breath, wheezing and stridor.    Cardiovascular: Positive for leg swelling (chronic). Negative for chest pain.   Gastrointestinal: Negative for abdominal distention, abdominal pain, diarrhea and vomiting.   Neurological: Positive for headaches. Negative for tremors, syncope, facial asymmetry, speech difficulty and weakness.   Psychiatric/Behavioral: Positive for confusion. Negative for agitation, hallucinations and sleep disturbance.     PEx  Temp:  [97.5 °F (36.4 °C)-98.2 °F (36.8 °C)]   Pulse:  [70-77]   Resp:  [14-20]   BP: (134-173)/(60-86)   SpO2:  [91 %-95 %]   No intake or output data in the 24 hours ending 05/31/19 1721    Constitutional: She appears well-developed and well-nourished.   HENT:   Head: Normocephalic and atraumatic.   Eyes: Pupils are equal, round, and reactive to light. EOM are normal.   Neck: Normal range of motion. Neck supple. No tracheal deviation present. No thyromegaly present.   Cardiovascular: Normal rate and regular rhythm.   No murmur heard.  Pulmonary/Chest: Effort normal and breath sounds normal. She has no wheezes.   Abdominal: Soft. Bowel sounds are normal. There is no tenderness.   Musculoskeletal: Normal range of motion. She exhibits edema (b/l chronic 1+ edema). She exhibits no tenderness.   Lymphadenopathy:     She has no cervical adenopathy.    Neurological: She is alert. She has normal reflexes. No cranial nerve deficit. GCS eye subscore is 4. GCS verbal subscore is 4. GCS motor subscore is 6.   Oriented to person, daughter.  Not oriented to place.  She is generally not oriented to time.   Skin: Skin is warm and dry.   Psychiatric: She has a normal mood and affect. Her behavior is normal.   Nursing note and vitals reviewed.    Recent Labs   Lab 05/28/19 0527 05/29/19 0758 05/30/19  0414   WBC 9.02 6.85 10.40   HGB 13.5 13.7 14.6   HCT 41.1 41.8 44.5    206 229     Recent Labs   Lab 05/27/19  1018 05/28/19  0527 05/29/19 0758 05/30/19  0414    140 139 140   K 3.7 3.6 3.3* 3.5    101 103 102   CO2 25 30* 27 29   BUN 10 11 9 9   CREATININE 0.7 0.7 0.7 0.7   * 89 104 104   CALCIUM 9.5 9.5 9.5 9.4   MG 1.6  --   --   --    PHOS 2.8  --   --   --      Recent Labs   Lab 05/27/19  1018   ALKPHOS 90   ALT 11   AST 17   ALBUMIN 3.5   PROT 7.4   BILITOT 0.7        No results for input(s): CPK, CPKMB, MB, TROPONINI in the last 72 hours.  Recent Labs   Lab 05/27/19  0853   POCTGLUCOSE 134*     No results found for: HGBA1C    Scheduled Meds:   atorvastatin  20 mg Oral Daily    benazepril  40 mg Oral Daily    donepezil  5 mg Oral QHS    escitalopram oxalate  20 mg Oral QHS    fentaNYL  1 patch Transdermal Q72H    furosemide  40 mg Oral Daily    lidocaine  2 patch Transdermal Q24H    metoprolol succinate  200 mg Oral Daily    miconazole NITRATE 2 %   Topical (Top) BID    multivit-iron-FA-calcium-mins  1 tablet Oral Daily    pantoprazole  40 mg Oral Daily    tuberculin  5 Units Intradermal Once     Continuous Infusions:  As Needed:  acetaminophen, INV hydrALAZINE, lidocaine-prilocaine, nitroGLYCERIN, sodium chloride 0.9%, sodium chloride 0.9%    Active Hospital Problems    Diagnosis  POA    *Encephalopathy, metabolic [G93.41]  Unknown    Dementia [F03.90]  Unknown    Fever [R50.9]  Yes    Hypertensive urgency [I16.0]  Yes     Depressed mood [F32.9]  Yes    Hypertensive heart disease with heart failure [I11.0]  Yes    Aortic stenosis [I35.0]  Yes    Essential hypertension [I10]  Yes    Hyperlipidemia [E78.5]  Yes    GERD (gastroesophageal reflux disease) [K21.9]  Yes    Arthritis [M19.90]  Yes      Resolved Hospital Problems   No resolved problems to display.         Assessment and Plan    Encephalopathy, metabolic  Differential includes infectious encephalopathy (septic encephalopathy 2017 diverticulitis), medication noncompliance/HTN encephalopathy, narcotic overdose, TIA/stroke  Procal negative.  Flu negative.  WBC WNL, lactic acid negative.  Tylenol level <3.  UA fairly unremarkable.     CT head no significant change from prior.  Continued cerebral volume loss with mild moderate patchy hypoattenuation supratentorial white matter suggestive for chronic ischemic change.  No evidence for acute intracranial hemorrhage or significant new abnormal parenchymal attenuation allowing for motion limitation.    CXR Mild cardiomegaly and accentuation of the basilar interstitial markings.  No significant airspace consolidation or pleural effusion identified.  Pt given naloxone with minimal improvement.  Pt given ceftriaxone 2 gm in the ED  Cont fentanyl patch   Neurologically intact  Low grade fever resolved, all infectious workup has been negative  Psychiatry consult for delirium management     Hypertensive urgency  Pt recently discharged 5/9/19 for CP.  BP meds adjusted, benazepril increased from 20 mg to 40 mg daily.  Toprol XL increased to 200 mg daily      Essential hypertension  As above        Depressed mood  Continue Lexapro 20 mg every night, Aricept 5 mg every night, Melatonin 5 mg every night      Hypertensive heart disease with heart failure  Last echo 5/9/19, Diastolic, EF 65%        Aortic stenosis  5/9/19 moderate AV stenosis  Increase toprol XL to 200 mg     GERD (gastroesophageal reflux disease)  Continue  PPI     Arthritis  Chronic OA of L hip, chronic L knee pain.  Hold tramadol, resume fentanyl patch   Tylenol prn, lidocaine patches.        Hyperlipidemia  Continue atorvastatin.    High Risk Conditions  HTN urgency, chronic pain      Diet:  Cardiac diet  GI PPx:   DVT PPx:    HAILEY/SCD    Goals of Care: Full code  Discharge plan: pending insurance approval for SNF placement     Time (minutes) spent in care of the patient (Greater than 1/2 spent in direct face-to-face contact) 35 minutes     Jacky Samuel MD  Staff Hospitalist  Department of Hospital Medicine  [facility]   [phone]

## 2019-05-31 NOTE — PLAN OF CARE
[facility]     Department of Hospital Medicine     [address] [place] [address]     [phone] after hours  [phone] fax       NURSING HOME ORDERS    05/31/2019    Admit to Nursing Home:  Skilled Bed                                            Diagnoses:  Active Hospital Problems    Diagnosis  POA    *Encephalopathy, metabolic [G93.41]  Unknown    Dementia [F03.90]  Unknown    Fever [R50.9]  Yes    Hypertensive urgency [I16.0]  Yes    Depressed mood [F32.9]  Yes    Hypertensive heart disease with heart failure [I11.0]  Yes    Aortic stenosis [I35.0]  Yes    Essential hypertension [I10]  Yes    Hyperlipidemia [E78.5]  Yes    GERD (gastroesophageal reflux disease) [K21.9]  Yes    Arthritis [M19.90]  Yes      Resolved Hospital Problems   No resolved problems to display.       Patient is homebound due to:  Encephalopathy, metabolic    Allergies:  Review of patient's allergies indicates:   Allergen Reactions    Aspirin Nausea Only and Other (See Comments)     Other reaction(s): esophageal pain    Celebrex [celecoxib] Rash    Morphine Hallucinations    Steroid [corticosteroids (glucocorticoids)] Other (See Comments)     Other reaction(s): Flushing (skin)    Sulfa dyne Other (See Comments)     Other reaction(s): esophogeal pain       Vitals:       Every shift (Skilled Nursing patients)    Diet: Cardiac diet     Acitivities:       - Up in a chair each morning as tolerated        LABS:  Per facility protocol     Nursing Precautions:      - Aspiration precautions:             - Total assistance with meals            -  Upright 90 degrees befor during and after meals       CONSULTS:      Physical Therapy to evaluate and treat     Occupational Therapy to evaluate and treat    Medications: Discontinue all previous medication orders, if any. See new list below.   Lilia Hidalgo   Home Medication Instructions TIM:[id number]    Printed on:05/31/19 5885    Medication Information                      acetaminophen (TYLENOL) 650 MG TbSR  Take 1 tablet (650 mg total) by mouth every 6 (six) hours as needed (last dose may be left at bedside for patient to take at night).             atorvastatin (LIPITOR) 20 MG tablet  TAKE 1 TABLET BY MOUTH ONCE DAILY.             benazepril (LOTENSIN) 20 MG tablet  TAKE 2 TABLETS(40 MG) BY MOUTH EVERY DAY             donepezil (ARICEPT) 5 MG tablet  Take 1 tablet (5 mg total) by mouth every evening.             escitalopram oxalate (LEXAPRO) 20 MG tablet  Take 1/2 pill nightly for a week and then 1 pill nightly onwards.             fentaNYL (DURAGESIC) 12 mcg/hr PT72  Place 1 patch onto the skin every 72 hours.             furosemide (LASIX) 40 MG tablet  TAKE 1 TABLET BY MOUTH ONCE DAILY.             lidocaine-prilocaine (EMLA) cream  Apply topically as needed. Apply to painful areas 3 times per day as needed             melatonin 5 mg Tab  Take 1 tablet by mouth every evening.              metoprolol succinate (TOPROL-XL) 100 MG 24 hr tablet  Take 2 tablets (200 mg total) by mouth once daily.             MULTIVITAMIN W-MINERALS/LUTEIN (CENTRUM SILVER ORAL)  Take by mouth once daily.             mupirocin (BACTROBAN) 2 % ointment  Apply topically 2 (two) times daily.             nitroGLYCERIN (NITROSTAT) 0.4 MG SL tablet  Place 1 tablet (0.4 mg total) under the tongue every 5 (five) minutes as needed for Chest pain (do not take more than 3 tablets in a row).             nystatin (MYCOSTATIN) powder  Apply topically 2 (two) times daily. To groin area             omeprazole (PRILOSEC) 20 MG capsule  Take 2 capsules (40 mg total) by mouth once daily.                       _________________________________  Jacky Cooper MD  05/31/2019

## 2019-05-31 NOTE — PLAN OF CARE
Problem: Adult Inpatient Plan of Care  Goal: Plan of Care Review  Outcome: Ongoing (interventions implemented as appropriate)  No acute events during this shift.    Slight improvement in patient SBP overnight with addition of Toprol XL. PRN hydralazine given once for elevated SBP with resolution on 0400 V/S check. Delirium precautions and interventions implemented to promote restful environment for patient to sleep.    Patient c/o of acute arthritic pain to left hip and knee this AM. PRN acetaminophen administer for moderate hip pain. Subsequent administration of pain medication, patient persistently called out for additional medication stating that her hip pain was the worse it had been since she has been in the hospital. Medicine team paged with response pending. Patient repositioned to additional comfort. Will follow and continue to monitor.

## 2019-05-31 NOTE — NURSING
Patient called for pain medication several times within a 30 minute time span. PRN analgesia given to patient at 0521. Night float paged to request others modes of analgesia. This was explained to patient four times. Patient verbalized understanding to explantation. Awaiting call back from medicine team.

## 2019-06-01 LAB
ALBUMIN SERPL BCP-MCNC: 2.9 G/DL (ref 3.5–5.2)
ALP SERPL-CCNC: 74 U/L (ref 55–135)
ALT SERPL W/O P-5'-P-CCNC: 10 U/L (ref 10–44)
ANION GAP SERPL CALC-SCNC: 10 MMOL/L (ref 8–16)
AST SERPL-CCNC: 15 U/L (ref 10–40)
BACTERIA BLD CULT: NORMAL
BACTERIA BLD CULT: NORMAL
BASOPHILS # BLD AUTO: 0.06 K/UL (ref 0–0.2)
BASOPHILS NFR BLD: 0.7 % (ref 0–1.9)
BILIRUB SERPL-MCNC: 0.7 MG/DL (ref 0.1–1)
BUN SERPL-MCNC: 17 MG/DL (ref 8–23)
CALCIUM SERPL-MCNC: 9.5 MG/DL (ref 8.7–10.5)
CHLORIDE SERPL-SCNC: 102 MMOL/L (ref 95–110)
CO2 SERPL-SCNC: 30 MMOL/L (ref 23–29)
CREAT SERPL-MCNC: 0.7 MG/DL (ref 0.5–1.4)
DIFFERENTIAL METHOD: NORMAL
EOSINOPHIL # BLD AUTO: 0.2 K/UL (ref 0–0.5)
EOSINOPHIL NFR BLD: 2.8 % (ref 0–8)
ERYTHROCYTE [DISTWIDTH] IN BLOOD BY AUTOMATED COUNT: 14 % (ref 11.5–14.5)
EST. GFR  (AFRICAN AMERICAN): >60 ML/MIN/1.73 M^2
EST. GFR  (NON AFRICAN AMERICAN): >60 ML/MIN/1.73 M^2
GLUCOSE SERPL-MCNC: 94 MG/DL (ref 70–110)
HCT VFR BLD AUTO: 45.9 % (ref 37–48.5)
HGB BLD-MCNC: 15.3 G/DL (ref 12–16)
IMM GRANULOCYTES # BLD AUTO: 0.04 K/UL (ref 0–0.04)
IMM GRANULOCYTES NFR BLD AUTO: 0.5 % (ref 0–0.5)
LYMPHOCYTES # BLD AUTO: 1.6 K/UL (ref 1–4.8)
LYMPHOCYTES NFR BLD: 18.8 % (ref 18–48)
MAGNESIUM SERPL-MCNC: 2 MG/DL (ref 1.6–2.6)
MCH RBC QN AUTO: 28.4 PG (ref 27–31)
MCHC RBC AUTO-ENTMCNC: 33.3 G/DL (ref 32–36)
MCV RBC AUTO: 85 FL (ref 82–98)
MONOCYTES # BLD AUTO: 0.8 K/UL (ref 0.3–1)
MONOCYTES NFR BLD: 9.4 % (ref 4–15)
NEUTROPHILS # BLD AUTO: 5.8 K/UL (ref 1.8–7.7)
NEUTROPHILS NFR BLD: 67.8 % (ref 38–73)
NRBC BLD-RTO: 0 /100 WBC
PHOSPHATE SERPL-MCNC: 4 MG/DL (ref 2.7–4.5)
PLATELET # BLD AUTO: 182 K/UL (ref 150–350)
PMV BLD AUTO: 11.1 FL (ref 9.2–12.9)
POTASSIUM SERPL-SCNC: 3.4 MMOL/L (ref 3.5–5.1)
PROT SERPL-MCNC: 6.6 G/DL (ref 6–8.4)
RBC # BLD AUTO: 5.39 M/UL (ref 4–5.4)
SODIUM SERPL-SCNC: 142 MMOL/L (ref 136–145)
WBC # BLD AUTO: 8.51 K/UL (ref 3.9–12.7)

## 2019-06-01 PROCEDURE — 36415 COLL VENOUS BLD VENIPUNCTURE: CPT | Mod: HCNC

## 2019-06-01 PROCEDURE — 99233 SBSQ HOSP IP/OBS HIGH 50: CPT | Mod: HCNC,,, | Performed by: HOSPITALIST

## 2019-06-01 PROCEDURE — 25000003 PHARM REV CODE 250: Mod: HCNC | Performed by: PHYSICIAN ASSISTANT

## 2019-06-01 PROCEDURE — 11000001 HC ACUTE MED/SURG PRIVATE ROOM: Mod: HCNC

## 2019-06-01 PROCEDURE — 83735 ASSAY OF MAGNESIUM: CPT | Mod: HCNC

## 2019-06-01 PROCEDURE — 85025 COMPLETE CBC W/AUTO DIFF WBC: CPT | Mod: HCNC

## 2019-06-01 PROCEDURE — 25000003 PHARM REV CODE 250: Mod: HCNC | Performed by: HOSPITALIST

## 2019-06-01 PROCEDURE — 80053 COMPREHEN METABOLIC PANEL: CPT | Mod: HCNC

## 2019-06-01 PROCEDURE — 99233 PR SUBSEQUENT HOSPITAL CARE,LEVL III: ICD-10-PCS | Mod: HCNC,,, | Performed by: HOSPITALIST

## 2019-06-01 PROCEDURE — 84100 ASSAY OF PHOSPHORUS: CPT | Mod: HCNC

## 2019-06-01 RX ADMIN — ESCITALOPRAM OXALATE 20 MG: 20 TABLET ORAL at 08:06

## 2019-06-01 RX ADMIN — METOPROLOL SUCCINATE 200 MG: 100 TABLET, EXTENDED RELEASE ORAL at 09:06

## 2019-06-01 RX ADMIN — FENTANYL 1 PATCH: 12 PATCH TRANSDERMAL at 09:06

## 2019-06-01 RX ADMIN — DONEPEZIL HYDROCHLORIDE 5 MG: 5 TABLET, FILM COATED ORAL at 08:06

## 2019-06-01 RX ADMIN — ATORVASTATIN CALCIUM 20 MG: 10 TABLET, FILM COATED ORAL at 09:06

## 2019-06-01 RX ADMIN — MICONAZOLE NITRATE: 20 POWDER TOPICAL at 09:06

## 2019-06-01 RX ADMIN — LIDOCAINE 2 PATCH: 50 PATCH TOPICAL at 03:06

## 2019-06-01 RX ADMIN — MICONAZOLE NITRATE: 20 POWDER TOPICAL at 08:06

## 2019-06-01 RX ADMIN — MULTIPLE VITAMINS W/ MINERALS TAB 1 TABLET: TAB at 09:06

## 2019-06-01 RX ADMIN — PANTOPRAZOLE SODIUM 40 MG: 40 TABLET, DELAYED RELEASE ORAL at 09:06

## 2019-06-01 RX ADMIN — BENAZEPRIL HYDROCHLORIDE 40 MG: 20 TABLET, FILM COATED ORAL at 09:06

## 2019-06-01 RX ADMIN — FUROSEMIDE 40 MG: 40 TABLET ORAL at 09:06

## 2019-06-01 RX ADMIN — ACETAMINOPHEN 650 MG: 325 TABLET ORAL at 06:06

## 2019-06-01 RX ADMIN — ACETAMINOPHEN 650 MG: 325 TABLET ORAL at 04:06

## 2019-06-01 NOTE — PLAN OF CARE
Problem: Adult Inpatient Plan of Care  Goal: Patient-Specific Goal (Individualization)  Outcome: Ongoing (interventions implemented as appropriate)  Patient remains free from falls.  Bed locked and in lowest position.  Personal items and call light in reach.  Dependent on staff for needs.   Alert and oriented x 2 with intermittent confusion. Needs reorienting and redirection.   Lungs clear.  Abdomen large,  soft and non tender with active bowel sounds all 4 quadrants.  Patient has Puric on however did have 1 episode of urinary incontinence this shift.  Adult brief on.  Incontinent of bowels. Patient c/o back pain and left knee pain this shift.  Medicated x2 this shift with Acetaminophen 650 mg po.  Patient repositioned for comfort and hot packs to back and knee.  Presently resting.

## 2019-06-02 LAB
ALBUMIN SERPL BCP-MCNC: 2.8 G/DL (ref 3.5–5.2)
ALP SERPL-CCNC: 70 U/L (ref 55–135)
ALT SERPL W/O P-5'-P-CCNC: 9 U/L (ref 10–44)
ANION GAP SERPL CALC-SCNC: 10 MMOL/L (ref 8–16)
AST SERPL-CCNC: 15 U/L (ref 10–40)
BASOPHILS # BLD AUTO: 0.12 K/UL (ref 0–0.2)
BASOPHILS NFR BLD: 1.1 % (ref 0–1.9)
BILIRUB SERPL-MCNC: 0.8 MG/DL (ref 0.1–1)
BUN SERPL-MCNC: 18 MG/DL (ref 8–23)
CALCIUM SERPL-MCNC: 9 MG/DL (ref 8.7–10.5)
CHLORIDE SERPL-SCNC: 102 MMOL/L (ref 95–110)
CO2 SERPL-SCNC: 24 MMOL/L (ref 23–29)
CREAT SERPL-MCNC: 0.7 MG/DL (ref 0.5–1.4)
DIFFERENTIAL METHOD: ABNORMAL
EOSINOPHIL # BLD AUTO: 0.3 K/UL (ref 0–0.5)
EOSINOPHIL NFR BLD: 3 % (ref 0–8)
ERYTHROCYTE [DISTWIDTH] IN BLOOD BY AUTOMATED COUNT: 13.8 % (ref 11.5–14.5)
EST. GFR  (AFRICAN AMERICAN): >60 ML/MIN/1.73 M^2
EST. GFR  (NON AFRICAN AMERICAN): >60 ML/MIN/1.73 M^2
GLUCOSE SERPL-MCNC: 92 MG/DL (ref 70–110)
HCT VFR BLD AUTO: 47.1 % (ref 37–48.5)
HGB BLD-MCNC: 15.8 G/DL (ref 12–16)
IMM GRANULOCYTES # BLD AUTO: 0.07 K/UL (ref 0–0.04)
IMM GRANULOCYTES NFR BLD AUTO: 0.6 % (ref 0–0.5)
LYMPHOCYTES # BLD AUTO: 2.1 K/UL (ref 1–4.8)
LYMPHOCYTES NFR BLD: 19.1 % (ref 18–48)
MAGNESIUM SERPL-MCNC: 2.1 MG/DL (ref 1.6–2.6)
MCH RBC QN AUTO: 28.7 PG (ref 27–31)
MCHC RBC AUTO-ENTMCNC: 33.5 G/DL (ref 32–36)
MCV RBC AUTO: 86 FL (ref 82–98)
MONOCYTES # BLD AUTO: 0.9 K/UL (ref 0.3–1)
MONOCYTES NFR BLD: 7.9 % (ref 4–15)
NEUTROPHILS # BLD AUTO: 7.5 K/UL (ref 1.8–7.7)
NEUTROPHILS NFR BLD: 68.3 % (ref 38–73)
NRBC BLD-RTO: 0 /100 WBC
PHOSPHATE SERPL-MCNC: 3.7 MG/DL (ref 2.7–4.5)
PLATELET # BLD AUTO: 160 K/UL (ref 150–350)
PMV BLD AUTO: 11.3 FL (ref 9.2–12.9)
POTASSIUM SERPL-SCNC: 3.5 MMOL/L (ref 3.5–5.1)
PROT SERPL-MCNC: 6.2 G/DL (ref 6–8.4)
RBC # BLD AUTO: 5.51 M/UL (ref 4–5.4)
SODIUM SERPL-SCNC: 136 MMOL/L (ref 136–145)
WBC # BLD AUTO: 10.91 K/UL (ref 3.9–12.7)

## 2019-06-02 PROCEDURE — 84100 ASSAY OF PHOSPHORUS: CPT | Mod: HCNC

## 2019-06-02 PROCEDURE — 99233 SBSQ HOSP IP/OBS HIGH 50: CPT | Mod: HCNC,,, | Performed by: HOSPITALIST

## 2019-06-02 PROCEDURE — 11000001 HC ACUTE MED/SURG PRIVATE ROOM: Mod: HCNC

## 2019-06-02 PROCEDURE — 25000003 PHARM REV CODE 250: Mod: HCNC | Performed by: PHYSICIAN ASSISTANT

## 2019-06-02 PROCEDURE — 36415 COLL VENOUS BLD VENIPUNCTURE: CPT | Mod: HCNC

## 2019-06-02 PROCEDURE — 80053 COMPREHEN METABOLIC PANEL: CPT | Mod: HCNC

## 2019-06-02 PROCEDURE — 85025 COMPLETE CBC W/AUTO DIFF WBC: CPT | Mod: HCNC

## 2019-06-02 PROCEDURE — 83735 ASSAY OF MAGNESIUM: CPT | Mod: HCNC

## 2019-06-02 PROCEDURE — 25000003 PHARM REV CODE 250: Mod: HCNC | Performed by: HOSPITALIST

## 2019-06-02 PROCEDURE — 99233 PR SUBSEQUENT HOSPITAL CARE,LEVL III: ICD-10-PCS | Mod: HCNC,,, | Performed by: HOSPITALIST

## 2019-06-02 RX ORDER — HYDROMORPHONE HYDROCHLORIDE 2 MG/ML
INJECTION, SOLUTION INTRAMUSCULAR; INTRAVENOUS; SUBCUTANEOUS
Status: DISPENSED
Start: 2019-06-02 | End: 2019-06-03

## 2019-06-02 RX ADMIN — PANTOPRAZOLE SODIUM 40 MG: 40 TABLET, DELAYED RELEASE ORAL at 09:06

## 2019-06-02 RX ADMIN — MICONAZOLE NITRATE: 20 POWDER TOPICAL at 09:06

## 2019-06-02 RX ADMIN — FUROSEMIDE 40 MG: 40 TABLET ORAL at 09:06

## 2019-06-02 RX ADMIN — ACETAMINOPHEN 650 MG: 325 TABLET ORAL at 09:06

## 2019-06-02 RX ADMIN — ATORVASTATIN CALCIUM 20 MG: 10 TABLET, FILM COATED ORAL at 09:06

## 2019-06-02 RX ADMIN — ACETAMINOPHEN 650 MG: 325 TABLET ORAL at 01:06

## 2019-06-02 RX ADMIN — MULTIPLE VITAMINS W/ MINERALS TAB 1 TABLET: TAB at 09:06

## 2019-06-02 RX ADMIN — LIDOCAINE 2 PATCH: 50 PATCH TOPICAL at 03:06

## 2019-06-02 RX ADMIN — BENAZEPRIL HYDROCHLORIDE 40 MG: 20 TABLET, FILM COATED ORAL at 09:06

## 2019-06-02 RX ADMIN — ESCITALOPRAM OXALATE 20 MG: 20 TABLET ORAL at 09:06

## 2019-06-02 RX ADMIN — METOPROLOL SUCCINATE 200 MG: 100 TABLET, EXTENDED RELEASE ORAL at 09:06

## 2019-06-02 RX ADMIN — DONEPEZIL HYDROCHLORIDE 5 MG: 5 TABLET, FILM COATED ORAL at 09:06

## 2019-06-02 NOTE — PROGRESS NOTES
Hospital Medicine  Progress note    Team: [facility] [place] HOSP MED R Jacky Cooper MD  Admit Date: 5/27/2019  MARLO 5/31/2019  Length of Stay:  LOS: 4 days   Code status: Full Code    Principal Problem:  Encephalopathy, metabolic    Overview:    Interval hx: Patient seen and examined at bedside, as per daughter patient is much more alert and conversation. Awaiting insurance authorization prior to discharge. Most likely Monday.     ROS     HENT: Negative for drooling, sore throat, trouble swallowing and voice change.    Eyes: Negative for photophobia, pain, redness and visual disturbance.   Respiratory: Negative for cough, shortness of breath, wheezing and stridor.    Cardiovascular: Positive for leg swelling (chronic). Negative for chest pain.   Gastrointestinal: Negative for abdominal distention, abdominal pain, diarrhea and vomiting.   Neurological: Positive for headaches. Negative for tremors, syncope, facial asymmetry, speech difficulty and weakness.   Psychiatric/Behavioral: Positive for confusion. Negative for agitation, hallucinations and sleep disturbance.     PEx  Temp:  [96.7 °F (35.9 °C)-98.8 °F (37.1 °C)]   Pulse:  [67-78]   Resp:  [14-19]   BP: (111-178)/(53-75)   SpO2:  [92 %-98 %]     Intake/Output Summary (Last 24 hours) at 6/1/2019 2020  Last data filed at 6/1/2019 1500  Gross per 24 hour   Intake 180 ml   Output 1500 ml   Net -1320 ml       Constitutional: She appears well-developed and well-nourished.   HENT:   Head: Normocephalic and atraumatic.   Eyes: Pupils are equal, round, and reactive to light. EOM are normal.   Neck: Normal range of motion. Neck supple. No tracheal deviation present. No thyromegaly present.   Cardiovascular: Normal rate and regular rhythm.   No murmur heard.  Pulmonary/Chest: Effort normal and breath sounds normal. She has no wheezes.   Abdominal: Soft. Bowel sounds are normal. There is no tenderness.   Musculoskeletal: Normal range of motion. She exhibits edema (b/l chronic 1+  edema). She exhibits no tenderness.   Lymphadenopathy:     She has no cervical adenopathy.   Neurological: She is alert. She has normal reflexes. No cranial nerve deficit. GCS eye subscore is 4. GCS verbal subscore is 4. GCS motor subscore is 6.   Oriented to person, daughter.  Not oriented to place.  She is generally not oriented to time.   Skin: Skin is warm and dry.   Psychiatric: She has a normal mood and affect. Her behavior is normal.   Nursing note and vitals reviewed.    Recent Labs   Lab 05/29/19 0758 05/30/19 0414 06/01/19  0558   WBC 6.85 10.40 8.51   HGB 13.7 14.6 15.3   HCT 41.8 44.5 45.9    229 182     Recent Labs   Lab 05/27/19  1018  05/29/19 0758 05/30/19 0414 06/01/19  0558      < > 139 140 142   K 3.7   < > 3.3* 3.5 3.4*      < > 103 102 102   CO2 25   < > 27 29 30*   BUN 10   < > 9 9 17   CREATININE 0.7   < > 0.7 0.7 0.7   *   < > 104 104 94   CALCIUM 9.5   < > 9.5 9.4 9.5   MG 1.6  --   --   --  2.0   PHOS 2.8  --   --   --  4.0    < > = values in this interval not displayed.     Recent Labs   Lab 05/27/19  1018 06/01/19  0558   ALKPHOS 90 74   ALT 11 10   AST 17 15   ALBUMIN 3.5 2.9*   PROT 7.4 6.6   BILITOT 0.7 0.7        No results for input(s): CPK, CPKMB, MB, TROPONINI in the last 72 hours.  Recent Labs   Lab 05/27/19  0853   POCTGLUCOSE 134*     No results found for: HGBA1C    Scheduled Meds:   atorvastatin  20 mg Oral Daily    benazepril  40 mg Oral Daily    donepezil  5 mg Oral QHS    escitalopram oxalate  20 mg Oral QHS    fentaNYL  1 patch Transdermal Q72H    furosemide  40 mg Oral Daily    lidocaine  2 patch Transdermal Q24H    metoprolol succinate  200 mg Oral Daily    miconazole NITRATE 2 %   Topical (Top) BID    multivit-iron-FA-calcium-mins  1 tablet Oral Daily    pantoprazole  40 mg Oral Daily     Continuous Infusions:  As Needed:  acetaminophen, INV hydrALAZINE, lidocaine-prilocaine, nitroGLYCERIN, sodium chloride 0.9%, sodium chloride  0.9%    Active Hospital Problems    Diagnosis  POA    *Encephalopathy, metabolic [G93.41]  Unknown    Dementia [F03.90]  Unknown    Fever [R50.9]  Yes    Hypertensive urgency [I16.0]  Yes    Depressed mood [F32.9]  Yes    Hypertensive heart disease with heart failure [I11.0]  Yes    Aortic stenosis [I35.0]  Yes    Essential hypertension [I10]  Yes    Hyperlipidemia [E78.5]  Yes    GERD (gastroesophageal reflux disease) [K21.9]  Yes    Arthritis [M19.90]  Yes      Resolved Hospital Problems   No resolved problems to display.         Assessment and Plan    Encephalopathy, metabolic  Resolved   Most likely worsening dementia   Procal negative.  Flu negative.  WBC WNL, lactic acid negative.  Tylenol level <3.  UA fairly unremarkable.     CT head no significant change from prior.  Continued cerebral volume loss with mild moderate patchy hypoattenuation supratentorial white matter suggestive for chronic ischemic change.  No evidence for acute intracranial hemorrhage or significant new abnormal parenchymal attenuation allowing for motion limitation.    CXR Mild cardiomegaly and accentuation of the basilar interstitial markings.  No significant airspace consolidation or pleural effusion identified.  Pt given naloxone with minimal improvement.  Pt given ceftriaxone 2 gm in the ED  Cont fentanyl patch   Neurologically intact  Low grade fever resolved, all infectious workup has been negative  Psychiatry consult for delirium management     Hypertensive urgency  Pt recently discharged 5/9/19 for CP.  BP meds adjusted, benazepril increased from 20 mg to 40 mg daily.  Toprol XL increased to 200 mg daily      Essential hypertension  As above        Depressed mood  Continue Lexapro 20 mg every night, Aricept 5 mg every night, Melatonin 5 mg every night      Hypertensive heart disease with heart failure  Last echo 5/9/19, Diastolic, EF 65%        Aortic stenosis  5/9/19 moderate AV stenosis  Increase toprol XL to 200  mg     GERD (gastroesophageal reflux disease)  Continue PPI     Arthritis  Chronic OA of L hip, chronic L knee pain.  Hold tramadol, resume fentanyl patch   Tylenol prn, lidocaine patches.        Hyperlipidemia  Continue atorvastatin.    High Risk Conditions  HTN urgency, chronic pain      Diet:  Cardiac diet  GI PPx:   DVT PPx:    HAILEY/SCD    Goals of Care: Full code  Discharge plan: pending insurance approval for SNF placement     Time (minutes) spent in care of the patient (Greater than 1/2 spent in direct face-to-face contact) 35 minutes     Jacky Samuel MD  Staff Hospitalist  Department of Hospital Medicine  [facility]   [phone]

## 2019-06-02 NOTE — PROGRESS NOTES
Hospital Medicine  Progress note    Team: [facility] [place] HOSP MED R Jacky Cooper MD  Admit Date: 5/27/2019  MARLO 5/31/2019  Length of Stay:  LOS: 5 days   Code status: Full Code    Principal Problem:  Encephalopathy, metabolic    Overview:    Interval hx: Patient seen and examined at bedside, no acute events overnight, patient progressing well towards goals of care and medically ready for discharge pending insurance approval.     ROS     HENT: Negative for drooling, sore throat, trouble swallowing and voice change.    Eyes: Negative for photophobia, pain, redness and visual disturbance.   Respiratory: Negative for cough, shortness of breath, wheezing and stridor.    Cardiovascular: Positive for leg swelling (chronic). Negative for chest pain.   Gastrointestinal: Negative for abdominal distention, abdominal pain, diarrhea and vomiting.   Neurological: Positive for headaches. Negative for tremors, syncope, facial asymmetry, speech difficulty and weakness.   Psychiatric/Behavioral: Positive for confusion. Negative for agitation, hallucinations and sleep disturbance.     PEx  Temp:  [97.1 °F (36.2 °C)-97.6 °F (36.4 °C)]   Pulse:  [67-76]   Resp:  [16-18]   BP: (106-145)/(53-69)   SpO2:  [91 %-97 %]     Intake/Output Summary (Last 24 hours) at 6/2/2019 1603  Last data filed at 6/2/2019 0500  Gross per 24 hour   Intake --   Output 275 ml   Net -275 ml       Constitutional: She appears well-developed and well-nourished.   HENT:   Head: Normocephalic and atraumatic.   Eyes: Pupils are equal, round, and reactive to light. EOM are normal.   Neck: Normal range of motion. Neck supple. No tracheal deviation present. No thyromegaly present.   Cardiovascular: Normal rate and regular rhythm.   No murmur heard.  Pulmonary/Chest: Effort normal and breath sounds normal. She has no wheezes.   Abdominal: Soft. Bowel sounds are normal. There is no tenderness.   Musculoskeletal: Normal range of motion. She exhibits edema (b/l chronic 1+ edema).  She exhibits no tenderness.   Lymphadenopathy:     She has no cervical adenopathy.   Neurological: She is alert. She has normal reflexes. No cranial nerve deficit. GCS eye subscore is 4. GCS verbal subscore is 4. GCS motor subscore is 6.   Oriented to person, daughter.  Not oriented to place.  She is generally not oriented to time.   Skin: Skin is warm and dry.   Psychiatric: She has a normal mood and affect. Her behavior is normal.   Nursing note and vitals reviewed.    Recent Labs   Lab 05/30/19  0414 06/01/19  0558 06/02/19  0611   WBC 10.40 8.51 10.91   HGB 14.6 15.3 15.8   HCT 44.5 45.9 47.1    182 160     Recent Labs   Lab 05/27/19  1018  05/30/19  0414 06/01/19  0558 06/02/19  0611      < > 140 142 136   K 3.7   < > 3.5 3.4* 3.5      < > 102 102 102   CO2 25   < > 29 30* 24   BUN 10   < > 9 17 18   CREATININE 0.7   < > 0.7 0.7 0.7   *   < > 104 94 92   CALCIUM 9.5   < > 9.4 9.5 9.0   MG 1.6  --   --  2.0 2.1   PHOS 2.8  --   --  4.0 3.7    < > = values in this interval not displayed.     Recent Labs   Lab 05/27/19  1018 06/01/19  0558 06/02/19  0611   ALKPHOS 90 74 70   ALT 11 10 9*   AST 17 15 15   ALBUMIN 3.5 2.9* 2.8*   PROT 7.4 6.6 6.2   BILITOT 0.7 0.7 0.8        No results for input(s): CPK, CPKMB, MB, TROPONINI in the last 72 hours.  Recent Labs   Lab 05/27/19  0853   POCTGLUCOSE 134*     No results found for: HGBA1C    Scheduled Meds:   atorvastatin  20 mg Oral Daily    benazepril  40 mg Oral Daily    donepezil  5 mg Oral QHS    escitalopram oxalate  20 mg Oral QHS    fentaNYL  1 patch Transdermal Q72H    furosemide  40 mg Oral Daily    hydromorphone (PF)        lidocaine  2 patch Transdermal Q24H    metoprolol succinate  200 mg Oral Daily    miconazole NITRATE 2 %   Topical (Top) BID    multivit-iron-FA-calcium-mins  1 tablet Oral Daily    pantoprazole  40 mg Oral Daily     Continuous Infusions:  As Needed:  acetaminophen, INV hydrALAZINE, lidocaine-prilocaine,  nitroGLYCERIN, sodium chloride 0.9%, sodium chloride 0.9%    Active Hospital Problems    Diagnosis  POA    *Encephalopathy, metabolic [G93.41]  Unknown    Dementia [F03.90]  Unknown    Fever [R50.9]  Yes    Hypertensive urgency [I16.0]  Yes    Depressed mood [F32.9]  Yes    Hypertensive heart disease with heart failure [I11.0]  Yes    Aortic stenosis [I35.0]  Yes    Essential hypertension [I10]  Yes    Hyperlipidemia [E78.5]  Yes    GERD (gastroesophageal reflux disease) [K21.9]  Yes    Arthritis [M19.90]  Yes      Resolved Hospital Problems   No resolved problems to display.         Assessment and Plan    Encephalopathy, metabolic  Resolved   Most likely worsening dementia   Procal negative.  Flu negative.  WBC WNL, lactic acid negative.  Tylenol level <3.  UA fairly unremarkable.     CT head no significant change from prior.  Continued cerebral volume loss with mild moderate patchy hypoattenuation supratentorial white matter suggestive for chronic ischemic change.  No evidence for acute intracranial hemorrhage or significant new abnormal parenchymal attenuation allowing for motion limitation.    CXR Mild cardiomegaly and accentuation of the basilar interstitial markings.  No significant airspace consolidation or pleural effusion identified.  Pt given naloxone with minimal improvement.  Pt given ceftriaxone 2 gm in the ED  Cont fentanyl patch   Neurologically intact  Low grade fever resolved, all infectious workup has been negative  Psychiatry consult for delirium management     Hypertensive urgency  Pt recently discharged 5/9/19 for CP.  BP meds adjusted, benazepril increased from 20 mg to 40 mg daily.  Toprol XL increased to 200 mg daily      Essential hypertension  As above        Depressed mood  Continue Lexapro 20 mg every night, Aricept 5 mg every night, Melatonin 5 mg every night      Hypertensive heart disease with heart failure  Last echo 5/9/19, Diastolic, EF 65%        Aortic stenosis  5/9/19  moderate AV stenosis  Increase toprol XL to 200 mg     GERD (gastroesophageal reflux disease)  Continue PPI     Arthritis  Chronic OA of L hip, chronic L knee pain.  Hold tramadol, resume fentanyl patch   Tylenol prn, lidocaine patches.        Hyperlipidemia  Continue atorvastatin.    High Risk Conditions  HTN urgency, chronic pain      Diet:  Cardiac diet  GI PPx:   DVT PPx:    HAILEY/SCD    Goals of Care: Full code  Discharge plan: pending insurance approval for SNF placement     Time (minutes) spent in care of the patient (Greater than 1/2 spent in direct face-to-face contact) 35 minutes     Jacky Samuel MD  Staff Hospitalist  Department of Hospital Medicine  [facility]   [phone]

## 2019-06-02 NOTE — PLAN OF CARE
Problem: Adult Inpatient Plan of Care  Goal: Plan of Care Review  Outcome: Ongoing (interventions implemented as appropriate)  Concern to first part of shift about not going to sleep. Slept about 5 hours this shift.complain once of back pain level 5 that was resolved by tylenol..SBP highest 140-160. Other VSS.  No falls. Patient has not attempt to get out of bed without staff assistance .Tele sitter camera in room.

## 2019-06-02 NOTE — PLAN OF CARE
AAOx2. Patient sat up in chair bedside most of shift without complication, except C/O back pain, repositioning helped relieve. Telesitter. No attempts at getting up without assistance. Safety maintained during shift. Bed in lowest postion, call light within reach, side rails up x2. SaO2 91-92% RA, other VS WNL. Pure wick changed. Will continue to monitor.

## 2019-06-03 LAB
ALBUMIN SERPL BCP-MCNC: 2.6 G/DL (ref 3.5–5.2)
ALP SERPL-CCNC: 67 U/L (ref 55–135)
ALT SERPL W/O P-5'-P-CCNC: 9 U/L (ref 10–44)
ANION GAP SERPL CALC-SCNC: 9 MMOL/L (ref 8–16)
AST SERPL-CCNC: 14 U/L (ref 10–40)
BASOPHILS # BLD AUTO: 0.04 K/UL (ref 0–0.2)
BASOPHILS NFR BLD: 0.4 % (ref 0–1.9)
BILIRUB SERPL-MCNC: 0.7 MG/DL (ref 0.1–1)
BUN SERPL-MCNC: 21 MG/DL (ref 8–23)
CALCIUM SERPL-MCNC: 9.1 MG/DL (ref 8.7–10.5)
CHLORIDE SERPL-SCNC: 100 MMOL/L (ref 95–110)
CO2 SERPL-SCNC: 29 MMOL/L (ref 23–29)
CREAT SERPL-MCNC: 0.7 MG/DL (ref 0.5–1.4)
DIFFERENTIAL METHOD: ABNORMAL
EOSINOPHIL # BLD AUTO: 0.3 K/UL (ref 0–0.5)
EOSINOPHIL NFR BLD: 2.7 % (ref 0–8)
ERYTHROCYTE [DISTWIDTH] IN BLOOD BY AUTOMATED COUNT: 13.7 % (ref 11.5–14.5)
EST. GFR  (AFRICAN AMERICAN): >60 ML/MIN/1.73 M^2
EST. GFR  (NON AFRICAN AMERICAN): >60 ML/MIN/1.73 M^2
GLUCOSE SERPL-MCNC: 84 MG/DL (ref 70–110)
HCT VFR BLD AUTO: 42.7 % (ref 37–48.5)
HGB BLD-MCNC: 13.6 G/DL (ref 12–16)
IMM GRANULOCYTES # BLD AUTO: 0.03 K/UL (ref 0–0.04)
IMM GRANULOCYTES NFR BLD AUTO: 0.3 % (ref 0–0.5)
LYMPHOCYTES # BLD AUTO: 1.7 K/UL (ref 1–4.8)
LYMPHOCYTES NFR BLD: 18.5 % (ref 18–48)
MAGNESIUM SERPL-MCNC: 2.2 MG/DL (ref 1.6–2.6)
MCH RBC QN AUTO: 28.6 PG (ref 27–31)
MCHC RBC AUTO-ENTMCNC: 31.9 G/DL (ref 32–36)
MCV RBC AUTO: 90 FL (ref 82–98)
MONOCYTES # BLD AUTO: 0.9 K/UL (ref 0.3–1)
MONOCYTES NFR BLD: 9.8 % (ref 4–15)
NEUTROPHILS # BLD AUTO: 6.3 K/UL (ref 1.8–7.7)
NEUTROPHILS NFR BLD: 68.3 % (ref 38–73)
NRBC BLD-RTO: 0 /100 WBC
PHOSPHATE SERPL-MCNC: 3.7 MG/DL (ref 2.7–4.5)
PLATELET # BLD AUTO: 207 K/UL (ref 150–350)
PMV BLD AUTO: 10.5 FL (ref 9.2–12.9)
POTASSIUM SERPL-SCNC: 3.4 MMOL/L (ref 3.5–5.1)
PROT SERPL-MCNC: 6 G/DL (ref 6–8.4)
RBC # BLD AUTO: 4.75 M/UL (ref 4–5.4)
SODIUM SERPL-SCNC: 138 MMOL/L (ref 136–145)
WBC # BLD AUTO: 9.22 K/UL (ref 3.9–12.7)

## 2019-06-03 PROCEDURE — 36415 COLL VENOUS BLD VENIPUNCTURE: CPT | Mod: HCNC

## 2019-06-03 PROCEDURE — 80053 COMPREHEN METABOLIC PANEL: CPT | Mod: HCNC

## 2019-06-03 PROCEDURE — 83735 ASSAY OF MAGNESIUM: CPT | Mod: HCNC

## 2019-06-03 PROCEDURE — 85025 COMPLETE CBC W/AUTO DIFF WBC: CPT | Mod: HCNC

## 2019-06-03 PROCEDURE — 97530 THERAPEUTIC ACTIVITIES: CPT | Mod: HCNC

## 2019-06-03 PROCEDURE — 11000001 HC ACUTE MED/SURG PRIVATE ROOM: Mod: HCNC

## 2019-06-03 PROCEDURE — 99233 SBSQ HOSP IP/OBS HIGH 50: CPT | Mod: HCNC,,, | Performed by: HOSPITALIST

## 2019-06-03 PROCEDURE — 25000003 PHARM REV CODE 250: Mod: HCNC | Performed by: PHYSICIAN ASSISTANT

## 2019-06-03 PROCEDURE — 97116 GAIT TRAINING THERAPY: CPT | Mod: HCNC

## 2019-06-03 PROCEDURE — 99233 PR SUBSEQUENT HOSPITAL CARE,LEVL III: ICD-10-PCS | Mod: HCNC,,, | Performed by: HOSPITALIST

## 2019-06-03 PROCEDURE — 84100 ASSAY OF PHOSPHORUS: CPT | Mod: HCNC

## 2019-06-03 PROCEDURE — 25000003 PHARM REV CODE 250: Mod: HCNC | Performed by: HOSPITALIST

## 2019-06-03 RX ADMIN — FUROSEMIDE 40 MG: 40 TABLET ORAL at 09:06

## 2019-06-03 RX ADMIN — ESCITALOPRAM OXALATE 20 MG: 20 TABLET ORAL at 09:06

## 2019-06-03 RX ADMIN — ATORVASTATIN CALCIUM 20 MG: 10 TABLET, FILM COATED ORAL at 09:06

## 2019-06-03 RX ADMIN — PANTOPRAZOLE SODIUM 40 MG: 40 TABLET, DELAYED RELEASE ORAL at 09:06

## 2019-06-03 RX ADMIN — ACETAMINOPHEN 650 MG: 325 TABLET ORAL at 09:06

## 2019-06-03 RX ADMIN — DONEPEZIL HYDROCHLORIDE 5 MG: 5 TABLET, FILM COATED ORAL at 09:06

## 2019-06-03 RX ADMIN — BENAZEPRIL HYDROCHLORIDE 40 MG: 20 TABLET, FILM COATED ORAL at 09:06

## 2019-06-03 RX ADMIN — METOPROLOL SUCCINATE 200 MG: 100 TABLET, EXTENDED RELEASE ORAL at 09:06

## 2019-06-03 NOTE — PLAN OF CARE
Problem: Occupational Therapy Goal  Goal: Occupational Therapy Goal  Goals to be met by: 6/20    Patient will increase functional independence with ADLs by performing:    UE Dressing with [place].  LE Dressing with [place].  Grooming while seated with Set-up Assistance.  Toileting from toilet with Contact Guard Assistance for hygiene and clothing management.   Bathing from  edge of bed with Contact Guard Assistance.     Outcome: Ongoing (interventions implemented as appropriate)  Goals addressed and unmet.  Cont with POC  Meri Herndon OT  6/3/2019

## 2019-06-03 NOTE — PLAN OF CARE
Problem: Adult Inpatient Plan of Care  Goal: Plan of Care Review  Outcome: Ongoing (interventions implemented as appropriate)  Awake and alert; [place] to person, Place.. Complain once of level 5/10 back pain which tylenol was given.pt went to sleep shortly after taking med.Highest SBP was 174 this shift.Other VSS.. No falls. Patient has not tried to get out of bed. Tele sitter present in room. Side rails up times 2. Bed in low position, call light in reach. No other complain voice

## 2019-06-03 NOTE — PLAN OF CARE
IMR Rounds:  BP better controlled. Pending insurance auth for [facility]. Primary nurse updated.

## 2019-06-03 NOTE — PT/OT/SLP PROGRESS
"Physical Therapy Treatment    Patient Name:  Lilia Hidalgo   MRN:  [id number]  Admitting Diagnosis: Encephalopathy, metabolic  Recent Surgery: * No surgery found *      Recommendations:     Discharge Recommendations:  nursing facility, skilled   Discharge Equipment Recommendations: none   Barriers to discharge: Decreased caregiver support Pt requiring increased assistance at current time.     Plan:     During this hospitalization, patient to be seen 3 x/week to address the above listed problems via gait training, therapeutic activities, neuromuscular re-education  · Plan of Care Expires:  06/22/19   Plan of Care Reviewed with: patient    This Plan of care has been discussed with the patient who was involved in its development and understands and is in agreement with the identified goals and treatment plan    Subjective     Communicated with RN (Lucrecia) prior to session.     Patient comments: "My leg is hurting bad, I'm stiff"  Pain/Comfort:  · Pain Rating 1: (No rating provided)  · Location - Side 1: Left  · Location - Orientation 1: generalized  · Location 1: leg  · Pain Addressed 1: Reposition, Distraction, Cessation of Activity  · Pain Rating Post-Intervention 1: (No rating provided)    Objective:     Patient found with: Betty telemetry([employer] camera)    Patient found sup in bed upon PT entry to room, agreeable to treatment.  No family present in the room.    General Precautions: Standard, fall   Orthopedic Precautions:N/A   Braces: N/A       BED MOBILITY (vc's for hand placement sequencing of task):        Rolling to the R:  NT.       Rolling to the L:  NT.        Sup > sit at the EOB:  Mod A for trunk and LE's, exiting on the R side.       Sit > sup:  Not performed 2* pt left seated UIC.       Scooting hips to EOB with mod/min A                SITTING AT THE EDGE OF THE BED    Assistance Level Required: close sup for safety with B UE support     TRANSFERS  (vc's for hand placement, sequencing of task and " safety)   Patient completed Sit <> Stand Transfer from EOB with min A of 1 for balance and hip elevation with rolling walker x1 trial(s)   Patient completed Stand <> Sit Transfer to BS chair with min/CGA for balance with rolling walker        GAIT: BS chair follows   Patient ambulated: 42ft  (increased time)   Patient required: min A for balance   Patient used:  Rolling Walker   Gait Pattern observed: reciprocal gait   Gait Deviation(s): decreased tracee, increased time in double stance, decreased velocity of limb motion, decreased step length, decreased stride length, decreased swing-to-stance ratio, decreased toe-to-floor clearance, decreased weight-shifting ability and FFP, increased reliance on AD through B UE's, downward gaze.  multiple rest breaks in standing    Comments: vc's and tc's for upright posture, placement of AD, B step length and safety    EDUCATION  Patient provided with daily orientation and goals of this PT session. They were educated to call for assistance and to transfer with hospital staff only.  Also, pt was educated on the effects of prolonged immobility and the importance of performing OOB activity and exercises to promote healing and reduce recovery time    Whiteboard updated with correct mobility information. RN/PCT notified.  Pt safe to transfer OOB/BTB with RN/PCT via SPT: Use RW/no AD with min A of 1 person.    Patient left UIC in reclined position, with  all lines intact, call button in reach and RN notified    AM-PAC 6 CLICK MOBILITY  Turning over in bed (including adjusting bedclothes, sheets and blankets)?: 2  Sitting down on and standing up from a chair with arms (e.g., wheelchair, bedside commode, etc.): 3  Moving from lying on back to sitting on the side of the bed?: 2  Moving to and from a bed to a chair (including a wheelchair)?: 3  Need to walk in hospital room?: 3  Climbing 3-5 steps with a railing?: 1  Basic Mobility Total Score: 14     Assessment:     Lilia Hidalgo is a  87 y.o. female admitted with a medical diagnosis of Encephalopathy, metabolic.  She presents with the following impairments/functional limitations:  weakness, impaired endurance, impaired self care skills, impaired functional mobilty, gait instability, impaired balance, decreased upper extremity function, decreased lower extremity function, decreased safety awareness, pain, decreased ROM, impaired coordination, impaired joint extensibility. requiring significant assistance and verbal cues for bed mob, transfers and gait 2* weakness, pain and fatigue.   In light of pt's current functional level and deficits, it is anticipated that pt will need to participate in an intense rehab program consisting of PT and OT in order to achieve full rehab potential to return to previous level of function and roles.  Pt remains motivated to participate in PT session and will cont to benefit from skilled PT intervention.    Rehab Prognosis:  Good; patient would benefit from acute skilled PT services to address these deficits and reach maximum level of function.      GOALS:   Multidisciplinary Problems     Physical Therapy Goals        Problem: Physical Therapy Goal    Goal Priority Disciplines Outcome Goal Variances Interventions   Physical Therapy Goal     PT, PT/OT Ongoing (interventions implemented as appropriate)     Description:  Goals to be met by: 2019     Patient will increase functional independence with mobility by performin. Supine to sit with Modified [place]  2. Sit to supine with Modified [place]  3. Sit to stand transfer with Supervision  4. Bed to chair transfer with Supervision using LRAD  5. Gait  x 100 feet with Stand-by Assistance using LRAD                       Time Tracking:     PT Received On: 19  PT Start Time: 1104     PT Stop Time: 1135  PT Total Time (min): 31 min     Billable Minutes: Gait Training 21 and Therapeutic Activity 10    Treatment Type: Treatment  PT/PTA: PTA     PTA  Visit Number: 2       Maia Milton PTA.  Pager [phone]    6/3/2019    .

## 2019-06-03 NOTE — PROGRESS NOTES
Hospital Medicine  Progress note    Team: [facility] [place] [place] HOSP MED R Jacky oCoper MD  Admit Date: 5/27/2019  MARLO 6/3/2019  Length of Stay:  LOS: 6 days   Code status: Full Code    Principal Problem:  Encephalopathy, metabolic    Overview:    Interval hx: Patient seen and examined at bedside, no acute events overnight,patient stable for discharge, awaiting insurance authorization.     ROS     HENT: Negative for drooling, sore throat, trouble swallowing and voice change.    Eyes: Negative for photophobia, pain, redness and visual disturbance.   Respiratory: Negative for cough, shortness of breath, wheezing and stridor.    Cardiovascular: Positive for leg swelling (chronic). Negative for chest pain.   Gastrointestinal: Negative for abdominal distention, abdominal pain, diarrhea and vomiting.   Neurological: Positive for headaches. Negative for tremors, syncope, facial asymmetry, speech difficulty and weakness.   Psychiatric/Behavioral: Positive for confusion. Negative for agitation, hallucinations and sleep disturbance.     PEx  Temp:  [97.1 °F (36.2 °C)-98.6 °F (37 °C)]   Pulse:  [63-80]   Resp:  [16-20]   BP: (106-152)/(52-69)   SpO2:  [91 %-94 %]     Intake/Output Summary (Last 24 hours) at 6/3/2019 1429  Last data filed at 6/3/2019 0400  Gross per 24 hour   Intake --   Output 400 ml   Net -400 ml       Constitutional: She appears well-developed and well-nourished.   HENT:   Head: Normocephalic and atraumatic.   Eyes: Pupils are equal, round, and reactive to light. EOM are normal.   Neck: Normal range of motion. Neck supple. No tracheal deviation present. No thyromegaly present.   Cardiovascular: Normal rate and regular rhythm.   No murmur heard.  Pulmonary/Chest: Effort normal and breath sounds normal. She has no wheezes.   Abdominal: Soft. Bowel sounds are normal. There is no tenderness.   Musculoskeletal: Normal range of motion. She exhibits edema (b/l chronic 1+ edema). She exhibits no tenderness.   Lymphadenopathy:      She has no cervical adenopathy.   Neurological: She is alert. She has normal reflexes. No cranial nerve deficit. GCS eye subscore is 4. GCS verbal subscore is 4. GCS motor subscore is 6.   Oriented to person, daughter.  Not oriented to place.  She is generally not oriented to time.   Skin: Skin is warm and dry.   Psychiatric: She has a normal mood and affect. Her behavior is normal.   Nursing note and vitals reviewed.    Recent Labs   Lab 06/01/19 0558 06/02/19  0611 06/03/19  0404   WBC 8.51 10.91 9.22   HGB 15.3 15.8 13.6   HCT 45.9 47.1 42.7    160 207     Recent Labs   Lab 06/01/19 0558 06/02/19  0611 06/03/19  0404    136 138   K 3.4* 3.5 3.4*    102 100   CO2 30* 24 29   BUN 17 18 21   CREATININE 0.7 0.7 0.7   GLU 94 92 84   CALCIUM 9.5 9.0 9.1   MG 2.0 2.1 2.2   PHOS 4.0 3.7 3.7     Recent Labs   Lab 06/01/19 0558 06/02/19  0611 06/03/19  0404   ALKPHOS 74 70 67   ALT 10 9* 9*   AST 15 15 14   ALBUMIN 2.9* 2.8* 2.6*   PROT 6.6 6.2 6.0   BILITOT 0.7 0.8 0.7        No results for input(s): CPK, CPKMB, MB, TROPONINI in the last 72 hours.  No results for input(s): POCTGLUCOSE in the last 168 hours.  No results found for: HGBA1C    Scheduled Meds:   atorvastatin  20 mg Oral Daily    benazepril  40 mg Oral Daily    donepezil  5 mg Oral QHS    escitalopram oxalate  20 mg Oral QHS    fentaNYL  1 patch Transdermal Q72H    furosemide  40 mg Oral Daily    lidocaine  2 patch Transdermal Q24H    metoprolol succinate  200 mg Oral Daily    miconazole NITRATE 2 %   Topical (Top) BID    multivit-iron-FA-calcium-mins  1 tablet Oral Daily    pantoprazole  40 mg Oral Daily     Continuous Infusions:  As Needed:  acetaminophen, INV hydrALAZINE, lidocaine-prilocaine, nitroGLYCERIN, sodium chloride 0.9%, sodium chloride 0.9%    Active Hospital Problems    Diagnosis  POA    *Encephalopathy, metabolic [G93.41]  Unknown    Dementia [F03.90]  Unknown    Fever [R50.9]  Yes    Hypertensive urgency  [I16.0]  Yes    Depressed mood [F32.9]  Yes    Hypertensive heart disease with heart failure [I11.0]  Yes    Aortic stenosis [I35.0]  Yes    Essential hypertension [I10]  Yes    Hyperlipidemia [E78.5]  Yes    GERD (gastroesophageal reflux disease) [K21.9]  Yes    Arthritis [M19.90]  Yes      Resolved Hospital Problems   No resolved problems to display.         Assessment and Plan    Encephalopathy, metabolic  Resolved   Most likely worsening dementia   Procal negative.  Flu negative.  WBC WNL, lactic acid negative.  Tylenol level <3.  UA fairly unremarkable.     CT head no significant change from prior.  Continued cerebral volume loss with mild moderate patchy hypoattenuation supratentorial white matter suggestive for chronic ischemic change.  No evidence for acute intracranial hemorrhage or significant new abnormal parenchymal attenuation allowing for motion limitation.    CXR Mild cardiomegaly and accentuation of the basilar interstitial markings.  No significant airspace consolidation or pleural effusion identified.  Pt given naloxone with minimal improvement.  Cont fentanyl patch   Neurologically intact  Low grade fever resolved, all infectious workup has been negative   Psychiatry consult for delirium management     Hypertensive urgency  Pt recently discharged 5/9/19 for CP.  BP meds adjusted, benazepril increased from 20 mg to 40 mg daily.  Toprol XL increased to 200 mg daily      Essential hypertension  As above        Depressed mood  Continue Lexapro 20 mg every night, Aricept 5 mg every night, Melatonin 5 mg every night      Hypertensive heart disease with heart failure  Last echo 5/9/19, Diastolic, EF 65%        Aortic stenosis  5/9/19 moderate AV stenosis  Increase toprol XL to 200 mg     GERD (gastroesophageal reflux disease)  Continue PPI     Arthritis  Chronic OA of L hip, chronic L knee pain.  Hold tramadol, resume fentanyl patch   Tylenol prn, lidocaine patches.        Hyperlipidemia  Continue  atorvastatin.    High Risk Conditions  HTN urgency, chronic pain      Diet:  Cardiac diet  GI PPx:   DVT PPx:    HAILEY/SCD    Goals of Care: Full code  Discharge plan: pending insurance approval for SNF placement     Time (minutes) spent in care of the patient (Greater than 1/2 spent in direct face-to-face contact) 35 minutes     Jacky Samuel MD  Staff Hospitalist  Department of Hospital Medicine  [facility]   [phone]

## 2019-06-03 NOTE — PT/OT/SLP PROGRESS
Occupational Therapy   Treatment    Name: Lilia Hidalgo  MRN: [id number]  Admitting Diagnosis:  Encephalopathy, metabolic       Recommendations:     Discharge Recommendations: nursing facility, skilled  Discharge Equipment Recommendations:  none  Barriers to discharge:  Decreased caregiver support    Assessment:     Lilia Hidalgo is a 87 y.o. female with a medical diagnosis of Encephalopathy, metabolic.  She presents sitting up in chair and with some improved cognitive performance with appropriate conversation.  Pt continues to be anxious and with pain.  Pt fatigued from PT session and was able to participate in seated therex for improved trunk control and B UE performance.  Performance deficits affecting function are weakness, impaired endurance, impaired self care skills, impaired functional mobilty, gait instability, impaired balance, pain.     Rehab Prognosis:  Fair; patient would benefit from acute skilled OT services to address these deficits and reach maximum level of function.       Plan:     Patient to be seen 3 x/week to address the above listed problems via self-care/home management, therapeutic activities, therapeutic exercises  · Plan of Care Expires: 06/28/19  · Plan of Care Reviewed with: patient    Subjective     Pain/Comfort:  · Pain Rating 1: 6/10  · Location - Side 1: Left  · Location - Orientation 1: generalized  · Location 1: leg    Objective:     Communicated with: RN prior to session.  Patient found up in chair with telemetry upon OT entry to room.    General Precautions: Standard, fall   Orthopedic Precautions:N/A   Braces: N/A     Occupational Performance:     Functional Mobility/Transfers:  · Patient completed Sit <> Stand Transfer with moderate assistance  with  rolling walker     Activities of Daily Living:  · Toileting: moderate assistance        AMPA 6 Click ADL: 16    Treatment & Education:  Pt educated on safety, role of OT, importance of increased participation in self care for  gains , expectations for participation, expectations for gains, POC, energy conservation, caregiver strain. White board updated.   - upper extremity and trunk therex       Patient left up in chair with all lines intactEducation:      GOALS:   Multidisciplinary Problems     Occupational Therapy Goals        Problem: Occupational Therapy Goal    Goal Priority Disciplines Outcome Interventions   Occupational Therapy Goal     OT, PT/OT Ongoing (interventions implemented as appropriate)    Description:  Goals to be met by: 6/20    Patient will increase functional independence with ADLs by performing:    UE Dressing with [place].  LE Dressing with [place].  Grooming while seated with Set-up Assistance.  Toileting from toilet with Contact Guard Assistance for hygiene and clothing management.   Bathing from  edge of bed with Contact Guard Assistance.                      Time Tracking:     OT Date of Treatment: 06/03/19  OT Start Time: 1130  OT Stop Time: 1145  OT Total Time (min): 15 min    Billable Minutes:Therapeutic Activity 15    Meri Herndon, OT  6/3/2019

## 2019-06-03 NOTE — PLAN OF CARE
SHAREE spoke to Deana in admissions at [facility], she informed SHAREE that she was still waiting to receive auth from [facility]. SHAREE will continue to follow up.     Update 4:35pm: SHAREE spoke to Deana at [facility], facility is still pending insurance auth.     Abhijit Shi, SHERRY  [facility]  [id number]

## 2019-06-03 NOTE — PLAN OF CARE
Problem: Physical Therapy Goal  Goal: Physical Therapy Goal  Goals to be met by: 2019     Patient will increase functional independence with mobility by performin. Supine to sit with Modified [place]  2. Sit to supine with Modified [place]  3. Sit to stand transfer with Supervision  4. Bed to chair transfer with Supervision using LRAD  5. Gait  x 100 feet with Stand-by Assistance using LRAD        Discharge Recommendations: SNF    Pt safe to transfer OOB/BTB with RN/PCT via SPT: Use RW/no AD with min A of 1 person.    Goals remain appropriate.     Maia Milton, PTA.   [phone]   6/3/2019

## 2019-06-03 NOTE — PLAN OF CARE
Problem: Thought Process Alteration  Goal: Optimal Thought Clarity    Intervention: Minimize Safety Risk and Altered Thought  Pt alert and oriented x4, aapt does not know situation. Able to hold a conversation.

## 2019-06-04 LAB
ALBUMIN SERPL BCP-MCNC: 2.6 G/DL (ref 3.5–5.2)
ALP SERPL-CCNC: 68 U/L (ref 55–135)
ALT SERPL W/O P-5'-P-CCNC: 9 U/L (ref 10–44)
ANION GAP SERPL CALC-SCNC: 7 MMOL/L (ref 8–16)
AST SERPL-CCNC: 13 U/L (ref 10–40)
BASOPHILS # BLD AUTO: 0.04 K/UL (ref 0–0.2)
BASOPHILS NFR BLD: 0.5 % (ref 0–1.9)
BILIRUB SERPL-MCNC: 0.8 MG/DL (ref 0.1–1)
BUN SERPL-MCNC: 21 MG/DL (ref 8–23)
CALCIUM SERPL-MCNC: 9.1 MG/DL (ref 8.7–10.5)
CHLORIDE SERPL-SCNC: 100 MMOL/L (ref 95–110)
CO2 SERPL-SCNC: 31 MMOL/L (ref 23–29)
CREAT SERPL-MCNC: 0.7 MG/DL (ref 0.5–1.4)
DIFFERENTIAL METHOD: ABNORMAL
EOSINOPHIL # BLD AUTO: 0.3 K/UL (ref 0–0.5)
EOSINOPHIL NFR BLD: 3.9 % (ref 0–8)
ERYTHROCYTE [DISTWIDTH] IN BLOOD BY AUTOMATED COUNT: 13.9 % (ref 11.5–14.5)
EST. GFR  (AFRICAN AMERICAN): >60 ML/MIN/1.73 M^2
EST. GFR  (NON AFRICAN AMERICAN): >60 ML/MIN/1.73 M^2
GLUCOSE SERPL-MCNC: 89 MG/DL (ref 70–110)
HCT VFR BLD AUTO: 41.3 % (ref 37–48.5)
HGB BLD-MCNC: 13.1 G/DL (ref 12–16)
IMM GRANULOCYTES # BLD AUTO: 0.03 K/UL (ref 0–0.04)
IMM GRANULOCYTES NFR BLD AUTO: 0.4 % (ref 0–0.5)
LYMPHOCYTES # BLD AUTO: 1.7 K/UL (ref 1–4.8)
LYMPHOCYTES NFR BLD: 21.4 % (ref 18–48)
MAGNESIUM SERPL-MCNC: 1.9 MG/DL (ref 1.6–2.6)
MCH RBC QN AUTO: 28.3 PG (ref 27–31)
MCHC RBC AUTO-ENTMCNC: 31.7 G/DL (ref 32–36)
MCV RBC AUTO: 89 FL (ref 82–98)
MONOCYTES # BLD AUTO: 0.8 K/UL (ref 0.3–1)
MONOCYTES NFR BLD: 9.5 % (ref 4–15)
NEUTROPHILS # BLD AUTO: 5.1 K/UL (ref 1.8–7.7)
NEUTROPHILS NFR BLD: 64.3 % (ref 38–73)
NRBC BLD-RTO: 0 /100 WBC
PHOSPHATE SERPL-MCNC: 3.6 MG/DL (ref 2.7–4.5)
PLATELET # BLD AUTO: 210 K/UL (ref 150–350)
PMV BLD AUTO: 10.9 FL (ref 9.2–12.9)
POTASSIUM SERPL-SCNC: 3.4 MMOL/L (ref 3.5–5.1)
PROT SERPL-MCNC: 5.9 G/DL (ref 6–8.4)
RBC # BLD AUTO: 4.63 M/UL (ref 4–5.4)
SODIUM SERPL-SCNC: 138 MMOL/L (ref 136–145)
WBC # BLD AUTO: 7.86 K/UL (ref 3.9–12.7)

## 2019-06-04 PROCEDURE — 36415 COLL VENOUS BLD VENIPUNCTURE: CPT | Mod: HCNC

## 2019-06-04 PROCEDURE — 85025 COMPLETE CBC W/AUTO DIFF WBC: CPT | Mod: HCNC

## 2019-06-04 PROCEDURE — 99231 SBSQ HOSP IP/OBS SF/LOW 25: CPT | Mod: HCNC,,, | Performed by: HOSPITALIST

## 2019-06-04 PROCEDURE — 11000001 HC ACUTE MED/SURG PRIVATE ROOM: Mod: HCNC

## 2019-06-04 PROCEDURE — 25000003 PHARM REV CODE 250: Mod: HCNC | Performed by: PHYSICIAN ASSISTANT

## 2019-06-04 PROCEDURE — 80053 COMPREHEN METABOLIC PANEL: CPT | Mod: HCNC

## 2019-06-04 PROCEDURE — 99231 PR SUBSEQUENT HOSPITAL CARE,LEVL I: ICD-10-PCS | Mod: HCNC,,, | Performed by: HOSPITALIST

## 2019-06-04 PROCEDURE — 25000003 PHARM REV CODE 250: Mod: HCNC | Performed by: HOSPITALIST

## 2019-06-04 PROCEDURE — 84100 ASSAY OF PHOSPHORUS: CPT | Mod: HCNC

## 2019-06-04 PROCEDURE — 83735 ASSAY OF MAGNESIUM: CPT | Mod: HCNC

## 2019-06-04 RX ORDER — FENTANYL 12.5 UG/1
1 PATCH TRANSDERMAL
Qty: 10 PATCH | Refills: 0 | Status: SHIPPED | OUTPATIENT
Start: 2019-06-04 | End: 2019-06-04 | Stop reason: SDUPTHER

## 2019-06-04 RX ORDER — FENTANYL 12.5 UG/1
1 PATCH TRANSDERMAL
Qty: 10 PATCH | Refills: 0 | Status: SHIPPED | OUTPATIENT
Start: 2019-06-04 | End: 2019-07-16 | Stop reason: SDUPTHER

## 2019-06-04 RX ADMIN — BENAZEPRIL HYDROCHLORIDE 40 MG: 20 TABLET, FILM COATED ORAL at 09:06

## 2019-06-04 RX ADMIN — LIDOCAINE 2 PATCH: 50 PATCH TOPICAL at 02:06

## 2019-06-04 RX ADMIN — METOPROLOL SUCCINATE 200 MG: 100 TABLET, EXTENDED RELEASE ORAL at 09:06

## 2019-06-04 RX ADMIN — FENTANYL 1 PATCH: 12 PATCH TRANSDERMAL at 09:06

## 2019-06-04 RX ADMIN — FUROSEMIDE 40 MG: 40 TABLET ORAL at 09:06

## 2019-06-04 RX ADMIN — PANTOPRAZOLE SODIUM 40 MG: 40 TABLET, DELAYED RELEASE ORAL at 09:06

## 2019-06-04 RX ADMIN — MULTIPLE VITAMINS W/ MINERALS TAB 1 TABLET: TAB at 09:06

## 2019-06-04 RX ADMIN — ACETAMINOPHEN 650 MG: 325 TABLET ORAL at 08:06

## 2019-06-04 RX ADMIN — DONEPEZIL HYDROCHLORIDE 5 MG: 5 TABLET, FILM COATED ORAL at 08:06

## 2019-06-04 RX ADMIN — ATORVASTATIN CALCIUM 20 MG: 10 TABLET, FILM COATED ORAL at 09:06

## 2019-06-04 RX ADMIN — MICONAZOLE NITRATE: 20 POWDER TOPICAL at 09:06

## 2019-06-04 RX ADMIN — ACETAMINOPHEN 650 MG: 325 TABLET ORAL at 09:06

## 2019-06-04 RX ADMIN — MICONAZOLE NITRATE: 20 POWDER TOPICAL at 08:06

## 2019-06-04 RX ADMIN — ESCITALOPRAM OXALATE 20 MG: 20 TABLET ORAL at 08:06

## 2019-06-04 NOTE — DISCHARGE SUMMARY
"[facility] Hospital Medicine  Discharge Summary      Patient Name: Lilia Hidalgo  MRN: [id number]  Admission Date: 5/27/2019  Hospital Length of Stay: 8 days  Discharge Date and Time: 6/5/2019  Attending Physician: Shawna Branch*   Discharging Provider: Shwana Branch MD  Primary Care Provider: April Herrera MD  [facility] Medicine Team: [facility] HOSP MED R Shawna Branch MD    HPI:   Per admitting provider:   88 y/o F from [facility] with PMH HTN, mod aortic valve stenosis, HTN, HLD, dementia, chronic left knee pain and OA of left hip (on fentanyl patch, tramadol tid prn, tylenol) admitted for evaluation of AMS.  History obtained from pt's daughter Lina at the bedside.  Pt's daughter noted pt "slightly off" on Saturday and it was difficult to carry on a conversation.  Pt was also noted to be napping more frequently over the weekend which is unusual for her.  +HA which is not a common compliant per daughter.  This morning pt at [facility] pt would not swallow her medications as she was not comprehending the instructions.  Low grade fever at assisted living.  No chills, diaphoresis, N/V/D.    In the ED, T 100.9 F, /86.  Procal negative.  Flu negative.  WBC WNL, lactic acid negative.  Tylenol level <3.  UA fairly unremarkable.  CT head no significant change from prior.  Continued cerebral volume loss with mild moderate patchy hypoattenuation supratentorial white matter suggestive for chronic ischemic change.  No evidence for acute intracranial hemorrhage or significant new abnormal parenchymal attenuation allowing for motion limitation.  CXR Mild cardiomegaly and accentuation of the basilar interstitial markings.  No significant airspace consolidation or pleural effusion identified.  Pt given naloxone with minimal improvement.    * No surgery found *      Hospital Course:   Encephalopathy, metabolic   Likely delirium superimposed on dementia vs progressive " dementia. Infectious workup was -ve.   Procal negative.  Flu negative.  WBC WNL, lactic acid negative.  Tylenol level <3.  UA fairly unremarkable.     CT head no significant change from prior.  Continued cerebral volume loss with mild moderate patchy hypoattenuation supratentorial white matter suggestive for chronic ischemic change.  No evidence for acute intracranial hemorrhage or significant new abnormal parenchymal attenuation allowing for motion limitation.    CXR Mild cardiomegaly and accentuation of the basilar interstitial markings.  No significant airspace consolidation or pleural effusion identified.  Pt given naloxone with minimal improvement.  Cont fentanyl patch   Neurologically intact  Psychiatry consulted; appreciated recs. Agreed with likely delirium. Has since resolved. Back to baseline upon discharge.      Hypertensive urgency  Pt recently discharged 5/9/19 for CP.  BP meds adjusted, benazepril increased from 20 mg to 40 mg daily.  Toprol XL increased to 200 mg daily. Lasix 40 mg added. BP better controlled.      Essential hypertension  As above    Depressed mood  Continue Lexapro 20 mg every night, Aricept 5 mg every night, Melatonin 5 mg every night      Hypertensive heart disease with heart failure  Last echo 5/9/19, Diastolic, EF 65%     Aortic stenosis  5/9/19 moderate AV stenosis  Increase toprol XL to 200 mg     GERD (gastroesophageal reflux disease)  Continue PPI     Arthritis  Chronic OA of L hip, chronic L knee pain.  Hold tramadol, resume fentanyl patch   Tylenol prn, lidocaine patches.     Hyperlipidemia  Continue atorvastatin.     Consults:   Consults (From admission, onward)        Status Ordering Provider     Inpatient consult to Psychiatry  Once     Provider:  (Not yet assigned)    Completed JEIMY SU          No new Assessment & Plan notes have been filed under this hospital service since the last note was generated.  Service: Hospital Medicine    Final Active Diagnoses:     Diagnosis Date Noted POA    PRINCIPAL PROBLEM:  Encephalopathy, metabolic [G93.41] 05/27/2019 Unknown    Dementia [F03.90] 05/29/2019 Unknown    Fever [R50.9] 05/27/2019 Yes    Hypertensive urgency [I16.0] 05/09/2019 Yes    Depressed mood [F32.9] 06/22/2016 Yes    Hypertensive heart disease with heart failure [I11.0] 07/14/2015 Yes    Aortic stenosis [I35.0] 06/17/2015 Yes    Essential hypertension [I10]  Yes    Hyperlipidemia [E78.5]  Yes    GERD (gastroesophageal reflux disease) [K21.9]  Yes    Arthritis [M19.90]  Yes      Problems Resolved During this Admission:       Discharged Condition: stable    Disposition: Skilled Nursing Facility    Follow Up:    Patient Instructions:   No discharge procedures on file.    Significant Diagnostic Studies: Labs:   CMP   Recent Labs   Lab 06/04/19 0444 06/05/19  0438    137   K 3.4* 3.5    100   CO2 31* 29   GLU 89 88   BUN 21 18   CREATININE 0.7 0.7   CALCIUM 9.1 8.7   PROT 5.9* 5.7*   ALBUMIN 2.6* 2.5*   BILITOT 0.8 0.6   ALKPHOS 68 65   AST 13 13   ALT 9* 8*   ANIONGAP 7* 8   ESTGFRAFRICA >60.0 >60.0   EGFRNONAA >60.0 >60.0    and CBC   Recent Labs   Lab 06/04/19 0444 06/05/19  0438   WBC 7.86 7.27   HGB 13.1 12.8   HCT 41.3 40.4    210       Pending Diagnostic Studies:     None         Medications:  Reconciled Home Medications:      Medication List      START taking these medications    fentaNYL 12 mcg/hr Pt72  Commonly known as:  DURAGESIC  Place 1 patch onto the skin every 72 hours.        CHANGE how you take these medications    metoprolol succinate 100 MG 24 hr tablet  Commonly known as:  TOPROL-XL  Take 2 tablets (200 mg total) by mouth once daily.  What changed:  how much to take     omeprazole 20 MG capsule  Commonly known as:  PRILOSEC  Take 2 capsules (40 mg total) by mouth once daily.  What changed:  how much to take        CONTINUE taking these medications    acetaminophen 650 MG Tbsr  Commonly known as:  TYLENOL  Take 1 tablet  (650 mg total) by mouth every 6 (six) hours as needed (last dose may be left at bedside for patient to take at night).     atorvastatin 20 MG tablet  Commonly known as:  LIPITOR  TAKE 1 TABLET BY MOUTH ONCE DAILY.     benazepril 20 MG tablet  Commonly known as:  LOTENSIN  TAKE 2 TABLETS(40 MG) BY MOUTH EVERY DAY     CENTRUM SILVER ORAL  Take by mouth once daily.     donepezil 5 MG tablet  Commonly known as:  ARICEPT  Take 1 tablet (5 mg total) by mouth every evening.     escitalopram oxalate 20 MG tablet  Commonly known as:  LEXAPRO  Take 1/2 pill nightly for a week and then 1 pill nightly onwards.     furosemide 40 MG tablet  Commonly known as:  LASIX  TAKE 1 TABLET BY MOUTH ONCE DAILY.     lidocaine-prilocaine cream  Commonly known as:  EMLA  Apply topically as needed. Apply to painful areas 3 times per day as needed     melatonin 5 mg Tab  Take 1 tablet by mouth every evening.     mupirocin 2 % ointment  Commonly known as:  BACTROBAN  Apply topically 2 (two) times daily.     nitroGLYCERIN 0.4 MG SL tablet  Commonly known as:  NITROSTAT  Place 1 tablet (0.4 mg total) under the tongue every 5 (five) minutes as needed for Chest pain (do not take more than 3 tablets in a row).     nystatin powder  Commonly known as:  MYCOSTATIN  Apply topically 2 (two) times daily. To groin area        STOP taking these medications    traMADol 50 mg tablet  Commonly known as:  ULTRAM            Indwelling Lines/Drains at time of discharge:   Lines/Drains/Airways     Drain            Female External Urinary Catheter -- days                Time spent on the discharge of patient: 35 minutes  Patient was seen and examined on the date of discharge and determined to be suitable for discharge.         Shawna Branch MD  Department of Hospital Medicine  [facility]

## 2019-06-04 NOTE — PLAN OF CARE
[facility]     Department of Hospital Medicine     [address] [place] [address]     [phone] after hours  [phone] fax       NURSING HOME ORDERS    06/05/2019    Admit to Nursing Home:  Skilled Bed      Diagnoses:  Active Hospital Problems    Diagnosis  POA    *Encephalopathy, metabolic [G93.41]  Unknown    Dementia [F03.90]  Unknown    Fever [R50.9]  Yes    Hypertensive urgency [I16.0]  Yes    Depressed mood [F32.9]  Yes    Hypertensive heart disease with heart failure [I11.0]  Yes    Aortic stenosis [I35.0]  Yes    Essential hypertension [I10]  Yes    Hyperlipidemia [E78.5]  Yes    GERD (gastroesophageal reflux disease) [K21.9]  Yes    Arthritis [M19.90]  Yes      Resolved Hospital Problems   No resolved problems to display.       Patient is homebound due to:  Encephalopathy, metabolic    Allergies:  Review of patient's allergies indicates:   Allergen Reactions    Aspirin Nausea Only and Other (See Comments)     Other reaction(s): esophageal pain    Celebrex [celecoxib] Rash    Morphine Hallucinations    Steroid [corticosteroids (glucocorticoids)] Other (See Comments)     Other reaction(s): Flushing (skin)    Sulfa dyne Other (See Comments)     Other reaction(s): esophogeal pain       Vitals:      Every shift (Skilled Nursing patients)    Diet: Cardiac    Acitivities:     - Up in a chair each morning as tolerated   - Ambulate with assistance to bathroom    LABS:  Per facility protocol    Nursing Precautions:      - Fall precautions per nursing home protocol   - Decubitus precautions:        -  for positioning   - Pressure reducing foam mattress   - Turn patient every two hours. Use wedge pillows to anchor patient    CONSULTS:    Physical Therapy to evaluate and treat     Occupational Therapy to evaluate and treat    MISCELLANEOUS CARE:        Routine Skin for Bedridden Patients:  Apply moisture barrier cream to all    skin folds  and wet areas in perineal area daily and after baths and                           all bowel movements.      Medications: Discontinue all previous medication orders, if any. See new list below.   Lilia Hidalgo   Home Medication Instructions TIM:[id number]    Printed on:06/04/19 0902   Medication Information                      acetaminophen (TYLENOL) 650 MG TbSR  Take 1 tablet (650 mg total) by mouth every 6 (six) hours as needed (last dose may be left at bedside for patient to take at night).             atorvastatin (LIPITOR) 20 MG tablet  TAKE 1 TABLET BY MOUTH ONCE DAILY.             benazepril (LOTENSIN) 20 MG tablet  TAKE 2 TABLETS(40 MG) BY MOUTH EVERY DAY             donepezil (ARICEPT) 5 MG tablet  Take 1 tablet (5 mg total) by mouth every evening.             escitalopram oxalate (LEXAPRO) 20 MG tablet  Take 1/2 pill nightly for a week and then 1 pill nightly onwards.             fentaNYL (DURAGESIC) 12 mcg/hr PT72  Place 1 patch onto the skin every 72 hours.             furosemide (LASIX) 40 MG tablet  TAKE 1 TABLET BY MOUTH ONCE DAILY.             lidocaine-prilocaine (EMLA) cream  Apply topically as needed. Apply to painful areas 3 times per day as needed             melatonin 5 mg Tab  Take 1 tablet by mouth every evening.              metoprolol succinate (TOPROL-XL) 100 MG 24 hr tablet  Take 2 tablets (200 mg total) by mouth once daily.             MULTIVITAMIN W-MINERALS/LUTEIN (CENTRUM SILVER ORAL)  Take by mouth once daily.                        nitroGLYCERIN (NITROSTAT) 0.4 MG SL tablet  Place 1 tablet (0.4 mg total) under the tongue every 5 (five) minutes as needed for Chest pain (do not take more than 3 tablets in a row).             nystatin (MYCOSTATIN) powder  Apply topically 2 (two) times daily. To groin area             omeprazole (PRILOSEC) 20 MG capsule  Take 2 capsules (40 mg total) by mouth once daily.                       _________________________________  Shawna Branch,  MD  06/05/2019

## 2019-06-04 NOTE — HOSPITAL COURSE
Encephalopathy, metabolic   Likely delirium superimposed on dementia vs progressive dementia. Infectious workup was -ve.   Procal negative.  Flu negative.  WBC WNL, lactic acid negative.  Tylenol level <3.  UA fairly unremarkable.     CT head no significant change from prior.  Continued cerebral volume loss with mild moderate patchy hypoattenuation supratentorial white matter suggestive for chronic ischemic change.  No evidence for acute intracranial hemorrhage or significant new abnormal parenchymal attenuation allowing for motion limitation.    CXR Mild cardiomegaly and accentuation of the basilar interstitial markings.  No significant airspace consolidation or pleural effusion identified.  Pt given naloxone with minimal improvement.  Cont fentanyl patch   Neurologically intact  Psychiatry consulted; appreciated recs. Agreed with likely delirium. Has since resolved. Back to baseline upon discharge.      Hypertensive urgency  Pt recently discharged 5/9/19 for CP.  BP meds adjusted, benazepril increased from 20 mg to 40 mg daily.  Toprol XL increased to 200 mg daily. Lasix 40 mg added. BP better controlled.      Essential hypertension  As above    Depressed mood  Continue Lexapro 20 mg every night, Aricept 5 mg every night, Melatonin 5 mg every night      Hypertensive heart disease with heart failure  Last echo 5/9/19, Diastolic, EF 65%     Aortic stenosis  5/9/19 moderate AV stenosis  Increase toprol XL to 200 mg     GERD (gastroesophageal reflux disease)  Continue PPI     Arthritis  Chronic OA of L hip, chronic L knee pain.  Hold tramadol, resume fentanyl patch   Tylenol prn, lidocaine patches.     Hyperlipidemia  Continue atorvastatin.

## 2019-06-05 VITALS
SYSTOLIC BLOOD PRESSURE: 108 MMHG | RESPIRATION RATE: 16 BRPM | DIASTOLIC BLOOD PRESSURE: 53 MMHG | OXYGEN SATURATION: 94 % | HEART RATE: 69 BPM | TEMPERATURE: 97 F | WEIGHT: 220 LBS | BODY MASS INDEX: 35.36 KG/M2 | HEIGHT: 66 IN

## 2019-06-05 LAB
ALBUMIN SERPL BCP-MCNC: 2.5 G/DL (ref 3.5–5.2)
ALP SERPL-CCNC: 65 U/L (ref 55–135)
ALT SERPL W/O P-5'-P-CCNC: 8 U/L (ref 10–44)
ANION GAP SERPL CALC-SCNC: 8 MMOL/L (ref 8–16)
AST SERPL-CCNC: 13 U/L (ref 10–40)
BASOPHILS # BLD AUTO: 0.05 K/UL (ref 0–0.2)
BASOPHILS NFR BLD: 0.7 % (ref 0–1.9)
BILIRUB SERPL-MCNC: 0.6 MG/DL (ref 0.1–1)
BUN SERPL-MCNC: 18 MG/DL (ref 8–23)
CALCIUM SERPL-MCNC: 8.7 MG/DL (ref 8.7–10.5)
CHLORIDE SERPL-SCNC: 100 MMOL/L (ref 95–110)
CO2 SERPL-SCNC: 29 MMOL/L (ref 23–29)
CREAT SERPL-MCNC: 0.7 MG/DL (ref 0.5–1.4)
DIFFERENTIAL METHOD: ABNORMAL
EOSINOPHIL # BLD AUTO: 0.3 K/UL (ref 0–0.5)
EOSINOPHIL NFR BLD: 4.5 % (ref 0–8)
ERYTHROCYTE [DISTWIDTH] IN BLOOD BY AUTOMATED COUNT: 13.6 % (ref 11.5–14.5)
EST. GFR  (AFRICAN AMERICAN): >60 ML/MIN/1.73 M^2
EST. GFR  (NON AFRICAN AMERICAN): >60 ML/MIN/1.73 M^2
GLUCOSE SERPL-MCNC: 88 MG/DL (ref 70–110)
HCT VFR BLD AUTO: 40.4 % (ref 37–48.5)
HGB BLD-MCNC: 12.8 G/DL (ref 12–16)
IMM GRANULOCYTES # BLD AUTO: 0.03 K/UL (ref 0–0.04)
IMM GRANULOCYTES NFR BLD AUTO: 0.4 % (ref 0–0.5)
LYMPHOCYTES # BLD AUTO: 1.7 K/UL (ref 1–4.8)
LYMPHOCYTES NFR BLD: 23.1 % (ref 18–48)
MAGNESIUM SERPL-MCNC: 1.9 MG/DL (ref 1.6–2.6)
MCH RBC QN AUTO: 28.3 PG (ref 27–31)
MCHC RBC AUTO-ENTMCNC: 31.7 G/DL (ref 32–36)
MCV RBC AUTO: 89 FL (ref 82–98)
MONOCYTES # BLD AUTO: 0.8 K/UL (ref 0.3–1)
MONOCYTES NFR BLD: 10.3 % (ref 4–15)
NEUTROPHILS # BLD AUTO: 4.4 K/UL (ref 1.8–7.7)
NEUTROPHILS NFR BLD: 61 % (ref 38–73)
NRBC BLD-RTO: 0 /100 WBC
PHOSPHATE SERPL-MCNC: 3.3 MG/DL (ref 2.7–4.5)
PLATELET # BLD AUTO: 210 K/UL (ref 150–350)
PMV BLD AUTO: 11 FL (ref 9.2–12.9)
POTASSIUM SERPL-SCNC: 3.5 MMOL/L (ref 3.5–5.1)
PROT SERPL-MCNC: 5.7 G/DL (ref 6–8.4)
RBC # BLD AUTO: 4.53 M/UL (ref 4–5.4)
SODIUM SERPL-SCNC: 137 MMOL/L (ref 136–145)
WBC # BLD AUTO: 7.27 K/UL (ref 3.9–12.7)

## 2019-06-05 PROCEDURE — 36415 COLL VENOUS BLD VENIPUNCTURE: CPT | Mod: HCNC

## 2019-06-05 PROCEDURE — 25000003 PHARM REV CODE 250: Mod: HCNC | Performed by: HOSPITALIST

## 2019-06-05 PROCEDURE — 80053 COMPREHEN METABOLIC PANEL: CPT | Mod: HCNC

## 2019-06-05 PROCEDURE — 84100 ASSAY OF PHOSPHORUS: CPT | Mod: HCNC

## 2019-06-05 PROCEDURE — 25000003 PHARM REV CODE 250: Mod: HCNC | Performed by: PHYSICIAN ASSISTANT

## 2019-06-05 PROCEDURE — 85025 COMPLETE CBC W/AUTO DIFF WBC: CPT | Mod: HCNC

## 2019-06-05 PROCEDURE — 83735 ASSAY OF MAGNESIUM: CPT | Mod: HCNC

## 2019-06-05 PROCEDURE — 99239 PR HOSPITAL DISCHARGE DAY,>30 MIN: ICD-10-PCS | Mod: HCNC,,, | Performed by: HOSPITALIST

## 2019-06-05 PROCEDURE — 99239 HOSP IP/OBS DSCHRG MGMT >30: CPT | Mod: HCNC,,, | Performed by: HOSPITALIST

## 2019-06-05 RX ADMIN — LIDOCAINE 2 PATCH: 50 PATCH TOPICAL at 01:06

## 2019-06-05 RX ADMIN — ACETAMINOPHEN 650 MG: 325 TABLET ORAL at 03:06

## 2019-06-05 RX ADMIN — PANTOPRAZOLE SODIUM 40 MG: 40 TABLET, DELAYED RELEASE ORAL at 08:06

## 2019-06-05 RX ADMIN — BENAZEPRIL HYDROCHLORIDE 40 MG: 20 TABLET, FILM COATED ORAL at 08:06

## 2019-06-05 RX ADMIN — MULTIPLE VITAMINS W/ MINERALS TAB 1 TABLET: TAB at 08:06

## 2019-06-05 RX ADMIN — FUROSEMIDE 40 MG: 40 TABLET ORAL at 08:06

## 2019-06-05 RX ADMIN — MICONAZOLE NITRATE: 20 POWDER TOPICAL at 08:06

## 2019-06-05 RX ADMIN — ATORVASTATIN CALCIUM 20 MG: 10 TABLET, FILM COATED ORAL at 08:06

## 2019-06-05 RX ADMIN — METOPROLOL SUCCINATE 200 MG: 100 TABLET, EXTENDED RELEASE ORAL at 08:06

## 2019-06-05 NOTE — DISCHARGE SUMMARY
"[facility] Hospital Medicine  Discharge Summary      Patient Name: Lilia Hidalgo  MRN: [id number]  Admission Date: 5/27/2019  Hospital Length of Stay: 8 days  Discharge Date and Time: 6/5/2019  Attending Physician: Shawna Branch*   Discharging Provider: Shawna Branch MD  Primary Care Provider: April Herrera MD  [facility] Medicine Team: [facility] HOSP MED R Shawna Branch MD    HPI:   Per admitting provider:   88 y/o F from [facility] with PMH HTN, mod aortic valve stenosis, HTN, HLD, dementia, chronic left knee pain and OA of left hip (on fentanyl patch, tramadol tid prn, tylenol) admitted for evaluation of AMS.  History obtained from pt's daughter Lina at the bedside.  Pt's daughter noted pt "slightly off" on Saturday and it was difficult to carry on a conversation.  Pt was also noted to be napping more frequently over the weekend which is unusual for her.  +HA which is not a common compliant per daughter.  This morning pt at [facility] pt would not swallow her medications as she was not comprehending the instructions.  Low grade fever at assisted living.  No chills, diaphoresis, N/V/D.    In the ED, T 100.9 F, /86.  Procal negative.  Flu negative.  WBC WNL, lactic acid negative.  Tylenol level <3.  UA fairly unremarkable.  CT head no significant change from prior.  Continued cerebral volume loss with mild moderate patchy hypoattenuation supratentorial white matter suggestive for chronic ischemic change.  No evidence for acute intracranial hemorrhage or significant new abnormal parenchymal attenuation allowing for motion limitation.  CXR Mild cardiomegaly and accentuation of the basilar interstitial markings.  No significant airspace consolidation or pleural effusion identified.  Pt given naloxone with minimal improvement.    * No surgery found *      Hospital Course:   Encephalopathy, metabolic   Likely delirium superimposed on dementia vs progressive " dementia. Infectious workup was -ve.   Procal negative.  Flu negative.  WBC WNL, lactic acid negative.  Tylenol level <3.  UA fairly unremarkable.     CT head no significant change from prior.  Continued cerebral volume loss with mild moderate patchy hypoattenuation supratentorial white matter suggestive for chronic ischemic change.  No evidence for acute intracranial hemorrhage or significant new abnormal parenchymal attenuation allowing for motion limitation.    CXR Mild cardiomegaly and accentuation of the basilar interstitial markings.  No significant airspace consolidation or pleural effusion identified.  Pt given naloxone with minimal improvement.  Cont fentanyl patch   Neurologically intact  Psychiatry consulted; appreciated recs. Agreed with likely delirium. Has since resolved. Back to baseline upon discharge.      Hypertensive urgency  Pt recently discharged 5/9/19 for CP.  BP meds adjusted, benazepril increased from 20 mg to 40 mg daily.  Toprol XL increased to 200 mg daily. Lasix 40 mg added. BP better controlled.      Essential hypertension  As above    Depressed mood  Continue Lexapro 20 mg every night, Aricept 5 mg every night, Melatonin 5 mg every night      Hypertensive heart disease with heart failure  Last echo 5/9/19, Diastolic, EF 65%     Aortic stenosis  5/9/19 moderate AV stenosis  Increase toprol XL to 200 mg     GERD (gastroesophageal reflux disease)  Continue PPI     Arthritis  Chronic OA of L hip, chronic L knee pain.  Hold tramadol, resume fentanyl patch   Tylenol prn, lidocaine patches.     Hyperlipidemia  Continue atorvastatin.     Consults:   Consults (From admission, onward)        Status Ordering Provider     Inpatient consult to Psychiatry  Once     Provider:  (Not yet assigned)    Completed JEIMY SU          No new Assessment & Plan notes have been filed under this hospital service since the last note was generated.  Service: Hospital Medicine    Final Active Diagnoses:     Diagnosis Date Noted POA    PRINCIPAL PROBLEM:  Encephalopathy, metabolic [G93.41] 05/27/2019 Unknown    Dementia [F03.90] 05/29/2019 Unknown    Fever [R50.9] 05/27/2019 Yes    Hypertensive urgency [I16.0] 05/09/2019 Yes    Depressed mood [F32.9] 06/22/2016 Yes    Hypertensive heart disease with heart failure [I11.0] 07/14/2015 Yes    Aortic stenosis [I35.0] 06/17/2015 Yes    Essential hypertension [I10]  Yes    Hyperlipidemia [E78.5]  Yes    GERD (gastroesophageal reflux disease) [K21.9]  Yes    Arthritis [M19.90]  Yes      Problems Resolved During this Admission:       Discharged Condition: stable    Disposition: Skilled Nursing Facility    Follow Up:    Patient Instructions:   No discharge procedures on file.    Significant Diagnostic Studies: Labs:   CMP   Recent Labs   Lab 06/04/19 0444 06/05/19  0438    137   K 3.4* 3.5    100   CO2 31* 29   GLU 89 88   BUN 21 18   CREATININE 0.7 0.7   CALCIUM 9.1 8.7   PROT 5.9* 5.7*   ALBUMIN 2.6* 2.5*   BILITOT 0.8 0.6   ALKPHOS 68 65   AST 13 13   ALT 9* 8*   ANIONGAP 7* 8   ESTGFRAFRICA >60.0 >60.0   EGFRNONAA >60.0 >60.0    and CBC   Recent Labs   Lab 06/04/19 0444 06/05/19  0438   WBC 7.86 7.27   HGB 13.1 12.8   HCT 41.3 40.4    210       Pending Diagnostic Studies:     None         Medications:  Reconciled Home Medications:      Medication List      START taking these medications    fentaNYL 12 mcg/hr Pt72  Commonly known as:  DURAGESIC  Place 1 patch onto the skin every 72 hours.        CHANGE how you take these medications    metoprolol succinate 100 MG 24 hr tablet  Commonly known as:  TOPROL-XL  Take 2 tablets (200 mg total) by mouth once daily.  What changed:  how much to take     omeprazole 20 MG capsule  Commonly known as:  PRILOSEC  Take 2 capsules (40 mg total) by mouth once daily.  What changed:  how much to take        CONTINUE taking these medications    acetaminophen 650 MG Tbsr  Commonly known as:  TYLENOL  Take 1 tablet  (650 mg total) by mouth every 6 (six) hours as needed (last dose may be left at bedside for patient to take at night).     atorvastatin 20 MG tablet  Commonly known as:  LIPITOR  TAKE 1 TABLET BY MOUTH ONCE DAILY.     benazepril 20 MG tablet  Commonly known as:  LOTENSIN  TAKE 2 TABLETS(40 MG) BY MOUTH EVERY DAY     CENTRUM SILVER ORAL  Take by mouth once daily.     donepezil 5 MG tablet  Commonly known as:  ARICEPT  Take 1 tablet (5 mg total) by mouth every evening.     escitalopram oxalate 20 MG tablet  Commonly known as:  LEXAPRO  Take 1/2 pill nightly for a week and then 1 pill nightly onwards.     furosemide 40 MG tablet  Commonly known as:  LASIX  TAKE 1 TABLET BY MOUTH ONCE DAILY.     lidocaine-prilocaine cream  Commonly known as:  EMLA  Apply topically as needed. Apply to painful areas 3 times per day as needed     melatonin 5 mg Tab  Take 1 tablet by mouth every evening.     mupirocin 2 % ointment  Commonly known as:  BACTROBAN  Apply topically 2 (two) times daily.     nitroGLYCERIN 0.4 MG SL tablet  Commonly known as:  NITROSTAT  Place 1 tablet (0.4 mg total) under the tongue every 5 (five) minutes as needed for Chest pain (do not take more than 3 tablets in a row).     nystatin powder  Commonly known as:  MYCOSTATIN  Apply topically 2 (two) times daily. To groin area        STOP taking these medications    traMADol 50 mg tablet  Commonly known as:  ULTRAM            Indwelling Lines/Drains at time of discharge:   Lines/Drains/Airways     Drain            Female External Urinary Catheter -- days                Time spent on the discharge of patient: 35 minutes  Patient was seen and examined on the date of discharge and determined to be suitable for discharge.         Shawna Branch MD  Department of Hospital Medicine  [facility]

## 2019-06-05 NOTE — PROGRESS NOTES
Called [facility] to follow up about pts transportation and said that [facility] will be here at 13:30. Pt and son informed.

## 2019-06-05 NOTE — PLAN OF CARE
Patient will be discharging to [facility] today. Patient is going to room P-102 and report can be called to Maren at [phone] or Dina at [phone]. SHAREE requested transport via [facility] for 11:20 AM.     Abhijit Shi, SHERRY  [facility]  [id number]

## 2019-06-07 NOTE — PHYSICIAN QUERY
PT Name: Lilia Hidalgo  MR #: [id number]     Physician Query Form - Etiology of Condition Clarification      CDS: Tracie Bennett RN  Contact information: [email or web address]  This form is a permanent document in the medical record.     Query Date: June 7, 2019    By submitting this query, we are merely seeking further clarification of documentation.  Please utilize your independent clinical judgment when addressing the question(s) below.     The medical record contains the following:    Findings Supporting Clinical Information Location in Medical Record   Encephalopathy   Encephalopathy, metabolic   Likely delirium superimposed on dementia vs progressive dementia.  Psychiatry consulted; appreciated recs. Agreed with likely delirium. Has since resolved. Back to baseline upon discharge      Encephalopathy, metabolic  Resolved   On admission patient was found to have 2 fentanyl patches on, suggesting that oversedation from opioid misuse was the cause of her encephalopathy. Other considerations included delirium on the background of dementia. Patient improved with conservative measures and supportive care.      1500 RN called to report additional fentanyl patch found behind L knee in addition to patch that was taken off of her arm earlier.  5/20 physical medicine and rehab AVS states 1 patch every 72 hrs.     Dc summary 6/5              PN 6/5                  H&P 5/27     Doctor, due to conflicting documentation in the discharge summary, please confirm the suspected or known etiology of the encephalopathy.     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

## 2019-07-02 ENCOUNTER — PES CALL (OUTPATIENT)
Dept: ADMINISTRATIVE | Facility: CLINIC | Age: 84
End: 2019-07-02

## 2019-07-16 ENCOUNTER — TELEPHONE (OUTPATIENT)
Dept: PHYSICAL MEDICINE AND REHAB | Facility: CLINIC | Age: 84
End: 2019-07-16

## 2019-07-16 DIAGNOSIS — G89.29 CHRONIC PAIN OF LEFT KNEE: ICD-10-CM

## 2019-07-16 DIAGNOSIS — M25.562 CHRONIC PAIN OF LEFT KNEE: ICD-10-CM

## 2019-07-16 DIAGNOSIS — M25.552 LEFT HIP PAIN: ICD-10-CM

## 2019-07-16 RX ORDER — FENTANYL 12.5 UG/1
1 PATCH TRANSDERMAL
Qty: 10 PATCH | Refills: 0 | Status: CANCELLED | OUTPATIENT
Start: 2019-07-16 | End: 2019-08-15

## 2019-07-16 RX ORDER — FENTANYL 12.5 UG/1
1 PATCH TRANSDERMAL
Qty: 10 PATCH | Refills: 0 | Status: SHIPPED | OUTPATIENT
Start: 2019-07-16 | End: 2019-07-24 | Stop reason: SDUPTHER

## 2019-07-16 NOTE — TELEPHONE ENCOUNTER
Last Rx refill-----06/04/19  Last office visit--05/20/19  Next office visit--09/27/19          ----- Message from Kristyn Polanco sent at 7/16/2019  1:27 PM CDT -----  Contact: Belksy (Daughter) [phone]  Rx Refill/Request     Refill Rx:      Rx Name and Strength:  fentaNYL (DURAGESIC) 12 mcg/hr PT72    Preferred Pharmacy with phone number:     [facility] - [address]  [address]  [address]  [place] [address]  Phone: [phone] Fax: [phone]      Communication Preference: PHONE    Additional Information:

## 2019-07-16 NOTE — TELEPHONE ENCOUNTER
----- Message from Carlito Fields sent at 7/16/2019 11:16 AM CDT -----  Contact: [facility]i Duggan/ Assisted [facility] Deisy [phone]  Is this a refill or new RX:  Refill    RX name and strength: fentaNYL (DURAGESIC) 12 mcg/hr PT72  :    [facility] [address] [phone] (Phone) [phone] (Fax)

## 2019-07-16 NOTE — TELEPHONE ENCOUNTER
Please call office back.  Please have Pharmacy resend Rx refill request.  Please provide the name of the medication.    ----- Message from Terra Casas sent at 7/12/2019  9:26 AM CDT -----  Contact: Daughter/ [phone]  Patient daughter would like a call back to see if the office got a fax about the patient medications refills from her pharmacy.

## 2019-07-24 DIAGNOSIS — M25.562 CHRONIC PAIN OF LEFT KNEE: ICD-10-CM

## 2019-07-24 DIAGNOSIS — G89.29 CHRONIC PAIN OF LEFT KNEE: ICD-10-CM

## 2019-07-24 DIAGNOSIS — M25.552 LEFT HIP PAIN: ICD-10-CM

## 2019-07-24 RX ORDER — FENTANYL 12.5 UG/1
1 PATCH TRANSDERMAL
Qty: 10 PATCH | Refills: 0 | Status: SHIPPED | OUTPATIENT
Start: 2019-07-24 | End: 2019-08-20 | Stop reason: SDUPTHER

## 2019-07-24 NOTE — TELEPHONE ENCOUNTER
Last visit: 05/20/2019  Next visit: 09/27/2019  Last refill Fentanyl: 07/16/2019 Script was sent to print in error    Lilia Hidalgo dtr on the phone regarding refill: fentaNYL (DURAGESIC) 12 mcg/hr PT72 in EPIC Dr. Gonzalez printed but did not escribe to the pharmacy

## 2019-08-20 DIAGNOSIS — M25.562 CHRONIC PAIN OF LEFT KNEE: ICD-10-CM

## 2019-08-20 DIAGNOSIS — M25.552 LEFT HIP PAIN: ICD-10-CM

## 2019-08-20 DIAGNOSIS — G89.29 CHRONIC PAIN OF LEFT KNEE: ICD-10-CM

## 2019-08-20 RX ORDER — FENTANYL 12.5 UG/1
1 PATCH TRANSDERMAL
Qty: 10 PATCH | Refills: 0 | Status: SHIPPED | OUTPATIENT
Start: 2019-08-23 | End: 2019-09-10 | Stop reason: SDUPTHER

## 2019-08-20 NOTE — TELEPHONE ENCOUNTER
Last Rx refill-----07/24/19  Last office visit-- 05/20/19  Next office visit--09/27/19        ----- Message from Yaron Eric sent at 8/20/2019 10:27 AM CDT -----  Rx Refill/Request     Is this a Refill or New Rx: Refill    Rx Name and Strength: fentaNYL (DURAGESIC) 12 mcg/hr PT72   Preferred Pharmacy with phone number: see below  Communication Preference: [phone]  Additional Information:     [facility] - [address]  [address]  [address]  [place] [address]  Phone: [phone] Fax: [phone]

## 2019-09-05 ENCOUNTER — PES CALL (OUTPATIENT)
Dept: ADMINISTRATIVE | Facility: CLINIC | Age: 84
End: 2019-09-05

## 2019-09-10 DIAGNOSIS — M25.562 CHRONIC PAIN OF LEFT KNEE: ICD-10-CM

## 2019-09-10 DIAGNOSIS — G89.29 CHRONIC PAIN OF LEFT KNEE: ICD-10-CM

## 2019-09-10 DIAGNOSIS — M25.552 LEFT HIP PAIN: ICD-10-CM

## 2019-09-10 NOTE — TELEPHONE ENCOUNTER
Last Rx refill-----08/20/19  Last office visit--05/20/19  Next office visit--09/27/19        ----- Message from Anna Vallejo sent at 9/10/2019  2:47 PM CDT -----  Contact: pts daughter/Belkys at [phone]  Rx Refill/Request     Is this a Refill or New Rx:  refill  Rx Name and Strength:  Fentanyl patch   12 micrograms qty ?  Preferred Pharmacy with phone number: [facility] pharmacy [address] in [place]   Communication Preference:call  Additional Information: Med is due now.  Pt has on e patch left.

## 2019-09-11 RX ORDER — FENTANYL 12.5 UG/1
1 PATCH TRANSDERMAL
Qty: 10 PATCH | Refills: 0 | Status: SHIPPED | OUTPATIENT
Start: 2019-09-20 | End: 2019-10-25 | Stop reason: SDUPTHER

## 2019-09-21 DIAGNOSIS — K21.9 GASTROESOPHAGEAL REFLUX DISEASE, ESOPHAGITIS PRESENCE NOT SPECIFIED: ICD-10-CM

## 2019-09-21 DIAGNOSIS — K44.9 HIATAL HERNIA: ICD-10-CM

## 2019-09-23 RX ORDER — OMEPRAZOLE 20 MG/1
CAPSULE, DELAYED RELEASE ORAL
Qty: 60 CAPSULE | Refills: 11 | Status: SHIPPED | OUTPATIENT
Start: 2019-09-23 | End: 2020-07-17 | Stop reason: SDUPTHER

## 2019-09-23 RX ORDER — ATORVASTATIN CALCIUM 20 MG/1
TABLET, FILM COATED ORAL
Qty: 30 TABLET | Refills: 11 | Status: SHIPPED | OUTPATIENT
Start: 2019-09-23 | End: 2020-07-17

## 2019-09-25 ENCOUNTER — PATIENT OUTREACH (OUTPATIENT)
Dept: ADMINISTRATIVE | Facility: OTHER | Age: 84
End: 2019-09-25

## 2019-09-27 ENCOUNTER — OFFICE VISIT (OUTPATIENT)
Dept: PHYSICAL MEDICINE AND REHAB | Facility: CLINIC | Age: 84
End: 2019-09-27
Payer: MEDICARE

## 2019-09-27 VITALS
SYSTOLIC BLOOD PRESSURE: 139 MMHG | BODY MASS INDEX: 35.51 KG/M2 | HEIGHT: 66 IN | DIASTOLIC BLOOD PRESSURE: 63 MMHG | HEART RATE: 67 BPM

## 2019-09-27 DIAGNOSIS — M25.562 CHRONIC PAIN OF LEFT KNEE: Primary | ICD-10-CM

## 2019-09-27 DIAGNOSIS — M25.511 CHRONIC RIGHT SHOULDER PAIN: ICD-10-CM

## 2019-09-27 DIAGNOSIS — Z96.652 H/O TOTAL KNEE REPLACEMENT, LEFT: ICD-10-CM

## 2019-09-27 DIAGNOSIS — G89.29 CHRONIC RIGHT SHOULDER PAIN: ICD-10-CM

## 2019-09-27 DIAGNOSIS — M25.552 LEFT HIP PAIN: ICD-10-CM

## 2019-09-27 DIAGNOSIS — M16.12 PRIMARY OSTEOARTHRITIS OF LEFT HIP: ICD-10-CM

## 2019-09-27 DIAGNOSIS — G89.29 CHRONIC PAIN OF LEFT KNEE: Primary | ICD-10-CM

## 2019-09-27 DIAGNOSIS — M47.816 SPONDYLOSIS OF LUMBAR REGION WITHOUT MYELOPATHY OR RADICULOPATHY: ICD-10-CM

## 2019-09-27 DIAGNOSIS — Z79.891 CHRONICALLY ON OPIATE THERAPY: ICD-10-CM

## 2019-09-27 PROCEDURE — 99999 PR PBB SHADOW E&M-EST. PATIENT-LVL II: ICD-10-PCS | Mod: PBBFAC,HCNC,, | Performed by: PHYSICAL MEDICINE & REHABILITATION

## 2019-09-27 PROCEDURE — 99214 OFFICE O/P EST MOD 30 MIN: CPT | Mod: HCNC,S$GLB,, | Performed by: PHYSICAL MEDICINE & REHABILITATION

## 2019-09-27 PROCEDURE — 99214 PR OFFICE/OUTPT VISIT, EST, LEVL IV, 30-39 MIN: ICD-10-PCS | Mod: HCNC,S$GLB,, | Performed by: PHYSICAL MEDICINE & REHABILITATION

## 2019-09-27 PROCEDURE — 1101F PT FALLS ASSESS-DOCD LE1/YR: CPT | Mod: HCNC,CPTII,S$GLB, | Performed by: PHYSICAL MEDICINE & REHABILITATION

## 2019-09-27 PROCEDURE — 99999 PR PBB SHADOW E&M-EST. PATIENT-LVL II: CPT | Mod: PBBFAC,HCNC,, | Performed by: PHYSICAL MEDICINE & REHABILITATION

## 2019-09-27 PROCEDURE — 1101F PR PT FALLS ASSESS DOC 0-1 FALLS W/OUT INJ PAST YR: ICD-10-PCS | Mod: HCNC,CPTII,S$GLB, | Performed by: PHYSICAL MEDICINE & REHABILITATION

## 2019-09-27 NOTE — PROGRESS NOTES
Subjective:       Patient ID: Lilia Hidalgo is a 88 y.o. female.    Chief Complaint: No chief complaint on file.    HPI     HISTORY OF PRESENT ILLNESS:  Ms. Hidalgo is an 88-year-old white female with past medical history of hypertension, depression, remote peptic ulcer, septic   encephalopathy in 2017, dementia, osteoarthritis and debility.  She [facility] for chronic persistent left knee pain after total knee arthroplasty and for left hip pain due to osteoarthritis.  Her last visit to the clinic was on 5/20/19.  She was maintained on fentanyl patches, p.r.n.  Tylenol and p.r.n. tramadol.    The patient is known to the clinic for followup.  She is accompanied by her daughter who is helping with the history.  The patient continues to reside at [facility].  She reports that her left hip pain has been stable.  It is a constant aching pain.  It is aggravated by movement.  Her maximum pain is 9-10/10 and minimum 3-4/10.  Today, it is 3-4/10.    Her left knee pain has been stable.  It is an almost constant aching pain.  It is aggravated by movement.  Her maximum pain is 9-10/10 and minimum 3-4/10.  Today, it is 3-4/10.  Her low back pain has been under fair control.      She is currently getting fentanyl patch 12 mcg per hour every three days.  She takes Tylenol 650 mg p.r.n. for mild pain, usually four times per day and tramadol 50 mg p.r.n. for moderate pain, usually three times per day.        Review of Systems   Constitutional: Positive for fatigue.   Eyes: Negative for visual disturbance.   Respiratory: Negative for shortness of breath.    Cardiovascular: Negative for chest pain.   Gastrointestinal: Negative for constipation, nausea and vomiting.   Genitourinary: Positive for difficulty urinating.   Musculoskeletal: Positive for gait problem and joint swelling. Negative for arthralgias, back pain and neck pain.   Neurological: Negative for dizziness and  headaches.   Psychiatric/Behavioral: Negative for behavioral problems and sleep disturbance.       Objective:      Physical Exam   Constitutional: She appears well-developed and well-nourished.   Coming to the clinic in a transport propelled by aide.     HENT:   Head: Normocephalic and atraumatic.   Neck: Normal range of motion.   Musculoskeletal:   BUE:  ROM:full.  Strength:    RUE: 3/5 at shoulder abduction, 4 elbow flexion, 4 elbow extension, 4 hand .   LUE: 4/5 at shoulder abduction, 4 elbow flexion, 4 elbow extension, 4 hand .      BLE:  ROM: decreased at knees.  Healed Lt TKA scar.  +ve Rt knee crepitus.   BLE edema.  Strength:    RLE: 4-/5 at hip flexion, 4 knee extension, 4 ankle DF, 4 PF.   LLE: 3-/5 at hip flexion, 3- knee extension, 4 ankle DF, 4 PF.  Sensation to pinprick:     RLE: intact.      LLE: intact.   SLR (sitting):      RLE: -ve.      LLE: -ve.     Mild tenderness over lumbar spine.  Mild to moderate pain with passive left hip movement.           Neurological: She is alert.   Needs occasional visual cues to follow simple commands.   Skin: Skin is warm.   Psychiatric: She has a normal mood and affect. Her behavior is normal.   Vitals reviewed.          Assessment:       1. Chronic pain of left knee    2. H/O total knee replacement, left    3. Primary osteoarthritis of left hip    4. Left hip pain    5. Spondylosis of lumbar region without myelopathy or radiculopathy    6. Chronic right shoulder pain    7. Chronically on opiate therapy        Plan:       - Continue acetaminophen (TYLENOL) 650 MG tablet; Take 1 tablet (500 mg total) by mouth every 6 (six) hours as needed (Mild pain).  - Continue fentaNYL (DURAGESIC) 12 mcg/hr PT72; Place 1 patch onto the skin every 72 hours.  - Continue traMADol (ULTRAM) 50 mg tablet; Take 1 tablet (50 mg total) by mouth tid.  -  No follow-ups on file.    This was a 25 minute visit, more than 50% of which was spent counseling the patient about the diagnosis  and the treatment plan.

## 2019-09-30 ENCOUNTER — PATIENT MESSAGE (OUTPATIENT)
Dept: INTERNAL MEDICINE | Facility: CLINIC | Age: 84
End: 2019-09-30

## 2019-10-01 DIAGNOSIS — G31.84 MILD COGNITIVE IMPAIRMENT: ICD-10-CM

## 2019-10-01 RX ORDER — DONEPEZIL HYDROCHLORIDE 5 MG/1
5 TABLET, FILM COATED ORAL NIGHTLY
Qty: 90 TABLET | Refills: 3 | Status: SHIPPED | OUTPATIENT
Start: 2019-10-01 | End: 2020-07-17 | Stop reason: SDUPTHER

## 2019-10-01 NOTE — TELEPHONE ENCOUNTER
Called and spoke to Mr. Montana let him know I have not received any calls on this but I will forward over to Luz for approval.

## 2019-10-01 NOTE — TELEPHONE ENCOUNTER
----- Message from Natalia Smith sent at 10/1/2019  2:48 PM CDT -----  Contact: [facility] nurse Mr. Montana @ [facility] Direct # [phone]  Mr. Montana would like a call back in regards to him saying that he's been calling for two weeks now trying to get a refill on the patient's script for donepezil (ARICEPT) 5 MG tablet and he would like to know why it haven't been filled yet?

## 2019-10-07 DIAGNOSIS — I10 BENIGN ESSENTIAL HYPERTENSION: ICD-10-CM

## 2019-10-07 RX ORDER — METOPROLOL SUCCINATE 100 MG/1
200 TABLET, EXTENDED RELEASE ORAL DAILY
Qty: 30 TABLET | Refills: 11 | OUTPATIENT
Start: 2019-10-07

## 2019-10-07 NOTE — TELEPHONE ENCOUNTER
----- Message from Ramya De Anda sent at 10/7/2019  3:35 PM CDT -----  Contact: De[facility] with [facility]  [phone]  Patient is calling for an RX refill or new RX.  Is this a refill or new RX:  Refill  RX name and strength: metoprolol succinate (TOPROL-XL) 100 MG 24 hr tablet  Directions (copy/paste from chart): Take 2 tablets (200 mg total) by mouth once daily. - Oral  Is this a 30 day or 90 day RX:  90  Local pharmacy or mail order pharmacy:  Local   Pharmacy name and phone # (copy/paste from chart): [facility] [phone]      Comments:  Originally given by  Jacky Cooper MD. Requested last Friday.

## 2019-10-08 DIAGNOSIS — I10 BENIGN ESSENTIAL HYPERTENSION: ICD-10-CM

## 2019-10-08 RX ORDER — ESCITALOPRAM OXALATE 20 MG/1
TABLET ORAL
Qty: 30 TABLET | Refills: 11 | Status: SHIPPED | OUTPATIENT
Start: 2019-10-08 | End: 2020-07-17 | Stop reason: SDUPTHER

## 2019-10-08 RX ORDER — METOPROLOL SUCCINATE 200 MG/1
TABLET, EXTENDED RELEASE ORAL
Qty: 30 TABLET | Refills: 5 | Status: SHIPPED | OUTPATIENT
Start: 2019-10-08 | End: 2020-05-07 | Stop reason: SDUPTHER

## 2019-10-08 RX ORDER — POTASSIUM CHLORIDE 750 MG/1
TABLET, EXTENDED RELEASE ORAL
Qty: 30 TABLET | Refills: 11 | Status: SHIPPED | OUTPATIENT
Start: 2019-10-08 | End: 2020-10-09

## 2019-10-08 RX ORDER — METOPROLOL SUCCINATE 200 MG/1
TABLET, EXTENDED RELEASE ORAL
Qty: 30 TABLET | Refills: 5 | Status: CANCELLED | OUTPATIENT
Start: 2019-10-08

## 2019-10-19 DIAGNOSIS — I10 ESSENTIAL HYPERTENSION: Primary | ICD-10-CM

## 2019-10-21 RX ORDER — LOSARTAN POTASSIUM 50 MG/1
50 TABLET ORAL DAILY
Qty: 30 TABLET | Refills: 3 | Status: SHIPPED | OUTPATIENT
Start: 2019-10-21 | End: 2019-11-06

## 2019-10-21 RX ORDER — BENAZEPRIL HYDROCHLORIDE 40 MG/1
TABLET ORAL
Qty: 30 TABLET | Refills: 1 | OUTPATIENT
Start: 2019-10-21

## 2019-10-21 RX ORDER — LOSARTAN POTASSIUM 50 MG/1
50 TABLET ORAL DAILY
Qty: 30 TABLET | Refills: 3 | Status: SHIPPED | OUTPATIENT
Start: 2019-10-21 | End: 2019-10-21

## 2019-10-21 NOTE — TELEPHONE ENCOUNTER
Spoke with daughter. Stated pt is wheelchair bound now living at [facility]. Called assisted lviving stated they can't take verbal orders over the phone. It would have to be in writing stating your taking over for PCP while on maternity leave, medication has been changed, and BP check in 2 weeks.....etc. Their fax [facility] is [phone].

## 2019-10-21 NOTE — TELEPHONE ENCOUNTER
Please verify patient's dose.  I have her both on 20 mg and 40 mg.  Also were going to have to change the medication anyway to a different medication since it is an ACE-inhibitor.

## 2019-10-21 NOTE — TELEPHONE ENCOUNTER
Spoke with daughter. Daughter stated the pharmacy called her on today with the same question. After researching the chart I see pt is 2 tablets of the 20mg to equal 40mg. So pt is taking 40mg. Also informed daughter medication would be changed because it is an ace inhibitor. Daughter was agreeable.

## 2019-10-25 DIAGNOSIS — G89.29 CHRONIC PAIN OF LEFT KNEE: ICD-10-CM

## 2019-10-25 DIAGNOSIS — M25.552 LEFT HIP PAIN: ICD-10-CM

## 2019-10-25 DIAGNOSIS — M25.562 CHRONIC PAIN OF LEFT KNEE: ICD-10-CM

## 2019-10-25 RX ORDER — FENTANYL 12.5 UG/1
1 PATCH TRANSDERMAL
Qty: 10 PATCH | Refills: 0 | Status: SHIPPED | OUTPATIENT
Start: 2019-10-25 | End: 2019-11-26 | Stop reason: SDUPTHER

## 2019-10-25 NOTE — TELEPHONE ENCOUNTER
Last Rx refill-----09/11/19  Last office visit--09/27/19  Next office visit--02/10/20        ----- Message from Berlin Baldwin sent at 10/25/2019 11:23 AM CDT -----  Contact: Belkys ( daughter )   Rx Refill/Request     Is this a Refill or New Rx:  Yes ( Living Facility just put the last patch on today )   Rx Name and Strength:  fentaNYL (DURAGESIC) 12 mcg/hr PT72    Preferred Pharmacy with phone number:     [facility] - [address]  [address]  [address]  [place] [address]  Phone: [phone] Fax: [phone]

## 2019-11-06 ENCOUNTER — TELEPHONE (OUTPATIENT)
Dept: INTERNAL MEDICINE | Facility: CLINIC | Age: 84
End: 2019-11-06

## 2019-11-06 DIAGNOSIS — I10 ESSENTIAL HYPERTENSION: ICD-10-CM

## 2019-11-06 RX ORDER — LOSARTAN POTASSIUM 100 MG/1
100 TABLET ORAL DAILY
Qty: 30 TABLET | Refills: 3 | Status: SHIPPED | OUTPATIENT
Start: 2019-11-06 | End: 2019-11-07 | Stop reason: SDUPTHER

## 2019-11-06 NOTE — TELEPHONE ENCOUNTER
They will need a letter stating the changes to the medication and BP readings. Like you did before. They can't take verbals or make changes without the orders.    Thanks

## 2019-11-06 NOTE — TELEPHONE ENCOUNTER
I received a log of blood pressures from the patient's assisted living.  Please verify med list as some of the pressure are high.  We may need to make changes

## 2019-11-06 NOTE — TELEPHONE ENCOUNTER
Pt was started on losartan 50mg on Oct 21st. Facility sent in BP readings for the past 2 weeks.

## 2019-11-06 NOTE — TELEPHONE ENCOUNTER
I have increased patient's losartan to 100 mg and sent to the pharmacy.  If they could let us know her blood pressures again in 2 weeks.

## 2019-11-07 DIAGNOSIS — I10 ESSENTIAL HYPERTENSION: ICD-10-CM

## 2019-11-07 NOTE — TELEPHONE ENCOUNTER
----- Message from Britney Red sent at 11/7/2019  9:39 AM CST -----  Contact: [facility] [phone]  Type: Rx    Name of medication(s): losartan (COZAAR) 100 MG tablet    Is this a refill? New rx? New     Pharmacy Name, Phone, & Location:[facility] [address]   [phone] (Phone)  [phone] (Fax)        Comments:[facility]  please advise, thanks

## 2019-11-08 RX ORDER — LOSARTAN POTASSIUM 100 MG/1
100 TABLET ORAL DAILY
Qty: 30 TABLET | Refills: 3 | Status: SHIPPED | OUTPATIENT
Start: 2019-11-08 | End: 2020-04-08

## 2019-11-11 ENCOUNTER — PATIENT MESSAGE (OUTPATIENT)
Dept: PHYSICAL MEDICINE AND REHAB | Facility: CLINIC | Age: 84
End: 2019-11-11

## 2019-11-11 DIAGNOSIS — M25.562 CHRONIC PAIN OF LEFT KNEE: ICD-10-CM

## 2019-11-11 DIAGNOSIS — M25.552 LEFT HIP PAIN: ICD-10-CM

## 2019-11-11 DIAGNOSIS — G89.29 CHRONIC PAIN OF LEFT KNEE: ICD-10-CM

## 2019-11-12 RX ORDER — TRAMADOL HYDROCHLORIDE 50 MG/1
50 TABLET ORAL SEE ADMIN INSTRUCTIONS
Qty: 90 TABLET | Refills: 3 | Status: SHIPPED | OUTPATIENT
Start: 2019-11-12 | End: 2019-11-20 | Stop reason: SDUPTHER

## 2019-11-12 RX ORDER — DEXTROMETHORPHAN HYDROBROMIDE, GUAIFENESIN 5; 100 MG/5ML; MG/5ML
650 LIQUID ORAL EVERY 6 HOURS PRN
Qty: 100 TABLET | Status: SHIPPED | OUTPATIENT
Start: 2019-11-12 | End: 2019-11-20 | Stop reason: SDUPTHER

## 2019-11-14 ENCOUNTER — TELEPHONE (OUTPATIENT)
Dept: PHYSICAL MEDICINE AND REHAB | Facility: CLINIC | Age: 84
End: 2019-11-14

## 2019-11-14 NOTE — TELEPHONE ENCOUNTER
Spoke with the daughter and explain the I forward her message and the dr will get back to her when he returns to clinic

## 2019-11-14 NOTE — TELEPHONE ENCOUNTER
----- Message from Lyndsey Griffith sent at 11/13/2019  2:35 PM CST -----  Contact: [address] Pt Daughter [phone]  Pt daughter wanted to speak to someone about changing her medications. Pt daughter asked for a call back.      Daughter contact # [phone].      Thanks

## 2019-11-20 ENCOUNTER — PATIENT MESSAGE (OUTPATIENT)
Dept: PHYSICAL MEDICINE AND REHAB | Facility: CLINIC | Age: 84
End: 2019-11-20

## 2019-11-20 DIAGNOSIS — M25.552 LEFT HIP PAIN: ICD-10-CM

## 2019-11-20 DIAGNOSIS — Z09 HOSPITAL DISCHARGE FOLLOW-UP: ICD-10-CM

## 2019-11-20 DIAGNOSIS — M25.562 CHRONIC PAIN OF LEFT KNEE: ICD-10-CM

## 2019-11-20 DIAGNOSIS — I11.0 HYPERTENSIVE HEART DISEASE WITH HEART FAILURE: ICD-10-CM

## 2019-11-20 DIAGNOSIS — M79.89 SWELLING OF LOWER EXTREMITY: ICD-10-CM

## 2019-11-20 DIAGNOSIS — I50.32 HEART FAILURE, DIASTOLIC, CHRONIC: ICD-10-CM

## 2019-11-20 DIAGNOSIS — G89.29 CHRONIC PAIN OF LEFT KNEE: ICD-10-CM

## 2019-11-20 RX ORDER — TRAMADOL HYDROCHLORIDE 50 MG/1
50 TABLET ORAL SEE ADMIN INSTRUCTIONS
Qty: 90 TABLET | Refills: 3 | Status: SHIPPED | OUTPATIENT
Start: 2019-11-20 | End: 2019-12-20

## 2019-11-20 RX ORDER — DEXTROMETHORPHAN HYDROBROMIDE, GUAIFENESIN 5; 100 MG/5ML; MG/5ML
650 LIQUID ORAL 2 TIMES DAILY
Qty: 100 TABLET | Status: SHIPPED | OUTPATIENT
Start: 2019-11-20 | End: 2020-06-12 | Stop reason: SDUPTHER

## 2019-11-20 RX ORDER — FUROSEMIDE 40 MG/1
TABLET ORAL
Qty: 30 TABLET | Refills: 11 | Status: SHIPPED | OUTPATIENT
Start: 2019-11-20 | End: 2020-07-17 | Stop reason: SDUPTHER

## 2019-11-21 ENCOUNTER — PATIENT MESSAGE (OUTPATIENT)
Dept: PHYSICAL MEDICINE AND REHAB | Facility: CLINIC | Age: 84
End: 2019-11-21

## 2019-11-26 DIAGNOSIS — M25.552 LEFT HIP PAIN: ICD-10-CM

## 2019-11-26 DIAGNOSIS — M25.562 CHRONIC PAIN OF LEFT KNEE: ICD-10-CM

## 2019-11-26 DIAGNOSIS — G89.29 CHRONIC PAIN OF LEFT KNEE: ICD-10-CM

## 2019-11-26 RX ORDER — FENTANYL 12.5 UG/1
1 PATCH TRANSDERMAL
Qty: 10 PATCH | Refills: 0 | Status: SHIPPED | OUTPATIENT
Start: 2019-11-27 | End: 2019-12-20 | Stop reason: SDUPTHER

## 2019-11-26 NOTE — TELEPHONE ENCOUNTER
Last Rx refill-----10/25/19  Last office visit--09/27/19  Next office visit--02/10/20        ----- Message from Samantha Cavanaugh sent at 11/26/2019 11:10 AM CST -----  Contact: Daughter  Pt daughter calling to get refill on the patients fentaNYL (DURAGESIC) 12 mcg/hr PT72.  Please send to:    [facility] [address]  [address] [place] [address]  Phone: [phone] Fax: [phone]

## 2019-11-30 ENCOUNTER — TELEPHONE (OUTPATIENT)
Dept: INTERNAL MEDICINE | Facility: CLINIC | Age: 84
End: 2019-11-30

## 2019-11-30 NOTE — TELEPHONE ENCOUNTER
----- Message from Lucia Prince sent at 11/30/2019  8:44 AM CST -----  Contact: Belkys [phone]  Patient's daughter is calling to discuss changing the furosemide (LASIX) 40 MG tablet to from taking it in the morning to night or splitting medication  . Please call and advise, Thanks

## 2019-12-02 ENCOUNTER — PATIENT MESSAGE (OUTPATIENT)
Dept: INTERNAL MEDICINE | Facility: CLINIC | Age: 84
End: 2019-12-02

## 2019-12-02 ENCOUNTER — TELEPHONE (OUTPATIENT)
Dept: INTERNAL MEDICINE | Facility: CLINIC | Age: 84
End: 2019-12-02

## 2019-12-02 NOTE — TELEPHONE ENCOUNTER
----- Message from Vince Cade sent at 12/2/2019  8:08 AM CST -----  Contact: Daughter [phone]  Patient is returning a phone call.  Who left a message for the patient: Dr wilson   Does patient know what this is regarding:  Daughter returning call  Comments:       Please call an advise  Thank you

## 2019-12-04 ENCOUNTER — PATIENT MESSAGE (OUTPATIENT)
Dept: INTERNAL MEDICINE | Facility: CLINIC | Age: 84
End: 2019-12-04

## 2019-12-04 ENCOUNTER — TELEPHONE (OUTPATIENT)
Dept: INTERNAL MEDICINE | Facility: CLINIC | Age: 84
End: 2019-12-04

## 2019-12-04 NOTE — TELEPHONE ENCOUNTER
----- Message from Mariangel Pandey sent at 12/4/2019 10:50 AM CST -----  Contact: Daughter/Belkys [phone]  Patient is returning a phone call.  Who left a message for the patient: Alycia  Does patient know what this is regarding:    Comments: Please today is possible. Daughter has been calling ml last week. If not please message back in the portal.    Please call and advise.    Thank You

## 2019-12-04 NOTE — LETTER
[facility]  [address]  [facility] [address]  Phone: [phone]  Fax: [phone]     2019    To Whom It May Concern:    Please change Ms. Lilia Hidalgo's ( 31) furosemide dosing from 40mg every morning to 40mg every night.     If you have any questions or concerns, don't hesitate to contact me at [phone].    Sincerely,    April Herrera MD  Department of Internal Medicine - [facility]

## 2019-12-20 DIAGNOSIS — M25.552 LEFT HIP PAIN: ICD-10-CM

## 2019-12-20 DIAGNOSIS — M25.562 CHRONIC PAIN OF LEFT KNEE: ICD-10-CM

## 2019-12-20 DIAGNOSIS — G89.29 CHRONIC PAIN OF LEFT KNEE: ICD-10-CM

## 2019-12-20 RX ORDER — FENTANYL 12.5 UG/1
1 PATCH TRANSDERMAL
Qty: 10 PATCH | Refills: 0 | Status: SHIPPED | OUTPATIENT
Start: 2019-12-27 | End: 2020-01-20 | Stop reason: SDUPTHER

## 2019-12-20 NOTE — TELEPHONE ENCOUNTER
Last Rx refill-----11/26/19  Last office visit--09/27/19  Next office visit--02/10/20      ----- Message from Ifrah Galvez sent at 12/20/2019 10:47 AM CST -----  Contact: pt   Reason; Refill request for fentaNYL (DURAGESIC) 12 mcg/hr PT72    Communication;

## 2020-01-03 ENCOUNTER — PATIENT MESSAGE (OUTPATIENT)
Dept: PHYSICAL MEDICINE AND REHAB | Facility: CLINIC | Age: 85
End: 2020-01-03

## 2020-01-07 RX ORDER — TRAMADOL HYDROCHLORIDE AND ACETAMINOPHEN 37.5; 325 MG/1; MG/1
1 TABLET, FILM COATED ORAL 3 TIMES DAILY
Qty: 90 TABLET | Refills: 1 | Status: SHIPPED | OUTPATIENT
Start: 2020-01-07 | End: 2020-02-06

## 2020-01-08 ENCOUNTER — PATIENT MESSAGE (OUTPATIENT)
Dept: PHYSICAL MEDICINE AND REHAB | Facility: CLINIC | Age: 85
End: 2020-01-08

## 2020-01-20 DIAGNOSIS — M25.552 LEFT HIP PAIN: ICD-10-CM

## 2020-01-20 DIAGNOSIS — M25.562 CHRONIC PAIN OF LEFT KNEE: ICD-10-CM

## 2020-01-20 DIAGNOSIS — G89.29 CHRONIC PAIN OF LEFT KNEE: ICD-10-CM

## 2020-01-20 RX ORDER — FENTANYL 12.5 UG/1
1 PATCH TRANSDERMAL
Qty: 10 PATCH | Refills: 0 | Status: SHIPPED | OUTPATIENT
Start: 2020-01-21 | End: 2020-02-21 | Stop reason: SDUPTHER

## 2020-01-20 NOTE — TELEPHONE ENCOUNTER
Last Rx refill-----12/20/19  Last office visit--09/27/19  Next office visit--02/10/20      ----- Message from Yaron Eric sent at 1/20/2020  9:37 AM CST -----  Rx Refill/Request     Is this a Refill or New Rx: Refill    Rx Name and Strength: fentaNYL (DURAGESIC) 12 mcg/hr PT72  Preferred Pharmacy with phone number: see below  Communication Preference: Crystal phan/ sherman gandhi @ [phone]  Additional Information:     [facility] - [place] [facility] - [address]  [address]  [address]  [place] [address]  Phone: [phone] Fax: [phone]

## 2020-01-31 ENCOUNTER — TELEPHONE (OUTPATIENT)
Dept: INTERNAL MEDICINE | Facility: CLINIC | Age: 85
End: 2020-01-31

## 2020-01-31 ENCOUNTER — OFFICE VISIT (OUTPATIENT)
Dept: INTERNAL MEDICINE | Facility: CLINIC | Age: 85
End: 2020-01-31
Payer: MEDICARE

## 2020-01-31 VITALS
DIASTOLIC BLOOD PRESSURE: 70 MMHG | BODY MASS INDEX: 32.14 KG/M2 | HEART RATE: 72 BPM | WEIGHT: 200 LBS | OXYGEN SATURATION: 96 % | SYSTOLIC BLOOD PRESSURE: 152 MMHG | TEMPERATURE: 98 F | HEIGHT: 66 IN

## 2020-01-31 DIAGNOSIS — L30.8 DERMATITIS ASSOCIATED WITH MOISTURE: Primary | ICD-10-CM

## 2020-01-31 PROCEDURE — 99213 PR OFFICE/OUTPT VISIT, EST, LEVL III, 20-29 MIN: ICD-10-PCS | Mod: HCNC,GC,S$GLB, | Performed by: STUDENT IN AN ORGANIZED HEALTH CARE EDUCATION/TRAINING PROGRAM

## 2020-01-31 PROCEDURE — 1125F AMNT PAIN NOTED PAIN PRSNT: CPT | Mod: HCNC,GC,S$GLB, | Performed by: STUDENT IN AN ORGANIZED HEALTH CARE EDUCATION/TRAINING PROGRAM

## 2020-01-31 PROCEDURE — 99999 PR PBB SHADOW E&M-EST. PATIENT-LVL IV: ICD-10-PCS | Mod: PBBFAC,HCNC,GC, | Performed by: STUDENT IN AN ORGANIZED HEALTH CARE EDUCATION/TRAINING PROGRAM

## 2020-01-31 PROCEDURE — 1101F PT FALLS ASSESS-DOCD LE1/YR: CPT | Mod: HCNC,CPTII,GC,S$GLB | Performed by: STUDENT IN AN ORGANIZED HEALTH CARE EDUCATION/TRAINING PROGRAM

## 2020-01-31 PROCEDURE — 1159F PR MEDICATION LIST DOCUMENTED IN MEDICAL RECORD: ICD-10-PCS | Mod: HCNC,GC,S$GLB, | Performed by: STUDENT IN AN ORGANIZED HEALTH CARE EDUCATION/TRAINING PROGRAM

## 2020-01-31 PROCEDURE — 1159F MED LIST DOCD IN RCRD: CPT | Mod: HCNC,GC,S$GLB, | Performed by: STUDENT IN AN ORGANIZED HEALTH CARE EDUCATION/TRAINING PROGRAM

## 2020-01-31 PROCEDURE — 1101F PR PT FALLS ASSESS DOC 0-1 FALLS W/OUT INJ PAST YR: ICD-10-PCS | Mod: HCNC,CPTII,GC,S$GLB | Performed by: STUDENT IN AN ORGANIZED HEALTH CARE EDUCATION/TRAINING PROGRAM

## 2020-01-31 PROCEDURE — 1125F PR PAIN SEVERITY QUANTIFIED, PAIN PRESENT: ICD-10-PCS | Mod: HCNC,GC,S$GLB, | Performed by: STUDENT IN AN ORGANIZED HEALTH CARE EDUCATION/TRAINING PROGRAM

## 2020-01-31 PROCEDURE — 99213 OFFICE O/P EST LOW 20 MIN: CPT | Mod: HCNC,GC,S$GLB, | Performed by: STUDENT IN AN ORGANIZED HEALTH CARE EDUCATION/TRAINING PROGRAM

## 2020-01-31 PROCEDURE — 99999 PR PBB SHADOW E&M-EST. PATIENT-LVL IV: CPT | Mod: PBBFAC,HCNC,GC, | Performed by: STUDENT IN AN ORGANIZED HEALTH CARE EDUCATION/TRAINING PROGRAM

## 2020-01-31 NOTE — TELEPHONE ENCOUNTER
----- Message from Britney Red sent at 1/31/2020 11:05 AM CST -----  Contact: Belkys (daughter) [phone]  Patient would like to get medical advice.  Symptoms (please be specific):  UTI  How long has patient had these symptoms:    Pharmacy name and phone # (copy/paste from chart):  [facility] - [address]   [phone] (Phone)  [phone] (Fax)  Would the patient rather a call back or a response via [facility]?:  Call back  Comments:please advise, thanks

## 2020-01-31 NOTE — PROGRESS NOTES
"  Subjective     Chief Complaint: "Possible UTI"    History of Present Illness:  Ms. Lilia Hidalgo is a 88 y.o. female with dementia, OA of the hips and knees s/p L knee replacement, HTN, HLD, moderate AS, HFrEF, diverticulosis with hx diverticulitis, PUD, GERD, and overactive bladder/urge incontinence (no longer on myrbetriq) using pads who is presenting to the clinic today for an urgent care visit for "possible UTI". Has had history of recurrent UTIs with multiple different bacteria and resistance patterns. She lives at the [facility]. She has severe dementia and is accompanied by her daughter who supplies most of the history. Her symptoms started 2-3 weeks ago when she noted a rash on her perineum and coccyx, and they have been using a barrier cream. This week she has noted burning in the area of the known rash after urinating but not dysuria per se. Patient states that it burns when she urinates because it irritates the coccygeal and perineal area that is already irritated. They have been using a barrier cream however the patient is stating that the nurses at the [facility] living center don't help her with this and she has to apply it herself. She states that she is urinating hourly into her diaper at minimum. Denies fever, chills, night sweats, abdominal or pelvic pain.      Review of Systems   Constitutional: Negative for chills, fever and weight loss.   HENT: Negative for congestion and sore throat.    Eyes: Negative for blurred vision and double vision.   Respiratory: Negative for cough and shortness of breath.    Cardiovascular: Negative for chest pain, palpitations and leg swelling.   Gastrointestinal: Negative for abdominal pain, constipation, diarrhea, nausea and vomiting.   Genitourinary: Negative for dysuria.   Musculoskeletal: Negative for back pain, joint pain and neck pain.   Skin: Positive for itching and rash.   Neurological: Negative for dizziness, loss of consciousness, weakness and " "headaches.   Psychiatric/Behavioral: Negative for depression. The patient is not nervous/anxious.        PAST HISTORY:     Past Medical History:   Diagnosis Date    Aortic stenosis 6/17/2015    Arthritis     Atrophic vaginitis 2/18/2016    Bilateral edema of lower extremity 6/22/2016    CHF (congestive heart failure)     Cholelithiasis without cholecystitis 5/5/2017    Chronic pain of left knee 8/3/2017    Depressed mood 6/22/2016    Diverticulitis of large intestine without perforation or abscess without bleeding 5/5/2017    Encounter for blood transfusion     Essential hypertension     GERD (gastroesophageal reflux disease)     Hyperlipidemia     Hypertension     Hypertensive heart disease with heart failure 7/14/2015    Insomnia     Joint pain     Knee pain, left 08/2016    pain with walking or standing    Primary osteoarthritis of knee 2/5/2013    PUD (peptic ulcer disease)     S/P knee replacement 2/13/2013    Slow transit constipation 2/18/2016       Past Surgical History:   Procedure Laterality Date    APPENDECTOMY      COLONOSCOPY      08    EYE SURGERY      bilateral cataract    FINGER SURGERY      LT thumb surgery--"arthritis surgery"    HYSTERECTOMY      UNKNOWN BSO    JOINT REPLACEMENT      right hip replacement    KNEE ARTHROPLASTY Left 2001    TONSILLECTOMY      TUMOR EXCISION      esophageal tumor removal     UPPER GASTROINTESTINAL ENDOSCOPY      2013       Family History   Problem Relation Age of Onset    Heart disease Mother     Heart disease Father     Asthma Father     Cancer Brother         breast    Heart disease Brother     Melanoma Neg Hx     Psoriasis Neg Hx     Lupus Neg Hx     Eczema Neg Hx     Acne Neg Hx     Breast cancer Neg Hx     Ovarian cancer Neg Hx     Cervical cancer Neg Hx     Endometrial cancer Neg Hx     Vaginal cancer Neg Hx        Social History     Socioeconomic History    Marital status:      Spouse name: Not on file    " Number of children: Not on file    Years of education: Not on file    Highest education level: Not on file   Occupational History    Not on file   Social Needs    Financial resource strain: Not on file    Food insecurity:     Worry: Not on file     Inability: Not on file    Transportation needs:     Medical: Not on file     Non-medical: Not on file   Tobacco Use    Smoking status: Never Smoker    Smokeless tobacco: Never Used   Substance and Sexual Activity    Alcohol use: No    Drug use: Never     Types: Hydrocodone    Sexual activity: Not Currently     Birth control/protection: None   Lifestyle    Physical activity:     Days per week: Not on file     Minutes per session: Not on file    Stress: Not on file   Relationships    Social connections:     Talks on phone: Not on file     Gets together: Not on file     Attends Hoahaoism service: Not on file     Active member of club or organization: Not on file     Attends meetings of clubs or organizations: Not on file     Relationship status: Not on file   Other Topics Concern    Are you pregnant or think you may be? No    Breast-feeding No   Social History Narrative    Not on file       MEDICATIONS & ALLERGIES:     Current Outpatient Medications on File Prior to Visit   Medication Sig    acetaminophen (TYLENOL) 650 MG TbSR Take 1 tablet (650 mg total) by mouth 2 (two) times daily. Give 1 tablet QAM, 1 QPM.    atorvastatin (LIPITOR) 20 MG tablet TAKE 1 TAB BY MOUTH AT BEDTIME    donepezil (ARICEPT) 5 MG tablet Take 1 tablet (5 mg total) by mouth every evening.    escitalopram oxalate (LEXAPRO) 20 MG tablet TAKE 1 TAB BY MOUTH AT BEDTIME FOR DEPRESSION    fentaNYL (DURAGESIC) 12 mcg/hr PT72 Place 1 patch onto the skin every 72 hours.    furosemide (LASIX) 40 MG tablet TAKE 1 TAB BY MOUTH EVERY DAY    lidocaine-prilocaine (EMLA) cream Apply topically as needed. Apply to painful areas 3 times per day as needed    losartan (COZAAR) 100 MG tablet Take  "1 tablet (100 mg total) by mouth once daily.    melatonin 5 mg Tab Take 1 tablet by mouth every evening.     metoprolol succinate (TOPROL-XL) 100 MG 24 hr tablet Take 2 tablets (200 mg total) by mouth once daily.    metoprolol succinate (TOPROL-XL) 200 MG 24 hr tablet TAKE 1 TAB BY MOUTH EVERY DAY HTN    MULTIVITAMIN W-MINERALS/LUTEIN (CENTRUM SILVER ORAL) Take by mouth once daily.    mupirocin (BACTROBAN) 2 % ointment Apply topically 2 (two) times daily.    nitroGLYCERIN (NITROSTAT) 0.4 MG SL tablet Place 1 tablet (0.4 mg total) under the tongue every 5 (five) minutes as needed for Chest pain (do not take more than 3 tablets in a row).    nystatin (MYCOSTATIN) powder Apply topically 2 (two) times daily. To groin area    omeprazole (PRILOSEC) 20 MG capsule TAKE 2 CAPS (40MG) BY MOUTH ONCE DAILY GERD    potassium chloride SA (K-DUR,KLOR-CON) 10 MEQ tablet TAKE 1 TAB BY MOUTH EVERY DAY    tramadol-acetaminophen 37.5-325 mg (ULTRACET) 37.5-325 mg Tab Take 1 tablet by mouth 3 (three) times daily.     No current facility-administered medications on file prior to visit.        Review of patient's allergies indicates:   Allergen Reactions    Aspirin Nausea Only and Other (See Comments)     Other reaction(s): esophageal pain    Celebrex [celecoxib] Rash    Morphine Hallucinations    Steroid [corticosteroids (glucocorticoids)] Other (See Comments)     Other reaction(s): Flushing (skin)    Sulfa dyne Other (See Comments)     Other reaction(s): esophogeal pain       OBJECTIVE:     Vital Signs:  Vitals:    01/31/20 1512   BP: (!) 152/70   BP Location: Right arm   Patient Position: Sitting   BP Method: Large (Manual)   Pulse: 72   Temp: 97.7 °F (36.5 °C)   SpO2: 96%   Weight: 90.7 kg (200 lb)   Height: 5' 6" (1.676 m)       Body mass index is 32.28 kg/m².     Physical Exam   Constitutional: She is oriented to person, place, and time and well-developed, well-nourished, and in no distress. No distress.   HENT: "   Head: Normocephalic and atraumatic.   Eyes: EOM are normal. Right eye exhibits no discharge. Left eye exhibits no discharge. No scleral icterus.   Neck: Normal range of motion. Neck supple. No JVD present. No tracheal deviation present.   Cardiovascular: Normal rate, regular rhythm, normal heart sounds and intact distal pulses.   No murmur heard.  Pulmonary/Chest: Effort normal and breath sounds normal. No respiratory distress. She has no wheezes. She has no rales.   Abdominal: Soft. Bowel sounds are normal. She exhibits no distension. There is no tenderness.   Musculoskeletal: She exhibits edema (3+ pitting edema in left lower extremity, 2+ in right lower extremity, nonpitting edema in bilat upper extremities) and deformity (bony enlargement of bilat knees L>R).   Neurological: She is alert and oriented to person, place, and time. No cranial nerve deficit. Gait normal.   Skin: She is not diaphoretic.   Unable to examine patient's genital/anal area as she came in wheelchair and cannot be moved   Psychiatric: Mood and affect normal.   Vitals reviewed.      Laboratory  Lab Results   Component Value Date    WBC 7.27 06/05/2019    HGB 12.8 06/05/2019    HCT 40.4 06/05/2019    MCV 89 06/05/2019     06/05/2019     BMP  Lab Results   Component Value Date     06/05/2019    K 3.5 06/05/2019     06/05/2019    CO2 29 06/05/2019    BUN 18 06/05/2019    CREATININE 0.7 06/05/2019    CALCIUM 8.7 06/05/2019    ANIONGAP 8 06/05/2019    ESTGFRAFRICA >60.0 06/05/2019    EGFRNONAA >60.0 06/05/2019     Lab Results   Component Value Date    INR 1.0 05/08/2019    INR 1.0 05/05/2017    INR 1.0 01/07/2017     No results found for: HGBA1C  No results for input(s): POCTGLUCOSE in the last 72 hours.    Diagnostic Results:  Labs: Reviewed    There are no preventive care reminders to display for this patient.      ASSESSMENT & PLAN:   Ms. Lilia Hidalgo is a 88 y.o. female here for an urgent care visit for  dermatitis.    Dermatitis associated with moisture  - Referred patient to [facility] for wound care, faxed this request to them and called, they will be able to come evaluate her next Tuesday or Wednesday. I have written instructions to the NH to turn the patient q2, apply barrier cream BID, and obtain an air mattress for her hospital bed.  -     Ambulatory referral to Home Health        RTC in 3 months with PCP Dr. Herrera.    Discussed with Dr. Patrick  - staff attestation to follow      Scott Bell DO  Internal Medicine PGY1  [facility]  [address] [place] [id number] [phone]

## 2020-01-31 NOTE — PATIENT INSTRUCTIONS
Nonspecific Dermatitis  Dermatitis is a skin rash caused by something that touches the skin and makes it irritated and inflamed.  Your skin may be red, swollen, dry, and may be cracked. Blisters may form and ooze. The rash will itch.  Dermatitis can form on the face and neck, backs of hands, forearms, genitals, and lower legs. Dermatitis is not passed from person to person.  Talk with your health care provider about what may have caused the rash. Common things that cause skin allergies are metal in jewelry, plants like poison ivy or poison oak, and certain skin care products. You will need to avoid the source of your rash in the future to prevent it from coming back. In some cases, the cause of the dermatitis may not be found.  Treatment is done to relieve itching and prevent the rash from coming back. The rash should go away in a few days to a few weeks.  Home care  The health care provider may prescribe medications to relieve swelling and itching. Follow all instructions when using these medications.  · Avoid anything that heats up your skin, such as hot showers or baths, or direct sunlight. This can make itching worse.  · Stay away from whatever you think caused the rash.  · Bathe in warm, not hot, water. Apply a moisturizing lotion after bathing to prevent dry skin.  · Avoid skin irritants such as wool or silk clothing, grease, oils, harsh soaps, and detergents.  · Apply cold compresses to soothe your sores to help relieve your symptoms. Do this for 30 minutes 3 to 4 times a day. You can make a cold compress by soaking a cloth in cold water. Squeeze out excess water. You can add colloidal oatmeal to the water to help reduce itching. For severe itching in a small area, apply an ice pack wrapped in a thin towel. Do this for 20 minutes 3 to 4 times a day.  · You can also help relieve large areas of itching by taking a lukewarm bath with colloidal oatmeal added to the water.  · Use hydrocortisone cream for redness  and irritation, unless another medicine was prescribed. You can also use benzocaine anesthetic cream or spray.  · Use oral diphenhydramine to help reduce itching. This is an antihistamine you can buy at drug and grocery stores. It can make you sleepy, so use lower doses during the daytime. Or you can use loratadine. This is an antihistamine that will not make you sleepy. Dont use diphenhydramine if you have glaucoma or have trouble urinating because of an enlarged prostate.  · Wash your hands or use an antibacterial gel often to prevent the spread of the rash.  Follow-up care  Follow up with your health care provider. Make an appointment with your health care provider if your symptoms do not get better in the next 1 to 2 weeks.  When to seek medical advice  Call your health care provider right away if any of these occur:  · Spreading of the rash to other parts of your body  · Severe swelling of your face, eyelids, mouth, throat or tongue  · Trouble urinating due to swelling in the genital area  · Fever of 100.4°F (38°C) or higher  · Redness or swelling that gets worse  · Pain that gets worse  · Foul-smelling fluid leaking from the skin  · Yellow-brown crusts on the open blisters  · Joint pain   Date Last Reviewed: 7/23/2014  © 9508-5340 [employer]. [address] [place] [address], [place] [address]. All rights reserved. This information is not intended as a substitute for professional medical care. Always follow your healthcare professional's instructions.

## 2020-02-01 ENCOUNTER — PATIENT MESSAGE (OUTPATIENT)
Dept: PHYSICAL MEDICINE AND REHAB | Facility: CLINIC | Age: 85
End: 2020-02-01

## 2020-02-03 ENCOUNTER — PATIENT MESSAGE (OUTPATIENT)
Dept: PHYSICAL MEDICINE AND REHAB | Facility: CLINIC | Age: 85
End: 2020-02-03

## 2020-02-03 ENCOUNTER — PATIENT MESSAGE (OUTPATIENT)
Dept: INTERNAL MEDICINE | Facility: CLINIC | Age: 85
End: 2020-02-03

## 2020-02-04 ENCOUNTER — PATIENT MESSAGE (OUTPATIENT)
Dept: INTERNAL MEDICINE | Facility: CLINIC | Age: 85
End: 2020-02-04

## 2020-02-04 ENCOUNTER — PATIENT MESSAGE (OUTPATIENT)
Dept: PHYSICAL MEDICINE AND REHAB | Facility: CLINIC | Age: 85
End: 2020-02-04

## 2020-02-05 PROCEDURE — G0180 PR HOME HEALTH MD CERTIFICATION: ICD-10-PCS | Mod: ,,, | Performed by: INTERNAL MEDICINE

## 2020-02-05 PROCEDURE — G0180 MD CERTIFICATION HHA PATIENT: HCPCS | Mod: ,,, | Performed by: INTERNAL MEDICINE

## 2020-02-07 ENCOUNTER — TELEPHONE (OUTPATIENT)
Dept: PRIMARY CARE CLINIC | Facility: CLINIC | Age: 85
End: 2020-02-07

## 2020-02-07 NOTE — TELEPHONE ENCOUNTER
Rec'd call from pt's daughter requesting a home visit. She has discussed care w/ and decided to transfer care to . Yue will review the schedule w/PCP and schedule the home visit.

## 2020-02-11 NOTE — TELEPHONE ENCOUNTER
Please add her for a day that has other people in her area.    Let the family know of the clinic details and see if they want an NP in the home.    Thanks,  MORALES

## 2020-02-12 ENCOUNTER — TELEPHONE (OUTPATIENT)
Dept: PRIMARY CARE CLINIC | Facility: CLINIC | Age: 85
End: 2020-02-12

## 2020-02-12 DIAGNOSIS — G93.41 ENCEPHALOPATHY, METABOLIC: Primary | ICD-10-CM

## 2020-02-12 DIAGNOSIS — R60.0 BILATERAL EDEMA OF LOWER EXTREMITY: ICD-10-CM

## 2020-02-12 DIAGNOSIS — F01.50 VASCULAR DEMENTIA WITHOUT BEHAVIORAL DISTURBANCE: ICD-10-CM

## 2020-02-12 NOTE — TELEPHONE ENCOUNTER
Referral for [facility] placed for chronic management of patient in the home. Please let Argelia/Kiera know.    I will see her for home visit on 4/16/20.    Thanks,  KJ

## 2020-02-13 ENCOUNTER — EXTERNAL HOME HEALTH (OUTPATIENT)
Dept: HOME HEALTH SERVICES | Facility: HOSPITAL | Age: 85
End: 2020-02-13

## 2020-02-14 ENCOUNTER — TELEPHONE (OUTPATIENT)
Dept: PRIMARY CARE CLINIC | Facility: CLINIC | Age: 85
End: 2020-02-14

## 2020-02-14 NOTE — TELEPHONE ENCOUNTER
She is scheduled to see Tracie on 2/26. Please email Argelia/Kiera about seeing if someone can go out sooner.    Thanks,  KJ

## 2020-02-20 DIAGNOSIS — M25.552 LEFT HIP PAIN: ICD-10-CM

## 2020-02-20 DIAGNOSIS — M25.562 CHRONIC PAIN OF LEFT KNEE: ICD-10-CM

## 2020-02-20 DIAGNOSIS — G89.29 CHRONIC PAIN OF LEFT KNEE: ICD-10-CM

## 2020-02-20 RX ORDER — FENTANYL 12.5 UG/1
1 PATCH TRANSDERMAL
Qty: 10 PATCH | Refills: 0 | Status: CANCELLED | OUTPATIENT
Start: 2020-02-21 | End: 2020-03-22

## 2020-02-20 NOTE — TELEPHONE ENCOUNTER
Last Rx refill-----01/20/20  Last office visit--09/27/19  Next office visit--06/08/20        ----- Message from Yaron Eric sent at 2/20/2020  3:27 PM CST -----  Rx Refill/Request     Is this a Refill or New Rx: Refill  Rx Name and Strength: fentaNYL (DURAGESIC) 12 mcg/hr PT72   Preferred Pharmacy with phone number: see below  Communication Preference: [phone]  Additional Information:     [facility] - [place] - [address]  [address]  [address]  [place] [address]  Phone: [phone] Fax: [phone]

## 2020-02-21 DIAGNOSIS — M25.562 CHRONIC PAIN OF LEFT KNEE: ICD-10-CM

## 2020-02-21 DIAGNOSIS — G89.29 CHRONIC PAIN OF LEFT KNEE: ICD-10-CM

## 2020-02-21 DIAGNOSIS — M25.552 LEFT HIP PAIN: ICD-10-CM

## 2020-02-21 RX ORDER — FENTANYL 12.5 UG/1
1 PATCH TRANSDERMAL
Qty: 10 PATCH | Refills: 0 | Status: SHIPPED | OUTPATIENT
Start: 2020-02-21 | End: 2020-02-26 | Stop reason: SDUPTHER

## 2020-02-26 ENCOUNTER — CARE AT HOME (OUTPATIENT)
Dept: HOME HEALTH SERVICES | Facility: CLINIC | Age: 85
End: 2020-02-26
Payer: MEDICARE

## 2020-02-26 ENCOUNTER — PATIENT MESSAGE (OUTPATIENT)
Dept: PHYSICAL MEDICINE AND REHAB | Facility: CLINIC | Age: 85
End: 2020-02-26

## 2020-02-26 VITALS
OXYGEN SATURATION: 95 % | SYSTOLIC BLOOD PRESSURE: 132 MMHG | TEMPERATURE: 99 F | RESPIRATION RATE: 18 BRPM | DIASTOLIC BLOOD PRESSURE: 62 MMHG | HEART RATE: 72 BPM

## 2020-02-26 DIAGNOSIS — R60.0 BILATERAL EDEMA OF LOWER EXTREMITY: ICD-10-CM

## 2020-02-26 DIAGNOSIS — M19.90 OSTEOARTHRITIS, UNSPECIFIED OSTEOARTHRITIS TYPE, UNSPECIFIED SITE: ICD-10-CM

## 2020-02-26 DIAGNOSIS — M25.562 CHRONIC PAIN OF LEFT KNEE: ICD-10-CM

## 2020-02-26 DIAGNOSIS — G89.29 CHRONIC PAIN OF LEFT KNEE: ICD-10-CM

## 2020-02-26 DIAGNOSIS — F01.50 VASCULAR DEMENTIA WITHOUT BEHAVIORAL DISTURBANCE: ICD-10-CM

## 2020-02-26 DIAGNOSIS — M25.552 LEFT HIP PAIN: ICD-10-CM

## 2020-02-26 DIAGNOSIS — I10 ESSENTIAL HYPERTENSION, BENIGN: Primary | ICD-10-CM

## 2020-02-26 DIAGNOSIS — R32 URINARY INCONTINENCE, UNSPECIFIED TYPE: ICD-10-CM

## 2020-02-26 PROCEDURE — 99349 PR HOME VISIT,ESTAB PATIENT,LEVEL III: ICD-10-PCS | Mod: S$GLB,,, | Performed by: NURSE PRACTITIONER

## 2020-02-26 PROCEDURE — 1101F PT FALLS ASSESS-DOCD LE1/YR: CPT | Mod: CPTII,S$GLB,, | Performed by: NURSE PRACTITIONER

## 2020-02-26 PROCEDURE — 1125F AMNT PAIN NOTED PAIN PRSNT: CPT | Mod: S$GLB,,, | Performed by: NURSE PRACTITIONER

## 2020-02-26 PROCEDURE — 99349 HOME/RES VST EST MOD MDM 40: CPT | Mod: S$GLB,,, | Performed by: NURSE PRACTITIONER

## 2020-02-26 PROCEDURE — 1159F PR MEDICATION LIST DOCUMENTED IN MEDICAL RECORD: ICD-10-PCS | Mod: S$GLB,,, | Performed by: NURSE PRACTITIONER

## 2020-02-26 PROCEDURE — 1159F MED LIST DOCD IN RCRD: CPT | Mod: S$GLB,,, | Performed by: NURSE PRACTITIONER

## 2020-02-26 PROCEDURE — 1101F PR PT FALLS ASSESS DOC 0-1 FALLS W/OUT INJ PAST YR: ICD-10-PCS | Mod: CPTII,S$GLB,, | Performed by: NURSE PRACTITIONER

## 2020-02-26 PROCEDURE — 1125F PR PAIN SEVERITY QUANTIFIED, PAIN PRESENT: ICD-10-PCS | Mod: S$GLB,,, | Performed by: NURSE PRACTITIONER

## 2020-02-26 RX ORDER — FENTANYL 12.5 UG/1
1 PATCH TRANSDERMAL
Qty: 10 PATCH | Refills: 0 | Status: SHIPPED | OUTPATIENT
Start: 2020-02-26 | End: 2020-03-02 | Stop reason: SDUPTHER

## 2020-02-26 RX ORDER — TRAMADOL HYDROCHLORIDE AND ACETAMINOPHEN 37.5; 325 MG/1; MG/1
TABLET, FILM COATED ORAL
COMMUNITY
Start: 2020-02-08 | End: 2020-03-09 | Stop reason: SDUPTHER

## 2020-02-26 NOTE — TELEPHONE ENCOUNTER
----- Message from Beverly Werner sent at 2/26/2020  8:42 AM CST -----  Contact: Daughter:   Belkys     teL: [phone]    Caller says pt. Has not received the Fentanyl patches yet.        Pls call to discuss this.

## 2020-02-26 NOTE — PROGRESS NOTES
[facility]  Medical Visit    Visit Date: 2/26/2020  Encounter Provider: Tracie Gutierrez NP  PCP:  Anna Wood MD    PRESENTING HISTORY      Patient ID: Lilia Hidalgo is a 88 y.o. female.    Consult Requested By:  Dr. Anna Wood  Reason for Consult:  Establish Care  Lilia is being seen at home due to mobility     Chief Complaint: Establish Care; Hypertension; Dementia; and Urinary Incontinence      History of Present Illness: Ms. Lilia Hidalgo is a 88 y.o. female who was recently seen by her PCP and referred to this service due to difficulty and confusion on leaving her home  ___________________________________________________________________    Today:    HPI:  Pt is being seen today to establish care with home based services. She resides in an assisted living and it is difficulty for her family to transport to the clinic and she also becomes more confused when she lives her residence.  She has a history of dementia, HTN, chronic pain related to arthritis, wheelchair bound.     She is found sitting in her wheelchair in her apartment, awake alert and pleasant. She denies any problems at this time. Reports mild pain to knees but reports it is present at all times, she answers all questions but does have trouble with recall and repeats herself  Home health is following pt for skin irritation to backside.    Review of Systems   Constitutional: Negative.    HENT: Negative.    Eyes: Negative.    Respiratory: Negative.    Cardiovascular: Positive for leg swelling.   Endocrine: Negative.    Genitourinary:        Incontinence   Musculoskeletal: Positive for arthralgias and gait problem.   Skin: Positive for rash.   Neurological: Positive for weakness.   Psychiatric/Behavioral: Positive for confusion.       Assessments:  · Environmental: apartment in assisted living, clean, adequate lighting and temperature  · Functional Status: requires assistance  · Safety: fall risk,  "confusion  · Nutritional: adequate food in home  · Home Health/DME/Supplies: [facility]/wheelchair and walker, life alert button    PAST HISTORY:     Past Medical History:   Diagnosis Date    Aortic stenosis 6/17/2015    Arthritis     Atrophic vaginitis 2/18/2016    Bilateral edema of lower extremity 6/22/2016    CHF (congestive heart failure)     Cholelithiasis without cholecystitis 5/5/2017    Chronic pain of left knee 8/3/2017    Depressed mood 6/22/2016    Diverticulitis of large intestine without perforation or abscess without bleeding 5/5/2017    Encounter for blood transfusion     Essential hypertension     GERD (gastroesophageal reflux disease)     Hyperlipidemia     Hypertension     Hypertensive heart disease with heart failure 7/14/2015    Insomnia     Joint pain     Knee pain, left 08/2016    pain with walking or standing    Primary osteoarthritis of knee 2/5/2013    PUD (peptic ulcer disease)     S/P knee replacement 2/13/2013    Slow transit constipation 2/18/2016       Past Surgical History:   Procedure Laterality Date    APPENDECTOMY      COLONOSCOPY      08    EYE SURGERY      bilateral cataract    FINGER SURGERY      LT thumb surgery--"arthritis surgery"    HYSTERECTOMY      UNKNOWN BSO    JOINT REPLACEMENT      right hip replacement    KNEE ARTHROPLASTY Left 2001    TONSILLECTOMY      TUMOR EXCISION      esophageal tumor removal     UPPER GASTROINTESTINAL ENDOSCOPY      2013       Family History   Problem Relation Age of Onset    Heart disease Mother     Heart disease Father     Asthma Father     Cancer Brother         breast    Heart disease Brother     Melanoma Neg Hx     Psoriasis Neg Hx     Lupus Neg Hx     Eczema Neg Hx     Acne Neg Hx     Breast cancer Neg Hx     Ovarian cancer Neg Hx     Cervical cancer Neg Hx     Endometrial cancer Neg Hx     Vaginal cancer Neg Hx        Social History     Socioeconomic History    Marital status:      Spouse " name: Not on file    Number of children: Not on file    Years of education: Not on file    Highest education level: Not on file   Occupational History    Not on file   Social Needs    Financial resource strain: Not on file    Food insecurity:     Worry: Not on file     Inability: Not on file    Transportation needs:     Medical: Not on file     Non-medical: Not on file   Tobacco Use    Smoking status: Never Smoker    Smokeless tobacco: Never Used   Substance and Sexual Activity    Alcohol use: No    Drug use: Never     Types: Hydrocodone    Sexual activity: Not Currently     Birth control/protection: None   Lifestyle    Physical activity:     Days per week: Not on file     Minutes per session: Not on file    Stress: Not on file   Relationships    Social connections:     Talks on phone: Not on file     Gets together: Not on file     Attends [employer] service: Not on file     Active member of club or organization: Not on file     Attends meetings of clubs or organizations: Not on file     Relationship status: Not on file   Other Topics Concern    Are you pregnant or think you may be? No    Breast-feeding No   Social History Narrative    Not on file       MEDICATIONS & ALLERGIES:     Current Outpatient Medications on File Prior to Visit   Medication Sig Dispense Refill    acetaminophen (TYLENOL) 650 MG TbSR Take 1 tablet (650 mg total) by mouth 2 (two) times daily. Give 1 tablet QAM, 1 QPM. 100 tablet prn    atorvastatin (LIPITOR) 20 MG tablet TAKE 1 TAB BY MOUTH AT BEDTIME 30 tablet 11    donepezil (ARICEPT) 5 MG tablet Take 1 tablet (5 mg total) by mouth every evening. 90 tablet 3    escitalopram oxalate (LEXAPRO) 20 MG tablet TAKE 1 TAB BY MOUTH AT BEDTIME FOR DEPRESSION 30 tablet 11    fentaNYL (DURAGESIC) 12 mcg/hr PT72 Place 1 patch onto the skin every 72 hours. 10 patch 0    furosemide (LASIX) 40 MG tablet TAKE 1 TAB BY MOUTH EVERY DAY 30 tablet 11    lidocaine-prilocaine (EMLA) cream  Apply topically as needed. Apply to painful areas 3 times per day as needed 30 g 3    losartan (COZAAR) 100 MG tablet Take 1 tablet (100 mg total) by mouth once daily. 30 tablet 3    melatonin 5 mg Tab Take 1 tablet by mouth every evening.       metoprolol succinate (TOPROL-XL) 100 MG 24 hr tablet Take 2 tablets (200 mg total) by mouth once daily. 30 tablet 11    metoprolol succinate (TOPROL-XL) 200 MG 24 hr tablet TAKE 1 TAB BY MOUTH EVERY DAY HTN 30 tablet 5    MULTIVITAMIN W-MINERALS/LUTEIN (CENTRUM SILVER ORAL) Take by mouth once daily.      mupirocin (BACTROBAN) 2 % ointment Apply topically 2 (two) times daily. 30 g 0    nitroGLYCERIN (NITROSTAT) 0.4 MG SL tablet Place 1 tablet (0.4 mg total) under the tongue every 5 (five) minutes as needed for Chest pain (do not take more than 3 tablets in a row). 30 tablet 1    nystatin (MYCOSTATIN) powder Apply topically 2 (two) times daily. To groin area 60 g 5    omeprazole (PRILOSEC) 20 MG capsule TAKE 2 CAPS (40MG) BY MOUTH ONCE DAILY GERD 60 capsule 11    potassium chloride SA (K-DUR,KLOR-CON) 10 MEQ tablet TAKE 1 TAB BY MOUTH EVERY DAY 30 tablet 11    tramadol-acetaminophen 37.5-325 mg (ULTRACET) 37.5-325 mg Tab        No current facility-administered medications on file prior to visit.         Review of patient's allergies indicates:   Allergen Reactions    Aspirin Nausea Only and Other (See Comments)     Other reaction(s): esophageal pain    Celebrex [celecoxib] Rash    Morphine Hallucinations    Steroid [corticosteroids (glucocorticoids)] Other (See Comments)     Other reaction(s): Flushing (skin)    Sulfa dyne Other (See Comments)     Other reaction(s): esophogeal pain       OBJECTIVE:     Vital Signs:  Vitals:    02/26/20 1525   BP: 132/62   Pulse: 72   Resp: 18   Temp: 98.7 °F (37.1 °C)     There is no height or weight on file to calculate BMI.     Physical Exam:  Physical Exam   Constitutional: She appears well-developed and well-nourished. No  distress.   HENT:   Head: Normocephalic and atraumatic.   Eyes: Pupils are equal, round, and reactive to light. EOM are normal.   Neck: Normal range of motion. Neck supple.   Cardiovascular: Normal rate, regular rhythm and normal heart sounds.   Pulmonary/Chest: Effort normal and breath sounds normal.   Abdominal: Soft. Bowel sounds are normal.   Musculoskeletal: Normal range of motion. She exhibits edema (2+ to bilateral legs).   Neurological: She is alert. No cranial nerve deficit.   Skin: Skin is warm and dry.   Excoriation to buttocks   Psychiatric: She has a normal mood and affect. Her behavior is normal.   Vitals reviewed.      Laboratory  Lab Results   Component Value Date    WBC 7.27 06/05/2019    HGB 12.8 06/05/2019    HCT 40.4 06/05/2019    MCV 89 06/05/2019     06/05/2019     Lab Results   Component Value Date    INR 1.0 05/08/2019    INR 1.0 05/05/2017    INR 1.0 01/07/2017     No results found for: HGBA1C  No results for input(s): POCTGLUCOSE in the last 72 hours.    Diagnostic Results:        ASSESSMENT & PLAN:       Lilia was seen today for establish care, hypertension, dementia and urinary incontinence.    Diagnoses and all orders for this visit:    Essential hypertension, benign  Stable, reading WNL today  Continue current plan of care    Vascular dementia without behavioral disturbance  -     Ambulatory referral/consult to [facility]  -     Ambulatory referral/consult to [facility]; Future  Short term memory impaired  Continue Aricept  Continue to monitor    Bilateral edema of lower extremity  -     Ambulatory referral/consult to [facility]  -     Ambulatory referral/cons[facility] to [facility]iative; Future  Chronic, related to dependent nature of sitting in wheelchair all day  Continue Lasix, elevate legs    Osteoarthritis, unspecified osteoarthritis type, unspecified  site  Chronic followed by pain management    Urinary incontinence, unspecified type  Chronic related to dementia   Pt is wearing diapers, pt does not seem aware when she has incontinent episodes  Skin is irritated due to urine  Change frequently, and use a barrier cream  Pt may require more care to assist with incontinence        Were controlled substances prescribed?  No      Scheduled Follow-up :  Future Appointments   Date Time Provider Department Center   4/16/2020  1:00 PM Anna Wood MD [facility] MED [facility]   6/8/2020  2:00 PM Girish Ugalde MD [facility] PHYSMED [facility]       After Visit Medication List :     Medication List           Accurate as of February 26, 2020  3:45 PM. If you have any questions, ask your nurse or doctor.               CONTINUE taking these medications    acetaminophen 650 MG Tbsr  Commonly known as:  TYLENOL  Take 1 tablet (650 mg total) by mouth 2 (two) times daily. Give 1 tablet QAM, 1 QPM.     atorvastatin 20 MG tablet  Commonly known as:  LIPITOR  TAKE 1 TAB BY MOUTH AT BEDTIME     CENTRUM SILVER ORAL     donepezil 5 MG tablet  Commonly known as:  ARICEPT  Take 1 tablet (5 mg total) by mouth every evening.     escitalopram oxalate 20 MG tablet  Commonly known as:  LEXAPRO  TAKE 1 TAB BY MOUTH AT BEDTIME FOR DEPRESSION     fentaNYL 12 mcg/hr Pt72  Commonly known as:  DURAGESIC  Place 1 patch onto the skin every 72 hours.     furosemide 40 MG tablet  Commonly known as:  LASIX  TAKE 1 TAB BY MOUTH EVERY DAY     lidocaine-prilocaine cream  Commonly known as:  EMLA  Apply topically as needed. Apply to painful areas 3 times per day as needed     losartan 100 MG tablet  Commonly known as:  COZAAR  Take 1 tablet (100 mg total) by mouth once daily.     melatonin  Commonly known as:  MELATIN     * metoprolol succinate 100 MG 24 hr tablet  Commonly known as:  TOPROL-XL  Take 2 tablets (200 mg total) by mouth once daily.     * metoprolol succinate 200 MG 24 hr tablet  Commonly  known as:  TOPROL-XL  TAKE 1 TAB BY MOUTH EVERY DAY HTN     mupirocin 2 % ointment  Commonly known as:  BACTROBAN  Apply topically 2 (two) times daily.     nitroGLYCERIN 0.4 MG SL tablet  Commonly known as:  NITROSTAT  Place 1 tablet (0.4 mg total) under the tongue every 5 (five) minutes as needed for Chest pain (do not take more than 3 tablets in a row).     nystatin powder  Commonly known as:  MYCOSTATIN  Apply topically 2 (two) times daily. To groin area     omeprazole 20 MG capsule  Commonly known as:  PRILOSEC  TAKE 2 CAPS (40MG) BY MOUTH ONCE DAILY GERD     potassium chloride SA 10 MEQ tablet  Commonly known as:  K-DUR,KLOR-CON  TAKE 1 TAB BY MOUTH EVERY DAY     tramadol-acetaminophen 37.5-325 mg 37.5-325 mg Tab  Commonly known as:  ULTRACET         * This list has 2 medication(s) that are the same as other medications prescribed for you. Read the directions carefully, and ask your doctor or other care provider to review them with you.                Signature:  Tracie Gutierrez NP    Patient consent obtained prior to treatment

## 2020-02-27 ENCOUNTER — PATIENT MESSAGE (OUTPATIENT)
Dept: PHYSICAL MEDICINE AND REHAB | Facility: CLINIC | Age: 85
End: 2020-02-27

## 2020-03-02 DIAGNOSIS — M25.552 LEFT HIP PAIN: ICD-10-CM

## 2020-03-02 DIAGNOSIS — G89.29 CHRONIC PAIN OF LEFT KNEE: ICD-10-CM

## 2020-03-02 DIAGNOSIS — M25.562 CHRONIC PAIN OF LEFT KNEE: ICD-10-CM

## 2020-03-02 RX ORDER — FENTANYL 12.5 UG/1
1 PATCH TRANSDERMAL
Qty: 10 PATCH | Refills: 0 | Status: SHIPPED | OUTPATIENT
Start: 2020-03-02 | End: 2020-03-31 | Stop reason: SDUPTHER

## 2020-03-02 NOTE — TELEPHONE ENCOUNTER
Called and spoke with patient daughter Belkys.  Patient did not received her Rx refill Duragesic.  Please resend to [facility].    Called [facility] and spoke with Carmel.  All medication was received except the Duragesic patch.      ----- Message from Ifrah Galvez sent at 3/2/2020 12:36 PM CST -----  Contact: pt Daughter Belkys   Reason; Pt says the script for fentaNYL (DURAGESIC) 12 mcg/hr PT72 was supposed to be sent to the pharmacy but it is not. Pt is all out please send FYI  looks like it was printed   instead Thanks Please call when done 3rd attempt    Communication: [phone]

## 2020-03-06 ENCOUNTER — PES CALL (OUTPATIENT)
Dept: ADMINISTRATIVE | Facility: CLINIC | Age: 85
End: 2020-03-06

## 2020-03-09 ENCOUNTER — PATIENT MESSAGE (OUTPATIENT)
Dept: PHYSICAL MEDICINE AND REHAB | Facility: CLINIC | Age: 85
End: 2020-03-09

## 2020-03-09 RX ORDER — TRAMADOL HYDROCHLORIDE AND ACETAMINOPHEN 37.5; 325 MG/1; MG/1
1 TABLET, FILM COATED ORAL 3 TIMES DAILY PRN
Qty: 90 TABLET | Refills: 2 | Status: SHIPPED | OUTPATIENT
Start: 2020-03-09 | End: 2020-06-12 | Stop reason: SDUPTHER

## 2020-03-09 RX ORDER — POTASSIUM CHLORIDE 750 MG/1
TABLET, EXTENDED RELEASE ORAL
Qty: 30 TABLET | Refills: 11 | OUTPATIENT
Start: 2020-03-09

## 2020-03-09 NOTE — TELEPHONE ENCOUNTER
Last Rx refill-----02/08/20  Last office visit--09/27/19  Next office visit--06/08/20        ----- Message from Yaron Eric sent at 3/9/2020  9:51 AM CDT -----  Rx Refill/Request     Is this a Refill or New Rx: Refill   Rx Name and Strength: tramadol-acetaminophen 37.5-325 mg (ULTRACET) 37.5-325 mg Tab   Preferred Pharmacy with phone number: see below  Communication Preference: [phone]  Additional Information: states pt will be out [facility] [address]  [address]  [address]  [place] [address]  Phone: [phone] Fax: [phone]

## 2020-03-17 ENCOUNTER — EXTERNAL HOME HEALTH (OUTPATIENT)
Dept: HOME HEALTH SERVICES | Facility: HOSPITAL | Age: 85
End: 2020-03-17
Payer: MEDICARE

## 2020-03-24 ENCOUNTER — TELEPHONE (OUTPATIENT)
Dept: INTERNAL MEDICINE | Facility: CLINIC | Age: 85
End: 2020-03-24

## 2020-03-24 NOTE — TELEPHONE ENCOUNTER
----- Message from Mary Jane Coleman sent at 3/24/2020  2:08 PM CDT -----  Contact: medical student  HOME VISIT ALREADY RESCHEDULED TO July. PATIENT DOING WELL IN ASSISTED LIVING FACILITY. NOTHING NEEDED AT THIS TIME.    Talked to patient directly, family member of patient (daughter) on phone.     Pill packs/medications: Pt has a 2-3 month supply of medications (pt lives in [facility] assisted living, medications are managed by nursing staff there)  Upcoming appointments: Pt would like to home visit (already rescheduled to July)  Adv Dir status: Advance Directive on file  [facility] status: Pt has active [facility] account  Telemedicine: Patient unable to setup telemedicine    Pt is coping well with isolation precautions.  Pt has has access to food and housing; is able to access transportation if needed.    Discussed medication compliance and chronic conditions with pertinent points.    -------------------------------------------------------------------------------------------------------------------------  Assessed for symptoms of covid.  Discussed symptoms to look out for and to call clinic FIRST with any concerns or if pt develops symptoms: [facility] to [facility] [phone] or [phone], or COVID HOTLINE [phone].    Advised to avoid going to ED for testing.  Counseled on staying at home, washing hands frequently, avoiding touching face and covering cough or sneeze.     Resources for patients: https://ready.[place].gov/incident/coronavirus/resources/    [facility] help: [phone]

## 2020-03-31 ENCOUNTER — TELEPHONE (OUTPATIENT)
Dept: PRIMARY CARE CLINIC | Facility: CLINIC | Age: 85
End: 2020-03-31

## 2020-03-31 DIAGNOSIS — G89.29 CHRONIC PAIN OF LEFT KNEE: ICD-10-CM

## 2020-03-31 DIAGNOSIS — M25.562 CHRONIC PAIN OF LEFT KNEE: ICD-10-CM

## 2020-03-31 DIAGNOSIS — M25.552 LEFT HIP PAIN: ICD-10-CM

## 2020-03-31 RX ORDER — FENTANYL 12.5 UG/1
1 PATCH TRANSDERMAL
Qty: 10 PATCH | Refills: 0 | Status: SHIPPED | OUTPATIENT
Start: 2020-04-01 | End: 2020-04-28 | Stop reason: SDUPTHER

## 2020-03-31 NOTE — TELEPHONE ENCOUNTER
Spoke w/ pt daughter she stated that pt is doing just fine, she is getting all of her meds, she is eating well, getting dressed and have a lot of hands on care she stated that pt is in [place] at the moment therefore she cannot visit her or anything at this time.

## 2020-03-31 NOTE — TELEPHONE ENCOUNTER
Last Rx refill-----03/02/20  Last office visit--09/27/19  Next office visit-- 06/08/20        ----- Message from Meghan Isaac sent at 3/31/2020  9:29 AM CDT -----  Contact: Belkys (daughter) @ [phone]  Pt is req a refill for fentaNYL (DURAGESIC) 12 mcg/hr PT72.     [facility] [address] [phone] (Phone)  [phone] (Fax)

## 2020-04-07 DIAGNOSIS — I10 ESSENTIAL HYPERTENSION: ICD-10-CM

## 2020-04-08 ENCOUNTER — OFFICE VISIT (OUTPATIENT)
Dept: PRIMARY CARE CLINIC | Facility: CLINIC | Age: 85
End: 2020-04-08
Payer: MEDICARE

## 2020-04-08 DIAGNOSIS — I11.0 HYPERTENSIVE HEART DISEASE WITH HEART FAILURE: ICD-10-CM

## 2020-04-08 DIAGNOSIS — F33.9 MAJOR DEPRESSION, RECURRENT, CHRONIC: ICD-10-CM

## 2020-04-08 DIAGNOSIS — M46.99 UNSPECIFIED INFLAMMATORY SPONDYLOPATHY, MULTIPLE SITES IN SPINE: ICD-10-CM

## 2020-04-08 DIAGNOSIS — E11.00 TYPE 2 DIABETES MELLITUS WITH HYPEROSMOLARITY WITHOUT COMA, WITHOUT LONG-TERM CURRENT USE OF INSULIN: ICD-10-CM

## 2020-04-08 DIAGNOSIS — I70.0 ATHEROSCLEROSIS OF AORTA: ICD-10-CM

## 2020-04-08 DIAGNOSIS — F01.50 VASCULAR DEMENTIA WITHOUT BEHAVIORAL DISTURBANCE: ICD-10-CM

## 2020-04-08 PROCEDURE — 99443 PR PHYSICIAN TELEPHONE EVALUATION 21-30 MIN: CPT | Mod: HCNC,95,, | Performed by: INTERNAL MEDICINE

## 2020-04-08 PROCEDURE — 99443 PR PHYSICIAN TELEPHONE EVALUATION 21-30 MIN: ICD-10-PCS | Mod: HCNC,95,, | Performed by: INTERNAL MEDICINE

## 2020-04-08 RX ORDER — LOSARTAN POTASSIUM 100 MG/1
TABLET ORAL
Qty: 90 TABLET | Refills: 3 | Status: SHIPPED | OUTPATIENT
Start: 2020-04-08 | End: 2021-04-05

## 2020-04-08 NOTE — PROGRESS NOTES
[facility] Provider Telephone Check-In Call During COVID Crisis      The patient location is:  Patient's Daughter's Home (Patient lives in [facility])  The chief complaint leading to consultation is: routine care, MDD, chronic pain  Total time spent with patient's caretaker and PoA (patient has dementia): 30m    Visit type: Telephone call with synchronous audio only     This visit was completed via telephone due to the restrictions of the COVID-19 pandemic. All issues as below were discussed and addressed but no physical exam was performed. If it was felt that the patient should be evaluated in clinic then they were directed there. The patient verbally consented to visit.      Subjective:      Patient ID: Lilia Hidalgo is a 88 y.o. female with dementia, CHF, HTN, MDD    Patient's risk score is 26%. Patient is high risk for poor outcomes with COVID due to the following chronic conditions advanced age, dementia, CHF, HTN, MDD    Patient is unable to participate in a virtual visit due to numerous barriers to care including low health literacy, dementia, living in an assisted living facility.    Patient is on numerous narcotics for pain control. She has low functional status. Family is requesting to get her chronic narcotics from PCP given her numerous barriers to care.    Objective:     Lab Results   Component Value Date    WBC 7.27 06/05/2019    HGB 12.8 06/05/2019    HCT 40.4 06/05/2019     06/05/2019    CHOL 170 04/16/2019    TRIG 317 (H) 04/16/2019    HDL 48 04/16/2019    ALT 8 (L) 06/05/2019    AST 13 06/05/2019     06/05/2019    K 3.5 06/05/2019     06/05/2019    CREATININE 0.7 06/05/2019    BUN 18 06/05/2019    CO2 29 06/05/2019    TSH 1.405 05/27/2019    INR 1.0 05/08/2019         Assessment:   88 y.o. female with multiple co-morbid illnesses here to establish care and continue work-up of chronic issues.     Plan:     Problem List Items Addressed This Visit        Neuro    Dementia     Resides in  assisted living, gets assistance with all ADLs  · Continue assisted living with medication assistance            Psychiatric    Major depression, recurrent, chronic     Controlled on lexapro  · Continue SSRI            Cardiac/Vascular    Hypertensive heart disease with heart failure     Indeterminate left ventricular diastolic function on echo on 5/2019  · monitor         Atherosclerosis of aorta     Noted on imaging 1/27/2013.  · Continue statin            Endocrine    DM hyperosmolarity type II     No recent A1cs, but obese with poor functional status  · Advised healthy eating habits            Orthopedic    Unspecified inflammatory spondylopathy, 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     Chronic pain, uncontrolled on narcotics  · Continue narcotics  · Advised realistic goals of care               Health Maintenance       Date Due Completion Date    Complete Opioid Risk Tool 06/19/1949 ---    Shingles Vaccine (1 of 2) 06/19/1981 ---    Lipid Panel 04/16/2024 4/16/2019    TETANUS VACCINE 12/12/2028 12/12/2018 (Done)    Override on 12/12/2018: Done          Follow up in about 3 months (around 7/8/2020), or if symptoms worsen or fail to improve. Thirty minutes spent with this patient today, including addressing advanced care planning.    Anna Wood MD/MPH  Internal Medicine  [facility]  [phone]

## 2020-04-08 NOTE — ASSESSMENT & PLAN NOTE
Resides in assisted living, gets assistance with all ADLs  · Continue assisted living with medication assistance

## 2020-04-23 DIAGNOSIS — I10 ESSENTIAL HYPERTENSION: Primary | ICD-10-CM

## 2020-04-23 RX ORDER — METOPROLOL SUCCINATE 200 MG/1
TABLET, EXTENDED RELEASE ORAL
Qty: 90 TABLET | Refills: 3 | OUTPATIENT
Start: 2020-04-23

## 2020-04-23 NOTE — TELEPHONE ENCOUNTER
Please clarify with patient's daughter regarding which dose of metoprolol she is taking she has been prescribed both 100 mg and 200 mg but should only taking 1 of these types of tablets.    You can also call [facility] to see if they know what she is taking.    Thanks,  MORALES

## 2020-04-28 DIAGNOSIS — M25.562 CHRONIC PAIN OF LEFT KNEE: ICD-10-CM

## 2020-04-28 DIAGNOSIS — G89.29 CHRONIC PAIN OF LEFT KNEE: ICD-10-CM

## 2020-04-28 DIAGNOSIS — M25.552 LEFT HIP PAIN: ICD-10-CM

## 2020-04-28 RX ORDER — FENTANYL 12.5 UG/1
1 PATCH TRANSDERMAL
Qty: 10 PATCH | Refills: 0 | Status: SHIPPED | OUTPATIENT
Start: 2020-05-01 | End: 2020-06-01 | Stop reason: SDUPTHER

## 2020-04-28 NOTE — TELEPHONE ENCOUNTER
Last Rx refill-----03/31/20  Last office visit--09/27/19  Next office visit--06/08/20        ----- Message from Marycarmen Smith sent at 4/28/2020  3:39 PM CDT -----  Contact: pts tracey Rizvi   Rx Refill/Request     Is this a Refill or New Rx: Refill    Rx Name and Strength:  fentaNYL (DURAGESIC) 12 mcg/hr PT72  Preferred Pharmacy with phone number: [facility]  Pharmacy   Communication Preference: can you please call Belkys at [phone]  Additional Information:  None    CB

## 2020-04-29 ENCOUNTER — PATIENT MESSAGE (OUTPATIENT)
Dept: PHYSICAL MEDICINE AND REHAB | Facility: CLINIC | Age: 85
End: 2020-04-29

## 2020-05-05 DIAGNOSIS — I10 BENIGN ESSENTIAL HYPERTENSION: ICD-10-CM

## 2020-05-05 NOTE — TELEPHONE ENCOUNTER
----- Message from Alicia Cavanaugh sent at 5/5/2020  4:00 PM CDT -----  Contact: self    Requesting an RX refill or new RX.  Is this a refill or new RX:  Refill   RX name and strength: metoprolol succinate (TOPROL-XL) 200 MG 24 hr tablet  Directions (copy/paste from chart):    Is this a 30 day or 90 day RX:    Local pharmacy or mail order pharmacy:  [facility] [address] [phone] (Phone)  [phone] (Fax)  Pharmacy name and phone # (copy/paste from chart):     Comments:

## 2020-05-07 RX ORDER — METOPROLOL SUCCINATE 100 MG/1
200 TABLET, EXTENDED RELEASE ORAL DAILY
Qty: 90 TABLET | Refills: 3 | OUTPATIENT
Start: 2020-05-07

## 2020-05-08 NOTE — PT/OT/SLP PROGRESS
"Speech Language Pathology  Treatment    Lilia Hidalgo   MRN: [id number]   Admitting Diagnosis: Debility    Diet recommendations: Solid Diet Level: Regular  Liquid Diet Level: Thin HOB to 90 degrees and Small bites/sips    SLP Treatment Date: 05/24/17  Speech Start Time: 1353     Speech Stop Time: 1426     Speech Total (min): 33 min       TREATMENT BILLABLE MINUTES:  Speech Therapy Individual 33    Has the patient been evaluated by SLP for swallowing? : Yes  Keep patient NPO?: No   General Precautions: Standard, aspiration, fall          Subjective:  "It's not excruciating pain where I'm screaming." pt referring to knee pain which is made worse when walking.                         Objective:      Pt answered critical thinking q's regarding a Rx label with 71% acc. Ind'ly/100% given cues.  Q's referring to TV listings were answered with 80% acc. Ind'ly/90% given cues. Increased response time also required to allow pt time to locate information.  Pt was able to explain meanings of familiar expressions/idioms with 100% acc.      Assessment:  Lilia Hidalgo is a 85 y.o. female with a medical diagnosis of Debility and presents with cognitive-linguistic deficits.    Diet recommendations:   Solid Diet Level: Regular    Liquid Diet Level: Thin        Discharge recommendations: Discharge Facility/Level Of Care Needs: home health speech therapy     Goals:    SLP Goals        Problem: SLP Goal    Goal Priority Disciplines Outcome   SLP Goal     SLP Ongoing (interventions implemented as appropriate)   Description:  Speech Language Pathology Goals  Goals expected to be met by 5/16:  1. Pt will tolerate a regular consistency diet and thin liquids without s/s of aspiration. Ongoing  2. Pt will participate in speech/language/cognitive evaluation to determine need for further intervention.- goal met 5/10  ADDITIONAL GOALS:  2. Pt will orient x 4 given min-mod A. Goal met.  3. Pt will complete immediate memory tasks with 70% accuracy " given min A. Goal met.  4. Following a delay, pt will recall 3 words/facts given mod A. Goal met on 2/4 trials.  5. Pt will follow complex commands with 70% accuracy. Ongoing  6. Pt will complete moderate level problem solving tasks with 80% accuracy given mod A. Goal met.  7. Pt will list an average of 10 items in a category within one minute. Ongoing  8. Further evaluation of reading, writing, visual spatial, and math/money management abilities. Reading, writing, and visual spatial abilities appear to be WFL. Math/money management abilities not yet assessed.    Updated goals expected to be met by 5/23:   1. Pt will tolerate a regular consistency diet and thin liquids without s/s of aspiration. ongoing  2. Pt will orient x 4. Goal met  3. Pt will complete immediate memory tasks with 80% accuracy given min A. Goal not met provided only min A on average.  4. Following a delay, pt will consistently recall 3 words/facts given mod A. Goal met.  5. Pt will follow complex commands with 70% accuracy. Goal met.  6. Pt will complete moderate level problem solving tasks with 80% accuracy given min A. Goal met.   7. Pt will list an average of 10 items in a category within one minute. Goal met.  8. Further evaluation of math/money management abilities.  Goal met.    Updated goals expected to be met by 5/30:  1. Pt will tolerate a regular consistency diet and thin liquids without s/s of aspiration.  2. Pt will complete immediate memory tasks with 80% accuracy given mod A.  3. Following a delay, pt will consistently recall 3 words/facts given min A.  4. Pt will complete moderate level problem solving tasks with 80% accuracy.  5. Pt will list an average of 12 items in a category within one minute.                                          Plan:   Patient to be seen Therapy Frequency: 5 x/week   Plan of Care expires: 06/08/17  Plan of Care reviewed with: patient  SLP Follow-up?: Yes  SLP - Next Visit Date: 05/22/17           [place] HERRERA  MATEUS Holt, CCC-SLP  05/24/2017     MATEUS Mejia, CCC-SLP  Speech Language Pathologist  [phone]  5/24/2017     Statement Selected

## 2020-05-26 ENCOUNTER — CARE AT HOME (OUTPATIENT)
Dept: HOME HEALTH SERVICES | Facility: CLINIC | Age: 85
End: 2020-05-26
Payer: MEDICARE

## 2020-05-26 VITALS
DIASTOLIC BLOOD PRESSURE: 70 MMHG | HEART RATE: 70 BPM | RESPIRATION RATE: 16 BRPM | TEMPERATURE: 98 F | OXYGEN SATURATION: 92 % | SYSTOLIC BLOOD PRESSURE: 124 MMHG

## 2020-05-26 DIAGNOSIS — I10 ESSENTIAL HYPERTENSION: ICD-10-CM

## 2020-05-26 DIAGNOSIS — F01.50 VASCULAR DEMENTIA WITHOUT BEHAVIORAL DISTURBANCE: ICD-10-CM

## 2020-05-26 DIAGNOSIS — E78.5 HYPERLIPIDEMIA, UNSPECIFIED HYPERLIPIDEMIA TYPE: ICD-10-CM

## 2020-05-26 DIAGNOSIS — F33.9 MAJOR DEPRESSION, RECURRENT, CHRONIC: Primary | ICD-10-CM

## 2020-05-26 PROCEDURE — 1101F PT FALLS ASSESS-DOCD LE1/YR: CPT | Mod: CPTII,S$GLB,, | Performed by: NURSE PRACTITIONER

## 2020-05-26 PROCEDURE — 1159F MED LIST DOCD IN RCRD: CPT | Mod: S$GLB,,, | Performed by: NURSE PRACTITIONER

## 2020-05-26 PROCEDURE — 99350 HOME/RES VST EST HIGH MDM 60: CPT | Mod: S$GLB,,, | Performed by: NURSE PRACTITIONER

## 2020-05-26 PROCEDURE — 1126F AMNT PAIN NOTED NONE PRSNT: CPT | Mod: S$GLB,,, | Performed by: NURSE PRACTITIONER

## 2020-05-26 PROCEDURE — 1126F PR PAIN SEVERITY QUANTIFIED, NO PAIN PRESENT: ICD-10-PCS | Mod: S$GLB,,, | Performed by: NURSE PRACTITIONER

## 2020-05-26 PROCEDURE — 1101F PR PT FALLS ASSESS DOC 0-1 FALLS W/OUT INJ PAST YR: ICD-10-PCS | Mod: CPTII,S$GLB,, | Performed by: NURSE PRACTITIONER

## 2020-05-26 PROCEDURE — 99350 PR HOME VISIT,ESTAB PATIENT,LEVEL IV: ICD-10-PCS | Mod: S$GLB,,, | Performed by: NURSE PRACTITIONER

## 2020-05-26 PROCEDURE — 1159F PR MEDICATION LIST DOCUMENTED IN MEDICAL RECORD: ICD-10-PCS | Mod: S$GLB,,, | Performed by: NURSE PRACTITIONER

## 2020-05-26 NOTE — PROGRESS NOTES
[facility]  Medical [facility] Visit    Visit Date: 5/26/2020  Encounter Provider: Tracie Gutierrez NP  PCP:  Anna Wood MD    Subjective:      Patient ID: Lilia Hidalgo is a 88 y.o. female.    Consult Requested By:  Tracie Gutierrez  Reason for Consult:  Routine Follow up    Lilia is being seen at home due to mobility      Chief Complaint: Dementia; Depression; Hyperlipidemia; and Hypertension    Pt is an 89y/o female being seen today for routine follow up. She has a history of HTN, HLD, Depression, Incontinent, dementia, impaired gait. She resides in an assisted living facility. Her daughter was available by phone. She is found sitting in her apartment. She is awake and alert, confused over date. Does discuss COVID and denies any sickness in her facility since this began. Reports she is doing well. Does have some pain in her hip and legs which is chronic and not worsening. She cannot review her medications as the nurse from the facility brings her medications    Review of Systems   Constitutional: Negative for activity change, fatigue and fever.   HENT: Negative.    Eyes: Negative.    Respiratory: Negative for chest tightness.    Cardiovascular: Negative.  Negative for leg swelling.   Gastrointestinal: Negative.    Endocrine: Negative.    Genitourinary: Negative.         Incontinent   Musculoskeletal: Positive for gait problem.   Skin: Negative.    Allergic/Immunologic: Negative.    Hematological: Negative.    Psychiatric/Behavioral: Positive for confusion. Negative for agitation.   All other systems reviewed and are negative.      Assessments:  · Environmental: lives in an assisted living  · Functional Status: requires some assistance  · Safety: fall risk  · Nutritional: adequate food in home  · Home Health/DME/Supplies: walker, wheelchair    Objective:   Physical Exam   Constitutional: She appears well-developed and well-nourished. No distress.   HENT:   Head: Normocephalic and atraumatic.   Eyes:  Pupils are equal, round, and reactive to light. EOM are normal.   Neck: Normal range of motion. Neck supple.   Cardiovascular: Normal rate, regular rhythm and normal heart sounds.   Pulmonary/Chest: Effort normal and breath sounds normal.   Abdominal: Soft. Bowel sounds are normal.   Musculoskeletal: Normal range of motion. She exhibits no edema.   Neurological: She is alert. No cranial nerve deficit.   Skin: Skin is warm and dry.   Psychiatric: She has a normal mood and affect. Her behavior is normal.   Vitals reviewed.      Vitals:    05/26/20 1130   BP: 124/70   Pulse: 70   Resp: 16   Temp: 97.8 °F (36.6 °C)   SpO2: (!) 92%   PainSc: 0-No pain     There is no height or weight on file to calculate BMI.    Assessment:   Lilia was seen today for dementia, depression, hyperlipidemia and hypertension.    Diagnoses and all orders for this visit:    Major depression, recurrent, chronic    Vascular dementia without behavioral disturbance  -     Ambulatory referral/consult to [facility]    Essential hypertension    Hyperlipidemia, unspecified hyperlipidemia type        Plan:            Lilia was seen today for dementia, depression, hyperlipidemia and hypertension.    Diagnoses and all orders for this visit:    Major depression, recurrent, chronic  Stable on Lexapro  Reports not seeing family due to COVID quarantine has caused some sadness but her plants and garden make her feel better  Continue current plan of care  Notify for any worsening symptoms    Vascular dementia without behavioral disturbance  -     Ambulatory referral/consult to [facility]  Stable  Continue Aricept  Continue to monitor    Essential hypertension  Stable, reading WNL today  Continue current plan of care    Hyperlipidemia, unspecified hyperlipidemia type  Stable on statin.  Continue to monitor      Were controlled substances prescribed?  No    Follow Up Appointments:   Future  Appointments   Date Time Provider Department Center   6/8/2020  2:00 PM Girish Ugalde MD [facility] [address]   7/17/2020 11:00 AM Anna Wood MD [facility] MED CLN [address] [facility]   Attestation: Screening criteria to assess the level of the patient's risk for infection with COVID-19 as recommended by the CDC at the time of the above documented home visit concluded appropriateness to proceed. Universal precautions were maintained at all times, including provider use of >60% alcohol gel hand  immediately prior to entry and upon departing the patient's home as well as cleaning of equipment used in home visit with antibacterial/germicidal disposable wipes.      Signature:  Tracie Gutierrez NP    Patient consent obtained prior to treatment at this visit.

## 2020-05-29 DIAGNOSIS — M25.562 CHRONIC PAIN OF LEFT KNEE: ICD-10-CM

## 2020-05-29 DIAGNOSIS — G89.29 CHRONIC PAIN OF LEFT KNEE: ICD-10-CM

## 2020-05-29 DIAGNOSIS — M25.552 LEFT HIP PAIN: ICD-10-CM

## 2020-05-29 RX ORDER — FENTANYL 12.5 UG/1
1 PATCH TRANSDERMAL
Qty: 10 PATCH | Refills: 0 | Status: CANCELLED | OUTPATIENT
Start: 2020-05-29 | End: 2020-06-28

## 2020-06-01 DIAGNOSIS — G89.29 CHRONIC PAIN OF LEFT KNEE: ICD-10-CM

## 2020-06-01 DIAGNOSIS — M25.552 LEFT HIP PAIN: ICD-10-CM

## 2020-06-01 DIAGNOSIS — M25.562 CHRONIC PAIN OF LEFT KNEE: ICD-10-CM

## 2020-06-01 RX ORDER — FENTANYL 12.5 UG/1
1 PATCH TRANSDERMAL
Qty: 10 PATCH | Refills: 0 | Status: SHIPPED | OUTPATIENT
Start: 2020-06-01 | End: 2020-07-02 | Stop reason: SDUPTHER

## 2020-06-01 NOTE — TELEPHONE ENCOUNTER
----- Message from Mariangel Katherin sent at 6/1/2020  1:27 PM CDT -----  Contact: Patient [phone]  Requesting an RX refill or new RX.  Is this a refill or new RX:  refill  RX name and strength: fentaNYL (DURAGESIC) 12 mcg/hr PT72  Directions (copy/paste from chart):    Is this a 30 day or 90 day RX:    Local pharmacy or mail order pharmacy:  local  Pharmacy name and phone # (copy/paste from chart):   [facility] [address] [phone] (Phone)  [phone] (Fax)    Comments:

## 2020-06-08 ENCOUNTER — OFFICE VISIT (OUTPATIENT)
Dept: PRIMARY CARE CLINIC | Facility: CLINIC | Age: 85
End: 2020-06-08
Payer: MEDICARE

## 2020-06-08 ENCOUNTER — TELEPHONE (OUTPATIENT)
Dept: PRIMARY CARE CLINIC | Facility: CLINIC | Age: 85
End: 2020-06-08

## 2020-06-08 DIAGNOSIS — Z66 DNR (DO NOT RESUSCITATE): ICD-10-CM

## 2020-06-08 PROCEDURE — 99443 PR PHYSICIAN TELEPHONE EVALUATION 21-30 MIN: ICD-10-PCS | Mod: HCNC,95,, | Performed by: INTERNAL MEDICINE

## 2020-06-08 PROCEDURE — 99443 PR PHYSICIAN TELEPHONE EVALUATION 21-30 MIN: CPT | Mod: HCNC,95,, | Performed by: INTERNAL MEDICINE

## 2020-06-08 NOTE — ACP (ADVANCE CARE PLANNING)
Advance Care Planning     Patient's power of , Belkys Moser, spoke with PCP, Dr Wood, on 6/8/20 from 2:30-2:45PM.    Advance Care Planning     Power of   I initiated the process of advance care planning today and explained the importance of this process to the patient's power of .  I introduced the concept of advance directives to the patient, as well. Then the patient received detailed information about the importance of designating a Health Care Power of  (HCPOA). She was also instructed to communicate with this person about their wishes for future healthcare, should she become sick and lose decision-making capacity. The patient has previously appointed a HCPOA. After our discussion, the patient has decided to complete a HCPOA and has appointed her daughter and NAME: Belkys Moser.  I spent a total time of 5 minutes discussing this issue with the patient.    Living Will  During this visit, I engaged the healthcare power of    in the advance care planning process.  The patient and I reviewed the role for advance directives and their purpose in directing future healthcare if the patient's unable to speak for him/herself.  At this point in time, the patient does have full decision-making capacity.  We discussed different extreme health states that she could experience, and reviewed what kind of medical care she would want in those situations.  The healthcare power of   communicated that if she were comatose and had little chance of a meaningful recovery, she would not want machines/life-sustaining treatments used. In addition to the above preference, other important end-of-life issues for the patient include DNR/DNI order. The patient has already designated a healthcare power of  to make decisions on her behalf.  I spent a total of 10 minutes engaging the patient in this advance care planning discussion.          Code Status  In light of the patients  advanced and life limiting illness,I engaged the the healthcare power of   in a conversation about the patient's preferences for care  at the very end of life. The patient wishes to have a natural, peaceful death.  Along those lines, the patient does not wish to have CPR or other invasive treatments performed when her heart and/or breathing stops. I communicated to the healthcare power of   that a DNR order would be placed in her medical record to reflect this preference and DNR form was completed and will be scanned into EPIC.  I spent a total of 10 minutes engaging the patient in this advance care planning discussion.             Anna Wood MD/MPH  [facility]  [phone] [facility]

## 2020-06-08 NOTE — PATIENT INSTRUCTIONS
PCP to email advanced directives to patient's PoA and MARKUS:    [email or web address]    [email or web address]

## 2020-06-08 NOTE — PROGRESS NOTES
Established Patient - Audio Only Telehealth Visit     The [place] location is: [facility] [place]  The chief complaint leading to consultation is: ACP  Visit type: Virtual visit with audio only (telephone)  Total time spent with patient: 30m       The reason for the audio only service rather than synchronous audio and video virtual visit was related to technical difficulties or patient preference/necessity.     Each patient to whom I provide medical services by telemedicine is:  (1) informed of the relationship between the physician and patient and the respective role of any other health care provider with respect to management of the patient; and (2) notified that they may decline to receive medical services by telemedicine and may withdraw from such care at any time. Patient verbally consented to receive this service via voice-only telephone call.       HPI: Patient's PoA wants clarification of goals of care based on patient's prognosis and assisted living facility situation.     Assessment and plan:    ACP Discussion today (see ACP note)  - DNR status updated  - documentation faxed to [facility] [place]         Anna Wood MD/MPH  [facility]  [phone] [employer]      This service was not originating from a related E/M service provided within the previous 7 days nor will  to an E/M service or procedure within the next 24 hours or my soonest available appointment.  Prevailing standard of care was able to be met in this audio-only visit.

## 2020-06-08 NOTE — LETTER
June 8, 2020    Lilia Hidalgo  [address]  [place] [address]             [facility]  [address]  [place] [facility] [place] [address]  Phone: [phone]  Fax: [phone] Advance Care Planning     Patient's power of , Belkys Moser, spoke with PCP, Dr Wood, on 6/8/20 from 2:30-2:45PM.    Power of   I initiated the process of advance care planning today and explained the importance of this process to the patient's power of .  I introduced the concept of advance directives to the patient, as well. Then the patient received detailed information about the importance of designating a Health Care Power of  (HCPOA). She was also instructed to communicate with this person about their wishes for future healthcare, should she become sick and lose decision-making capacity. The patient has previously appointed a HCPOA. After our discussion, the patient has decided to complete a HCPOA and has appointed her daughter and NAME: Belkys Moser.  I spent a total time of 5 minutes discussing this issue with the patient.    Living Will  During this visit, I engaged the healthcare power of    in the advance care planning process.  The patient and I reviewed the role for advance directives and their purpose in directing future healthcare if the patient's unable to speak for him/herself.  At this point in time, the patient does have full decision-making capacity.  We discussed different extreme health states that she could experience, and reviewed what kind of medical care she would want in those situations.  The healthcare power of   communicated that if she were comatose and had little chance of a meaningful recovery, she would not want machines/life-sustaining treatments used. In addition to the above preference, other important end-of-life issues for the patient include DNR/DNI order. The patient has already designated a healthcare power of   to make decisions on her behalf.  I spent a total of 10 minutes engaging the patient in this advance care planning discussion.    Code Status  In light of the patients advanced and life limiting illness,I engaged the the healthcare power of   in a conversation about the patient's preferences for care  at the very end of life. The patient wishes to have a natural, peaceful death.  Along those lines, the patient does not wish to have CPR or other invasive treatments performed when her heart and/or breathing stops. I communicated to the healthcare power of   that a DNR order would be placed in her medical record to reflect this preference and DNR form was completed and will be scanned into EPIC.  I spent a total of 10 minutes engaging the patient in this advance care planning discussion.             Anna Wood MD/MPH  [facility]  [phone] [facility]

## 2020-06-08 NOTE — ASSESSMENT & PLAN NOTE
ACP discussion with PoA on 6/8/20. DNR order faxed to [facility]  · Letter emailed to shi (Belkys Aguayo) and Poli (Estela Moore)

## 2020-06-12 RX ORDER — DEXTROMETHORPHAN HYDROBROMIDE, GUAIFENESIN 5; 100 MG/5ML; MG/5ML
650 LIQUID ORAL 2 TIMES DAILY
Qty: 100 TABLET | Status: CANCELLED | OUTPATIENT
Start: 2020-06-12

## 2020-06-12 RX ORDER — TRAMADOL HYDROCHLORIDE AND ACETAMINOPHEN 37.5; 325 MG/1; MG/1
1 TABLET, FILM COATED ORAL 3 TIMES DAILY PRN
Qty: 90 TABLET | Refills: 2 | OUTPATIENT
Start: 2020-06-12

## 2020-06-12 NOTE — TELEPHONE ENCOUNTER
----- Message from Inez Castillo sent at 6/12/2020  2:37 PM CDT -----  Contact: [phone]  Requesting an RX refill or new RX.  Is this a refill or new RX:  Refill  RX name and strength:tramadol-acetaminophen 37.5-325 mg (ULTRACET) 37.5-325 mg Tab  Directions (copy/paste from chart):    Is this a 30 day or 90 day RX:  90  Local pharmacy or mail order pharmacy:  local  Pharmacy name and phone # (copy/paste from chart):  [facility] [address] [phone] (Phone)  [phone] (Fax)   Comments:

## 2020-06-16 ENCOUNTER — TELEPHONE (OUTPATIENT)
Dept: PRIMARY CARE CLINIC | Facility: CLINIC | Age: 85
End: 2020-06-16

## 2020-07-02 DIAGNOSIS — M25.562 CHRONIC PAIN OF LEFT KNEE: ICD-10-CM

## 2020-07-02 DIAGNOSIS — G89.29 CHRONIC PAIN OF LEFT KNEE: ICD-10-CM

## 2020-07-02 DIAGNOSIS — M25.552 LEFT HIP PAIN: ICD-10-CM

## 2020-07-02 RX ORDER — FENTANYL 12.5 UG/1
1 PATCH TRANSDERMAL
Qty: 10 PATCH | Refills: 0 | Status: SHIPPED | OUTPATIENT
Start: 2020-07-02 | End: 2020-07-22 | Stop reason: SDUPTHER

## 2020-07-02 NOTE — TELEPHONE ENCOUNTER
----- Message from Malia Clark sent at 2020  9:16 AM CDT -----  Contact: Crystal Aly [phone]  [facility] called to request a rx.    Requesting an RX refill or new RX.  Is this a refill or new RX:  refill  RX name and strength: fentaNYL (DURAGESIC) 12 mcg/hr PT72 ()  Directions (copy/paste from chart):  Place 1 patch onto the skin every 72 hours. - Transdermal  Is this a 30 day or 90 day RX:  30  Local pharmacy or mail order pharmacy:  local  Pharmacy name and phone # (copy/paste from chart):   [facility] [address] [phone] (Phone)   Comments:

## 2020-07-07 ENCOUNTER — TELEPHONE (OUTPATIENT)
Dept: PRIMARY CARE CLINIC | Facility: CLINIC | Age: 85
End: 2020-07-07

## 2020-07-07 DIAGNOSIS — B37.31 VAGINAL CANDIDIASIS: Primary | ICD-10-CM

## 2020-07-07 RX ORDER — FLUCONAZOLE 150 MG/1
150 TABLET ORAL DAILY
Qty: 1 TABLET | Refills: 0 | Status: SHIPPED | OUTPATIENT
Start: 2020-07-07 | End: 2020-07-08

## 2020-07-07 NOTE — TELEPHONE ENCOUNTER
Notified Belkys that diflucan has been sent to [facility].  Will speak to NP about ear assessment in home.

## 2020-07-07 NOTE — TELEPHONE ENCOUNTER
Pt dtr called, too, pt is complaining of vaginal burning, pt is incontinent of urine.     Too thinks this is a yeast infection.     She also is complaining of wax build up and a year ago, she went to ent to have her ears cleaned out   She is having problems hearing.     Can someone go to see pt in her home?    Confirmed [facility] [address]    Please advise.

## 2020-07-07 NOTE — TELEPHONE ENCOUNTER
Please let her know that one tablet of diflucan sent to [facility] to treat vaginal candidiasis.    Regarding the ear cleaning, can you can see if one of the NPs can do that in the home? Can you talk to Kiera with [facility] about this?    Thanks,  MORALES

## 2020-07-16 ENCOUNTER — CARE AT HOME (OUTPATIENT)
Dept: HOME HEALTH SERVICES | Facility: CLINIC | Age: 85
End: 2020-07-16
Payer: MEDICARE

## 2020-07-16 VITALS — HEART RATE: 77 BPM | SYSTOLIC BLOOD PRESSURE: 132 MMHG | RESPIRATION RATE: 18 BRPM | DIASTOLIC BLOOD PRESSURE: 70 MMHG

## 2020-07-16 DIAGNOSIS — F33.9 MAJOR DEPRESSION, RECURRENT, CHRONIC: ICD-10-CM

## 2020-07-16 DIAGNOSIS — I10 ESSENTIAL HYPERTENSION: ICD-10-CM

## 2020-07-16 DIAGNOSIS — H61.23 BILATERAL IMPACTED CERUMEN: Primary | ICD-10-CM

## 2020-07-16 DIAGNOSIS — F01.50 VASCULAR DEMENTIA WITHOUT BEHAVIORAL DISTURBANCE: ICD-10-CM

## 2020-07-16 PROCEDURE — 1101F PR PT FALLS ASSESS DOC 0-1 FALLS W/OUT INJ PAST YR: ICD-10-PCS | Mod: CPTII,S$GLB,, | Performed by: NURSE PRACTITIONER

## 2020-07-16 PROCEDURE — 1159F PR MEDICATION LIST DOCUMENTED IN MEDICAL RECORD: ICD-10-PCS | Mod: S$GLB,,, | Performed by: NURSE PRACTITIONER

## 2020-07-16 PROCEDURE — 1159F MED LIST DOCD IN RCRD: CPT | Mod: S$GLB,,, | Performed by: NURSE PRACTITIONER

## 2020-07-16 PROCEDURE — 1101F PT FALLS ASSESS-DOCD LE1/YR: CPT | Mod: CPTII,S$GLB,, | Performed by: NURSE PRACTITIONER

## 2020-07-16 PROCEDURE — 99350 PR HOME VISIT,ESTAB PATIENT,LEVEL IV: ICD-10-PCS | Mod: 25,S$GLB,, | Performed by: NURSE PRACTITIONER

## 2020-07-16 PROCEDURE — 69209 REMOVE IMPACTED EAR WAX UNI: CPT | Mod: 50,S$GLB,, | Performed by: NURSE PRACTITIONER

## 2020-07-16 PROCEDURE — 1126F AMNT PAIN NOTED NONE PRSNT: CPT | Mod: S$GLB,,, | Performed by: NURSE PRACTITIONER

## 2020-07-16 PROCEDURE — 1126F PR PAIN SEVERITY QUANTIFIED, NO PAIN PRESENT: ICD-10-PCS | Mod: S$GLB,,, | Performed by: NURSE PRACTITIONER

## 2020-07-16 PROCEDURE — 99350 HOME/RES VST EST HIGH MDM 60: CPT | Mod: 25,S$GLB,, | Performed by: NURSE PRACTITIONER

## 2020-07-16 PROCEDURE — 69209 PR REMOVAL IMPACTED CERUMEN USING IRRIGATION/LAVAGE, UNILATERAL: ICD-10-PCS | Mod: 50,S$GLB,, | Performed by: NURSE PRACTITIONER

## 2020-07-16 NOTE — PROGRESS NOTES
[facility]  Medical Home Visit    Visit Date: 7/16/2020  Encounter Provider: Tracie Gutierrez NP  PCP:  Anna Wood MD    Subjective:      Patient ID: Lilia Hidalgo is a 89 y.o. female.    Consult Requested By:  Tracie Gutierrez  Reason for Consult:  Routine Follow up    Lilia is being seen at home due to mobility and physical limitations     Chief Complaint: Hearing Problem, Otalgia, and Dementia    Pt is an 89y/o female being seen today for eval of hearing problem and her report of earwax blocking her ear canal . She has a history of HTN, HLD, Depression, Incontinent, dementia, impaired gait. She resides in an assisted living facility. Her daughter was available by phone. She is found sitting in her apartment. She is awake and alert, confused over date. Does discuss COVID and denies any sickness in her facility since this began. Reports she is doing well. Does have some pain in her hip and legs which is chronic and not worsening. She cannot review her medications as the nurse from the facility brings her medications    Daughter is reporting hearing is worse than usual and she requests mother's ear checked for wax impaction as she has previously required irrigation    Review of Systems   Constitutional: Negative for activity change, fatigue and fever.   HENT: Positive for hearing loss.    Eyes: Negative.    Respiratory: Negative for chest tightness.    Cardiovascular: Negative.  Negative for leg swelling.   Gastrointestinal: Negative.    Endocrine: Negative.    Genitourinary: Negative.         Incontinent   Musculoskeletal: Positive for gait problem.   Skin: Negative.    Allergic/Immunologic: Negative.    Hematological: Negative.    Psychiatric/Behavioral: Positive for confusion. Negative for agitation.   All other systems reviewed and are negative.      Assessments:  · Environmental: lives in an assisted living  · Functional Status: requires some assistance  · Safety: fall risk  · Nutritional:  adequate food in home  · Home Health/DME/Supplies: walker, wheelchair    Objective:   Physical Exam  Vitals signs reviewed.   Constitutional:       General: She is not in acute distress.     Appearance: She is well-developed.   HENT:      Head: Normocephalic and atraumatic.      Right Ear: External ear normal. There is impacted cerumen.      Left Ear: External ear normal. There is impacted cerumen.   Eyes:      Pupils: Pupils are equal, round, and reactive to light.   Neck:      Musculoskeletal: Normal range of motion and neck supple.   Cardiovascular:      Rate and Rhythm: Normal rate and regular rhythm.      Heart sounds: Normal heart sounds.   Pulmonary:      Effort: Pulmonary effort is normal.      Breath sounds: Normal breath sounds.   Abdominal:      General: Bowel sounds are normal.      Palpations: Abdomen is soft.   Musculoskeletal: Normal range of motion.   Skin:     General: Skin is warm and dry.   Neurological:      Mental Status: She is alert.      Cranial Nerves: No cranial nerve deficit.   Psychiatric:         Behavior: Behavior normal.         Vitals:    07/16/20 1859   BP: 132/70   Pulse: 77   Resp: 18   PainSc: 0-No pain     There is no height or weight on file to calculate BMI.    Assessment:   Lilia was seen today for hearing problem, otalgia and dementia.    Diagnoses and all orders for this visit:    Bilateral impacted cerumen    Vascular dementia without behavioral disturbance  -     Ambulatory referral/consult to [facility] - [facility]  -     Ambulatory referral/consult to [facility] Pall[facility]; Future    Major depression, recurrent, chronic  -     [facility]    Essential hypertension  -     Ambulatory referral/consult to [facility] - [facility]iative        Plan:            Lilia was seen today for dementia, depression, hyperlipidemia and hypertension.    Diagnoses and  all orders for this visit:    Major depression, recurrent, chronic  Stable on Lexapro  Reports not seeing family due to COVID quarantine has caused some sadness but her plants and garden make her feel better  Continue current plan of care  Notify for any worsening symptoms    Vascular dementia without behavioral disturbance  -     Ambulatory referral/consult to [facility] - [facility] & Palliative  Stable  Continue Aricept  Continue to monitor    Essential hypertension  Stable, reading WNL today  Continue current plan of care    Hyperlipidemia, unspecified hyperlipidemia type  Stable on statin.  Continue to monitor    Impacted Bilateral Ear Wax.  Both ears with significant ear wax noted.  Both ears flushed with normal saline via small cannula and ear vacuum used to remove saline and wax.  Pt tolerated well, wax removed from both ears, more from right than left.  Pt reports feeling improvement after irrigation.  Canals clear after lavage.  Continue to monitor    Were controlled substances prescribed?  No    Follow Up Appointments:   Future Appointments   Date Time Provider Department Center   7/17/2020 11:00 AM [facility]     Attestation: Screening criteria to assess the level of the patient's risk for infection with COVID-19 as recommended by the CDC at the time of the above documented home visit concluded appropriateness to proceed. Universal precautions were maintained at all times, including provider use of >60% alcohol gel hand  immediately prior to entry and upon departing the patient's home as well as cleaning of equipment used in home visit with antibacterial/germicidal disposable wipes.      Signature:  Tracie Gutierrez NP    Patient consent obtained prior to treatment at this visit.

## 2020-07-17 ENCOUNTER — OFFICE VISIT (OUTPATIENT)
Dept: PRIMARY CARE CLINIC | Facility: CLINIC | Age: 85
End: 2020-07-17
Payer: MEDICARE

## 2020-07-17 DIAGNOSIS — K21.9 GASTROESOPHAGEAL REFLUX DISEASE, ESOPHAGITIS PRESENCE NOT SPECIFIED: ICD-10-CM

## 2020-07-17 DIAGNOSIS — I11.0 HYPERTENSIVE HEART DISEASE WITH HEART FAILURE: ICD-10-CM

## 2020-07-17 DIAGNOSIS — I50.32 HEART FAILURE, DIASTOLIC, CHRONIC: ICD-10-CM

## 2020-07-17 DIAGNOSIS — F33.9 MAJOR DEPRESSION, RECURRENT, CHRONIC: ICD-10-CM

## 2020-07-17 DIAGNOSIS — F01.50 VASCULAR DEMENTIA WITHOUT BEHAVIORAL DISTURBANCE: ICD-10-CM

## 2020-07-17 DIAGNOSIS — M79.89 SWELLING OF LOWER EXTREMITY: ICD-10-CM

## 2020-07-17 DIAGNOSIS — G31.84 MILD COGNITIVE IMPAIRMENT: ICD-10-CM

## 2020-07-17 DIAGNOSIS — K44.9 HIATAL HERNIA: ICD-10-CM

## 2020-07-17 PROCEDURE — 99443 PR PHYSICIAN TELEPHONE EVALUATION 21-30 MIN: CPT | Mod: HCNC,95,, | Performed by: INTERNAL MEDICINE

## 2020-07-17 PROCEDURE — 99443 PR PHYSICIAN TELEPHONE EVALUATION 21-30 MIN: ICD-10-PCS | Mod: HCNC,95,, | Performed by: INTERNAL MEDICINE

## 2020-07-17 RX ORDER — FUROSEMIDE 40 MG/1
TABLET ORAL
Qty: 90 TABLET | Refills: 3 | Status: SHIPPED | OUTPATIENT
Start: 2020-07-17 | End: 2021-07-29

## 2020-07-17 RX ORDER — ESCITALOPRAM OXALATE 20 MG/1
TABLET ORAL
Qty: 90 TABLET | Refills: 3 | Status: SHIPPED | OUTPATIENT
Start: 2020-07-17 | End: 2021-08-03

## 2020-07-17 RX ORDER — DONEPEZIL HYDROCHLORIDE 5 MG/1
5 TABLET, FILM COATED ORAL NIGHTLY
Qty: 90 TABLET | Refills: 3 | Status: SHIPPED | OUTPATIENT
Start: 2020-07-17 | End: 2021-07-29

## 2020-07-17 RX ORDER — OMEPRAZOLE 20 MG/1
CAPSULE, DELAYED RELEASE ORAL
Qty: 180 CAPSULE | Refills: 3 | Status: SHIPPED | OUTPATIENT
Start: 2020-07-17 | End: 2021-07-26

## 2020-07-17 NOTE — Clinical Note
Can you collect labs on her at next visit? She needs a BMP, CBC. She's on potassium but hasn't had labs in a year. I'm not refilling the potassium until labs are done.    KJ

## 2020-07-17 NOTE — PROGRESS NOTES
Established Patient - Audio Only Telehealth Visit with [facility] Provider     The patient location is: HOME  The chief complaint leading to consultation is: routine care  Visit type: Virtual visit with audio only (telephone)     The reason for the audio only service rather than synchronous audio and video virtual visit was related to technical difficulties or patient preference/necessity.     Each patient to whom I provide medical services by telemedicine is:  (1) informed of the relationship between the physician and patient and the respective role of any other health care provider with respect to management of the patient; and (2) notified that they may decline to receive medical services by telemedicine and may withdraw from such care at any time. Patient verbally consented to receive this service via voice-only telephone call.       Subjective:      Patient ID: Lilia Hidalgo is a 89 y.o. female with dementia, CHF, HTN, MDD who presents for f/u.    Patient's risk score of poor outcomes with COVID is 6. Patient is high risk for poor outcomes with COVID due to the following chronic conditions advanced age, dementia, CHF, HTN, MDD.    Spoke with Ms. Hidalgo's daughter, Belkys. Tracie Gutierrez NP saw her yesterday, checked her vitals, and cleared out her ears. She has been doing great lately. Dementia seems to be getting worse. Possibly d/t being stuck in apartment. She seemed to improve a bit after her ears were cleaned.     She says that her left leg is still in a lot of pain. She is now in wheelchair, has a fentanyl patch. Hasn't complained about pain anymore than usual. It is unclear whether fentanyl and tramadol has been helpful for her now. She was without a fentanyl patch for four days and didn't complain (but medication has 24 hour half life, and can still have bioavailability 96hrs) and didn't seem to notice any more pain but it has been challenging to get a good history regarding her pain in general.  Daughter wants to wean her off of fentanyl 12mcg patch and use tramadol instead.     She has been doing reasonably well mood-thakkar. Belkys has been talking to her on the phone, getting her oriented, and encouraging her to get back down to bingo a few times a week. She seems to do a lot better after being able to see and interact with her peers. Previously, she was also having issues d/t the wax in her ears so the aides had to talk a lot louder and since they were also wearing masks, she thought that they were yelling at her. Now they are able to talk to her at a lower volume.        Objective:     Lab Results   Component Value Date    WBC 7.27 06/05/2019    HGB 12.8 06/05/2019    HCT 40.4 06/05/2019     06/05/2019    CHOL 170 04/16/2019    TRIG 317 (H) 04/16/2019    HDL 48 04/16/2019    ALT 8 (L) 06/05/2019    AST 13 06/05/2019     06/05/2019    K 3.5 06/05/2019     06/05/2019    CREATININE 0.7 06/05/2019    BUN 18 06/05/2019    CO2 29 06/05/2019    TSH 1.405 05/27/2019    INR 1.0 05/08/2019         Assessment:   89 y.o. female with multiple co-morbid illnesses here to continue work-up of chronic issues.     Plan:     Problem List Items Addressed This Visit        Neuro    Dementia     Resides in assisted living, gets assistance with all ADLs  · Continue assisted living with medication assistance            Psychiatric    Major depression, recurrent, chronic     Controlled on lexapro  · Continue SSRI         Relevant Medications    escitalopram oxalate (LEXAPRO) 20 MG tablet       Cardiac/Vascular    Hypertensive heart disease with heart failure    Relevant Medications    furosemide (LASIX) 40 MG tablet       GI    GERD (gastroesophageal reflux disease)    Relevant Medications    omeprazole (PRILOSEC) 20 MG capsule      Other Visit Diagnoses     Mild cognitive impairment        Relevant Medications    donepeziL (ARICEPT) 5 MG tablet    escitalopram oxalate (LEXAPRO) 20 MG tablet    Swelling of lower  extremity        Relevant Medications    furosemide (LASIX) 40 MG tablet    Heart failure, diastolic, chronic        Relevant Medications    furosemide (LASIX) 40 MG tablet    Hiatal hernia        Relevant Medications    omeprazole (PRILOSEC) 20 MG capsule          Health Maintenance       Date Due Completion Date    Shingles Vaccine (1 of 2) 06/19/1981 ---    Influenza Vaccine (1) 09/01/2020 10/25/2019    Lipid Panel 04/16/2024 4/16/2019    TETANUS VACCINE 12/12/2028 12/12/2018 (Done)    Override on 12/12/2018: Done          Follow up in about 2 months (around 9/17/2020).  Thirty minutes spent with this patient today, including addressing advanced care planning.    Anna Wood MD/MPH  Internal Medicine  [facility]  Spectra [phone]    This service was not originating from a related E/M service provided within the previous 7 days nor will  to an E/M service or procedure within the next 24 hours or my soonest available appointment.  Prevailing standard of care was able to be met in this audio-only visit.

## 2020-07-17 NOTE — PATIENT INSTRUCTIONS
TODAY:  - refill meds (except)  - labs with next Tracie visit  - continue current pain management  - stop atorvastatin

## 2020-07-22 DIAGNOSIS — M25.552 LEFT HIP PAIN: ICD-10-CM

## 2020-07-22 DIAGNOSIS — G89.29 CHRONIC PAIN OF LEFT KNEE: ICD-10-CM

## 2020-07-22 DIAGNOSIS — M25.562 CHRONIC PAIN OF LEFT KNEE: ICD-10-CM

## 2020-07-22 NOTE — TELEPHONE ENCOUNTER
Spoke with Belkys, pt dtr, pt needs this refilled.    Pt maybe moved to memory area and may need a covid testing.   Gave Belkys the number to [facility].

## 2020-07-23 RX ORDER — FENTANYL 12.5 UG/1
1 PATCH TRANSDERMAL
Qty: 10 PATCH | Refills: 0 | Status: SHIPPED | OUTPATIENT
Start: 2020-07-23 | End: 2020-09-03 | Stop reason: SDUPTHER

## 2020-07-30 ENCOUNTER — PES CALL (OUTPATIENT)
Dept: ADMINISTRATIVE | Facility: CLINIC | Age: 85
End: 2020-07-30

## 2020-08-07 ENCOUNTER — PES CALL (OUTPATIENT)
Dept: ADMINISTRATIVE | Facility: CLINIC | Age: 85
End: 2020-08-07

## 2020-08-11 ENCOUNTER — OFFICE VISIT (OUTPATIENT)
Dept: HOME HEALTH SERVICES | Facility: CLINIC | Age: 85
End: 2020-08-11
Payer: MEDICARE

## 2020-08-11 VITALS
WEIGHT: 201 LBS | SYSTOLIC BLOOD PRESSURE: 116 MMHG | HEART RATE: 65 BPM | HEIGHT: 66 IN | BODY MASS INDEX: 32.3 KG/M2 | DIASTOLIC BLOOD PRESSURE: 63 MMHG

## 2020-08-11 DIAGNOSIS — I11.0 HYPERTENSIVE HEART DISEASE WITH HEART FAILURE: ICD-10-CM

## 2020-08-11 DIAGNOSIS — K21.9 GASTROESOPHAGEAL REFLUX DISEASE, ESOPHAGITIS PRESENCE NOT SPECIFIED: ICD-10-CM

## 2020-08-11 DIAGNOSIS — Z99.3 DEPENDENCE ON WHEELCHAIR: ICD-10-CM

## 2020-08-11 DIAGNOSIS — F01.50 VASCULAR DEMENTIA WITHOUT BEHAVIORAL DISTURBANCE: ICD-10-CM

## 2020-08-11 DIAGNOSIS — R60.0 BILATERAL EDEMA OF LOWER EXTREMITY: ICD-10-CM

## 2020-08-11 DIAGNOSIS — R26.9 ABNORMALITY OF GAIT AND MOBILITY: ICD-10-CM

## 2020-08-11 DIAGNOSIS — F33.9 MAJOR DEPRESSION, RECURRENT, CHRONIC: ICD-10-CM

## 2020-08-11 DIAGNOSIS — M46.99 UNSPECIFIED INFLAMMATORY SPONDYLOPATHY, MULTIPLE SITES IN SPINE: ICD-10-CM

## 2020-08-11 DIAGNOSIS — I70.0 ATHEROSCLEROSIS OF AORTA: ICD-10-CM

## 2020-08-11 DIAGNOSIS — I10 ESSENTIAL HYPERTENSION: ICD-10-CM

## 2020-08-11 DIAGNOSIS — Z00.00 ENCOUNTER FOR PREVENTIVE HEALTH EXAMINATION: Primary | ICD-10-CM

## 2020-08-11 DIAGNOSIS — E78.5 HYPERLIPIDEMIA, UNSPECIFIED HYPERLIPIDEMIA TYPE: ICD-10-CM

## 2020-08-11 PROCEDURE — G0439 PPPS, SUBSEQ VISIT: HCPCS | Mod: S$GLB,,, | Performed by: NURSE PRACTITIONER

## 2020-08-11 PROCEDURE — 99499 RISK ADDL DX/OHS AUDIT: ICD-10-PCS | Mod: S$GLB,,, | Performed by: NURSE PRACTITIONER

## 2020-08-11 PROCEDURE — G0439 PR MEDICARE ANNUAL WELLNESS SUBSEQUENT VISIT: ICD-10-PCS | Mod: S$GLB,,, | Performed by: NURSE PRACTITIONER

## 2020-08-11 PROCEDURE — 99499 UNLISTED E&M SERVICE: CPT | Mod: S$GLB,,, | Performed by: NURSE PRACTITIONER

## 2020-08-11 NOTE — PROGRESS NOTES
"  Lilia Hidalgo presented for a  Medicare AWV and comprehensive Health Risk Assessment today. The following components were reviewed and updated:    · Medical history  · Family History  · Social history  · Allergies and Current Medications  · Health Risk Assessment  · Health Maintenance  · Care Team     ** See Completed Assessments for Annual Wellness Visit within the encounter summary.**         The following assessments were completed:  · Living Situation  · CAGE  · Depression Screening  · Timed Get Up and Go  · Whisper Test  · Cognitive Function Screening  · Nutrition Screening  · ADL Screening  · PAQ Screening        Vitals:    08/11/20 1046   BP: 116/63   Pulse: 65   Weight: 91.2 kg (201 lb)   Height: 5' 6" (1.676 m)     Body mass index is 32.44 kg/m².  Physical Exam  Constitutional:       Appearance: Normal appearance.   HENT:      Head: Normocephalic and atraumatic.      Nose: Nose normal.      Mouth/Throat:      Mouth: Mucous membranes are moist.   Eyes:      Extraocular Movements: Extraocular movements intact.   Neck:      Musculoskeletal: Normal range of motion.   Cardiovascular:      Rate and Rhythm: Normal rate and regular rhythm.      Heart sounds: Normal heart sounds.   Pulmonary:      Effort: Pulmonary effort is normal. No respiratory distress.      Breath sounds: Normal breath sounds.   Abdominal:      General: Bowel sounds are normal. There is no distension.      Palpations: Abdomen is soft.   Musculoskeletal:         General: Swelling (BLE) present.      Comments: Limited ROM  Wheelchair bound   Skin:     General: Skin is warm and dry.   Neurological:      Mental Status: She is alert.      Comments: Awake, alert, oriented to self only, forgetful   Psychiatric:         Mood and Affect: Mood normal.         Behavior: Behavior normal.               Diagnoses and health risks identified today and associated recommendations/orders:    1. Encounter for preventive health examination  Assessment completed. " Preventive measures reviewed with patient. Measures reviewed with patient daughter Belkys via telephone.    2. Dependence on wheelchair  Stable, followed by PCP.    3. Abnormality of gait and mobility  Stable, followed by PCP.    4. Vascular dementia without behavioral disturbance  Stable, followed by PCP.    5. Major depression, recurrent, chronic  Stable, followed by PCP.    6. Atherosclerosis of aorta  Stable, followed by PCP.    7. Essential hypertension  Stable, followed by PCP.    8. Hyperlipidemia, unspecified hyperlipidemia type  Stable, followed by PCP.    9. Hypertensive heart disease with heart failure  Stable, followed by PCP.    10. Gastroesophageal reflux disease, esophagitis presence not specified  Stable, followed by PCP.    11. 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    12. Bilateral edema of lower extremity  Stable, followed by PCP.    Provided Lilia with a 5-10 year written screening schedule and personal prevention plan. Recommendations were developed using the USPSTF age appropriate recommendations. Education, counseling, and referrals were provided as needed. After Visit Summary printed and given to patient which includes a list of additional screenings\tests needed.    No follow-ups on file.    Yanna Ramos NP  I offered to discuss end of life issues, including information on how to make advance directives that the patient could use to name someone who would make medical decisions on their behalf if they became too ill to make themselves.    ___Patient declined  _X_Patient is interested, I provided paper work and offered to discuss.

## 2020-08-11 NOTE — PATIENT INSTRUCTIONS
Counseling and Referral of Other Preventative  (Italic type indicates deductible and co-insurance are waived)    Patient Name: Lilia Hidalgo  Today's Date: 8/11/2020    Health Maintenance       Date Due Completion Date    Shingles Vaccine (1 of 2) 08/25/2020 (Originally 6/19/1981) ---    Influenza Vaccine (1) 09/01/2020 10/25/2019    Lipid Panel 04/16/2024 4/16/2019    TETANUS VACCINE 12/12/2028 12/12/2018 (Done)    Override on 12/12/2018: Done        No orders of the defined types were placed in this encounter.    The following information is provided to all patients.  This information is to help you find resources for any of the problems found today that may be affecting your health:                Living healthy guide: www.[facility].[place].gov      Understanding Diabetes: www.diabetes.org      Eating healthy: www.cdc.gov/healthyweight      [facility] home safety checklist: www.cdc.gov/steadi/patient.html      Agency on Aging: www.goea.[place].gov      Alcoholics anonymous (AA): www.aa.org      Physical Activity: www.kurt.nih.gov/er6jneb      Tobacco use: www.quitwithusla.org

## 2020-08-12 RX ORDER — NITROGLYCERIN 0.4 MG/1
0.4 TABLET SUBLINGUAL EVERY 5 MIN PRN
COMMUNITY
End: 2020-10-15 | Stop reason: SDUPTHER

## 2020-08-12 RX ORDER — ASPIRIN 325 MG
50000 TABLET, DELAYED RELEASE (ENTERIC COATED) ORAL DAILY
COMMUNITY

## 2020-08-12 RX ORDER — NYSTATIN 100000 [USP'U]/G
100000 POWDER TOPICAL 2 TIMES DAILY PRN
COMMUNITY
End: 2020-12-22

## 2020-08-12 RX ORDER — ATORVASTATIN CALCIUM 20 MG/1
20 TABLET, FILM COATED ORAL DAILY
COMMUNITY
End: 2020-10-12

## 2020-08-20 ENCOUNTER — TELEPHONE (OUTPATIENT)
Dept: PRIMARY CARE CLINIC | Facility: CLINIC | Age: 85
End: 2020-08-20

## 2020-08-20 DIAGNOSIS — M25.562 CHRONIC PAIN OF LEFT KNEE: ICD-10-CM

## 2020-08-20 DIAGNOSIS — M25.552 LEFT HIP PAIN: ICD-10-CM

## 2020-08-20 DIAGNOSIS — G89.29 CHRONIC PAIN OF LEFT KNEE: ICD-10-CM

## 2020-08-20 RX ORDER — FENTANYL 12.5 UG/1
1 PATCH TRANSDERMAL
Qty: 10 PATCH | Refills: 0 | Status: CANCELLED | OUTPATIENT
Start: 2020-08-20 | End: 2020-09-19

## 2020-08-20 NOTE — TELEPHONE ENCOUNTER
----- Message from Kristi Sim sent at 8/20/2020 10:27 AM CDT -----  Contact: miriam/[facility]/[phone]  Miriam called in regards needing to talk with someone in Dr NIELSEN's office about if script can be sent to [facility] [address] [phone] (Phone)  [phone] (Fax) fentaNYL (DURAGESIC) 12 mcg/hr PT72 . Please call and advise. Thank you

## 2020-09-03 ENCOUNTER — TELEPHONE (OUTPATIENT)
Dept: PRIMARY CARE CLINIC | Facility: CLINIC | Age: 85
End: 2020-09-03

## 2020-09-03 DIAGNOSIS — M25.552 LEFT HIP PAIN: ICD-10-CM

## 2020-09-03 DIAGNOSIS — M25.562 CHRONIC PAIN OF LEFT KNEE: ICD-10-CM

## 2020-09-03 DIAGNOSIS — G89.29 CHRONIC PAIN OF LEFT KNEE: ICD-10-CM

## 2020-09-03 RX ORDER — FENTANYL 12.5 UG/1
1 PATCH TRANSDERMAL
Qty: 10 PATCH | Refills: 0 | Status: SHIPPED | OUTPATIENT
Start: 2020-09-03 | End: 2020-10-05 | Stop reason: SDUPTHER

## 2020-09-17 ENCOUNTER — OFFICE VISIT (OUTPATIENT)
Dept: PRIMARY CARE CLINIC | Facility: CLINIC | Age: 85
End: 2020-09-17
Payer: MEDICARE

## 2020-09-17 DIAGNOSIS — M24.28 LIGAMENTUM FLAVUM HYPERTROPHY: ICD-10-CM

## 2020-09-17 DIAGNOSIS — M48.07 LUMBOSACRAL STENOSIS WITH NEUROGENIC CLAUDICATION: ICD-10-CM

## 2020-09-17 DIAGNOSIS — E78.5 TYPE 2 DIABETES MELLITUS WITH HYPERLIPIDEMIA: ICD-10-CM

## 2020-09-17 DIAGNOSIS — E11.69 TYPE 2 DIABETES MELLITUS WITH HYPERLIPIDEMIA: ICD-10-CM

## 2020-09-17 DIAGNOSIS — I11.0 HYPERTENSIVE HEART DISEASE WITH HEART FAILURE: ICD-10-CM

## 2020-09-17 PROCEDURE — 99443 PR PHYSICIAN TELEPHONE EVALUATION 21-30 MIN: ICD-10-PCS | Mod: HCNC,95,, | Performed by: INTERNAL MEDICINE

## 2020-09-17 PROCEDURE — 99443 PR PHYSICIAN TELEPHONE EVALUATION 21-30 MIN: CPT | Mod: HCNC,95,, | Performed by: INTERNAL MEDICINE

## 2020-09-17 NOTE — PROGRESS NOTES
Established Patient - Audio Only Telehealth Visit with [facility] Provider     The patient location is: HOME  The chief complaint leading to consultation is: routine care  Visit type: Virtual visit with audio only (telephone)     The reason for the audio only service rather than synchronous audio and video virtual visit was related to technical difficulties or patient preference/necessity.     Each patient to whom I provide medical services by telemedicine is:  (1) informed of the relationship between the physician and patient and the respective role of any other health care provider with respect to management of the patient; and (2) notified that they may decline to receive medical services by telemedicine and may withdraw from such care at any time. Patient verbally consented to receive this service via voice-only telephone call.       Subjective:      Patient ID: Lilia Hidalgo is a 89 y.o. female.    Patient's risk score is 6 . Patient is high risk for poor outcomes with COVID due to the following chronic conditions advanced age, HTN, MDD, DM, GERD    Spoke with pt's daughter. She states that the Ms. Hidalgo is doing very well in the memory care section of [facility]. She is happy with the care of her mother there. No new issues to discuss. She is not aware of any medication refill needs at this time.      Pain is well controlled on fentanyl patch and ultraset.    Objective:     Lab Results   Component Value Date    WBC 7.27 06/05/2019    HGB 12.8 06/05/2019    HCT 40.4 06/05/2019     06/05/2019    CHOL 170 04/16/2019    TRIG 317 (H) 04/16/2019    HDL 48 04/16/2019    ALT 8 (L) 06/05/2019    AST 13 06/05/2019     06/05/2019    K 3.5 06/05/2019     06/05/2019    CREATININE 0.7 06/05/2019    BUN 18 06/05/2019    CO2 29 06/05/2019    TSH 1.405 05/27/2019    INR 1.0 05/08/2019         Assessment:   89 y.o. female with multiple co-morbid illnesses here to continue work-up of chronic issues.     Plan:  "    Problem List Items Addressed This Visit        Neuro    Lumbosacral stenosis with neurogenic claudication     MRI 5/2018: "L2-L3: Disc desiccation.  Slight loss of disc height.  Small disc bulge.  Facet arthropathy.  Ligamentum flavum thickening.  Mild spinal canal stenosis.  No significant neuroforaminal narrowing. L3-L4: Facet arthropathy.  Small disc bulge.  Ligamentum flavum thickening.  Mild spinal canal stenosis.  No significant neuroforaminal narrowing."  · Continue pain management with ultraset and fentanyl patch            Cardiac/Vascular    Hypertensive heart disease with heart failure     Indeterminate left ventricular diastolic function on echo on 5/2019  · Monitor remotely            Endocrine    Type 2 diabetes mellitus with hyperlipidemia     DM at goal, unclear benefit of statin  · Continue current therapies            Orthopedic    Ligamentum flavum hypertrophy     MRI 5/2018: "L2-L3: Disc desiccation.  Slight loss of disc height.  Small disc bulge.  Facet arthropathy.  Ligamentum flavum thickening.  Mild spinal canal stenosis.  No significant neuroforaminal narrowing. L3-L4: Facet arthropathy.  Small disc bulge.  Ligamentum flavum thickening.  Mild spinal canal stenosis.  No significant neuroforaminal narrowing."  · Continue pain management with ultraset and fentanyl patch               Health Maintenance       Date Due Completion Date    Shingles Vaccine (1 of 2) 06/19/1981 ---    Influenza Vaccine (1) 08/01/2020 10/25/2019    Lipid Panel 04/16/2024 4/16/2019    TETANUS VACCINE 12/12/2028 12/12/2018 (Done)    Override on 12/12/2018: Done          Follow up in about 6 months (around 3/17/2021). Thirty minutes spent with this patient today, including addressing chronic pain management.    Joao Salazar (MS4)    Anna Wood MD/MPH  Internal Medicine  [facility]  Spectra [phone]    This service was not originating from a related E/M service provided " within the previous 7 days nor will  to an E/M service or procedure within the next 24 hours or my soonest available appointment.  Prevailing standard of care was able to be met in this audio-only visit.

## 2020-09-21 PROBLEM — M48.07 LUMBOSACRAL STENOSIS WITH NEUROGENIC CLAUDICATION: Status: ACTIVE | Noted: 2020-09-21

## 2020-09-21 PROBLEM — M24.28 LIGAMENTUM FLAVUM HYPERTROPHY: Status: ACTIVE | Noted: 2020-09-21

## 2020-09-21 PROBLEM — E11.69 TYPE 2 DIABETES MELLITUS WITH HYPERLIPIDEMIA: Status: ACTIVE | Noted: 2020-09-21

## 2020-09-21 PROBLEM — E78.5 TYPE 2 DIABETES MELLITUS WITH HYPERLIPIDEMIA: Status: ACTIVE | Noted: 2020-09-21

## 2020-09-21 NOTE — ASSESSMENT & PLAN NOTE
"MRI 5/2018: "L2-L3: Disc desiccation.  Slight loss of disc height.  Small disc bulge.  Facet arthropathy.  Ligamentum flavum thickening.  Mild spinal canal stenosis.  No significant neuroforaminal narrowing. L3-L4: Facet arthropathy.  Small disc bulge.  Ligamentum flavum thickening.  Mild spinal canal stenosis.  No significant neuroforaminal narrowing."  · Continue pain management with ultraset and fentanyl patch  "

## 2020-09-25 ENCOUNTER — PATIENT MESSAGE (OUTPATIENT)
Dept: PRIMARY CARE CLINIC | Facility: CLINIC | Age: 85
End: 2020-09-25

## 2020-09-25 DIAGNOSIS — M46.99 UNSPECIFIED INFLAMMATORY SPONDYLOPATHY, MULTIPLE SITES IN SPINE: ICD-10-CM

## 2020-09-25 RX ORDER — TRAMADOL HYDROCHLORIDE AND ACETAMINOPHEN 37.5; 325 MG/1; MG/1
1 TABLET, FILM COATED ORAL 3 TIMES DAILY PRN
Qty: 90 TABLET | Refills: 2 | Status: ON HOLD | OUTPATIENT
Start: 2020-09-25 | End: 2022-03-24

## 2020-09-25 NOTE — TELEPHONE ENCOUNTER
Please let patient's daughter know that her mom's script for ULTRACET (tramadol/tylenol) at [facility] was active until 9/12/20. I resent it today to be sure.    Thanks,  KJ

## 2020-10-05 DIAGNOSIS — G89.29 CHRONIC PAIN OF LEFT KNEE: ICD-10-CM

## 2020-10-05 DIAGNOSIS — M25.552 LEFT HIP PAIN: ICD-10-CM

## 2020-10-05 DIAGNOSIS — M25.562 CHRONIC PAIN OF LEFT KNEE: ICD-10-CM

## 2020-10-05 RX ORDER — FENTANYL 12.5 UG/1
1 PATCH TRANSDERMAL
Qty: 10 PATCH | Refills: 0 | Status: SHIPPED | OUTPATIENT
Start: 2020-10-05 | End: 2020-11-06 | Stop reason: SDUPTHER

## 2020-10-05 NOTE — TELEPHONE ENCOUNTER
Pt dtr, Belkys, called. She stated pt's fentanyl needed to be reordered.  Pended med  Confirmed pharmacy, [facility].

## 2020-10-12 ENCOUNTER — CARE AT HOME (OUTPATIENT)
Dept: HOME HEALTH SERVICES | Facility: CLINIC | Age: 85
End: 2020-10-12
Payer: MEDICARE

## 2020-10-12 VITALS
SYSTOLIC BLOOD PRESSURE: 124 MMHG | DIASTOLIC BLOOD PRESSURE: 62 MMHG | OXYGEN SATURATION: 97 % | TEMPERATURE: 98 F | RESPIRATION RATE: 18 BRPM | HEART RATE: 70 BPM

## 2020-10-12 DIAGNOSIS — F33.9 MAJOR DEPRESSION, RECURRENT, CHRONIC: ICD-10-CM

## 2020-10-12 DIAGNOSIS — F01.50 VASCULAR DEMENTIA WITHOUT BEHAVIORAL DISTURBANCE: ICD-10-CM

## 2020-10-12 DIAGNOSIS — E78.5 HYPERLIPIDEMIA, UNSPECIFIED HYPERLIPIDEMIA TYPE: Primary | ICD-10-CM

## 2020-10-12 DIAGNOSIS — I10 ESSENTIAL HYPERTENSION: ICD-10-CM

## 2020-10-12 PROCEDURE — 99350 PR HOME VISIT,ESTAB PATIENT,LEVEL IV: ICD-10-PCS | Mod: S$GLB,,, | Performed by: NURSE PRACTITIONER

## 2020-10-12 PROCEDURE — 1101F PT FALLS ASSESS-DOCD LE1/YR: CPT | Mod: CPTII,S$GLB,, | Performed by: NURSE PRACTITIONER

## 2020-10-12 PROCEDURE — 1159F MED LIST DOCD IN RCRD: CPT | Mod: S$GLB,,, | Performed by: NURSE PRACTITIONER

## 2020-10-12 PROCEDURE — 1159F PR MEDICATION LIST DOCUMENTED IN MEDICAL RECORD: ICD-10-PCS | Mod: S$GLB,,, | Performed by: NURSE PRACTITIONER

## 2020-10-12 PROCEDURE — 99350 HOME/RES VST EST HIGH MDM 60: CPT | Mod: S$GLB,,, | Performed by: NURSE PRACTITIONER

## 2020-10-12 PROCEDURE — 1101F PR PT FALLS ASSESS DOC 0-1 FALLS W/OUT INJ PAST YR: ICD-10-PCS | Mod: CPTII,S$GLB,, | Performed by: NURSE PRACTITIONER

## 2020-10-12 NOTE — PROGRESS NOTES
[facility]  Medical Home Visit    Visit Date: 10/12/2020  Encounter Provider: Tracie Gutierrez NP  PCP:  Anna Wood MD    Subjective:      Patient ID: Lilia Hidalgo is a 89 y.o. female.    Consult Requested By:  Tracie Gutierrez  Reason for Consult:  Routine Follow up    Lilia is being seen at home due to mobility      Chief Complaint: Dementia, Hypertension, and Hyperlipidemia    Pt is an 89y/o female being seen today for routine follow up. She has a history of HTN, HLD, Depression, Incontinent, dementia, impaired gait. She resides in an assisted living facility. Her daughter was available by phone. She is found sitting at the table in the group room of her memory care unit. She was previously living in a apartment in the facility but as her dementia has worsened she was moved to the memory care unit since our last visit. She is awake and alert, confused over date. Reports she is doing well. Does have some pain in her hip and legs which is chronic and not worsening. She cannot review her medications as the nurse from the facility brings her medications.    She is in good spirits, smiling and staff report she is more active and participating in bingo and unit activities since she has moved to this unit.       Review of Systems   Constitutional: Negative for activity change, fatigue and fever.   HENT: Negative.    Eyes: Negative.    Respiratory: Negative for chest tightness.    Cardiovascular: Negative.  Negative for leg swelling.   Gastrointestinal: Negative.    Endocrine: Negative.    Genitourinary: Negative.         Incontinent   Musculoskeletal: Positive for gait problem.   Skin: Negative.    Allergic/Immunologic: Negative.    Hematological: Negative.    Psychiatric/Behavioral: Positive for confusion. Negative for agitation.   All other systems reviewed and are negative.      Assessments:  · Environmental: lives in an assisted living  · Functional Status: requires some assistance  · Safety:  fall risk  · Nutritional: adequate food in home  · Home Health/DME/Supplies: walker, wheelchair    Objective:   Physical Exam  Vitals signs reviewed.   Constitutional:       General: She is not in acute distress.     Appearance: She is well-developed.   HENT:      Head: Normocephalic and atraumatic.   Eyes:      Pupils: Pupils are equal, round, and reactive to light.   Neck:      Musculoskeletal: Normal range of motion and neck supple.   Cardiovascular:      Rate and Rhythm: Normal rate and regular rhythm.      Heart sounds: Normal heart sounds.   Pulmonary:      Effort: Pulmonary effort is normal.      Breath sounds: Normal breath sounds.   Abdominal:      General: Bowel sounds are normal.      Palpations: Abdomen is soft.   Musculoskeletal: Normal range of motion.   Skin:     General: Skin is warm and dry.   Neurological:      Mental Status: She is alert.      Cranial Nerves: No cranial nerve deficit.   Psychiatric:         Behavior: Behavior normal.         Vitals:    10/12/20 1541   BP: 124/62   Pulse: 70   Resp: 18   Temp: 97.8 °F (36.6 °C)   SpO2: 97%     There is no height or weight on file to calculate BMI.    Assessment:   Lilia was seen today for dementia, hypertension and hyperlipidemia.    Diagnoses and all orders for this visit:    Hyperlipidemia, unspecified hyperlipidemia type    Vascular dementia without behavioral disturbance  -     Ambulatory referral/consult to [facility]    Essential hypertension    Major depression, recurrent, chronic        Plan:            Lilia was seen today for dementia, depression, hyperlipidemia and hypertension.    Diagnoses and all orders for this visit:    Major depression, recurrent, chronic  Stable on Lexapro  Reports not seeing family due to COVID quarantine has caused some sadness but her plants and garden make her feel better, participating in unit activities make her happy  Continue current plan of care  Notify for any  worsening symptoms    Vascular dementia without behavioral disturbance  Stable  Continue Aricept  Continue to monitor    Essential hypertension  Stable, reading WNL today  Continue current plan of care    Hyperlipidemia, unspecified hyperlipidemia type  Stable on statin.  Continue to monitor      Were controlled substances prescribed?  No    Follow Up Appointments:   Future Appointments   Date Time Provider Department Center   12/30/2020  3:00 PM Tracie Gutierrez NP [address]   Attestation: Screening criteria to assess the level of the patient's risk for infection with COVID-19 as recommended by the CDC at the time of the above documented home visit concluded appropriateness to proceed. Universal precautions were maintained at all times, including provider use of >60% alcohol gel hand  immediately prior to entry and upon departing the patient's home as well as cleaning of equipment used in home visit with antibacterial/germicidal disposable wipes.      Signature:  Tracie Gutierrez NP    Patient consent obtained prior to treatment at this visit.

## 2020-10-15 RX ORDER — NITROGLYCERIN 0.4 MG/1
0.4 TABLET SUBLINGUAL EVERY 5 MIN PRN
Qty: 15 TABLET | Refills: 3 | Status: SHIPPED | OUTPATIENT
Start: 2020-10-15

## 2020-10-15 NOTE — TELEPHONE ENCOUNTER
----- Message from Tammy Casas sent at 10/15/2020 12:56 PM CDT -----  [facility]   Requesting an RX refill or new RX.  Is this a refill or new RX:  new  RX name and strength: nitroGLYCERIN (NITROSTAT) 0.4 MG SL tablet  Is this a 30 day or 90 day RX:    Pharmacy name and phone # (copy/paste from chart):   [facility] [place] [address] [phone] (Phone)  [phone] (Fax)  Comments:

## 2020-11-06 DIAGNOSIS — G89.29 CHRONIC PAIN OF LEFT KNEE: ICD-10-CM

## 2020-11-06 DIAGNOSIS — M25.562 CHRONIC PAIN OF LEFT KNEE: ICD-10-CM

## 2020-11-06 DIAGNOSIS — M25.552 LEFT HIP PAIN: ICD-10-CM

## 2020-11-06 RX ORDER — FENTANYL 12.5 UG/1
1 PATCH TRANSDERMAL
Qty: 10 PATCH | Refills: 0 | Status: SHIPPED | OUTPATIENT
Start: 2020-11-06 | End: 2020-12-09 | Stop reason: SDUPTHER

## 2020-11-06 NOTE — TELEPHONE ENCOUNTER
----- Message from Meghan Monge sent at 11/6/2020  9:11 AM CST -----  Regarding: refill reqeust  Contact: Poli Hua [phone]  Requesting an RX refill or new RX.  Is this a refill or new RX: refill  RX name and strength:fentaNYL (DURAGESIC) 12 mcg/hr PT72  Is this a 30 day or 90 day RX: 90  Pharmacy name and phone [facility] [address] [phone] (Phone) [phone] (Fax)

## 2020-12-09 DIAGNOSIS — M25.562 CHRONIC PAIN OF LEFT KNEE: ICD-10-CM

## 2020-12-09 DIAGNOSIS — G89.29 CHRONIC PAIN OF LEFT KNEE: ICD-10-CM

## 2020-12-09 DIAGNOSIS — M25.552 LEFT HIP PAIN: ICD-10-CM

## 2020-12-09 RX ORDER — FENTANYL 12.5 UG/1
1 PATCH TRANSDERMAL
Qty: 10 PATCH | Refills: 0 | Status: SHIPPED | OUTPATIENT
Start: 2020-12-09 | End: 2021-01-11 | Stop reason: SDUPTHER

## 2020-12-09 NOTE — TELEPHONE ENCOUNTER
Pt dtr, Belkys, called, she needs fentanly patch reordered please.    Pended med, confirmed phamacy, [facility].

## 2021-01-11 DIAGNOSIS — M25.552 LEFT HIP PAIN: ICD-10-CM

## 2021-01-11 DIAGNOSIS — M25.562 CHRONIC PAIN OF LEFT KNEE: ICD-10-CM

## 2021-01-11 DIAGNOSIS — G89.29 CHRONIC PAIN OF LEFT KNEE: ICD-10-CM

## 2021-01-11 RX ORDER — FENTANYL 12.5 UG/1
1 PATCH TRANSDERMAL
Qty: 10 PATCH | Refills: 0 | Status: SHIPPED | OUTPATIENT
Start: 2021-01-11 | End: 2021-02-10 | Stop reason: SDUPTHER

## 2021-01-20 ENCOUNTER — CARE AT HOME (OUTPATIENT)
Dept: HOME HEALTH SERVICES | Facility: CLINIC | Age: 86
End: 2021-01-20
Payer: MEDICARE

## 2021-01-20 DIAGNOSIS — F01.50 VASCULAR DEMENTIA WITHOUT BEHAVIORAL DISTURBANCE: ICD-10-CM

## 2021-01-20 DIAGNOSIS — I70.0 ATHEROSCLEROSIS OF AORTA: ICD-10-CM

## 2021-01-20 DIAGNOSIS — F33.9 MAJOR DEPRESSION, RECURRENT, CHRONIC: ICD-10-CM

## 2021-01-20 DIAGNOSIS — I11.0 HYPERTENSIVE HEART DISEASE WITH HEART FAILURE: ICD-10-CM

## 2021-01-20 PROCEDURE — 1159F PR MEDICATION LIST DOCUMENTED IN MEDICAL RECORD: ICD-10-PCS | Mod: S$GLB,,, | Performed by: NURSE PRACTITIONER

## 2021-01-20 PROCEDURE — 99350 HOME/RES VST EST HIGH MDM 60: CPT | Mod: S$GLB,,, | Performed by: NURSE PRACTITIONER

## 2021-01-20 PROCEDURE — 1159F MED LIST DOCD IN RCRD: CPT | Mod: S$GLB,,, | Performed by: NURSE PRACTITIONER

## 2021-01-20 PROCEDURE — 99350 PR HOME VISIT,ESTAB PATIENT,LEVEL IV: ICD-10-PCS | Mod: S$GLB,,, | Performed by: NURSE PRACTITIONER

## 2021-02-10 DIAGNOSIS — M25.562 CHRONIC PAIN OF LEFT KNEE: ICD-10-CM

## 2021-02-10 DIAGNOSIS — M25.552 LEFT HIP PAIN: ICD-10-CM

## 2021-02-10 DIAGNOSIS — G89.29 CHRONIC PAIN OF LEFT KNEE: ICD-10-CM

## 2021-02-10 RX ORDER — FENTANYL 12.5 UG/1
1 PATCH TRANSDERMAL
Qty: 10 PATCH | Refills: 0 | Status: SHIPPED | OUTPATIENT
Start: 2021-02-10 | End: 2021-03-17 | Stop reason: SDUPTHER

## 2021-03-11 ENCOUNTER — PATIENT MESSAGE (OUTPATIENT)
Dept: PRIMARY CARE CLINIC | Facility: CLINIC | Age: 86
End: 2021-03-11

## 2021-03-11 ENCOUNTER — TELEPHONE (OUTPATIENT)
Dept: PRIMARY CARE CLINIC | Facility: CLINIC | Age: 86
End: 2021-03-11

## 2021-03-15 ENCOUNTER — PATIENT MESSAGE (OUTPATIENT)
Dept: PRIMARY CARE CLINIC | Facility: CLINIC | Age: 86
End: 2021-03-15

## 2021-03-15 ENCOUNTER — TELEPHONE (OUTPATIENT)
Dept: PRIMARY CARE CLINIC | Facility: CLINIC | Age: 86
End: 2021-03-15

## 2021-03-15 ENCOUNTER — CARE AT HOME (OUTPATIENT)
Dept: HOME HEALTH SERVICES | Facility: CLINIC | Age: 86
End: 2021-03-15
Payer: MEDICARE

## 2021-03-15 VITALS
RESPIRATION RATE: 18 BRPM | HEART RATE: 80 BPM | DIASTOLIC BLOOD PRESSURE: 72 MMHG | OXYGEN SATURATION: 97 % | SYSTOLIC BLOOD PRESSURE: 132 MMHG

## 2021-03-15 DIAGNOSIS — F01.50 VASCULAR DEMENTIA WITHOUT BEHAVIORAL DISTURBANCE: ICD-10-CM

## 2021-03-15 DIAGNOSIS — I11.0 HYPERTENSIVE HEART DISEASE WITH HEART FAILURE: ICD-10-CM

## 2021-03-15 DIAGNOSIS — E11.69 TYPE 2 DIABETES MELLITUS WITH HYPERLIPIDEMIA: ICD-10-CM

## 2021-03-15 DIAGNOSIS — H61.23 BILATERAL IMPACTED CERUMEN: Primary | ICD-10-CM

## 2021-03-15 DIAGNOSIS — E78.5 TYPE 2 DIABETES MELLITUS WITH HYPERLIPIDEMIA: ICD-10-CM

## 2021-03-15 DIAGNOSIS — M24.28 LIGAMENTUM FLAVUM HYPERTROPHY: ICD-10-CM

## 2021-03-15 DIAGNOSIS — H61.20 IMPACTED CERUMEN, UNSPECIFIED LATERALITY: Primary | ICD-10-CM

## 2021-03-15 DIAGNOSIS — F33.9 MAJOR DEPRESSION, RECURRENT, CHRONIC: ICD-10-CM

## 2021-03-15 DIAGNOSIS — I70.0 ATHEROSCLEROSIS OF AORTA: ICD-10-CM

## 2021-03-15 DIAGNOSIS — M48.07 LUMBOSACRAL STENOSIS WITH NEUROGENIC CLAUDICATION: ICD-10-CM

## 2021-03-15 PROCEDURE — 99350 PR HOME VISIT,ESTAB PATIENT,LEVEL IV: ICD-10-PCS | Mod: S$GLB,,, | Performed by: NURSE PRACTITIONER

## 2021-03-15 PROCEDURE — 1159F MED LIST DOCD IN RCRD: CPT | Mod: S$GLB,,, | Performed by: NURSE PRACTITIONER

## 2021-03-15 PROCEDURE — 99350 HOME/RES VST EST HIGH MDM 60: CPT | Mod: S$GLB,,, | Performed by: NURSE PRACTITIONER

## 2021-03-15 PROCEDURE — 1159F PR MEDICATION LIST DOCUMENTED IN MEDICAL RECORD: ICD-10-PCS | Mod: S$GLB,,, | Performed by: NURSE PRACTITIONER

## 2021-03-17 ENCOUNTER — OFFICE VISIT (OUTPATIENT)
Dept: OTOLARYNGOLOGY | Facility: CLINIC | Age: 86
End: 2021-03-17
Payer: MEDICARE

## 2021-03-17 DIAGNOSIS — M25.562 CHRONIC PAIN OF LEFT KNEE: ICD-10-CM

## 2021-03-17 DIAGNOSIS — H61.23 BILATERAL IMPACTED CERUMEN: ICD-10-CM

## 2021-03-17 DIAGNOSIS — G89.29 CHRONIC PAIN OF LEFT KNEE: ICD-10-CM

## 2021-03-17 DIAGNOSIS — M25.552 LEFT HIP PAIN: ICD-10-CM

## 2021-03-17 PROCEDURE — 1157F ADVNC CARE PLAN IN RCRD: CPT | Mod: S$GLB,,, | Performed by: NURSE PRACTITIONER

## 2021-03-17 PROCEDURE — 99999 PR PBB SHADOW E&M-EST. PATIENT-LVL III: ICD-10-PCS | Mod: PBBFAC,,, | Performed by: NURSE PRACTITIONER

## 2021-03-17 PROCEDURE — 1159F MED LIST DOCD IN RCRD: CPT | Mod: S$GLB,,, | Performed by: NURSE PRACTITIONER

## 2021-03-17 PROCEDURE — 69210 EAR CERUMEN REMOVAL: ICD-10-PCS | Mod: S$GLB,,, | Performed by: NURSE PRACTITIONER

## 2021-03-17 PROCEDURE — 69210 REMOVE IMPACTED EAR WAX UNI: CPT | Mod: S$GLB,,, | Performed by: NURSE PRACTITIONER

## 2021-03-17 PROCEDURE — 1126F PR PAIN SEVERITY QUANTIFIED, NO PAIN PRESENT: ICD-10-PCS | Mod: S$GLB,,, | Performed by: NURSE PRACTITIONER

## 2021-03-17 PROCEDURE — 99202 OFFICE O/P NEW SF 15 MIN: CPT | Mod: 25,S$GLB,, | Performed by: NURSE PRACTITIONER

## 2021-03-17 PROCEDURE — 1101F PR PT FALLS ASSESS DOC 0-1 FALLS W/OUT INJ PAST YR: ICD-10-PCS | Mod: CPTII,S$GLB,, | Performed by: NURSE PRACTITIONER

## 2021-03-17 PROCEDURE — 3288F FALL RISK ASSESSMENT DOCD: CPT | Mod: CPTII,S$GLB,, | Performed by: NURSE PRACTITIONER

## 2021-03-17 PROCEDURE — 1157F PR ADVANCE CARE PLAN OR EQUIV PRESENT IN MEDICAL RECORD: ICD-10-PCS | Mod: S$GLB,,, | Performed by: NURSE PRACTITIONER

## 2021-03-17 PROCEDURE — 1101F PT FALLS ASSESS-DOCD LE1/YR: CPT | Mod: CPTII,S$GLB,, | Performed by: NURSE PRACTITIONER

## 2021-03-17 PROCEDURE — 99202 PR OFFICE/OUTPT VISIT, NEW, LEVL II, 15-29 MIN: ICD-10-PCS | Mod: 25,S$GLB,, | Performed by: NURSE PRACTITIONER

## 2021-03-17 PROCEDURE — 99999 PR PBB SHADOW E&M-EST. PATIENT-LVL III: CPT | Mod: PBBFAC,,, | Performed by: NURSE PRACTITIONER

## 2021-03-17 PROCEDURE — 1159F PR MEDICATION LIST DOCUMENTED IN MEDICAL RECORD: ICD-10-PCS | Mod: S$GLB,,, | Performed by: NURSE PRACTITIONER

## 2021-03-17 PROCEDURE — 1126F AMNT PAIN NOTED NONE PRSNT: CPT | Mod: S$GLB,,, | Performed by: NURSE PRACTITIONER

## 2021-03-17 PROCEDURE — 3288F PR FALLS RISK ASSESSMENT DOCUMENTED: ICD-10-PCS | Mod: CPTII,S$GLB,, | Performed by: NURSE PRACTITIONER

## 2021-03-17 RX ORDER — FENTANYL 12.5 UG/1
1 PATCH TRANSDERMAL
Qty: 10 PATCH | Refills: 0 | Status: SHIPPED | OUTPATIENT
Start: 2021-03-17 | End: 2021-04-19 | Stop reason: SDUPTHER

## 2021-03-22 ENCOUNTER — OFFICE VISIT (OUTPATIENT)
Dept: PRIMARY CARE CLINIC | Facility: CLINIC | Age: 86
End: 2021-03-22
Payer: MEDICARE

## 2021-03-22 DIAGNOSIS — M46.99 UNSPECIFIED INFLAMMATORY SPONDYLOPATHY, MULTIPLE SITES IN SPINE: ICD-10-CM

## 2021-03-22 DIAGNOSIS — F01.50 VASCULAR DEMENTIA WITHOUT BEHAVIORAL DISTURBANCE: ICD-10-CM

## 2021-03-22 PROCEDURE — 99443 PR PHYSICIAN TELEPHONE EVALUATION 21-30 MIN: ICD-10-PCS | Mod: 95,,, | Performed by: INTERNAL MEDICINE

## 2021-03-22 PROCEDURE — 1157F PR ADVANCE CARE PLAN OR EQUIV PRESENT IN MEDICAL RECORD: ICD-10-PCS | Mod: 95,,, | Performed by: INTERNAL MEDICINE

## 2021-03-22 PROCEDURE — 1157F ADVNC CARE PLAN IN RCRD: CPT | Mod: 95,,, | Performed by: INTERNAL MEDICINE

## 2021-03-22 PROCEDURE — 99443 PR PHYSICIAN TELEPHONE EVALUATION 21-30 MIN: CPT | Mod: 95,,, | Performed by: INTERNAL MEDICINE

## 2021-04-05 DIAGNOSIS — I10 ESSENTIAL HYPERTENSION: ICD-10-CM

## 2021-04-05 RX ORDER — LOSARTAN POTASSIUM 100 MG/1
TABLET ORAL
Qty: 90 TABLET | Refills: 3 | Status: ON HOLD | OUTPATIENT
Start: 2021-04-05 | End: 2022-04-01 | Stop reason: SDUPTHER

## 2021-04-19 DIAGNOSIS — M25.552 LEFT HIP PAIN: ICD-10-CM

## 2021-04-19 DIAGNOSIS — G89.29 CHRONIC PAIN OF LEFT KNEE: ICD-10-CM

## 2021-04-19 DIAGNOSIS — M25.562 CHRONIC PAIN OF LEFT KNEE: ICD-10-CM

## 2021-04-20 RX ORDER — FENTANYL 12.5 UG/1
1 PATCH TRANSDERMAL
Qty: 10 PATCH | Refills: 0 | Status: SHIPPED | OUTPATIENT
Start: 2021-04-20 | End: 2021-05-25 | Stop reason: SDUPTHER

## 2021-04-28 ENCOUNTER — PES CALL (OUTPATIENT)
Dept: ADMINISTRATIVE | Facility: CLINIC | Age: 86
End: 2021-04-28

## 2021-05-25 ENCOUNTER — OFFICE VISIT (OUTPATIENT)
Dept: PRIMARY CARE CLINIC | Facility: CLINIC | Age: 86
End: 2021-05-25
Payer: MEDICARE

## 2021-05-25 DIAGNOSIS — G89.29 CHRONIC PAIN OF LEFT KNEE: ICD-10-CM

## 2021-05-25 DIAGNOSIS — F33.9 MAJOR DEPRESSION, RECURRENT, CHRONIC: Primary | ICD-10-CM

## 2021-05-25 DIAGNOSIS — Z66 DNR (DO NOT RESUSCITATE): ICD-10-CM

## 2021-05-25 DIAGNOSIS — M25.562 CHRONIC PAIN OF LEFT KNEE: ICD-10-CM

## 2021-05-25 DIAGNOSIS — F01.50 VASCULAR DEMENTIA WITHOUT BEHAVIORAL DISTURBANCE: ICD-10-CM

## 2021-05-25 DIAGNOSIS — G31.84 MILD COGNITIVE IMPAIRMENT: ICD-10-CM

## 2021-05-25 DIAGNOSIS — I10 ESSENTIAL HYPERTENSION: ICD-10-CM

## 2021-05-25 DIAGNOSIS — M25.552 LEFT HIP PAIN: ICD-10-CM

## 2021-05-25 PROCEDURE — 99443 PR PHYSICIAN TELEPHONE EVALUATION 21-30 MIN: CPT | Mod: 95,,, | Performed by: INTERNAL MEDICINE

## 2021-05-25 PROCEDURE — 99443 PR PHYSICIAN TELEPHONE EVALUATION 21-30 MIN: ICD-10-PCS | Mod: 95,,, | Performed by: INTERNAL MEDICINE

## 2021-05-25 PROCEDURE — 1157F PR ADVANCE CARE PLAN OR EQUIV PRESENT IN MEDICAL RECORD: ICD-10-PCS | Mod: 95,,, | Performed by: INTERNAL MEDICINE

## 2021-05-25 PROCEDURE — 1157F ADVNC CARE PLAN IN RCRD: CPT | Mod: 95,,, | Performed by: INTERNAL MEDICINE

## 2021-05-26 RX ORDER — FENTANYL 12.5 UG/1
1 PATCH TRANSDERMAL
Qty: 10 PATCH | Refills: 0 | Status: SHIPPED | OUTPATIENT
Start: 2021-05-26 | End: 2021-06-29 | Stop reason: SDUPTHER

## 2021-05-28 ENCOUNTER — TELEPHONE (OUTPATIENT)
Dept: PRIMARY CARE CLINIC | Facility: CLINIC | Age: 86
End: 2021-05-28

## 2021-06-04 ENCOUNTER — TELEPHONE (OUTPATIENT)
Dept: INTERNAL MEDICINE | Facility: CLINIC | Age: 86
End: 2021-06-04

## 2021-06-17 ENCOUNTER — PES CALL (OUTPATIENT)
Dept: ADMINISTRATIVE | Facility: CLINIC | Age: 86
End: 2021-06-17

## 2021-06-22 ENCOUNTER — PES CALL (OUTPATIENT)
Dept: ADMINISTRATIVE | Facility: CLINIC | Age: 86
End: 2021-06-22

## 2021-06-29 DIAGNOSIS — G89.29 CHRONIC PAIN OF LEFT KNEE: ICD-10-CM

## 2021-06-29 DIAGNOSIS — M25.552 LEFT HIP PAIN: ICD-10-CM

## 2021-06-29 DIAGNOSIS — M25.562 CHRONIC PAIN OF LEFT KNEE: ICD-10-CM

## 2021-06-29 RX ORDER — FENTANYL 12.5 UG/1
1 PATCH TRANSDERMAL
Qty: 10 PATCH | Refills: 0 | Status: SHIPPED | OUTPATIENT
Start: 2021-06-29 | End: 2021-07-28 | Stop reason: SDUPTHER

## 2021-07-28 DIAGNOSIS — M25.562 CHRONIC PAIN OF LEFT KNEE: ICD-10-CM

## 2021-07-28 DIAGNOSIS — M25.552 LEFT HIP PAIN: ICD-10-CM

## 2021-07-28 DIAGNOSIS — G89.29 CHRONIC PAIN OF LEFT KNEE: ICD-10-CM

## 2021-07-28 RX ORDER — FENTANYL 12.5 UG/1
1 PATCH TRANSDERMAL
Qty: 10 PATCH | Refills: 0 | Status: SHIPPED | OUTPATIENT
Start: 2021-07-28 | End: 2021-09-07 | Stop reason: SDUPTHER

## 2021-08-02 ENCOUNTER — PATIENT MESSAGE (OUTPATIENT)
Dept: PRIMARY CARE CLINIC | Facility: CLINIC | Age: 86
End: 2021-08-02

## 2021-08-02 ENCOUNTER — OFFICE VISIT (OUTPATIENT)
Dept: PRIMARY CARE CLINIC | Facility: CLINIC | Age: 86
End: 2021-08-02
Payer: MEDICARE

## 2021-08-02 VITALS — SYSTOLIC BLOOD PRESSURE: 130 MMHG | DIASTOLIC BLOOD PRESSURE: 60 MMHG

## 2021-08-02 DIAGNOSIS — Z66 DNR (DO NOT RESUSCITATE): ICD-10-CM

## 2021-08-02 DIAGNOSIS — M48.07 LUMBOSACRAL STENOSIS WITH NEUROGENIC CLAUDICATION: ICD-10-CM

## 2021-08-02 PROCEDURE — 1157F PR ADVANCE CARE PLAN OR EQUIV PRESENT IN MEDICAL RECORD: ICD-10-PCS | Mod: CPTII,95,, | Performed by: INTERNAL MEDICINE

## 2021-08-02 PROCEDURE — 99443 PR PHYSICIAN TELEPHONE EVALUATION 21-30 MIN: ICD-10-PCS | Mod: 95,,, | Performed by: INTERNAL MEDICINE

## 2021-08-02 PROCEDURE — 1157F ADVNC CARE PLAN IN RCRD: CPT | Mod: CPTII,95,, | Performed by: INTERNAL MEDICINE

## 2021-08-02 PROCEDURE — 99443 PR PHYSICIAN TELEPHONE EVALUATION 21-30 MIN: CPT | Mod: 95,,, | Performed by: INTERNAL MEDICINE

## 2021-08-03 ENCOUNTER — PATIENT MESSAGE (OUTPATIENT)
Dept: ADMINISTRATIVE | Facility: HOSPITAL | Age: 86
End: 2021-08-03

## 2021-08-11 ENCOUNTER — OFFICE VISIT (OUTPATIENT)
Dept: HOME HEALTH SERVICES | Facility: CLINIC | Age: 86
End: 2021-08-11
Payer: MEDICARE

## 2021-08-11 VITALS
HEIGHT: 66 IN | HEART RATE: 57 BPM | SYSTOLIC BLOOD PRESSURE: 124 MMHG | BODY MASS INDEX: 31.5 KG/M2 | WEIGHT: 196 LBS | TEMPERATURE: 98 F | OXYGEN SATURATION: 95 % | DIASTOLIC BLOOD PRESSURE: 76 MMHG

## 2021-08-11 DIAGNOSIS — Z00.00 ENCOUNTER FOR PREVENTIVE HEALTH EXAMINATION: Primary | ICD-10-CM

## 2021-08-11 DIAGNOSIS — E78.5 HYPERLIPIDEMIA, UNSPECIFIED HYPERLIPIDEMIA TYPE: ICD-10-CM

## 2021-08-11 DIAGNOSIS — E66.9 CLASS 1 OBESITY WITH SERIOUS COMORBIDITY AND BODY MASS INDEX (BMI) OF 31.0 TO 31.9 IN ADULT, UNSPECIFIED OBESITY TYPE: ICD-10-CM

## 2021-08-11 DIAGNOSIS — M24.28 LIGAMENTUM FLAVUM HYPERTROPHY: ICD-10-CM

## 2021-08-11 DIAGNOSIS — I11.0 HYPERTENSIVE HEART DISEASE WITH HEART FAILURE: ICD-10-CM

## 2021-08-11 DIAGNOSIS — I70.0 ATHEROSCLEROSIS OF AORTA: ICD-10-CM

## 2021-08-11 DIAGNOSIS — R26.9 ABNORMALITY OF GAIT AND MOBILITY: ICD-10-CM

## 2021-08-11 DIAGNOSIS — F01.50 VASCULAR DEMENTIA WITHOUT BEHAVIORAL DISTURBANCE: ICD-10-CM

## 2021-08-11 DIAGNOSIS — E78.5 TYPE 2 DIABETES MELLITUS WITH HYPERLIPIDEMIA: ICD-10-CM

## 2021-08-11 DIAGNOSIS — E11.69 TYPE 2 DIABETES MELLITUS WITH HYPERLIPIDEMIA: ICD-10-CM

## 2021-08-11 DIAGNOSIS — F33.9 MAJOR DEPRESSION, RECURRENT, CHRONIC: ICD-10-CM

## 2021-08-11 DIAGNOSIS — Z99.3 DEPENDENCE ON WHEELCHAIR: ICD-10-CM

## 2021-08-11 DIAGNOSIS — R60.0 BILATERAL EDEMA OF LOWER EXTREMITY: ICD-10-CM

## 2021-08-11 PROBLEM — E66.811 CLASS 1 OBESITY WITH SERIOUS COMORBIDITY AND BODY MASS INDEX (BMI) OF 31.0 TO 31.9 IN ADULT: Status: ACTIVE | Noted: 2021-08-11

## 2021-08-11 PROCEDURE — 1159F PR MEDICATION LIST DOCUMENTED IN MEDICAL RECORD: ICD-10-PCS | Mod: CPTII,S$GLB,, | Performed by: NURSE PRACTITIONER

## 2021-08-11 PROCEDURE — 1159F MED LIST DOCD IN RCRD: CPT | Mod: CPTII,S$GLB,, | Performed by: NURSE PRACTITIONER

## 2021-08-11 PROCEDURE — 1157F ADVNC CARE PLAN IN RCRD: CPT | Mod: CPTII,S$GLB,, | Performed by: NURSE PRACTITIONER

## 2021-08-11 PROCEDURE — 1125F AMNT PAIN NOTED PAIN PRSNT: CPT | Mod: CPTII,S$GLB,, | Performed by: NURSE PRACTITIONER

## 2021-08-11 PROCEDURE — 99499 RISK ADDL DX/OHS AUDIT: ICD-10-PCS | Mod: S$GLB,,, | Performed by: NURSE PRACTITIONER

## 2021-08-11 PROCEDURE — 1101F PR PT FALLS ASSESS DOC 0-1 FALLS W/OUT INJ PAST YR: ICD-10-PCS | Mod: CPTII,S$GLB,, | Performed by: NURSE PRACTITIONER

## 2021-08-11 PROCEDURE — 1101F PT FALLS ASSESS-DOCD LE1/YR: CPT | Mod: CPTII,S$GLB,, | Performed by: NURSE PRACTITIONER

## 2021-08-11 PROCEDURE — 3288F FALL RISK ASSESSMENT DOCD: CPT | Mod: CPTII,S$GLB,, | Performed by: NURSE PRACTITIONER

## 2021-08-11 PROCEDURE — 3288F PR FALLS RISK ASSESSMENT DOCUMENTED: ICD-10-PCS | Mod: CPTII,S$GLB,, | Performed by: NURSE PRACTITIONER

## 2021-08-11 PROCEDURE — G0439 PPPS, SUBSEQ VISIT: HCPCS | Mod: S$GLB,,, | Performed by: NURSE PRACTITIONER

## 2021-08-11 PROCEDURE — 1160F PR REVIEW ALL MEDS BY PRESCRIBER/CLIN PHARMACIST DOCUMENTED: ICD-10-PCS | Mod: CPTII,S$GLB,, | Performed by: NURSE PRACTITIONER

## 2021-08-11 PROCEDURE — 99499 UNLISTED E&M SERVICE: CPT | Mod: S$GLB,,, | Performed by: NURSE PRACTITIONER

## 2021-08-11 PROCEDURE — G0439 PR MEDICARE ANNUAL WELLNESS SUBSEQUENT VISIT: ICD-10-PCS | Mod: S$GLB,,, | Performed by: NURSE PRACTITIONER

## 2021-08-11 PROCEDURE — 1125F PR PAIN SEVERITY QUANTIFIED, PAIN PRESENT: ICD-10-PCS | Mod: CPTII,S$GLB,, | Performed by: NURSE PRACTITIONER

## 2021-08-11 PROCEDURE — 1160F RVW MEDS BY RX/DR IN RCRD: CPT | Mod: CPTII,S$GLB,, | Performed by: NURSE PRACTITIONER

## 2021-08-11 PROCEDURE — 1157F PR ADVANCE CARE PLAN OR EQUIV PRESENT IN MEDICAL RECORD: ICD-10-PCS | Mod: CPTII,S$GLB,, | Performed by: NURSE PRACTITIONER

## 2021-08-23 ENCOUNTER — PATIENT MESSAGE (OUTPATIENT)
Dept: INTERNAL MEDICINE | Facility: CLINIC | Age: 86
End: 2021-08-23

## 2021-09-07 DIAGNOSIS — M25.552 LEFT HIP PAIN: ICD-10-CM

## 2021-09-07 DIAGNOSIS — M25.562 CHRONIC PAIN OF LEFT KNEE: ICD-10-CM

## 2021-09-07 DIAGNOSIS — G89.29 CHRONIC PAIN OF LEFT KNEE: ICD-10-CM

## 2021-09-07 RX ORDER — FENTANYL 12.5 UG/1
1 PATCH TRANSDERMAL
Qty: 10 PATCH | Refills: 0 | Status: SHIPPED | OUTPATIENT
Start: 2021-09-07 | End: 2021-09-28 | Stop reason: SDUPTHER

## 2021-09-28 DIAGNOSIS — G89.29 CHRONIC PAIN OF LEFT KNEE: ICD-10-CM

## 2021-09-28 DIAGNOSIS — M25.562 CHRONIC PAIN OF LEFT KNEE: ICD-10-CM

## 2021-09-28 DIAGNOSIS — M25.552 LEFT HIP PAIN: ICD-10-CM

## 2021-09-28 RX ORDER — FENTANYL 12.5 UG/1
1 PATCH TRANSDERMAL
Qty: 10 PATCH | Refills: 0 | Status: SHIPPED | OUTPATIENT
Start: 2021-09-28 | End: 2021-10-27 | Stop reason: SDUPTHER

## 2021-10-08 ENCOUNTER — TELEPHONE (OUTPATIENT)
Dept: PRIMARY CARE CLINIC | Facility: CLINIC | Age: 86
End: 2021-10-08

## 2021-10-15 ENCOUNTER — TELEPHONE (OUTPATIENT)
Dept: PRIMARY CARE CLINIC | Facility: CLINIC | Age: 86
End: 2021-10-15

## 2021-10-18 ENCOUNTER — PATIENT MESSAGE (OUTPATIENT)
Dept: ADMINISTRATIVE | Facility: HOSPITAL | Age: 86
End: 2021-10-18
Payer: MEDICARE

## 2021-10-18 ENCOUNTER — TELEPHONE (OUTPATIENT)
Dept: PRIMARY CARE CLINIC | Facility: CLINIC | Age: 86
End: 2021-10-18

## 2021-10-18 ENCOUNTER — PATIENT MESSAGE (OUTPATIENT)
Dept: PRIMARY CARE CLINIC | Facility: CLINIC | Age: 86
End: 2021-10-18

## 2021-10-18 RX ORDER — POTASSIUM CHLORIDE 750 MG/1
TABLET, EXTENDED RELEASE ORAL
Qty: 45 TABLET | Refills: 11 | Status: SHIPPED | OUTPATIENT
Start: 2021-10-18

## 2021-10-18 RX ORDER — FUROSEMIDE 40 MG/1
40 TABLET ORAL 2 TIMES DAILY
Qty: 180 TABLET | Refills: 3 | Status: SHIPPED | OUTPATIENT
Start: 2021-10-18 | End: 2021-10-20 | Stop reason: DRUGHIGH

## 2021-10-19 ENCOUNTER — TELEPHONE (OUTPATIENT)
Dept: PRIMARY CARE CLINIC | Facility: CLINIC | Age: 86
End: 2021-10-19

## 2021-10-20 ENCOUNTER — TELEPHONE (OUTPATIENT)
Dept: PRIMARY CARE CLINIC | Facility: CLINIC | Age: 86
End: 2021-10-20

## 2021-10-20 DIAGNOSIS — M79.89 SWELLING OF LOWER EXTREMITY: Primary | ICD-10-CM

## 2021-10-20 RX ORDER — FUROSEMIDE 40 MG/1
40 TABLET ORAL DAILY PRN
Qty: 30 TABLET | Refills: 3 | Status: SHIPPED | OUTPATIENT
Start: 2021-10-20 | End: 2021-11-03 | Stop reason: DRUGHIGH

## 2021-10-27 DIAGNOSIS — G89.29 CHRONIC PAIN OF LEFT KNEE: ICD-10-CM

## 2021-10-27 DIAGNOSIS — M25.552 LEFT HIP PAIN: ICD-10-CM

## 2021-10-27 DIAGNOSIS — M25.562 CHRONIC PAIN OF LEFT KNEE: ICD-10-CM

## 2021-10-27 RX ORDER — FENTANYL 12.5 UG/1
1 PATCH TRANSDERMAL
Qty: 10 PATCH | Refills: 0 | Status: SHIPPED | OUTPATIENT
Start: 2021-10-27 | End: 2021-12-03 | Stop reason: SDUPTHER

## 2021-10-30 ENCOUNTER — PATIENT MESSAGE (OUTPATIENT)
Dept: PRIMARY CARE CLINIC | Facility: CLINIC | Age: 86
End: 2021-10-30
Payer: MEDICARE

## 2021-11-02 ENCOUNTER — CARE AT HOME (OUTPATIENT)
Dept: HOME HEALTH SERVICES | Facility: CLINIC | Age: 86
End: 2021-11-02
Payer: MEDICARE

## 2021-11-02 DIAGNOSIS — F01.50 VASCULAR DEMENTIA WITHOUT BEHAVIORAL DISTURBANCE: Primary | ICD-10-CM

## 2021-11-02 PROCEDURE — 99350 HOME/RES VST EST HIGH MDM 60: CPT | Mod: S$GLB,,, | Performed by: NURSE PRACTITIONER

## 2021-11-02 PROCEDURE — 99350 PR HOME VISIT,ESTAB PATIENT,LEVEL IV: ICD-10-PCS | Mod: S$GLB,,, | Performed by: NURSE PRACTITIONER

## 2021-11-03 ENCOUNTER — TELEPHONE (OUTPATIENT)
Dept: PRIMARY CARE CLINIC | Facility: CLINIC | Age: 86
End: 2021-11-03
Payer: MEDICARE

## 2021-11-03 DIAGNOSIS — I50.32 HEART FAILURE, DIASTOLIC, CHRONIC: Primary | ICD-10-CM

## 2021-11-03 DIAGNOSIS — M79.89 SWELLING OF LOWER EXTREMITY: ICD-10-CM

## 2021-11-03 RX ORDER — FUROSEMIDE 40 MG/1
80 TABLET ORAL 2 TIMES DAILY PRN
Qty: 30 TABLET | Refills: 3 | Status: SHIPPED | OUTPATIENT
Start: 2021-11-03 | End: 2022-01-12

## 2021-11-05 PROCEDURE — G0180 PR HOME HEALTH MD CERTIFICATION: ICD-10-PCS | Mod: ,,, | Performed by: INTERNAL MEDICINE

## 2021-11-05 PROCEDURE — G0180 MD CERTIFICATION HHA PATIENT: HCPCS | Mod: ,,, | Performed by: INTERNAL MEDICINE

## 2021-11-09 ENCOUNTER — TELEPHONE (OUTPATIENT)
Dept: PRIMARY CARE CLINIC | Facility: CLINIC | Age: 86
End: 2021-11-09
Payer: MEDICARE

## 2021-11-09 DIAGNOSIS — I50.32 HEART FAILURE, DIASTOLIC, CHRONIC: ICD-10-CM

## 2021-11-09 DIAGNOSIS — R60.0 BILATERAL EDEMA OF LOWER EXTREMITY: Primary | ICD-10-CM

## 2021-11-11 ENCOUNTER — EXTERNAL HOME HEALTH (OUTPATIENT)
Dept: HOME HEALTH SERVICES | Facility: HOSPITAL | Age: 86
End: 2021-11-11
Payer: MEDICARE

## 2021-12-03 DIAGNOSIS — G89.29 CHRONIC PAIN OF LEFT KNEE: ICD-10-CM

## 2021-12-03 DIAGNOSIS — M25.552 LEFT HIP PAIN: ICD-10-CM

## 2021-12-03 DIAGNOSIS — M25.562 CHRONIC PAIN OF LEFT KNEE: ICD-10-CM

## 2021-12-03 NOTE — TELEPHONE ENCOUNTER
----- Message from Kristi Sim sent at 12/3/2021  1:37 PM CST -----  Contact: Gary  Requesting an RX refill or new RX.  Is this a refill or new RX: Refill 1  RX name and strength (copy/paste from chart): fentaNYL (DURAGESIC) 12 mcg/hr PT72  Is this a 30 day or 90 day RX:   Patient advised that in the future they can use their [facility] account to request a refill?:  phone  Pharmacy name and phone # (copy/paste from chart):  [facility] [place], [place] - [address]   Phone:  [phone]  Fax:  [phone]        Comments:

## 2021-12-06 RX ORDER — FENTANYL 12.5 UG/1
1 PATCH TRANSDERMAL
Qty: 10 PATCH | Refills: 0 | Status: ON HOLD | OUTPATIENT
Start: 2021-12-06 | End: 2022-03-24

## 2021-12-26 ENCOUNTER — PATIENT MESSAGE (OUTPATIENT)
Dept: PRIMARY CARE CLINIC | Facility: CLINIC | Age: 86
End: 2021-12-26
Payer: MEDICARE

## 2022-01-03 ENCOUNTER — PATIENT MESSAGE (OUTPATIENT)
Dept: PRIMARY CARE CLINIC | Facility: CLINIC | Age: 87
End: 2022-01-03
Payer: MEDICARE

## 2022-01-03 ENCOUNTER — TELEPHONE (OUTPATIENT)
Dept: PRIMARY CARE CLINIC | Facility: CLINIC | Age: 87
End: 2022-01-03
Payer: MEDICARE

## 2022-01-03 NOTE — LETTER
January 3, 2022    Lilia Hidalgo  [address]  [address]  [facility] [place] [facility] [address]             [facility]  [address]  [facility] [address]  Phone: [phone]  Fax: [phone] To Poli in Memory care,    Please review this order for patient Lilia Hidalgo in unit/apt 708.      Please discontinue fentanyl patches for this patient. If she complains of pain please let this clinic know by calling [phone].    Sincerely,          Anna Wood MD

## 2022-01-03 NOTE — TELEPHONE ENCOUNTER
Please fax letter to [facility] [place] [facility] unit. You will need to call them to confirm fax number and whether they received it.    Thanks,  KJ

## 2022-01-04 ENCOUNTER — PATIENT MESSAGE (OUTPATIENT)
Dept: PRIMARY CARE CLINIC | Facility: CLINIC | Age: 87
End: 2022-01-04
Payer: MEDICARE

## 2022-02-01 ENCOUNTER — PATIENT MESSAGE (OUTPATIENT)
Dept: ADMINISTRATIVE | Facility: HOSPITAL | Age: 87
End: 2022-02-01
Payer: MEDICARE

## 2022-02-01 DIAGNOSIS — E11.9 TYPE 2 DIABETES MELLITUS WITHOUT COMPLICATION, UNSPECIFIED WHETHER LONG TERM INSULIN USE: ICD-10-CM

## 2022-03-21 ENCOUNTER — ANESTHESIA (OUTPATIENT)
Dept: SURGERY | Facility: HOSPITAL | Age: 87
DRG: 492 | End: 2022-03-21
Payer: MEDICARE

## 2022-03-21 ENCOUNTER — HOSPITAL ENCOUNTER (INPATIENT)
Facility: HOSPITAL | Age: 87
LOS: 11 days | Discharge: NURSING, REHAB OR LONG-TERM CARE | DRG: 492 | End: 2022-04-01
Attending: EMERGENCY MEDICINE | Admitting: INTERNAL MEDICINE
Payer: MEDICARE

## 2022-03-21 ENCOUNTER — ANESTHESIA EVENT (OUTPATIENT)
Dept: SURGERY | Facility: HOSPITAL | Age: 87
DRG: 492 | End: 2022-03-21
Payer: MEDICARE

## 2022-03-21 DIAGNOSIS — N39.0 URINARY TRACT INFECTION WITHOUT HEMATURIA, SITE UNSPECIFIED: ICD-10-CM

## 2022-03-21 DIAGNOSIS — R79.89 ELEVATED BRAIN NATRIURETIC PEPTIDE (BNP) LEVEL: ICD-10-CM

## 2022-03-21 DIAGNOSIS — R79.89 ELEVATED TROPONIN: ICD-10-CM

## 2022-03-21 DIAGNOSIS — I44.2 COMPLETE HEART BLOCK: Primary | ICD-10-CM

## 2022-03-21 DIAGNOSIS — Z95.0 TEMPORARY TRANSVENOUS CARDIAC PACEMAKER PRESENT: ICD-10-CM

## 2022-03-21 DIAGNOSIS — R00.1 BRADYCARDIA: ICD-10-CM

## 2022-03-21 DIAGNOSIS — S82.891C: ICD-10-CM

## 2022-03-21 DIAGNOSIS — N17.9 AKI (ACUTE KIDNEY INJURY): ICD-10-CM

## 2022-03-21 DIAGNOSIS — S82.891C OPEN RIGHT ANKLE FRACTURE, TYPE III, INITIAL ENCOUNTER: ICD-10-CM

## 2022-03-21 DIAGNOSIS — S82.891B OPEN RIGHT ANKLE FRACTURE: ICD-10-CM

## 2022-03-21 DIAGNOSIS — S82.851C: ICD-10-CM
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
## 2022-03-21 DIAGNOSIS — Z95.0 STATUS POST PLACEMENT OF CARDIAC PACEMAKER: ICD-10-CM

## 2022-03-21 DIAGNOSIS — I44.2 COMPLETE AV BLOCK: ICD-10-CM

## 2022-03-21 DIAGNOSIS — S82.192A OTHER CLOSED FRACTURE OF PROXIMAL END OF LEFT TIBIA, INITIAL ENCOUNTER: ICD-10-CM

## 2022-03-21 DIAGNOSIS — I50.32 CHRONIC DIASTOLIC CONGESTIVE HEART FAILURE: Chronic | ICD-10-CM

## 2022-03-21 DIAGNOSIS — I50.9 CHRONIC CONGESTIVE HEART FAILURE: ICD-10-CM

## 2022-03-21 DIAGNOSIS — I49.9 ARRHYTHMIA: ICD-10-CM

## 2022-03-21 DIAGNOSIS — I10 ESSENTIAL HYPERTENSION: ICD-10-CM

## 2022-03-21 DIAGNOSIS — I50.9 CHRONIC CONGESTIVE HEART FAILURE, UNSPECIFIED HEART FAILURE TYPE: ICD-10-CM

## 2022-03-21 LAB
ABO + RH BLD: NORMAL
ALBUMIN SERPL BCP-MCNC: 3.2 G/DL (ref 3.5–5.2)
ALP SERPL-CCNC: 73 U/L (ref 55–135)
ALT SERPL W/O P-5'-P-CCNC: 17 U/L (ref 10–44)
ANION GAP SERPL CALC-SCNC: 12 MMOL/L (ref 8–16)
APTT BLDCRRT: 22.5 SEC (ref 21–32)
ASCENDING AORTA: 2.84 CM
AST SERPL-CCNC: 17 U/L (ref 10–40)
AV INDEX (PROSTH): 0.32
AV MEAN GRADIENT: 22 MMHG
AV PEAK GRADIENT: 38 MMHG
AV VALVE AREA: 1 CM2
AV VELOCITY RATIO: 0.34
BACTERIA #/AREA URNS AUTO: ABNORMAL /HPF
BASOPHILS # BLD AUTO: 0.05 K/UL (ref 0–0.2)
BASOPHILS NFR BLD: 0.4 % (ref 0–1.9)
BILIRUB SERPL-MCNC: 1.1 MG/DL (ref 0.1–1)
BILIRUB UR QL STRIP: NEGATIVE
BLD GP AB SCN CELLS X3 SERPL QL: NORMAL
BNP SERPL-MCNC: 1070 PG/ML (ref 0–99)
BSA FOR ECHO PROCEDURE: 2.06 M2
BUN SERPL-MCNC: 36 MG/DL (ref 8–23)
CALCIUM SERPL-MCNC: 9.9 MG/DL (ref 8.7–10.5)
CHLORIDE SERPL-SCNC: 106 MMOL/L (ref 95–110)
CLARITY UR REFRACT.AUTO: ABNORMAL
CO2 SERPL-SCNC: 28 MMOL/L (ref 23–29)
COLOR UR AUTO: YELLOW
CREAT SERPL-MCNC: 1.5 MG/DL (ref 0.5–1.4)
CTP QC/QA: YES
CV ECHO LV RWT: 0.42 CM
DIFFERENTIAL METHOD: ABNORMAL
DOP CALC AO PEAK VEL: 3.08 M/S
DOP CALC AO VTI: 74 CM
DOP CALC LVOT AREA: 3.1 CM2
DOP CALC LVOT DIAMETER: 1.98 CM
DOP CALC LVOT PEAK VEL: 1.05 M/S
DOP CALC LVOT STROKE VOLUME: 73.86 CM3
DOP CALC MV VTI: 45.12 CM
DOP CALCLVOT PEAK VEL VTI: 24 CM
E WAVE DECELERATION TIME: 324.83 MSEC
E/A RATIO: 0.76
E/E' RATIO: 15.54 M/S
ECHO LV POSTERIOR WALL: 0.7 CM (ref 0.6–1.1)
EJECTION FRACTION: 65 %
EOSINOPHIL # BLD AUTO: 0.1 K/UL (ref 0–0.5)
EOSINOPHIL NFR BLD: 1.1 % (ref 0–8)
ERYTHROCYTE [DISTWIDTH] IN BLOOD BY AUTOMATED COUNT: 15.6 % (ref 11.5–14.5)
EST. GFR  (AFRICAN AMERICAN): 35.1 ML/MIN/1.73 M^2
EST. GFR  (NON AFRICAN AMERICAN): 30.5 ML/MIN/1.73 M^2
ESTIMATED AVG GLUCOSE: 108 MG/DL (ref 68–131)
FRACTIONAL SHORTENING: 47 % (ref 28–44)
GLUCOSE SERPL-MCNC: 122 MG/DL (ref 70–110)
GLUCOSE UR QL STRIP: NEGATIVE
HBA1C MFR BLD: 5.4 % (ref 4–5.6)
HCT VFR BLD AUTO: 47 % (ref 37–48.5)
HGB BLD-MCNC: 14 G/DL (ref 12–16)
HGB UR QL STRIP: NEGATIVE
HYALINE CASTS UR QL AUTO: 31 /LPF
IMM GRANULOCYTES # BLD AUTO: 0.05 K/UL (ref 0–0.04)
IMM GRANULOCYTES NFR BLD AUTO: 0.4 % (ref 0–0.5)
INR PPP: 1.1 (ref 0.8–1.2)
INTERVENTRICULAR SEPTUM: 0.81 CM (ref 0.6–1.1)
KETONES UR QL STRIP: NEGATIVE
LA MAJOR: 4.73 CM
LA WIDTH: 4.77 CM
LEFT ATRIUM SIZE: 1.98 CM
LEFT ATRIUM VOLUME INDEX MOD: 29.6 ML/M2
LEFT ATRIUM VOLUME MOD: 59.12 CM3
LEFT INTERNAL DIMENSION IN SYSTOLE: 1.79 CM (ref 2.1–4)
LEFT VENTRICLE DIASTOLIC VOLUME INDEX: 23.1 ML/M2
LEFT VENTRICLE DIASTOLIC VOLUME: 46.19 ML
LEFT VENTRICLE MASS INDEX: 33 G/M2
LEFT VENTRICLE SYSTOLIC VOLUME INDEX: 4.8 ML/M2
LEFT VENTRICLE SYSTOLIC VOLUME: 9.52 ML
LEFT VENTRICULAR INTERNAL DIMENSION IN DIASTOLE: 3.36 CM (ref 3.5–6)
LEFT VENTRICULAR MASS: 65.14 G
LEUKOCYTE ESTERASE UR QL STRIP: ABNORMAL
LV LATERAL E/E' RATIO: 14.43 M/S
LV SEPTAL E/E' RATIO: 16.83 M/S
LYMPHOCYTES # BLD AUTO: 0.9 K/UL (ref 1–4.8)
LYMPHOCYTES NFR BLD: 7.4 % (ref 18–48)
MAGNESIUM SERPL-MCNC: 2.2 MG/DL (ref 1.6–2.6)
MCH RBC QN AUTO: 27.9 PG (ref 27–31)
MCHC RBC AUTO-ENTMCNC: 29.8 G/DL (ref 32–36)
MCV RBC AUTO: 94 FL (ref 82–98)
MICROSCOPIC COMMENT: ABNORMAL
MONOCYTES # BLD AUTO: 0.6 K/UL (ref 0.3–1)
MONOCYTES NFR BLD: 4.9 % (ref 4–15)
MV MEAN GRADIENT: 1 MMHG
MV PEAK A VEL: 1.33 M/S
MV PEAK E VEL: 1.01 M/S
MV PEAK GRADIENT: 7 MMHG
MV STENOSIS PRESSURE HALF TIME: 94.2 MS
MV VALVE AREA BY CONTINUITY EQUATION: 1.64 CM2
MV VALVE AREA P 1/2 METHOD: 2.34 CM2
NEUTROPHILS # BLD AUTO: 10.5 K/UL (ref 1.8–7.7)
NEUTROPHILS NFR BLD: 85.8 % (ref 38–73)
NITRITE UR QL STRIP: NEGATIVE
NRBC BLD-RTO: 0 /100 WBC
PH UR STRIP: 5 [PH] (ref 5–8)
PHOSPHATE SERPL-MCNC: 4.1 MG/DL (ref 2.7–4.5)
PISA TR MAX VEL: 3.1 M/S
PLATELET # BLD AUTO: 247 K/UL (ref 150–450)
PMV BLD AUTO: 11 FL (ref 9.2–12.9)
POTASSIUM SERPL-SCNC: 4.6 MMOL/L (ref 3.5–5.1)
PREALB SERPL-MCNC: 24 MG/DL (ref 20–43)
PROT SERPL-MCNC: 6.9 G/DL (ref 6–8.4)
PROT UR QL STRIP: NEGATIVE
PROTHROMBIN TIME: 11.3 SEC (ref 9–12.5)
RBC # BLD AUTO: 5.02 M/UL (ref 4–5.4)
RBC #/AREA URNS AUTO: 3 /HPF (ref 0–4)
RV TISSUE DOPPLER FREE WALL SYSTOLIC VELOCITY 1 (APICAL 4 CHAMBER VIEW): 10.85 CM/S
SARS-COV-2 RDRP RESP QL NAA+PROBE: NEGATIVE
SINUS: 3.13 CM
SODIUM SERPL-SCNC: 146 MMOL/L (ref 136–145)
SP GR UR STRIP: 1.01 (ref 1–1.03)
SQUAMOUS #/AREA URNS AUTO: 24 /HPF
STJ: 2.21 CM
TDI LATERAL: 0.07 M/S
TDI SEPTAL: 0.06 M/S
TDI: 0.07 M/S
TR MAX PG: 38 MMHG
TROPONIN I SERPL DL<=0.01 NG/ML-MCNC: 0.38 NG/ML (ref 0–0.03)
URN SPEC COLLECT METH UR: ABNORMAL
WBC # BLD AUTO: 12.22 K/UL (ref 3.9–12.7)
WBC #/AREA URNS AUTO: 33 /HPF (ref 0–5)

## 2022-03-21 PROCEDURE — 20000000 HC ICU ROOM

## 2022-03-21 PROCEDURE — 25000003 PHARM REV CODE 250: Performed by: ANESTHESIOLOGY

## 2022-03-21 PROCEDURE — 64447 NJX AA&/STRD FEMORAL NRV IMG: CPT | Mod: 59,RT,, | Performed by: ANESTHESIOLOGY

## 2022-03-21 PROCEDURE — D9220A PRA ANESTHESIA: Mod: ANES,,, | Performed by: ANESTHESIOLOGY

## 2022-03-21 PROCEDURE — 25000003 PHARM REV CODE 250: Performed by: STUDENT IN AN ORGANIZED HEALTH CARE EDUCATION/TRAINING PROGRAM

## 2022-03-21 PROCEDURE — C1769 GUIDE WIRE: HCPCS | Performed by: ORTHOPAEDIC SURGERY

## 2022-03-21 PROCEDURE — 27830 TREAT LOWER LEG DISLOCATION: CPT | Mod: 51,LT,, | Performed by: ORTHOPAEDIC SURGERY

## 2022-03-21 PROCEDURE — 83735 ASSAY OF MAGNESIUM: CPT | Performed by: STUDENT IN AN ORGANIZED HEALTH CARE EDUCATION/TRAINING PROGRAM

## 2022-03-21 PROCEDURE — 87077 CULTURE AEROBIC IDENTIFY: CPT | Performed by: STUDENT IN AN ORGANIZED HEALTH CARE EDUCATION/TRAINING PROGRAM

## 2022-03-21 PROCEDURE — 85730 THROMBOPLASTIN TIME PARTIAL: CPT | Performed by: STUDENT IN AN ORGANIZED HEALTH CARE EDUCATION/TRAINING PROGRAM

## 2022-03-21 PROCEDURE — 25000003 PHARM REV CODE 250: Performed by: INTERNAL MEDICINE

## 2022-03-21 PROCEDURE — 27818 TREATMENT OF ANKLE FRACTURE: CPT | Mod: RT

## 2022-03-21 PROCEDURE — 96367 TX/PROPH/DG ADDL SEQ IV INF: CPT

## 2022-03-21 PROCEDURE — 93010 ELECTROCARDIOGRAM REPORT: CPT | Mod: ,,, | Performed by: INTERNAL MEDICINE

## 2022-03-21 PROCEDURE — 99900035 HC TECH TIME PER 15 MIN (STAT)

## 2022-03-21 PROCEDURE — 90715 TDAP VACCINE 7 YRS/> IM: CPT | Performed by: EMERGENCY MEDICINE

## 2022-03-21 PROCEDURE — 85610 PROTHROMBIN TIME: CPT | Performed by: STUDENT IN AN ORGANIZED HEALTH CARE EDUCATION/TRAINING PROGRAM

## 2022-03-21 PROCEDURE — 27000221 HC OXYGEN, UP TO 24 HOURS

## 2022-03-21 PROCEDURE — 93010 EKG 12-LEAD: ICD-10-PCS | Mod: ,,, | Performed by: INTERNAL MEDICINE

## 2022-03-21 PROCEDURE — 99291 CRITICAL CARE FIRST HOUR: CPT | Mod: 25

## 2022-03-21 PROCEDURE — 86850 RBC ANTIBODY SCREEN: CPT | Performed by: STUDENT IN AN ORGANIZED HEALTH CARE EDUCATION/TRAINING PROGRAM

## 2022-03-21 PROCEDURE — 64445 NJX AA&/STRD SCIATIC NRV IMG: CPT | Performed by: STUDENT IN AN ORGANIZED HEALTH CARE EDUCATION/TRAINING PROGRAM

## 2022-03-21 PROCEDURE — 99223 PR INITIAL HOSPITAL CARE,LEVL III: ICD-10-PCS | Mod: AI,GC,, | Performed by: INTERNAL MEDICINE

## 2022-03-21 PROCEDURE — 27822 PR OPEN TX TRIMALLEOLAR ANKLE FX W/O FIX PST LIP: ICD-10-PCS | Mod: RT,,, | Performed by: ORTHOPAEDIC SURGERY

## 2022-03-21 PROCEDURE — 87088 URINE BACTERIA CULTURE: CPT | Performed by: STUDENT IN AN ORGANIZED HEALTH CARE EDUCATION/TRAINING PROGRAM

## 2022-03-21 PROCEDURE — 63600175 PHARM REV CODE 636 W HCPCS: Performed by: STUDENT IN AN ORGANIZED HEALTH CARE EDUCATION/TRAINING PROGRAM

## 2022-03-21 PROCEDURE — 64450 R POPLITEAL SS: ICD-10-PCS | Mod: 59,RT,, | Performed by: ANESTHESIOLOGY

## 2022-03-21 PROCEDURE — 84484 ASSAY OF TROPONIN QUANT: CPT | Performed by: STUDENT IN AN ORGANIZED HEALTH CARE EDUCATION/TRAINING PROGRAM

## 2022-03-21 PROCEDURE — 76942 R ADDUCTOR SS: ICD-10-PCS | Mod: 26,,, | Performed by: ANESTHESIOLOGY

## 2022-03-21 PROCEDURE — 99223 1ST HOSP IP/OBS HIGH 75: CPT | Mod: AI,GC,, | Performed by: INTERNAL MEDICINE

## 2022-03-21 PROCEDURE — 25500020 PHARM REV CODE 255: Performed by: INTERNAL MEDICINE

## 2022-03-21 PROCEDURE — 63600175 PHARM REV CODE 636 W HCPCS: Performed by: EMERGENCY MEDICINE

## 2022-03-21 PROCEDURE — 96375 TX/PRO/DX INJ NEW DRUG ADDON: CPT

## 2022-03-21 PROCEDURE — 76942 ECHO GUIDE FOR BIOPSY: CPT | Mod: 26,,, | Performed by: ANESTHESIOLOGY

## 2022-03-21 PROCEDURE — 94761 N-INVAS EAR/PLS OXIMETRY MLT: CPT

## 2022-03-21 PROCEDURE — 64447 NJX AA&/STRD FEMORAL NRV IMG: CPT | Performed by: STUDENT IN AN ORGANIZED HEALTH CARE EDUCATION/TRAINING PROGRAM

## 2022-03-21 PROCEDURE — 84100 ASSAY OF PHOSPHORUS: CPT | Performed by: STUDENT IN AN ORGANIZED HEALTH CARE EDUCATION/TRAINING PROGRAM

## 2022-03-21 PROCEDURE — 64450 NJX AA&/STRD OTHER PN/BRANCH: CPT | Mod: 59,RT,, | Performed by: ANESTHESIOLOGY

## 2022-03-21 PROCEDURE — 37000008 HC ANESTHESIA 1ST 15 MINUTES: Performed by: ORTHOPAEDIC SURGERY

## 2022-03-21 PROCEDURE — 93005 ELECTROCARDIOGRAM TRACING: CPT

## 2022-03-21 PROCEDURE — 11012 PR DEBRIDE ASSOC OPEN FX/DISLO SKIN/MUS/BONE: ICD-10-PCS | Mod: 51,,, | Performed by: ORTHOPAEDIC SURGERY

## 2022-03-21 PROCEDURE — 99223 1ST HOSP IP/OBS HIGH 75: CPT | Mod: 57,,, | Performed by: ORTHOPAEDIC SURGERY

## 2022-03-21 PROCEDURE — 27830: ICD-10-PCS | Mod: 51,LT,, | Performed by: ORTHOPAEDIC SURGERY

## 2022-03-21 PROCEDURE — 80053 COMPREHEN METABOLIC PANEL: CPT | Performed by: STUDENT IN AN ORGANIZED HEALTH CARE EDUCATION/TRAINING PROGRAM

## 2022-03-21 PROCEDURE — 25000003 PHARM REV CODE 250: Performed by: SURGERY

## 2022-03-21 PROCEDURE — C1713 ANCHOR/SCREW BN/BN,TIS/BN: HCPCS | Performed by: ORTHOPAEDIC SURGERY

## 2022-03-21 PROCEDURE — 51702 INSERT TEMP BLADDER CATH: CPT

## 2022-03-21 PROCEDURE — 36000711: Performed by: ORTHOPAEDIC SURGERY

## 2022-03-21 PROCEDURE — 27201423 OPTIME MED/SURG SUP & DEVICES STERILE SUPPLY: Performed by: ORTHOPAEDIC SURGERY

## 2022-03-21 PROCEDURE — 96365 THER/PROPH/DIAG IV INF INIT: CPT

## 2022-03-21 PROCEDURE — 81001 URINALYSIS AUTO W/SCOPE: CPT | Performed by: STUDENT IN AN ORGANIZED HEALTH CARE EDUCATION/TRAINING PROGRAM

## 2022-03-21 PROCEDURE — 83036 HEMOGLOBIN GLYCOSYLATED A1C: CPT | Performed by: STUDENT IN AN ORGANIZED HEALTH CARE EDUCATION/TRAINING PROGRAM

## 2022-03-21 PROCEDURE — 87086 URINE CULTURE/COLONY COUNT: CPT | Performed by: STUDENT IN AN ORGANIZED HEALTH CARE EDUCATION/TRAINING PROGRAM

## 2022-03-21 PROCEDURE — 93010 ELECTROCARDIOGRAM REPORT: CPT | Mod: 76,,, | Performed by: INTERNAL MEDICINE

## 2022-03-21 PROCEDURE — 99291 CRITICAL CARE FIRST HOUR: CPT | Mod: GC,CS,, | Performed by: EMERGENCY MEDICINE

## 2022-03-21 PROCEDURE — D9220A PRA ANESTHESIA: ICD-10-PCS | Mod: CRNA,,, | Performed by: NURSE ANESTHETIST, CERTIFIED REGISTERED

## 2022-03-21 PROCEDURE — 63600175 PHARM REV CODE 636 W HCPCS: Performed by: ORTHOPAEDIC SURGERY

## 2022-03-21 PROCEDURE — D9220A PRA ANESTHESIA: ICD-10-PCS | Mod: ANES,,, | Performed by: ANESTHESIOLOGY

## 2022-03-21 PROCEDURE — 37000009 HC ANESTHESIA EA ADD 15 MINS: Performed by: ORTHOPAEDIC SURGERY

## 2022-03-21 PROCEDURE — 87186 SC STD MICRODIL/AGAR DIL: CPT | Performed by: STUDENT IN AN ORGANIZED HEALTH CARE EDUCATION/TRAINING PROGRAM

## 2022-03-21 PROCEDURE — 63600175 PHARM REV CODE 636 W HCPCS: Performed by: ANESTHESIOLOGY

## 2022-03-21 PROCEDURE — 84134 ASSAY OF PREALBUMIN: CPT | Performed by: STUDENT IN AN ORGANIZED HEALTH CARE EDUCATION/TRAINING PROGRAM

## 2022-03-21 PROCEDURE — 85025 COMPLETE CBC W/AUTO DIFF WBC: CPT | Performed by: STUDENT IN AN ORGANIZED HEALTH CARE EDUCATION/TRAINING PROGRAM

## 2022-03-21 PROCEDURE — 99291 PR CRITICAL CARE, E/M 30-74 MINUTES: ICD-10-PCS | Mod: GC,CS,, | Performed by: EMERGENCY MEDICINE

## 2022-03-21 PROCEDURE — 99223 PR INITIAL HOSPITAL CARE,LEVL III: ICD-10-PCS | Mod: 57,,, | Performed by: ORTHOPAEDIC SURGERY

## 2022-03-21 PROCEDURE — U0002 COVID-19 LAB TEST NON-CDC: HCPCS | Performed by: EMERGENCY MEDICINE

## 2022-03-21 PROCEDURE — 64447 R ADDUCTOR SS: ICD-10-PCS | Mod: 59,RT,, | Performed by: ANESTHESIOLOGY

## 2022-03-21 PROCEDURE — 36000710: Performed by: ORTHOPAEDIC SURGERY

## 2022-03-21 PROCEDURE — 90471 IMMUNIZATION ADMIN: CPT | Performed by: EMERGENCY MEDICINE

## 2022-03-21 PROCEDURE — D9220A PRA ANESTHESIA: Mod: CRNA,,, | Performed by: NURSE ANESTHETIST, CERTIFIED REGISTERED

## 2022-03-21 PROCEDURE — 27822 TREATMENT OF ANKLE FRACTURE: CPT | Mod: RT,,, | Performed by: ORTHOPAEDIC SURGERY

## 2022-03-21 PROCEDURE — 83880 ASSAY OF NATRIURETIC PEPTIDE: CPT | Performed by: STUDENT IN AN ORGANIZED HEALTH CARE EDUCATION/TRAINING PROGRAM

## 2022-03-21 PROCEDURE — 63600175 PHARM REV CODE 636 W HCPCS: Performed by: SURGERY

## 2022-03-21 PROCEDURE — 11012 DEB SKIN BONE AT FX SITE: CPT | Mod: 51,,, | Performed by: ORTHOPAEDIC SURGERY

## 2022-03-21 DEVICE — SCREW CORT SELF TAP 3.5X70MM: Type: IMPLANTABLE DEVICE | Site: ANKLE | Status: FUNCTIONAL

## 2022-03-21 DEVICE — SCREW CORT SELF TAP 3.5X120MM: Type: IMPLANTABLE DEVICE | Site: ANKLE | Status: FUNCTIONAL

## 2022-03-21 RX ORDER — CEFAZOLIN SODIUM 2 G/50ML
2 SOLUTION INTRAVENOUS
Status: DISCONTINUED | OUTPATIENT
Start: 2022-03-21 | End: 2022-03-21

## 2022-03-21 RX ORDER — FENTANYL CITRATE 50 UG/ML
25 INJECTION, SOLUTION INTRAMUSCULAR; INTRAVENOUS
Status: COMPLETED | OUTPATIENT
Start: 2022-03-21 | End: 2022-03-21

## 2022-03-21 RX ORDER — FUROSEMIDE 10 MG/ML
40 INJECTION INTRAMUSCULAR; INTRAVENOUS DAILY
Status: DISCONTINUED | OUTPATIENT
Start: 2022-03-22 | End: 2022-03-22

## 2022-03-21 RX ORDER — LIDOCAINE HYDROCHLORIDE 10 MG/ML
20 INJECTION INFILTRATION; PERINEURAL ONCE
Status: COMPLETED | OUTPATIENT
Start: 2022-03-21 | End: 2022-03-21

## 2022-03-21 RX ORDER — PROPOFOL 10 MG/ML
VIAL (ML) INTRAVENOUS
Status: DISCONTINUED | OUTPATIENT
Start: 2022-03-21 | End: 2022-03-21

## 2022-03-21 RX ORDER — VASOPRESSIN 20 [USP'U]/ML
INJECTION, SOLUTION INTRAMUSCULAR; SUBCUTANEOUS
Status: DISCONTINUED | OUTPATIENT
Start: 2022-03-21 | End: 2022-03-21

## 2022-03-21 RX ORDER — ASPIRIN 81 MG/1
81 TABLET ORAL 2 TIMES DAILY
Status: DISCONTINUED | OUTPATIENT
Start: 2022-03-21 | End: 2022-04-02 | Stop reason: HOSPADM

## 2022-03-21 RX ORDER — DIPHENHYDRAMINE HCL 25 MG
25 CAPSULE ORAL EVERY 6 HOURS PRN
Status: DISCONTINUED | OUTPATIENT
Start: 2022-03-21 | End: 2022-04-02 | Stop reason: HOSPADM

## 2022-03-21 RX ORDER — CEFAZOLIN SODIUM/D5W 2 G/100 ML
2 PLASTIC BAG, INJECTION (ML) INTRAVENOUS
Status: COMPLETED | OUTPATIENT
Start: 2022-03-21 | End: 2022-03-22

## 2022-03-21 RX ORDER — CEFAZOLIN SODIUM 2 G/50ML
2 SOLUTION INTRAVENOUS
Status: COMPLETED | OUTPATIENT
Start: 2022-03-21 | End: 2022-03-21

## 2022-03-21 RX ORDER — ATORVASTATIN CALCIUM 20 MG/1
20 TABLET, FILM COATED ORAL NIGHTLY
Status: DISCONTINUED | OUTPATIENT
Start: 2022-03-21 | End: 2022-04-02 | Stop reason: HOSPADM

## 2022-03-21 RX ORDER — BUPIVACAINE HYDROCHLORIDE 5 MG/ML
INJECTION, SOLUTION EPIDURAL; INTRACAUDAL
Status: COMPLETED | OUTPATIENT
Start: 2022-03-21 | End: 2022-03-21

## 2022-03-21 RX ORDER — ONDANSETRON 2 MG/ML
INJECTION INTRAMUSCULAR; INTRAVENOUS
Status: DISCONTINUED | OUTPATIENT
Start: 2022-03-21 | End: 2022-03-21

## 2022-03-21 RX ORDER — SODIUM CHLORIDE 9 MG/ML
1000 INJECTION, SOLUTION INTRAVENOUS
Status: COMPLETED | OUTPATIENT
Start: 2022-03-21 | End: 2022-03-21

## 2022-03-21 RX ORDER — FENTANYL CITRATE 50 UG/ML
INJECTION, SOLUTION INTRAMUSCULAR; INTRAVENOUS
Status: DISPENSED
Start: 2022-03-21 | End: 2022-03-22

## 2022-03-21 RX ORDER — FENTANYL CITRATE 50 UG/ML
50 INJECTION, SOLUTION INTRAMUSCULAR; INTRAVENOUS ONCE
Status: COMPLETED | OUTPATIENT
Start: 2022-03-21 | End: 2022-03-21

## 2022-03-21 RX ORDER — CEFAZOLIN SODIUM 1 G/3ML
INJECTION, POWDER, FOR SOLUTION INTRAMUSCULAR; INTRAVENOUS
Status: DISCONTINUED | OUTPATIENT
Start: 2022-03-21 | End: 2022-03-21

## 2022-03-21 RX ORDER — FENTANYL CITRATE 50 UG/ML
25 INJECTION, SOLUTION INTRAMUSCULAR; INTRAVENOUS EVERY 5 MIN PRN
Status: CANCELLED | OUTPATIENT
Start: 2022-03-21

## 2022-03-21 RX ORDER — ACETAMINOPHEN 500 MG
50000 TABLET ORAL DAILY
Status: DISCONTINUED | OUTPATIENT
Start: 2022-03-22 | End: 2022-03-22

## 2022-03-21 RX ORDER — KETAMINE HCL IN 0.9 % NACL 50 MG/5 ML
SYRINGE (ML) INTRAVENOUS
Status: DISCONTINUED | OUTPATIENT
Start: 2022-03-21 | End: 2022-03-21

## 2022-03-21 RX ORDER — DEXAMETHASONE SODIUM PHOSPHATE 4 MG/ML
INJECTION, SOLUTION INTRA-ARTICULAR; INTRALESIONAL; INTRAMUSCULAR; INTRAVENOUS; SOFT TISSUE
Status: DISCONTINUED | OUTPATIENT
Start: 2022-03-21 | End: 2022-03-21

## 2022-03-21 RX ORDER — VANCOMYCIN HYDROCHLORIDE 1 G/20ML
INJECTION, POWDER, LYOPHILIZED, FOR SOLUTION INTRAVENOUS
Status: DISCONTINUED | OUTPATIENT
Start: 2022-03-21 | End: 2022-03-21 | Stop reason: HOSPADM

## 2022-03-21 RX ORDER — MUPIROCIN 20 MG/G
OINTMENT TOPICAL
Status: CANCELLED | OUTPATIENT
Start: 2022-03-21

## 2022-03-21 RX ORDER — DONEPEZIL HYDROCHLORIDE 5 MG/1
5 TABLET, FILM COATED ORAL NIGHTLY
Status: DISCONTINUED | OUTPATIENT
Start: 2022-03-21 | End: 2022-04-02 | Stop reason: HOSPADM

## 2022-03-21 RX ORDER — SODIUM CHLORIDE 0.9 % (FLUSH) 0.9 %
10 SYRINGE (ML) INJECTION
Status: CANCELLED | OUTPATIENT
Start: 2022-03-21

## 2022-03-21 RX ORDER — DEXMEDETOMIDINE HYDROCHLORIDE 100 UG/ML
INJECTION, SOLUTION INTRAVENOUS CONTINUOUS PRN
Status: DISCONTINUED | OUTPATIENT
Start: 2022-03-21 | End: 2022-03-21

## 2022-03-21 RX ADMIN — VASOPRESSIN 2 UNITS: 20 INJECTION INTRAVENOUS at 05:03

## 2022-03-21 RX ADMIN — PROPOFOL 10 MG: 10 INJECTION, EMULSION INTRAVENOUS at 04:03

## 2022-03-21 RX ADMIN — DEXAMETHASONE SODIUM PHOSPHATE 4 MG: 4 INJECTION INTRA-ARTICULAR; INTRALESIONAL; INTRAMUSCULAR; INTRAVENOUS; SOFT TISSUE at 04:03

## 2022-03-21 RX ADMIN — Medication 5 MG: at 04:03

## 2022-03-21 RX ADMIN — Medication 2 G: at 08:03

## 2022-03-21 RX ADMIN — ATORVASTATIN CALCIUM 20 MG: 20 TABLET, FILM COATED ORAL at 08:03

## 2022-03-21 RX ADMIN — CEFAZOLIN SODIUM 2 G: 2 SOLUTION INTRAVENOUS at 08:03

## 2022-03-21 RX ADMIN — VASOPRESSIN 2 UNITS: 20 INJECTION INTRAVENOUS at 06:03

## 2022-03-21 RX ADMIN — ASPIRIN 81 MG: 81 TABLET, COATED ORAL at 09:03

## 2022-03-21 RX ADMIN — Medication 10 MG: at 04:03

## 2022-03-21 RX ADMIN — FENTANYL CITRATE 25 MCG: 50 INJECTION INTRAMUSCULAR; INTRAVENOUS at 12:03

## 2022-03-21 RX ADMIN — SODIUM CHLORIDE 1000 ML: 0.9 INJECTION, SOLUTION INTRAVENOUS at 12:03

## 2022-03-21 RX ADMIN — BUPIVACAINE HYDROCHLORIDE 10 ML: 5 INJECTION, SOLUTION EPIDURAL; INTRACAUDAL; PERINEURAL at 02:03

## 2022-03-21 RX ADMIN — GENTAMICIN SULFATE 72 MG: 40 INJECTION, SOLUTION INTRAMUSCULAR; INTRAVENOUS at 10:03

## 2022-03-21 RX ADMIN — DEXMEDETOMIDINE HYDROCHLORIDE 1 MCG/KG/HR: 100 INJECTION, SOLUTION INTRAVENOUS at 04:03

## 2022-03-21 RX ADMIN — Medication 10 MG: at 05:03

## 2022-03-21 RX ADMIN — BUPIVACAINE HYDROCHLORIDE 20 ML: 5 INJECTION, SOLUTION EPIDURAL; INTRACAUDAL at 02:03

## 2022-03-21 RX ADMIN — DONEPEZIL HYDROCHLORIDE 5 MG: 5 TABLET ORAL at 08:03

## 2022-03-21 RX ADMIN — SODIUM CHLORIDE, SODIUM GLUCONATE, SODIUM ACETATE, POTASSIUM CHLORIDE, MAGNESIUM CHLORIDE, SODIUM PHOSPHATE, DIBASIC, AND POTASSIUM PHOSPHATE: .53; .5; .37; .037; .03; .012; .00082 INJECTION, SOLUTION INTRAVENOUS at 03:03

## 2022-03-21 RX ADMIN — PROPOFOL 20 MG: 10 INJECTION, EMULSION INTRAVENOUS at 05:03

## 2022-03-21 RX ADMIN — FENTANYL CITRATE 50 MCG: 50 INJECTION INTRAMUSCULAR; INTRAVENOUS at 02:03

## 2022-03-21 RX ADMIN — TETANUS TOXOID, REDUCED DIPHTHERIA TOXOID AND ACELLULAR PERTUSSIS VACCINE, ADSORBED 0.5 ML: 5; 2.5; 8; 8; 2.5 SUSPENSION INTRAMUSCULAR at 08:03

## 2022-03-21 RX ADMIN — LIDOCAINE HYDROCHLORIDE 20 ML: 10 INJECTION, SOLUTION INFILTRATION; PERINEURAL at 09:03

## 2022-03-21 RX ADMIN — HUMAN ALBUMIN MICROSPHERES AND PERFLUTREN 0.66 MG: 10; .22 INJECTION, SOLUTION INTRAVENOUS at 01:03

## 2022-03-21 RX ADMIN — ONDANSETRON 4 MG: 2 INJECTION INTRAMUSCULAR; INTRAVENOUS at 05:03

## 2022-03-21 RX ADMIN — CEFAZOLIN 2 G: 330 INJECTION, POWDER, FOR SOLUTION INTRAMUSCULAR; INTRAVENOUS at 04:03

## 2022-03-21 RX ADMIN — CEFTRIAXONE 1 G: 1 INJECTION, SOLUTION INTRAVENOUS at 01:03

## 2022-03-21 RX ADMIN — SODIUM CHLORIDE: 0.9 INJECTION, SOLUTION INTRAVENOUS at 04:03

## 2022-03-21 RX ADMIN — MEPIVACAINE HYDROCHLORIDE 5 ML: 15 INJECTION, SOLUTION EPIDURAL; INFILTRATION at 02:03

## 2022-03-21 RX ADMIN — VASOPRESSIN 2 UNITS: 20 INJECTION INTRAVENOUS at 04:03

## 2022-03-21 NOTE — ASSESSMENT & PLAN NOTE
- Elevated BNP with LUANNE. Appears hypervolemic.  - Pending echo  - holding BB given CHB  - will restart ARB as tolerated  - IV lasix 40 daily

## 2022-03-21 NOTE — ASSESSMENT & PLAN NOTE
- Patient presented to ED with ankle fracture and found to be in complete heart block. Currently stable with junctional scape rhythm in the 30s and normal BP.   - Will place TVP as bridge to PPM. Will plan for permanent pacemaker once right ankle fracture is addressed completely to avoid risk of device infection.   - Stop metoprolol

## 2022-03-21 NOTE — SUBJECTIVE & OBJECTIVE
"Past Medical History:   Diagnosis Date    Aortic stenosis 6/17/2015    Arthritis     Atrophic vaginitis 2/18/2016    Bilateral edema of lower extremity 6/22/2016    CHF (congestive heart failure)     Cholelithiasis without cholecystitis 5/5/2017    Chronic pain of left knee 8/3/2017    Depressed mood 6/22/2016    Diverticulitis of large intestine without perforation or abscess without bleeding 5/5/2017    Encounter for blood transfusion     Essential hypertension     GERD (gastroesophageal reflux disease)     Hyperlipidemia     Hypertension     Hypertensive heart disease with heart failure 7/14/2015    Insomnia     Joint pain     Knee pain, left 08/2016    pain with walking or standing    Primary osteoarthritis of knee 2/5/2013    PUD (peptic ulcer disease)     S/P knee replacement 2/13/2013    Slow transit constipation 2/18/2016       Past Surgical History:   Procedure Laterality Date    APPENDECTOMY      COLONOSCOPY      08    EYE SURGERY      bilateral cataract    FINGER SURGERY      LT thumb surgery--"arthritis surgery"    HYSTERECTOMY      UNKNOWN BSO    JOINT REPLACEMENT      right hip replacement    KNEE ARTHROPLASTY Left 2001    TONSILLECTOMY      TUMOR EXCISION      esophageal tumor removal     UPPER GASTROINTESTINAL ENDOSCOPY      2013       Review of patient's allergies indicates:   Allergen Reactions    Aspirin Nausea Only and Other (See Comments)     Other reaction(s): esophageal pain    Celebrex [celecoxib] Rash    Morphine Hallucinations    Steroid [corticosteroids (glucocorticoids)] Other (See Comments)     Other reaction(s): Flushing (skin)    Sulfa dyne Other (See Comments)     Other reaction(s): esophogeal pain       No current facility-administered medications on file prior to encounter.     Current Outpatient Medications on File Prior to Encounter   Medication Sig    acetaminophen (TYLENOL) 650 MG TbSR Take 1 tablet (650 mg total) by mouth every 8 (eight) hours as needed. Give 1 tablet QAM, 1 " QPM.    atorvastatin (LIPITOR) 20 MG tablet TAKE 1 TAB BY MOUTH AT BEDTIME    cholecalciferol, vitamin D3, 1,250 mcg (50,000 unit) capsule Take 50,000 Units by mouth once daily.    donepeziL (ARICEPT) 5 MG tablet TAKE 1 TAB BY MOUTH EVERY EVENING    EScitalopram oxalate (LEXAPRO) 20 MG tablet TAKE 1 TAB BY MOUTH AT BEDTIME FOR DEPRESSION    fentaNYL (DURAGESIC) 12 mcg/hr PT72 Place 1 patch onto the skin every 72 hours.    furosemide (LASIX) 40 MG tablet TAKE 1 TAB BY MOUTH EVERY DAY    furosemide (LASIX) 40 MG tablet TAKE 2 TABLETS (80 MG TOTAL) BY MOUTH 2 (TWO) TIMES DAILY AS NEEDED (FOR WEIGHT GAIN OF 5 POUNDS). TAKE WITH POTASSIUM    losartan (COZAAR) 100 MG tablet TAKE 1 TAB BY MOUTH ONCE DAILY    melatonin 5 mg Tab Take 1 tablet by mouth every evening.     metoprolol succinate (TOPROL-XL) 200 MG 24 hr tablet TAKE 1 TAB BY MOUTH EVERY DAY    MULTIVITAMIN W-MINERALS/LUTEIN (CENTRUM SILVER ORAL) Take by mouth once daily.    nitroGLYCERIN (NITROSTAT) 0.4 MG SL tablet Place 1 tablet (0.4 mg total) under the tongue every 5 (five) minutes as needed for Chest pain.    nystatin (MYCOSTATIN) cream APPLY TO GROIN AREA TOPICALLY EVERY 12 HOURS FOR ANTIFUNGAL    NYSTOP powder APPLY TO GROIN AREA TOPICALLY EVERY 12 HOURS FOR ANTIFUNGAL    omeprazole (PRILOSEC) 20 MG capsule TAKE 2 CAPS (40MG) BY MOUTH EVERY DAY (DX: GERD)    potassium chloride SA (K-DUR,KLOR-CON) 10 MEQ tablet Take 1 tablet (10 mEq total) by mouth once daily. May also take 2 tablets (20 mEq total) daily as needed (when taking lasix for leg swelling).    tramadol-acetaminophen 37.5-325 mg (ULTRACET) 37.5-325 mg Tab Take 1 tablet by mouth 3 (three) times daily as needed for Pain.     Family History       Problem Relation (Age of Onset)    Asthma Father    Cancer Brother    Heart disease Mother, Father, Brother          Tobacco Use    Smoking status: Never Smoker    Smokeless tobacco: Never Used   Substance and Sexual Activity    Alcohol use: No    Drug use:  Never     Types: Hydrocodone    Sexual activity: Not Currently     Birth control/protection: None     Review of Systems   Unable to perform ROS: Dementia   Objective:     Vital Signs (Most Recent):  Temp: 98 °F (36.7 °C) (03/21/22 0752)  Pulse: (!) 37 (03/21/22 0901)  Resp: 16 (03/21/22 0752)  BP: (!) 105/54 (03/21/22 0853)  SpO2: 99 % (03/21/22 0901)   Vital Signs (24h Range):  Temp:  [98 °F (36.7 °C)] 98 °F (36.7 °C)  Pulse:  [36-37] 37  Resp:  [16] 16  SpO2:  [93 %-99 %] 99 %  BP: (105-141)/(44-57) 105/54     Weight: 90.7 kg (200 lb)  Body mass index is 32.28 kg/m².    SpO2: 99 %  O2 Device (Oxygen Therapy): room air    No intake or output data in the 24 hours ending 03/21/22 0903    Lines/Drains/Airways       Peripheral Intravenous Line  Duration                  Peripheral IV - Single Lumen 03/21/22 0000 20 G Right Hand <1 day         Peripheral IV - Single Lumen 03/21/22 0823 20 G Right Forearm <1 day         Peripheral IV - Single Lumen 03/21/22 0900 22 G Left Forearm <1 day                    Physical Exam  Constitutional:       General: She is not in acute distress.     Appearance: She is well-developed. She is obese. She is not diaphoretic.   HENT:      Head: Normocephalic and atraumatic.   Eyes:      Conjunctiva/sclera: Conjunctivae normal.   Neck:      Trachea: No tracheal deviation.   Cardiovascular:      Rate and Rhythm: Regular rhythm. Bradycardia present.      Heart sounds: Normal heart sounds. No murmur heard.  Pulmonary:      Effort: Pulmonary effort is normal. No respiratory distress.      Breath sounds: Normal breath sounds. No wheezing.   Abdominal:      General: Bowel sounds are normal. There is no distension.      Palpations: Abdomen is soft.      Tenderness: There is no abdominal tenderness.   Musculoskeletal:         General: Deformity (right ankle) present.      Cervical back: Normal range of motion.   Neurological:      Mental Status: She is alert and oriented to person, place, and time.        Significant Labs: All pertinent lab results from the last 24 hours have been reviewed.    Significant Imaging: All pertinent images from the last 24h have been reviewed.

## 2022-03-21 NOTE — HPI
HPI limited by dementia. History obtained from daughter Lina    89F pmhx aortic stenosis, CHF (EF 65% on last echo 2019), DM2, severe L hip OA, R LETICIA, L TKA, prior right ankle surgery, and dementia who presents to ER for right ankle injury. This occurred at [facility] where she lives, she went to stand up from the toilet at 6:30AM today and rolled right ankle causing large open wound to medial ankle and obvious deformity. Immediate pain and inability to bear weight. Reportedly did not fall, did not hit head or pass out. No other apparent injury. At baseline she does not walk, uses wheelchair to ambulate and can transfer with assistance. Not on blood thinners. Lives at [facility].

## 2022-03-21 NOTE — PLAN OF CARE
Cardiology update    TVP placed in ED. She will go to OR with orthopedic surgery. Will admit to CCU afterwards due to TVP in place. Plan for PPM in the next few days.

## 2022-03-21 NOTE — ANESTHESIA PROCEDURE NOTES
R adductor SS    Patient location during procedure: ICU   Block not for primary anesthetic.  Reason for block: at surgeon's request and post-op pain management   Post-op Pain Location: R ankle   Start time: 3/21/2022 2:53 PM  Timeout: 3/21/2022 2:52 PM   End time: 3/21/2022 2:56 PM    Staffing  Authorizing Provider: Silvia Joseph MD  Performing Provider: Sandy Rodríguez MD    Preanesthetic Checklist  Completed: patient identified, IV checked, site marked, risks and benefits discussed, surgical consent, monitors and equipment checked, pre-op evaluation and timeout performed  Peripheral Block  Patient position: supine  Prep: ChloraPrep  Patient monitoring: heart rate, cardiac monitor, continuous pulse ox, continuous capnometry and frequent blood pressure checks  Block type: adductor canal  Laterality: right  Injection technique: single shot  Needle  Needle type: Stimuplex   Needle gauge: 21 G  Needle length: 4 in  Needle localization: anatomical landmarks and ultrasound guidance   -ultrasound image captured on disc.  Assessment  Injection assessment: negative aspiration, negative parasthesia and local visualized surrounding nerve  Paresthesia pain: none  Heart rate change: no  Slow fractionated injection: yes  Pain Tolerance: comfortable throughout block  Medications:    Medications: bupivacaine (pf) (MARCAINE) injection 0.5% - Perineural   10 mL - 3/21/2022 2:56:00 PM  mepivacaine (CARBOCAINE) injection 15 mg/mL (1.5%) - Perineural   5 mL - 3/21/2022 2:56:00 PM    Additional Notes  VSS.  [facility] RN monitoring vitals throughout procedure.  Patient tolerated procedure well.

## 2022-03-21 NOTE — ED NOTES
Patient's belongings : pink blanket and single shoe given to daughters at bedside.  2- silver colored rings with kuldip and clear stone given to daughter at bedside.

## 2022-03-21 NOTE — ANESTHESIA PROCEDURE NOTES
R popliteal SS    Patient location during procedure: pre-op   Block not for primary anesthetic.  Reason for block: at surgeon's request and post-op pain management   Post-op Pain Location: R ankle   Start time: 3/21/2022 2:46 PM  Timeout: 3/21/2022 2:45 PM   End time: 3/21/2022 2:52 PM    Staffing  Authorizing Provider: Silvia Joseph MD  Performing Provider: Sandy Rodríguez MD    Preanesthetic Checklist  Completed: patient identified, IV checked, site marked, risks and benefits discussed, surgical consent, monitors and equipment checked, pre-op evaluation and timeout performed  Peripheral Block  Patient position: supine  Prep: ChloraPrep  Patient monitoring: heart rate, cardiac monitor, continuous pulse ox, continuous capnometry and frequent blood pressure checks  Block type: popliteal  Laterality: right  Injection technique: single shot  Needle  Needle type: Stimuplex   Needle gauge: 21 G  Needle length: 4 in  Needle localization: anatomical landmarks and ultrasound guidance   -ultrasound image captured on disc.  Assessment  Injection assessment: negative aspiration, negative parasthesia and local visualized surrounding nerve  Paresthesia pain: none  Heart rate change: no  Slow fractionated injection: yes  Pain Tolerance: comfortable throughout block  Medications:    Medications: bupivacaine (pf) (MARCAINE) injection 0.5% - Perineural   20 mL - 3/21/2022 2:48:00 PM  mepivacaine (CARBOCAINE) injection 15 mg/mL (1.5%) - Perineural   10 mL - 3/21/2022 2:48:00 PM    Additional Notes  VSS.  [facility] RN monitoring vitals throughout procedure.  Patient tolerated procedure well.

## 2022-03-21 NOTE — ANESTHESIA PREPROCEDURE EVALUATION
03/21/2022  Pre-operative evaluation for Procedure(s):  APPLICATION, EXTERNAL FIXATION DEVICE, LARGE, TIBIA    Lilia Hidalgo is a 89 y.o. female who presents s/p fall at nursing home with tibial fracture. Found to be in complete heart block with junctional rhythm while in ED, now s/p transvenous pacer. She has a significant PMHx of hypertension, moderate aortic stenosis, HFpEF (65%), DM2, and dementia.    Patient Active Problem List   Diagnosis    Essential hypertension    Hyperlipidemia    Arthritis    GERD (gastroesophageal reflux disease)    Primary osteoarthritis of left knee    Aortic stenosis    Hypertensive heart disease with heart failure    Atrophic vaginitis    Slow transit constipation    Depressed mood    Bilateral edema of lower extremity    Atherosclerosis of aorta    Cholelithiasis without cholecystitis    Atypical chest pain    DM hyperosmolarity type II    Hypertensive urgency    Fever    Encephalopathy, metabolic    Dementia    Major depression, recurrent, chronic    Unspecified inflammatory spondylopathy, 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    DNR (do not resuscitate)    Ligamentum flavum hypertrophy    Lumbosacral stenosis with neurogenic claudication    Type 2 diabetes mellitus with hyperlipidemia    Class 1 obesity with serious comorbidity and body mass index (BMI) of 31.0 to 31.9 in adult    Complete AV block    Open ankle fracture, right, type III, initial encounter       Review of patient's allergies indicates:   Allergen Reactions    Aspirin Nausea Only and Other (See Comments)     Other reaction(s): esophageal pain    Celebrex [celecoxib] Rash    Morphine Hallucinations    Steroid [corticosteroids (glucocorticoids)] Other (See Comments)     Other reaction(s): Flushing (skin)    Sulfa dyne Other (See Comments)     Other reaction(s): esophogeal pain       No  current facility-administered medications on file prior to encounter.     Current Outpatient Medications on File Prior to Encounter   Medication Sig Dispense Refill    acetaminophen (TYLENOL) 650 MG TbSR Take 1 tablet (650 mg total) by mouth every 8 (eight) hours as needed. Give 1 tablet QAM, 1 QPM. 100 tablet 3    atorvastatin (LIPITOR) 20 MG tablet TAKE 1 TAB BY MOUTH AT BEDTIME 30 tablet 11    cholecalciferol, vitamin D3, 1,250 mcg (50,000 unit) capsule Take 50,000 Units by mouth once daily.      donepeziL (ARICEPT) 5 MG tablet TAKE 1 TAB BY MOUTH EVERY EVENING 30 tablet 11    EScitalopram oxalate (LEXAPRO) 20 MG tablet TAKE 1 TAB BY MOUTH AT BEDTIME FOR DEPRESSION 90 tablet 3    fentaNYL (DURAGESIC) 12 mcg/hr PT72 Place 1 patch onto the skin every 72 hours. 10 patch 0    furosemide (LASIX) 40 MG tablet TAKE 1 TAB BY MOUTH EVERY DAY 30 tablet 11    furosemide (LASIX) 40 MG tablet TAKE 2 TABLETS (80 MG TOTAL) BY MOUTH 2 (TWO) TIMES DAILY AS NEEDED (FOR WEIGHT GAIN OF 5 POUNDS). TAKE WITH POTASSIUM 60 tablet 11    losartan (COZAAR) 100 MG tablet TAKE 1 TAB BY MOUTH ONCE DAILY 90 tablet 3    melatonin 5 mg Tab Take 1 tablet by mouth every evening.       metoprolol succinate (TOPROL-XL) 200 MG 24 hr tablet TAKE 1 TAB BY MOUTH EVERY DAY 30 tablet 11    MULTIVITAMIN W-MINERALS/LUTEIN (CENTRUM SILVER ORAL) Take by mouth once daily.      nitroGLYCERIN (NITROSTAT) 0.4 MG SL tablet Place 1 tablet (0.4 mg total) under the tongue every 5 (five) minutes as needed for Chest pain. 15 tablet 3    nystatin (MYCOSTATIN) cream APPLY TO GROIN AREA TOPICALLY EVERY 12 HOURS FOR ANTIFUNGAL 30 g 11    NYSTOP powder APPLY TO GROIN AREA TOPICALLY EVERY 12 HOURS FOR ANTIFUNGAL 60 g 10    omeprazole (PRILOSEC) 20 MG capsule TAKE 2 CAPS (40MG) BY MOUTH EVERY DAY (DX: GERD) 60 capsule 11    potassium chloride SA (K-DUR,KLOR-CON) 10 MEQ tablet Take 1 tablet (10 mEq total) by mouth once daily. May also take 2 tablets (20 mEq  "total) daily as needed (when taking lasix for leg swelling). 45 tablet 11    tramadol-acetaminophen 37.5-325 mg (ULTRACET) 37.5-325 mg Tab Take 1 tablet by mouth 3 (three) times daily as needed for Pain. 90 tablet 2       Past Surgical History:   Procedure Laterality Date    APPENDECTOMY      COLONOSCOPY      08    EYE SURGERY      bilateral cataract    FINGER SURGERY      LT thumb surgery--"arthritis surgery"    HYSTERECTOMY      UNKNOWN BSO    JOINT REPLACEMENT      right hip replacement    KNEE ARTHROPLASTY Left 2001    TONSILLECTOMY      TUMOR EXCISION      esophageal tumor removal     UPPER GASTROINTESTINAL ENDOSCOPY      2013       Social History     Socioeconomic History    Marital status:    Tobacco Use    Smoking status: Never Smoker    Smokeless tobacco: Never Used   Substance and Sexual Activity    Alcohol use: No    Drug use: Never     Types: Hydrocodone    Sexual activity: Not Currently     Birth control/protection: None   Other Topics Concern    Are you pregnant or think you may be? No    Breast-feeding No     Social Determinants of Health     Financial Resource Strain: Unknown    Difficulty of Paying Living Expenses: Patient refused   Food Insecurity: No Food Insecurity    Worried About Running Out of Food in the Last Year: Never true    Ran Out of Food in the Last Year: Never true   Transportation Needs: No Transportation Needs    Lack of Transportation (Medical): No    Lack of Transportation (Non-Medical): No   Physical Activity: Inactive    Days of Exercise per Week: 0 days    Minutes of Exercise per Session: 0 min   Stress: No Stress Concern Present    Feeling of Stress : Only a little   Social Connections: Socially Isolated    Frequency of Communication with Friends and Family: More than three times a week    Frequency of Social Gatherings with Friends and Family: Twice a week    Attends Pentec[employer]al Services: Never    Active Member of Clubs or Organizations: No "    Attends Club or Organization Meetings: Never    Marital Status:    Housing Stability: Low Risk     Unable to Pay for Housing in the Last Year: No    Number of Places Lived in the Last Year: 1    Unstable Housing in the Last Year: No         CBC:   Recent Labs     22  0855   WBC 12.22   RBC 5.02   HGB 14.0   HCT 47.0      MCV 94   MCH 27.9   MCHC 29.8*       CMP:   Recent Labs     22  0855   *   K 4.6      CO2 28   BUN 36*   CREATININE 1.5*   *   MG 2.2   PHOS 4.1   CALCIUM 9.9   ALBUMIN 3.2*   PROT 6.9   ALKPHOS 73   ALT 17   AST 17   BILITOT 1.1*       INR  Recent Labs     22  0855   INR 1.1   APTT 22.5           Diagnostic Studies:      EKD Echo:  Results for orders placed or performed during the hospital encounter of 16   2D Echocardiogram with Color Flow Doppler   Result Value Ref Range    EF + QEF 70 55 - 65    Diastolic Dysfunction Yes (A)     Aortic Valve Stenosis MILD TO MODERATE (A)     Est. PA Systolic Pressure 35     Tricuspid Valve Regurgitation TRIVIAL            Pre-op Assessment    I have reviewed the Patient Summary Reports.     I have reviewed the Nursing Notes. I have reviewed the NPO Status.   I have reviewed the Medications.     Review of Systems  Anesthesia Hx:  No problems with previous Anesthesia  Neg history of prior surgery. Denies Family Hx of Anesthesia complications.    Social:  No Alcohol Use, Non-Smoker    Hematology/Oncology:  Hematology Normal   Oncology Normal     EENT/Dental:EENT/Dental Normal   Cardiovascular:   Hypertension CHF hyperlipidemia 3rd degree AV block   Pulmonary:  Pulmonary Normal  Denies COPD.  Denies Asthma.    Renal/:   Chronic Renal Disease, ARF    Hepatic/GI:   GERD Denies Liver Disease.    Musculoskeletal:   Arthritis     Neurological:  Neurology Normal  Denies CVA. Denies Seizures. Dementia, oriented to self only.   Endocrine:   Diabetes, type 2  Obesity / BMI > 30  Psych:   Psychiatric  History depression          Physical Exam  General: Well nourished and Cooperative    Airway:  Mallampati: II   Mouth Opening: Normal  TM Distance: Normal  Tongue: Normal  Neck ROM: Normal ROM    Dental:  Periodontal disease        Anesthesia Plan  Type of Anesthesia, risks & benefits discussed:    Anesthesia Type: Gen ETT, Gen Natural Airway, Regional  Intra-op Monitoring Plan: Standard ASA Monitors  Post Op Pain Control Plan: multimodal analgesia and IV/PO Opioids PRN  Induction:  IV  Airway Plan: Direct, Post-Induction  Informed Consent: Informed consent signed with the Patient representative and all parties understand the risks and agree with anesthesia plan.  All questions answered.   ASA Score: 4  Day of Surgery Review of History & Physical: H&P Update referred to the surgeon/provider.  Anesthesia Plan Notes: Daughter reports patient NPO since yesterday evening.     Ready For Surgery From Anesthesia Perspective.     .

## 2022-03-21 NOTE — HPI
89 y.o. female with HTN, MDD, mild cognitive impairment, and urinary incontinence. She is brought to ED from nursing home due to a ankle fracture. She uses a wheelchair and was being assisted to transfer from the toilet to her wheelchair when she twisted her ankle and fractured it. No reports of syncope. On arrival to ED, an ECG showed 3rd degree AV block and cardiology was consulted. She is awake and complaining of pain. Not able to give a detailed history due to patient's dementia. Discussed the events with daughter.

## 2022-03-21 NOTE — PROGRESS NOTES
Pharmacist Renal Dose Adjustment Note    Lilia Hidalgo is a 89 y.o. female being treated with the medication Cefazolin    Patient Data:    Vital Signs (Most Recent):  Temp: 98 °F (36.7 °C) (03/21/22 0752)  Pulse: (!) 50 (03/21/22 1207)  Resp: 16 (03/21/22 0752)  BP: (!) 133/59 (03/21/22 1207)  SpO2: 95 % (03/21/22 1207)   Vital Signs (72h Range):  Temp:  [98 °F (36.7 °C)]   Pulse:  [35-79]   Resp:  [16]   BP: ()/(44-82)   SpO2:  [93 %-100 %]      Recent Labs   Lab 03/21/22  0855   CREATININE 1.5*     Serum creatinine: 1.5 mg/dL (H) 03/21/22 0855  Estimated creatinine clearance: 28.3 mL/min (A)    Cefazolin 2 g Q8H will be changed to Cefazolin 2 g Q12H    Pharmacist's Name: Mackenzie Milton  Pharmacist's Extension: [phone]

## 2022-03-21 NOTE — SUBJECTIVE & OBJECTIVE
"Past Medical History:   Diagnosis Date    Aortic stenosis 6/17/2015    Arthritis     Atrophic vaginitis 2/18/2016    Bilateral edema of lower extremity 6/22/2016    CHF (congestive heart failure)     Cholelithiasis without cholecystitis 5/5/2017    Chronic pain of left knee 8/3/2017    Depressed mood 6/22/2016    Diverticulitis of large intestine without perforation or abscess without bleeding 5/5/2017    Encounter for blood transfusion     Essential hypertension     GERD (gastroesophageal reflux disease)     Hyperlipidemia     Hypertension     Hypertensive heart disease with heart failure 7/14/2015    Insomnia     Joint pain     Knee pain, left 08/2016    pain with walking or standing    Primary osteoarthritis of knee 2/5/2013    PUD (peptic ulcer disease)     S/P knee replacement 2/13/2013    Slow transit constipation 2/18/2016       Past Surgical History:   Procedure Laterality Date    APPENDECTOMY      COLONOSCOPY      08    EYE SURGERY      bilateral cataract    FINGER SURGERY      LT thumb surgery--"arthritis surgery"    HYSTERECTOMY      UNKNOWN BSO    JOINT REPLACEMENT      right hip replacement    KNEE ARTHROPLASTY Left 2001    TONSILLECTOMY      TUMOR EXCISION      esophageal tumor removal     UPPER GASTROINTESTINAL ENDOSCOPY      2013       Review of patient's allergies indicates:   Allergen Reactions    Aspirin Nausea Only and Other (See Comments)     Other reaction(s): esophageal pain    Celebrex [celecoxib] Rash    Morphine Hallucinations    Steroid [corticosteroids (glucocorticoids)] Other (See Comments)     Other reaction(s): Flushing (skin)    Sulfa dyne Other (See Comments)     Other reaction(s): esophogeal pain       No current facility-administered medications on file prior to encounter.     Current Outpatient Medications on File Prior to Encounter   Medication Sig    acetaminophen (TYLENOL) 650 MG TbSR Take 1 tablet (650 mg total) by mouth every 8 (eight) " hours as needed. Give 1 tablet QAM, 1 QPM.    atorvastatin (LIPITOR) 20 MG tablet TAKE 1 TAB BY MOUTH AT BEDTIME    cholecalciferol, vitamin D3, 1,250 mcg (50,000 unit) capsule Take 50,000 Units by mouth once daily.    donepeziL (ARICEPT) 5 MG tablet TAKE 1 TAB BY MOUTH EVERY EVENING    EScitalopram oxalate (LEXAPRO) 20 MG tablet TAKE 1 TAB BY MOUTH AT BEDTIME FOR DEPRESSION    fentaNYL (DURAGESIC) 12 mcg/hr PT72 Place 1 patch onto the skin every 72 hours.    furosemide (LASIX) 40 MG tablet TAKE 1 TAB BY MOUTH EVERY DAY    furosemide (LASIX) 40 MG tablet TAKE 2 TABLETS (80 MG TOTAL) BY MOUTH 2 (TWO) TIMES DAILY AS NEEDED (FOR WEIGHT GAIN OF 5 POUNDS). TAKE WITH POTASSIUM    losartan (COZAAR) 100 MG tablet TAKE 1 TAB BY MOUTH ONCE DAILY    melatonin 5 mg Tab Take 1 tablet by mouth every evening.     metoprolol succinate (TOPROL-XL) 200 MG 24 hr tablet TAKE 1 TAB BY MOUTH EVERY DAY    MULTIVITAMIN W-MINERALS/LUTEIN (CENTRUM SILVER ORAL) Take by mouth once daily.    nitroGLYCERIN (NITROSTAT) 0.4 MG SL tablet Place 1 tablet (0.4 mg total) under the tongue every 5 (five) minutes as needed for Chest pain.    nystatin (MYCOSTATIN) cream APPLY TO GROIN AREA TOPICALLY EVERY 12 HOURS FOR ANTIFUNGAL    NYSTOP powder APPLY TO GROIN AREA TOPICALLY EVERY 12 HOURS FOR ANTIFUNGAL    omeprazole (PRILOSEC) 20 MG capsule TAKE 2 CAPS (40MG) BY MOUTH EVERY DAY (DX: GERD)    potassium chloride SA (K-DUR,KLOR-CON) 10 MEQ tablet Take 1 tablet (10 mEq total) by mouth once daily. May also take 2 tablets (20 mEq total) daily as needed (when taking lasix for leg swelling).    tramadol-acetaminophen 37.5-325 mg (ULTRACET) 37.5-325 mg Tab Take 1 tablet by mouth 3 (three) times daily as needed for Pain.     Family History       Problem Relation (Age of Onset)    Asthma Father    Cancer Brother    Heart disease Mother, Father, Brother          Tobacco Use    Smoking status: Never Smoker    Smokeless tobacco: Never Used    Substance and Sexual Activity    Alcohol use: No    Drug use: Never     Types: Hydrocodone    Sexual activity: Not Currently     Birth control/protection: None     Review of Systems   Unable to perform ROS: Dementia   Constitutional: Negative for decreased appetite, diaphoresis and malaise/fatigue.   Cardiovascular:  Positive for leg swelling. Negative for chest pain, dyspnea on exertion and irregular heartbeat.   Respiratory:  Negative for shortness of breath.    Musculoskeletal:  Positive for joint pain.   Gastrointestinal:  Negative for bloating.   All other systems reviewed and are negative.  Objective:     Vital Signs (Most Recent):  Temp: 98 °F (36.7 °C) (03/21/22 0752)  Pulse: (!) 50 (03/21/22 1207)  Resp: 18 (03/21/22 1229)  BP: (!) 133/59 (03/21/22 1207)  SpO2: 95 % (03/21/22 1207)   Vital Signs (24h Range):  Temp:  [98 °F (36.7 °C)] 98 °F (36.7 °C)  Pulse:  [35-79] 50  Resp:  [16-18] 18  SpO2:  [93 %-100 %] 95 %  BP: ()/(44-82) 133/59     Weight: 90.7 kg (200 lb)  Body mass index is 32.28 kg/m².    SpO2: 95 %  O2 Device (Oxygen Therapy): room air      Intake/Output Summary (Last 24 hours) at 3/21/2022 1311  Last data filed at 3/21/2022 1123  Gross per 24 hour   Intake 147.4 ml   Output --   Net 147.4 ml       Lines/Drains/Airways       Central Venous Catheter Line  Duration             Percutaneous Central Line Insertion/Assessment - single lumen  03/21/22 1124 right internal jugular <1 day              Drain  Duration                  Urethral Catheter 03/21/22 1001 Double-lumen 16 Fr. <1 day              Peripheral Intravenous Line  Duration                  Peripheral IV - Single Lumen 03/21/22 0000 20 G Right Hand <1 day         Peripheral IV - Single Lumen 03/21/22 0823 20 G Right Forearm <1 day         Peripheral IV - Single Lumen 03/21/22 0900 22 G Left Forearm <1 day                    Physical Exam  Constitutional:       General: She is not in acute distress.     Appearance: She is  well-developed. She is obese. She is not diaphoretic.   HENT:      Head: Normocephalic and atraumatic.   Eyes:      Conjunctiva/sclera: Conjunctivae normal.   Neck:      Trachea: No tracheal deviation.   Cardiovascular:      Rate and Rhythm: Regular rhythm. Bradycardia present.      Heart sounds: Normal heart sounds. No murmur heard.  Pulmonary:      Effort: Pulmonary effort is normal. No respiratory distress.      Breath sounds: Normal breath sounds. No wheezing.   Abdominal:      General: Bowel sounds are normal. There is no distension.      Palpations: Abdomen is soft.      Tenderness: There is no abdominal tenderness.   Musculoskeletal:         General: Deformity (right ankle) present.      Cervical back: Normal range of motion.      Right lower leg: Edema present.      Left lower leg: Edema present.   Skin:     General: Skin is warm.   Neurological:      Mental Status: She is alert and oriented to person, place, and time.       Significant Labs: All pertinent lab results from the last 24 hours have been reviewed.

## 2022-03-21 NOTE — CONSULTS
"[facility]  Orthopedics  Consult Note    Patient Name: Lilia Hidalgo  MRN: [id number]  Admission Date: 3/21/2022  Hospital Length of Stay: 0 days  Attending Provider: Jeanette Gannon MD  Primary Care Provider: Anna Wood MD    Patient information was obtained from relative(s) and nursing home.     Inpatient consult to Orthopedic Surgery  Consult performed by: Sidra Dc MD  Consult ordered by: Bryan Landeros MD        Subjective:     Principal Problem:Open ankle fracture, right, type III, initial encounter    Chief Complaint:   Chief Complaint   Patient presents with    Bradycardia    Ankle Injury     From [facility] [place] 2[place] for right open ankle fracture after "rolling ankle".  Bradycardic with EMS, was given 1 mg atropine PTA.  AAO        HPI: HPI limited by dementia. History obtained from daughter Lina    89F pmhx aortic stenosis, CHF (EF 65% on last echo 2019), DM2, severe L hip OA, R LETICIA, L TKA, prior right ankle surgery, and dementia who presents to ER for right ankle injury. This occurred at [facility] where she lives, she went to stand up from the toilet at 6:30AM today and rolled right ankle causing large open wound to medial ankle and obvious deformity. Immediate pain and inability to bear weight. Reportedly did not fall, did not hit head or pass out. No other apparent injury. At baseline she does not walk, uses wheelchair to ambulate and can transfer with assistance. Not on blood thinners. Lives at [facility].      Past Medical History:   Diagnosis Date    Aortic stenosis 6/17/2015    Arthritis     Atrophic vaginitis 2/18/2016    Bilateral edema of lower extremity 6/22/2016    CHF (congestive heart failure)     Cholelithiasis without cholecystitis 5/5/2017    Chronic pain of left knee 8/3/2017    Depressed mood 6/22/2016    Diverticulitis of large intestine without perforation or abscess without bleeding 5/5/2017    Encounter for blood transfusion     Essential " "hypertension     GERD (gastroesophageal reflux disease)     Hyperlipidemia     Hypertension     Hypertensive heart disease with heart failure 7/14/2015    Insomnia     Joint pain     Knee pain, left 08/2016    pain with walking or standing    Primary osteoarthritis of knee 2/5/2013    PUD (peptic ulcer disease)     S/P knee replacement 2/13/2013    Slow transit constipation 2/18/2016       Past Surgical History:   Procedure Laterality Date    APPENDECTOMY      COLONOSCOPY      08    EYE SURGERY      bilateral cataract    FINGER SURGERY      LT thumb surgery--"arthritis surgery"    HYSTERECTOMY      UNKNOWN BSO    JOINT REPLACEMENT      right hip replacement    KNEE ARTHROPLASTY Left 2001    TONSILLECTOMY      TUMOR EXCISION      esophageal tumor removal     UPPER GASTROINTESTINAL ENDOSCOPY      2013       Review of patient's allergies indicates:   Allergen Reactions    Aspirin Nausea Only and Other (See Comments)     Other reaction(s): esophageal pain    Celebrex [celecoxib] Rash    Morphine Hallucinations    Steroid [corticosteroids (glucocorticoids)] Other (See Comments)     Other reaction(s): Flushing (skin)    Sulfa dyne Other (See Comments)     Other reaction(s): esophogeal pain       Current Facility-Administered Medications   Medication    fentaNYL 50 mcg/mL injection 25 mcg    gentamicin (GARAMYCIN) 72 mg in sodium chloride 0.9% 100 mL IVPB    gentamicin - pharmacy to dose     Current Outpatient Medications   Medication Sig    acetaminophen (TYLENOL) 650 MG TbSR Take 1 tablet (650 mg total) by mouth every 8 (eight) hours as needed. Give 1 tablet QAM, 1 QPM.    atorvastatin (LIPITOR) 20 MG tablet TAKE 1 TAB BY MOUTH AT BEDTIME    cholecalciferol, vitamin D3, 1,250 mcg (50,000 unit) capsule Take 50,000 Units by mouth once daily.    donepeziL (ARICEPT) 5 MG tablet TAKE 1 TAB BY MOUTH EVERY EVENING    EScitalopram oxalate (LEXAPRO) 20 MG tablet TAKE 1 TAB BY MOUTH AT BEDTIME FOR " DEPRESSION    fentaNYL (DURAGESIC) 12 mcg/hr PT72 Place 1 patch onto the skin every 72 hours.    furosemide (LASIX) 40 MG tablet TAKE 1 TAB BY MOUTH EVERY DAY    furosemide (LASIX) 40 MG tablet TAKE 2 TABLETS (80 MG TOTAL) BY MOUTH 2 (TWO) TIMES DAILY AS NEEDED (FOR WEIGHT GAIN OF 5 POUNDS). TAKE WITH POTASSIUM    losartan (COZAAR) 100 MG tablet TAKE 1 TAB BY MOUTH ONCE DAILY    melatonin 5 mg Tab Take 1 tablet by mouth every evening.     metoprolol succinate (TOPROL-XL) 200 MG 24 hr tablet TAKE 1 TAB BY MOUTH EVERY DAY    MULTIVITAMIN W-MINERALS/LUTEIN (CENTRUM SILVER ORAL) Take by mouth once daily.    nitroGLYCERIN (NITROSTAT) 0.4 MG SL tablet Place 1 tablet (0.4 mg total) under the tongue every 5 (five) minutes as needed for Chest pain.    nystatin (MYCOSTATIN) cream APPLY TO GROIN AREA TOPICALLY EVERY 12 HOURS FOR ANTIFUNGAL    NYSTOP powder APPLY TO GROIN AREA TOPICALLY EVERY 12 HOURS FOR ANTIFUNGAL    omeprazole (PRILOSEC) 20 MG capsule TAKE 2 CAPS (40MG) BY MOUTH EVERY DAY (DX: GERD)    potassium chloride SA (K-DUR,KLOR-CON) 10 MEQ tablet Take 1 tablet (10 mEq total) by mouth once daily. May also take 2 tablets (20 mEq total) daily as needed (when taking lasix for leg swelling).    tramadol-acetaminophen 37.5-325 mg (ULTRACET) 37.5-325 mg Tab Take 1 tablet by mouth 3 (three) times daily as needed for Pain.     Family History       Problem Relation (Age of Onset)    Asthma Father    Cancer Brother    Heart disease Mother, Father, Brother          Tobacco Use    Smoking status: Never Smoker    Smokeless tobacco: Never Used   Substance and Sexual Activity    Alcohol use: No    Drug use: Never     Types: Hydrocodone    Sexual activity: Not Currently     Birth control/protection: None     ROS  ROS limited by dementia  Objective:     Vital Signs (Most Recent):  Temp: 98 °F (36.7 °C) (03/21/22 0752)  Pulse: (!) 36 (03/21/22 1053)  Resp: 16 (03/21/22 0752)  BP: (!) 139/57 (03/21/22 1053)  SpO2: 99 %  (03/21/22 1053)   Vital Signs (24h Range):  Temp:  [98 °F (36.7 °C)] 98 °F (36.7 °C)  Pulse:  [36-79] 36  Resp:  [16] 16  SpO2:  [93 %-100 %] 99 %  BP: ()/(44-82) 139/57     Weight: 90.7 kg (200 lb)     Body mass index is 32.28 kg/m².      Intake/Output Summary (Last 24 hours) at 3/21/2022 1106  Last data filed at 3/21/2022 0925  Gross per 24 hour   Intake 47.9 ml   Output --   Net 47.9 ml       Ortho/SPM Exam  PE limited by dementia    General Exam  General appearance: Alert. Not oriented. Demented.   HEENT: Normocephalic, head atraumatic. Conjuntiva normal. EOMI. External appearance of nose, mouth, ears wnl.  Neck: Supple. Trachea midline.  Respiratory: Breathing unlabored on room air. Symmetric chest rise.   CV: Extremities warm and well perfused.  Neurologic: Grossly moves extremities   Psychatric: Dementia  Skin: Warm, dry, well perfused. No rash.    Left upper extremity  Inspection: no swelling, lacerations, abrasions, contusions, or deformity  Palpation: no TTP, no palpable abnormality, compartments soft and compressible  ROM: full, painless passive ROM of shoulder, elbow, wrist, and fingers  Neuro: grossly neuro intact  Vascular: 2+ radial pulse, fingers WWP     Right upper extremity  Inspection: no swelling, lacerations, abrasions, contusions, or deformity  Palpation: no TTP, no palpable abnormality, compartments soft and compressible  ROM: full, painless passive ROM of shoulder, elbow, wrist, and fingers  Neuro: grossly neuro intact  Vascular: 2+ radial pulse, fingers WWP     Left lower extremity  Inspection: no swelling, lacerations, abrasions, contusions, or deformity  Palpation: no TTP, no palpable abnormality, compartments soft and compressible  ROM: left hip pain with ROM (baseline per daughter 2/2 severe OA). Painful ROM left knee. Does not appear to have pain with ROM ankle, toes  Neuro: grossly intact  Vascular: 2+ DP pulse, toes WWP     Right lower extremity  Inspection: 12cm open wound  medial ankle, no gross contamination, minimal active bleeding, obvious ankle deformity  Palpation: TTP left ankle, compartments soft and compressible  ROM: painful ROM left ankle  Neuro: grossly intact, wiggles toes  Vascular: 2+ DP pulse, toes WWP        Significant Labs: A1C:   Recent Labs   Lab 22  0855   HGBA1C 5.4     BMP:   Recent Labs   Lab 22  0855   *   *   K 4.6      CO2 28   BUN 36*   CREATININE 1.5*   CALCIUM 9.9   MG 2.2     CBC:   Recent Labs   Lab 22  0855   WBC 12.22   HGB 14.0   HCT 47.0        CMP:   Recent Labs   Lab 22  0855   *   K 4.6      CO2 28   *   BUN 36*   CREATININE 1.5*   CALCIUM 9.9   PROT 6.9   ALBUMIN 3.2*   BILITOT 1.1*   ALKPHOS 73   AST 17   ALT 17   ANIONGAP 12   EGFRNONAA 30.5*     Coagulation:   Recent Labs   Lab 22  0855   LABPROT 11.3   INR 1.1   APTT 22.5     Prealbumin:   Recent Labs   Lab 22  0855   PREALBUMIN 24     Urine Culture: No results for input(s): LABURIN in the last 48 hours.  Urine Studies:   Recent Labs   Lab 22  1000   COLORU Yellow   APPEARANCEUA Cloudy*   PHUR 5.0   SPECGRAV 1.015   PROTEINUA Negative   GLUCUA Negative   KETONESU Negative   BILIRUBINUA Negative   OCCULTUA Negative   NITRITE Negative   LEUKOCYTESUR 1+*   RBCUA 3   WBCUA 33*   BACTERIA Many*   SQUAMEPITHEL 24   HYALINECASTS 31*     All pertinent labs within the past 24 hours have been reviewed.    Significant Imaging: I have reviewed all pertinent imaging results/findings.    X-rays reviewed - right trimalleolar ankle fracture dislocation       Procedure Note: right ankle reduction  Patient was explained risks, benefits, and alternatives to treatment and verbalized consent to proceed. Time out was performed and patient name, , site, and procedure were confirmed. 12cm open right ankle wound was anesthetized with 20cc lidocaine 1% then washed with 2L iodinated saline. The wound was provisionally closed with  running nylon suture. Fracture was reduced. Short leg splint was applied in typical fashion. Post-reduction films were performed and confirmed adequate reduction. Patient tolerated the procedure well.       Assessment/Plan:     * Open ankle fracture, right, type III, initial encounter  Lilia Hidalgo is a 89 y.o. female with right grade 3a open trimalleolar fracture dislocation. They take no anticoagulation at home. They required a wheelchair to mobilize prior to this injury. Lives at [facility].    - To OR today for right ankle I&D and external fixation versus operative fixation (NPO)  - Ancef and gentamicin IV given in ER for open fracture  - NWB RLE in short leg splint, elevate, ice  - Pt marked and POA daughter Lina consented, case booked  - Pre-operative labs and imaging obtained in ED (UA, Type and Cross, PT-INR, CBC, BMP, PreAlbumin, CXR, EKG). Rocephin given for +UTI  - Paul  - Cardiology placed transvenous pacemaker in ER for third degree heart block. Did bedside echo given troponin elevated at 0.37- echo showed no wall motion abnormality and preserved EF. Okay to proceed to OR per cardiology   - Admit to Hospital Medicine for medical optimization     Risks and complications of surgery including but not limited to the risks of anesthetic complications, infection, wound healing complications, need for further surgeries, non-union, mal-union, hardware failure, pain, bleeding, stiffness, damage to vessels or nerves, DVT, pulmonary embolism, perioperative medical risks (cardiac, pulmonary, renal, neurologic), and death were discussed at length with the patient's POA Lina. No guarantees were made.  Patient's POA Lina verbalized their understanding of everything discussed and all questions were answered. The patient's POA elects to proceed with surgery at this time.             Sidra Dc MD  Orthopedics  [facility]

## 2022-03-21 NOTE — ASSESSMENT & PLAN NOTE
Lilia Hidalgo is a 89 y.o. female with right grade 3a open trimalleolar fracture dislocation. They take no anticoagulation at home. They required a wheelchair to mobilize prior to this injury. Lives at [facility].    - To OR today for right ankle I&D and external fixation versus operative fixation (NPO)  - Ancef and gentamicin IV given in ER for open fracture  - NWB RLE in short leg splint, elevate, ice  - Pt marked and POA daughter Lina consented, case booked  - Pre-operative labs and imaging obtained in ED (UA, Type and Cross, PT-INR, CBC, BMP, PreAlbumin, CXR, EKG). Rocephin given for +UTI  - Paul  - Cardiology placing transvenous pacemaker in ER for third degree heart block  - Admit to Hospital Medicine for medical optimization     Risks and complications of surgery including but not limited to the risks of anesthetic complications, infection, wound healing complications, need for further surgeries, non-union, mal-union, hardware failure, pain, bleeding, stiffness, damage to vessels or nerves, DVT, pulmonary embolism, perioperative medical risks (cardiac, pulmonary, renal, neurologic), and death were discussed at length with the patient's POA Lina. No guarantees were made.  Patient's POA Lina verbalized their understanding of everything discussed and all questions were answered. The patient's POA elects to proceed with surgery at this time.

## 2022-03-21 NOTE — SUBJECTIVE & OBJECTIVE
"Past Medical History:   Diagnosis Date    Aortic stenosis 6/17/2015    Arthritis     Atrophic vaginitis 2/18/2016    Bilateral edema of lower extremity 6/22/2016    CHF (congestive heart failure)     Cholelithiasis without cholecystitis 5/5/2017    Chronic pain of left knee 8/3/2017    Depressed mood 6/22/2016    Diverticulitis of large intestine without perforation or abscess without bleeding 5/5/2017    Encounter for blood transfusion     Essential hypertension     GERD (gastroesophageal reflux disease)     Hyperlipidemia     Hypertension     Hypertensive heart disease with heart failure 7/14/2015    Insomnia     Joint pain     Knee pain, left 08/2016    pain with walking or standing    Primary osteoarthritis of knee 2/5/2013    PUD (peptic ulcer disease)     S/P knee replacement 2/13/2013    Slow transit constipation 2/18/2016       Past Surgical History:   Procedure Laterality Date    APPENDECTOMY      COLONOSCOPY      08    EYE SURGERY      bilateral cataract    FINGER SURGERY      LT thumb surgery--"arthritis surgery"    HYSTERECTOMY      UNKNOWN BSO    JOINT REPLACEMENT      right hip replacement    KNEE ARTHROPLASTY Left 2001    TONSILLECTOMY      TUMOR EXCISION      esophageal tumor removal     UPPER GASTROINTESTINAL ENDOSCOPY      2013       Review of patient's allergies indicates:   Allergen Reactions    Aspirin Nausea Only and Other (See Comments)     Other reaction(s): esophageal pain    Celebrex [celecoxib] Rash    Morphine Hallucinations    Steroid [corticosteroids (glucocorticoids)] Other (See Comments)     Other reaction(s): Flushing (skin)    Sulfa dyne Other (See Comments)     Other reaction(s): esophogeal pain       Current Facility-Administered Medications   Medication    fentaNYL 50 mcg/mL injection 25 mcg    gentamicin (GARAMYCIN) 72 mg in sodium chloride 0.9% 100 mL IVPB    gentamicin - pharmacy to dose     Current Outpatient Medications   Medication Sig    acetaminophen (TYLENOL) 650 MG TbSR " Take 1 tablet (650 mg total) by mouth every 8 (eight) hours as needed. Give 1 tablet QAM, 1 QPM.    atorvastatin (LIPITOR) 20 MG tablet TAKE 1 TAB BY MOUTH AT BEDTIME    cholecalciferol, vitamin D3, 1,250 mcg (50,000 unit) capsule Take 50,000 Units by mouth once daily.    donepeziL (ARICEPT) 5 MG tablet TAKE 1 TAB BY MOUTH EVERY EVENING    EScitalopram oxalate (LEXAPRO) 20 MG tablet TAKE 1 TAB BY MOUTH AT BEDTIME FOR DEPRESSION    fentaNYL (DURAGESIC) 12 mcg/hr PT72 Place 1 patch onto the skin every 72 hours.    furosemide (LASIX) 40 MG tablet TAKE 1 TAB BY MOUTH EVERY DAY    furosemide (LASIX) 40 MG tablet TAKE 2 TABLETS (80 MG TOTAL) BY MOUTH 2 (TWO) TIMES DAILY AS NEEDED (FOR WEIGHT GAIN OF 5 POUNDS). TAKE WITH POTASSIUM    losartan (COZAAR) 100 MG tablet TAKE 1 TAB BY MOUTH ONCE DAILY    melatonin 5 mg Tab Take 1 tablet by mouth every evening.     metoprolol succinate (TOPROL-XL) 200 MG 24 hr tablet TAKE 1 TAB BY MOUTH EVERY DAY    MULTIVITAMIN W-MINERALS/LUTEIN (CENTRUM SILVER ORAL) Take by mouth once daily.    nitroGLYCERIN (NITROSTAT) 0.4 MG SL tablet Place 1 tablet (0.4 mg total) under the tongue every 5 (five) minutes as needed for Chest pain.    nystatin (MYCOSTATIN) cream APPLY TO GROIN AREA TOPICALLY EVERY 12 HOURS FOR ANTIFUNGAL    NYSTOP powder APPLY TO GROIN AREA TOPICALLY EVERY 12 HOURS FOR ANTIFUNGAL    omeprazole (PRILOSEC) 20 MG capsule TAKE 2 CAPS (40MG) BY MOUTH EVERY DAY (DX: GERD)    potassium chloride SA (K-DUR,KLOR-CON) 10 MEQ tablet Take 1 tablet (10 mEq total) by mouth once daily. May also take 2 tablets (20 mEq total) daily as needed (when taking lasix for leg swelling).    tramadol-acetaminophen 37.5-325 mg (ULTRACET) 37.5-325 mg Tab Take 1 tablet by mouth 3 (three) times daily as needed for Pain.     Family History       Problem Relation (Age of Onset)    Asthma Father    Cancer Brother    Heart disease Mother, Father, Brother          Tobacco Use    Smoking status: Never Smoker     Smokeless tobacco: Never Used   Substance and Sexual Activity    Alcohol use: No    Drug use: Never     Types: Hydrocodone    Sexual activity: Not Currently     Birth control/protection: None     ROS  ROS limited by dementia  Objective:     Vital Signs (Most Recent):  Temp: 98 °F (36.7 °C) (03/21/22 0752)  Pulse: (!) 36 (03/21/22 1053)  Resp: 16 (03/21/22 0752)  BP: (!) 139/57 (03/21/22 1053)  SpO2: 99 % (03/21/22 1053)   Vital Signs (24h Range):  Temp:  [98 °F (36.7 °C)] 98 °F (36.7 °C)  Pulse:  [36-79] 36  Resp:  [16] 16  SpO2:  [93 %-100 %] 99 %  BP: ()/(44-82) 139/57     Weight: 90.7 kg (200 lb)     Body mass index is 32.28 kg/m².      Intake/Output Summary (Last 24 hours) at 3/21/2022 1106  Last data filed at 3/21/2022 0925  Gross per 24 hour   Intake 47.9 ml   Output --   Net 47.9 ml       Ortho/SPM Exam  PE limited by dementia    General Exam  General appearance: Alert. Not oriented. Demented.   HEENT: Normocephalic, head atraumatic. Conjuntiva normal. EOMI. External appearance of nose, mouth, ears wnl.  Neck: Supple. Trachea midline.  Respiratory: Breathing unlabored on room air. Symmetric chest rise.   CV: Extremities warm and well perfused.  Neurologic: Grossly moves extremities   Psychatric: Dementia  Skin: Warm, dry, well perfused. No rash.    Left upper extremity  Inspection: no swelling, lacerations, abrasions, contusions, or deformity  Palpation: no TTP, no palpable abnormality, compartments soft and compressible  ROM: full, painless passive ROM of shoulder, elbow, wrist, and fingers  Neuro: grossly neuro intact  Vascular: 2+ radial pulse, fingers WWP     Right upper extremity  Inspection: no swelling, lacerations, abrasions, contusions, or deformity  Palpation: no TTP, no palpable abnormality, compartments soft and compressible  ROM: full, painless passive ROM of shoulder, elbow, wrist, and fingers  Neuro: grossly neuro intact  Vascular: 2+ radial pulse, fingers WWP     Left lower  extremity  Inspection: no swelling, lacerations, abrasions, contusions, or deformity  Palpation: no TTP, no palpable abnormality, compartments soft and compressible  ROM: left hip pain with ROM (baseline per daughter 2/2 severe OA). full, painless passive ROM of knee, ankle, toes  Neuro: grossly intact  Vascular: 2+ DP pulse, toes WWP     Right lower extremity  Inspection: 12cm open wound medial ankle, no gross contamination, minimal active bleeding, obvious ankle deformity  Palpation: TTP left ankle, compartments soft and compressible  ROM: painful ROM left ankle  Neuro: grossly intact, wiggles toes  Vascular: 2+ DP pulse, toes WWP        Significant Labs: A1C:   Recent Labs   Lab 03/21/22  0855   HGBA1C 5.4     BMP:   Recent Labs   Lab 03/21/22  0855   *   *   K 4.6      CO2 28   BUN 36*   CREATININE 1.5*   CALCIUM 9.9   MG 2.2     CBC:   Recent Labs   Lab 03/21/22  0855   WBC 12.22   HGB 14.0   HCT 47.0        CMP:   Recent Labs   Lab 03/21/22  0855   *   K 4.6      CO2 28   *   BUN 36*   CREATININE 1.5*   CALCIUM 9.9   PROT 6.9   ALBUMIN 3.2*   BILITOT 1.1*   ALKPHOS 73   AST 17   ALT 17   ANIONGAP 12   EGFRNONAA 30.5*     Coagulation:   Recent Labs   Lab 03/21/22  0855   LABPROT 11.3   INR 1.1   APTT 22.5     Prealbumin:   Recent Labs   Lab 03/21/22  0855   PREALBUMIN 24     Urine Culture: No results for input(s): LABURIN in the last 48 hours.  Urine Studies:   Recent Labs   Lab 03/21/22  1000   COLORU Yellow   APPEARANCEUA Cloudy*   PHUR 5.0   SPECGRAV 1.015   PROTEINUA Negative   GLUCUA Negative   KETONESU Negative   BILIRUBINUA Negative   OCCULTUA Negative   NITRITE Negative   LEUKOCYTESUR 1+*   RBCUA 3   WBCUA 33*   BACTERIA Many*   SQUAMEPITHEL 24   HYALINECASTS 31*     All pertinent labs within the past 24 hours have been reviewed.    Significant Imaging: I have reviewed all pertinent imaging results/findings.    X-rays reviewed - right trimalleolar ankle  fracture dislocation       Procedure Note: right ankle reduction  Patient was explained risks, benefits, and alternatives to treatment and verbalized consent to proceed. Time out was performed and patient name, , site, and procedure were confirmed. 12cm open right ankle wound was anesthetized with 20cc lidocaine 1% then washed with 2L iodinated saline. The wound was provisionally closed with running nylon suture. Fracture was reduced. Short leg splint was applied in typical fashion. Post-reduction films were performed and confirmed adequate reduction. Patient tolerated the procedure well.

## 2022-03-21 NOTE — Clinical Note
The chest was prepped. The site was prepped with ChloraPrep and Ioban. The patient was draped. The patient was positioned supine.

## 2022-03-21 NOTE — CONSULTS
[facility] - Emergency Dept  Cardiology  Consult Note    Patient Name: Lilia Hidalgo  MRN: [id number]  Admission Date: 3/21/2022  Hospital Length of Stay: 0 days  Code Status: Prior   Attending Provider: Jeanette Gannon MD   Consulting Provider: Pasha Harris MD  Primary Care Physician: Anna Wood MD  Principal Problem:<principal problem not specified>    Patient information was obtained from patient, past medical records and ER records.     Inpatient consult to Cardiology  Consult performed by: Pasha Harris MD  Consult ordered by: Bryan Landeros MD        Subjective:     Chief Complaint:  bradycardia     HPI:    89 y.o. female with HTN, MDD, mild cognitive impairment, and urinary incontinence. She is brought to ED from nursing home due to a ankle fracture. She uses a wheelchair and was being assisted to transfer from the toilet to her wheelchair when she twisted her ankle and fractured it. No reports of syncope. On arrival to ED, an ECG showed 3rd degree AV block and cardiology was consulted. She is awake and complaining of pain. Not able to give a detailed history due to patient's dementia. Discussed the events with daughter.       Past Medical History:   Diagnosis Date    Aortic stenosis 6/17/2015    Arthritis     Atrophic vaginitis 2/18/2016    Bilateral edema of lower extremity 6/22/2016    CHF (congestive heart failure)     Cholelithiasis without cholecystitis 5/5/2017    Chronic pain of left knee 8/3/2017    Depressed mood 6/22/2016    Diverticulitis of large intestine without perforation or abscess without bleeding 5/5/2017    Encounter for blood transfusion     Essential hypertension     GERD (gastroesophageal reflux disease)     Hyperlipidemia     Hypertension     Hypertensive heart disease with heart failure 7/14/2015    Insomnia     Joint pain     Knee pain, left 08/2016    pain with walking or standing    Primary osteoarthritis of knee 2/5/2013    PUD (peptic ulcer  "disease)     S/P knee replacement 2/13/2013    Slow transit constipation 2/18/2016       Past Surgical History:   Procedure Laterality Date    APPENDECTOMY      COLONOSCOPY      08    EYE SURGERY      bilateral cataract    FINGER SURGERY      LT thumb surgery--"arthritis surgery"    HYSTERECTOMY      UNKNOWN BSO    JOINT REPLACEMENT      right hip replacement    KNEE ARTHROPLASTY Left 2001    TONSILLECTOMY      TUMOR EXCISION      esophageal tumor removal     UPPER GASTROINTESTINAL ENDOSCOPY      2013       Review of patient's allergies indicates:   Allergen Reactions    Aspirin Nausea Only and Other (See Comments)     Other reaction(s): esophageal pain    Celebrex [celecoxib] Rash    Morphine Hallucinations    Steroid [corticosteroids (glucocorticoids)] Other (See Comments)     Other reaction(s): Flushing (skin)    Sulfa dyne Other (See Comments)     Other reaction(s): esophogeal pain       No current facility-administered medications on file prior to encounter.     Current Outpatient Medications on File Prior to Encounter   Medication Sig    acetaminophen (TYLENOL) 650 MG TbSR Take 1 tablet (650 mg total) by mouth every 8 (eight) hours as needed. Give 1 tablet QAM, 1 QPM.    atorvastatin (LIPITOR) 20 MG tablet TAKE 1 TAB BY MOUTH AT BEDTIME    cholecalciferol, vitamin D3, 1,250 mcg (50,000 unit) capsule Take 50,000 Units by mouth once daily.    donepeziL (ARICEPT) 5 MG tablet TAKE 1 TAB BY MOUTH EVERY EVENING    EScitalopram oxalate (LEXAPRO) 20 MG tablet TAKE 1 TAB BY MOUTH AT BEDTIME FOR DEPRESSION    fentaNYL (DURAGESIC) 12 mcg/hr PT72 Place 1 patch onto the skin every 72 hours.    furosemide (LASIX) 40 MG tablet TAKE 1 TAB BY MOUTH EVERY DAY    furosemide (LASIX) 40 MG tablet TAKE 2 TABLETS (80 MG TOTAL) BY MOUTH 2 (TWO) TIMES DAILY AS NEEDED (FOR WEIGHT GAIN OF 5 POUNDS). TAKE WITH POTASSIUM    losartan (COZAAR) 100 MG tablet TAKE 1 TAB BY MOUTH ONCE DAILY    melatonin 5 mg Tab Take " 1 tablet by mouth every evening.     metoprolol succinate (TOPROL-XL) 200 MG 24 hr tablet TAKE 1 TAB BY MOUTH EVERY DAY    MULTIVITAMIN W-MINERALS/LUTEIN (CENTRUM SILVER ORAL) Take by mouth once daily.    nitroGLYCERIN (NITROSTAT) 0.4 MG SL tablet Place 1 tablet (0.4 mg total) under the tongue every 5 (five) minutes as needed for Chest pain.    nystatin (MYCOSTATIN) cream APPLY TO GROIN AREA TOPICALLY EVERY 12 HOURS FOR ANTIFUNGAL    NYSTOP powder APPLY TO GROIN AREA TOPICALLY EVERY 12 HOURS FOR ANTIFUNGAL    omeprazole (PRILOSEC) 20 MG capsule TAKE 2 CAPS (40MG) BY MOUTH EVERY DAY (DX: GERD)    potassium chloride SA (K-DUR,KLOR-CON) 10 MEQ tablet Take 1 tablet (10 mEq total) by mouth once daily. May also take 2 tablets (20 mEq total) daily as needed (when taking lasix for leg swelling).    tramadol-acetaminophen 37.5-325 mg (ULTRACET) 37.5-325 mg Tab Take 1 tablet by mouth 3 (three) times daily as needed for Pain.     Family History       Problem Relation (Age of Onset)    Asthma Father    Cancer Brother    Heart disease Mother, Father, Brother          Tobacco Use    Smoking status: Never Smoker    Smokeless tobacco: Never Used   Substance and Sexual Activity    Alcohol use: No    Drug use: Never     Types: Hydrocodone    Sexual activity: Not Currently     Birth control/protection: None     Review of Systems   Unable to perform ROS: Dementia   Objective:     Vital Signs (Most Recent):  Temp: 98 °F (36.7 °C) (03/21/22 0752)  Pulse: (!) 37 (03/21/22 0901)  Resp: 16 (03/21/22 0752)  BP: (!) 105/54 (03/21/22 0853)  SpO2: 99 % (03/21/22 0901)   Vital Signs (24h Range):  Temp:  [98 °F (36.7 °C)] 98 °F (36.7 °C)  Pulse:  [36-37] 37  Resp:  [16] 16  SpO2:  [93 %-99 %] 99 %  BP: (105-141)/(44-57) 105/54     Weight: 90.7 kg (200 lb)  Body mass index is 32.28 kg/m².    SpO2: 99 %  O2 Device (Oxygen Therapy): room air    No intake or output data in the 24 hours ending 03/21/22 0903    Lines/Drains/Airways        Peripheral Intravenous Line  Duration                  Peripheral IV - Single Lumen 03/21/22 0000 20 G Right Hand <1 day         Peripheral IV - Single Lumen 03/21/22 0823 20 G Right Forearm <1 day         Peripheral IV - Single Lumen 03/21/22 0900 22 G Left Forearm <1 day                    Physical Exam  Constitutional:       General: She is not in acute distress.     Appearance: She is well-developed. She is obese. She is not diaphoretic.   HENT:      Head: Normocephalic and atraumatic.   Eyes:      Conjunctiva/sclera: Conjunctivae normal.   Neck:      Trachea: No tracheal deviation.   Cardiovascular:      Rate and Rhythm: Regular rhythm. Bradycardia present.      Heart sounds: Normal heart sounds. No murmur heard.  Pulmonary:      Effort: Pulmonary effort is normal. No respiratory distress.      Breath sounds: Normal breath sounds. No wheezing.   Abdominal:      General: Bowel sounds are normal. There is no distension.      Palpations: Abdomen is soft.      Tenderness: There is no abdominal tenderness.   Musculoskeletal:         General: Deformity (right ankle) present.      Cervical back: Normal range of motion.   Neurological:      Mental Status: She is alert and oriented to person, place, and time.       Significant Labs: All pertinent lab results from the last 24 hours have been reviewed.    Significant Imaging: All pertinent images from the last 24h have been reviewed.      Assessment and Plan:     Complete AV block  - Patient presented to ED with ankle fracture and found to be in complete heart block. Currently stable with junctional scape rhythm in the 30s and normal BP.   - Will place TVP as bridge to PPM. Will plan for permanent pacemaker once right ankle fracture is addressed completely to avoid risk of device infection.   - Stop metoprolol        VTE Risk Mitigation (From admission, onward)    None          Thank you for your consult. I will follow-up with patient. Please contact us if you have any  additional questions.    Pasha Harris MD  Cardiology   [facility] Dept

## 2022-03-21 NOTE — ED PROVIDER NOTES
"Encounter Date: 3/21/2022       History     Chief Complaint   Patient presents with    Bradycardia    Ankle Injury     From [facility] for right open ankle fracture after "rolling ankle".  Bradycardic with EMS, was given 1 mg atropine PTA.  AAO     89-year-old female with PMH of dementia, CHF, and hypertension presents with open ankle fracture.  Patient is demented and unable to provide a history.  She does not remember any injuries or falls.  She reports that her knees hurt but denies any ankle pain.  Per EMS, the nursing home denies any falls and states the patient rolled her ankle; nursing home was unable to provide additional information.  With EMS the patient was noted to be bradycardic to the 30s, so they gave atropine 1 mg which did not improve her bradycardia. BP was stable with EMS.  They did not give any other medications EN route. No other hx is able to be obtained at this time.         Review of patient's allergies indicates:   Allergen Reactions    Aspirin Nausea Only and Other (See Comments)     Other reaction(s): esophageal pain    Celebrex [celecoxib] Rash    Morphine Hallucinations    Steroid [corticosteroids (glucocorticoids)] Other (See Comments)     Other reaction(s): Flushing (skin)    Sulfa dyne Other (See Comments)     Other reaction(s): esophogeal pain     Past Medical History:   Diagnosis Date    Aortic stenosis 6/17/2015    Arthritis     Atrophic vaginitis 2/18/2016    Bilateral edema of lower extremity 6/22/2016    CHF (congestive heart failure)     Cholelithiasis without cholecystitis 5/5/2017    Chronic pain of left knee 8/3/2017    Depressed mood 6/22/2016    Diverticulitis of large intestine without perforation or abscess without bleeding 5/5/2017    Encounter for blood transfusion     Essential hypertension     GERD (gastroesophageal reflux disease)     Hyperlipidemia     Hypertension     Hypertensive heart disease with heart failure 7/14/2015    Insomnia     " "Joint pain     Knee pain, left 08/2016    pain with walking or standing    Primary osteoarthritis of knee 2/5/2013    PUD (peptic ulcer disease)     S/P knee replacement 2/13/2013    Slow transit constipation 2/18/2016     Past Surgical History:   Procedure Laterality Date    APPENDECTOMY      COLONOSCOPY      08    EYE SURGERY      bilateral cataract    FINGER SURGERY      LT thumb surgery--"arthritis surgery"    HYSTERECTOMY      UNKNOWN BSO    JOINT REPLACEMENT      right hip replacement    KNEE ARTHROPLASTY Left 2001    TONSILLECTOMY      TUMOR EXCISION      esophageal tumor removal     UPPER GASTROINTESTINAL ENDOSCOPY      2013     Family History   Problem Relation Age of Onset    Heart disease Mother     Heart disease Father     Asthma Father     Cancer Brother         breast    Heart disease Brother     Melanoma Neg Hx     Psoriasis Neg Hx     Lupus Neg Hx     Eczema Neg Hx     Acne Neg Hx     Breast cancer Neg Hx     Ovarian cancer Neg Hx     Cervical cancer Neg Hx     Endometrial cancer Neg Hx     Vaginal cancer Neg Hx      Social History     Tobacco Use    Smoking status: Never Smoker    Smokeless tobacco: Never Used   Substance Use Topics    Alcohol use: No    Drug use: Never     Types: Hydrocodone     Review of Systems   Unable to perform ROS: Dementia       Physical Exam     Initial Vitals [03/21/22 0752]   BP Pulse Resp Temp SpO2   (!) 109/44 (!) 37 16 98 °F (36.7 °C) (!) 94 %      MAP       --         Physical Exam    Nursing note and vitals reviewed.  Constitutional: She appears well-developed and well-nourished. She is not diaphoretic.   HENT:   Head: Normocephalic and atraumatic.   No scalp laceration, hematoma, or other signs of trauma   Eyes: Conjunctivae and EOM are normal. Pupils are equal, round, and reactive to light.   Neck: Neck supple.   Normal range of motion.  Cardiovascular: Normal heart sounds and intact distal pulses.   No murmur heard.  Bradycardia. " Intact doppler DP pulse in RLE   Pulmonary/Chest: Breath sounds normal. No respiratory distress. She has no wheezes. She has no rhonchi. She has no rales.   Abdominal: Abdomen is soft. She exhibits no distension. There is no abdominal tenderness. There is no rebound and no guarding.   Musculoskeletal:      Cervical back: Normal range of motion and neck supple.      Comments: RLE: ankle fracture open medially, with active oozing of blood but no arterial bleeding obvious. Intact DP pulse to doppler.     LLE: knee pain with movement. Bruising over shin. Intact distal pulses.     Back: No TTP of spine or stepoffs     Neurological: She is alert. GCS eye subscore is 4. GCS verbal subscore is 4. GCS motor subscore is 6.   Oriented to self only   Skin: Skin is warm and dry. Capillary refill takes less than 2 seconds.         ED Course   Procedures  Labs Reviewed   CBC W/ AUTO DIFFERENTIAL - Abnormal; Notable for the following components:       Result Value    MCHC 29.8 (*)     RDW 15.6 (*)     Gran # (ANC) 10.5 (*)     Immature Grans (Abs) 0.05 (*)     Lymph # 0.9 (*)     Gran % 85.8 (*)     Lymph % 7.4 (*)     All other components within normal limits   COMPREHENSIVE METABOLIC PANEL - Abnormal; Notable for the following components:    Sodium 146 (*)     Glucose 122 (*)     BUN 36 (*)     Creatinine 1.5 (*)     Albumin 3.2 (*)     Total Bilirubin 1.1 (*)     eGFR if  35.1 (*)     eGFR if non  30.5 (*)     All other components within normal limits   B-TYPE NATRIURETIC PEPTIDE - Abnormal; Notable for the following components:    BNP 1,070 (*)     All other components within normal limits   TROPONIN I - Abnormal; Notable for the following components:    Troponin I 0.376 (*)     All other components within normal limits   URINALYSIS, REFLEX TO URINE CULTURE - Abnormal; Notable for the following components:    Appearance, UA Cloudy (*)     Leukocytes, UA 1+ (*)     All other components within  normal limits    Narrative:     Specimen Source->Urine   URINALYSIS MICROSCOPIC - Abnormal; Notable for the following components:    WBC, UA 33 (*)     Bacteria Many (*)     Hyaline Casts, UA 31 (*)     All other components within normal limits    Narrative:     Specimen Source->Urine   CULTURE, URINE   MAGNESIUM   PHOSPHORUS   PROTIME-INR   APTT   PREALBUMIN   HEMOGLOBIN A1C   SARS-COV-2 RDRP GENE    Narrative:     This test utilizes isothermal nucleic acid amplification   technology to detect the SARS-CoV-2 RdRp nucleic acid segment.   The analytical sensitivity (limit of detection) is 125 genome   equivalents/mL.   A POSITIVE result implies infection with the SARS-CoV-2 virus;   the patient is presumed to be contagious.     A NEGATIVE result means that SARS-CoV-2 nucleic acids are not   present above the limit of detection. A NEGATIVE result should be   treated as presumptive. It does not rule out the possibility of   COVID-19 and should not be the sole basis for treatment decisions.   If COVID-19 is strongly suspected based on clinical and exposure   history, re-testing using an alternate molecular assay should be   considered.   This test is only for use under the Food and Drug   Administration s Emergency Use Authorization (EUA).   Commercial kits are provided by [employer].   Performance characteristics of the EUA have been independently   verified by [facility] Department of   Pathology and Laboratory Medicine.   _________________________________________________________________   The authorized Fact Sheet for Healthcare Providers and the authorized Fact   Sheet for Patients of the ID NOW COVID-19 are available on the FDA   website:     https://www.fda.gov/media/046671/download  https://www.fda.gov/media/550630/download          TYPE & SCREEN        ECG Results          EKG 12-lead (In process)  Result time 03/21/22 08:22:33    In process by Interface, Lab In [facility] (03/21/22 08:22:33)                  Narrative:    Test Reason : R00.1,    Vent. Rate : 037 BPM     Atrial Rate : 288 BPM     P-R Int : 000 ms          QRS Dur : 068 ms      QT Int : 360 ms       P-R-T Axes : 000 051 174 degrees     QTc Int : 282 ms    Junctional bradycardia  Nonspecific ST and T wave abnormality  Abnormal ECG  When compared with ECG of 27-MAY-2019 09:04,  Junctional rhythm has replaced Sinus rhythm  Vent. rate has decreased BY  48 BPM  Non-specific change in ST segment in Inferior leads  T wave inversion now evident in Lateral leads  QT has shortened    Referred By: [email or web address]   SELF           Confirmed By:                   In process by Interface, Lab In [facility] (03/21/22 08:22:33)                 Narrative:    Test Reason : R00.1,    Vent. Rate : 037 BPM     Atrial Rate : 288 BPM     P-R Int : 000 ms          QRS Dur : 068 ms      QT Int : 360 ms       P-R-T Axes : 000 051 174 degrees     QTc Int : 282 ms    Junctional bradycardia  Nonspecific ST and T wave abnormality  Abnormal ECG  When compared with ECG of 27-MAY-2019 09:04,  Junctional rhythm has replaced Sinus rhythm  Vent. rate has decreased BY  48 BPM  Non-specific change in ST segment in Inferior leads  T wave inversion now evident in Lateral leads  QT has shortened    Referred By: [email or web address]   SELF           Confirmed By:                              EKG 12-lead (In process)  Result time 03/21/22 08:22:22    In process by Interface, Lab In [facility] (03/21/22 08:22:22)                 Narrative:    Test Reason : R00.1,    Vent. Rate : 037 BPM     Atrial Rate : 036 BPM     P-R Int : 000 ms          QRS Dur : 086 ms      QT Int : 514 ms       P-R-T Axes : 000 051 042 degrees     QTc Int : 403 ms    Junctional bradycardia  Nonspecific ST and T wave abnormality  Abnormal ECG  When compared with ECG of 21-MAR-2022 07:59,  Non-specific change in ST segment in Lateral leads  Nonspecific T wave abnormality has replaced inverted T waves in Inferior  leads  Nonspecific T  wave abnormality now evident in Lateral leads    Referred By: [email or web address]   SELF           Confirmed By:                             Imaging Results          X-Ray Chest AP Portable (Final result)  Result time 03/21/22 12:34:01    Final result by Mina Stearns III, MD (03/21/22 12:34:01)                 Narrative:    EXAMINATION:  XR CHEST AP PORTABLE    CLINICAL HISTORY:  Presence of cardiac pacemaker    FINDINGS:  Chest one view: Pacer tip RV.  Heart size is normal.  There is aortic plaque.  Lungs show mild perihilar atelectasis.  No line complication seen.      Electronically signed by: Mina Stearns MD  Date:    03/21/2022  Time:    12:34                             X-Ray Tibia Fibula Bilateral (Final result)  Result time 03/21/22 11:12:47   Procedure changed from X-Ray Tibia Fibula 2 View Right     Final result by Mina Stearns III, MD (03/21/22 11:12:47)                 Narrative:    EXAMINATION:  XR TIBIA FIBULA BILATERAL    CLINICAL HISTORY:  Open right ankle fracture;  Other fracture of right lower leg, initial encounter for open fracture type I or II    Tib fib two views bilateral.    Right: There is a trimalleolar fracture without dislocation.  There is a possible proximal fibular fracture.    Left: There is a TKA in place.  There is a proximal fibular fracture and a possible proximal tibial fracture.      Electronically signed by: Mina Stearns MD  Date:    03/21/2022  Time:    11:12                             X-Ray Hip 2 or 3 views Left (with Pelvis when performed) (Final result)  Result time 03/21/22 11:07:12   Procedure changed from X-Ray Pelvis Routine AP     Final result by Mina Stearns III, MD (03/21/22 11:07:12)                 Narrative:    EXAMINATION:  XR HIP WITH PELVIS WHEN PERFORMED, 2 OR 3 VIEWS LEFT    CLINICAL HISTORY:  eval fracture;    FINDINGS:  Hip two views left: There is severe advanced DJD and impingement change.  No fracture dislocation bone destruction  seen.      Electronically signed by: Mina Stearns MD  Date:    03/21/2022  Time:    11:07                             X-Ray Knee 1 or 2 View Right (Final result)  Result time 03/21/22 11:07:19    Final result by Salvador Kaiser MD (03/21/22 11:07:19)                 Impression:      As above      Electronically signed by: Salvador Kaiser MD  Date:    03/21/2022  Time:    11:07             Narrative:    EXAMINATION:  XR KNEE 1 OR 2 VIEW RIGHT    CLINICAL HISTORY:  open right ankle fracture;    TECHNIQUE:  Two views of the right knee were obtained, with AP and lateral projections submitted.    COMPARISON:  Comparison is made to 12/15/2016.  Clinical information of recent trauma, with right ankle fractures having been documented on an earlier examination of today's date.    FINDINGS:  Visualized osseous structures appear intact, with no definite evidence of recent fracture or other significant abnormality identified.  No significant joint effusion.                               X-Ray Ankle Complete Right (Final result)  Result time 03/21/22 11:03:11    Final result by Salvador Kaiser MD (03/21/22 11:03:11)                 Impression:      1. Redemonstration of fractures of the right medial and lateral malleoli and the posterior tibial margin.  2. Tibiotalar joint space now appears appears unremarkable.  3. No significant detrimental interval change since 03/21/2022 at 09:14.      Electronically signed by: Salvador Kaiser MD  Date:    03/21/2022  Time:    11:03             Narrative:    EXAMINATION:  XR ANKLE COMPLETE 3 VIEW RIGHT    CLINICAL HISTORY:  ap, mortise, perfect lateral (medial and lateral dome of talus perfectly superimposed);    TECHNIQUE:  Three views of the right ankle were obtained, with AP, lateral, and oblique projections submitted.    COMPARISON:  Comparison is made to 03/21/2022 at 09:14.    FINDINGS:  Once again identified are fractures involving the medial and lateral malleoli as well as a likely fracture  involving the posterior tibial margin.  Position/alignment of these fractures demonstrates no detrimental change since the prior exam.  Tibiotalar joint now appears unremarkable, with relationship between the tibial and talar articular surfaces.  No additional fractures or other significant osseous abnormality noted.                               X-Ray Foot Complete Right (Final result)  Result time 03/21/22 11:06:50    Final result by Mina Stearns III, MD (03/21/22 11:06:50)                 Narrative:    EXAMINATION:  XR FOOT COMPLETE 3 VIEW RIGHT    FINDINGS:  Foot complete three views right: There is a trimalleolar fracture of the distal tib fib with anterior dislocation at the ankle joint.      Electronically signed by: Mina Stearns MD  Date:    03/21/2022  Time:    11:06                              X-Ray Ankle 2 View Right (Final result)  Result time 03/21/22 09:30:23   Procedure changed from X-Ray Ankle Complete Right     Final result by Denis Lujan MD (03/21/22 09:30:23)                 Impression:      Displaced trimalleolar ankle fracture as described.    Foci of soft tissue gas in the anterior ankle, compatible with reported compound fracture.    This report was flagged in Epic as abnormal.      Electronically signed by: Denis Lujan  Date:    03/21/2022  Time:    09:30             Narrative:    EXAMINATION:  XR ANKLE 2 VIEW RIGHT    CLINICAL HISTORY:  open right ankle fx;    TECHNIQUE:  AP, oblique, and lateral radiographs of the ankle were performed.    COMPARISON:  Right foot radiographs 06/11/2010    FINDINGS:  Oblique fracture of the distal fibula at the level of the syndesmosis, with lateral displacement by half shaft width.  Transverse fracture of the medial malleolus, with lateral displacement by 8 mm.  Small mildly displaced fracture involving the posterior malleolus.    Lateral subluxation of the tibiotalar joint, with widening of the medial clear space.  Distal tibiofibular alignment  appears congruent.    Tibiotalar joint effusion.  Foci of soft tissue gas in the anterior ankle.  Diffuse soft tissue swelling.                               X-Ray Chest AP Portable (Final result)  Result time 03/21/22 09:31:40    Final result by Salvador Kaiser MD (03/21/22 09:31:40)                 Impression:      Mild cardiomegaly is again noted, but there has been no significant detrimental interval change in the appearance of the chest since 05/27/2019.      Electronically signed by: Salvador Kaiser MD  Date:    03/21/2022  Time:    09:31             Narrative:    EXAMINATION:  XR CHEST AP PORTABLE    CLINICAL HISTORY:  pre-op;    COMPARISON:  Comparison is made to 05/27/2019.    FINDINGS:  Heart is mildly enlarged, but the appearance of the cardiomediastinal silhouette demonstrates no detrimental change since the examination referenced above.  Pulmonary vascularity is normal, and there are no findings indicating current cardiac decompensation.  Left hemithorax is obscured to a considerable degree by an opacity relating to a structure external to the patient, but the lung zones appear clear, and free of significant airspace consolidation or volume loss.  No pleural fluid.  No pneumothorax.                                 Medications   gentamicin - pharmacy to dose (has no administration in time range)   ceFAZolin 2 gram in dextrose 5% 100 mL IVPB (premix) (has no administration in time range)   fentaNYL (SUBLIMAZE) 50 mcg/mL injection (has no administration in time range)   Tdap (BOOSTRIX) vaccine injection 0.5 mL (0.5 mLs Intramuscular Given 3/21/22 0817)   cefazolin (ANCEF) 2 gram in dextrose 5% 50 mL IVPB (premix) (0 g Intravenous Stopped 3/21/22 0925)   LIDOcaine HCL 10 mg/ml (1%) injection 20 mL (20 mLs Other Given by Other 3/21/22 0937)   fentaNYL 50 mcg/mL injection 25 mcg (25 mcg Intravenous Given 3/21/22 1229)   gentamicin (GARAMYCIN) 72 mg in sodium chloride 0.9% 100 mL IVPB (0 mg/kg × 71.9 kg (Adjusted)  Intravenous Stopped 3/21/22 1123)   CALCIUM GLUCONATE/D5W 1G/100ML (READY TO MIX SYSTEM) IVPB (  Override Pull 3/21/22 1115)   cefTRIAXone (ROCEPHIN) 1 g/50 mL D5W IVPB (0 g Intravenous Stopped 3/21/22 1344)   0.9%  NaCl infusion ( Intravenous Verify Only 3/21/22 1400)   fentaNYL 50 mcg/mL injection 50 mcg (50 mcg Intravenous Given 3/21/22 1438)     Medical Decision Making:   Initial Assessment:   89-year-old female with PMH of dementia, CHF, and hypertension presents with right open ankle fracture and bradycardia to 30s, stable BP, PE shows an open fracture. Will consult ortho and cards. EKG reviewed bedside and appears to be a complete heart block. Code cart bedside and patient placed on pads.   Differential Diagnosis:   Complete heart block, 2nd degree AV block, sinus sid, electrolyte abnormality, beta blocker overdose, open fracture  Clinical Tests:   Lab Tests: Ordered and Reviewed  Radiological Study: Ordered and Reviewed  Medical Tests: Ordered and Reviewed  ED Management:  See ED course            Attending Attestation:   Physician Attestation Statement for Resident:  As the supervising MD   Physician Attestation Statement: I have personally seen and examined this patient.   I agree with the above history. -:   As the supervising MD I agree with the above PE.    As the supervising MD I agree with the above treatment, course, plan, and disposition.   -: 89 yo F with;  1. Rolled her right ankle and sustained an open ankle fx. No fall, patient with dementia, cannot provide hx. Not on blood thinners  Antibiotics  Tetanus  XR  Ortho consult  2. Bradycardic. complete heart block, BP stable, denies cp/sob/lightheadedness, on metop, unknown last dose        Attending Critical Care:   Critical Care Times:   ==============================================================  · Total Critical Care Time - exclusive of procedural time: 45 minutes.  ==============================================================  Critical  care reasons: complete heart block, open right ankle fracture.   Critical care was time spent personally by me on the following activities: obtaining history from patient or relative, examination of patient, ordering lab, x-rays, and/or EKG, development of treatment plan with patient or relative, ordering and performing treatments and interventions and discussion with consultants.   Critical Care Condition: life-threatening           ED Course as of 03/21/22 1528   Mon Mar 21, 2022   0834 Cards and Ortho consulted and will see the patient [BD]   0932 Cardiology saw the patient and stated that typically the patient would get a pacemaker placed today.  However, she 1st needs her open ankle fracture repaired.  Cardiology will speak with Ortho and anesthesia to decide on transvenous pacer placement prior to surgery today.  Patient remains hemodynamically stable otherwise [BD]   0933 CBC auto differential(!)  CBC unremarkable without leukocytosis, significant anemia, or decreased platelets   [BD]   0933 SARS-CoV-2 RNA, Amplification, Qual: Negative [BD]   0933 aPTT: 22.5 [BD]   0933 INR: 1.1 [BD]   0933 X-Ray Ankle 2 View Right(!)  Xray ankle shows displaced trimalleolar fracture. Pt already given antibiotics for open fracture. Ortho has been bedside for splinting and will take patient to OR today.  [BD]   1016 BNP(!): 1,070  Significantly elevated, concerning for CHF [BD]   1016 Troponin I(!): 0.376  Significantly elevated, even compared to prior.  Patient is inappropriate for heparin initiation at this time because she went to surgery today.  Will continue to monitor [BD]   1016 Comprehensive metabolic panel(!)  CMP shows LUANNE with BUN 36 creatinine 1.5, which I suspect is secondary to decreased perfusion of the kidneys.  Will not give fluids at this time as there is concern for CHF/volume overload [BD]   1017 Phosphorus: 4.1 [BD]   1156 Troponin I(!): 0.376  Cards did bedside echo that showed preserved EF. Pt cleared  for OR [BD]   1201 Cardiology will admit the patient to the CCU.  Orthopedic surgery will take the patient to the OR today.  Cardiology will place a transvenous pacer.  Patient's daughter aware of and in agreement with the plan. [BD]      ED Course User Index  [BD] Bryan Landeros MD             Clinical Impression:   Final diagnoses:  [R00.1] Bradycardia  [S82.891B] Open right ankle fracture  [W19.XXXA] Fall  [Z95.0] Temporary transvenous cardiac pacemaker present  [I44.2] Complete heart block (Primary)  [R77.8] Elevated troponin  [R79.89] Elevated brain natriuretic peptide (BNP) level  [N39.0] Urinary tract infection without hematuria, site unspecified  [S82.851C] Trimalleolar fracture of right ankle, open type III, initial encounter          ED Disposition Condition    Admit               Bryan Landeros MD  Resident  03/21/22 [id number]       Jeanette Gannon MD  03/21/22 2548

## 2022-03-21 NOTE — Clinical Note
A generator pocket was made at the left chest with blunt dissection, electrocautery and sharp dissection.

## 2022-03-21 NOTE — ASSESSMENT & PLAN NOTE
- Patient presented to ED with ankle fracture and found to be in complete heart block. Currently stable with junctional scape rhythm in the 30s and normal BP.   - TVP in place as bridge to PPM. Will plan for permanent pacemaker once right ankle fracture is addressed completely to avoid risk of device infection.   - Stop metoprolol  - echo ordered

## 2022-03-21 NOTE — H&P
[facility]  Cardiology  History and Physical     Patient Name: Lilia Hidalgo  MRN: [id number]  Admission Date: 3/21/2022  Code Status: Prior   Attending Provider: Zaira Howell MD   Primary Care Physician: Anna Wood MD  Principal Problem:Open ankle fracture, right, type III, initial encounter    Patient information was obtained from patient, past medical records and ER records.     Subjective:     Chief Complaint:  FALL, CHB     HPI:   89 y.o. female with HTN, MDD, mild cognitive impairment, and urinary incontinence. She is brought to ED from nursing home due to a ankle fracture. She uses a wheelchair and was being assisted to transfer from the toilet to her wheelchair when she twisted her ankle and fractured it. No reports of syncope. On arrival to ED, an ECG showed 3rd degree AV block and cardiology was consulted. She is awake and complaining of pain. Not able to give a detailed history due to patient's dementia. Discussed the events with daughter.       Past Medical History:   Diagnosis Date    Aortic stenosis 6/17/2015    Arthritis     Atrophic vaginitis 2/18/2016    Bilateral edema of lower extremity 6/22/2016    CHF (congestive heart failure)     Cholelithiasis without cholecystitis 5/5/2017    Chronic pain of left knee 8/3/2017    Depressed mood 6/22/2016    Diverticulitis of large intestine without perforation or abscess without bleeding 5/5/2017    Encounter for blood transfusion     Essential hypertension     GERD (gastroesophageal reflux disease)     Hyperlipidemia     Hypertension     Hypertensive heart disease with heart failure 7/14/2015    Insomnia     Joint pain     Knee pain, left 08/2016    pain with walking or standing    Primary osteoarthritis of knee 2/5/2013    PUD (peptic ulcer disease)     S/P knee replacement 2/13/2013    Slow transit constipation 2/18/2016       Past Surgical History:   Procedure Laterality Date     "APPENDECTOMY      COLONOSCOPY      08    EYE SURGERY      bilateral cataract    FINGER SURGERY      LT thumb surgery--"arthritis surgery"    HYSTERECTOMY      UNKNOWN BSO    JOINT REPLACEMENT      right hip replacement    KNEE ARTHROPLASTY Left 2001    TONSILLECTOMY      TUMOR EXCISION      esophageal tumor removal     UPPER GASTROINTESTINAL ENDOSCOPY      2013       Review of patient's allergies indicates:   Allergen Reactions    Aspirin Nausea Only and Other (See Comments)     Other reaction(s): esophageal pain    Celebrex [celecoxib] Rash    Morphine Hallucinations    Steroid [corticosteroids (glucocorticoids)] Other (See Comments)     Other reaction(s): Flushing (skin)    Sulfa dyne Other (See Comments)     Other reaction(s): esophogeal pain       No current facility-administered medications on file prior to encounter.     Current Outpatient Medications on File Prior to Encounter   Medication Sig    acetaminophen (TYLENOL) 650 MG TbSR Take 1 tablet (650 mg total) by mouth every 8 (eight) hours as needed. Give 1 tablet QAM, 1 QPM.    atorvastatin (LIPITOR) 20 MG tablet TAKE 1 TAB BY MOUTH AT BEDTIME    cholecalciferol, vitamin D3, 1,250 mcg (50,000 unit) capsule Take 50,000 Units by mouth once daily.    donepeziL (ARICEPT) 5 MG tablet TAKE 1 TAB BY MOUTH EVERY EVENING    EScitalopram oxalate (LEXAPRO) 20 MG tablet TAKE 1 TAB BY MOUTH AT BEDTIME FOR DEPRESSION    fentaNYL (DURAGESIC) 12 mcg/hr PT72 Place 1 patch onto the skin every 72 hours.    furosemide (LASIX) 40 MG tablet TAKE 1 TAB BY MOUTH EVERY DAY    furosemide (LASIX) 40 MG tablet TAKE 2 TABLETS (80 MG TOTAL) BY MOUTH 2 (TWO) TIMES DAILY AS NEEDED (FOR WEIGHT GAIN OF 5 POUNDS). TAKE WITH POTASSIUM    losartan (COZAAR) 100 MG tablet TAKE 1 TAB BY MOUTH ONCE DAILY    melatonin 5 mg Tab Take 1 tablet by mouth every evening.     metoprolol succinate (TOPROL-XL) 200 MG 24 hr tablet TAKE 1 TAB BY MOUTH EVERY DAY    MULTIVITAMIN " W-MINERALS/LUTEIN (CENTRUM SILVER ORAL) Take by mouth once daily.    nitroGLYCERIN (NITROSTAT) 0.4 MG SL tablet Place 1 tablet (0.4 mg total) under the tongue every 5 (five) minutes as needed for Chest pain.    nystatin (MYCOSTATIN) cream APPLY TO GROIN AREA TOPICALLY EVERY 12 HOURS FOR ANTIFUNGAL    NYSTOP powder APPLY TO GROIN AREA TOPICALLY EVERY 12 HOURS FOR ANTIFUNGAL    omeprazole (PRILOSEC) 20 MG capsule TAKE 2 CAPS (40MG) BY MOUTH EVERY DAY (DX: GERD)    potassium chloride SA (K-DUR,KLOR-CON) 10 MEQ tablet Take 1 tablet (10 mEq total) by mouth once daily. May also take 2 tablets (20 mEq total) daily as needed (when taking lasix for leg swelling).    tramadol-acetaminophen 37.5-325 mg (ULTRACET) 37.5-325 mg Tab Take 1 tablet by mouth 3 (three) times daily as needed for Pain.     Family History       Problem Relation (Age of Onset)    Asthma Father    Cancer Brother    Heart disease Mother, Father, Brother          Tobacco Use    Smoking status: Never Smoker    Smokeless tobacco: Never Used   Substance and Sexual Activity    Alcohol use: No    Drug use: Never     Types: Hydrocodone    Sexual activity: Not Currently     Birth control/protection: None     Review of Systems   Unable to perform ROS: Dementia   Constitutional: Negative for decreased appetite, diaphoresis and malaise/fatigue.   Cardiovascular:  Positive for leg swelling. Negative for chest pain, dyspnea on exertion and irregular heartbeat.   Respiratory:  Negative for shortness of breath.    Musculoskeletal:  Positive for joint pain.   Gastrointestinal:  Negative for bloating.   All other systems reviewed and are negative.  Objective:     Vital Signs (Most Recent):  Temp: 98 °F (36.7 °C) (03/21/22 0752)  Pulse: (!) 50 (03/21/22 1207)  Resp: 18 (03/21/22 1229)  BP: (!) 133/59 (03/21/22 1207)  SpO2: 95 % (03/21/22 1207)   Vital Signs (24h Range):  Temp:  [98 °F (36.7 °C)] 98 °F (36.7 °C)  Pulse:  [35-79] 50  Resp:  [16-18] 18  SpO2:  [93  %-100 %] 95 %  BP: ()/(44-82) 133/59     Weight: 90.7 kg (200 lb)  Body mass index is 32.28 kg/m².    SpO2: 95 %  O2 Device (Oxygen Therapy): room air      Intake/Output Summary (Last 24 hours) at 3/21/2022 1311  Last data filed at 3/21/2022 1123  Gross per 24 hour   Intake 147.4 ml   Output --   Net 147.4 ml       Lines/Drains/Airways       Central Venous Catheter Line  Duration             Percutaneous Central Line Insertion/Assessment - single lumen  03/21/22 1124 right internal jugular <1 day              Drain  Duration                  Urethral Catheter 03/21/22 1001 Double-lumen 16 Fr. <1 day              Peripheral Intravenous Line  Duration                  Peripheral IV - Single Lumen 03/21/22 0000 20 G Right Hand <1 day         Peripheral IV - Single Lumen 03/21/22 0823 20 G Right Forearm <1 day         Peripheral IV - Single Lumen 03/21/22 0900 22 G Left Forearm <1 day                    Physical Exam  Constitutional:       General: She is not in acute distress.     Appearance: She is well-developed. She is obese. She is not diaphoretic.   HENT:      Head: Normocephalic and atraumatic.   Eyes:      Conjunctiva/sclera: Conjunctivae normal.   Neck:      Trachea: No tracheal deviation.   Cardiovascular:      Rate and Rhythm: Regular rhythm. Bradycardia present.      Heart sounds: Normal heart sounds. No murmur heard.  Pulmonary:      Effort: Pulmonary effort is normal. No respiratory distress.      Breath sounds: Normal breath sounds. No wheezing.   Abdominal:      General: Bowel sounds are normal. There is no distension.      Palpations: Abdomen is soft.      Tenderness: There is no abdominal tenderness.   Musculoskeletal:         General: Deformity (right ankle) present.      Cervical back: Normal range of motion.      Right lower leg: Edema present.      Left lower leg: Edema present.   Skin:     General: Skin is warm.   Neurological:      Mental Status: She is alert and oriented to person, place,  and time.       Significant Labs: All pertinent lab results from the last 24 hours have been reviewed.        Assessment and Plan:     * Open ankle fracture, right, type III, initial encounter  - plan for external fixation later today    Complete AV block  - Patient presented to ED with ankle fracture and found to be in complete heart block. Currently stable with junctional scape rhythm in the 30s and normal BP.   - TVP in place as bridge to PPM. Will plan for permanent pacemaker once right ankle fracture is addressed completely to avoid risk of device infection.   - Stop metoprolol  - echo ordered    Acute decompensated heart failure  - Elevated BNP with LUANNE. Appears hypervolemic.  - Pending echo  - holding BB given CHB  - will restart ARB as tolerated  - IV lasix 40 daily    LUANNE (acute kidney injury)  Likely d/t hypervolemic state  - will diurese  - CTM        VTE Risk Mitigation (From admission, onward)    None          Becky Mcclure MD  Cardiology   [facility]

## 2022-03-22 LAB
ANION GAP SERPL CALC-SCNC: 12 MMOL/L (ref 8–16)
BASOPHILS # BLD AUTO: 0.02 K/UL (ref 0–0.2)
BASOPHILS NFR BLD: 0.3 % (ref 0–1.9)
BUN SERPL-MCNC: 38 MG/DL (ref 8–23)
CALCIUM SERPL-MCNC: 8.9 MG/DL (ref 8.7–10.5)
CHLORIDE SERPL-SCNC: 107 MMOL/L (ref 95–110)
CHOLEST SERPL-MCNC: 115 MG/DL (ref 120–199)
CHOLEST/HDLC SERPL: 2.9 {RATIO} (ref 2–5)
CO2 SERPL-SCNC: 26 MMOL/L (ref 23–29)
CREAT SERPL-MCNC: 1.5 MG/DL (ref 0.5–1.4)
DIFFERENTIAL METHOD: ABNORMAL
EOSINOPHIL # BLD AUTO: 0 K/UL (ref 0–0.5)
EOSINOPHIL NFR BLD: 0 % (ref 0–8)
ERYTHROCYTE [DISTWIDTH] IN BLOOD BY AUTOMATED COUNT: 15.9 % (ref 11.5–14.5)
EST. GFR  (AFRICAN AMERICAN): 35.1 ML/MIN/1.73 M^2
EST. GFR  (NON AFRICAN AMERICAN): 30.5 ML/MIN/1.73 M^2
FERRITIN SERPL-MCNC: 71 NG/ML (ref 20–300)
GLUCOSE SERPL-MCNC: 121 MG/DL (ref 70–110)
HCT VFR BLD AUTO: 35.2 % (ref 37–48.5)
HDLC SERPL-MCNC: 40 MG/DL (ref 40–75)
HDLC SERPL: 34.8 % (ref 20–50)
HGB BLD-MCNC: 10.4 G/DL (ref 12–16)
IMM GRANULOCYTES # BLD AUTO: 0.03 K/UL (ref 0–0.04)
IMM GRANULOCYTES NFR BLD AUTO: 0.4 % (ref 0–0.5)
IRON SERPL-MCNC: 24 UG/DL (ref 30–160)
LDLC SERPL CALC-MCNC: 55.2 MG/DL (ref 63–159)
LYMPHOCYTES # BLD AUTO: 0.6 K/UL (ref 1–4.8)
LYMPHOCYTES NFR BLD: 8.2 % (ref 18–48)
MAGNESIUM SERPL-MCNC: 2.1 MG/DL (ref 1.6–2.6)
MCH RBC QN AUTO: 28.3 PG (ref 27–31)
MCHC RBC AUTO-ENTMCNC: 29.5 G/DL (ref 32–36)
MCV RBC AUTO: 96 FL (ref 82–98)
MONOCYTES # BLD AUTO: 0.8 K/UL (ref 0.3–1)
MONOCYTES NFR BLD: 10.5 % (ref 4–15)
NEUTROPHILS # BLD AUTO: 6.1 K/UL (ref 1.8–7.7)
NEUTROPHILS NFR BLD: 80.6 % (ref 38–73)
NONHDLC SERPL-MCNC: 75 MG/DL
NRBC BLD-RTO: 0 /100 WBC
PLATELET # BLD AUTO: 139 K/UL (ref 150–450)
PMV BLD AUTO: 11.1 FL (ref 9.2–12.9)
POTASSIUM SERPL-SCNC: 4.5 MMOL/L (ref 3.5–5.1)
RBC # BLD AUTO: 3.67 M/UL (ref 4–5.4)
SATURATED IRON: 7 % (ref 20–50)
SODIUM SERPL-SCNC: 145 MMOL/L (ref 136–145)
TOTAL IRON BINDING CAPACITY: 349 UG/DL (ref 250–450)
TRANSFERRIN SERPL-MCNC: 236 MG/DL (ref 200–375)
TRANSFERRIN SERPL-MCNC: 236 MG/DL (ref 200–375)
TRIGL SERPL-MCNC: 99 MG/DL (ref 30–150)
WBC # BLD AUTO: 7.53 K/UL (ref 3.9–12.7)

## 2022-03-22 PROCEDURE — 84466 ASSAY OF TRANSFERRIN: CPT | Performed by: STUDENT IN AN ORGANIZED HEALTH CARE EDUCATION/TRAINING PROGRAM

## 2022-03-22 PROCEDURE — 25000003 PHARM REV CODE 250: Performed by: INTERNAL MEDICINE

## 2022-03-22 PROCEDURE — 25000003 PHARM REV CODE 250: Performed by: STUDENT IN AN ORGANIZED HEALTH CARE EDUCATION/TRAINING PROGRAM

## 2022-03-22 PROCEDURE — 20000000 HC ICU ROOM

## 2022-03-22 PROCEDURE — 27000221 HC OXYGEN, UP TO 24 HOURS

## 2022-03-22 PROCEDURE — 80048 BASIC METABOLIC PNL TOTAL CA: CPT | Performed by: STUDENT IN AN ORGANIZED HEALTH CARE EDUCATION/TRAINING PROGRAM

## 2022-03-22 PROCEDURE — 63600175 PHARM REV CODE 636 W HCPCS: Performed by: STUDENT IN AN ORGANIZED HEALTH CARE EDUCATION/TRAINING PROGRAM

## 2022-03-22 PROCEDURE — 93010 ELECTROCARDIOGRAM REPORT: CPT | Mod: ,,, | Performed by: INTERNAL MEDICINE

## 2022-03-22 PROCEDURE — 99231 PR SUBSEQUENT HOSPITAL CARE,LEVL I: ICD-10-PCS | Mod: GC,,, | Performed by: INTERNAL MEDICINE

## 2022-03-22 PROCEDURE — 99900035 HC TECH TIME PER 15 MIN (STAT)

## 2022-03-22 PROCEDURE — 93010 EKG 12-LEAD: ICD-10-PCS | Mod: ,,, | Performed by: INTERNAL MEDICINE

## 2022-03-22 PROCEDURE — 80061 LIPID PANEL: CPT | Performed by: STUDENT IN AN ORGANIZED HEALTH CARE EDUCATION/TRAINING PROGRAM

## 2022-03-22 PROCEDURE — 85025 COMPLETE CBC W/AUTO DIFF WBC: CPT | Performed by: STUDENT IN AN ORGANIZED HEALTH CARE EDUCATION/TRAINING PROGRAM

## 2022-03-22 PROCEDURE — 94761 N-INVAS EAR/PLS OXIMETRY MLT: CPT

## 2022-03-22 PROCEDURE — 93005 ELECTROCARDIOGRAM TRACING: CPT

## 2022-03-22 PROCEDURE — 99231 SBSQ HOSP IP/OBS SF/LOW 25: CPT | Mod: GC,,, | Performed by: INTERNAL MEDICINE

## 2022-03-22 PROCEDURE — 83735 ASSAY OF MAGNESIUM: CPT | Performed by: STUDENT IN AN ORGANIZED HEALTH CARE EDUCATION/TRAINING PROGRAM

## 2022-03-22 PROCEDURE — 82728 ASSAY OF FERRITIN: CPT | Performed by: STUDENT IN AN ORGANIZED HEALTH CARE EDUCATION/TRAINING PROGRAM

## 2022-03-22 RX ORDER — FUROSEMIDE 10 MG/ML
80 INJECTION INTRAMUSCULAR; INTRAVENOUS DAILY
Status: DISCONTINUED | OUTPATIENT
Start: 2022-03-22 | End: 2022-03-23

## 2022-03-22 RX ORDER — SODIUM CHLORIDE 9 MG/ML
INJECTION, SOLUTION INTRAVENOUS CONTINUOUS
Status: DISCONTINUED | OUTPATIENT
Start: 2022-03-22 | End: 2022-03-31

## 2022-03-22 RX ORDER — FUROSEMIDE 10 MG/ML
120 INJECTION INTRAMUSCULAR; INTRAVENOUS ONCE
Status: COMPLETED | OUTPATIENT
Start: 2022-03-22 | End: 2022-03-22

## 2022-03-22 RX ORDER — ACETAMINOPHEN 500 MG
1000 TABLET ORAL EVERY 8 HOURS
Status: DISCONTINUED | OUTPATIENT
Start: 2022-03-22 | End: 2022-03-28

## 2022-03-22 RX ORDER — SODIUM CHLORIDE 9 MG/ML
INJECTION, SOLUTION INTRAVENOUS CONTINUOUS
Status: ACTIVE | OUTPATIENT
Start: 2022-03-22 | End: 2022-03-22

## 2022-03-22 RX ORDER — ACETAMINOPHEN 500 MG
50000 TABLET ORAL WEEKLY
Status: DISCONTINUED | OUTPATIENT
Start: 2022-03-22 | End: 2022-04-02 | Stop reason: HOSPADM

## 2022-03-22 RX ORDER — FUROSEMIDE 10 MG/ML
80 INJECTION INTRAMUSCULAR; INTRAVENOUS ONCE
Status: DISCONTINUED | OUTPATIENT
Start: 2022-03-22 | End: 2022-03-22

## 2022-03-22 RX ORDER — PANTOPRAZOLE SODIUM 40 MG/1
40 TABLET, DELAYED RELEASE ORAL DAILY
Status: DISCONTINUED | OUTPATIENT
Start: 2022-03-22 | End: 2022-04-02 | Stop reason: HOSPADM

## 2022-03-22 RX ORDER — OXYCODONE HYDROCHLORIDE 5 MG/1
5 TABLET ORAL EVERY 6 HOURS PRN
Status: DISCONTINUED | OUTPATIENT
Start: 2022-03-22 | End: 2022-03-25

## 2022-03-22 RX ORDER — MUPIROCIN 20 MG/G
OINTMENT TOPICAL 2 TIMES DAILY
Status: COMPLETED | OUTPATIENT
Start: 2022-03-22 | End: 2022-03-26

## 2022-03-22 RX ADMIN — PANTOPRAZOLE SODIUM 40 MG: 40 TABLET, DELAYED RELEASE ORAL at 11:03

## 2022-03-22 RX ADMIN — MUPIROCIN: 20 OINTMENT TOPICAL at 08:03

## 2022-03-22 RX ADMIN — Medication 2 G: at 08:03

## 2022-03-22 RX ADMIN — ASPIRIN 81 MG: 81 TABLET, COATED ORAL at 08:03

## 2022-03-22 RX ADMIN — SODIUM CHLORIDE: 0.9 INJECTION, SOLUTION INTRAVENOUS at 12:03

## 2022-03-22 RX ADMIN — OXYCODONE 5 MG: 5 TABLET ORAL at 09:03

## 2022-03-22 RX ADMIN — ACETAMINOPHEN 1000 MG: 325 TABLET ORAL at 09:03

## 2022-03-22 RX ADMIN — CEFTRIAXONE 1 G: 1 INJECTION, SOLUTION INTRAVENOUS at 11:03

## 2022-03-22 RX ADMIN — FUROSEMIDE 120 MG: 10 INJECTION, SOLUTION INTRAMUSCULAR; INTRAVENOUS at 05:03

## 2022-03-22 RX ADMIN — ACETAMINOPHEN 1000 MG: 325 TABLET ORAL at 01:03

## 2022-03-22 RX ADMIN — DONEPEZIL HYDROCHLORIDE 5 MG: 5 TABLET ORAL at 08:03

## 2022-03-22 RX ADMIN — ATORVASTATIN CALCIUM 20 MG: 20 TABLET, FILM COATED ORAL at 08:03

## 2022-03-22 RX ADMIN — FUROSEMIDE 80 MG: 10 INJECTION, SOLUTION INTRAMUSCULAR; INTRAVENOUS at 09:03

## 2022-03-22 RX ADMIN — OXYCODONE 5 MG: 5 TABLET ORAL at 08:03

## 2022-03-22 RX ADMIN — CHOLECALCIFEROL TAB 125 MCG (5000 UNIT) 50000 UNITS: 125 TAB at 08:03

## 2022-03-22 RX ADMIN — SODIUM CHLORIDE: 0.9 INJECTION, SOLUTION INTRAVENOUS at 07:03

## 2022-03-22 RX ADMIN — SODIUM CHLORIDE: 0.9 INJECTION, SOLUTION INTRAVENOUS at 01:03

## 2022-03-22 RX ADMIN — ACETAMINOPHEN 1000 MG: 325 TABLET ORAL at 10:03

## 2022-03-22 NOTE — NURSING
Pt arrives to [address] with anesthesia and RN. Cardiology resident and charge RN called to bedside. Pt connected to bedside monitor and switched from simple face mask to 4L nasal cannula per RT. Upon assessment, pt awake and disoriented to time and situation. No complaints of pain at this time and in no apparent distress. MAP 67, HR 50, and O2 sat 100%. Wound vac in place to R leg with ace wrap intact. R foot elevated and ice applied. Awaiting orders from cardiology team. Will continue to monitor.

## 2022-03-22 NOTE — NURSING
Ortho resident notified of left knee pain and swelling/warmth of the left leg, bruise noted to left lower leg. MCKINLEY Dc MD at bedside

## 2022-03-22 NOTE — OP NOTE
OPERATIVE NOTE    DATE OF PROCEDURE:  03/21/2022    PREOPERATIVE DIAGNOSIS:   Right trimalleolar ankle fracture dislocation, open grade 3A, initial encounter  Fall from standing    POSTOPERATIVE DIAGNOSIS:   Right trimalleolar ankle fracture dislocation, open grade 3A, initial encounter  Left proximal tibial periprosthetic fracture, closed, displaced, initial encounter  Fall from standing    PROCEDURE:   Open reduction internal fixation right trimalleolar ankle fracture without fixation of posterior lip  Debridement and irrigation open fracture right ankle including bone, fascia, subcutaneous tissue, skin  Closed management of left proximal tibia periprosthetic fracture without manipulation    SURGEON:   Anirudh Carreno MD    ASSISTANT:    Hema Lutz MD    ANESTHESIA:   General    EBL:    40 mL    COMPLICATIONS:  None    IMPLANTS:   Jose  3.5 mm cortical screw, x3    Vancomycin powder 1 g    SPECIMENS:   None    INDICATIONS FOR PROCEDURE:  89-year-old female, multiple medical comorbidities including general debilitation, dementia, aortic stenosis, CHF, debilitating left hip osteoarthritis, prior left total knee arthroplasty, lives at assisted living, had a fall while transferring from the toilet 03/21/2022.  Had immediate pain and deformity of right ankle with open fracture.      She was brought to the ED by the EMS.  She was seen in the emergency department by the orthopedic resident on-call team, where the open right ankle wound was provisionally washed out, closed, reduced, splinted.  Other extremities/expected injuries were x-rayed at that time.  She was given IV antibiotics upon admission.  She was noted to have heart block, requiring urgent transvenous pacemaker insertion prior to going to the operating room for urgent assessment/treatment of her open ankle fracture..    Lives at assisted living  Dementia  Dependent on others for transfers only  General debility, urinary incontinence,  osteoporosis, prior right total hip, left severe hip arthritis  Demise tobacco use, history of cancer, diabetes    Counseled patient and family members on diagnosis of right open ankle fracture.  Discussed non operative versus operative management options.  Given the significant soft tissue trauma, feel this is a limb threatening injury with high risk of infection if treated without operative debridement and stabilization.  Given the poor soft tissue envelope, large 10 cm transverse traumatic laceration at the site of the fracture, discussed debridement followed by limited percutaneous fixation, as I feel that this would limit further trauma to this ankle, and provide the best chance of saving her leg, and preserving her overall long-term function.    The risks, benefits, and alternatives to surgery were discussed with the patient and/or family.    Specific risks discussed included, but were not limited to:  Loss of limb, non anatomic reduction, ankle pain, limited mobility, need for multiple repeat procedures including debridements, damage to nearby structures, including neurovascular structures leading to loss of function or loss of limb, bleeding, need for blood transfusion, pain, stiffness, scarring, numbness, tingling, weakness, compartment syndrome, malunion/nonunion, hardware failure, hardware prominence, infection, need for multiple staged procedures, prolonged antibiotics, iatrogenic fracture, heterotopic ossification, arthritis, a variety of medical complications including but not limited to heart attack, stroke, deep venous thrombosis, pulmonary embolism, prolonged hospitalization, prolonged intubation, and death.   Patient and/or family expressed an understanding and desires to proceed with surgery.   All questions were answered.  No guarantees were implied or stated.  Informed consent was obtained.        OPERATIVE PROCEDURE:  Patient met in the preoperative hold area and the correct site and side of  surgery being the right lower extremity were marked and verified.  Patient brought back to the operative suite.  General anesthesia smoothly induced.  Patient transferred over to operative table.  Placed in supine position. All bony prominences were appropriately padded.  Operative extremity placed on bone foam.  Patient received 2 g Ancef for preoperative antibiotics.  The right lower extremity was then prepped and draped in normal sterile fashion.    Time-out was performed verifying the correct patient, site/side of surgery, surgical consent, radiographs as applicable, preop antibiotics, necessary equipment, anticipated blood loss, length of procedure, postoperative disposition.      We evaluated the traumatic wound which was approximately 10 cm, transverse and encompassed the area just proximal to the ankle joint, medial side, proximally 50% of the circumference of the ankle.  Prior sutures were removed.  There is no gross contamination.    The underlying soft tissue quality was poor, there was fat in the wound.  This was provisionally irrigated with saline.  We then delivered the ankle joint into the wound as it was grossly unstable, and we were able to visualize the distal tibial articular surface.  We then excisionally debrided devitalized appearing bone, fascia, subcutaneous tissue, skin as needed with a scalpel, rongeur, curette.  Bony edges that had protruded through the wound were debrided.  We used this opportunity to thoroughly irrigate the proximal portions of the tibia.  The ankle was then reduced and we continued irrigation ankle joint through traumatic wound.  We used total of 6 L of saline.    We then turned our attention open reduction internal fixation of the ankle fracture.  We worked through the traumatic medial wound to visualize the medial malleolus fracture and anatomically reduce it using visual and fluoroscopic keys with a point reduction clamp, and dental pick.  In order to place fixation  into the medial malleolus, we made a percutaneous stab incision distal to the traumatic wound in the appropriate place for hardware placement.  We then placed a temporary K-wire to hold this position.  We then used a long 2.5 mm drill bit to drill bicortically.  This was repeated with a 2nd drill bit.  Appropriate placement of the drill bits was confirmed on fluoroscopy.  We then placed 3.5 mm cortical screws to the tip of the medial malleolus bicortically obtaining good solid purchase.    We then turned our attention to the fibular fracture.  It was minimally displaced.  In order to avoid further traumatic insult to her limb, we chose to proceed with percutaneous fixation.  We made a percutaneous stab incision distal to the tip of the fibula good guided by fluoroscopy.  We then placed a long 2.5 mm drill bit through the tip of the fibula, across the fracture, into the intramedullary canal.  This was confirmed on fluoroscopic imaging.  We then placed a long 120 mm 3.5 mm cortical screw through the tip of the fibula, across the fracture, and in an intramedullary location, stabilizing the fracture.    Wounds irrigated with saline.  Hemostasis achieved as needed with electrocautery.    There was enough tissue on the medial traumatic wound to primary close.  Deep tissue closed with 2-0 Vicryl.  Subcutaneous tissue closed with 3-0 Vicryl.  Skin closed with 3-0 nylon.  Provena incisional wound VAC dressing applied.  Short-leg well-padded splint with ankle at neutral dorsiflexion applied.    At the conclusion of procedure the patient had soft and compressible compartments, brisk cap refill, palpable DP/PT pulse in the operative extremity.    Patient was noted to have a contralateral left proximal tibia periprosthetic fracture very near tibial component of the total knee.  This was treated in a closed manner without manipulation by placing cast padding, Ace wrap, and a knee immobilizer.  Due to her poor baseline mobility,  poor soft tissue envelope, we will attempt non operative management of this injury in a knee immobilizer/cast.    Prior to final closure all counts were confirmed to be correct.  Patient tolerated the procedure well without any complications, was awoken from anesthesia, transferred PACU for further recovery.    POSTOPERATIVE PLAN:  89F, poor mobility, prior L TKA  Fall  R open grade 3 trimal fx/dislocation  L prox tib periprosthetic fx    3.21.22 - ORIF + I&D R ankle    Non operative management of left proximal tibia periprosthetic fracture    Antibiotics times 24 hours  ASA 81 mg b.i.d. x6 weeks for DVT prophylaxis    Plan to transition bilateral lower extremities to cast  Right lower extremity short-leg cast  Left lower extremity long-leg cast    Weightbear as tolerated bilateral lower extremities once in cast for transfers only - at baseline patient is dependent on others for assistance with transferring    Calcium, vitamin-D, boost  Multimodal pain control  [facility]    X-rays at subsequent followups:  Right ankle  Left knee    Follow-up postop   1 week for removal of incisional wound VAC, silver dressing on incision, placement into a cast  3 weeks possible suture removal pending appearance of the healing, continue cast bilateral lower extremities  6 weeks - likely continue cast bilateral lower extremities  10 weeks - pending healing of fractures, consider discontinuation of casting  4 months  6 months  1 year    =====================  Anirudh Carreno MD  Orthopaedic Surgery

## 2022-03-22 NOTE — HOSPITAL COURSE
Ms. Hidalgo was admitted to the hospital for surgical management right ankle fracture sp I&D and percutaneous fixation 3/21/22 and then she also has a left tibia fracture which they decided not to operate on and placed long cast. She was noted to have a CHB, underwent TVP placement for optimization prior to the surgical procedure. Course complicated by a pansensitive Ecoli UTI for which she was treated with a 7 day course of ABX (cefazolin and CTX). ID was consulted and cleared the patient to undergo PPM placement; planning on undergoing placement on 03/28. IV lasix discontinued on 3/28 and patient was started on Bumex 1 mg BID. However, patient developed a contraction alkalosis with worsening hypernatremia. Started on D5W - now at baseline. Stepped down to  on 3/29 awaiting a bed

## 2022-03-22 NOTE — PLAN OF CARE
Shift events:   -CT of left knee w/out contrast   -US of kidneys   -EKG     -1L NC  -BP WDL  -TV Paced at 50 bpm   -Pain controlled with PRN oxycodone and scheduled Tylenol   -Ice applied to R leg   -cardiac diet   -75 cc NS per hour   -Wound vac 0 output   -200 mg Lasix given on shift

## 2022-03-22 NOTE — PROGRESS NOTES
[facility]  Orthopedics  Progress Note    Patient Name: Lilia Hidalgo  MRN: [id number]  Admission Date: 3/21/2022  Hospital Length of Stay: 1 days  Attending Provider: Zaira Howell MD  Primary Care Provider: Anna Wood MD  Follow-up For: Procedure(s) (LRB):  APPLICATION, EXTERNAL FIXATION DEVICE, LARGE, TIBIA (Right)  IRRIGATION AND DEBRIDEMENT (Right)    Post-Operative Day: 1 Day Post-Op  Subjective:     Principal Problem:Open ankle fracture, right, type III, initial encounter    Principal Orthopedic Problem: same    Interval History: NAEON. VS with mild intermittent hypotension, transvenous pacemaker in place. Elevating RLE and icing ankle. XR left tib/fib obtained for bruising shows periprosthetic tibia fracture.    Review of patient's allergies indicates:   Allergen Reactions    Aspirin Nausea Only and Other (See Comments)     Other reaction(s): esophageal pain    Celebrex [celecoxib] Rash    Morphine Hallucinations    Steroid [corticosteroids (glucocorticoids)] Other (See Comments)     Other reaction(s): Flushing (skin)    Sulfa dyne Other (See Comments)     Other reaction(s): esophogeal pain       Current Facility-Administered Medications   Medication    0.9%  NaCl infusion    acetaminophen tablet 1,000 mg    aspirin EC tablet 81 mg    atorvastatin tablet 20 mg    cefTRIAXone (ROCEPHIN) 1 g/50 mL D5W IVPB    cholecalciferol (vitamin D3) 125 mcg (5,000 unit) tablet 50,000 Units    diphenhydrAMINE capsule 25 mg    donepeziL tablet 5 mg    furosemide injection 80 mg    furosemide injection 80 mg    gentamicin - pharmacy to dose    mupirocin 2 % ointment    oxyCODONE immediate release tablet 5 mg    pantoprazole EC tablet 40 mg     Objective:     Vital Signs (Most Recent):  Temp: 98.2 °F (36.8 °C) (03/22/22 0700)  Pulse: (!) 50 (03/22/22 1115)  Resp: (!) 26 (03/22/22 1100)  BP: (!) 96/51 (03/22/22 1100)  SpO2: 96 % (03/22/22 1115)   Vital Signs (24h  "Range):  Temp:  [97.8 °F (36.6 °C)-99.8 °F (37.7 °C)] 98.2 °F (36.8 °C)  Pulse:  [49-50] 50  Resp:  [16-47] 26  SpO2:  [94 %-100 %] 96 %  BP: ()/(39-92) 96/51     Weight: 90.7 kg (200 lb)  Height: 5' 6" (167.6 cm)  Body mass index is 32.28 kg/m².      Intake/Output Summary (Last 24 hours) at 3/22/2022 1216  Last data filed at 3/22/2022 1100  Gross per 24 hour   Intake 1508.84 ml   Output 517 ml   Net 991.84 ml       Ortho/SPM Exam  NAD, demented    RLE  Short leg splint cdi  Elevated, ice right ankle  Grossly NVI distally, wiggles toes    LLE  Bruise left proximal tibia  Leg edema   TTP left proximal tibia  Grossly NVI distally, wiggles toes    Significant Labs: BMP:   Recent Labs   Lab 03/22/22  0307   *      K 4.5      CO2 26   BUN 38*   CREATININE 1.5*   CALCIUM 8.9   MG 2.1     CBC:   Recent Labs   Lab 03/21/22  0855 03/22/22  0307   WBC 12.22 7.53   HGB 14.0 10.4*   HCT 47.0 35.2*    139*     All pertinent labs within the past 24 hours have been reviewed.    Significant Imaging: I have reviewed all pertinent imaging results/findings.    XR left knee shows periprosthetic tibia fracture    CT left knee shows periprosthetic tibia fracture. the cemented tibia prosthesis appears well fixed.     Assessment/Plan:     * Open ankle fracture, right, type III, initial encounter  89F with grade 3a open right ankle fracture sp I&D and percutaneous fixation 3/21/22 by Dr. Carreno. Also has left periprosthetic proximal tibia fracture.     Will discuss management options for left periprosthetic proximal tibia fracture with patient's family. Patient placed in well padded left knee immobilizer. Okay for diet today. Cardiology to place permanent pacemaker when orthopedic intervention complete.     Nerve block: R popliteal & adductor single shot  Pain: multimodal regimen recommended  WBS:  NWB RLE in short leg splint, ice, elevate. NWB LLE in well padded knee immobilizer, ice left knee.   DVT ppx:  " heparin tid per primary ,scd  PT/OT:  hold off for now given NWB BLE  Abx:  periop ancef and gentamicin x24h post op renally dosed   Paul:  Can dc from ortho standpoint  Dispo: pending further management   F/u:  will schedule closer to discharge            Sidra cD MD  Orthopedics  [facility]

## 2022-03-22 NOTE — ASSESSMENT & PLAN NOTE
89F with grade 3a open right ankle fracture sp I&D and percutaneous fixation 3/21/22 by Dr. Carreno. Also has left periprosthetic proximal tibia fracture.     Will discuss management options for left periprosthetic proximal tibia fracture with patient's family. Patient placed in well padded left knee immobilizer. Okay for diet today. Cardiology to place permanent pacemaker when orthopedic intervention complete.     Nerve block: R popliteal & adductor single shot  Pain: multimodal regimen recommended  WBS:  NWB RLE in short leg splint, ice, elevate. NWB LLE in well padded knee immobilizer, ice left knee.   DVT ppx:  heparin tid per primary ,scd  PT/OT:  hold off for now given NWB BLE  Abx:  periop ancef and gentamicin x24h post op renally dosed   Paul:  Can dc from ortho standpoint  Dispo: pending further management   F/u:  will schedule closer to discharge

## 2022-03-22 NOTE — TRANSFER OF CARE
"Anesthesia Transfer of Care Note    Patient: Lilia Hidalgo    Procedure(s) Performed: Procedure(s) (LRB):  APPLICATION, EXTERNAL FIXATION DEVICE, LARGE, TIBIA (Right)  IRRIGATION AND DEBRIDEMENT (Right)    Patient location: ICU    Anesthesia Type: general    Transport from OR: Transported from OR on 6-10 L/min O2 by face mask with adequate spontaneous ventilation    Post pain: adequate analgesia    Post assessment: no apparent anesthetic complications    Post vital signs: stable    Level of consciousness: awake and alert    Nausea/Vomiting: no nausea/vomiting    Complications: none    Transfer of care protocol was followed      Last vitals:   Visit Vitals  BP (!) 143/76   Pulse (!) 50   Temp 36.6 °C (97.8 °F) (Oral)   Resp (!) 22   Ht 5' 6" (1.676 m)   Wt 90.7 kg (200 lb)   LMP  (LMP Unknown)   SpO2 98%   Breastfeeding No   BMI 32.28 kg/m²     "

## 2022-03-22 NOTE — ASSESSMENT & PLAN NOTE
Presented with an elevated BNP and appeared hypervolemic  TTE with a normal EF- unable to determine diastolic function given she is being paced

## 2022-03-22 NOTE — SUBJECTIVE & OBJECTIVE
"Principal Problem:Open ankle fracture, right, type III, initial encounter    Principal Orthopedic Problem: same    Interval History: NAEON. VS with mild intermittent hypotension, transvenous pacemaker in place. Elevating RLE and icing ankle. XR left tib/fib obtained for bruising shows periprosthetic tibia fracture.    Review of patient's allergies indicates:   Allergen Reactions    Aspirin Nausea Only and Other (See Comments)     Other reaction(s): esophageal pain    Celebrex [celecoxib] Rash    Morphine Hallucinations    Steroid [corticosteroids (glucocorticoids)] Other (See Comments)     Other reaction(s): Flushing (skin)    Sulfa dyne Other (See Comments)     Other reaction(s): esophogeal pain       Current Facility-Administered Medications   Medication    0.9%  NaCl infusion    acetaminophen tablet 1,000 mg    aspirin EC tablet 81 mg    atorvastatin tablet 20 mg    cefTRIAXone (ROCEPHIN) 1 g/50 mL D5W IVPB    cholecalciferol (vitamin D3) 125 mcg (5,000 unit) tablet 50,000 Units    diphenhydrAMINE capsule 25 mg    donepeziL tablet 5 mg    furosemide injection 80 mg    furosemide injection 80 mg    gentamicin - pharmacy to dose    mupirocin 2 % ointment    oxyCODONE immediate release tablet 5 mg    pantoprazole EC tablet 40 mg     Objective:     Vital Signs (Most Recent):  Temp: 98.2 °F (36.8 °C) (03/22/22 0700)  Pulse: (!) 50 (03/22/22 1115)  Resp: (!) 26 (03/22/22 1100)  BP: (!) 96/51 (03/22/22 1100)  SpO2: 96 % (03/22/22 1115)   Vital Signs (24h Range):  Temp:  [97.8 °F (36.6 °C)-99.8 °F (37.7 °C)] 98.2 °F (36.8 °C)  Pulse:  [49-50] 50  Resp:  [16-47] 26  SpO2:  [94 %-100 %] 96 %  BP: ()/(39-92) 96/51     Weight: 90.7 kg (200 lb)  Height: 5' 6" (167.6 cm)  Body mass index is 32.28 kg/m².      Intake/Output Summary (Last 24 hours) at 3/22/2022 1216  Last data filed at 3/22/2022 1100  Gross per 24 hour   Intake 1508.84 ml   Output 517 ml   Net 991.84 ml       Ortho/SPM Exam  NAD, demented    RLE  Short leg " splint cdi  Elevated, ice right ankle  Grossly NVI distally, wiggles toes    LLE  Bruise left proximal tibia  Leg edema   TTP left proximal tibia  Grossly NVI distally, wiggles toes    Significant Labs: BMP:   Recent Labs   Lab 03/22/22  0307   *      K 4.5      CO2 26   BUN 38*   CREATININE 1.5*   CALCIUM 8.9   MG 2.1     CBC:   Recent Labs   Lab 03/21/22  0855 03/22/22  0307   WBC 12.22 7.53   HGB 14.0 10.4*   HCT 47.0 35.2*    139*     All pertinent labs within the past 24 hours have been reviewed.    Significant Imaging: I have reviewed all pertinent imaging results/findings.    XR left knee shows periprosthetic tibia fracture    CT left knee shows periprosthetic tibia fracture. the cemented tibia prosthesis appears well fixed.

## 2022-03-22 NOTE — NURSING
Pt urine output ~5 per hour and bladder scan revealed 0 mL in bladder. MD notified and will continue to monitor

## 2022-03-22 NOTE — PROGRESS NOTES
[facility]  Cardiology  Progress Note    Patient Name: Lilia Hidalgo  MRN: [id number]  Admission Date: 3/21/2022  Hospital Length of Stay: 1 days  Code Status: Prior   Attending Physician: Zaira Howell MD   Primary Care Physician: Anna Wood MD  Expected Discharge Date:   Principal Problem:Complete AV block    Subjective:     Hospital Course:   Ms. Hidalgo was admitted to the hospital for surgical management of a fracture. She was noted to have a CHB, underwent TVP placement for optimization prior to the surgical procedure.     Interval History: No events overnight.   -Starting ppx ac with heparin  - +/- undergoing surgery tomorrow- NPO at [facility]  -will hold off on PM placement until orthopedic procedures are completed and she's euvolemic    Review of Systems   Unable to perform ROS: Mental status change     Objective:     Vital Signs (Most Recent):  Temp: 98.2 °F (36.8 °C) (03/22/22 0700)  Pulse: (!) 50 (03/22/22 1330)  Resp: 20 (03/22/22 1330)  BP: (!) 106/51 (03/22/22 1300)  SpO2: (!) 90 % (03/22/22 1330)   Vital Signs (24h Range):  Temp:  [97.8 °F (36.6 °C)-99.8 °F (37.7 °C)] 98.2 °F (36.8 °C)  Pulse:  [49-50] 50  Resp:  [16-47] 20  SpO2:  [90 %-100 %] 90 %  BP: ()/(39-76) 106/51     Weight: 90.7 kg (200 lb)  Body mass index is 32.28 kg/m².     SpO2: (!) 90 %  O2 Device (Oxygen Therapy): room air      Intake/Output Summary (Last 24 hours) at 3/22/2022 1452  Last data filed at 3/22/2022 1400  Gross per 24 hour   Intake 1840.17 ml   Output 432 ml   Net 1408.17 ml     Lines/Drains/Airways       Central Venous Catheter Line  Duration             Percutaneous Central Line Insertion/Assessment - single lumen  03/21/22 1124 right internal jugular 1 day              Drain  Duration                  Urethral Catheter 03/21/22 1001 Double-lumen 16 Fr. 1 day              Peripheral Intravenous Line  Duration                  Peripheral IV - Single Lumen 03/22/22 0900 20 G  Anterior;Left Forearm <1 day         Peripheral IV - Single Lumen 03/22/22 1005 20 G;1 3/4 in Left Upper Arm <1 day                  Physical Exam  Constitutional:       General: She is not in acute distress.     Appearance: She is obese. She is not ill-appearing.   HENT:      Nose: No congestion or rhinorrhea.      Mouth/Throat:      Pharynx: No oropharyngeal exudate or posterior oropharyngeal erythema.   Eyes:      General: No scleral icterus.     Conjunctiva/sclera: Conjunctivae normal.   Cardiovascular:      Rate and Rhythm: Normal rate and regular rhythm.   Pulmonary:      Effort: Pulmonary effort is normal. No respiratory distress.      Breath sounds: No wheezing or rales.   Musculoskeletal:         General: Swelling present.      Right lower leg: Edema present.      Left lower leg: Edema present.   Skin:     General: Skin is warm.      Coloration: Skin is not jaundiced.      Findings: No lesion.   Neurological:      Mental Status: She is alert.     Significant Labs: All pertinent lab results from the last 24 hours have been reviewed.    Significant Imaging:  All imaging reviewed    Assessment and Plan:     * Complete AV block  Patient presented to ED with ankle fracture and found to be in complete heart block. Currently stable with junctional scape rhythm in the 30s and normal BP.     -TVP in place as bridge to PPM. Will plan for permanent pacemaker once fractures are addressed completely to avoid risk of device infection    LUANNE (acute kidney injury)  Likely d/t hypervolemic state    S/p diuresis without significant improvement    Now looks volume down on exam    --IVFs for 10hrs   --will re-assess renal function afterward  --RP ultrasound ordered    Acute decompensated heart failure  Presented with an elevated BNP and appeared hypervolemic  TTE with a normal EF- unable to determine diastolic function given she is being paced    Open ankle fracture, right, type III, initial encounter  S/p external  fixation    Now with L ankle fracture; potentially might have surgical mgmt        VTE Risk Mitigation (From admission, onward)    None          Mohini Mar MD  Cardiology  [facility]

## 2022-03-22 NOTE — ASSESSMENT & PLAN NOTE
Patient presented to ED with ankle fracture and found to be in complete heart block. Currently stable with junctional scape rhythm in the 30s and normal BP.     -TVP in place as bridge to PPM. Will plan for permanent pacemaker once fractures are addressed completely to avoid risk of device infection

## 2022-03-22 NOTE — CARE UPDATE
XR let tib/fib show left periprosthetic tibia fracture  Dedicated left knee XR and knee CT pending  Keep npo, bedrest for now  Will immobilize  Further recs to follow

## 2022-03-22 NOTE — RESPIRATORY THERAPY
Patient received from OR on simple face mask. Placed on 4LNC. Patient tolerating well. Ambu bag @ bedside. Will continue to monitor.

## 2022-03-22 NOTE — PT/OT/SLP PROGRESS
Occupational Therapy      Patient Name:  Lilia Hidalgo   MRN:  [id number]    Patient not seen today secondary to MD hold.  Pt on bed rest pending workup and management of left tibia fracture. Will follow-up once appropriate and new OT orders are received.    3/22/2022

## 2022-03-22 NOTE — PROGRESS NOTES
Cardiology resident called.. No urine output made by pt this hour. Order to continue monitoring and call back at 2000 for possible 120 mg of lasix order. WCTM.

## 2022-03-22 NOTE — PLAN OF CARE
[facility]  Initial Discharge Assessment       Primary Care Provider: Anna Wood MD    Admission Diagnosis: Chronic congestive heart failure [I50.9]  Complete heart block [I44.2]  Bradycardia [R00.1]  Fall [W19.XXXA]  Open right ankle fracture [S82.891B]  Open right ankle fracture, type III, initial encounter [S82.891C]  Temporary transvenous cardiac pacemaker present [Z95.0]    Admission Date: 3/21/2022  Expected Discharge Date: 3/28/2022    Discharge Barriers Identified: None    Payor: HU[employer] MANAGED MEDICARE / Plan: HU[employer] MEDICARE HMO / Product Type: Capitation /     Extended Emergency Contact Information  Primary Emergency Contact: Belkys Moser  Address: [address] [place] [address] [facility]  Home Phone: [phone]  Mobile Phone: [phone]  Relation: Daughter  Secondary Emergency Contact: Lina Benavides  Address: [address] [place] [address] [place]  Mobile Phone: [phone]  Relation: Daughter    Discharge Plan A: Skilled Nursing Facility  Discharge Plan B: Assisted Living      [facility] [facility] - [place], [place] - [address] [facility]  [address] [facility] [address]  Phone: [phone] Fax: [phone]    [facility] [place] - [address] [facility]  [address]  [address]  [facility] [address]  Phone: [phone] Fax: [phone]      Initial Assessment (most recent)     Adult Discharge Assessment - 03/22/22 1551        Discharge Assessment    Assessment Type Discharge Planning Assessment     Confirmed/corrected address, phone number and insurance Yes     Confirmed Demographics Correct on Facesheet     Source of Information family     If unable to respond/provide information was family/caregiver contacted? Yes     Contact Name/Number Belkys Moser - dtr - [phone]     When was your last doctors appointment? --   unknown    Communicated MARLO  with patient/caregiver Date not available/Unable to determine     Reason For Admission See admit diagnosis     Lives With facility resident     Facility Arrived From: [facility] - Memory Care Unit     Do you expect to return to your current living situation? Other (see comments)   TBD    Prior to hospitilization cognitive status: Unable to Assess     Current cognitive status: Not Oriented to Place     Walking or Climbing Stairs Difficulty ambulation difficulty, dependent     Mobility Management Wheelchair - was able to transfer     Dressing/Bathing Difficulty bathing difficulty, assistance 1 person     Equipment Currently Used at Home wheelchair     Readmission within 30 days? No     Patient currently being followed by outpatient case management? No     Do you take prescription medications? Yes     Do you have prescription coverage? Yes     Coverage [employer]     Do you have any problems affording any of your prescribed medications? No     Is the patient taking medications as prescribed? yes     Who is going to help you get home at discharge? [facility]     How do you get to doctors appointments? agency     Are you on dialysis? No     Do you take coumadin? No     Discharge Plan A Skilled Nursing Facility     Discharge Plan B Assisted Living     DME Needed Upon Discharge  other (see comments)   TBD    Discharge Plan discussed with: Adult children     Discharge Barriers Identified None               The PRN SHAREE attempted to complete the assessment with the pt but she was confused.  SHAREE contacted the pt's dtr Belkys to complete the assessment.  The pt lives at [facility] in the memory care unit.  SW has been WC bound but was able to transfer.  [facility] spoke with the dtr and informed her that the pt will need to do SNF when she returns or have 24 hour sitters to assist with transfers.  Moving to the residential section is also an option.  SW/CM will f/u as needed to assist with dc planning.  Pt is not medically stable  at this time.    JESSE HuiW   PRN

## 2022-03-22 NOTE — ANESTHESIA POSTPROCEDURE EVALUATION
Anesthesia Post Evaluation    Patient: Lilia Hidalgo    Procedure(s) Performed: Procedure(s) (LRB):  APPLICATION, EXTERNAL FIXATION DEVICE, LARGE, TIBIA (Right)  IRRIGATION AND DEBRIDEMENT (Right)    Final Anesthesia Type: general      Patient location during evaluation: PACU  Patient participation: Yes- Able to Participate  Level of consciousness: awake and alert and oriented  Pain management: adequate  Airway patency: patent    PONV status at discharge: No PONV  Anesthetic complications: no      Cardiovascular status: blood pressure returned to baseline and hemodynamically stable  Respiratory status: unassisted  Hydration status: euvolemic  Follow-up not needed.          Vitals Value Taken Time   /47 03/22/22 0932   Temp 36.8 °C (98.2 °F) 03/22/22 0700   Pulse 50 03/22/22 0942   Resp 18 03/22/22 0942   SpO2 97 % 03/22/22 0942   Vitals shown include unvalidated device data.      No case tracking events are documented in the log.      Pain/Lolly Score: Pain Rating Prior to Med Admin: 6 (3/21/2022  2:38 PM)

## 2022-03-22 NOTE — PT/OT/SLP PROGRESS
Physical Therapy      Patient Name:  Lilia Hidalgo   MRN:  [id number]    Patient not seen today secondary to  (pt not seen due to being on bedrest  with tibial fx. MD discontinued PT orders.).     3/22/2022

## 2022-03-22 NOTE — SUBJECTIVE & OBJECTIVE
Interval History: Patient seen , disoriented, no acute complaints, pain in the ankles. Paced rhythm on the tele.     Review of Systems   Constitutional: Negative for chills and fever.   Cardiovascular:  Negative for chest pain, orthopnea and palpitations.   Respiratory:  Negative for cough and shortness of breath.    Gastrointestinal:  Negative for abdominal pain.   Neurological:  Negative for dizziness, headaches and light-headedness.   Psychiatric/Behavioral:  Negative for altered mental status.    Objective:     Vital Signs (Most Recent):  Temp: 98.2 °F (36.8 °C) (03/22/22 0700)  Pulse: (!) 50 (03/22/22 1330)  Resp: 20 (03/22/22 1330)  BP: (!) 106/51 (03/22/22 1300)  SpO2: (!) 90 % (03/22/22 1330)   Vital Signs (24h Range):  Temp:  [97.8 °F (36.6 °C)-99.8 °F (37.7 °C)] 98.2 °F (36.8 °C)  Pulse:  [49-50] 50  Resp:  [16-37] 20  SpO2:  [90 %-100 %] 90 %  BP: ()/(39-64) 106/51     Weight: 90.7 kg (200 lb)  Body mass index is 32.28 kg/m².     SpO2: (!) 90 %  O2 Device (Oxygen Therapy): room air    Physical Exam  Vitals and nursing note reviewed.   Constitutional:       General: She is not in acute distress.     Appearance: Normal appearance. She is obese. She is not toxic-appearing.   HENT:      Head: Normocephalic and atraumatic.      Mouth/Throat:      Mouth: Mucous membranes are moist.   Cardiovascular:      Rate and Rhythm: Normal rate and regular rhythm.      Heart sounds: No murmur heard.    No friction rub. No gallop.   Pulmonary:      Effort: Pulmonary effort is normal. No respiratory distress.      Breath sounds: Normal breath sounds.   Abdominal:      Palpations: Abdomen is soft.   Musculoskeletal:      Right lower leg: No edema.      Left lower leg: No edema.      Comments: Right ankle post op    Skin:     General: Skin is warm.   Neurological:      Mental Status: She is alert. Mental status is at baseline. She is disoriented.     Significant Labs: EP:   Recent Labs   Lab 03/21/22  0855 03/22/22  0307    * 145   K 4.6 4.5    107   CO2 28 26   * 121*   BUN 36* 38*   CREATININE 1.5* 1.5*   CALCIUM 9.9 8.9   PROT 6.9  --    ALBUMIN 3.2*  --    BILITOT 1.1*  --    ALKPHOS 73  --    AST 17  --    ALT 17  --    ANIONGAP 12 12   ESTGFRAFRICA 35.1* 35.1*   EGFRNONAA 30.5* 30.5*   WBC 12.22 7.53   HGB 14.0 10.4*   HCT 47.0 35.2*    139*   INR 1.1  --        Significant Imaging: Ejection fraction:   Recent Labs   Lab 03/21/22  1320   EF 65

## 2022-03-22 NOTE — PROGRESS NOTES
[facility]  Cardiac Electrophysiology  Progress Note    Admission Date: 3/21/2022  Code Status: Prior   Attending Physician: Zaira Howell MD   Expected Discharge Date: 3/28/2022  Principal Problem:Complete AV block    Subjective:     Interval History: Patient seen , disoriented, no acute complaints, pain in the ankles. Paced rhythm on the tele.     Review of Systems   Constitutional: Negative for chills and fever.   Cardiovascular:  Negative for chest pain, orthopnea and palpitations.   Respiratory:  Negative for cough and shortness of breath.    Gastrointestinal:  Negative for abdominal pain.   Neurological:  Negative for dizziness, headaches and light-headedness.   Psychiatric/Behavioral:  Negative for altered mental status.    Objective:     Vital Signs (Most Recent):  Temp: 98.2 °F (36.8 °C) (03/22/22 0700)  Pulse: (!) 50 (03/22/22 1330)  Resp: 20 (03/22/22 1330)  BP: (!) 106/51 (03/22/22 1300)  SpO2: (!) 90 % (03/22/22 1330)   Vital Signs (24h Range):  Temp:  [97.8 °F (36.6 °C)-99.8 °F (37.7 °C)] 98.2 °F (36.8 °C)  Pulse:  [49-50] 50  Resp:  [16-37] 20  SpO2:  [90 %-100 %] 90 %  BP: ()/(39-64) 106/51     Weight: 90.7 kg (200 lb)  Body mass index is 32.28 kg/m².     SpO2: (!) 90 %  O2 Device (Oxygen Therapy): room air    Physical Exam  Vitals and nursing note reviewed.   Constitutional:       General: She is not in acute distress.     Appearance: Normal appearance. She is obese. She is not toxic-appearing.   HENT:      Head: Normocephalic and atraumatic.      Mouth/Throat:      Mouth: Mucous membranes are moist.   Cardiovascular:      Rate and Rhythm: Normal rate and regular rhythm.      Heart sounds: No murmur heard.    No friction rub. No gallop.   Pulmonary:      Effort: Pulmonary effort is normal. No respiratory distress.      Breath sounds: Normal breath sounds.   Abdominal:      Palpations: Abdomen is soft.   Musculoskeletal:      Right lower leg: No edema.      Left  lower leg: No edema.      Comments: Right ankle post op    Skin:     General: Skin is warm.   Neurological:      Mental Status: She is alert. Mental status is at baseline. She is disoriented.     Significant Labs: EP:   Recent Labs   Lab 03/21/22  0855 03/22/22  0307   * 145   K 4.6 4.5    107   CO2 28 26   * 121*   BUN 36* 38*   CREATININE 1.5* 1.5*   CALCIUM 9.9 8.9   PROT 6.9  --    ALBUMIN 3.2*  --    BILITOT 1.1*  --    ALKPHOS 73  --    AST 17  --    ALT 17  --    ANIONGAP 12 12   ESTGFRAFRICA 35.1* 35.1*   EGFRNONAA 30.5* 30.5*   WBC 12.22 7.53   HGB 14.0 10.4*   HCT 47.0 35.2*    139*   INR 1.1  --        Significant Imaging: Ejection fraction:   Recent Labs   Lab 03/21/22  1320   EF 65     Assessment and Plan:     * Complete AV block  Patient is a  89 year old female that presented from [facility] for ankle fracture and was found to be in complete heart block in the ED. ECG confirmed heart block with escape rhythm at 35 bpm. Patient underwent TVP placement to address the right ankle open fracture. TVP set at 50 bpm.   - Continue TVP for now, check thresholds daily  - Ankle fracture management per Ortho  - Holding off on dcPPM until ankle fracture management complete  - Continue telemetry monitoring while inpatient    Plan of care was discussed with staff, Dr Kelly.     Dale Soriano MD  Cardiac Electrophysiology, Cards Fellow PGY4  [facility]

## 2022-03-22 NOTE — PLAN OF CARE
SICU PLAN OF CARE NOTE    Dx: Open ankle fracture, right, type III, initial encounter    Shift Events: SICU admit s/p external fixation RLE    Goals of Care: Pain control; plan for PPM in future    Neuro: Follows Commands, Moves All Extremities, and Confused    Cardiac: MAPs upper 50-60's. HR 50, 100% paced via TVP. RLE cap refill <3 sec & skin warm; VIPIN RLE pulses. All other pulses palpable.    Respiratory: Nasal Cannula    Diet: Regular Diet    Urine Output: Urinary Catheter 15-20 cc/hour. Rupal WOOD aware of low UOP throughout night. Encouraging PO intake.    Wound vac cont suction @ 125 mmHg. 25cc output.     Labs/Accuchecks: Daily labs. No accuchecks. Team aware of all pertinent lab values this AM.    Skin: No new skin breakdown noted. RLE elevated & iced. Heel boot on. Pt turned as needed. Bed plugged in and working.

## 2022-03-22 NOTE — ASSESSMENT & PLAN NOTE
Patient is a  89 year old female that presented from [facility] for ankle fracture and was found to be in complete heart block in the ED. ECG confirmed heart block with escape rhythm at 35 bpm. Patient underwent TVP placement to address the right ankle open fracture. TVP set at 50 bpm.   - Continue TVP for now, check thresholds daily  - Ankle fracture management per Ortho  - Holding off on dcPPM until ankle fracture management complete  - Continue telemetry monitoring while inpatient

## 2022-03-22 NOTE — SUBJECTIVE & OBJECTIVE
Interval History: No events overnight.   -Starting ppx ac with heparin  - +/- undergoing surgery tomorrow- NPO at [facility]  -will hold off on PM placement until orthopedic procedures are completed and she's euvolemic    Review of Systems   Unable to perform ROS: Mental status change     Objective:     Vital Signs (Most Recent):  Temp: 98.2 °F (36.8 °C) (03/22/22 0700)  Pulse: (!) 50 (03/22/22 1330)  Resp: 20 (03/22/22 1330)  BP: (!) 106/51 (03/22/22 1300)  SpO2: (!) 90 % (03/22/22 1330)   Vital Signs (24h Range):  Temp:  [97.8 °F (36.6 °C)-99.8 °F (37.7 °C)] 98.2 °F (36.8 °C)  Pulse:  [49-50] 50  Resp:  [16-47] 20  SpO2:  [90 %-100 %] 90 %  BP: ()/(39-76) 106/51     Weight: 90.7 kg (200 lb)  Body mass index is 32.28 kg/m².     SpO2: (!) 90 %  O2 Device (Oxygen Therapy): room air      Intake/Output Summary (Last 24 hours) at 3/22/2022 1452  Last data filed at 3/22/2022 1400  Gross per 24 hour   Intake 1840.17 ml   Output 432 ml   Net 1408.17 ml     Lines/Drains/Airways       Central Venous Catheter Line  Duration             Percutaneous Central Line Insertion/Assessment - single lumen  03/21/22 1124 right internal jugular 1 day              Drain  Duration                  Urethral Catheter 03/21/22 1001 Double-lumen 16 Fr. 1 day              Peripheral Intravenous Line  Duration                  Peripheral IV - Single Lumen 03/22/22 0900 20 G Anterior;Left Forearm <1 day         Peripheral IV - Single Lumen 03/22/22 1005 20 G;1 3/4 in Left Upper Arm <1 day                  Physical Exam  Constitutional:       General: She is not in acute distress.     Appearance: She is obese. She is not ill-appearing.   HENT:      Nose: No congestion or rhinorrhea.      Mouth/Throat:      Pharynx: No oropharyngeal exudate or posterior oropharyngeal erythema.   Eyes:      General: No scleral icterus.     Conjunctiva/sclera: Conjunctivae normal.   Cardiovascular:      Rate and Rhythm: Normal rate and regular rhythm.   Pulmonary:       Effort: Pulmonary effort is normal. No respiratory distress.      Breath sounds: No wheezing or rales.   Musculoskeletal:         General: Swelling present.      Right lower leg: Edema present.      Left lower leg: Edema present.   Skin:     General: Skin is warm.      Coloration: Skin is not jaundiced.      Findings: No lesion.   Neurological:      Mental Status: She is alert.     Significant Labs: All pertinent lab results from the last 24 hours have been reviewed.    Significant Imaging:  All imaging reviewed

## 2022-03-22 NOTE — ASSESSMENT & PLAN NOTE
Likely d/t hypervolemic state    S/p diuresis without significant improvement    Now looks volume down on exam    --IVFs for 10hrs   --will re-assess renal function afterward  --RP ultrasound ordered

## 2022-03-22 NOTE — BRIEF OP NOTE
[facility]  Brief Operative Note    SUMMARY     Surgery Date: 3/21/2022     Surgeon(s) and Role:     * Anirudh Carreno MD - Primary     * Hema Lutz MD - Resident - Assisting     * Sidra Dc MD - Resident - Assisting        Pre-op Diagnosis:  Open right ankle fracture, type III, initial encounter [S82.891C]    Post-op Diagnosis:  Post-Op Diagnosis Codes:     * Open right ankle fracture, type III, initial encounter [S82.894F]    Procedure(s) (LRB):  APPLICATION, EXTERNAL FIXATION DEVICE, LARGE, TIBIA (Right)  IRRIGATION AND DEBRIDEMENT (Right)           Specimens:   Specimen (24h ago, onward)            None          [id number]    Post op Plan  89F with grade 3a open right ankle fracture sp I&D and percutaneous fixation 3/21/22 by Dr. Carreno    Nerve block: R popliteal & adductor single shot  Pain: multimodal regimen recommended  WBS:  NWB RLE in short leg splint, ice, elevate  DVT ppx:  ASA 81mg BID while inpatient and for 6 wks at discharge  PT/OT:  Daily to mobilize patient  Abx:  periop ancef and gentamicin x24h renally dosed  Paul:  Can dc from ortho standpoint  Dispo: will likely need SNF  F/u:  2 weeks with orthopedic surgery

## 2022-03-23 LAB
ALBUMIN SERPL BCP-MCNC: 2.3 G/DL (ref 3.5–5.2)
ALP SERPL-CCNC: 62 U/L (ref 55–135)
ALT SERPL W/O P-5'-P-CCNC: <5 U/L (ref 10–44)
ANION GAP SERPL CALC-SCNC: 10 MMOL/L (ref 8–16)
AST SERPL-CCNC: 24 U/L (ref 10–40)
BACTERIA UR CULT: ABNORMAL
BASOPHILS # BLD AUTO: 0.04 K/UL (ref 0–0.2)
BASOPHILS NFR BLD: 0.6 % (ref 0–1.9)
BILIRUB SERPL-MCNC: 0.5 MG/DL (ref 0.1–1)
BUN SERPL-MCNC: 46 MG/DL (ref 8–23)
CALCIUM SERPL-MCNC: 8.2 MG/DL (ref 8.7–10.5)
CHLORIDE SERPL-SCNC: 106 MMOL/L (ref 95–110)
CK SERPL-CCNC: 124 U/L (ref 20–180)
CO2 SERPL-SCNC: 25 MMOL/L (ref 23–29)
CREAT SERPL-MCNC: 1.9 MG/DL (ref 0.5–1.4)
DIFFERENTIAL METHOD: ABNORMAL
EOSINOPHIL # BLD AUTO: 0.1 K/UL (ref 0–0.5)
EOSINOPHIL NFR BLD: 1.1 % (ref 0–8)
ERYTHROCYTE [DISTWIDTH] IN BLOOD BY AUTOMATED COUNT: 15.9 % (ref 11.5–14.5)
EST. GFR  (AFRICAN AMERICAN): 26.4 ML/MIN/1.73 M^2
EST. GFR  (NON AFRICAN AMERICAN): 22.9 ML/MIN/1.73 M^2
GLUCOSE SERPL-MCNC: 95 MG/DL (ref 70–110)
HCT VFR BLD AUTO: 29.5 % (ref 37–48.5)
HGB BLD-MCNC: 9 G/DL (ref 12–16)
IMM GRANULOCYTES # BLD AUTO: 0.02 K/UL (ref 0–0.04)
IMM GRANULOCYTES NFR BLD AUTO: 0.3 % (ref 0–0.5)
LYMPHOCYTES # BLD AUTO: 0.8 K/UL (ref 1–4.8)
LYMPHOCYTES NFR BLD: 11.8 % (ref 18–48)
MAGNESIUM SERPL-MCNC: 2 MG/DL (ref 1.6–2.6)
MCH RBC QN AUTO: 29 PG (ref 27–31)
MCHC RBC AUTO-ENTMCNC: 30.5 G/DL (ref 32–36)
MCV RBC AUTO: 95 FL (ref 82–98)
MONOCYTES # BLD AUTO: 0.7 K/UL (ref 0.3–1)
MONOCYTES NFR BLD: 11 % (ref 4–15)
NEUTROPHILS # BLD AUTO: 4.9 K/UL (ref 1.8–7.7)
NEUTROPHILS NFR BLD: 75.2 % (ref 38–73)
NRBC BLD-RTO: 0 /100 WBC
PLATELET # BLD AUTO: 123 K/UL (ref 150–450)
PMV BLD AUTO: 11 FL (ref 9.2–12.9)
POTASSIUM SERPL-SCNC: 4.2 MMOL/L (ref 3.5–5.1)
POTASSIUM UR-SCNC: 39 MMOL/L (ref 15–95)
PROT SERPL-MCNC: 5.1 G/DL (ref 6–8.4)
RBC # BLD AUTO: 3.1 M/UL (ref 4–5.4)
SODIUM SERPL-SCNC: 141 MMOL/L (ref 136–145)
SODIUM UR-SCNC: 37 MMOL/L (ref 20–250)
WBC # BLD AUTO: 6.55 K/UL (ref 3.9–12.7)

## 2022-03-23 PROCEDURE — 25000003 PHARM REV CODE 250: Performed by: STUDENT IN AN ORGANIZED HEALTH CARE EDUCATION/TRAINING PROGRAM

## 2022-03-23 PROCEDURE — 85025 COMPLETE CBC W/AUTO DIFF WBC: CPT | Performed by: INTERNAL MEDICINE

## 2022-03-23 PROCEDURE — 20000000 HC ICU ROOM

## 2022-03-23 PROCEDURE — 83735 ASSAY OF MAGNESIUM: CPT | Performed by: INTERNAL MEDICINE

## 2022-03-23 PROCEDURE — 63600175 PHARM REV CODE 636 W HCPCS: Performed by: INTERNAL MEDICINE

## 2022-03-23 PROCEDURE — 99222 PR INITIAL HOSPITAL CARE,LEVL II: ICD-10-PCS | Mod: ,,, | Performed by: HOSPITALIST

## 2022-03-23 PROCEDURE — 63600175 PHARM REV CODE 636 W HCPCS: Performed by: STUDENT IN AN ORGANIZED HEALTH CARE EDUCATION/TRAINING PROGRAM

## 2022-03-23 PROCEDURE — 84300 ASSAY OF URINE SODIUM: CPT | Performed by: INTERNAL MEDICINE

## 2022-03-23 PROCEDURE — 80053 COMPREHEN METABOLIC PANEL: CPT | Performed by: INTERNAL MEDICINE

## 2022-03-23 PROCEDURE — 84133 ASSAY OF URINE POTASSIUM: CPT | Performed by: INTERNAL MEDICINE

## 2022-03-23 PROCEDURE — 99232 SBSQ HOSP IP/OBS MODERATE 35: CPT | Mod: ,,, | Performed by: INTERNAL MEDICINE

## 2022-03-23 PROCEDURE — 82550 ASSAY OF CK (CPK): CPT | Performed by: INTERNAL MEDICINE

## 2022-03-23 PROCEDURE — 25000003 PHARM REV CODE 250: Performed by: INTERNAL MEDICINE

## 2022-03-23 PROCEDURE — 99900035 HC TECH TIME PER 15 MIN (STAT)

## 2022-03-23 PROCEDURE — 99231 SBSQ HOSP IP/OBS SF/LOW 25: CPT | Mod: GC,,, | Performed by: INTERNAL MEDICINE

## 2022-03-23 PROCEDURE — 94761 N-INVAS EAR/PLS OXIMETRY MLT: CPT

## 2022-03-23 PROCEDURE — 27000221 HC OXYGEN, UP TO 24 HOURS

## 2022-03-23 PROCEDURE — 99222 1ST HOSP IP/OBS MODERATE 55: CPT | Mod: ,,, | Performed by: HOSPITALIST

## 2022-03-23 PROCEDURE — 99231 PR SUBSEQUENT HOSPITAL CARE,LEVL I: ICD-10-PCS | Mod: GC,,, | Performed by: INTERNAL MEDICINE

## 2022-03-23 PROCEDURE — 99232 PR SUBSEQUENT HOSPITAL CARE,LEVL II: ICD-10-PCS | Mod: ,,, | Performed by: INTERNAL MEDICINE

## 2022-03-23 RX ORDER — HEPARIN SODIUM 5000 [USP'U]/ML
5000 INJECTION, SOLUTION INTRAVENOUS; SUBCUTANEOUS EVERY 8 HOURS
Status: DISCONTINUED | OUTPATIENT
Start: 2022-03-23 | End: 2022-03-27

## 2022-03-23 RX ORDER — MORPHINE SULFATE 2 MG/ML
INJECTION, SOLUTION INTRAMUSCULAR; INTRAVENOUS
Status: DISPENSED
Start: 2022-03-23 | End: 2022-03-24

## 2022-03-23 RX ORDER — HYDROMORPHONE HYDROCHLORIDE 1 MG/ML
0.5 INJECTION, SOLUTION INTRAMUSCULAR; INTRAVENOUS; SUBCUTANEOUS EVERY 6 HOURS PRN
Status: DISCONTINUED | OUTPATIENT
Start: 2022-03-23 | End: 2022-03-25

## 2022-03-23 RX ORDER — FUROSEMIDE 10 MG/ML
120 INJECTION INTRAMUSCULAR; INTRAVENOUS
Status: DISCONTINUED | OUTPATIENT
Start: 2022-03-23 | End: 2022-03-28

## 2022-03-23 RX ADMIN — CEFTRIAXONE 1 G: 1 INJECTION, SOLUTION INTRAVENOUS at 09:03

## 2022-03-23 RX ADMIN — HEPARIN SODIUM 5000 UNITS: 5000 INJECTION INTRAVENOUS; SUBCUTANEOUS at 09:03

## 2022-03-23 RX ADMIN — PANTOPRAZOLE SODIUM 40 MG: 40 TABLET, DELAYED RELEASE ORAL at 09:03

## 2022-03-23 RX ADMIN — ACETAMINOPHEN 1000 MG: 325 TABLET ORAL at 09:03

## 2022-03-23 RX ADMIN — ATORVASTATIN CALCIUM 20 MG: 20 TABLET, FILM COATED ORAL at 08:03

## 2022-03-23 RX ADMIN — HEPARIN SODIUM 5000 UNITS: 5000 INJECTION INTRAVENOUS; SUBCUTANEOUS at 02:03

## 2022-03-23 RX ADMIN — OXYCODONE 5 MG: 5 TABLET ORAL at 08:03

## 2022-03-23 RX ADMIN — FUROSEMIDE 120 MG: 10 INJECTION, SOLUTION INTRAMUSCULAR; INTRAVENOUS at 05:03

## 2022-03-23 RX ADMIN — OXYCODONE 5 MG: 5 TABLET ORAL at 12:03

## 2022-03-23 RX ADMIN — FUROSEMIDE 80 MG: 10 INJECTION, SOLUTION INTRAMUSCULAR; INTRAVENOUS at 09:03

## 2022-03-23 RX ADMIN — MUPIROCIN: 20 OINTMENT TOPICAL at 09:03

## 2022-03-23 RX ADMIN — ASPIRIN 81 MG: 81 TABLET, COATED ORAL at 09:03

## 2022-03-23 RX ADMIN — ACETAMINOPHEN 1000 MG: 325 TABLET ORAL at 02:03

## 2022-03-23 RX ADMIN — HYDROMORPHONE HYDROCHLORIDE 0.5 MG: 1 INJECTION, SOLUTION INTRAMUSCULAR; INTRAVENOUS; SUBCUTANEOUS at 08:03

## 2022-03-23 RX ADMIN — OXYCODONE 5 MG: 5 TABLET ORAL at 04:03

## 2022-03-23 RX ADMIN — ASPIRIN 81 MG: 81 TABLET, COATED ORAL at 08:03

## 2022-03-23 RX ADMIN — MUPIROCIN: 20 OINTMENT TOPICAL at 08:03

## 2022-03-23 RX ADMIN — DONEPEZIL HYDROCHLORIDE 5 MG: 5 TABLET ORAL at 08:03

## 2022-03-23 NOTE — PROGRESS NOTES
[facility]  Cardiology  Progress Note    Patient Name: Lilia Hidalgo  MRN: [id number]  Admission Date: 3/21/2022  Hospital Length of Stay: 2 days  Code Status: Prior   Attending Physician: Zaira Howell MD   Primary Care Physician: Anna Wood MD  Expected Discharge Date: 3/28/2022  Principal Problem:Complete AV block    Subjective:     Hospital Course:   Ms. Hidalgo was admitted to the hospital for surgical management of a fracture. She was noted to have a CHB, underwent TVP placement for optimization prior to the surgical procedure.     Interval History: Febrile overnight.   -Ortho planning on non-operative management for L fracture  -Cultures ordered, continuing ctx    Review of Systems   Unable to perform ROS: Mental status change     Objective:     Vital Signs (Most Recent):  Temp: 98.7 °F (37.1 °C) (03/23/22 1200)  Pulse: 66 (03/23/22 1330)  Resp: 19 (03/23/22 1330)  BP: 106/80 (03/23/22 1300)  SpO2: 98 % (03/23/22 1330)   Vital Signs (24h Range):  Temp:  [98 °F (36.7 °C)-100.8 °F (38.2 °C)] 98.7 °F (37.1 °C)  Pulse:  [] 66  Resp:  [15-48] 19  SpO2:  [86 %-100 %] 98 %  BP: ()/(46-80) 106/80     Weight: 90.7 kg (200 lb)  Body mass index is 32.28 kg/m².     SpO2: 98 %  O2 Device (Oxygen Therapy): nasal cannula w/ humidification      Intake/Output Summary (Last 24 hours) at 3/23/2022 1504  Last data filed at 3/23/2022 1300  Gross per 24 hour   Intake 1946.08 ml   Output 502 ml   Net 1444.08 ml       Lines/Drains/Airways       Central Venous Catheter Line  Duration             Percutaneous Central Line Insertion/Assessment - single lumen  03/21/22 1124 right internal jugular 2 days              Drain  Duration                  Urethral Catheter 03/21/22 1001 Double-lumen 16 Fr. 2 days              Peripheral Intravenous Line  Duration                  Peripheral IV - Single Lumen 03/22/22 0900 20 G Anterior;Left Forearm 1 day         Peripheral IV - Single Lumen  03/22/22 1005 20 G;1 3/4 in Left Upper Arm 1 day                  Physical Exam  Constitutional:       General: She is not in acute distress.     Appearance: She is obese. She is not ill-appearing.   HENT:      Nose: No congestion or rhinorrhea.      Mouth/Throat:      Pharynx: No oropharyngeal exudate or posterior oropharyngeal erythema.   Eyes:      General: No scleral icterus.     Conjunctiva/sclera: Conjunctivae normal.   Cardiovascular:      Rate and Rhythm: Normal rate and regular rhythm.   Pulmonary:      Effort: Pulmonary effort is normal. No respiratory distress.      Breath sounds: No wheezing or rales.   Musculoskeletal:         General: Swelling present.      Right lower leg: Edema present.      Left lower leg: Edema present.   Skin:     General: Skin is warm.      Coloration: Skin is not jaundiced.      Findings: No lesion.   Neurological:      Mental Status: She is alert.     Significant Labs: All pertinent lab results from the last 24 hours have been reviewed.    Significant Imaging:  All imaging reviewed    Assessment and Plan:     * Complete AV block  Patient presented to ED with ankle fracture and found to be in complete heart block. Currently stable with junctional scape rhythm in the 30s and normal BP.     -TVP in place as bridge to PPM. Will plan for permanent pacemaker once fractures are addressed completely to avoid risk of device infection  -Now febrile, will follow cultures and determine further management    LUANNE (acute kidney injury)  Likely d/t hypervolemic state vs ATN from CHB  RP ultrasound with chronic disease, no hydronephrosis    Nephrology consulted- suspect ATN w/ muddy brown casts after spinning urine    --started diuresis w lasix iv 120 daily      Acute decompensated heart failure  Presented with an elevated BNP and appeared hypervolemic  TTE with a normal EF- unable to determine diastolic function given she is being paced    Open ankle fracture, right, type III, initial  encounter  S/p external fixation  -pt/ot as tolerated      VTE Risk Mitigation (From admission, onward)         Ordered     heparin (porcine) injection 5,000 Units  Every 8 hours         03/23/22 0800              Mohini Mar MD  Cardiology  [facility]

## 2022-03-23 NOTE — PROGRESS NOTES
[facility]  Orthopedics  Progress Note    Patient Name: Lilia Hidalgo  MRN: [id number]  Admission Date: 3/21/2022  Hospital Length of Stay: 2 days  Attending Provider: Zaira Howell MD  Primary Care Provider: Anna Wood MD  Follow-up For: Procedure(s) (LRB):  APPLICATION, EXTERNAL FIXATION DEVICE, LARGE, TIBIA (Right)  IRRIGATION AND DEBRIDEMENT (Right)    Post-Operative Day: 2 Days Post-Op  Subjective:     Principal Problem:Complete AV block    Principal Orthopedic Problem: same     Interval History: NAEON. VSS, transvenous pacemaker in place. Elevating RLE and icing ankle. XR left tib/fib obtained for bruising shows periprosthetic tibia fracture. Discussed with family and would like to pursue non-operative manage. Hgb 9.0. No PT yesterday die to tibia fracture     Review of patient's allergies indicates:   Allergen Reactions    Aspirin Nausea Only and Other (See Comments)     Other reaction(s): esophageal pain    Celebrex [celecoxib] Rash    Morphine Hallucinations    Steroid [corticosteroids (glucocorticoids)] Other (See Comments)     Other reaction(s): Flushing (skin)    Sulfa dyne Other (See Comments)     Other reaction(s): esophogeal pain       Current Facility-Administered Medications   Medication    0.9%  NaCl infusion    acetaminophen tablet 1,000 mg    aspirin EC tablet 81 mg    atorvastatin tablet 20 mg    cefTRIAXone (ROCEPHIN) 1 g/50 mL D5W IVPB    cholecalciferol (vitamin D3) 125 mcg (5,000 unit) tablet 50,000 Units    diphenhydrAMINE capsule 25 mg    donepeziL tablet 5 mg    furosemide injection 80 mg    heparin (porcine) injection 5,000 Units    mupirocin 2 % ointment    oxyCODONE immediate release tablet 5 mg    pantoprazole EC tablet 40 mg     Objective:     Vital Signs (Most Recent):  Temp: 98.9 °F (37.2 °C) (03/23/22 0715)  Pulse: (!) 50 (03/23/22 0745)  Resp: 20 (03/23/22 0745)  BP: (!) 113/51 (03/23/22 0730)  SpO2: 100 % (03/23/22 0745)  "Vital Signs (24h Range):  Temp:  [98 °F (36.7 °C)-100.8 °F (38.2 °C)] 98.9 °F (37.2 °C)  Pulse:  [] 50  Resp:  [15-48] 20  SpO2:  [70 %-100 %] 100 %  BP: ()/(46-80) 113/51     Weight: 90.7 kg (200 lb)  Height: 5' 6" (167.6 cm)  Body mass index is 32.28 kg/m².      Intake/Output Summary (Last 24 hours) at 3/23/2022 0808  Last data filed at 3/23/2022 0700  Gross per 24 hour   Intake 2252.11 ml   Output 374 ml   Net 1878.11 ml       Ortho/SPM Exam  NAD, demented     RLE  Short leg splint cdi  Elevated, ice right ankle  Grossly NVI distally, wiggles toes     LLE  Polar ice and immobilizer in place   Grossly NVI distally, wiggles toes    Significant Labs:   CBC:   Recent Labs   Lab 03/21/22  0855 03/22/22  0307 03/23/22  0311   WBC 12.22 7.53 6.55   HGB 14.0 10.4* 9.0*   HCT 47.0 35.2* 29.5*    139* 123*     CMP:   Recent Labs   Lab 03/21/22  0855 03/22/22  0307 03/23/22  0311   * 145 141   K 4.6 4.5 4.2    107 106   CO2 28 26 25   * 121* 95   BUN 36* 38* 46*   CREATININE 1.5* 1.5* 1.9*   CALCIUM 9.9 8.9 8.2*   PROT 6.9  --  5.1*   ALBUMIN 3.2*  --  2.3*   BILITOT 1.1*  --  0.5   ALKPHOS 73  --  62   AST 17  --  24   ALT 17  --  <5*   ANIONGAP 12 12 10   EGFRNONAA 30.5* 30.5* 22.9*     All pertinent labs within the past 24 hours have been reviewed.    Significant Imaging: I have reviewed all pertinent imaging results/findings.    Assessment/Plan:     * Open ankle fracture, right, type III, initial encounter  89F with grade 3a open right ankle fracture sp I&D and percutaneous fixation 3/21/22 by Dr. Carreno. Also has left periprosthetic proximal tibia fracture.      Discuss management options for left periprosthetic proximal tibia fracture with patient's family. Per  plan is for non-operative treatment. Will place into long leg cast.  Patient currently in a well padded left knee immobilizer. Okay for diet today. Cardiology to place permanent pacemaker when orthopedic " intervention complete.     Nerve block: R popliteal & adductor single shot  Pain: multimodal regimen recommended  WBS:  NWB RLE in short leg splint, ice, elevate. NWB LLE in well padded knee immobilizer, ice left knee.  plan yo  change into a cast   DVT ppx:  heparin tid per primary ,scd  PT/OT:  hold off for now given NWB BLE  Abx:  periop ancef and gentamicin x24h post op renally dosed   Paul:  Can dc from ortho standpoint  Dispo: pending further management   F/u:  will schedule closer to discharge    Alexsandra Archuleta PA-C  Orthopedics  [facility]

## 2022-03-23 NOTE — CONSULTS
[facility]  Wound Care    Patient Name:  Lilia Hidalgo   MRN:  [id number]  Date: 3/23/2022  Diagnosis: Complete AV block    History:     Past Medical History:   Diagnosis Date    Aortic stenosis 6/17/2015    Arthritis     Atrophic vaginitis 2/18/2016    Bilateral edema of lower extremity 6/22/2016    CHF (congestive heart failure)     Cholelithiasis without cholecystitis 5/5/2017    Chronic pain of left knee 8/3/2017    Depressed mood 6/22/2016    Diverticulitis of large intestine without perforation or abscess without bleeding 5/5/2017    Encounter for blood transfusion     Essential hypertension     GERD (gastroesophageal reflux disease)     Hyperlipidemia     Hypertension     Hypertensive heart disease with heart failure 7/14/2015    Insomnia     Joint pain     Knee pain, left 08/2016    pain with walking or standing    Primary osteoarthritis of knee 2/5/2013    PUD (peptic ulcer disease)     S/P knee replacement 2/13/2013    Slow transit constipation 2/18/2016       Social History     Socioeconomic History    Marital status:    Tobacco Use    Smoking status: Never Smoker    Smokeless tobacco: Never Used   Substance and Sexual Activity    Alcohol use: No    Drug use: Never     Types: Hydrocodone    Sexual activity: Not Currently     Birth control/protection: None   Other Topics Concern    Are you pregnant or think you may be? No    Breast-feeding No     Social Determinants of Health     Financial Resource Strain: Unknown    Difficulty of Paying Living Expenses: Patient refused   Food Insecurity: No Food Insecurity    Worried About Running Out of Food in the Last Year: Never true    Ran Out of Food in the Last Year: Never true   Transportation Needs: No Transportation Needs    Lack of Transportation (Medical): No    Lack of Transportation (Non-Medical): No   Physical Activity: Inactive    Days of Exercise per Week: 0 days    Minutes of Exercise per  Session: 0 min   Stress: No Stress Concern Present    Feeling of Stress : Only a little   Social Connections: Socially Isolated    Frequency of Communication with Friends and Family: More than three times a week    Frequency of Social Gatherings with Friends and Family: Twice a week    Attends Islam Services: Never    Active Member of Clubs or Organizations: No    Attends Club or Organization Meetings: Never    Marital Status:    Housing Stability: Low Risk     Unable to Pay for Housing in the Last Year: No    Number of Places Lived in the Last Year: 1    Unstable Housing in the Last Year: No       Precautions:     Allergies as of 03/21/2022 - Reviewed 03/21/2022   Allergen Reaction Noted    Aspirin Nausea Only and Other (See Comments) 08/10/2012    Celebrex [celecoxib] Rash 08/10/2012    Morphine Hallucinations 05/08/2017    Steroid [corticosteroids (glucocorticoids)] Other (See Comments) 08/10/2012    Sulfa dyne Other (See Comments) 08/10/2012       WOC Assessment Details/Treatment     Wound care consulted by RN for assessment of purple discoloration to right lower leg. Patient with fracture to right lower leg and now s/p irrigation and debridement of right lower leg with application of external fixation device. Purple discoloration is like ecchymotic discoloration related to trauma from the fracture and surgery. No wound care recommendations at this time. Ortho currently managing. Please defer to ortho surgery for any questions/ concerns regarding patient's lower extremities. Wound care to sign off.       03/23/2022

## 2022-03-23 NOTE — ASSESSMENT & PLAN NOTE
89F with grade 3a open right ankle fracture sp I&D and percutaneous fixation 3/21/22 by Dr. Carreno. Also has left periprosthetic proximal tibia fracture that is being treated non op in cast.        Plan  -No further orthopedic intervention at this time  -Okay to WBAT BLE in casts  -PT/OT daily  -Keep left long leg cast reinforced with bed pan bag at the top to prevent it from getting soiled with urine/feces  -Continue incisional wound vac right ankle (placed 3/22)  -Continue heparin TID for dvt ppx per primary team. Continue DVT ppx at discharge (heparin okay or can do ASA 81 bid or LVX)  -From orthopedic standpoint okay to dc once medically ready (still needs permanent pacemaker placement)  -FU 1 week after discharge for incisional wound vac removal from right ankle

## 2022-03-23 NOTE — PLAN OF CARE
SICU PLAN OF CARE NOTE     Dx: Open ankle fracture, right, type III, initial encounter     Shift Events: No acute events overnight.     Goals of Care: Control pain; plan for PPM in future     Neuro: Follows Commands, Moves All Extremities, Confused     Cardiac: MAPs 70-80's. HR 50, 100% paced via TVP. RLE cap refill <3 sec & skin warm; VIPIN RLE pulses. LLE pulses audible on doppler. All other pulses palpable.     Respiratory: Nasal Cannula 1L     Diet: NPO since midnight     Gtts: MIVF     Urine Output: Urinary Catheter 15-20 cc/hour     Wound vac cont suction @ 125 mmHg. 0 cc output.     Labs/Accuchecks: Daily labs. No accuchecks.      Skin: No new skin breakdown noted. Lower extremities elevated & iced. Pt turned as needed. Bed plugged in and working.

## 2022-03-23 NOTE — ASSESSMENT & PLAN NOTE
Patient is a 89 year old female that presented from [facility] for ankle fracture and was found to be in complete heart block in the ED. ECG confirmed heart block with escape rhythm at 35 bpm. Patient underwent TVP placement to address the right ankle open fracture. TVP set at 50 bpm with output 8 mA, threshold 0.4 mA.  - Continue TVP for now, check thresholds daily  - Ankle fracture management per Ortho  - Concern for infection due to febrile episodes early AM on 03/23/2022, defer to primary for management  - Holding off on dcPPM until ankle fracture management complete  - Continue telemetry monitoring while inpatient

## 2022-03-23 NOTE — ASSESSMENT & PLAN NOTE
Likely d/t hypervolemic state vs ATN from CHB  RP ultrasound with chronic disease, no hydronephrosis    Nephrology consulted- suspect ATN w/ muddy brown casts after spinning urine    --started diuresis w lasix iv 120 daily

## 2022-03-23 NOTE — SUBJECTIVE & OBJECTIVE
"Past Medical History:   Diagnosis Date    Aortic stenosis 6/17/2015    Arthritis     Atrophic vaginitis 2/18/2016    Bilateral edema of lower extremity 6/22/2016    CHF (congestive heart failure)     Cholelithiasis without cholecystitis 5/5/2017    Chronic pain of left knee 8/3/2017    Depressed mood 6/22/2016    Diverticulitis of large intestine without perforation or abscess without bleeding 5/5/2017    Encounter for blood transfusion     Essential hypertension     GERD (gastroesophageal reflux disease)     Hyperlipidemia     Hypertension     Hypertensive heart disease with heart failure 7/14/2015    Insomnia     Joint pain     Knee pain, left 08/2016    pain with walking or standing    Primary osteoarthritis of knee 2/5/2013    PUD (peptic ulcer disease)     S/P knee replacement 2/13/2013    Slow transit constipation 2/18/2016       Past Surgical History:   Procedure Laterality Date    APPENDECTOMY      APPLICATION OF LARGE EXTERNAL FIXATION DEVICE TO TIBIA Right 3/21/2022    Procedure: APPLICATION, EXTERNAL FIXATION DEVICE, LARGE, TIBIA;  Surgeon: Anirudh Carreno MD;  Location: [facility] OR [address] [place] [address];  Service: Orthopedics;  Laterality: Right;    COLONOSCOPY      08    EYE SURGERY      bilateral cataract    FINGER SURGERY      LT thumb surgery--"arthritis surgery"    HYSTERECTOMY      UNKNOWN BSO    JOINT REPLACEMENT      right hip replacement    KNEE ARTHROPLASTY Left 2001    TONSILLECTOMY      TUMOR EXCISION      esophageal tumor removal     UPPER GASTROINTESTINAL ENDOSCOPY      2013       Review of patient's allergies indicates:   Allergen Reactions    Aspirin Nausea Only and Other (See Comments)     Other reaction(s): esophageal pain    Celebrex [celecoxib] Rash    Morphine Hallucinations    Steroid [corticosteroids (glucocorticoids)] Other (See Comments)     Other reaction(s): Flushing (skin)    Sulfa dyne Other (See Comments)     Other reaction(s): esophogeal pain     Current Facility-Administered " Medications   Medication Frequency    0.9%  NaCl infusion Continuous    acetaminophen tablet 1,000 mg Q8H    aspirin EC tablet 81 mg BID    atorvastatin tablet 20 mg QHS    cefTRIAXone (ROCEPHIN) 1 g/50 mL D5W IVPB Q24H    cholecalciferol (vitamin D3) 125 mcg (5,000 unit) tablet 50,000 Units Weekly    diphenhydrAMINE capsule 25 mg Q6H PRN    donepeziL tablet 5 mg QHS    furosemide injection 120 mg Q12H    heparin (porcine) injection 5,000 Units Q8H    mupirocin 2 % ointment BID    oxyCODONE immediate release tablet 5 mg Q6H PRN    pantoprazole EC tablet 40 mg Daily     Family History       Problem Relation (Age of Onset)    Asthma Father    Cancer Brother    Heart disease Mother, Father, Brother          Tobacco Use    Smoking status: Never Smoker    Smokeless tobacco: Never Used   Substance and Sexual Activity    Alcohol use: No    Drug use: Never     Types: Hydrocodone    Sexual activity: Not Currently     Birth control/protection: None     Review of Systems   Reason unable to perform ROS: patient confused.   Objective:     Vital Signs (Most Recent):  Temp: 98.9 °F (37.2 °C) (03/23/22 0715)  Pulse: 66 (03/23/22 1245)  Resp: (!) 24 (03/23/22 1245)  BP: (!) 115/49 (03/23/22 1230)  SpO2: 100 % (03/23/22 1245)  O2 Device (Oxygen Therapy): nasal cannula w/ humidification (03/23/22 1200)   Vital Signs (24h Range):  Temp:  [98 °F (36.7 °C)-100.8 °F (38.2 °C)] 98.9 °F (37.2 °C)  Pulse:  [] 66  Resp:  [15-48] 24  SpO2:  [70 %-100 %] 100 %  BP: ()/(46-80) 115/49     Weight: 90.7 kg (200 lb) (03/21/22 1313)  Body mass index is 32.28 kg/m².  Body surface area is 2.06 meters squared.    I/O last 3 completed shifts:  In: 2615.3 [P.O.:840; I.V.:1585.7; IV Piggyback:189.7]  Out: 619 [Urine:594; Other:25]    Physical Exam  Vitals reviewed.   Constitutional:       Comments: Frail    HENT:      Head: Atraumatic.   Eyes:      Conjunctiva/sclera: Conjunctivae normal.      Pupils: Pupils are equal, round, and reactive to  light.   Cardiovascular:      Rate and Rhythm: Normal rate and regular rhythm.      Heart sounds: Normal heart sounds.   Pulmonary:      Comments: Decreased air entry B/L   Abdominal:      General: Bowel sounds are normal.      Palpations: Abdomen is soft.      Tenderness: There is no abdominal tenderness. There is no right CVA tenderness, left CVA tenderness or guarding.   Musculoskeletal:         General: Swelling present.      Right lower leg: Edema present.      Left lower leg: Edema present.   Skin:     Coloration: Skin is not jaundiced.      Findings: No rash.   Neurological:      Mental Status: She is disoriented.       Significant Labs:  CBC:   Recent Labs   Lab 03/23/22 0311   WBC 6.55   RBC 3.10*   HGB 9.0*   HCT 29.5*   *   MCV 95   MCH 29.0   MCHC 30.5*     CMP:   Recent Labs   Lab 03/23/22 0311   GLU 95   CALCIUM 8.2*   ALBUMIN 2.3*   PROT 5.1*      K 4.2   CO2 25      BUN 46*   CREATININE 1.9*   ALKPHOS 62   ALT <5*   AST 24   BILITOT 0.5       Significant Imaging:  Reviewed

## 2022-03-23 NOTE — CONSULTS
[facility] - Surgical Intensive Care  Nephrology  Consult Note    Patient Name: Lilia Hidalgo  MRN: [id number]  Admission Date: 3/21/2022  Hospital Length of Stay: 2 days  Attending Provider: Zaira Howell MD   Primary Care Physician: Anna Wood MD  Principal Problem:Complete AV block    Inpatient consult to Nephrology  Consult performed by: Juanito Weldon MD  Consult ordered by: Mohini Mar MD        Subjective:     HPI:   89 y.o. female with HTN, MDD, mild cognitive impairment, and urinary incontinence. She is brought to ED from nursing home due to a ankle fracture. She uses a wheelchair and was being assisted to transfer from the toilet to her wheelchair when she twisted her ankle and fractured it. No reports of syncope. On arrival to ED, an ECG showed 3rd degree AV block and cardiology was consulted. She is awake and complaining of pain. Not able to give a detailed history due to patient's dementia.     Nephrology consulted for LUANNE       Past Medical History:   Diagnosis Date    Aortic stenosis 6/17/2015    Arthritis     Atrophic vaginitis 2/18/2016    Bilateral edema of lower extremity 6/22/2016    CHF (congestive heart failure)     Cholelithiasis without cholecystitis 5/5/2017    Chronic pain of left knee 8/3/2017    Depressed mood 6/22/2016    Diverticulitis of large intestine without perforation or abscess without bleeding 5/5/2017    Encounter for blood transfusion     Essential hypertension     GERD (gastroesophageal reflux disease)     Hyperlipidemia     Hypertension     Hypertensive heart disease with heart failure 7/14/2015    Insomnia     Joint pain     Knee pain, left 08/2016    pain with walking or standing    Primary osteoarthritis of knee 2/5/2013    PUD (peptic ulcer disease)     S/P knee replacement 2/13/2013    Slow transit constipation 2/18/2016       Past Surgical History:   Procedure Laterality Date    APPENDECTOMY      APPLICATION OF LARGE EXTERNAL  "FIXATION DEVICE TO TIBIA Right 3/21/2022    Procedure: APPLICATION, EXTERNAL FIXATION DEVICE, LARGE, TIBIA;  Surgeon: Anirudh Carreno MD;  Location: [facility] OR [address] [place] [address];  Service: Orthopedics;  Laterality: Right;    COLONOSCOPY      08    EYE SURGERY      bilateral cataract    FINGER SURGERY      LT thumb surgery--"arthritis surgery"    HYSTERECTOMY      UNKNOWN BSO    JOINT REPLACEMENT      right hip replacement    KNEE ARTHROPLASTY Left 2001    TONSILLECTOMY      TUMOR EXCISION      esophageal tumor removal     UPPER GASTROINTESTINAL ENDOSCOPY      2013       Review of patient's allergies indicates:   Allergen Reactions    Aspirin Nausea Only and Other (See Comments)     Other reaction(s): esophageal pain    Celebrex [celecoxib] Rash    Morphine Hallucinations    Steroid [corticosteroids (glucocorticoids)] Other (See Comments)     Other reaction(s): Flushing (skin)    Sulfa dyne Other (See Comments)     Other reaction(s): esophogeal pain     Current Facility-Administered Medications   Medication Frequency    0.9%  NaCl infusion Continuous    acetaminophen tablet 1,000 mg Q8H    aspirin EC tablet 81 mg BID    atorvastatin tablet 20 mg QHS    cefTRIAXone (ROCEPHIN) 1 g/50 mL D5W IVPB Q24H    cholecalciferol (vitamin D3) 125 mcg (5,000 unit) tablet 50,000 Units Weekly    diphenhydrAMINE capsule 25 mg Q6H PRN    donepeziL tablet 5 mg QHS    furosemide injection 120 mg Q12H    heparin (porcine) injection 5,000 Units Q8H    mupirocin 2 % ointment BID    oxyCODONE immediate release tablet 5 mg Q6H PRN    pantoprazole EC tablet 40 mg Daily     Family History       Problem Relation (Age of Onset)    Asthma Father    Cancer Brother    Heart disease Mother, Father, Brother          Tobacco Use    Smoking status: Never Smoker    Smokeless tobacco: Never Used   Substance and Sexual Activity    Alcohol use: No    Drug use: Never     Types: Hydrocodone    Sexual activity: Not Currently     " Birth control/protection: None     Review of Systems   Reason unable to perform ROS: patient confused.   Objective:     Vital Signs (Most Recent):  Temp: 98.9 °F (37.2 °C) (03/23/22 0715)  Pulse: 66 (03/23/22 1245)  Resp: (!) 24 (03/23/22 1245)  BP: (!) 115/49 (03/23/22 1230)  SpO2: 100 % (03/23/22 1245)  O2 Device (Oxygen Therapy): nasal cannula w/ humidification (03/23/22 1200)   Vital Signs (24h Range):  Temp:  [98 °F (36.7 °C)-100.8 °F (38.2 °C)] 98.9 °F (37.2 °C)  Pulse:  [] 66  Resp:  [15-48] 24  SpO2:  [70 %-100 %] 100 %  BP: ()/(46-80) 115/49     Weight: 90.7 kg (200 lb) (03/21/22 1313)  Body mass index is 32.28 kg/m².  Body surface area is 2.06 meters squared.    I/O last 3 completed shifts:  In: 2615.3 [P.O.:840; I.V.:1585.7; IV Piggyback:189.7]  Out: 619 [Urine:594; Other:25]    Physical Exam  Vitals reviewed.   Constitutional:       Comments: Frail    HENT:      Head: Atraumatic.   Eyes:      Conjunctiva/sclera: Conjunctivae normal.      Pupils: Pupils are equal, round, and reactive to light.   Cardiovascular:      Rate and Rhythm: Normal rate and regular rhythm.      Heart sounds: Normal heart sounds.   Pulmonary:      Comments: Decreased air entry B/L   Abdominal:      General: Bowel sounds are normal.      Palpations: Abdomen is soft.      Tenderness: There is no abdominal tenderness. There is no right CVA tenderness, left CVA tenderness or guarding.   Musculoskeletal:         General: Swelling present.      Right lower leg: Edema present.      Left lower leg: Edema present.   Skin:     Coloration: Skin is not jaundiced.      Findings: No rash.   Neurological:      Mental Status: She is disoriented.       Significant Labs:  CBC:   Recent Labs   Lab 03/23/22  0311   WBC 6.55   RBC 3.10*   HGB 9.0*   HCT 29.5*   *   MCV 95   MCH 29.0   MCHC 30.5*     CMP:   Recent Labs   Lab 03/23/22  0311   GLU 95   CALCIUM 8.2*   ALBUMIN 2.3*   PROT 5.1*      K 4.2   CO2 25      BUN 46*    CREATININE 1.9*   ALKPHOS 62   ALT <5*   AST 24   BILITOT 0.5       Significant Imaging:  Reviewed     Assessment/Plan:     * Complete AV block  Per primary     LUANNE (acute kidney injury)  Patient with LUANNE. No known history of CKD   1.5 on admission then up to 1.9   Oliguric 389 cc in last 24 hours   Renal US no hydro   Most likely ATN given heart block and also had dropped in MAPS on 3/21 and 3/22   Urine sed with occasional granular casts   Patient hypervolemic on exam   Start lasix 120 mg BID   Check CPK      Acute decompensated heart failure  Per primary           Juanito Weldon MD  Nephrology  [facility] - Surgical Intensive Care

## 2022-03-23 NOTE — ASSESSMENT & PLAN NOTE
Patient with LUANNE. No known history of CKD   1.5 on admission then up to 1.9   Oliguric 389 cc in last 24 hours   Renal US no hydro   Most likely ATN given heart block and also had dropped in MAPS on 3/21 and 3/22   Urine sed with occasional granular casts   Patient hypervolemic on exam   Start lasix 120 mg BID   Check CPK

## 2022-03-23 NOTE — CONSULTS
Consult completed by Student Dietitian on 3/23. Please see note for full assessment and recs. RD to monitor and follow.    Karo Cavanaugh MS, RD, LDN  Ext: [phone]

## 2022-03-23 NOTE — HPI
89 y.o. female with HTN, MDD, mild cognitive impairment, and urinary incontinence. She is brought to ED from nursing home due to a ankle fracture. She uses a wheelchair and was being assisted to transfer from the toilet to her wheelchair when she twisted her ankle and fractured it. No reports of syncope. On arrival to ED, an ECG showed 3rd degree AV block and cardiology was consulted. She is awake and complaining of pain. Not able to give a detailed history due to patient's dementia.     Nephrology consulted for LUANNE

## 2022-03-23 NOTE — CARE UPDATE
Ortho care update    Right short leg splint changed to short leg cast. Left lower extremity placed in a long leg cast. Good cap refill both sides.     Plan  -No further orthopedic intervention at this time  -Okay to WBAT BLE in casts  -PT/OT orders updated with new WB precautions  -Keep left long leg cast reinforced with bed pan bag at the top to prevent it from getting soiled with urine/feces  -Continue incisional wound vac right ankle (placed 3/22)  -Continue heparin TID for dvt ppx per primary team. Continue DVT ppx at discharge (heparin okay or can do ASA 81 bid or LVX)  -From orthopedic standpoint okay to dc once medically ready (still needs permanent pacemaker placement)  -FU 1 week after discharge for incisional wound vac removal from right ankle

## 2022-03-23 NOTE — PROGRESS NOTES
[facility] - Surgical Intensive Care  Adult Nutrition  Consult Note    SUMMARY     Recommendations    1) continue cardiac diet + texture per SLP    2) add Boost plus BID    3) add MVI, zinc, vit C to aid in wound healing     Goals: 1) pt consuming % of EEN by RD f/u  Nutrition Goal Status: new  Communication of RD Recs:  (POC)    Assessment and Plan    Increased nutrient needs (protein)  Related to physiological demands  As evidenced by wounds, surgery  NEW    Malnutrition Assessment    Orbital Region (Subcutaneous Fat Loss): well nourished  Upper Arm Region (Subcutaneous Fat Loss): well nourished   [place] Region (Muscle Loss): well nourished  Clavicle Bone Region (Muscle Loss): well nourished  Clavicle and Acromion Bone Region (Muscle Loss): well nourished  Patellar Region (Muscle Loss): well nourished  Anterior Thigh Region (Muscle Loss): well nourished   Edema (Fluid Accumulation): 1-->trace   Subcutaneous Fat Loss (Final Summary): well nourished  Muscle Loss Evaluation (Final Summary): well nourished  Fluid Accumulation Evaluation: mild         Reason for Assessment    Reason For Assessment: consult  Diagnosis:  (complete AV block)  Relevant Medical History: aortic stenosis, edema, CHF, cholelithiasis, diverticulitis, HTN, GERD, HLD, PUD, dementia  Interdisciplinary Rounds: did not attend    General Information Comments: Pt was unable to answer questions, confused.  No family at bedside.  Pt noted to have wound + altered skin integrity.  Pt noted to have AV block, Iman, and open ankle fracture.  Surgery 3/21 application, external fixation device, large, tibia right, irrigation and debridement right.  Nurse stated pt has left knee fracture as well.  Per chart review, wt gain noted, may be fluid related.  NFPE done 3/23, no wasting found, pt appears well nourished.     Nutrition Discharge Planning: To be determined: Cardiac diet    Nutrition Risk Screen    Nutrition Risk Screen: no indicators  "present    Nutrition/Diet History    Food Allergies: NKFA  Factors Affecting Nutritional Intake: impaired cognitive status/motor control    Anthropometrics    Temp: 98.9 °F (37.2 °C)  Height: 5' 6" (167.6 cm)  Height (inches): 66 in  Weight Method: Estimated  Weight: 90.7 kg (200 lb)  Weight (lb): 200 lb  Ideal Body Weight (IBW), Female: 130 lb  % Ideal Body Weight, Female (lb): 153.85 %  BMI (Calculated): 32.3  BMI Grade: 30 - 34.9- obesity - grade I       Lab/Procedures/Meds    Pertinent Labs Reviewed: reviewed  Pertinent Labs Comments: HIGH: BUN 46, creatinine 1.9,       LOW: GFR 22.9, ALT <5, cholesterol 115    Pertinent Medications Reviewed: reviewed  Pertinent Medications Comments: atorvastatin, vit D3, furosemide, heparin, pantoprazole      Estimated/Assessed Needs    Weight Used For Calorie Calculations: 90.7 kg (199 lb 15.3 oz)  Energy Calorie Requirements (kcal): 2267 (25 wound, surgery, critical care)  Energy Need Method: Kcal/kg  Protein Requirements: 109-136 (1.2-1.5 wound)  Weight Used For Protein Calculations: 90.7 kg (199 lb 15.3 oz)  Fluid Requirements (mL): 9915-5728 or per MD  Estimated Fluid Requirement Method: other (see comments) (CHF)  RDA Method (mL): 2267     Nutrition Prescription Ordered    Current Diet Order: Cardiac    Evaluation of Received Nutrient/Fluid Intake    I/O: +3.0196L since admit  Energy Calories Required: not meeting needs  Protein Required: not meeting needs  Tolerance: tolerating  % Intake of Estimated Energy Needs: 0%  % Meal Intake: 0% (just got ordered a diet this morning, did not touch her breakfast, confused)    Nutrition Risk    Level of Risk/Frequency of Follow-up:  (1x weekly)       Monitor and Evaluation    Food and Nutrient Intake: energy intake, food and beverage intake  Food and Nutrient Adminstration: diet order  Physical Activity and Function: nutrition-related ADLs and IADLs  Anthropometric Measurements: weight, weight change, body mass index  Biochemical " Data, Medical Tests and Procedures: electrolyte and renal panel, gastrointestinal profile, glucose/endocrine profile, inflammatory profile, lipid profile  Nutrition-Focused Physical Findings: overall appearance, skin       Nutrition Follow-Up    RD Follow-up?: Yes

## 2022-03-23 NOTE — SUBJECTIVE & OBJECTIVE
Interval History: Patient was seen this AM, sleepy, febrile twice overnight (100.8 + 100.4), paced rhythm on tele at 50 bpm, intrinsic rhythm ventricular escape at 27-30 bpm. Noted elevation in Cr as well from 1.5 to 1.9.     Review of Systems   Constitutional: Negative for chills and fever.   Cardiovascular:  Negative for chest pain, orthopnea and palpitations.   Respiratory:  Negative for cough and shortness of breath.    Gastrointestinal:  Negative for abdominal pain.   Neurological:  Negative for dizziness, headaches and light-headedness.   Psychiatric/Behavioral:  Negative for altered mental status.    Objective:     Vital Signs (Most Recent):  Temp: 98.9 °F (37.2 °C) (03/23/22 0715)  Pulse: (!) 50 (03/23/22 1015)  Resp: (!) 25 (03/23/22 1015)  BP: (!) 143/62 (03/23/22 1000)  SpO2: 100 % (03/23/22 1015)   Vital Signs (24h Range):  Temp:  [98 °F (36.7 °C)-100.8 °F (38.2 °C)] 98.9 °F (37.2 °C)  Pulse:  [] 50  Resp:  [15-48] 25  SpO2:  [70 %-100 %] 100 %  BP: ()/(46-80) 143/62     Weight: 90.7 kg (200 lb)  Body mass index is 32.28 kg/m².     SpO2: 100 %  O2 Device (Oxygen Therapy): nasal cannula w/ humidification    Physical Exam  Vitals reviewed.   Constitutional:       General: She is not in acute distress.     Appearance: Normal appearance. She is not toxic-appearing.   HENT:      Head: Normocephalic and atraumatic.      Mouth/Throat:      Mouth: Mucous membranes are moist.   Neck:      Comments: R IJ TVP  Cardiovascular:      Rate and Rhythm: Normal rate and regular rhythm.      Heart sounds: No murmur heard.    No friction rub. No gallop.   Pulmonary:      Effort: Pulmonary effort is normal. No respiratory distress.      Breath sounds: Normal breath sounds.   Abdominal:      Palpations: Abdomen is soft.   Musculoskeletal:      Right lower leg: No edema.      Left lower leg: No edema.   Skin:     General: Skin is warm.   Neurological:      Mental Status: She is alert. Mental status is at baseline.      Significant Labs: EP:   Recent Labs   Lab 03/22/22  0307 03/23/22  0311    141   K 4.5 4.2    106   CO2 26 25   * 95   BUN 38* 46*   CREATININE 1.5* 1.9*   CALCIUM 8.9 8.2*   PROT  --  5.1*   ALBUMIN  --  2.3*   BILITOT  --  0.5   ALKPHOS  --  62   AST  --  24   ALT  --  <5*   ANIONGAP 12 10   ESTGFRAFRICA 35.1* 26.4*   EGFRNONAA 30.5* 22.9*   WBC 7.53 6.55   HGB 10.4* 9.0*   HCT 35.2* 29.5*   * 123*       Significant Imaging: Ejection fraction:   Recent Labs   Lab 03/21/22  1320   EF 65

## 2022-03-23 NOTE — ASSESSMENT & PLAN NOTE
Patient presented to ED with ankle fracture and found to be in complete heart block. Currently stable with junctional scape rhythm in the 30s and normal BP.     -TVP in place as bridge to PPM. Will plan for permanent pacemaker once fractures are addressed completely to avoid risk of device infection  -Now febrile, will follow cultures and determine further management

## 2022-03-23 NOTE — PROGRESS NOTES
[facility]  Cardiac Electrophysiology  Progress Note    Admission Date: 3/21/2022  Code Status: Prior   Attending Physician: Zaira Howell MD   Expected Discharge Date: 3/28/2022  Principal Problem:Complete AV block    Subjective:     Interval History: Patient was seen this AM, sleepy, febrile twice overnight (100.8 + 100.4), paced rhythm on tele at 50 bpm, intrinsic rhythm ventricular escape at 27-30 bpm. Noted elevation in Cr as well from 1.5 to 1.9.     Review of Systems   Constitutional: Negative for chills and fever.   Cardiovascular:  Negative for chest pain, orthopnea and palpitations.   Respiratory:  Negative for cough and shortness of breath.    Gastrointestinal:  Negative for abdominal pain.   Neurological:  Negative for dizziness, headaches and light-headedness.   Psychiatric/Behavioral:  Negative for altered mental status.    Objective:     Vital Signs (Most Recent):  Temp: 98.9 °F (37.2 °C) (03/23/22 0715)  Pulse: (!) 50 (03/23/22 1015)  Resp: (!) 25 (03/23/22 1015)  BP: (!) 143/62 (03/23/22 1000)  SpO2: 100 % (03/23/22 1015)   Vital Signs (24h Range):  Temp:  [98 °F (36.7 °C)-100.8 °F (38.2 °C)] 98.9 °F (37.2 °C)  Pulse:  [] 50  Resp:  [15-48] 25  SpO2:  [70 %-100 %] 100 %  BP: ()/(46-80) 143/62     Weight: 90.7 kg (200 lb)  Body mass index is 32.28 kg/m².     SpO2: 100 %  O2 Device (Oxygen Therapy): nasal cannula w/ humidification    Physical Exam  Vitals reviewed.   Constitutional:       General: She is not in acute distress.     Appearance: Normal appearance. She is not toxic-appearing.   HENT:      Head: Normocephalic and atraumatic.      Mouth/Throat:      Mouth: Mucous membranes are moist.   Neck:      Comments: R IJ TVP  Cardiovascular:      Rate and Rhythm: Normal rate and regular rhythm.      Heart sounds: No murmur heard.    No friction rub. No gallop.   Pulmonary:      Effort: Pulmonary effort is normal. No respiratory distress.      Breath sounds:  Normal breath sounds.   Abdominal:      Palpations: Abdomen is soft.   Musculoskeletal:      Right lower leg: No edema.      Left lower leg: No edema.   Skin:     General: Skin is warm.   Neurological:      Mental Status: She is alert. Mental status is at baseline.     Significant Labs: EP:   Recent Labs   Lab 03/22/22  0307 03/23/22  0311    141   K 4.5 4.2    106   CO2 26 25   * 95   BUN 38* 46*   CREATININE 1.5* 1.9*   CALCIUM 8.9 8.2*   PROT  --  5.1*   ALBUMIN  --  2.3*   BILITOT  --  0.5   ALKPHOS  --  62   AST  --  24   ALT  --  <5*   ANIONGAP 12 10   ESTGFRAFRICA 35.1* 26.4*   EGFRNONAA 30.5* 22.9*   WBC 7.53 6.55   HGB 10.4* 9.0*   HCT 35.2* 29.5*   * 123*       Significant Imaging: Ejection fraction:   Recent Labs   Lab 03/21/22  1320   EF 65     Assessment and Plan:     * Complete AV block  Patient is a 89 year old female that presented from [facility] for ankle fracture and was found to be in complete heart block in the ED. ECG confirmed heart block with escape rhythm at 35 bpm. Patient underwent TVP placement to address the right ankle open fracture. TVP set at 50 bpm with output 8 mA, threshold 0.4 mA.  - Continue TVP for now, check thresholds daily  - Ankle fracture management per Ortho  - Concern for infection due to febrile episodes early AM on 03/23/2022, defer to primary for management  - Holding off on dcPPM for now considering febrile episodes  - Continue telemetry monitoring while inpatient    Plan of care was discussed with staff, Dr Kelly.     Dale Soriano MD  Cardiac Electrophysiology, Fellow PGY4  [facility]

## 2022-03-23 NOTE — SUBJECTIVE & OBJECTIVE
Interval History: Febrile overnight.   -Ortho planning on non-operative management for L fracture  -Cultures ordered, continuing ctx    Review of Systems   Unable to perform ROS: Mental status change     Objective:     Vital Signs (Most Recent):  Temp: 98.7 °F (37.1 °C) (03/23/22 1200)  Pulse: 66 (03/23/22 1330)  Resp: 19 (03/23/22 1330)  BP: 106/80 (03/23/22 1300)  SpO2: 98 % (03/23/22 1330)   Vital Signs (24h Range):  Temp:  [98 °F (36.7 °C)-100.8 °F (38.2 °C)] 98.7 °F (37.1 °C)  Pulse:  [] 66  Resp:  [15-48] 19  SpO2:  [86 %-100 %] 98 %  BP: ()/(46-80) 106/80     Weight: 90.7 kg (200 lb)  Body mass index is 32.28 kg/m².     SpO2: 98 %  O2 Device (Oxygen Therapy): nasal cannula w/ humidification      Intake/Output Summary (Last 24 hours) at 3/23/2022 1504  Last data filed at 3/23/2022 1300  Gross per 24 hour   Intake 1946.08 ml   Output 502 ml   Net 1444.08 ml       Lines/Drains/Airways       Central Venous Catheter Line  Duration             Percutaneous Central Line Insertion/Assessment - single lumen  03/21/22 1124 right internal jugular 2 days              Drain  Duration                  Urethral Catheter 03/21/22 1001 Double-lumen 16 Fr. 2 days              Peripheral Intravenous Line  Duration                  Peripheral IV - Single Lumen 03/22/22 0900 20 G Anterior;Left Forearm 1 day         Peripheral IV - Single Lumen 03/22/22 1005 20 G;1 3/4 in Left Upper Arm 1 day                  Physical Exam  Constitutional:       General: She is not in acute distress.     Appearance: She is obese. She is not ill-appearing.   HENT:      Nose: No congestion or rhinorrhea.      Mouth/Throat:      Pharynx: No oropharyngeal exudate or posterior oropharyngeal erythema.   Eyes:      General: No scleral icterus.     Conjunctiva/sclera: Conjunctivae normal.   Cardiovascular:      Rate and Rhythm: Normal rate and regular rhythm.   Pulmonary:      Effort: Pulmonary effort is normal. No respiratory distress.       Breath sounds: No wheezing or rales.   Musculoskeletal:         General: Swelling present.      Right lower leg: Edema present.      Left lower leg: Edema present.   Skin:     General: Skin is warm.      Coloration: Skin is not jaundiced.      Findings: No lesion.   Neurological:      Mental Status: She is alert.     Significant Labs: All pertinent lab results from the last 24 hours have been reviewed.    Significant Imaging:  All imaging reviewed

## 2022-03-24 LAB
ALBUMIN SERPL BCP-MCNC: 2 G/DL (ref 3.5–5.2)
ALP SERPL-CCNC: 55 U/L (ref 55–135)
ALT SERPL W/O P-5'-P-CCNC: <5 U/L (ref 10–44)
ANION GAP SERPL CALC-SCNC: 7 MMOL/L (ref 8–16)
AST SERPL-CCNC: 20 U/L (ref 10–40)
BASOPHILS # BLD AUTO: 0.01 K/UL (ref 0–0.2)
BASOPHILS NFR BLD: 0.2 % (ref 0–1.9)
BILIRUB SERPL-MCNC: 0.5 MG/DL (ref 0.1–1)
BUN SERPL-MCNC: 45 MG/DL (ref 8–23)
CALCIUM SERPL-MCNC: 8 MG/DL (ref 8.7–10.5)
CHLORIDE SERPL-SCNC: 107 MMOL/L (ref 95–110)
CO2 SERPL-SCNC: 28 MMOL/L (ref 23–29)
CREAT SERPL-MCNC: 1.7 MG/DL (ref 0.5–1.4)
DIFFERENTIAL METHOD: ABNORMAL
EOSINOPHIL # BLD AUTO: 0.1 K/UL (ref 0–0.5)
EOSINOPHIL NFR BLD: 1.3 % (ref 0–8)
ERYTHROCYTE [DISTWIDTH] IN BLOOD BY AUTOMATED COUNT: 15.7 % (ref 11.5–14.5)
EST. GFR  (AFRICAN AMERICAN): 30.2 ML/MIN/1.73 M^2
EST. GFR  (NON AFRICAN AMERICAN): 26.2 ML/MIN/1.73 M^2
GLUCOSE SERPL-MCNC: 119 MG/DL (ref 70–110)
HCT VFR BLD AUTO: 28.7 % (ref 37–48.5)
HGB BLD-MCNC: 8.8 G/DL (ref 12–16)
IMM GRANULOCYTES # BLD AUTO: 0.02 K/UL (ref 0–0.04)
IMM GRANULOCYTES NFR BLD AUTO: 0.4 % (ref 0–0.5)
LYMPHOCYTES # BLD AUTO: 0.4 K/UL (ref 1–4.8)
LYMPHOCYTES NFR BLD: 6.7 % (ref 18–48)
MAGNESIUM SERPL-MCNC: 2.1 MG/DL (ref 1.6–2.6)
MCH RBC QN AUTO: 29 PG (ref 27–31)
MCHC RBC AUTO-ENTMCNC: 30.7 G/DL (ref 32–36)
MCV RBC AUTO: 95 FL (ref 82–98)
MONOCYTES # BLD AUTO: 0.4 K/UL (ref 0.3–1)
MONOCYTES NFR BLD: 7.3 % (ref 4–15)
NEUTROPHILS # BLD AUTO: 4.5 K/UL (ref 1.8–7.7)
NEUTROPHILS NFR BLD: 84.1 % (ref 38–73)
NRBC BLD-RTO: 0 /100 WBC
PLATELET # BLD AUTO: 111 K/UL (ref 150–450)
PMV BLD AUTO: 11.2 FL (ref 9.2–12.9)
POTASSIUM SERPL-SCNC: 4 MMOL/L (ref 3.5–5.1)
PROT SERPL-MCNC: 4.9 G/DL (ref 6–8.4)
RBC # BLD AUTO: 3.03 M/UL (ref 4–5.4)
SODIUM SERPL-SCNC: 142 MMOL/L (ref 136–145)
WBC # BLD AUTO: 5.36 K/UL (ref 3.9–12.7)

## 2022-03-24 PROCEDURE — 63600175 PHARM REV CODE 636 W HCPCS: Performed by: STUDENT IN AN ORGANIZED HEALTH CARE EDUCATION/TRAINING PROGRAM

## 2022-03-24 PROCEDURE — 25000003 PHARM REV CODE 250: Performed by: STUDENT IN AN ORGANIZED HEALTH CARE EDUCATION/TRAINING PROGRAM

## 2022-03-24 PROCEDURE — 94761 N-INVAS EAR/PLS OXIMETRY MLT: CPT

## 2022-03-24 PROCEDURE — 97163 PT EVAL HIGH COMPLEX 45 MIN: CPT

## 2022-03-24 PROCEDURE — 87040 BLOOD CULTURE FOR BACTERIA: CPT | Performed by: STUDENT IN AN ORGANIZED HEALTH CARE EDUCATION/TRAINING PROGRAM

## 2022-03-24 PROCEDURE — 97165 OT EVAL LOW COMPLEX 30 MIN: CPT

## 2022-03-24 PROCEDURE — 63600175 PHARM REV CODE 636 W HCPCS: Performed by: INTERNAL MEDICINE

## 2022-03-24 PROCEDURE — 99231 SBSQ HOSP IP/OBS SF/LOW 25: CPT | Mod: GC,,, | Performed by: INTERNAL MEDICINE

## 2022-03-24 PROCEDURE — 20000000 HC ICU ROOM

## 2022-03-24 PROCEDURE — 85025 COMPLETE CBC W/AUTO DIFF WBC: CPT | Performed by: INTERNAL MEDICINE

## 2022-03-24 PROCEDURE — 99223 PR INITIAL HOSPITAL CARE,LEVL III: ICD-10-PCS | Mod: GC,,, | Performed by: INTERNAL MEDICINE

## 2022-03-24 PROCEDURE — 97112 NEUROMUSCULAR REEDUCATION: CPT

## 2022-03-24 PROCEDURE — 99232 PR SUBSEQUENT HOSPITAL CARE,LEVL II: ICD-10-PCS | Mod: ,,, | Performed by: HOSPITALIST

## 2022-03-24 PROCEDURE — 27000221 HC OXYGEN, UP TO 24 HOURS

## 2022-03-24 PROCEDURE — 25000003 PHARM REV CODE 250: Performed by: INTERNAL MEDICINE

## 2022-03-24 PROCEDURE — 80053 COMPREHEN METABOLIC PANEL: CPT | Performed by: INTERNAL MEDICINE

## 2022-03-24 PROCEDURE — 97530 THERAPEUTIC ACTIVITIES: CPT

## 2022-03-24 PROCEDURE — 99223 1ST HOSP IP/OBS HIGH 75: CPT | Mod: GC,,, | Performed by: INTERNAL MEDICINE

## 2022-03-24 PROCEDURE — 99232 SBSQ HOSP IP/OBS MODERATE 35: CPT | Mod: ,,, | Performed by: HOSPITALIST

## 2022-03-24 PROCEDURE — 83735 ASSAY OF MAGNESIUM: CPT | Performed by: INTERNAL MEDICINE

## 2022-03-24 PROCEDURE — 99231 PR SUBSEQUENT HOSPITAL CARE,LEVL I: ICD-10-PCS | Mod: GC,,, | Performed by: INTERNAL MEDICINE

## 2022-03-24 PROCEDURE — 99232 SBSQ HOSP IP/OBS MODERATE 35: CPT | Mod: ,,, | Performed by: INTERNAL MEDICINE

## 2022-03-24 PROCEDURE — 99232 PR SUBSEQUENT HOSPITAL CARE,LEVL II: ICD-10-PCS | Mod: ,,, | Performed by: INTERNAL MEDICINE

## 2022-03-24 RX ORDER — LIDOCAINE 50 MG/G
OINTMENT TOPICAL
Status: DISCONTINUED | OUTPATIENT
Start: 2022-03-24 | End: 2022-03-24

## 2022-03-24 RX ORDER — FUROSEMIDE 10 MG/ML
120 INJECTION INTRAMUSCULAR; INTRAVENOUS ONCE
Status: COMPLETED | OUTPATIENT
Start: 2022-03-24 | End: 2022-03-24

## 2022-03-24 RX ORDER — CETIRIZINE HYDROCHLORIDE 5 MG/1
10 TABLET ORAL DAILY
Status: DISCONTINUED | OUTPATIENT
Start: 2022-03-24 | End: 2022-03-24

## 2022-03-24 RX ADMIN — FUROSEMIDE 120 MG: 10 INJECTION, SOLUTION INTRAMUSCULAR; INTRAVENOUS at 03:03

## 2022-03-24 RX ADMIN — PANTOPRAZOLE SODIUM 40 MG: 40 TABLET, DELAYED RELEASE ORAL at 08:03

## 2022-03-24 RX ADMIN — FUROSEMIDE 120 MG: 10 INJECTION, SOLUTION INTRAMUSCULAR; INTRAVENOUS at 04:03

## 2022-03-24 RX ADMIN — ACETAMINOPHEN 1000 MG: 325 TABLET ORAL at 02:03

## 2022-03-24 RX ADMIN — ACETAMINOPHEN 1000 MG: 325 TABLET ORAL at 09:03

## 2022-03-24 RX ADMIN — HEPARIN SODIUM 5000 UNITS: 5000 INJECTION INTRAVENOUS; SUBCUTANEOUS at 05:03

## 2022-03-24 RX ADMIN — HEPARIN SODIUM 5000 UNITS: 5000 INJECTION INTRAVENOUS; SUBCUTANEOUS at 09:03

## 2022-03-24 RX ADMIN — OXYCODONE 5 MG: 5 TABLET ORAL at 08:03

## 2022-03-24 RX ADMIN — HYDROMORPHONE HYDROCHLORIDE 0.5 MG: 1 INJECTION, SOLUTION INTRAMUSCULAR; INTRAVENOUS; SUBCUTANEOUS at 05:03

## 2022-03-24 RX ADMIN — MUPIROCIN: 20 OINTMENT TOPICAL at 08:03

## 2022-03-24 RX ADMIN — OXYCODONE 5 MG: 5 TABLET ORAL at 09:03

## 2022-03-24 RX ADMIN — DONEPEZIL HYDROCHLORIDE 5 MG: 5 TABLET ORAL at 09:03

## 2022-03-24 RX ADMIN — CEFTRIAXONE 1 G: 1 INJECTION, SOLUTION INTRAVENOUS at 09:03

## 2022-03-24 RX ADMIN — ATORVASTATIN CALCIUM 20 MG: 20 TABLET, FILM COATED ORAL at 09:03

## 2022-03-24 RX ADMIN — HEPARIN SODIUM 5000 UNITS: 5000 INJECTION INTRAVENOUS; SUBCUTANEOUS at 02:03

## 2022-03-24 RX ADMIN — ACETAMINOPHEN 1000 MG: 325 TABLET ORAL at 06:03

## 2022-03-24 RX ADMIN — ASPIRIN 81 MG: 81 TABLET, COATED ORAL at 08:03

## 2022-03-24 RX ADMIN — HYDROMORPHONE HYDROCHLORIDE 0.5 MG: 1 INJECTION, SOLUTION INTRAMUSCULAR; INTRAVENOUS; SUBCUTANEOUS at 06:03

## 2022-03-24 RX ADMIN — OXYCODONE 5 MG: 5 TABLET ORAL at 02:03

## 2022-03-24 RX ADMIN — HYDROMORPHONE HYDROCHLORIDE 0.5 MG: 1 INJECTION, SOLUTION INTRAMUSCULAR; INTRAVENOUS; SUBCUTANEOUS at 12:03

## 2022-03-24 RX ADMIN — ASPIRIN 81 MG: 81 TABLET, COATED ORAL at 09:03

## 2022-03-24 RX ADMIN — MUPIROCIN: 20 OINTMENT TOPICAL at 09:03

## 2022-03-24 RX ADMIN — FUROSEMIDE 120 MG: 10 INJECTION, SOLUTION INTRAMUSCULAR; INTRAVENOUS at 12:03

## 2022-03-24 NOTE — PLAN OF CARE
SICU PLAN OF CARE NOTE     Dx: Open ankle fracture, right, type III, initial encounter     Shift Events: Hip pain uncontrolled by PRN pain medications - xrays obtained with unremarkable impressions. No other acute events overnight.     Goals of Care: MAP >65. Control pain & trend WBC count. Plan for PPM soon.     Neuro: Follows Commands, Moves All Extremities, Confused     Cardiac: MAPs 70-80's. HR 66, 100% paced via TVP. Bilateral lower extremity cap refill <3 sec & skin warm. Upper extremity pulses palpable.     Respiratory: Nasal Cannula 1L     Diet: cardiac     Gtts: none     Urine Output: Urinary Catheter 20-70 cc/hour.     Wound vac cont suction @ 125 mmHg. 0 cc output.     Labs/Accuchecks: Daily labs. No accuchecks.      Skin: No new skin breakdown noted. Lower extremities elevated & iced. Pt turned as needed. Bed plugged in and working.

## 2022-03-24 NOTE — PLAN OF CARE
Problem: Occupational Therapy  Goal: Occupational Therapy Goal  Description: Goals to be met by: 4/7/22     Patient will increase functional independence with ADLs by performing:    Feeding with Stand-by Assistance.  Sitting at edge of bed x10 minutes with Stand-by Assistance.  Toilet transfer to bedside commode with Maximum Assistance using slide board.    Outcome: Ongoing, Progressing

## 2022-03-24 NOTE — SUBJECTIVE & OBJECTIVE
Interval History:   No acute events over night.  Last febrile episode was 3/23/22 at 3am. ID recommending 48 hours without fevers and negative blood cultures before proceeding with pacemaker placement.   No further interventions planned from an orthopedic standpoint >> FU 1 week after discharge for incisional wound vac removal from right ankle      Review of Systems   Unable to perform ROS: Dementia   Constitutional: Positive for malaise/fatigue. Negative for chills and fever.   Objective:     Vital Signs (Most Recent):  Temp: 98.4 °F (36.9 °C) (03/24/22 1200)  Pulse: 66 (03/24/22 1330)  Resp: (!) 22 (03/24/22 1425)  BP: (!) 108/54 (03/24/22 1330)  SpO2: 100 % (03/24/22 1330)   Vital Signs (24h Range):  Temp:  [97.7 °F (36.5 °C)-99.6 °F (37.6 °C)] 98.4 °F (36.9 °C)  Pulse:  [52-66] 66  Resp:  [13-45] 22  SpO2:  [93 %-100 %] 100 %  BP: ()/(42-60) 108/54     Weight: 90.7 kg (200 lb)  Body mass index is 32.28 kg/m².     SpO2: 100 %  O2 Device (Oxygen Therapy): nasal cannula w/ humidification      Intake/Output Summary (Last 24 hours) at 3/24/2022 1436  Last data filed at 3/24/2022 1300  Gross per 24 hour   Intake 817.37 ml   Output 975 ml   Net -157.63 ml       Lines/Drains/Airways       Central Venous Catheter Line  Duration             Percutaneous Central Line Insertion/Assessment - single lumen  03/21/22 1124 right internal jugular 3 days              Drain  Duration                  Urethral Catheter 03/21/22 1001 Double-lumen 16 Fr. 3 days              Peripheral Intravenous Line  Duration                  Peripheral IV - Single Lumen 03/22/22 0900 20 G Anterior;Left Forearm 2 days         Peripheral IV - Single Lumen 03/22/22 1005 20 G;1 3/4 in Left Upper Arm 2 days                    Physical Exam  Vitals and nursing note reviewed.   Constitutional:       General: She is not in acute distress.     Appearance: She is obese. She is not ill-appearing.   HENT:      Nose: No congestion or rhinorrhea.       Mouth/Throat:      Pharynx: No oropharyngeal exudate or posterior oropharyngeal erythema.   Eyes:      General: No scleral icterus.     Conjunctiva/sclera: Conjunctivae normal.   Cardiovascular:      Rate and Rhythm: Normal rate and regular rhythm.   Pulmonary:      Effort: Pulmonary effort is normal. No respiratory distress.      Breath sounds: No wheezing or rales.   Musculoskeletal:         General: Swelling present.      Right lower leg: Edema present.      Left lower leg: Edema present.   Skin:     General: Skin is warm.      Coloration: Skin is not jaundiced.      Findings: No lesion.   Neurological:      Mental Status: She is alert.       Significant Labs: All pertinent lab results from the last 24 hours have been reviewed.

## 2022-03-24 NOTE — ASSESSMENT & PLAN NOTE
-- Ortho following  -- Keep left long leg cast reinforced with bed pan bag at the top to prevent it from getting soiled with urine/feces  -- Continue incisional wound vac right ankle (placed 3/22)  -- Continue heparin TID for dvt ppx per primary team. Continue DVT ppx at discharge (heparin okay or can do ASA 81 bid or LVX)  -- From orthopedic standpoint okay to dc once medically ready (still needs permanent pacemaker placement)  -- FU 1 week after discharge for incisional wound vac removal from right ankle

## 2022-03-24 NOTE — PT/OT/SLP EVAL
Physical Therapy Co-Evaluation and Treatment     Patient Name:  Lilia Hidalgo   MRN:  [id number]    Recommendations:     Discharge Recommendations:  nursing facility, skilled   Discharge Equipment Recommendations: none   Barriers to discharge: None    Assessment:       Lilia Hidalgo is a 89 y.o. female admitted with a medical diagnosis of Complete AV block, pt also with grade 3a open right ankle fracture sp I&D and percutaneous fixation 3/21/22 and left periprosthetic proximal tibia fracture that is being treated non op in cast.   She presents with the following impairments/functional limitations:  weakness, impaired functional mobilty, decreased safety awareness, impaired endurance, gait instability, impaired cognition, impaired coordination, pain, impaired balance, decreased lower extremity function, decreased ROM, orthopedic precautions.  Pt participated in bed mobility and seated balance, requiring total assist for supine<>sit and max assist when seated EOB with impaired trunk control. Pt able to follow limited meaningful commands throughout session, however has impaired cognition at baseline. Pt continues to present below their independent prior functional baseline. Pt would benefit from continued, skilled PT while in house to address the above listed impairments, further progress mobility as able, return towards highest level of function.      Rehab Prognosis: Fair; patient would benefit from acute skilled PT services 2 x/week to address these deficits and reach maximum level of function.  Patient is most appropriate to go to nursing facility, skilled .  Recent Surgery: Procedure(s) (LRB):  APPLICATION, EXTERNAL FIXATION DEVICE, LARGE, TIBIA (Right)  IRRIGATION AND DEBRIDEMENT (Right) 3 Days Post-Op    Plan:     During this hospitalization, patient to be seen 2 x/week to address the identified rehab impairments via gait training, therapeutic activities, therapeutic exercises, neuromuscular re-education,  "wheelchair management/training and progress toward the following goals:    · Plan of Care Expires:  04/23/22    Subjective     Subjective:"Yvette is a pretty name"  Pt goal: none defined  Pain/Comfort:  · Pain Rating 1: other (see comments) (did not rate, however notable c/o pain with all movement)  · Pain Addressed 1: Reposition, Distraction, Cessation of Activity, Nurse notified    Patients cultural, spiritual, Methodist conflicts given the current situation: no    Living Environment: Pt is an unreliable historian at this time, however per EMR pt [facility] memory [facility]  Prior level of function:  Per EMR, pt able to perform functional transfers with hands on assistance and perform w/c mobility  Falls within the last 90 days: pt admitted s/p fall  Equipment owned: wheelchair  Support available upon discharge: staff    Objective:     Communicated with nursing prior to session.  Patient found supine with blood pressure cuff, pulse ox (continuous), peripheral IV, anthony catheter, oxygen  upon PT entry to room.    Co-treatment performed for this visit due to patient need for two skilled therapists to ensure patient and staff safety and to accommodate for patient activity tolerance/pain management     General Precautions: Standard, fall   Orthopedic Precautions:LLE weight bearing as tolerated, RLE weight bearing as tolerated   Braces:  (BLE hard casts)     Exams:  · Cognition: impaired; pt alert and oriented x0, unable to state name despite options provided. Pt otherwise unable to answer meaningful questions, frequently stating 'why am I here?', pt followed meaningful commands <25% of the time throughout evaluation.  · RLE ROM: impaired; limited assessment 2/2 pain and cast  · RLE Strength: grossly decreased secondary to pain  · LLE ROM: impaired; limited assessment 2/2 pain and cast  · LLE Strength: grossly decreased secondary to pain  · Posture: rounded shoulders, forward head, increased trunk " flexion  · Integumentary: impaired; casting on BLE  · Sensation: not tested 2/2 impaired cognition    Functional Mobility:  · Bed Mobility: total assist for supine<>sit, significant time required 2/2 pain in BLE with all movement, pt unable to have meaningful participate in bed mobility. Total assist for scooting at EOB  · Balance:   · Sitting: max assist when seated EOB for ~6-8 minutes, trunk sway noted in all directions however primarily anteriorly, impaired righting reactions requiring frequent multi modal cueing to attempt to maintain upright seated position, however minimally successful. Pt unable to follow commands for further progression or assessment on this date.    Therapeutic activities and education:  Education provided to pt re: d/c planning, PT POC, WBAT, benefits of mobility. Anticipate limited carryover 2/2 impaired cognition.      AM-PAC 6 CLICK MOBILITY  Total Score:6     Patient left supine with all lines intact, call button in reach and RN present.    GOALS:   Multidisciplinary Problems     Physical Therapy Goals        Problem: Physical Therapy    Goal Priority Disciplines Outcome Goal Variances Interventions   Physical Therapy Goal     PT, PT/OT Ongoing, Progressing     Description: Goals to be met by: 22    Patient will increase functional independence with mobility by performin. Pt will perform supine<>sit with mod assist x1 to improve independence with mobility  2. Pt will perform functional transfers with max assist x2, WBAT BLE  3. Pt will perform w/c mobility >50 ft with min asisst x1  4. Pt will sit EOB for >10 minutes with min assist x1                   History:     Past Medical History:   Diagnosis Date    Aortic stenosis 2015    Arthritis     Atrophic vaginitis 2016    Bilateral edema of lower extremity 2016    CHF (congestive heart failure)     Cholelithiasis without cholecystitis 2017    Chronic pain of left knee 8/3/2017    Depressed mood  "6/22/2016    Diverticulitis of large intestine without perforation or abscess without bleeding 5/5/2017    Encounter for blood transfusion     Essential hypertension     GERD (gastroesophageal reflux disease)     Hyperlipidemia     Hypertension     Hypertensive heart disease with heart failure 7/14/2015    Insomnia     Joint pain     Knee pain, left 08/2016    pain with walking or standing    Primary osteoarthritis of knee 2/5/2013    PUD (peptic ulcer disease)     S/P knee replacement 2/13/2013    Slow transit constipation 2/18/2016       Past Surgical History:   Procedure Laterality Date    APPENDECTOMY      APPLICATION OF LARGE EXTERNAL FIXATION DEVICE TO TIBIA Right 3/21/2022    Procedure: APPLICATION, EXTERNAL FIXATION DEVICE, LARGE, TIBIA;  Surgeon: Anirudh Carreno MD;  Location: [facility] OR [address] [place] [address];  Service: Orthopedics;  Laterality: Right;    COLONOSCOPY      08    EYE SURGERY      bilateral cataract    FINGER SURGERY      LT thumb surgery--"arthritis surgery"    HYSTERECTOMY      UNKNOWN BSO    JOINT REPLACEMENT      right hip replacement    KNEE ARTHROPLASTY Left 2001    TONSILLECTOMY      TUMOR EXCISION      esophageal tumor removal     UPPER GASTROINTESTINAL ENDOSCOPY      2013       Time Tracking:     PT Received On: 03/24/22  PT Start Time: 1009     PT Stop Time: 1028  PT Total Time (min): 19 min     Billable Minutes: Evaluation 11 min and Neuromuscular Re-education 8 min      Lilly Rodriguez, PT, DPT, GCS  03/24/2022     "

## 2022-03-24 NOTE — ASSESSMENT & PLAN NOTE
Patient is a 89 year old female that presented from [facility] for ankle fracture and was found to be in complete heart block in the ED. ECG confirmed heart block with escape rhythm at 35 bpm. Patient underwent TVP placement to address the right ankle open fracture. TVP set at 66 bpm with output 10 mA, threshold 0.6 mA.  - Continue TVP for now, check thresholds daily  - Ankle fracture management per Ortho  - Concern for infection due to febrile episodes early AM on 03/23/2022, defer to primary for management  - Awaiting ID recommendations (and blood cultures) for scheduling dcPPM placement  - Continue telemetry monitoring while inpatient

## 2022-03-24 NOTE — PLAN OF CARE
"      SICU PLAN OF CARE NOTE    Dx: Complete AV block    Shift Events: No acute distress or events all shift. TVP remains 100% V-paced @ 66 bpm. ID consulted. Planning for permanent pacemaker placement tomorrow. NPO and sips with meds at midnight for procedure. Pt still pleasantly confused to time and situation. Forgets and moans in pain at times. Pain managed well with PO Oxy and IV Dilaudid for BTP Q6hr. Right partial hard cast and left full length hard cast remain intact and dry. BM x 1. Tolerating Cardiac diet with 50% consumed with assist required. TQ2 with wedge and pillows. Large IV Lasix doses as ordered with minimal UOP noted. Daughters x 2 updated throughout the day.    Goals of Care: MAP >65    Neuro: Follows Commands, Moves All Extremities, and Confused    Vital Signs: BP (!) 118/56 (BP Location: Left arm, Patient Position: Lying)   Pulse 66   Temp 98.5 °F (36.9 °C) (Oral)   Resp (!) 26   Ht 5' 6" (1.676 m)   Wt 97.3 kg (214 lb 8.1 oz)   LMP  (LMP Unknown)   SpO2 100%   Breastfeeding No   BMI 34.62 kg/m²     Respiratory: Nasal Cannula 1L per NC (only 90-91% on Room air)    Diet: Cardiac Diet    Gtts: none    Urine Output: Urinary Catheter 630 cc/shift    Drains: none    Wound Vac RLE with  0 ml/output.    Cardiac: RIJ TVP: 100% V-paced at 66 bpm     Labs/Accuchecks: Daily labs.    Skin: WV to RLE CDI. BK hard cast to RLE and full RLE hard cast CDI. Small blanchable redness noted to right lower back, foam placed for protection. See assessment for details and wound care orders. Sacral foam dressing changed. Waffle mattress inflated and bed functioning properly. TQ2 with frequent weight shifts complete with assist x2.          "

## 2022-03-24 NOTE — PROGRESS NOTES
[facility]  Cardiology  Progress Note    Patient Name: Lilia Hidalgo  MRN: [id number]  Admission Date: 3/21/2022  Hospital Length of Stay: 3 days  Code Status: Full Code   Attending Physician: Zaira Howell MD   Primary Care Physician: Anna Wood MD  Expected Discharge Date: 3/28/2022  Principal Problem:Complete AV block    Subjective:     Hospital Course:   Ms. Hidalgo was admitted to the hospital for surgical management of a fracture. She was noted to have a CHB, underwent TVP placement for optimization prior to the surgical procedure. s/p I&D and percutaneous fixation 3/21/22 by Dr. Carreno. Left periprosthetic proximal tibia fracture that is being treated non op in cast. Bcx NGTD. Nephrology consulted for LUANNE - diuresing patient. Ucx with ecoli sensitive to rocephin which she is on. Awaiting ID clearance prior to DC PM implantation.      Interval History:    No acute events over night.   Last febrile episode was 3/23/22 at 3am. ID recommending 48 hours without fevers and negative blood cultures before proceeding with pacemaker placement.    No further interventions planned from an orthopedic standpoint >> FU 1 week after discharge for incisional wound vac removal from right ankle      Review of Systems   Unable to perform ROS: Dementia   Constitutional: Positive for malaise/fatigue. Negative for chills and fever.   Objective:     Vital Signs (Most Recent):  Temp: 98.4 °F (36.9 °C) (03/24/22 1200)  Pulse: 66 (03/24/22 1330)  Resp: (!) 22 (03/24/22 1425)  BP: (!) 108/54 (03/24/22 1330)  SpO2: 100 % (03/24/22 1330)   Vital Signs (24h Range):  Temp:  [97.7 °F (36.5 °C)-99.6 °F (37.6 °C)] 98.4 °F (36.9 °C)  Pulse:  [52-66] 66  Resp:  [13-45] 22  SpO2:  [93 %-100 %] 100 %  BP: ()/(42-60) 108/54     Weight: 90.7 kg (200 lb)  Body mass index is 32.28 kg/m².     SpO2: 100 %  O2 Device (Oxygen Therapy): nasal cannula w/ humidification      Intake/Output Summary (Last 24  hours) at 3/24/2022 1436  Last data filed at 3/24/2022 1300  Gross per 24 hour   Intake 817.37 ml   Output 975 ml   Net -157.63 ml       Lines/Drains/Airways       Central Venous Catheter Line  Duration             Percutaneous Central Line Insertion/Assessment - single lumen  03/21/22 1124 right internal jugular 3 days              Drain  Duration                  Urethral Catheter 03/21/22 1001 Double-lumen 16 Fr. 3 days              Peripheral Intravenous Line  Duration                  Peripheral IV - Single Lumen 03/22/22 0900 20 G Anterior;Left Forearm 2 days         Peripheral IV - Single Lumen 03/22/22 1005 20 G;1 3/4 in Left Upper Arm 2 days                    Physical Exam  Vitals and nursing note reviewed.   Constitutional:       General: She is not in acute distress.     Appearance: She is obese. She is not ill-appearing.   HENT:      Nose: No congestion or rhinorrhea.      Mouth/Throat:      Pharynx: No oropharyngeal exudate or posterior oropharyngeal erythema.   Eyes:      General: No scleral icterus.     Conjunctiva/sclera: Conjunctivae normal.   Cardiovascular:      Rate and Rhythm: Normal rate and regular rhythm.   Pulmonary:      Effort: Pulmonary effort is normal. No respiratory distress.      Breath sounds: No wheezing or rales.   Musculoskeletal:         General: Swelling present.      Right lower leg: Edema present.      Left lower leg: Edema present.   Skin:     General: Skin is warm.      Coloration: Skin is not jaundiced.      Findings: No lesion.   Neurological:      Mental Status: She is alert.       Significant Labs: All pertinent lab results from the last 24 hours have been reviewed.        Assessment and Plan:     * Complete AV block  Patient presented to ED with ankle fracture and found to be in complete heart block. Currently stable with junctional scape rhythm in the 30s and normal BP.     -TVP in place as bridge to PPM. Will plan for permanent pacemaker once fractures are addressed  completely to avoid risk of device infection  - last febril 3/23- will need 48 hours of no fevers and negative blood cultures before proceeding with PPM  - NPO after midnight for possible PPM placement tomorrow    Chronic congestive heart failure  Presented with an elevated BNP and appeared hypervolemic  TTE with a normal EF- unable to determine diastolic function given she is being paced    Closed fracture of left proximal tibia  -- Ortho following  -- Keep left long leg cast reinforced with bed pan bag at the top to prevent it from getting soiled with urine/feces  -- Continue incisional wound vac right ankle (placed 3/22)  -- Continue heparin TID for dvt ppx per primary team. Continue DVT ppx at discharge (heparin okay or can do ASA 81 bid or LVX)  -- From orthopedic standpoint okay to dc once medically ready (still needs permanent pacemaker placement)  -- FU 1 week after discharge for incisional wound vac removal from right ankle       LUANNE (acute kidney injury)  Likely d/t hypervolemic state vs ATN from CHB  RP ultrasound with chronic disease, no hydronephrosis    Nephrology consulted- suspect ATN w/ muddy brown casts after spinning urine    --started diuresis w lasix iv 120 bid      Open ankle fracture, right, type III, initial encounter  S/p external fixation  -pt/ot as tolerated        VTE Risk Mitigation (From admission, onward)         Ordered     heparin (porcine) injection 5,000 Units  Every 8 hours         03/23/22 0800                Becky Mcclure MD  Cardiology  [facility]

## 2022-03-24 NOTE — ASSESSMENT & PLAN NOTE
Patient presented to ED with ankle fracture and found to be in complete heart block. Currently stable with junctional scape rhythm in the 30s and normal BP.     -TVP in place as bridge to PPM. Will plan for permanent pacemaker once fractures are addressed completely to avoid risk of device infection  - last febril 3/23- will need 48 hours of no fevers and negative blood cultures before proceeding with PPM  - NPO after midnight for possible PPM placement tomorrow

## 2022-03-24 NOTE — ASSESSMENT & PLAN NOTE
Likely d/t hypervolemic state vs ATN from CHB  RP ultrasound with chronic disease, no hydronephrosis    Nephrology consulted- suspect ATN w/ muddy brown casts after spinning urine    --started diuresis w lasix iv 120 bid

## 2022-03-24 NOTE — SUBJECTIVE & OBJECTIVE
Interval History: Patient was seen this morning, denied pain, no febrile episodes, TVP set at 66 bpm output 10 mA.    Review of Systems   Unable to perform ROS: Dementia   Objective:     Vital Signs (Most Recent):  Temp: 97.8 °F (36.6 °C) (03/24/22 0715)  Pulse: 66 (03/24/22 1030)  Resp: 19 (03/24/22 1030)  BP: (!) 101/47 (03/24/22 1000)  SpO2: 100 % (03/24/22 1030)   Vital Signs (24h Range):  Temp:  [97.7 °F (36.5 °C)-99.6 °F (37.6 °C)] 97.8 °F (36.6 °C)  Pulse:  [50-66] 66  Resp:  [13-45] 19  SpO2:  [93 %-100 %] 100 %  BP: ()/(42-80) 101/47     Weight: 90.7 kg (200 lb)  Body mass index is 32.28 kg/m².     SpO2: 100 %  O2 Device (Oxygen Therapy): nasal cannula w/ humidification    Physical Exam  Vitals reviewed.   Constitutional:       General: She is not in acute distress.     Appearance: Normal appearance. She is not ill-appearing or toxic-appearing.   HENT:      Head: Normocephalic and atraumatic.      Mouth/Throat:      Mouth: Mucous membranes are moist.   Neck:      Comments: R IJ TVP  Cardiovascular:      Rate and Rhythm: Normal rate and regular rhythm.      Heart sounds: No murmur heard.    No friction rub. No gallop.   Pulmonary:      Effort: Pulmonary effort is normal. No respiratory distress.      Breath sounds: Normal breath sounds.   Abdominal:      Palpations: Abdomen is soft.   Musculoskeletal:      Right lower leg: No edema.      Left lower leg: No edema.      Comments: R ankle surgical changes   Skin:     General: Skin is warm.   Neurological:      Mental Status: She is alert. Mental status is at baseline.     Significant Labs: EP:   Recent Labs   Lab 03/23/22  0311 03/24/22  0333    142   K 4.2 4.0    107   CO2 25 28   GLU 95 119*   BUN 46* 45*   CREATININE 1.9* 1.7*   CALCIUM 8.2* 8.0*   PROT 5.1* 4.9*   ALBUMIN 2.3* 2.0*   BILITOT 0.5 0.5   ALKPHOS 62 55   AST 24 20   ALT <5* <5*   ANIONGAP 10 7*   ESTGFRAFRICA 26.4* 30.2*   EGFRNONAA 22.9* 26.2*   WBC 6.55 5.36   HGB 9.0* 8.8*    HCT 29.5* 28.7*   * 111*       Significant Imaging: Ejection fraction:   Recent Labs   Lab 03/21/22  1320   EF 65

## 2022-03-24 NOTE — PROGRESS NOTES
[facility]  Cardiac Electrophysiology  Progress Note    Admission Date: 3/21/2022  Code Status: Full Code   Attending Physician: Zaira Howell MD   Expected Discharge Date: 3/28/2022  Principal Problem:Complete AV block    Subjective:     Interval History: Patient was seen this morning, denied pain, no febrile episodes, TVP set at 66 bpm output 10 mA.    Review of Systems   Unable to perform ROS: Dementia   Objective:     Vital Signs (Most Recent):  Temp: 97.8 °F (36.6 °C) (03/24/22 0715)  Pulse: 66 (03/24/22 1030)  Resp: 19 (03/24/22 1030)  BP: (!) 101/47 (03/24/22 1000)  SpO2: 100 % (03/24/22 1030)   Vital Signs (24h Range):  Temp:  [97.7 °F (36.5 °C)-99.6 °F (37.6 °C)] 97.8 °F (36.6 °C)  Pulse:  [50-66] 66  Resp:  [13-45] 19  SpO2:  [93 %-100 %] 100 %  BP: ()/(42-80) 101/47     Weight: 90.7 kg (200 lb)  Body mass index is 32.28 kg/m².     SpO2: 100 %  O2 Device (Oxygen Therapy): nasal cannula w/ humidification    Physical Exam  Vitals reviewed.   Constitutional:       General: She is not in acute distress.     Appearance: Normal appearance. She is not ill-appearing or toxic-appearing.   HENT:      Head: Normocephalic and atraumatic.      Mouth/Throat:      Mouth: Mucous membranes are moist.   Neck:      Comments: R IJ TVP  Cardiovascular:      Rate and Rhythm: Normal rate and regular rhythm.      Heart sounds: No murmur heard.    No friction rub. No gallop.   Pulmonary:      Effort: Pulmonary effort is normal. No respiratory distress.      Breath sounds: Normal breath sounds.   Abdominal:      Palpations: Abdomen is soft.   Musculoskeletal:      Right lower leg: No edema.      Left lower leg: No edema.      Comments: R ankle surgical changes   Skin:     General: Skin is warm.   Neurological:      Mental Status: She is alert. Mental status is at baseline.     Significant Labs: EP:   Recent Labs   Lab 03/23/22  0311 03/24/22  0333    142   K 4.2 4.0    107   CO2 25  28   GLU 95 119*   BUN 46* 45*   CREATININE 1.9* 1.7*   CALCIUM 8.2* 8.0*   PROT 5.1* 4.9*   ALBUMIN 2.3* 2.0*   BILITOT 0.5 0.5   ALKPHOS 62 55   AST 24 20   ALT <5* <5*   ANIONGAP 10 7*   ESTGFRAFRICA 26.4* 30.2*   EGFRNONAA 22.9* 26.2*   WBC 6.55 5.36   HGB 9.0* 8.8*   HCT 29.5* 28.7*   * 111*       Significant Imaging: Ejection fraction:   Recent Labs   Lab 03/21/22  1320   EF 65     Assessment and Plan:     * Complete AV block  [facility] 89 [facility] and was found to be in complete heart block in the ED. ECG confirmed heart block with escape rhythm at 35 bpm. Patient underwent TVP placement to address the right ankle open fracture. TVP set at 66 bpm with output 10 mA, threshold 0.6 mA.  - Continue TVP for now, check thresholds daily  - Ankle fracture management per Ortho  - Concern for infection due to febrile episodes early AM on 03/23/2022, defer to primary for management  - Awaiting ID recommendations (and blood cultures) for scheduling dcPPM placement  - Continue telemetry monitoring while inpatient    Plan of care was discussed with staff, Dr Kelly.     Dale Soriano MD  Cardiac Electrophysiology, Fellow PGY4  [facility]

## 2022-03-24 NOTE — PROGRESS NOTES
[facility]  Orthopedics  Progress Note    Patient Name: Lilia Hidalgo  MRN: [id number]  Admission Date: 3/21/2022  Hospital Length of Stay: 3 days  Attending Provider: Zaira Howell MD  Primary Care Provider: Anna Wood MD  Follow-up For: Procedure(s) (LRB):  APPLICATION, EXTERNAL FIXATION DEVICE, LARGE, TIBIA (Right)  IRRIGATION AND DEBRIDEMENT (Right)    Post-Operative Day: 3 Days Post-Op  Subjective:     Principal Problem:Complete AV block    Principal Orthopedic Problem: same    Interval History: VS with Tmax 99.6, mild intermittent hypotension, on 1L O2 NC. Hb 8.8 (9).  (from 123, 139, 247). Bcx NGTD. Nephrology consulted for LUANNE - diuresing patient. Ucx with ecoli sensitive to rocephin which she is on. Per nurse patient's pain controlled but has left hip pain when they roll her to clean her. Casts c/d/i    Review of patient's allergies indicates:   Allergen Reactions    Aspirin Nausea Only and Other (See Comments)     Other reaction(s): esophageal pain    Celebrex [celecoxib] Rash    Morphine Hallucinations    Steroid [corticosteroids (glucocorticoids)] Other (See Comments)     Other reaction(s): Flushing (skin)    Sulfa dyne Other (See Comments)     Other reaction(s): esophogeal pain       Current Facility-Administered Medications   Medication    0.9%  NaCl infusion    acetaminophen tablet 1,000 mg    aspirin EC tablet 81 mg    atorvastatin tablet 20 mg    cefTRIAXone (ROCEPHIN) 1 g/50 mL D5W IVPB    cholecalciferol (vitamin D3) 125 mcg (5,000 unit) tablet 50,000 Units    diphenhydrAMINE capsule 25 mg    donepeziL tablet 5 mg    furosemide injection 120 mg    heparin (porcine) injection 5,000 Units    HYDROmorphone injection 0.5 mg    mupirocin 2 % ointment    oxyCODONE immediate release tablet 5 mg    pantoprazole EC tablet 40 mg     Objective:     Vital Signs (Most Recent):  Temp: 97.8 °F (36.6 °C) (03/24/22 0715)  Pulse: 66 (03/24/22  "1000)  Resp: (!) 26 (03/24/22 1000)  BP: (!) 101/47 (03/24/22 1000)  SpO2: 100 % (03/24/22 1000)   Vital Signs (24h Range):  Temp:  [97.7 °F (36.5 °C)-99.6 °F (37.6 °C)] 97.8 °F (36.6 °C)  Pulse:  [50-66] 66  Resp:  [13-45] 26  SpO2:  [93 %-100 %] 100 %  BP: ()/(42-80) 101/47     Weight: 90.7 kg (200 lb)  Height: 5' 6" (167.6 cm)  Body mass index is 32.28 kg/m².      Intake/Output Summary (Last 24 hours) at 3/24/2022 1024  Last data filed at 3/24/2022 0900  Gross per 24 hour   Intake 810 ml   Output 940 ml   Net -130 ml         Ortho/SPM Exam  NAD, demented    RLE  Short leg cast in place, cdi  NVI distally    LLE  Long leg cast in place, cdi  NVI distally    Significant Labs: BMP:   Recent Labs   Lab 03/24/22  0333   *      K 4.0      CO2 28   BUN 45*   CREATININE 1.7*   CALCIUM 8.0*   MG 2.1       CBC:   Recent Labs   Lab 03/23/22  0311 03/24/22  0333   WBC 6.55 5.36   HGB 9.0* 8.8*   HCT 29.5* 28.7*   * 111*       All pertinent labs within the past 24 hours have been reviewed.    Significant Imaging: I have reviewed all pertinent imaging results/findings.    Assessment/Plan:     Open ankle fracture, right, type III, initial encounter  89F with grade 3a open right ankle fracture sp I&D and percutaneous fixation 3/21/22 by Dr. Carreno. Also has left periprosthetic proximal tibia fracture that is being treated non op in cast.        Plan  -No further orthopedic intervention at this time  -Okay to WBAT BLE in casts  -PT/OT daily  -Keep left long leg cast reinforced with bed pan bag at the top to prevent it from getting soiled with urine/feces  -Continue incisional wound vac right ankle (placed 3/22)  -Continue heparin TID for dvt ppx per primary team. Continue DVT ppx at discharge (heparin okay or can do ASA 81 bid or LVX)  -From orthopedic standpoint okay to dc once medically ready (still needs permanent pacemaker placement)  -FU 1 week after discharge for incisional wound vac " removal from right ankle            Sidra Dc MD  Orthopedics  [facility]

## 2022-03-24 NOTE — PT/OT/SLP EVAL
"Occupational Therapy   Evaluation    Name: Lilia Hidalgo  MRN: [id number]  Admitting Diagnosis:  Complete AV block  Recent Surgery: Procedure(s) (LRB):  APPLICATION, EXTERNAL FIXATION DEVICE, LARGE, TIBIA (Right)  IRRIGATION AND DEBRIDEMENT (Right) 3 Days Post-Op    Recommendations:     Discharge Recommendations: nursing facility, skilled  Discharge Equipment Recommendations:  none  Barriers to discharge:  None    Assessment:     Lilia Hidalgo is a 89 y.o. female with a medical diagnosis of Complete AV block.  She presents from NH with ankle fx after twisting to get to w/c. Pt found to have L tibial fx and complete heart block. Pt now s/p TVR and no plans for operative repair of LE fx. Pt is in B hard cast and WBAT B LE. Pt has baseline cognitive impairment and oriented to self only with cueing. Performance deficits affecting function: decreased safety awareness, impaired cognition, impaired self care skills, impaired functional mobilty, gait instability, decreased lower extremity function, pain, decreased ROM, orthopedic precautions.      Rehab Prognosis: Fair; patient would benefit from acute skilled OT services to address these deficits and reach maximum level of function.       Plan:     Patient to be seen 2 x/week to address the above listed problems via self-care/home management, therapeutic activities, therapeutic exercises  · Plan of Care Expires: 04/23/22  · Plan of Care Reviewed with: patient    Subjective     Chief Complaint: "Am I ok?"  Patient/Family Comments/goals: none stated    Occupational Profile:  Living Environment: lives in [facility]; w/c bound PTA  Previous level of function: unknown  Roles and Routines: facility resident  Equipment Used at Home:  wheelchair  Assistance upon Discharge: facility staff    Pain/Comfort:  · Location 1:  (pain noted with  movement of B LE, but not rated)  · Pain Addressed 1: Reposition, Distraction, Cessation of Activity    Patients cultural, " spiritual, Voodoo conflicts given the current situation: no    Objective:     Communicated with: RN prior to session.  Patient found supine with blood pressure cuff, pulse ox (continuous), telemetry, anthony catheter (B LE hard casts) upon OT entry to room.    General Precautions: Standard, fall   Orthopedic Precautions:RLE weight bearing as tolerated, LLE weight bearing as tolerated   Braces:    Respiratory Status: Room air    Occupational Performance:    Bed Mobility:    · Patient completed Scooting/Bridging with total assistance and 2 persons  · Patient completed Supine to Sit with total assistance and 2 persons  · Patient completed Sit to Supine with total assistance and 2 persons    Functional Mobility/Transfers:  · NT 2* safety concerns of B hard casts and Max A for static sitting  · Functional Mobility: NT    Activities of Daily Living:  · Grooming: total assistance      Cognitive/Visual Perceptual:  Pt is alert and following basic commands intermittently  Oriented x 0    Physical Exam:  B UE ROM and MMT grossly WFL    AMPAC 6 Click ADL:  AMPAC Total Score: 6    Treatment & Education:  Pt ed on OT POC  Daily orientation provided; no evidence of learning  Education:    Patient left right sidelying with all lines intact, call button in reach and RN notified    GOALS:   Multidisciplinary Problems     Occupational Therapy Goals        Problem: Occupational Therapy    Goal Priority Disciplines Outcome Interventions   Occupational Therapy Goal     OT, PT/OT Ongoing, Progressing    Description: Goals to be met by: 4/7/22     Patient will increase functional independence with ADLs by performing:    Feeding with Stand-by Assistance.  Sitting at edge of bed x10 minutes with Stand-by Assistance.  Toilet transfer to bedside commode with Maximum Assistance using slide board.                     History:     Past Medical History:   Diagnosis Date    Aortic stenosis 6/17/2015    Arthritis     Atrophic vaginitis  "2/18/2016    Bilateral edema of lower extremity 6/22/2016    CHF (congestive heart failure)     Cholelithiasis without cholecystitis 5/5/2017    Chronic pain of left knee 8/3/2017    Depressed mood 6/22/2016    Diverticulitis of large intestine without perforation or abscess without bleeding 5/5/2017    Encounter for blood transfusion     Essential hypertension     GERD (gastroesophageal reflux disease)     Hyperlipidemia     Hypertension     Hypertensive heart disease with heart failure 7/14/2015    Insomnia     Joint pain     Knee pain, left 08/2016    pain with walking or standing    Primary osteoarthritis of knee 2/5/2013    PUD (peptic ulcer disease)     S/P knee replacement 2/13/2013    Slow transit constipation 2/18/2016       Past Surgical History:   Procedure Laterality Date    APPENDECTOMY      APPLICATION OF LARGE EXTERNAL FIXATION DEVICE TO TIBIA Right 3/21/2022    Procedure: APPLICATION, EXTERNAL FIXATION DEVICE, LARGE, TIBIA;  Surgeon: Anirudh Carreno MD;  Location: [facility] OR [address] [place] [address];  Service: Orthopedics;  Laterality: Right;    COLONOSCOPY      08    EYE SURGERY      bilateral cataract    FINGER SURGERY      LT thumb surgery--"arthritis surgery"    HYSTERECTOMY      UNKNOWN BSO    JOINT REPLACEMENT      right hip replacement    KNEE ARTHROPLASTY Left 2001    TONSILLECTOMY      TUMOR EXCISION      esophageal tumor removal     UPPER GASTROINTESTINAL ENDOSCOPY      2013       Time Tracking:     OT Date of Treatment: 03/24/22  OT Start Time: 1008  OT Stop Time: 1035  OT Total Time (min): 27 min    Billable Minutes:Evaluation 10  Therapeutic Activity 17    3/24/2022    "

## 2022-03-24 NOTE — PLAN OF CARE
"      SICU PLAN OF CARE NOTE    Dx: Complete AV block    Shift Events: TVP remains in place with pacer settings changed to rate of 66 bpm. Ortho at bedside today to place right BK hard cast and left full length hard cast. WV remains to RLE under cast with no output all shift. Adequate UOP via anthony and Lasix IVP with mild response. Afebrile all shift. Pleasantly confused to time and situation, baseline dementia. Both daughters visited today and updated. No surgical treatments to LLE. Optimize ADL as best as possible for discharge. EC consulted and will re-evaluate in 2 days s/t elevated temp. Multiple attempted to collect BC x 2 but unsuccessful. Multiple nurses have attempted and use of US still unsuccessful.     Goals of Care: MAP >65    Neuro: Follows Commands, Moves All Extremities, and Confused baseline    Vital Signs: BP (!) 98/54 (BP Location: Left arm, Patient Position: Lying)   Pulse 66   Temp 97.7 °F (36.5 °C) (Oral)   Resp 20   Ht 5' 6" (1.676 m)   Wt 90.7 kg (200 lb)   LMP  (LMP Unknown)   SpO2 97%   Breastfeeding No   BMI 32.28 kg/m²     Respiratory: Nasal Cannula 1L NC    Diet: Cardiac Diet    Gtts: MIVF KVO to TVP    Urine Output: Urinary Catheter 395 cc/shift    Drains: Wound vac to RLE -125 mmHg.    Cardiac: 100% Vpaced at 66 bpm via TVP to RIJ.     Labs/Accuchecks: Daily labs.     Skin: WV to RLE CDI. BK hard cast to RLE and full RLE hard cast placed today. No new skin breakdown noted. See assessment for details and wound care orders. Sacral foam dressing in place for prevention. Waffle mattress inflated and bed functioning properly. TQ2 with frequent weight shifts complete with assist x2.       "

## 2022-03-24 NOTE — SUBJECTIVE & OBJECTIVE
"Principal Problem:Complete AV block    Principal Orthopedic Problem: same    Interval History: VS with Tmax 99.6, mild intermittent hypotension, on 1L O2 NC. Hb 8.8 (9).  (from 123, 139, 247). Bcx NGTD. Nephrology consulted for LUANNE - diuresing patient. Ucx with ecoli sensitive to rocephin which she is on. Per nurse patient's pain controlled but has left hip pain when they roll her to clean her. Casts c/d/i    Review of patient's allergies indicates:   Allergen Reactions    Aspirin Nausea Only and Other (See Comments)     Other reaction(s): esophageal pain    Celebrex [celecoxib] Rash    Morphine Hallucinations    Steroid [corticosteroids (glucocorticoids)] Other (See Comments)     Other reaction(s): Flushing (skin)    Sulfa dyne Other (See Comments)     Other reaction(s): esophogeal pain       Current Facility-Administered Medications   Medication    0.9%  NaCl infusion    acetaminophen tablet 1,000 mg    aspirin EC tablet 81 mg    atorvastatin tablet 20 mg    cefTRIAXone (ROCEPHIN) 1 g/50 mL D5W IVPB    cholecalciferol (vitamin D3) 125 mcg (5,000 unit) tablet 50,000 Units    diphenhydrAMINE capsule 25 mg    donepeziL tablet 5 mg    furosemide injection 120 mg    heparin (porcine) injection 5,000 Units    HYDROmorphone injection 0.5 mg    mupirocin 2 % ointment    oxyCODONE immediate release tablet 5 mg    pantoprazole EC tablet 40 mg     Objective:     Vital Signs (Most Recent):  Temp: 97.8 °F (36.6 °C) (03/24/22 0715)  Pulse: 66 (03/24/22 1000)  Resp: (!) 26 (03/24/22 1000)  BP: (!) 101/47 (03/24/22 1000)  SpO2: 100 % (03/24/22 1000)   Vital Signs (24h Range):  Temp:  [97.7 °F (36.5 °C)-99.6 °F (37.6 °C)] 97.8 °F (36.6 °C)  Pulse:  [50-66] 66  Resp:  [13-45] 26  SpO2:  [93 %-100 %] 100 %  BP: ()/(42-80) 101/47     Weight: 90.7 kg (200 lb)  Height: 5' 6" (167.6 cm)  Body mass index is 32.28 kg/m².      Intake/Output Summary (Last 24 hours) at 3/24/2022 1024  Last data filed at 3/24/2022 0900  Gross per " 24 hour   Intake 810 ml   Output 940 ml   Net -130 ml         Ortho/SPM Exam  NAD, demented    RLE  Short leg cast in place, cdi  NVI distally    LLE  Long leg cast in place, cdi  NVI distally    Significant Labs: BMP:   Recent Labs   Lab 03/24/22  0333   *      K 4.0      CO2 28   BUN 45*   CREATININE 1.7*   CALCIUM 8.0*   MG 2.1       CBC:   Recent Labs   Lab 03/23/22  0311 03/24/22  0333   WBC 6.55 5.36   HGB 9.0* 8.8*   HCT 29.5* 28.7*   * 111*       All pertinent labs within the past 24 hours have been reviewed.    Significant Imaging: I have reviewed all pertinent imaging results/findings.

## 2022-03-24 NOTE — HPI
This is a 89 yo female with history of HTN and urinary incontinence who presented from nursing home due to ankle fracture. On 3/21 she underwent ORIF of right ankle with debridement and irrigation. No cultures were sent. On arrival in ED, patient incidentally noted to be in 3rd degree AV block and was started on transcutaneous venous pacing with plans to bridge to pacemaker. CT of left knee 3/22 noted displaced hardware fracture. Orthopedic plans conservative management for now. On 3/23 patient had low grade fever of 100.4 F and blood cultures were drawn. As she is in need of permanent pacemaker, Infectious Diseases has been consulted to determine when it would be safe to proceed with pacemaker.

## 2022-03-24 NOTE — PLAN OF CARE
Problem: Physical Therapy  Goal: Physical Therapy Goal  Description: Goals to be met by: 22    Patient will increase functional independence with mobility by performin. Pt will perform supine<>sit with mod assist x1 to improve independence with mobility  2. Pt will perform functional transfers with max assist x2, WBAT BLE  3. Pt will perform w/c mobility >50 ft with min asisst x1  4. Pt will sit EOB for >10 minutes with min assist x1  Outcome: Ongoing, Progressing     Evaluation completed and goals currently appropriate at this time.    Lilly Rodriguez, PT, DPT, GCS  3/24/2022

## 2022-03-24 NOTE — PROGRESS NOTES
[facility]  Nephrology  Progress Note    Patient Name: Lilia Hidalgo  MRN: [id number]  Admission Date: 3/21/2022  Hospital Length of Stay: 3 days  Attending Provider: Zaira Howell MD   Primary Care Physician: nAna Wood MD  Principal Problem:Complete AV block    Subjective:     HPI:   89 y.o. female with HTN, MDD, mild cognitive impairment, and urinary incontinence. She is brought to ED from nursing home due to a ankle fracture. She uses a wheelchair and was being assisted to transfer from the toilet to her wheelchair when she twisted her ankle and fractured it. No reports of syncope. On arrival to ED, an ECG showed 3rd degree AV block and cardiology was consulted. She is awake and complaining of pain. Not able to give a detailed history due to patient's dementia.     Nephrology consulted for LUANNE       Interval History: patient made 1000 cc of urine in last 24 hours. Neg 290 cc in last 24 hours     Review of patient's allergies indicates:   Allergen Reactions    Aspirin Nausea Only and Other (See Comments)     Other reaction(s): esophageal pain    Celebrex [celecoxib] Rash    Morphine Hallucinations    Steroid [corticosteroids (glucocorticoids)] Other (See Comments)     Other reaction(s): Flushing (skin)    Sulfa dyne Other (See Comments)     Other reaction(s): esophogeal pain     Current Facility-Administered Medications   Medication Frequency    0.9%  NaCl infusion Continuous    acetaminophen tablet 1,000 mg Q8H    aspirin EC tablet 81 mg BID    atorvastatin tablet 20 mg QHS    cefTRIAXone (ROCEPHIN) 1 g/50 mL D5W IVPB Q24H    cholecalciferol (vitamin D3) 125 mcg (5,000 unit) tablet 50,000 Units Weekly    diphenhydrAMINE capsule 25 mg Q6H PRN    donepeziL tablet 5 mg QHS    furosemide injection 120 mg Q12H    heparin (porcine) injection 5,000 Units Q8H    HYDROmorphone injection 0.5 mg Q6H PRN    mupirocin 2 % ointment BID    oxyCODONE immediate release  tablet 5 mg Q6H PRN    pantoprazole EC tablet 40 mg Daily       Objective:     Vital Signs (Most Recent):  Temp: 97.8 °F (36.6 °C) (03/24/22 0715)  Pulse: 66 (03/24/22 0800)  Resp: (!) 24 (03/24/22 0812)  BP: (!) 85/42 (03/24/22 0800)  SpO2: 100 % (03/24/22 0800)  O2 Device (Oxygen Therapy): nasal cannula w/ humidification (03/24/22 0800)   Vital Signs (24h Range):  Temp:  [97.7 °F (36.5 °C)-99.6 °F (37.6 °C)] 97.8 °F (36.6 °C)  Pulse:  [50-66] 66  Resp:  [13-42] 24  SpO2:  [93 %-100 %] 100 %  BP: ()/(42-80) 85/42     Weight: 90.7 kg (200 lb) (03/21/22 1313)  Body mass index is 32.28 kg/m².  Body surface area is 2.06 meters squared.    I/O last 3 completed shifts:  In: 1844.5 [P.O.:420; I.V.:1376; IV Piggyback:48.5]  Out: 1245 [Urine:1245]    Physical Exam  Vitals reviewed.   Constitutional:       Comments: Frail   Eyes:      Conjunctiva/sclera: Conjunctivae normal.   Cardiovascular:      Rate and Rhythm: Normal rate and regular rhythm.   Pulmonary:      Comments: Decreased air entry B/L   Abdominal:      General: Bowel sounds are normal.      Palpations: Abdomen is soft.      Tenderness: There is no abdominal tenderness. There is no right CVA tenderness, left CVA tenderness or guarding.   Musculoskeletal:         General: Swelling present.      Right lower leg: Edema present.      Left lower leg: Edema present.   Skin:     Coloration: Skin is not jaundiced.      Findings: No rash.   Neurological:      Mental Status: She is disoriented.       Significant Labs:  CBC:   Recent Labs   Lab 03/24/22 0333   WBC 5.36   RBC 3.03*   HGB 8.8*   HCT 28.7*   *   MCV 95   MCH 29.0   MCHC 30.7*     CMP:   Recent Labs   Lab 03/24/22  0333   *   CALCIUM 8.0*   ALBUMIN 2.0*   PROT 4.9*      K 4.0   CO2 28      BUN 45*   CREATININE 1.7*   ALKPHOS 55   ALT <5*   AST 20   BILITOT 0.5        Significant Imaging:  Reviewed     Assessment/Plan:     * Complete AV block  Per primary     LUANNE (acute kidney  injury)  Patient with LUANNE. No known history of CKD   1.5 on admission then up to 1.9 and today 1.7   1000 cc of urine in last 24 hours  in last 24 hours   Renal US no hydro   Most likely ATN given heart block and also had dropped in MAPS on 3/21 and 3/22   Urine sed with occasional granular casts   Patient hypervolemic on exam   Started on  lasix 120 mg BID yesterday not achieving goal net neg . Recommend giving diuril today 500 IV mg followed by 200 mg of IV lasix         Chronic congestive heart failure  Per primary     ankle fracture  Per primary             Juanito Weldon MD  Nephrology  [facility]

## 2022-03-24 NOTE — ASSESSMENT & PLAN NOTE
Patient with LUANNE. No known history of CKD   1.5 on admission then up to 1.9 and today 1.7   1000 cc of urine in last 24 hours  in last 24 hours   Renal US no hydro   Most likely ATN given heart block and also had dropped in MAPS on 3/21 and 3/22   Urine sed with occasional granular casts   Patient hypervolemic on exam   Started on  lasix 120 mg BID yesterday not achieving goal net neg . Recommend giving diuril today 500 IV mg followed by 200 mg of IV lasix

## 2022-03-24 NOTE — SUBJECTIVE & OBJECTIVE
Interval History: patient made 1000 cc of urine in last 24 hours. Neg 290 cc in last 24 hours     Review of patient's allergies indicates:   Allergen Reactions    Aspirin Nausea Only and Other (See Comments)     Other reaction(s): esophageal pain    Celebrex [celecoxib] Rash    Morphine Hallucinations    Steroid [corticosteroids (glucocorticoids)] Other (See Comments)     Other reaction(s): Flushing (skin)    Sulfa dyne Other (See Comments)     Other reaction(s): esophogeal pain     Current Facility-Administered Medications   Medication Frequency    0.9%  NaCl infusion Continuous    acetaminophen tablet 1,000 mg Q8H    aspirin EC tablet 81 mg BID    atorvastatin tablet 20 mg QHS    cefTRIAXone (ROCEPHIN) 1 g/50 mL D5W IVPB Q24H    cholecalciferol (vitamin D3) 125 mcg (5,000 unit) tablet 50,000 Units Weekly    diphenhydrAMINE capsule 25 mg Q6H PRN    donepeziL tablet 5 mg QHS    furosemide injection 120 mg Q12H    heparin (porcine) injection 5,000 Units Q8H    HYDROmorphone injection 0.5 mg Q6H PRN    mupirocin 2 % ointment BID    oxyCODONE immediate release tablet 5 mg Q6H PRN    pantoprazole EC tablet 40 mg Daily       Objective:     Vital Signs (Most Recent):  Temp: 97.8 °F (36.6 °C) (03/24/22 0715)  Pulse: 66 (03/24/22 0800)  Resp: (!) 24 (03/24/22 0812)  BP: (!) 85/42 (03/24/22 0800)  SpO2: 100 % (03/24/22 0800)  O2 Device (Oxygen Therapy): nasal cannula w/ humidification (03/24/22 0800)   Vital Signs (24h Range):  Temp:  [97.7 °F (36.5 °C)-99.6 °F (37.6 °C)] 97.8 °F (36.6 °C)  Pulse:  [50-66] 66  Resp:  [13-42] 24  SpO2:  [93 %-100 %] 100 %  BP: ()/(42-80) 85/42     Weight: 90.7 kg (200 lb) (03/21/22 1313)  Body mass index is 32.28 kg/m².  Body surface area is 2.06 meters squared.    I/O last 3 completed shifts:  In: 1844.5 [P.O.:420; I.V.:1376; IV Piggyback:48.5]  Out: 1245 [Urine:1245]    Physical Exam  Vitals reviewed.   Constitutional:       Comments: Frail   Eyes:      Conjunctiva/sclera:  Conjunctivae normal.   Cardiovascular:      Rate and Rhythm: Normal rate and regular rhythm.   Pulmonary:      Comments: Decreased air entry B/L   Abdominal:      General: Bowel sounds are normal.      Palpations: Abdomen is soft.      Tenderness: There is no abdominal tenderness. There is no right CVA tenderness, left CVA tenderness or guarding.   Musculoskeletal:         General: Swelling present.      Right lower leg: Edema present.      Left lower leg: Edema present.   Skin:     Coloration: Skin is not jaundiced.      Findings: No rash.   Neurological:      Mental Status: She is disoriented.       Significant Labs:  CBC:   Recent Labs   Lab 03/24/22  0333   WBC 5.36   RBC 3.03*   HGB 8.8*   HCT 28.7*   *   MCV 95   MCH 29.0   MCHC 30.7*     CMP:   Recent Labs   Lab 03/24/22 0333   *   CALCIUM 8.0*   ALBUMIN 2.0*   PROT 4.9*      K 4.0   CO2 28      BUN 45*   CREATININE 1.7*   ALKPHOS 55   ALT <5*   AST 20   BILITOT 0.5        Significant Imaging:  Reviewed

## 2022-03-24 NOTE — NURSING
"2225 Ortho resident (Rudy WOOD) called due to uncontrolled pain despite tylenol, oxycodone and dilaudid being administered. Per pt, pain in "R hip shooting to knee" & pain awakening pt from sleep. Cap refill <3 seconds in bilateral toes. Pt repositioned and ice applied to bilateral lower extremities with no relief. MD to come to bedside for assessment. Will continue to monitor.    2230 Joey WOOD & Rudy WOOD at bedside. MD's aware of all vitals and pt presentation. Orders for STAT xrays to bilateral elbows and R hip.    "

## 2022-03-25 LAB
ABO + RH BLD: NORMAL
ALBUMIN SERPL BCP-MCNC: 2 G/DL (ref 3.5–5.2)
ALP SERPL-CCNC: 59 U/L (ref 55–135)
ALT SERPL W/O P-5'-P-CCNC: <5 U/L (ref 10–44)
ANION GAP SERPL CALC-SCNC: 10 MMOL/L (ref 8–16)
AST SERPL-CCNC: 18 U/L (ref 10–40)
BASOPHILS # BLD AUTO: 0.02 K/UL (ref 0–0.2)
BASOPHILS NFR BLD: 0.4 % (ref 0–1.9)
BILIRUB SERPL-MCNC: 0.6 MG/DL (ref 0.1–1)
BLD GP AB SCN CELLS X3 SERPL QL: NORMAL
BUN SERPL-MCNC: 45 MG/DL (ref 8–23)
CALCIUM SERPL-MCNC: 8.2 MG/DL (ref 8.7–10.5)
CHLORIDE SERPL-SCNC: 106 MMOL/L (ref 95–110)
CO2 SERPL-SCNC: 27 MMOL/L (ref 23–29)
CREAT SERPL-MCNC: 1.5 MG/DL (ref 0.5–1.4)
DIFFERENTIAL METHOD: ABNORMAL
EOSINOPHIL # BLD AUTO: 0.2 K/UL (ref 0–0.5)
EOSINOPHIL NFR BLD: 4.1 % (ref 0–8)
ERYTHROCYTE [DISTWIDTH] IN BLOOD BY AUTOMATED COUNT: 15.6 % (ref 11.5–14.5)
EST. GFR  (AFRICAN AMERICAN): 35.1 ML/MIN/1.73 M^2
EST. GFR  (NON AFRICAN AMERICAN): 30.5 ML/MIN/1.73 M^2
GLUCOSE SERPL-MCNC: 93 MG/DL (ref 70–110)
HCT VFR BLD AUTO: 28.7 % (ref 37–48.5)
HGB BLD-MCNC: 8.7 G/DL (ref 12–16)
IMM GRANULOCYTES # BLD AUTO: 0.02 K/UL (ref 0–0.04)
IMM GRANULOCYTES NFR BLD AUTO: 0.4 % (ref 0–0.5)
LYMPHOCYTES # BLD AUTO: 0.5 K/UL (ref 1–4.8)
LYMPHOCYTES NFR BLD: 10.4 % (ref 18–48)
MAGNESIUM SERPL-MCNC: 2.1 MG/DL (ref 1.6–2.6)
MCH RBC QN AUTO: 28.2 PG (ref 27–31)
MCHC RBC AUTO-ENTMCNC: 30.3 G/DL (ref 32–36)
MCV RBC AUTO: 93 FL (ref 82–98)
MONOCYTES # BLD AUTO: 0.5 K/UL (ref 0.3–1)
MONOCYTES NFR BLD: 9.8 % (ref 4–15)
NEUTROPHILS # BLD AUTO: 3.7 K/UL (ref 1.8–7.7)
NEUTROPHILS NFR BLD: 74.9 % (ref 38–73)
NRBC BLD-RTO: 0 /100 WBC
PHOSPHATE SERPL-MCNC: 3.3 MG/DL (ref 2.7–4.5)
PLATELET # BLD AUTO: 122 K/UL (ref 150–450)
PMV BLD AUTO: 11.4 FL (ref 9.2–12.9)
POTASSIUM SERPL-SCNC: 4.2 MMOL/L (ref 3.5–5.1)
PROT SERPL-MCNC: 5.1 G/DL (ref 6–8.4)
RBC # BLD AUTO: 3.08 M/UL (ref 4–5.4)
SODIUM SERPL-SCNC: 143 MMOL/L (ref 136–145)
WBC # BLD AUTO: 4.9 K/UL (ref 3.9–12.7)

## 2022-03-25 PROCEDURE — 94761 N-INVAS EAR/PLS OXIMETRY MLT: CPT

## 2022-03-25 PROCEDURE — 25000003 PHARM REV CODE 250: Performed by: STUDENT IN AN ORGANIZED HEALTH CARE EDUCATION/TRAINING PROGRAM

## 2022-03-25 PROCEDURE — 20000000 HC ICU ROOM

## 2022-03-25 PROCEDURE — 86850 RBC ANTIBODY SCREEN: CPT | Performed by: INTERNAL MEDICINE

## 2022-03-25 PROCEDURE — 99232 PR SUBSEQUENT HOSPITAL CARE,LEVL II: ICD-10-PCS | Mod: GC,,, | Performed by: INTERNAL MEDICINE

## 2022-03-25 PROCEDURE — 99233 PR SUBSEQUENT HOSPITAL CARE,LEVL III: ICD-10-PCS | Mod: GC,,, | Performed by: INTERNAL MEDICINE

## 2022-03-25 PROCEDURE — 99233 SBSQ HOSP IP/OBS HIGH 50: CPT | Mod: GC,,, | Performed by: INTERNAL MEDICINE

## 2022-03-25 PROCEDURE — 87040 BLOOD CULTURE FOR BACTERIA: CPT | Performed by: STUDENT IN AN ORGANIZED HEALTH CARE EDUCATION/TRAINING PROGRAM

## 2022-03-25 PROCEDURE — 83735 ASSAY OF MAGNESIUM: CPT | Performed by: INTERNAL MEDICINE

## 2022-03-25 PROCEDURE — 99233 SBSQ HOSP IP/OBS HIGH 50: CPT | Mod: ,,, | Performed by: INTERNAL MEDICINE

## 2022-03-25 PROCEDURE — 27000221 HC OXYGEN, UP TO 24 HOURS

## 2022-03-25 PROCEDURE — 99900035 HC TECH TIME PER 15 MIN (STAT)

## 2022-03-25 PROCEDURE — 63600175 PHARM REV CODE 636 W HCPCS: Performed by: STUDENT IN AN ORGANIZED HEALTH CARE EDUCATION/TRAINING PROGRAM

## 2022-03-25 PROCEDURE — 25000003 PHARM REV CODE 250: Performed by: INTERNAL MEDICINE

## 2022-03-25 PROCEDURE — 84100 ASSAY OF PHOSPHORUS: CPT | Performed by: INTERNAL MEDICINE

## 2022-03-25 PROCEDURE — 99231 SBSQ HOSP IP/OBS SF/LOW 25: CPT | Mod: ,,, | Performed by: HOSPITALIST

## 2022-03-25 PROCEDURE — 85025 COMPLETE CBC W/AUTO DIFF WBC: CPT | Performed by: INTERNAL MEDICINE

## 2022-03-25 PROCEDURE — 99231 PR SUBSEQUENT HOSPITAL CARE,LEVL I: ICD-10-PCS | Mod: ,,, | Performed by: HOSPITALIST

## 2022-03-25 PROCEDURE — 63600175 PHARM REV CODE 636 W HCPCS: Performed by: INTERNAL MEDICINE

## 2022-03-25 PROCEDURE — 99232 SBSQ HOSP IP/OBS MODERATE 35: CPT | Mod: GC,,, | Performed by: INTERNAL MEDICINE

## 2022-03-25 PROCEDURE — 99233 PR SUBSEQUENT HOSPITAL CARE,LEVL III: ICD-10-PCS | Mod: ,,, | Performed by: INTERNAL MEDICINE

## 2022-03-25 PROCEDURE — 80053 COMPREHEN METABOLIC PANEL: CPT | Performed by: INTERNAL MEDICINE

## 2022-03-25 RX ORDER — HYDROMORPHONE HYDROCHLORIDE 1 MG/ML
0.2 INJECTION, SOLUTION INTRAMUSCULAR; INTRAVENOUS; SUBCUTANEOUS EVERY 6 HOURS PRN
Status: DISCONTINUED | OUTPATIENT
Start: 2022-03-25 | End: 2022-03-31

## 2022-03-25 RX ORDER — ACETAMINOPHEN 325 MG/1
650 TABLET ORAL EVERY 8 HOURS PRN
Status: DISCONTINUED | OUTPATIENT
Start: 2022-03-25 | End: 2022-04-02 | Stop reason: HOSPADM

## 2022-03-25 RX ORDER — OXYCODONE HYDROCHLORIDE 5 MG/1
5 TABLET ORAL EVERY 6 HOURS PRN
Status: DISCONTINUED | OUTPATIENT
Start: 2022-03-25 | End: 2022-04-01

## 2022-03-25 RX ORDER — OXYCODONE HYDROCHLORIDE 10 MG/1
10 TABLET ORAL EVERY 6 HOURS PRN
Status: DISCONTINUED | OUTPATIENT
Start: 2022-03-25 | End: 2022-03-25

## 2022-03-25 RX ADMIN — ASPIRIN 81 MG: 81 TABLET, COATED ORAL at 08:03

## 2022-03-25 RX ADMIN — HEPARIN SODIUM 5000 UNITS: 5000 INJECTION INTRAVENOUS; SUBCUTANEOUS at 09:03

## 2022-03-25 RX ADMIN — OXYCODONE HYDROCHLORIDE 10 MG: 10 TABLET ORAL at 12:03

## 2022-03-25 RX ADMIN — ACETAMINOPHEN 1000 MG: 325 TABLET ORAL at 09:03

## 2022-03-25 RX ADMIN — FUROSEMIDE 120 MG: 10 INJECTION, SOLUTION INTRAMUSCULAR; INTRAVENOUS at 03:03

## 2022-03-25 RX ADMIN — DIPHENHYDRAMINE HYDROCHLORIDE 25 MG: 25 CAPSULE ORAL at 08:03

## 2022-03-25 RX ADMIN — DONEPEZIL HYDROCHLORIDE 5 MG: 5 TABLET ORAL at 08:03

## 2022-03-25 RX ADMIN — MUPIROCIN: 20 OINTMENT TOPICAL at 09:03

## 2022-03-25 RX ADMIN — CEFTRIAXONE 1 G: 1 INJECTION, SOLUTION INTRAVENOUS at 09:03

## 2022-03-25 RX ADMIN — MUPIROCIN: 20 OINTMENT TOPICAL at 08:03

## 2022-03-25 RX ADMIN — HYDROMORPHONE HYDROCHLORIDE 0.5 MG: 1 INJECTION, SOLUTION INTRAMUSCULAR; INTRAVENOUS; SUBCUTANEOUS at 03:03

## 2022-03-25 RX ADMIN — HEPARIN SODIUM 5000 UNITS: 5000 INJECTION INTRAVENOUS; SUBCUTANEOUS at 01:03

## 2022-03-25 RX ADMIN — OXYCODONE 5 MG: 5 TABLET ORAL at 08:03

## 2022-03-25 RX ADMIN — PANTOPRAZOLE SODIUM 40 MG: 40 TABLET, DELAYED RELEASE ORAL at 08:03

## 2022-03-25 RX ADMIN — ACETAMINOPHEN 1000 MG: 325 TABLET ORAL at 01:03

## 2022-03-25 RX ADMIN — ATORVASTATIN CALCIUM 20 MG: 20 TABLET, FILM COATED ORAL at 08:03

## 2022-03-25 NOTE — PLAN OF CARE
"      SICU PLAN OF CARE NOTE    Dx: Complete AV block    Shift Events: TVP remains in place. ID not cleared for PPM today will be scheduled for Monday. V-paced @ 66 bpm. Pain managed well with oral Oxy. Adequate UOPper anthony. Tolerating Cardiac diet.    Goals of Care: MAP >65    Neuro: Follows Commands, Moves All Extremities, and Confused Baseline Dementia    Vital Signs: BP (!) 124/52 (BP Location: Right arm, Patient Position: Lying)   Pulse 66   Temp 98.3 °F (36.8 °C) (Oral)   Resp (!) 28   Ht 5' 6" (1.676 m)   Wt 97.3 kg (214 lb 8.1 oz)   LMP  (LMP Unknown)   SpO2 100%   Breastfeeding No   BMI 34.62 kg/m²     Respiratory: Nasal Cannula 1L per NC    Diet: Cardiac Diet    Gtts: none    Urine Output: Urinary Catheter 500 cc/shift    Drains: none    Wound Vac RLE: 0 ml/shift    Labs/Accuchecks: Daily labs.    Skin:       "

## 2022-03-25 NOTE — PROGRESS NOTES
[facility]  Infectious Disease  Progress Note    Patient Name: Lilia Hidalgo  MRN: [id number]  Admission Date: 3/21/2022  Length of Stay: 4 days  Attending Physician: Anirudh Guidry MD  Primary Care Provider: Anna Wood MD    Isolation Status: No active isolations  Assessment/Plan:      Complete AV block  Evaluation for pacemaker clearance   89 yo female who is s/p ORIF of right ankle following fracture at nursing home found to be in complete heart block on admission EKG. Has been receiving transcutaneous venous pacing as bridge to pacemaker placement. However, patient spiked low grade fever of 100.4 F between 3/22 and 3/23. Surgery was performed the night of 3/21 so this could very well be post op fever as reaction to trauma to the ankle. No surgical cultures were collected as suspicion for infection was low during procedure. Blood cultures collected early morning of 3/24 NGTD.     Recommendations:  --Patient remains afebrile with no growth on blood cultures; clear for pacemaker placement from ID standpoint   --May continue ceftriaxone for E coli on urine culture from 3/21 for uncomplicated cystitis course; anticipated stop date 3/26/22        Thank you for your consult. I will sign off. Please contact us if you have any additional questions.    Estiven Vincent, DO  Infectious Disease  [facility]    Subjective:     Principal Problem:Complete AV block    HPI: This is a 89 yo female with history of HTN and urinary incontinence who presented from nursing home due to ankle fracture. On 3/21 she underwent ORIF of right ankle with debridement and irrigation. No cultures were sent. On arrival in ED, patient incidentally noted to be in 3rd degree AV block and was started on transcutaneous venous pacing with plans to bridge to pacemaker. CT of left knee 3/22 noted displaced hardware fracture. Orthopedic plans conservative management for now. On 3/23 patient had  low grade fever of 100.4 F and blood cultures were drawn. As she is in need of permanent pacemaker, Infectious Diseases has been consulted to determine when it would be safe to proceed with pacemaker.     Interval History: Patient seen at bedside. No new complaints. Awaiting pacemaker placement.     Review of Systems   Constitutional:  Negative for chills, fatigue, fever and unexpected weight change.   HENT:  Negative for congestion, postnasal drip and trouble swallowing.    Respiratory:  Negative for cough, chest tightness and shortness of breath.    Cardiovascular:  Negative for chest pain, palpitations and leg swelling.   Gastrointestinal:  Negative for abdominal pain, blood in stool, constipation, diarrhea, nausea and vomiting.   Genitourinary:  Negative for difficulty urinating, dysuria and hematuria.   Musculoskeletal:  Negative for arthralgias and back pain.   Neurological:  Negative for dizziness and light-headedness.   Psychiatric/Behavioral:  Negative for confusion.    Objective:     Vital Signs (Most Recent):  Temp: 98.3 °F (36.8 °C) (03/25/22 1504)  Pulse: 66 (03/25/22 1630)  Resp: (!) 36 (03/25/22 1630)  BP: (!) 113/56 (03/25/22 1630)  SpO2: 100 % (03/25/22 1630)   Vital Signs (24h Range):  Temp:  [97.9 °F (36.6 °C)-99.1 °F (37.3 °C)] 98.3 °F (36.8 °C)  Pulse:  [63-66] 66  Resp:  [14-57] 36  SpO2:  [99 %-100 %] 100 %  BP: (101-139)/(47-62) 113/56     Weight: 97.3 kg (214 lb 8.1 oz)  Body mass index is 34.62 kg/m².    Estimated Creatinine Clearance: 29.3 mL/min (A) (based on SCr of 1.5 mg/dL (H)).    Physical Exam  Constitutional:       General: She is not in acute distress.     Appearance: Normal appearance. She is not ill-appearing.   Cardiovascular:      Rate and Rhythm: Normal rate and regular rhythm.      Pulses: Normal pulses.      Heart sounds: Normal heart sounds. No murmur heard.    No friction rub. No gallop.   Pulmonary:      Effort: Pulmonary effort is normal. No respiratory distress.       Breath sounds: Normal breath sounds.   Abdominal:      General: Abdomen is flat. Bowel sounds are normal. There is no distension.      Palpations: Abdomen is soft.      Tenderness: There is no abdominal tenderness.   Genitourinary:     Comments: Paul intact  Skin:     General: Skin is warm and dry.      Comments: No appreciable wounds; right IJ clean and intact for transcutaneous pacing    Neurological:      Mental Status: She is disoriented.       Significant Labs: All pertinent labs within the past 24 hours have been reviewed.    Significant Imaging: I have reviewed all pertinent imaging results/findings within the past 24 hours.

## 2022-03-25 NOTE — SUBJECTIVE & OBJECTIVE
Interval History: No events overnight  -Pending 48 hrs of culture clearance (03/26) prior to PM placement    Review of Systems   Unable to perform ROS: Dementia   Constitutional: Positive for malaise/fatigue. Negative for chills and fever.     Objective:     Vital Signs (Most Recent):  Temp: 98.4 °F (36.9 °C) (03/25/22 1105)  Pulse: 66 (03/25/22 1115)  Resp: (!) 22 (03/25/22 1201)  BP: (!) 123/53 (03/25/22 1105)  SpO2: 100 % (03/25/22 1115)   Vital Signs (24h Range):  Temp:  [97.9 °F (36.6 °C)-99.1 °F (37.3 °C)] 98.4 °F (36.9 °C)  Pulse:  [63-66] 66  Resp:  [14-57] 22  SpO2:  [98 %-100 %] 100 %  BP: ()/(47-62) 123/53     Weight: 97.3 kg (214 lb 8.1 oz)  Body mass index is 34.62 kg/m².     SpO2: 100 %  O2 Device (Oxygen Therapy): nasal cannula      Intake/Output Summary (Last 24 hours) at 3/25/2022 1204  Last data filed at 3/25/2022 1100  Gross per 24 hour   Intake 528.98 ml   Output 1435 ml   Net -906.02 ml       Lines/Drains/Airways       Central Venous Catheter Line  Duration             Percutaneous Central Line Insertion/Assessment - single lumen  03/21/22 1124 right internal jugular 4 days              Drain  Duration                  Urethral Catheter 03/21/22 1001 Double-lumen 16 Fr. 4 days              Peripheral Intravenous Line  Duration                  Peripheral IV - Single Lumen 03/22/22 0900 20 G Anterior;Left Forearm 3 days         Peripheral IV - Single Lumen 03/22/22 1005 20 G;1 3/4 in Left Upper Arm 3 days                  Physical Exam  Vitals and nursing note reviewed.   Constitutional:       General: She is not in acute distress.     Appearance: She is obese. She is not ill-appearing.   HENT:      Nose: No congestion or rhinorrhea.      Mouth/Throat:      Pharynx: No oropharyngeal exudate or posterior oropharyngeal erythema.   Eyes:      General: No scleral icterus.     Conjunctiva/sclera: Conjunctivae normal.   Cardiovascular:      Rate and Rhythm: Normal rate and regular rhythm.    Pulmonary:      Effort: Pulmonary effort is normal. No respiratory distress.      Breath sounds: No wheezing or rales.   Musculoskeletal:         General: Swelling present.      Right lower leg: Edema present.      Left lower leg: Edema present.   Skin:     General: Skin is warm.      Coloration: Skin is not jaundiced.      Findings: No lesion.   Neurological:      Mental Status: She is alert.     Significant Labs: All pertinent lab results from the last 24 hours have been reviewed.

## 2022-03-25 NOTE — CONSULTS
[facility]  Infectious Disease  Consult Note    Patient Name: Lilia Hidalgo  MRN: [id number]  Admission Date: 3/21/2022  Hospital Length of Stay: 3 days  Attending Physician: Zaira Howell MD  Primary Care Provider: Anna Wood MD     Isolation Status: No active isolations    Patient information was obtained from relative(s) and ER records.      Inpatient consult to Infectious Diseases  Consult performed by: Estiven Vincent DO  Consult ordered by: Becky Mcclure MD        Assessment/Plan:     Complete AV block  Evaluation for pacemaker clearance   89 yo female who is s/p ORIF of right ankle following fracture at nursing home found to be in complete heart block on admission EKG. Has been receiving transcutaneous venous pacing as bridge to pacemaker placement. However, patient spiked low grade fever of 100.4 F between 3/22 and 3/23. Surgery was performed the night of 3/21 so this could very well be post op fever as reaction to trauma to the ankle. No surgical cultures were collected as suspicion for infection was low during procedure. Blood cultures collected early morning of 3/24 NGTD.     Recommendations:  --Would recommend waiting until the morning of 3/26 to ensure blood cultures have remained sterile 48 hrs prior to proceeding to pacemaker placement   --Monitor fever curve to ensure patient remains afebrile at least 48 hrs   --May continue ceftriaxone for E coli on urine culture from 3/21 for uncomplicated cystitis course     Thank you for your consult. I will follow-up with patient. Please contact us if you have any additional questions.    Estiven Vincent DO  Infectious Disease  [facility]    Subjective:     Principal Problem: Complete AV block    HPI: This is a 89 yo female with history of HTN and urinary incontinence who presented from nursing home due to ankle fracture. On 3/21 she underwent ORIF of right ankle with debridement and  "irrigation. No cultures were sent. On arrival in ED, patient incidentally noted to be in 3rd degree AV block and was started on transcutaneous venous pacing with plans to bridge to pacemaker. CT of left knee 3/22 noted displaced hardware fracture. Orthopedic plans conservative management for now. On 3/23 patient had low grade fever of 100.4 F and blood cultures were drawn. As she is in need of permanent pacemaker, Infectious Diseases has been consulted to determine when it would be safe to proceed with pacemaker.       Past Medical History:   Diagnosis Date    Aortic stenosis 6/17/2015    Arthritis     Atrophic vaginitis 2/18/2016    Bilateral edema of lower extremity 6/22/2016    CHF (congestive heart failure)     Cholelithiasis without cholecystitis 5/5/2017    Chronic pain of left knee 8/3/2017    Depressed mood 6/22/2016    Diverticulitis of large intestine without perforation or abscess without bleeding 5/5/2017    Encounter for blood transfusion     Essential hypertension     GERD (gastroesophageal reflux disease)     Hyperlipidemia     Hypertension     Hypertensive heart disease with heart failure 7/14/2015    Insomnia     Joint pain     Knee pain, left 08/2016    pain with walking or standing    Primary osteoarthritis of knee 2/5/2013    PUD (peptic ulcer disease)     S/P knee replacement 2/13/2013    Slow transit constipation 2/18/2016       Past Surgical History:   Procedure Laterality Date    APPENDECTOMY      APPLICATION OF LARGE EXTERNAL FIXATION DEVICE TO TIBIA Right 3/21/2022    Procedure: APPLICATION, EXTERNAL FIXATION DEVICE, LARGE, TIBIA;  Surgeon: Anirudh Carreno MD;  Location: [facility] OR [address] [place] [address];  Service: Orthopedics;  Laterality: Right;    COLONOSCOPY      08    EYE SURGERY      bilateral cataract    FINGER SURGERY      LT thumb surgery--"arthritis surgery"    HYSTERECTOMY      UNKNOWN BSO    JOINT REPLACEMENT      right hip replacement    KNEE ARTHROPLASTY " Left 2001    TONSILLECTOMY      TUMOR EXCISION      esophageal tumor removal     UPPER GASTROINTESTINAL ENDOSCOPY      2013       Review of patient's allergies indicates:   Allergen Reactions    Aspirin Nausea Only and Other (See Comments)     Other reaction(s): esophageal pain    Celebrex [celecoxib] Rash    Morphine Hallucinations    Steroid [corticosteroids (glucocorticoids)] Other (See Comments)     Other reaction(s): Flushing (skin)    Sulfa dyne Other (See Comments)     Other reaction(s): esophogeal pain       Medications:  Medications Prior to Admission   Medication Sig    acetaminophen (TYLENOL) 650 MG TbSR Take 1 tablet (650 mg total) by mouth every 8 (eight) hours as needed. Give 1 tablet QAM, 1 QPM.    atorvastatin (LIPITOR) 20 MG tablet TAKE 1 TAB BY MOUTH AT BEDTIME    cholecalciferol, vitamin D3, 1,250 mcg (50,000 unit) capsule Take 50,000 Units by mouth once daily.    donepeziL (ARICEPT) 5 MG tablet TAKE 1 TAB BY MOUTH EVERY EVENING    EScitalopram oxalate (LEXAPRO) 20 MG tablet TAKE 1 TAB BY MOUTH AT BEDTIME FOR DEPRESSION    furosemide (LASIX) 40 MG tablet TAKE 1 TAB BY MOUTH EVERY DAY    losartan (COZAAR) 100 MG tablet TAKE 1 TAB BY MOUTH ONCE DAILY    melatonin 5 mg Tab Take 1 tablet by mouth every evening.     metoprolol succinate (TOPROL-XL) 200 MG 24 hr tablet TAKE 1 TAB BY MOUTH EVERY DAY    MULTIVITAMIN W-MINERALS/LUTEIN (CENTRUM SILVER ORAL) Take by mouth once daily.    omeprazole (PRILOSEC) 20 MG capsule TAKE 2 CAPS (40MG) BY MOUTH EVERY DAY (DX: GERD)    potassium chloride SA (K-DUR,KLOR-CON) 10 MEQ tablet Take 1 tablet (10 mEq total) by mouth once daily. May also take 2 tablets (20 mEq total) daily as needed (when taking lasix for leg swelling).    furosemide (LASIX) 40 MG tablet TAKE 2 TABLETS (80 MG TOTAL) BY MOUTH 2 (TWO) TIMES DAILY AS NEEDED (FOR WEIGHT GAIN OF 5 POUNDS). TAKE WITH POTASSIUM    nitroGLYCERIN (NITROSTAT) 0.4 MG SL tablet Place 1 tablet (0.4 mg  total) under the tongue every 5 (five) minutes as needed for Chest pain.    nystatin (MYCOSTATIN) cream APPLY TO GROIN AREA TOPICALLY EVERY 12 HOURS FOR ANTIFUNGAL    NYSTOP powder APPLY TO GROIN AREA TOPICALLY EVERY 12 HOURS FOR ANTIFUNGAL     Antibiotics (From admission, onward)                Start     Stop Route Frequency Ordered    03/22/22 1014  cefTRIAXone (ROCEPHIN) 1 g/50 mL D5W IVPB         -- IV Every 24 hours (non-standard times) 03/22/22 1014    03/22/22 0900  mupirocin 2 % ointment         03/27 0859 Nasl 2 times daily 03/22/22 0734          Antifungals (From admission, onward)                None          Antivirals (From admission, onward)      None             Immunization History   Administered Date(s) Administered    Influenza 09/10/2018    Influenza (FLUAD) - Trivalent - Adjuvanted - PF (65+) 09/11/2018    Influenza - High Dose - PF (65 years and older) 10/05/2010, 09/26/2012, 10/11/2015, 11/02/2016, 09/23/2017, 09/25/2019    Influenza - Quadrivalent - PF *Preferred* (6 months and older) 11/05/2007, 10/24/2008, 09/30/2009, 09/20/2011    Influenza - Trivalent (ADULT) 11/05/2007, 10/24/2008, 09/30/2009, 09/20/2011    Influenza Whole 08/28/2013    PPD Test 05/31/2019    Pneumococcal Conjugate - 13 Valent 11/02/2016    Pneumococcal Polysaccharide - 23 Valent 12/12/2018    Tdap 03/21/2022       Family History       Problem Relation (Age of Onset)    Asthma Father    Cancer Brother    Heart disease Mother, Father, Brother          Social History     Socioeconomic History    Marital status:    Tobacco Use    Smoking status: Never Smoker    Smokeless tobacco: Never Used   Substance and Sexual Activity    Alcohol use: No    Drug use: Never     Types: Hydrocodone    Sexual activity: Not Currently     Birth control/protection: None   Other Topics Concern    Are you pregnant or think you may be? No    Breast-feeding No     Social Determinants of Health     Financial Resource  Strain: Unknown    Difficulty of Paying Living Expenses: Patient refused   Food Insecurity: No Food Insecurity    Worried About Running Out of Food in the Last Year: Never true    Ran Out of Food in the Last Year: Never true   Transportation Needs: No Transportation Needs    Lack of Transportation (Medical): No    Lack of Transportation (Non-Medical): No   Physical Activity: Inactive    Days of Exercise per Week: 0 days    Minutes of Exercise per Session: 0 min   Stress: No Stress Concern Present    Feeling of Stress : Only a little   Social Connections: Socially Isolated    Frequency of Communication with Friends and Family: More than three times a week    Frequency of Social Gatherings with Friends and Family: Twice a week    Attends [employer] Services: Never    Active Member of Clubs or Organizations: No    Attends Club or Organization Meetings: Never    Marital Status:    Housing Stability: Low Risk     Unable to Pay for Housing in the Last Year: No    Number of Places Lived in the Last Year: 1    Unstable Housing in the Last Year: No     Review of Systems   Unable to perform ROS: Dementia   Objective:     Vital Signs (Most Recent):  Temp: 98.8 °F (37.1 °C) (03/24/22 1900)  Pulse: 66 (03/24/22 1900)  Resp: (!) 21 (03/24/22 1900)  BP: (!) 113/54 (03/24/22 1900)  SpO2: 100 % (03/24/22 1900)   Vital Signs (24h Range):  Temp:  [97.8 °F (36.6 °C)-99.6 °F (37.6 °C)] 98.8 °F (37.1 °C)  Pulse:  [52-66] 66  Resp:  [13-45] 21  SpO2:  [93 %-100 %] 100 %  BP: ()/(42-60) 113/54     Weight: 97.3 kg (214 lb 8.1 oz)  Body mass index is 34.62 kg/m².    Estimated Creatinine Clearance: 25.9 mL/min (A) (based on SCr of 1.7 mg/dL (H)).    Physical Exam  Constitutional:       General: She is not in acute distress.     Appearance: Normal appearance. She is not ill-appearing.   Cardiovascular:      Rate and Rhythm: Normal rate and regular rhythm.      Pulses: Normal pulses.      Heart sounds: Normal heart  sounds. No murmur heard.    No friction rub. No gallop.   Pulmonary:      Effort: Pulmonary effort is normal. No respiratory distress.      Breath sounds: Normal breath sounds.   Abdominal:      General: Abdomen is flat. Bowel sounds are normal. There is no distension.      Palpations: Abdomen is soft.      Tenderness: There is no abdominal tenderness.   Genitourinary:     Comments: Paul intact  Skin:     General: Skin is warm and dry.      Comments: No appreciable wounds; right IJ clean and intact for transcutaneous pacing    Neurological:      Mental Status: She is disoriented.       Significant Labs: All pertinent labs within the past 24 hours have been reviewed.    Significant Imaging: I have reviewed all pertinent imaging results/findings within the past 24 hours.

## 2022-03-25 NOTE — PROGRESS NOTES
[facility]  Cardiology  Progress Note    Patient Name: Lilia Hidalgo  MRN: [id number]  Admission Date: 3/21/2022  Hospital Length of Stay: 4 days  Code Status: Full Code   Attending Physician: Anirudh Guidry MD   Primary Care Physician: Anna Wood MD  Expected Discharge Date: 3/28/2022  Principal Problem:Complete AV block    Subjective:     Hospital Course:   Ms. Hidalgo was admitted to the hospital for surgical management of a fracture. She was noted to have a CHB, underwent TVP placement for optimization prior to the surgical procedure.     s/p I&D and percutaneous fixation 3/21/22 by Dr. Carreno. Left periprosthetic proximal tibia fracture that is being treated non op in cast. Bcx NGTD. Nephrology consulted for LUANNE - diuresing patient. Ucx with ecoli sensitive to rocephin which she is on. Awaiting ID clearance prior to DC PM implantation.    Interval History: No events overnight  -Pending 48 hrs of culture clearance (03/26) prior to PM placement    Review of Systems   Unable to perform ROS: Dementia   Constitutional: Positive for malaise/fatigue. Negative for chills and fever.     Objective:     Vital Signs (Most Recent):  Temp: 98.4 °F (36.9 °C) (03/25/22 1105)  Pulse: 66 (03/25/22 1115)  Resp: (!) 22 (03/25/22 1201)  BP: (!) 123/53 (03/25/22 1105)  SpO2: 100 % (03/25/22 1115)   Vital Signs (24h Range):  Temp:  [97.9 °F (36.6 °C)-99.1 °F (37.3 °C)] 98.4 °F (36.9 °C)  Pulse:  [63-66] 66  Resp:  [14-57] 22  SpO2:  [98 %-100 %] 100 %  BP: ()/(47-62) 123/53     Weight: 97.3 kg (214 lb 8.1 oz)  Body mass index is 34.62 kg/m².     SpO2: 100 %  O2 Device (Oxygen Therapy): nasal cannula      Intake/Output Summary (Last 24 hours) at 3/25/2022 1204  Last data filed at 3/25/2022 1100  Gross per 24 hour   Intake 528.98 ml   Output 1435 ml   Net -906.02 ml       Lines/Drains/Airways       Central Venous Catheter Line  Duration             Percutaneous Central Line  Insertion/Assessment - single lumen  03/21/22 1124 right internal jugular 4 days              Drain  Duration                  Urethral Catheter 03/21/22 1001 Double-lumen 16 Fr. 4 days              Peripheral Intravenous Line  Duration                  Peripheral IV - Single Lumen 03/22/22 0900 20 G Anterior;Left Forearm 3 days         Peripheral IV - Single Lumen 03/22/22 1005 20 G;1 3/4 in Left Upper Arm 3 days                  Physical Exam  Vitals and nursing note reviewed.   Constitutional:       General: She is not in acute distress.     Appearance: She is obese. She is not ill-appearing.   HENT:      Nose: No congestion or rhinorrhea.      Mouth/Throat:      Pharynx: No oropharyngeal exudate or posterior oropharyngeal erythema.   Eyes:      General: No scleral icterus.     Conjunctiva/sclera: Conjunctivae normal.   Cardiovascular:      Rate and Rhythm: Normal rate and regular rhythm.   Pulmonary:      Effort: Pulmonary effort is normal. No respiratory distress.      Breath sounds: No wheezing or rales.   Musculoskeletal:         General: Swelling present.      Right lower leg: Edema present.      Left lower leg: Edema present.   Skin:     General: Skin is warm.      Coloration: Skin is not jaundiced.      Findings: No lesion.   Neurological:      Mental Status: She is alert.     Significant Labs: All pertinent lab results from the last 24 hours have been reviewed.    Assessment and Plan:     * Complete AV block  Patient presented to ED with ankle fracture and found to be in complete heart block. Currently stable with junctional scape rhythm in the 30s and normal BP.     -TVP in place as bridge to PPM. Will plan for permanent pacemaker once fractures are addressed completely to avoid risk of device infection  - last febrile 3/23- will need 48 hours of no fevers and negative blood cultures before proceeding with PPM-- which is on 03/26; will tentatively have PPM placed on 03/28    Closed fracture of left  proximal tibia  Ortho following. Recommendations:   -- Keep left long leg cast reinforced with bed pan bag at the top to prevent it from getting soiled with urine/feces  -- Continue incisional wound vac right ankle (placed 3/22)  -- Continue heparin TID for dvt ppx per primary team. Continue DVT ppx at discharge (heparin okay or can do ASA 81 bid or LVX)  -- From orthopedic standpoint okay to dc once medically ready (still needs permanent pacemaker placement)  -- FU 1 week after discharge for incisional wound vac removal from right ankle       LUANNE (acute kidney injury)  Likely d/t hypervolemic state vs ATN from [facility]  RP ultrasound with chronic disease, no hydronephrosis    Nephrology consulted- suspect ATN w/ muddy brown casts after spinning urine    --started diuresis w lasix iv 120 bid    Chronic congestive heart failure  Presented with an elevated BNP and appeared hypervolemic  TTE with a normal EF- unable to determine diastolic function given she is being paced    Open ankle fracture, right, type III, initial encounter  S/p external fixation  -pt/ot as tolerated      VTE Risk Mitigation (From admission, onward)         Ordered     heparin (porcine) injection 5,000 Units  Every 8 hours         03/23/22 0800              Mohini Mar MD  Cardiology  [facility]

## 2022-03-25 NOTE — PROGRESS NOTES
[facility]  Cardiac Electrophysiology  Progress Note    Admission Date: 3/21/2022  Code Status: Full Code   Attending Physician: Zaira Howell MD   Expected Discharge Date: 3/28/2022  Principal Problem:Complete AV block    Subjective:     Interval History: Patient was seen this morning, no acute complaints offered. Telemetry showed paced rhythm. Instrinsic rhythm 30 bpm-35bpm with AV dissociation and regular p intervals. TVP at 66 bpm with 10 mA. Threshold unchanged.     Review of Systems   Unable to perform ROS: Dementia   Objective:     Vital Signs (Most Recent):  Temp: 98.3 °F (36.8 °C) (03/25/22 0715)  Pulse: 66 (03/25/22 0930)  Resp: (!) 44 (03/25/22 0930)  BP: (!) 112/52 (03/25/22 0930)  SpO2: 100 % (03/25/22 0930)   Vital Signs (24h Range):  Temp:  [97.9 °F (36.6 °C)-99.1 °F (37.3 °C)] 98.3 °F (36.8 °C)  Pulse:  [63-66] 66  Resp:  [13-49] 44  SpO2:  [98 %-100 %] 100 %  BP: ()/(47-62) 112/52     Weight: 97.3 kg (214 lb 8.1 oz)  Body mass index is 34.62 kg/m².     SpO2: 100 %  O2 Device (Oxygen Therapy): nasal cannula w/ humidification    Physical Exam  Vitals reviewed.   Constitutional:       General: She is not in acute distress.     Appearance: Normal appearance. She is not ill-appearing or toxic-appearing.   HENT:      Head: Normocephalic and atraumatic.      Mouth/Throat:      Mouth: Mucous membranes are moist.   Neck:      Comments: R IJ TVP C/D/I  Cardiovascular:      Rate and Rhythm: Normal rate and regular rhythm.      Heart sounds: No murmur heard.    No friction rub. No gallop.   Pulmonary:      Effort: Pulmonary effort is normal. No respiratory distress.      Breath sounds: Normal breath sounds.   Abdominal:      Palpations: Abdomen is soft.   Musculoskeletal:      Right lower leg: No edema.      Left lower leg: No edema.      Comments: Right ankle surgical cast, wound vac   Skin:     General: Skin is warm.   Neurological:      Mental Status: She is alert. Mental  status is at baseline.     Significant Labs: EP:   Recent Labs   Lab 03/24/22  0333 03/25/22  0234    143   K 4.0 4.2    106   CO2 28 27   * 93   BUN 45* 45*   CREATININE 1.7* 1.5*   CALCIUM 8.0* 8.2*   PROT 4.9* 5.1*   ALBUMIN 2.0* 2.0*   BILITOT 0.5 0.6   ALKPHOS 55 59   AST 20 18   ALT <5* <5*   ANIONGAP 7* 10   ESTGFRAFRICA 30.2* 35.1*   EGFRNONAA 26.2* 30.5*   WBC 5.36 4.90   HGB 8.8* 8.7*   HCT 28.7* 28.7*   * 122*       Significant Imaging: Ejection fraction:   Recent Labs   Lab 03/21/22  1320   EF 65     Assessment and Plan:     * Complete AV block  Patient is a 89 year old female that presented from [facility] for ankle fracture and was found to be in complete heart block in the ED. ECG confirmed heart block with escape rhythm at 35 bpm. Patient underwent TVP placement to address the right ankle open fracture. TVP set at 66 bpm with output 10 mA, threshold 0.6 mA.  - Continue TVP for now, check thresholds daily  - Ankle fracture management per Ortho  - Concern for infection due to febrile episodes early AM on 03/23/2022, dx with E coli UTI, defer to primary for management  - Appreciate ID recommendations, dcPPM placement once blood cultures are clear, likely to be scheduled on Monday  - Continue telemetry monitoring while inpatient    Plan of care was discussed with staff, Dr Rivera.    Dale Soriano MD  Cardiac Electrophysiology, Fellow PGY4  [facility]

## 2022-03-25 NOTE — SUBJECTIVE & OBJECTIVE
"Past Medical History:   Diagnosis Date    Aortic stenosis 6/17/2015    Arthritis     Atrophic vaginitis 2/18/2016    Bilateral edema of lower extremity 6/22/2016    CHF (congestive heart failure)     Cholelithiasis without cholecystitis 5/5/2017    Chronic pain of left knee 8/3/2017    Depressed mood 6/22/2016    Diverticulitis of large intestine without perforation or abscess without bleeding 5/5/2017    Encounter for blood transfusion     Essential hypertension     GERD (gastroesophageal reflux disease)     Hyperlipidemia     Hypertension     Hypertensive heart disease with heart failure 7/14/2015    Insomnia     Joint pain     Knee pain, left 08/2016    pain with walking or standing    Primary osteoarthritis of knee 2/5/2013    PUD (peptic ulcer disease)     S/P knee replacement 2/13/2013    Slow transit constipation 2/18/2016       Past Surgical History:   Procedure Laterality Date    APPENDECTOMY      APPLICATION OF LARGE EXTERNAL FIXATION DEVICE TO TIBIA Right 3/21/2022    Procedure: APPLICATION, EXTERNAL FIXATION DEVICE, LARGE, TIBIA;  Surgeon: Anirudh Carreno MD;  Location: [facility] OR [address] [place] [address];  Service: Orthopedics;  Laterality: Right;    COLONOSCOPY      08    EYE SURGERY      bilateral cataract    FINGER SURGERY      LT thumb surgery--"arthritis surgery"    HYSTERECTOMY      UNKNOWN BSO    JOINT REPLACEMENT      right hip replacement    KNEE ARTHROPLASTY Left 2001    TONSILLECTOMY      TUMOR EXCISION      esophageal tumor removal     UPPER GASTROINTESTINAL ENDOSCOPY      2013       Review of patient's allergies indicates:   Allergen Reactions    Aspirin Nausea Only and Other (See Comments)     Other reaction(s): esophageal pain    Celebrex [celecoxib] Rash    Morphine Hallucinations    Steroid [corticosteroids (glucocorticoids)] Other (See Comments)     Other reaction(s): Flushing (skin)    Sulfa dyne Other (See Comments)     Other reaction(s): esophogeal pain       Medications:  Medications Prior to " Admission   Medication Sig    acetaminophen (TYLENOL) 650 MG TbSR Take 1 tablet (650 mg total) by mouth every 8 (eight) hours as needed. Give 1 tablet QAM, 1 QPM.    atorvastatin (LIPITOR) 20 MG tablet TAKE 1 TAB BY MOUTH AT BEDTIME    cholecalciferol, vitamin D3, 1,250 mcg (50,000 unit) capsule Take 50,000 Units by mouth once daily.    donepeziL (ARICEPT) 5 MG tablet TAKE 1 TAB BY MOUTH EVERY EVENING    EScitalopram oxalate (LEXAPRO) 20 MG tablet TAKE 1 TAB BY MOUTH AT BEDTIME FOR DEPRESSION    furosemide (LASIX) 40 MG tablet TAKE 1 TAB BY MOUTH EVERY DAY    losartan (COZAAR) 100 MG tablet TAKE 1 TAB BY MOUTH ONCE DAILY    melatonin 5 mg Tab Take 1 tablet by mouth every evening.     metoprolol succinate (TOPROL-XL) 200 MG 24 hr tablet TAKE 1 TAB BY MOUTH EVERY DAY    MULTIVITAMIN W-MINERALS/LUTEIN (CENTRUM SILVER ORAL) Take by mouth once daily.    omeprazole (PRILOSEC) 20 MG capsule TAKE 2 CAPS (40MG) BY MOUTH EVERY DAY (DX: GERD)    potassium chloride SA (K-DUR,KLOR-CON) 10 MEQ tablet Take 1 tablet (10 mEq total) by mouth once daily. May also take 2 tablets (20 mEq total) daily as needed (when taking lasix for leg swelling).    furosemide (LASIX) 40 MG tablet TAKE 2 TABLETS (80 MG TOTAL) BY MOUTH 2 (TWO) TIMES DAILY AS NEEDED (FOR WEIGHT GAIN OF 5 POUNDS). TAKE WITH POTASSIUM    nitroGLYCERIN (NITROSTAT) 0.4 MG SL tablet Place 1 tablet (0.4 mg total) under the tongue every 5 (five) minutes as needed for Chest pain.    nystatin (MYCOSTATIN) cream APPLY TO GROIN AREA TOPICALLY EVERY 12 HOURS FOR ANTIFUNGAL    NYSTOP powder APPLY TO GROIN AREA TOPICALLY EVERY 12 HOURS FOR ANTIFUNGAL     Antibiotics (From admission, onward)                Start     Stop Route Frequency Ordered    03/22/22 1014  cefTRIAXone (ROCEPHIN) 1 g/50 mL D5W IVPB         -- IV Every 24 hours (non-standard times) 03/22/22 1014    03/22/22 0900  mupirocin 2 % ointment         03/27 0859 Nasl 2 times daily 03/22/22 0734          Antifungals (From  admission, onward)                None          Antivirals (From admission, onward)      None             Immunization History   Administered Date(s) Administered    Influenza 09/10/2018    Influenza (FLUAD) - Trivalent - Adjuvanted - PF (65+) 09/11/2018    Influenza - High Dose - PF (65 years and older) 10/05/2010, 09/26/2012, 10/11/2015, 11/02/2016, 09/23/2017, 09/25/2019    Influenza - Quadrivalent - PF *Preferred* (6 months and older) 11/05/2007, 10/24/2008, 09/30/2009, 09/20/2011    Influenza - Trivalent (ADULT) 11/05/2007, 10/24/2008, 09/30/2009, 09/20/2011    Influenza Whole 08/28/2013    PPD Test 05/31/2019    Pneumococcal Conjugate - 13 Valent 11/02/2016    Pneumococcal Polysaccharide - 23 Valent 12/12/2018    Tdap 03/21/2022       Family History       Problem Relation (Age of Onset)    Asthma Father    Cancer Brother    Heart disease Mother, Father, Brother          Social History     Socioeconomic History    Marital status:    Tobacco Use    Smoking status: Never Smoker    Smokeless tobacco: Never Used   Substance and Sexual Activity    Alcohol use: No    Drug use: Never     Types: Hydrocodone    Sexual activity: Not Currently     Birth control/protection: None   Other Topics Concern    Are you pregnant or think you may be? No    Breast-feeding No     Social Determinants of Health     Financial Resource Strain: Unknown    Difficulty of Paying Living Expenses: Patient refused   Food Insecurity: No Food Insecurity    Worried About Running Out of Food in the Last Year: Never true    Ran Out of Food in the Last Year: Never true   Transportation Needs: No Transportation Needs    Lack of Transportation (Medical): No    Lack of Transportation (Non-Medical): No   Physical Activity: Inactive    Days of Exercise per Week: 0 days    Minutes of Exercise per Session: 0 min   Stress: No Stress Concern Present    Feeling of Stress : Only a little   Social Connections: Socially Isolated    Frequency of  Communication with Friends and Family: More than three times a week    Frequency of Social Gatherings with Friends and Family: Twice a week    Attends Alevism Services: Never    Active Member of Clubs or Organizations: No    Attends Club or Organization Meetings: Never    Marital Status:    Housing Stability: Low Risk     Unable to Pay for Housing in the Last Year: No    Number of Places Lived in the Last Year: 1    Unstable Housing in the Last Year: No     Review of Systems   Unable to perform ROS: Dementia   Objective:     Vital Signs (Most Recent):  Temp: 98.8 °F (37.1 °C) (03/24/22 1900)  Pulse: 66 (03/24/22 1900)  Resp: (!) 21 (03/24/22 1900)  BP: (!) 113/54 (03/24/22 1900)  SpO2: 100 % (03/24/22 1900)   Vital Signs (24h Range):  Temp:  [97.8 °F (36.6 °C)-99.6 °F (37.6 °C)] 98.8 °F (37.1 °C)  Pulse:  [52-66] 66  Resp:  [13-45] 21  SpO2:  [93 %-100 %] 100 %  BP: ()/(42-60) 113/54     Weight: 97.3 kg (214 lb 8.1 oz)  Body mass index is 34.62 kg/m².    Estimated Creatinine Clearance: 25.9 mL/min (A) (based on SCr of 1.7 mg/dL (H)).    Physical Exam  Constitutional:       General: She is not in acute distress.     Appearance: Normal appearance. She is not ill-appearing.   Cardiovascular:      Rate and Rhythm: Normal rate and regular rhythm.      Pulses: Normal pulses.      Heart sounds: Normal heart sounds. No murmur heard.    No friction rub. No gallop.   Pulmonary:      Effort: Pulmonary effort is normal. No respiratory distress.      Breath sounds: Normal breath sounds.   Abdominal:      General: Abdomen is flat. Bowel sounds are normal. There is no distension.      Palpations: Abdomen is soft.      Tenderness: There is no abdominal tenderness.   Genitourinary:     Comments: Paul intact  Skin:     General: Skin is warm and dry.      Comments: No appreciable wounds; right IJ clean and intact for transcutaneous pacing    Neurological:      Mental Status: She is disoriented.       Significant Labs:  All pertinent labs within the past 24 hours have been reviewed.    Significant Imaging: I have reviewed all pertinent imaging results/findings within the past 24 hours.

## 2022-03-25 NOTE — SUBJECTIVE & OBJECTIVE
Interval History: Patient was seen this morning, no acute complaints offered. Telemetry showed paced rhythm. Instrinsic rhythm 30 bpm-35bpm with AV dissociation and regular p intervals. TVP at 66 bpm with 10 mA. Threshold unchanged.     Review of Systems   Unable to perform ROS: Dementia   Objective:     Vital Signs (Most Recent):  Temp: 98.3 °F (36.8 °C) (03/25/22 0715)  Pulse: 66 (03/25/22 0930)  Resp: (!) 44 (03/25/22 0930)  BP: (!) 112/52 (03/25/22 0930)  SpO2: 100 % (03/25/22 0930)   Vital Signs (24h Range):  Temp:  [97.9 °F (36.6 °C)-99.1 °F (37.3 °C)] 98.3 °F (36.8 °C)  Pulse:  [63-66] 66  Resp:  [13-49] 44  SpO2:  [98 %-100 %] 100 %  BP: ()/(47-62) 112/52     Weight: 97.3 kg (214 lb 8.1 oz)  Body mass index is 34.62 kg/m².     SpO2: 100 %  O2 Device (Oxygen Therapy): nasal cannula w/ humidification    Physical Exam  Vitals reviewed.   Constitutional:       General: She is not in acute distress.     Appearance: Normal appearance. She is not ill-appearing or toxic-appearing.   HENT:      Head: Normocephalic and atraumatic.      Mouth/Throat:      Mouth: Mucous membranes are moist.   Neck:      Comments: R IJ TVP C/D/I  Cardiovascular:      Rate and Rhythm: Normal rate and regular rhythm.      Heart sounds: No murmur heard.    No friction rub. No gallop.   Pulmonary:      Effort: Pulmonary effort is normal. No respiratory distress.      Breath sounds: Normal breath sounds.   Abdominal:      Palpations: Abdomen is soft.   Musculoskeletal:      Right lower leg: No edema.      Left lower leg: No edema.      Comments: Right ankle surgical cast, wound vac   Skin:     General: Skin is warm.   Neurological:      Mental Status: She is alert. Mental status is at baseline.     Significant Labs: EP:   Recent Labs   Lab 03/24/22  0333 03/25/22  0234    143   K 4.0 4.2    106   CO2 28 27   * 93   BUN 45* 45*   CREATININE 1.7* 1.5*   CALCIUM 8.0* 8.2*   PROT 4.9* 5.1*   ALBUMIN 2.0* 2.0*   BILITOT  0.5 0.6   ALKPHOS 55 59   AST 20 18   ALT <5* <5*   ANIONGAP 7* 10   ESTGFRAFRICA 30.2* 35.1*   EGFRNONAA 26.2* 30.5*   WBC 5.36 4.90   HGB 8.8* 8.7*   HCT 28.7* 28.7*   * 122*       Significant Imaging: Ejection fraction:   Recent Labs   Lab 03/21/22  1320   EF 65

## 2022-03-25 NOTE — SUBJECTIVE & OBJECTIVE
Interval History: Patient seen at bedside. No new complaints. Awaiting pacemaker placement.     Review of Systems   Constitutional:  Negative for chills, fatigue, fever and unexpected weight change.   HENT:  Negative for congestion, postnasal drip and trouble swallowing.    Respiratory:  Negative for cough, chest tightness and shortness of breath.    Cardiovascular:  Negative for chest pain, palpitations and leg swelling.   Gastrointestinal:  Negative for abdominal pain, blood in stool, constipation, diarrhea, nausea and vomiting.   Genitourinary:  Negative for difficulty urinating, dysuria and hematuria.   Musculoskeletal:  Negative for arthralgias and back pain.   Neurological:  Negative for dizziness and light-headedness.   Psychiatric/Behavioral:  Negative for confusion.    Objective:     Vital Signs (Most Recent):  Temp: 98.3 °F (36.8 °C) (03/25/22 1504)  Pulse: 66 (03/25/22 1630)  Resp: (!) 36 (03/25/22 1630)  BP: (!) 113/56 (03/25/22 1630)  SpO2: 100 % (03/25/22 1630)   Vital Signs (24h Range):  Temp:  [97.9 °F (36.6 °C)-99.1 °F (37.3 °C)] 98.3 °F (36.8 °C)  Pulse:  [63-66] 66  Resp:  [14-57] 36  SpO2:  [99 %-100 %] 100 %  BP: (101-139)/(47-62) 113/56     Weight: 97.3 kg (214 lb 8.1 oz)  Body mass index is 34.62 kg/m².    Estimated Creatinine Clearance: 29.3 mL/min (A) (based on SCr of 1.5 mg/dL (H)).    Physical Exam  Constitutional:       General: She is not in acute distress.     Appearance: Normal appearance. She is not ill-appearing.   Cardiovascular:      Rate and Rhythm: Normal rate and regular rhythm.      Pulses: Normal pulses.      Heart sounds: Normal heart sounds. No murmur heard.    No friction rub. No gallop.   Pulmonary:      Effort: Pulmonary effort is normal. No respiratory distress.      Breath sounds: Normal breath sounds.   Abdominal:      General: Abdomen is flat. Bowel sounds are normal. There is no distension.      Palpations: Abdomen is soft.      Tenderness: There is no abdominal  tenderness.   Genitourinary:     Comments: Paul intact  Skin:     General: Skin is warm and dry.      Comments: No appreciable wounds; right IJ clean and intact for transcutaneous pacing    Neurological:      Mental Status: She is disoriented.       Significant Labs: All pertinent labs within the past 24 hours have been reviewed.    Significant Imaging: I have reviewed all pertinent imaging results/findings within the past 24 hours.

## 2022-03-25 NOTE — ASSESSMENT & PLAN NOTE
89 yo female who is s/p ORIF of right ankle following fracture at nursing home found to be in complete heart block on admission EKG. Has been receiving transcutaneous venous pacing as bridge to pacemaker placement. However, patient spiked low grade fever of 100.4 F between 3/22 and 3/23. Surgery was performed the night of 3/21 so this could very well be post op fever as reaction to trauma to the ankle. No surgical cultures were collected as suspicion for infection was low during procedure. Blood cultures collected early morning of 3/24 NGTD.     Recommendations:  --Patient remains afebrile with no growth on blood cultures; clear for pacemaker placement from ID standpoint   --May continue ceftriaxone for E coli on urine culture from 3/21 for uncomplicated cystitis course

## 2022-03-25 NOTE — ASSESSMENT & PLAN NOTE
Patient presented to ED with ankle fracture and found to be in complete heart block. Currently stable with junctional scape rhythm in the 30s and normal BP.     -TVP in place as bridge to PPM. Will plan for permanent pacemaker once fractures are addressed completely to avoid risk of device infection  - last febrile 3/23- will need 48 hours of no fevers and negative blood cultures before proceeding with PPM-- which is on 03/26; will tentatively have PPM placed on 03/28

## 2022-03-25 NOTE — SUBJECTIVE & OBJECTIVE
Interval History: patient made 1295 cc of urine in last 24 hours. Neg 516 cc in last 24 hours     Review of patient's allergies indicates:   Allergen Reactions    Aspirin Nausea Only and Other (See Comments)     Other reaction(s): esophageal pain    Celebrex [celecoxib] Rash    Morphine Hallucinations    Steroid [corticosteroids (glucocorticoids)] Other (See Comments)     Other reaction(s): Flushing (skin)    Sulfa dyne Other (See Comments)     Other reaction(s): esophogeal pain     Current Facility-Administered Medications   Medication Frequency    0.9%  NaCl infusion Continuous    acetaminophen tablet 1,000 mg Q8H    aspirin EC tablet 81 mg BID    atorvastatin tablet 20 mg QHS    cefTRIAXone (ROCEPHIN) 1 g/50 mL D5W IVPB Q24H    cholecalciferol (vitamin D3) 125 mcg (5,000 unit) tablet 50,000 Units Weekly    diphenhydrAMINE capsule 25 mg Q6H PRN    donepeziL tablet 5 mg QHS    furosemide injection 120 mg Q12H    heparin (porcine) injection 5,000 Units Q8H    HYDROmorphone injection 0.5 mg Q6H PRN    mupirocin 2 % ointment BID    oxyCODONE immediate release tablet 5 mg Q6H PRN    pantoprazole EC tablet 40 mg Daily       Objective:     Vital Signs (Most Recent):  Temp: 98.3 °F (36.8 °C) (03/25/22 0715)  Pulse: 66 (03/25/22 0808)  Resp: (!) 22 (03/25/22 0808)  BP: (!) 120/55 (03/25/22 0800)  SpO2: 100 % (03/25/22 0808)  O2 Device (Oxygen Therapy): nasal cannula (03/25/22 0808)   Vital Signs (24h Range):  Temp:  [97.9 °F (36.6 °C)-99.1 °F (37.3 °C)] 98.3 °F (36.8 °C)  Pulse:  [63-66] 66  Resp:  [13-48] 22  SpO2:  [98 %-100 %] 100 %  BP: ()/(47-62) 120/55     Weight: 97.3 kg (214 lb 8.1 oz) (03/24/22 1500)  Body mass index is 34.62 kg/m².  Body surface area is 2.13 meters squared.    I/O last 3 completed shifts:  In: 1077.3 [P.O.:670; I.V.:358.5; IV Piggyback:48.8]  Out: 1800 [Urine:1800]    Physical Exam  Vitals reviewed.   Constitutional:       Comments: Frail   Eyes:      Conjunctiva/sclera: Conjunctivae  normal.   Cardiovascular:      Rate and Rhythm: Normal rate and regular rhythm.   Pulmonary:      Comments: Decreased air entry B/L   Abdominal:      General: Bowel sounds are normal.      Palpations: Abdomen is soft.      Tenderness: There is no abdominal tenderness. There is no right CVA tenderness, left CVA tenderness or guarding.   Musculoskeletal:      Right lower leg: Edema present.      Left lower leg: Edema present.      Comments: Edema improving in UE    Skin:     Coloration: Skin is not jaundiced.      Findings: No rash.   Neurological:      Mental Status: She is disoriented.       Significant Labs:  CBC:   Recent Labs   Lab 03/25/22  0234   WBC 4.90   RBC 3.08*   HGB 8.7*   HCT 28.7*   *   MCV 93   MCH 28.2   MCHC 30.3*       CMP:   Recent Labs   Lab 03/25/22 0234   GLU 93   CALCIUM 8.2*   ALBUMIN 2.0*   PROT 5.1*      K 4.2   CO2 27      BUN 45*   CREATININE 1.5*   ALKPHOS 59   ALT <5*   AST 18   BILITOT 0.6          Significant Imaging:  Reviewed

## 2022-03-25 NOTE — PROGRESS NOTES
[facility]  Nephrology  Progress Note    Patient Name: Lilia Hidalgo  MRN: [id number]  Admission Date: 3/21/2022  Hospital Length of Stay: 4 days  Attending Provider: Zaira Howell MD   Primary Care Physician: Anna Wood MD  Principal Problem:Complete AV block    Subjective:     HPI:   89 y.o. female with HTN, MDD, mild cognitive impairment, and urinary incontinence. She is brought to ED from nursing home due to a ankle fracture. She uses a wheelchair and was being assisted to transfer from the toilet to her wheelchair when she twisted her ankle and fractured it. No reports of syncope. On arrival to ED, an ECG showed 3rd degree AV block and cardiology was consulted. She is awake and complaining of pain. Not able to give a detailed history due to patient's dementia.     Nephrology consulted for LUANNE       Interval History: patient made 1295 cc of urine in last 24 hours. Neg 516 cc in last 24 hours     Review of patient's allergies indicates:   Allergen Reactions    Aspirin Nausea Only and Other (See Comments)     Other reaction(s): esophageal pain    Celebrex [celecoxib] Rash    Morphine Hallucinations    Steroid [corticosteroids (glucocorticoids)] Other (See Comments)     Other reaction(s): Flushing (skin)    Sulfa dyne Other (See Comments)     Other reaction(s): esophogeal pain     Current Facility-Administered Medications   Medication Frequency    0.9%  NaCl infusion Continuous    acetaminophen tablet 1,000 mg Q8H    aspirin EC tablet 81 mg BID    atorvastatin tablet 20 mg QHS    cefTRIAXone (ROCEPHIN) 1 g/50 mL D5W IVPB Q24H    cholecalciferol (vitamin D3) 125 mcg (5,000 unit) tablet 50,000 Units Weekly    diphenhydrAMINE capsule 25 mg Q6H PRN    donepeziL tablet 5 mg QHS    furosemide injection 120 mg Q12H    heparin (porcine) injection 5,000 Units Q8H    HYDROmorphone injection 0.5 mg Q6H PRN    mupirocin 2 % ointment BID    oxyCODONE immediate release  tablet 5 mg Q6H PRN    pantoprazole EC tablet 40 mg Daily       Objective:     Vital Signs (Most Recent):  Temp: 98.3 °F (36.8 °C) (03/25/22 0715)  Pulse: 66 (03/25/22 0808)  Resp: (!) 22 (03/25/22 0808)  BP: (!) 120/55 (03/25/22 0800)  SpO2: 100 % (03/25/22 0808)  O2 Device (Oxygen Therapy): nasal cannula (03/25/22 0808)   Vital Signs (24h Range):  Temp:  [97.9 °F (36.6 °C)-99.1 °F (37.3 °C)] 98.3 °F (36.8 °C)  Pulse:  [63-66] 66  Resp:  [13-48] 22  SpO2:  [98 %-100 %] 100 %  BP: ()/(47-62) 120/55     Weight: 97.3 kg (214 lb 8.1 oz) (03/24/22 1500)  Body mass index is 34.62 kg/m².  Body surface area is 2.13 meters squared.    I/O last 3 completed shifts:  In: 1077.3 [P.O.:670; I.V.:358.5; IV Piggyback:48.8]  Out: 1800 [Urine:1800]    Physical Exam  Vitals reviewed.   Constitutional:       Comments: Frail   Eyes:      Conjunctiva/sclera: Conjunctivae normal.   Cardiovascular:      Rate and Rhythm: Normal rate and regular rhythm.   Pulmonary:      Comments: Decreased air entry B/L   Abdominal:      General: Bowel sounds are normal.      Palpations: Abdomen is soft.      Tenderness: There is no abdominal tenderness. There is no right CVA tenderness, left CVA tenderness or guarding.   Musculoskeletal:      Right lower leg: Edema present.      Left lower leg: Edema present.      Comments: Edema improving in UE    Skin:     Coloration: Skin is not jaundiced.      Findings: No rash.   Neurological:      Mental Status: She is disoriented.       Significant Labs:  CBC:   Recent Labs   Lab 03/25/22  0234   WBC 4.90   RBC 3.08*   HGB 8.7*   HCT 28.7*   *   MCV 93   MCH 28.2   MCHC 30.3*       CMP:   Recent Labs   Lab 03/25/22  0234   GLU 93   CALCIUM 8.2*   ALBUMIN 2.0*   PROT 5.1*      K 4.2   CO2 27      BUN 45*   CREATININE 1.5*   ALKPHOS 59   ALT <5*   AST 18   BILITOT 0.6          Significant Imaging:  Reviewed     Assessment/Plan:     * Complete AV block  Per primary     LUANNE (acute kidney  injury)  Patient with LUANNE. No known history of CKD   1.5 on admission then up to 1.9 and today is 1.5 improving with diuretics   1295 cc urine OPT in last 24 hours and neg 516   Renal US no hydro   Initial thought process was ATN given heart block and also had dropped in MAPS on 3/21 and 3/22 but given that LUANNE is improving with diuretics fits more of a cardiorenal picture   Urine sed with occasional granular casts   Patient hypervolemic on exam but improving   C/w lasix 120 mg BID IV for now         Chronic congestive heart failure  Per primary     Open ankle fracture, right, type III, initial encounter  Per primary           Juanito Weldon MD  Nephrology  [facility]

## 2022-03-25 NOTE — ASSESSMENT & PLAN NOTE
Patient is a 89 year old female that presented from [facility] for ankle fracture and was found to be in complete heart block in the ED. ECG confirmed heart block with escape rhythm at 35 bpm. Patient underwent TVP placement to address the right ankle open fracture. TVP set at 66 bpm with output 10 mA, threshold 0.6 mA.  - Continue TVP for now, check thresholds daily  - Ankle fracture management per Ortho  - Concern for infection due to febrile episodes early AM on 03/23/2022, dx with E coli UTI, defer to primary for management  - Appreciate ID recommendations, dcPPM placement once blood cultures are clear, likely to be scheduled on Monday  - Continue telemetry monitoring while inpatient

## 2022-03-25 NOTE — PLAN OF CARE
Afebrile, no issues overnight. Wound vac to continuous suction, no output. NPO since midnight. 1 set of blood cx drawn and sent. Afebrile. 2 L NC mainaining SpO2>95%. UO marginal, see I&O's. Pain controlled with PRN Dilaudid & hydromorphone. Disoriented to time & place, easily reoriented and redirectable. Turned Q2 hr & assisted with weight shifting, no new skin breakdown noted.

## 2022-03-25 NOTE — ASSESSMENT & PLAN NOTE
89 yo female who is s/p ORIF of right ankle following fracture at nursing home found to be in complete heart block on admission EKG. Has been receiving transcutaneous venous pacing as bridge to pacemaker placement. However, patient spiked low grade fever of 100.4 F between 3/22 and 3/23. Surgery was performed the night of 3/21 so this could very well be post op fever as reaction to trauma to the ankle. No surgical cultures were collected as suspicion for infection was low during procedure. Blood cultures collected early morning of 3/24 NGTD.     Recommendations:  --Would recommend waiting until the morning of 3/26 to ensure blood cultures have remained sterile 48 hrs prior to proceeding to pacemaker placement   --Patient will be afebrile 48 hrs tomorrow morning which would also make pacemaker placement feasible once blood cultures are confirmed sterile   --May continue ceftriaxone for E coli on urine culture from 3/21 for uncomplicated cystitis course

## 2022-03-25 NOTE — ASSESSMENT & PLAN NOTE
Patient with LUANNE. No known history of CKD   1.5 on admission then up to 1.9 and today is 1.5 improving with diuretics   1295 cc urine OPT in last 24 hours and neg 516   Renal US no hydro   Initial thought process was ATN given heart block and also had dropped in MAPS on 3/21 and 3/22 but given that LUANNE is improving with diuretics fits more of a cardiorenal picture   Urine sed with occasional granular casts   Patient hypervolemic on exam but improving   C/w lasix 120 mg BID IV for now

## 2022-03-25 NOTE — SUBJECTIVE & OBJECTIVE
"Principal Problem:Complete AV block    Principal Orthopedic Problem: same    Interval History: NAEON. VS wnl except intermittent HTN. Bcx NGTD. Casts cdi. Physical therapy has not been by in several days. Cardiology planning to place pacemaker today.     Review of patient's allergies indicates:   Allergen Reactions    Aspirin Nausea Only and Other (See Comments)     Other reaction(s): esophageal pain    Celebrex [celecoxib] Rash    Morphine Hallucinations    Steroid [corticosteroids (glucocorticoids)] Other (See Comments)     Other reaction(s): Flushing (skin)    Sulfa dyne Other (See Comments)     Other reaction(s): esophogeal pain       Current Facility-Administered Medications   Medication    0.9%  NaCl infusion    acetaminophen tablet 1,000 mg    aspirin EC tablet 81 mg    atorvastatin tablet 20 mg    cefTRIAXone (ROCEPHIN) 1 g/50 mL D5W IVPB    cholecalciferol (vitamin D3) 125 mcg (5,000 unit) tablet 50,000 Units    diphenhydrAMINE capsule 25 mg    donepeziL tablet 5 mg    furosemide injection 120 mg    heparin (porcine) injection 5,000 Units    HYDROmorphone injection 0.5 mg    mupirocin 2 % ointment    oxyCODONE immediate release tablet Tab 10 mg    pantoprazole EC tablet 40 mg     Objective:     Vital Signs (Most Recent):  Temp: 98.4 °F (36.9 °C) (03/25/22 1105)  Pulse: 66 (03/25/22 1200)  Resp: (!) 22 (03/25/22 1201)  BP: (!) 122/56 (03/25/22 1200)  SpO2: 100 % (03/25/22 1200)   Vital Signs (24h Range):  Temp:  [97.9 °F (36.6 °C)-99.1 °F (37.3 °C)] 98.4 °F (36.9 °C)  Pulse:  [63-66] 66  Resp:  [14-57] 22  SpO2:  [98 %-100 %] 100 %  BP: ()/(47-62) 122/56     Weight: 97.3 kg (214 lb 8.1 oz)  Height: 5' 6" (167.6 cm)  Body mass index is 34.62 kg/m².      Intake/Output Summary (Last 24 hours) at 3/25/2022 1212  Last data filed at 3/25/2022 1200  Gross per 24 hour   Intake 538.97 ml   Output 1510 ml   Net -971.03 ml         Ortho/SPM Exam  NAD, demented    RLE  Prevena right ankle intact good seal  Short " leg cast in place, cdi  NVI distally    LLE  Long leg cast in place, cdi  NVI distally    Significant Labs: BMP:   Recent Labs   Lab 03/25/22  0234   GLU 93      K 4.2      CO2 27   BUN 45*   CREATININE 1.5*   CALCIUM 8.2*   MG 2.1       CBC:   Recent Labs   Lab 03/24/22  0333 03/25/22  0234   WBC 5.36 4.90   HGB 8.8* 8.7*   HCT 28.7* 28.7*   * 122*       All pertinent labs within the past 24 hours have been reviewed.    Significant Imaging: I have reviewed all pertinent imaging results/findings.

## 2022-03-26 LAB
ALBUMIN SERPL BCP-MCNC: 1.8 G/DL (ref 3.5–5.2)
ALP SERPL-CCNC: 62 U/L (ref 55–135)
ALT SERPL W/O P-5'-P-CCNC: <5 U/L (ref 10–44)
ANION GAP SERPL CALC-SCNC: 8 MMOL/L (ref 8–16)
AST SERPL-CCNC: 18 U/L (ref 10–40)
BASOPHILS # BLD AUTO: 0.02 K/UL (ref 0–0.2)
BASOPHILS NFR BLD: 0.4 % (ref 0–1.9)
BILIRUB SERPL-MCNC: 0.5 MG/DL (ref 0.1–1)
BUN SERPL-MCNC: 47 MG/DL (ref 8–23)
CA-I BLDV-SCNC: 1.14 MMOL/L (ref 1.06–1.42)
CALCIUM SERPL-MCNC: 8.7 MG/DL (ref 8.7–10.5)
CHLORIDE SERPL-SCNC: 104 MMOL/L (ref 95–110)
CO2 SERPL-SCNC: 31 MMOL/L (ref 23–29)
CREAT SERPL-MCNC: 1.3 MG/DL (ref 0.5–1.4)
DIFFERENTIAL METHOD: ABNORMAL
EOSINOPHIL # BLD AUTO: 0.2 K/UL (ref 0–0.5)
EOSINOPHIL NFR BLD: 3.5 % (ref 0–8)
ERYTHROCYTE [DISTWIDTH] IN BLOOD BY AUTOMATED COUNT: 15.8 % (ref 11.5–14.5)
EST. GFR  (AFRICAN AMERICAN): 41.7 ML/MIN/1.73 M^2
EST. GFR  (NON AFRICAN AMERICAN): 36.2 ML/MIN/1.73 M^2
GLUCOSE SERPL-MCNC: 89 MG/DL (ref 70–110)
HCT VFR BLD AUTO: 29 % (ref 37–48.5)
HGB BLD-MCNC: 9 G/DL (ref 12–16)
IMM GRANULOCYTES # BLD AUTO: 0.03 K/UL (ref 0–0.04)
IMM GRANULOCYTES NFR BLD AUTO: 0.6 % (ref 0–0.5)
LYMPHOCYTES # BLD AUTO: 0.5 K/UL (ref 1–4.8)
LYMPHOCYTES NFR BLD: 9.5 % (ref 18–48)
MAGNESIUM SERPL-MCNC: 2.2 MG/DL (ref 1.6–2.6)
MCH RBC QN AUTO: 29.1 PG (ref 27–31)
MCHC RBC AUTO-ENTMCNC: 31 G/DL (ref 32–36)
MCV RBC AUTO: 94 FL (ref 82–98)
MONOCYTES # BLD AUTO: 0.8 K/UL (ref 0.3–1)
MONOCYTES NFR BLD: 15.2 % (ref 4–15)
NEUTROPHILS # BLD AUTO: 3.9 K/UL (ref 1.8–7.7)
NEUTROPHILS NFR BLD: 70.8 % (ref 38–73)
NRBC BLD-RTO: 0 /100 WBC
PHOSPHATE SERPL-MCNC: 2.9 MG/DL (ref 2.7–4.5)
PLATELET # BLD AUTO: 156 K/UL (ref 150–450)
PMV BLD AUTO: 11.1 FL (ref 9.2–12.9)
POTASSIUM SERPL-SCNC: 3.9 MMOL/L (ref 3.5–5.1)
PROT SERPL-MCNC: 5.4 G/DL (ref 6–8.4)
RBC # BLD AUTO: 3.09 M/UL (ref 4–5.4)
SODIUM SERPL-SCNC: 143 MMOL/L (ref 136–145)
WBC # BLD AUTO: 5.45 K/UL (ref 3.9–12.7)

## 2022-03-26 PROCEDURE — 83735 ASSAY OF MAGNESIUM: CPT | Performed by: INTERNAL MEDICINE

## 2022-03-26 PROCEDURE — 27000221 HC OXYGEN, UP TO 24 HOURS

## 2022-03-26 PROCEDURE — 25000003 PHARM REV CODE 250: Performed by: STUDENT IN AN ORGANIZED HEALTH CARE EDUCATION/TRAINING PROGRAM

## 2022-03-26 PROCEDURE — 84100 ASSAY OF PHOSPHORUS: CPT | Performed by: INTERNAL MEDICINE

## 2022-03-26 PROCEDURE — 25000003 PHARM REV CODE 250: Performed by: INTERNAL MEDICINE

## 2022-03-26 PROCEDURE — 80053 COMPREHEN METABOLIC PANEL: CPT | Performed by: INTERNAL MEDICINE

## 2022-03-26 PROCEDURE — 99233 SBSQ HOSP IP/OBS HIGH 50: CPT | Mod: ,,, | Performed by: INTERNAL MEDICINE

## 2022-03-26 PROCEDURE — 99232 PR SUBSEQUENT HOSPITAL CARE,LEVL II: ICD-10-PCS | Mod: ,,, | Performed by: INTERNAL MEDICINE

## 2022-03-26 PROCEDURE — 99232 PR SUBSEQUENT HOSPITAL CARE,LEVL II: ICD-10-PCS | Mod: GC,,, | Performed by: INTERNAL MEDICINE

## 2022-03-26 PROCEDURE — 20000000 HC ICU ROOM

## 2022-03-26 PROCEDURE — 99233 PR SUBSEQUENT HOSPITAL CARE,LEVL III: ICD-10-PCS | Mod: ,,, | Performed by: INTERNAL MEDICINE

## 2022-03-26 PROCEDURE — 82330 ASSAY OF CALCIUM: CPT | Performed by: INTERNAL MEDICINE

## 2022-03-26 PROCEDURE — 99900035 HC TECH TIME PER 15 MIN (STAT)

## 2022-03-26 PROCEDURE — 63600175 PHARM REV CODE 636 W HCPCS: Performed by: STUDENT IN AN ORGANIZED HEALTH CARE EDUCATION/TRAINING PROGRAM

## 2022-03-26 PROCEDURE — 99232 SBSQ HOSP IP/OBS MODERATE 35: CPT | Mod: ,,, | Performed by: INTERNAL MEDICINE

## 2022-03-26 PROCEDURE — 85025 COMPLETE CBC W/AUTO DIFF WBC: CPT | Performed by: INTERNAL MEDICINE

## 2022-03-26 PROCEDURE — 99232 SBSQ HOSP IP/OBS MODERATE 35: CPT | Mod: GC,,, | Performed by: INTERNAL MEDICINE

## 2022-03-26 PROCEDURE — 63600175 PHARM REV CODE 636 W HCPCS: Performed by: INTERNAL MEDICINE

## 2022-03-26 PROCEDURE — 94761 N-INVAS EAR/PLS OXIMETRY MLT: CPT

## 2022-03-26 RX ADMIN — SODIUM CHLORIDE: 0.9 INJECTION, SOLUTION INTRAVENOUS at 04:03

## 2022-03-26 RX ADMIN — HEPARIN SODIUM 5000 UNITS: 5000 INJECTION INTRAVENOUS; SUBCUTANEOUS at 09:03

## 2022-03-26 RX ADMIN — OXYCODONE 5 MG: 5 TABLET ORAL at 06:03

## 2022-03-26 RX ADMIN — MUPIROCIN: 20 OINTMENT TOPICAL at 09:03

## 2022-03-26 RX ADMIN — OXYCODONE 5 MG: 5 TABLET ORAL at 11:03

## 2022-03-26 RX ADMIN — FUROSEMIDE 120 MG: 10 INJECTION, SOLUTION INTRAMUSCULAR; INTRAVENOUS at 04:03

## 2022-03-26 RX ADMIN — MUPIROCIN: 20 OINTMENT TOPICAL at 08:03

## 2022-03-26 RX ADMIN — PANTOPRAZOLE SODIUM 40 MG: 40 TABLET, DELAYED RELEASE ORAL at 08:03

## 2022-03-26 RX ADMIN — CEFTRIAXONE 1 G: 1 INJECTION, SOLUTION INTRAVENOUS at 10:03

## 2022-03-26 RX ADMIN — DONEPEZIL HYDROCHLORIDE 5 MG: 5 TABLET ORAL at 09:03

## 2022-03-26 RX ADMIN — FUROSEMIDE 120 MG: 10 INJECTION, SOLUTION INTRAMUSCULAR; INTRAVENOUS at 03:03

## 2022-03-26 RX ADMIN — ASPIRIN 81 MG: 81 TABLET, COATED ORAL at 09:03

## 2022-03-26 RX ADMIN — ACETAMINOPHEN 1000 MG: 325 TABLET ORAL at 09:03

## 2022-03-26 RX ADMIN — HEPARIN SODIUM 5000 UNITS: 5000 INJECTION INTRAVENOUS; SUBCUTANEOUS at 06:03

## 2022-03-26 RX ADMIN — ACETAMINOPHEN 1000 MG: 325 TABLET ORAL at 01:03

## 2022-03-26 RX ADMIN — ATORVASTATIN CALCIUM 20 MG: 20 TABLET, FILM COATED ORAL at 09:03

## 2022-03-26 RX ADMIN — ASPIRIN 81 MG: 81 TABLET, COATED ORAL at 08:03

## 2022-03-26 RX ADMIN — HEPARIN SODIUM 5000 UNITS: 5000 INJECTION INTRAVENOUS; SUBCUTANEOUS at 01:03

## 2022-03-26 NOTE — PROGRESS NOTES
[facility]  Cardiology  Progress Note    Patient Name: Lilia Hidalgo  MRN: [id number]  Admission Date: 3/21/2022  Hospital Length of Stay: 5 days  Code Status: Full Code   Attending Physician: Anirudh Guidry MD   Primary Care Physician: Anna Wood MD  Expected Discharge Date: 3/28/2022  Principal Problem:Complete AV block    Subjective:     Hospital Course:   Ms. Hidalgo was admitted to the hospital for surgical management of a fracture (s/p percutaneous fixation 03/21). She was noted to have a CHB, underwent TVP placement for optimization prior to the surgical procedure. Course complicated by a pansensitive Ecoli UTI for which she was started on ceftriaxone. ID was consulted and cleared the patient to undergo PPM placement; planning on undergoing placement on 03/28.     Interval History: No events overnight. Has remained hemodynamically stable  -Bcx without growth  -Discussed with ID- cleared for ppm placement; planned for 03/28    Review of Systems   Unable to perform ROS: Dementia   Constitutional: Positive for malaise/fatigue. Negative for chills and fever.     Objective:     Vital Signs (Most Recent):  Temp: 98.8 °F (37.1 °C) (03/26/22 1100)  Pulse: 66 (03/26/22 1130)  Resp: 18 (03/26/22 1132)  BP: (!) 148/64 (03/26/22 1130)  SpO2: 98 % (03/26/22 1130)   Vital Signs (24h Range):  Temp:  [97.9 °F (36.6 °C)-98.8 °F (37.1 °C)] 98.8 °F (37.1 °C)  Pulse:  [65-66] 66  Resp:  [11-54] 18  SpO2:  [95 %-100 %] 98 %  BP: (101-154)/(49-77) 148/64     Weight: 94.4 kg (208 lb 1.8 oz) (Bed scale)  Body mass index is 33.59 kg/m².     SpO2: 98 %  O2 Device (Oxygen Therapy): room air      Intake/Output Summary (Last 24 hours) at 3/26/2022 1158  Last data filed at 3/26/2022 1100  Gross per 24 hour   Intake 699.81 ml   Output 1720 ml   Net -1020.19 ml       Lines/Drains/Airways       Central Venous Catheter Line  Duration             Percutaneous Central Line Insertion/Assessment - single  lumen  03/21/22 1124 right internal jugular 5 days              Drain  Duration                  Urethral Catheter 03/21/22 1001 Double-lumen 16 Fr. 5 days              Peripheral Intravenous Line  Duration                  Peripheral IV - Single Lumen 03/22/22 0900 20 G Anterior;Left Forearm 4 days         Peripheral IV - Single Lumen 03/22/22 1005 20 G;1 3/4 in Left Upper Arm 4 days                  Physical Exam  Vitals and nursing note reviewed.   Constitutional:       General: She is not in acute distress.     Appearance: She is obese. She is not ill-appearing.   HENT:      Nose: No congestion or rhinorrhea.      Mouth/Throat:      Pharynx: No oropharyngeal exudate or posterior oropharyngeal erythema.   Eyes:      General: No scleral icterus.     Conjunctiva/sclera: Conjunctivae normal.   Cardiovascular:      Rate and Rhythm: Normal rate and regular rhythm.   Pulmonary:      Effort: Pulmonary effort is normal. No respiratory distress.      Breath sounds: No wheezing or rales.   Musculoskeletal:         General: Swelling present.      Right lower leg: Edema present.      Left lower leg: Edema present.   Skin:     General: Skin is warm.      Coloration: Skin is not jaundiced.      Findings: No lesion.   Neurological:      Mental Status: She is alert.     Significant Labs: All pertinent lab results from the last 24 hours have been reviewed.    Assessment and Plan:     * Complete AV block  Patient presented to ED with ankle fracture and found to be in complete heart block. Currently stable with junctional scape rhythm in the 30s and normal BP.     -TVP in place as bridge to PPM  - Now s/p fracture fixation on 03/21  - Afebrile/HD stable, bcx without growth  - Planning on undergoing pacemaker placement on 03/28    Closed fracture of left proximal tibia  Ortho following. Recommendations:   -- Keep left long leg cast reinforced with bed pan bag at the top to prevent it from getting soiled with urine/feces  -- Continue  incisional wound vac right ankle (placed 3/22)  -- Continue heparin TID for dvt ppx per primary team. Continue DVT ppx at discharge (heparin okay or can do ASA 81 bid or LVX)  -- From orthopedic standpoint okay to dc once medically ready (still needs permanent pacemaker placement)  -- FU 1 week after discharge for incisional wound vac removal from right ankle       LUANNE (acute kidney injury)  Likely d/t hypervolemic state vs ATN from CHB  RP ultrasound with chronic disease, no hydronephrosis    Nephrology consulted- suspect ATN w/ muddy brown casts after spinning urine    --started diuresis w lasix iv 120 bid with improvement in creatinine  --continue lasix    Chronic congestive heart failure  Presented with an elevated BNP and appeared hypervolemic  TTE with a normal EF- unable to determine diastolic function given she is being paced    Open ankle fracture, right, type III, initial encounter  S/p external fixation  -continue pt/ot as tolerated, with restrictions per ortho      VTE Risk Mitigation (From admission, onward)         Ordered     heparin (porcine) injection 5,000 Units  Every 8 hours         03/23/22 0800              Mohini Mar MD  Cardiology  [facility]

## 2022-03-26 NOTE — ASSESSMENT & PLAN NOTE
Ortho following. Recommendations:   -- Keep left long leg cast reinforced with bed pan bag at the top to prevent it from getting soiled with urine/feces  -- Continue incisional wound vac right ankle (placed 3/22)  -- Continue heparin TID for dvt ppx per primary team. Continue DVT ppx at discharge (heparin okay or can do ASA 81 bid or LVX)  -- From orthopedic standpoint okay to dc once medically ready (still needs permanent pacemaker placement)  -- FU 1 week after discharge for incisional wound vac removal from right ankle

## 2022-03-26 NOTE — PLAN OF CARE
"      SICU PLAN OF CARE NOTE    Dx: Complete AV block    Shift Events: Patient and patient's family updated on plan of care. No acute events during shift. TVP in place with patient being paced at 66 bpm. All VSS. Plans for permanent pacemaker placement on Monday. All questions and concerns acknowledged and answered. See flow sheets for full assessment details. Will continue to monitor patient closely.    Goals of Care: MAP > 65, pacemaker placement on Monday    Neuro: Follows Commands, Moves All Extremities, and Confused    Vital Signs: BP (!) 149/64   Pulse 66   Temp 99 °F (37.2 °C) (Oral)   Resp 20   Ht 5' 6" (1.676 m)   Wt 94.4 kg (208 lb 1.8 oz) Comment: Bed scale  LMP  (LMP Unknown)   SpO2 95%   Breastfeeding No   BMI 33.59 kg/m²     Respiratory: Room Air    Diet: Cardiac Diet    Gtts: MIVF @10ml for TVP    Urine Output: Urinary Catheter 740 cc/shift     Labs: Daily     Skin: see flow sheets       "

## 2022-03-26 NOTE — PROGRESS NOTES
[facility]  Cardiac Electrophysiology  Progress Note    Admission Date: 3/21/2022  Code Status: Full Code   Attending Physician: Anirudh Guidry MD   Expected Discharge Date: 3/28/2022  Principal Problem:Complete AV block    Subjective:   No acute events overnight. Telemetry shows paced rhythm. TVP at 65 bpm with 10 mA.   Threshold unchanged.     Assessment and Plan:     * Complete AV block  89/ female from [facility] for ankle fracture and was found to be in complete heart block in the ED. ECG confirmed heart block with escape rhythm at 35 bpm. Has TVP set at 65 bpm with output 10 mA, threshold 0.6 mA.    Plan:  Continue TVP for now, check thresholds daily  Cleared by ID for PPM placement (Monday -->dcPPM)  Continue telemetry monitoring while inpatient    Objective:     Vital Signs (Most Recent):  Temp: 97.9 °F (36.6 °C) (03/26/22 0701)  Pulse: 66 (03/26/22 1000)  Resp: 20 (03/26/22 1000)  BP: (!) 145/61 (03/26/22 0930)  SpO2: 100 % (03/26/22 1000)   Vital Signs (24h Range):  Temp:  [97.9 °F (36.6 °C)-98.4 °F (36.9 °C)] 97.9 °F (36.6 °C)  Pulse:  [65-66] 66  Resp:  [11-57] 20  SpO2:  [99 %-100 %] 100 %  BP: (101-154)/(49-73) 145/61     Weight: 94.4 kg (208 lb 1.8 oz) (Bed scale)  Body mass index is 33.59 kg/m².     SpO2: 100 %  O2 Device (Oxygen Therapy): nasal cannula    Physical Exam  Vitals reviewed.   Constitutional:       General: She is not in acute distress.     Appearance: Normal appearance. She is not ill-appearing or toxic-appearing.   HENT:      Head: Normocephalic and atraumatic.      Mouth/Throat:      Mouth: Mucous membranes are moist.   Neck:      Comments: R IJ TVP C/D/I  Cardiovascular:      Rate and Rhythm: Normal rate and regular rhythm.      Heart sounds: No murmur heard.    No friction rub. No gallop.   Pulmonary:      Effort: Pulmonary effort is normal. No respiratory distress.      Breath sounds: Normal breath sounds.   Abdominal:      Palpations: Abdomen is soft.    Musculoskeletal:      Right lower leg: No edema.      Left lower leg: No edema.      Comments: Right ankle surgical cast, wound vac   Skin:     General: Skin is warm.   Neurological:      Mental Status: She is alert. Mental status is at baseline.     Significant Labs: EP:   Recent Labs   Lab 03/25/22  0234 03/26/22  0419    143   K 4.2 3.9    104   CO2 27 31*   GLU 93 89   BUN 45* 47*   CREATININE 1.5* 1.3   CALCIUM 8.2* 8.7   PROT 5.1* 5.4*   ALBUMIN 2.0* 1.8*   BILITOT 0.6 0.5   ALKPHOS 59 62   AST 18 18   ALT <5* <5*   ANIONGAP 10 8   ESTGFRAFRICA 35.1* 41.7*   EGFRNONAA 30.5* 36.2*   WBC 4.90 5.45   HGB 8.7* 9.0*   HCT 28.7* 29.0*   * 156         Significant Imaging: Ejection fraction:   Recent Labs   Lab 03/21/22  1320   EF 65           Colby Sheppard MD  Cardiac Electrophysiology  [facility] - [facility]

## 2022-03-26 NOTE — SUBJECTIVE & OBJECTIVE
Interval History: No acute events overnight. Telemetry shows paced rhythm. TVP at 65 bpm with 10 mA. Threshold unchanged.     Review of Systems   Unable to perform ROS: Dementia   Objective:     Vital Signs (Most Recent):  Temp: 97.9 °F (36.6 °C) (03/26/22 0701)  Pulse: 66 (03/26/22 1000)  Resp: 20 (03/26/22 1000)  BP: (!) 145/61 (03/26/22 0930)  SpO2: 100 % (03/26/22 1000)   Vital Signs (24h Range):  Temp:  [97.9 °F (36.6 °C)-98.4 °F (36.9 °C)] 97.9 °F (36.6 °C)  Pulse:  [65-66] 66  Resp:  [11-57] 20  SpO2:  [99 %-100 %] 100 %  BP: (101-154)/(49-73) 145/61     Weight: 94.4 kg (208 lb 1.8 oz) (Bed scale)  Body mass index is 33.59 kg/m².     SpO2: 100 %  O2 Device (Oxygen Therapy): nasal cannula    Physical Exam  Vitals reviewed.   Constitutional:       General: She is not in acute distress.     Appearance: Normal appearance. She is not ill-appearing or toxic-appearing.   HENT:      Head: Normocephalic and atraumatic.      Mouth/Throat:      Mouth: Mucous membranes are moist.   Neck:      Comments: R IJ TVP C/D/I  Cardiovascular:      Rate and Rhythm: Normal rate and regular rhythm.      Heart sounds: No murmur heard.    No friction rub. No gallop.   Pulmonary:      Effort: Pulmonary effort is normal. No respiratory distress.      Breath sounds: Normal breath sounds.   Abdominal:      Palpations: Abdomen is soft.   Musculoskeletal:      Right lower leg: No edema.      Left lower leg: No edema.      Comments: Right ankle surgical cast, wound vac   Skin:     General: Skin is warm.   Neurological:      Mental Status: She is alert. Mental status is at baseline.     Significant Labs: EP:   Recent Labs   Lab 03/25/22  0234 03/26/22  0419    143   K 4.2 3.9    104   CO2 27 31*   GLU 93 89   BUN 45* 47*   CREATININE 1.5* 1.3   CALCIUM 8.2* 8.7   PROT 5.1* 5.4*   ALBUMIN 2.0* 1.8*   BILITOT 0.6 0.5   ALKPHOS 59 62   AST 18 18   ALT <5* <5*   ANIONGAP 10 8   ESTGFRAFRICA 35.1* 41.7*   EGFRNONAA 30.5* 36.2*   WBC  4.90 5.45   HGB 8.7* 9.0*   HCT 28.7* 29.0*   * 156         Significant Imaging: Ejection fraction:   Recent Labs   Lab 03/21/22  1320   EF 65

## 2022-03-26 NOTE — SUBJECTIVE & OBJECTIVE
Interval History: No events overnight. Has remained hemodynamically stable  -Bcx without growth  -Discussed with ID- cleared for ppm placement; planned for 03/28    Review of Systems   Unable to perform ROS: Dementia   Constitutional: Positive for malaise/fatigue. Negative for chills and fever.     Objective:     Vital Signs (Most Recent):  Temp: 98.8 °F (37.1 °C) (03/26/22 1100)  Pulse: 66 (03/26/22 1130)  Resp: 18 (03/26/22 1132)  BP: (!) 148/64 (03/26/22 1130)  SpO2: 98 % (03/26/22 1130)   Vital Signs (24h Range):  Temp:  [97.9 °F (36.6 °C)-98.8 °F (37.1 °C)] 98.8 °F (37.1 °C)  Pulse:  [65-66] 66  Resp:  [11-54] 18  SpO2:  [95 %-100 %] 98 %  BP: (101-154)/(49-77) 148/64     Weight: 94.4 kg (208 lb 1.8 oz) (Bed scale)  Body mass index is 33.59 kg/m².     SpO2: 98 %  O2 Device (Oxygen Therapy): room air      Intake/Output Summary (Last 24 hours) at 3/26/2022 1158  Last data filed at 3/26/2022 1100  Gross per 24 hour   Intake 699.81 ml   Output 1720 ml   Net -1020.19 ml       Lines/Drains/Airways       Central Venous Catheter Line  Duration             Percutaneous Central Line Insertion/Assessment - single lumen  03/21/22 1124 right internal jugular 5 days              Drain  Duration                  Urethral Catheter 03/21/22 1001 Double-lumen 16 Fr. 5 days              Peripheral Intravenous Line  Duration                  Peripheral IV - Single Lumen 03/22/22 0900 20 G Anterior;Left Forearm 4 days         Peripheral IV - Single Lumen 03/22/22 1005 20 G;1 3/4 in Left Upper Arm 4 days                  Physical Exam  Vitals and nursing note reviewed.   Constitutional:       General: She is not in acute distress.     Appearance: She is obese. She is not ill-appearing.   HENT:      Nose: No congestion or rhinorrhea.      Mouth/Throat:      Pharynx: No oropharyngeal exudate or posterior oropharyngeal erythema.   Eyes:      General: No scleral icterus.     Conjunctiva/sclera: Conjunctivae normal.   Cardiovascular:       Rate and Rhythm: Normal rate and regular rhythm.   Pulmonary:      Effort: Pulmonary effort is normal. No respiratory distress.      Breath sounds: No wheezing or rales.   Musculoskeletal:         General: Swelling present.      Right lower leg: Edema present.      Left lower leg: Edema present.   Skin:     General: Skin is warm.      Coloration: Skin is not jaundiced.      Findings: No lesion.   Neurological:      Mental Status: She is alert.     Significant Labs: All pertinent lab results from the last 24 hours have been reviewed.

## 2022-03-26 NOTE — ASSESSMENT & PLAN NOTE
Likely d/t hypervolemic state vs ATN from CHB  RP ultrasound with chronic disease, no hydronephrosis    Nephrology consulted- suspect ATN w/ muddy brown casts after spinning urine    --started diuresis w lasix iv 120 bid with improvement in creatinine  --continue lasix

## 2022-03-26 NOTE — ASSESSMENT & PLAN NOTE
Patient presented to ED with ankle fracture and found to be in complete heart block. Currently stable with junctional scape rhythm in the 30s and normal BP.     -TVP in place as bridge to PPM  - Now s/p fracture fixation on 03/21  - Afebrile/HD stable, bcx without growth  - Planning on undergoing pacemaker placement on 03/28

## 2022-03-26 NOTE — PROGRESS NOTES
[facility]  Nephrology  Progress Note     Patient Name: Lilia Hidalgo  MRN: [id number]  Admission Date: 3/21/2022  Hospital Length of Stay: 5 days  Attending Provider: Anirudh Guidry MD   Primary Care Physician: Anna Wood MD  Principal Problem: Complete AV block    Subjective:     HPI:   89 y.o. female with HTN, MDD, mild cognitive impairment, and urinary incontinence. She is brought to ED from nursing home due to a ankle fracture. She uses a wheelchair and was being assisted to transfer from the toilet to her wheelchair when she twisted her ankle and fractured it. No reports of syncope. On arrival to ED, an ECG showed 3rd degree AV block and cardiology was consulted. She is awake and complaining of pain. Not able to give a detailed history due to patient's dementia.     Nephrology consulted for LUANNE       Interval History: UOP: 1590mL    Review of patient's allergies indicates:   Allergen Reactions    Aspirin Nausea Only and Other (See Comments)     Other reaction(s): esophageal pain    Celebrex [celecoxib] Rash    Morphine Hallucinations    Steroid [corticosteroids (glucocorticoids)] Other (See Comments)     Other reaction(s): Flushing (skin)    Sulfa dyne Other (See Comments)     Other reaction(s): esophogeal pain      Current Facility-Administered Medications   Medication Frequency    0.9%  NaCl infusion Continuous    acetaminophen tablet 1,000 mg Q8H    acetaminophen tablet 650 mg Q8H PRN    aspirin EC tablet 81 mg BID    atorvastatin tablet 20 mg QHS    cholecalciferol (vitamin D3) 125 mcg (5,000 unit) tablet 50,000 Units Weekly    diphenhydrAMINE capsule 25 mg Q6H PRN    donepeziL tablet 5 mg QHS    furosemide injection 120 mg Q12H    heparin (porcine) injection 5,000 Units Q8H    HYDROmorphone injection 0.2 mg Q6H PRN    mupirocin 2 % ointment BID    oxyCODONE immediate release tablet 5 mg Q6H PRN    pantoprazole EC tablet 40 mg Daily       Objective:      Vital Signs (Most Recent):  Temp: 98.8 °F (37.1 °C) (03/26/22 1100)  Pulse: 66 (03/26/22 1200)  Resp: 19 (03/26/22 1200)  BP: (!) 148/64 (03/26/22 1130)  SpO2: 96 % (03/26/22 1200)  O2 Device (Oxygen Therapy): room air (03/26/22 1100) Vital Signs (24h Range):  Temp:  [97.9 °F (36.6 °C)-98.8 °F (37.1 °C)] 98.8 °F (37.1 °C)  Pulse:  [65-66] 66  Resp:  [11-54] 19  SpO2:  [95 %-100 %] 96 %  BP: (101-154)/(49-77) 148/64   Weight: 94.4 kg (208 lb 1.8 oz) (Bed scale) (03/26/22 0600)  Body mass index is 33.59 kg/m².  Body surface area is 2.1 meters squared.      Intake/Output Summary (Last 24 hours) at 3/26/2022 1335  Last data filed at 3/26/2022 1200  Gross per 24 hour   Intake 868.98 ml   Output 1705 ml   Net -836.02 ml     I/O last 3 completed shifts:  In: 1128.9 [P.O.:720; I.V.:359.7; IV Piggyback:49.1]  Out: 2180 [Urine:2180]  Net IO Since Admission: 1,320.82 mL [03/26/22 1335]     Physical Exam  Vitals and nursing note reviewed. Exam conducted with a chaperone present.   Constitutional:       Appearance: She is ill-appearing.   HENT:      Nose:      Comments: Nasal Canula  Eyes:      Extraocular Movements: Extraocular movements intact.   Cardiovascular:      Rate and Rhythm: Normal rate.   Pulmonary:      Effort: Pulmonary effort is normal. No respiratory distress.   Abdominal:      General: There is no distension.      Tenderness: There is no abdominal tenderness.   Musculoskeletal:      Right lower leg: Edema present.      Left lower leg: Edema present.   Neurological:      Mental Status: She is alert.           Assessment/Plan:     * Complete AV block  Per primary     LUANNE (acute kidney injury)  Patient with LUANNE. No known history of CKD   1.5 on admission then up to 1.9  Renal US no hydro   Initial thought process was ATN given heart block and also had dropped in MAPS on 3/21 and 3/22 but given that LUANNE is improving with diuretics fits more of a cardiorenal picture   Urine sed with occasional granular casts   Pt  with hypervolemia.   Adequate response to diuretics.  Renal function improving.   Cr trending down    -Continue Lasix 120mg IV BID  -Consider transitioning to PO lasix tomorrow.  -Strict I/O and chart  -RFP in AM    Chronic congestive heart failure  Per primary     Open ankle fracture, right, type III, initial encounter  Per primary          Vasu Holloway MD  Nephrology  [facility]    ATTENDING PHYSICIAN ATTESTATION  I have personally verified the history and examined the patient. I thoroughly reviewed the demographic, clinical, laboratorial and imaging information available in medical records. I agree with the assessment and recommendations provided by the subspecialty resident who was under my supervision.

## 2022-03-26 NOTE — ASSESSMENT & PLAN NOTE
89/ female from [facility] for ankle fracture and was found to be in complete heart block in the ED. ECG confirmed heart block with escape rhythm at 35 bpm. Has TVP set at 65 bpm with output 10 mA, threshold 0.6 mA.    Plan:  Continue TVP for now, check thresholds daily  Cleared by ID for PPM placement (Monday -->dcPPM)  Continue telemetry monitoring while inpatient

## 2022-03-27 LAB
ALBUMIN SERPL BCP-MCNC: 2 G/DL (ref 3.5–5.2)
ALP SERPL-CCNC: 85 U/L (ref 55–135)
ALT SERPL W/O P-5'-P-CCNC: <5 U/L (ref 10–44)
ANION GAP SERPL CALC-SCNC: 10 MMOL/L (ref 8–16)
AST SERPL-CCNC: 26 U/L (ref 10–40)
BASOPHILS # BLD AUTO: 0.02 K/UL (ref 0–0.2)
BASOPHILS NFR BLD: 0.4 % (ref 0–1.9)
BILIRUB SERPL-MCNC: 0.6 MG/DL (ref 0.1–1)
BUN SERPL-MCNC: 46 MG/DL (ref 8–23)
CALCIUM SERPL-MCNC: 8.9 MG/DL (ref 8.7–10.5)
CHLORIDE SERPL-SCNC: 106 MMOL/L (ref 95–110)
CO2 SERPL-SCNC: 29 MMOL/L (ref 23–29)
CREAT SERPL-MCNC: 1.1 MG/DL (ref 0.5–1.4)
DIFFERENTIAL METHOD: ABNORMAL
EOSINOPHIL # BLD AUTO: 0.2 K/UL (ref 0–0.5)
EOSINOPHIL NFR BLD: 2.8 % (ref 0–8)
ERYTHROCYTE [DISTWIDTH] IN BLOOD BY AUTOMATED COUNT: 15.7 % (ref 11.5–14.5)
EST. GFR  (AFRICAN AMERICAN): 51.1 ML/MIN/1.73 M^2
EST. GFR  (NON AFRICAN AMERICAN): 44.3 ML/MIN/1.73 M^2
GLUCOSE SERPL-MCNC: 108 MG/DL (ref 70–110)
HCT VFR BLD AUTO: 29.9 % (ref 37–48.5)
HGB BLD-MCNC: 9.3 G/DL (ref 12–16)
IMM GRANULOCYTES # BLD AUTO: 0.02 K/UL (ref 0–0.04)
IMM GRANULOCYTES NFR BLD AUTO: 0.4 % (ref 0–0.5)
LYMPHOCYTES # BLD AUTO: 0.8 K/UL (ref 1–4.8)
LYMPHOCYTES NFR BLD: 14.2 % (ref 18–48)
MAGNESIUM SERPL-MCNC: 2 MG/DL (ref 1.6–2.6)
MCH RBC QN AUTO: 29 PG (ref 27–31)
MCHC RBC AUTO-ENTMCNC: 31.1 G/DL (ref 32–36)
MCV RBC AUTO: 93 FL (ref 82–98)
MONOCYTES # BLD AUTO: 0.7 K/UL (ref 0.3–1)
MONOCYTES NFR BLD: 13.7 % (ref 4–15)
NEUTROPHILS # BLD AUTO: 3.7 K/UL (ref 1.8–7.7)
NEUTROPHILS NFR BLD: 68.5 % (ref 38–73)
NRBC BLD-RTO: 0 /100 WBC
PHOSPHATE SERPL-MCNC: 2.3 MG/DL (ref 2.7–4.5)
PLATELET # BLD AUTO: 184 K/UL (ref 150–450)
PMV BLD AUTO: 11 FL (ref 9.2–12.9)
POTASSIUM SERPL-SCNC: 3.9 MMOL/L (ref 3.5–5.1)
PROT SERPL-MCNC: 5.8 G/DL (ref 6–8.4)
RBC # BLD AUTO: 3.21 M/UL (ref 4–5.4)
SARS-COV-2 RNA RESP QL NAA+PROBE: NOT DETECTED
SODIUM SERPL-SCNC: 145 MMOL/L (ref 136–145)
WBC # BLD AUTO: 5.42 K/UL (ref 3.9–12.7)

## 2022-03-27 PROCEDURE — U0003 INFECTIOUS AGENT DETECTION BY NUCLEIC ACID (DNA OR RNA); SEVERE ACUTE RESPIRATORY SYNDROME CORONAVIRUS 2 (SARS-COV-2) (CORONAVIRUS DISEASE [COVID-19]), AMPLIFIED PROBE TECHNIQUE, MAKING USE OF HIGH THROUGHPUT TECHNOLOGIES AS DESCRIBED BY CMS-2020-01-R: HCPCS | Performed by: STUDENT IN AN ORGANIZED HEALTH CARE EDUCATION/TRAINING PROGRAM

## 2022-03-27 PROCEDURE — 99900035 HC TECH TIME PER 15 MIN (STAT)

## 2022-03-27 PROCEDURE — 25000003 PHARM REV CODE 250: Performed by: STUDENT IN AN ORGANIZED HEALTH CARE EDUCATION/TRAINING PROGRAM

## 2022-03-27 PROCEDURE — 99232 PR SUBSEQUENT HOSPITAL CARE,LEVL II: ICD-10-PCS | Mod: GC,,, | Performed by: INTERNAL MEDICINE

## 2022-03-27 PROCEDURE — 99232 SBSQ HOSP IP/OBS MODERATE 35: CPT | Mod: GC,,, | Performed by: INTERNAL MEDICINE

## 2022-03-27 PROCEDURE — 84100 ASSAY OF PHOSPHORUS: CPT | Performed by: INTERNAL MEDICINE

## 2022-03-27 PROCEDURE — 25000003 PHARM REV CODE 250: Performed by: INTERNAL MEDICINE

## 2022-03-27 PROCEDURE — 20000000 HC ICU ROOM

## 2022-03-27 PROCEDURE — 63600175 PHARM REV CODE 636 W HCPCS: Performed by: INTERNAL MEDICINE

## 2022-03-27 PROCEDURE — 83735 ASSAY OF MAGNESIUM: CPT | Performed by: INTERNAL MEDICINE

## 2022-03-27 PROCEDURE — 85025 COMPLETE CBC W/AUTO DIFF WBC: CPT | Performed by: INTERNAL MEDICINE

## 2022-03-27 PROCEDURE — 63600175 PHARM REV CODE 636 W HCPCS: Performed by: STUDENT IN AN ORGANIZED HEALTH CARE EDUCATION/TRAINING PROGRAM

## 2022-03-27 PROCEDURE — 80053 COMPREHEN METABOLIC PANEL: CPT | Performed by: INTERNAL MEDICINE

## 2022-03-27 PROCEDURE — U0005 INFEC AGEN DETEC AMPLI PROBE: HCPCS | Performed by: STUDENT IN AN ORGANIZED HEALTH CARE EDUCATION/TRAINING PROGRAM

## 2022-03-27 PROCEDURE — 94761 N-INVAS EAR/PLS OXIMETRY MLT: CPT

## 2022-03-27 RX ORDER — BALSAM PERU/CASTOR OIL
OINTMENT (GRAM) TOPICAL 2 TIMES DAILY
Status: DISCONTINUED | OUTPATIENT
Start: 2022-03-27 | End: 2022-04-02 | Stop reason: HOSPADM

## 2022-03-27 RX ADMIN — DONEPEZIL HYDROCHLORIDE 5 MG: 5 TABLET ORAL at 09:03

## 2022-03-27 RX ADMIN — OXYCODONE 5 MG: 5 TABLET ORAL at 08:03

## 2022-03-27 RX ADMIN — Medication: at 01:03

## 2022-03-27 RX ADMIN — ATORVASTATIN CALCIUM 20 MG: 20 TABLET, FILM COATED ORAL at 09:03

## 2022-03-27 RX ADMIN — HEPARIN SODIUM 5000 UNITS: 5000 INJECTION INTRAVENOUS; SUBCUTANEOUS at 01:03

## 2022-03-27 RX ADMIN — ASPIRIN 81 MG: 81 TABLET, COATED ORAL at 09:03

## 2022-03-27 RX ADMIN — OXYCODONE 5 MG: 5 TABLET ORAL at 07:03

## 2022-03-27 RX ADMIN — Medication: at 09:03

## 2022-03-27 RX ADMIN — OXYCODONE 5 MG: 5 TABLET ORAL at 01:03

## 2022-03-27 RX ADMIN — PANTOPRAZOLE SODIUM 40 MG: 40 TABLET, DELAYED RELEASE ORAL at 08:03

## 2022-03-27 RX ADMIN — FUROSEMIDE 120 MG: 10 INJECTION, SOLUTION INTRAMUSCULAR; INTRAVENOUS at 03:03

## 2022-03-27 RX ADMIN — ASPIRIN 81 MG: 81 TABLET, COATED ORAL at 08:03

## 2022-03-27 RX ADMIN — HYDROMORPHONE HYDROCHLORIDE 0.2 MG: 1 INJECTION, SOLUTION INTRAMUSCULAR; INTRAVENOUS; SUBCUTANEOUS at 07:03

## 2022-03-27 RX ADMIN — FUROSEMIDE 120 MG: 10 INJECTION, SOLUTION INTRAMUSCULAR; INTRAVENOUS at 04:03

## 2022-03-27 RX ADMIN — ACETAMINOPHEN 1000 MG: 325 TABLET ORAL at 09:03

## 2022-03-27 RX ADMIN — HEPARIN SODIUM 5000 UNITS: 5000 INJECTION INTRAVENOUS; SUBCUTANEOUS at 06:03

## 2022-03-27 NOTE — ASSESSMENT & PLAN NOTE
Likely d/t hypervolemic state vs ATN from [facility]  RP ultrasound with chronic disease, no hydronephrosis    Nephrology consulted- suspect ATN w/ muddy brown casts after spinning urine    --started diuresis w lasix iv 120 bid with improvement in creatinine, now back at baseline  --continue lasix

## 2022-03-27 NOTE — NURSING
"      SICU PLAN OF CARE NOTE    Dx: Complete AV block    Shift Events: NAEON, TVP in place through RIJ    Goals of Care: MAPs >65, Plan for permanent pacemaker on Monday    Neuro: Oriented to self only, follows commands, moves BLE and wiggles toes in both feet.     Vital Signs: BP (!) 143/62 (BP Location: Right arm, Patient Position: Lying)   Pulse 66   Temp 98.4 °F (36.9 °C) (Oral)   Resp 19   Ht 5' 6" (1.676 m)   Wt 94.4 kg (208 lb 1.8 oz) Comment: Bed scale  LMP  (LMP Unknown)   SpO2 96%   Breastfeeding No   BMI 33.59 kg/m²     Respiratory: Room Air    Diet: Cardiac Diet    Urine Output: Urinary Catheter 845 cc/shift    Drains: Wound vac to R ankle - 0cc/shift    Labs/Accuchecks: daily labs    Skin: Skin CDI. BLE in hard casts. Repositioning pt q 2hr. Waffle mattress inflated. Adhesive use limited.       "

## 2022-03-27 NOTE — PROGRESS NOTES
[facility]  Cardiology  Progress Note    Patient Name: Lilia Hidalgo  MRN: [id number]  Admission Date: 3/21/2022  Hospital Length of Stay: 6 days  Code Status: Full Code   Attending Physician: Anirudh Guidry MD   Primary Care Physician: Anna Wood MD  Expected Discharge Date: 3/28/2022  Principal Problem:Complete AV block    Subjective:     Hospital Course:   Ms. Hidalgo was admitted to the hospital for surgical management of a fracture (s/p percutaneous fixation 03/21). She was noted to have a CHB, underwent TVP placement for optimization prior to the surgical procedure. Course complicated by a pansensitive Ecoli UTI for which she was started on ceftriaxone. ID was consulted and cleared the patient to undergo PPM placement; planning on undergoing placement on 03/28.     Interval History: No acute events overnight  -Has remained afebrile and hemodynamically stable   -NPO at [facility] for pacemaker placement 03/28    Review of Systems   Unable to perform ROS: Dementia     Objective:     Vital Signs (Most Recent):  Temp: 98.3 °F (36.8 °C) (03/27/22 0701)  Pulse: 66 (03/27/22 0845)  Resp: 20 (03/27/22 0845)  BP: (!) 147/63 (03/27/22 0600)  SpO2: 97 % (03/27/22 0845)   Vital Signs (24h Range):  Temp:  [98.3 °F (36.8 °C)-99.1 °F (37.3 °C)] 98.3 °F (36.8 °C)  Pulse:  [65-66] 66  Resp:  [16-37] 20  SpO2:  [93 %-100 %] 97 %  BP: (111-149)/(51-77) 147/63     Weight: 94.4 kg (208 lb 1.8 oz) (Bed scale)  Body mass index is 33.59 kg/m².     SpO2: 97 %  O2 Device (Oxygen Therapy): room air      Intake/Output Summary (Last 24 hours) at 3/27/2022 0858  Last data filed at 3/27/2022 0700  Gross per 24 hour   Intake 879.03 ml   Output 1745 ml   Net -865.97 ml       Lines/Drains/Airways       Central Venous Catheter Line  Duration             Percutaneous Central Line Insertion/Assessment - single lumen  03/21/22 1124 right internal jugular 5 days              Drain  Duration                  Urethral  "Catheter 03/21/22 1001 Double-lumen 16 Fr. 5 days              Peripheral Intravenous Line  Duration                  Peripheral IV - Single Lumen 03/22/22 0900 20 G Anterior;Left Forearm 4 days         Peripheral IV - Single Lumen 03/22/22 1005 20 G;1 3/4 in Left Upper Arm 4 days                  Physical Exam  Vitals and nursing note reviewed.   Constitutional:       General: She is not in acute distress.     Appearance: She is obese. She is not ill-appearing.   HENT:      Nose: No congestion or rhinorrhea.      Mouth/Throat:      Pharynx: No oropharyngeal exudate or posterior oropharyngeal erythema.   Eyes:      General: No scleral icterus.     Conjunctiva/sclera: Conjunctivae normal.   Cardiovascular:      Rate and Rhythm: Normal rate and regular rhythm.   Pulmonary:      Effort: Pulmonary effort is normal. No respiratory distress.      Breath sounds: No wheezing or rales.   Musculoskeletal:         General: Swelling present.      Right lower leg: Edema present.      Left lower leg: Edema present.   Skin:     General: Skin is warm.      Coloration: Skin is not jaundiced.      Findings: No lesion.   Neurological:      Mental Status: She is alert.     Significant Labs: All pertinent lab results from the last 24 hours have been reviewed.    Assessment and Plan:     * Complete AV block  Patient presented to ED with ankle fracture and found to be in complete heart block. Currently stable with junctional scape rhythm in the 30s and normal BP.     TVP was placed on 03/20 as a bridge to pacemaker. Underwent R fixation surgery on 03/21.  -Pacemaker placement delayed due to one febrile episode on 03/23. Cultures have had no growth and she was appropriately treated for a pansensitive ecoli uti.   -ID was consulted and cleared the patient to undergo pm placement     --NPO at [facility] 03/28  --Covid test ordered  --continue TVP for now    Bradycardia  See "complete av block"    Closed fracture of left proximal tibia  Ortho " following. Recommendations:   -- Keep left long leg cast reinforced with bed pan bag at the top to prevent it from getting soiled with urine/feces  -- Continue incisional wound vac right ankle (placed 3/22)  -- Continue heparin TID for dvt ppx per primary team. Continue DVT ppx at discharge (heparin okay or can do ASA 81 bid or LVX)  -- From orthopedic standpoint okay to dc once medically ready (still needs permanent pacemaker placement)  -- FU 1 week after discharge for incisional wound vac removal from right ankle       LUANNE (acute kidney injury)  Likely d/t hypervolemic state vs ATN from [facility]  RP ultrasound with chronic disease, no hydronephrosis    Nephrology consulted- suspect ATN w/ muddy brown casts after spinning urine    --started diuresis w lasix iv 120 bid with improvement in creatinine, now back at baseline  --continue lasix    Chronic congestive heart failure  Presented with an elevated BNP and appeared hypervolemic  TTE with a normal EF- unable to determine diastolic function given she is being paced    Open ankle fracture, right, type III, initial encounter  S/p external fixation  -continue pt/ot as tolerated, with restrictions per ortho      VTE Risk Mitigation (From admission, onward)         Ordered     heparin (porcine) injection 5,000 Units  Every 8 hours         03/23/22 0800              Mohini Mar MD  Cardiology  [facility]

## 2022-03-27 NOTE — PLAN OF CARE
SICU PLAN OF CARE NOTE     Dx: Complete AV block     Shift Events: Patient and patient's family updated on plan of care. No acute events during shift. TVP in place with patient being paced at 66 bpm. All VSS. Plans for permanent pacemaker placement on tomorrow. All questions and concerns acknowledged and answered. See flow sheets for full assessment details. Will continue to monitor patient closely.     Goals of Care: MAP > 65, pacemaker placement tomorrow     Neuro: Follows Commands, Moves All Extremities, and Confused     Respiratory: Room Air     Diet: Mechanical Soft Diet     Gtts: MIVF @10ml for TVP     Urine Output: Urinary Catheter 885 cc/shift     Labs: Daily      Skin: see flow sheets

## 2022-03-27 NOTE — CARE UPDATE
Subjective:   No acute events overnight. Telemetry shows paced rhythm.   TVP at 65 bpm with 10 mA. Threshold unchanged.      Assessment and Plan:      * Complete AV block  89/ female from [facility] for ankle fracture and was found to be in complete heart block in the ED. ECG confirmed heart block with escape rhythm at 35 bpm. Has TVP set at 65 bpm with output 10 mA, threshold 0.6 mA.     Plan:  Continue TVP   Cleared by ID for PPM placement   Will implant DcPPM tomorrow (3/28/2022)  POA consented over the phone (2MD witnessed)  NPO after midnight

## 2022-03-27 NOTE — ASSESSMENT & PLAN NOTE
Patient presented to ED with ankle fracture and found to be in complete heart block. Currently stable with junctional scape rhythm in the 30s and normal BP.     TVP was placed on 03/20 as a bridge to pacemaker. Underwent R fixation surgery on 03/21.  -Pacemaker placement delayed due to one febrile episode on 03/23. Cultures have had no growth and she was appropriately treated for a pansensitive ecoli uti.   -ID was consulted and cleared the patient to undergo pm placement     --NPO at [facility] 03/28  --Covid test ordered  --continue TVP for now

## 2022-03-27 NOTE — SUBJECTIVE & OBJECTIVE
Interval History: No acute events overnight  -Has remained afebrile and hemodynamically stable   -NPO at [facility] for pacemaker placement 03/28    Review of Systems   Unable to perform ROS: Dementia     Objective:     Vital Signs (Most Recent):  Temp: 98.3 °F (36.8 °C) (03/27/22 0701)  Pulse: 66 (03/27/22 0845)  Resp: 20 (03/27/22 0845)  BP: (!) 147/63 (03/27/22 0600)  SpO2: 97 % (03/27/22 0845)   Vital Signs (24h Range):  Temp:  [98.3 °F (36.8 °C)-99.1 °F (37.3 °C)] 98.3 °F (36.8 °C)  Pulse:  [65-66] 66  Resp:  [16-37] 20  SpO2:  [93 %-100 %] 97 %  BP: (111-149)/(51-77) 147/63     Weight: 94.4 kg (208 lb 1.8 oz) (Bed scale)  Body mass index is 33.59 kg/m².     SpO2: 97 %  O2 Device (Oxygen Therapy): room air      Intake/Output Summary (Last 24 hours) at 3/27/2022 0858  Last data filed at 3/27/2022 0700  Gross per 24 hour   Intake 879.03 ml   Output 1745 ml   Net -865.97 ml       Lines/Drains/Airways       Central Venous Catheter Line  Duration             Percutaneous Central Line Insertion/Assessment - single lumen  03/21/22 1124 right internal jugular 5 days              Drain  Duration                  Urethral Catheter 03/21/22 1001 Double-lumen 16 Fr. 5 days              Peripheral Intravenous Line  Duration                  Peripheral IV - Single Lumen 03/22/22 0900 20 G Anterior;Left Forearm 4 days         Peripheral IV - Single Lumen 03/22/22 1005 20 G;1 3/4 in Left Upper Arm 4 days                  Physical Exam  Vitals and nursing note reviewed.   Constitutional:       General: She is not in acute distress.     Appearance: She is obese. She is not ill-appearing.   HENT:      Nose: No congestion or rhinorrhea.      Mouth/Throat:      Pharynx: No oropharyngeal exudate or posterior oropharyngeal erythema.   Eyes:      General: No scleral icterus.     Conjunctiva/sclera: Conjunctivae normal.   Cardiovascular:      Rate and Rhythm: Normal rate and regular rhythm.   Pulmonary:      Effort: Pulmonary effort is normal.  No respiratory distress.      Breath sounds: No wheezing or rales.   Musculoskeletal:         General: Swelling present.      Right lower leg: Edema present.      Left lower leg: Edema present.   Skin:     General: Skin is warm.      Coloration: Skin is not jaundiced.      Findings: No lesion.   Neurological:      Mental Status: She is alert.     Significant Labs: All pertinent lab results from the last 24 hours have been reviewed.

## 2022-03-28 ENCOUNTER — ANESTHESIA EVENT (OUTPATIENT)
Dept: MEDSURG UNIT | Facility: HOSPITAL | Age: 87
DRG: 492 | End: 2022-03-28
Payer: MEDICARE

## 2022-03-28 ENCOUNTER — ANESTHESIA (OUTPATIENT)
Dept: MEDSURG UNIT | Facility: HOSPITAL | Age: 87
DRG: 492 | End: 2022-03-28
Payer: MEDICARE

## 2022-03-28 LAB
ABO + RH BLD: NORMAL
ALBUMIN SERPL BCP-MCNC: 1.9 G/DL (ref 3.5–5.2)
ALP SERPL-CCNC: 81 U/L (ref 55–135)
ALT SERPL W/O P-5'-P-CCNC: 8 U/L (ref 10–44)
ANION GAP SERPL CALC-SCNC: 9 MMOL/L (ref 8–16)
APTT BLDCRRT: 24 SEC (ref 21–32)
AST SERPL-CCNC: 33 U/L (ref 10–40)
BASOPHILS # BLD AUTO: 0.04 K/UL (ref 0–0.2)
BASOPHILS NFR BLD: 0.8 % (ref 0–1.9)
BILIRUB SERPL-MCNC: 0.6 MG/DL (ref 0.1–1)
BLD GP AB SCN CELLS X3 SERPL QL: NORMAL
BUN SERPL-MCNC: 40 MG/DL (ref 8–23)
CALCIUM SERPL-MCNC: 8.8 MG/DL (ref 8.7–10.5)
CHLORIDE SERPL-SCNC: 109 MMOL/L (ref 95–110)
CO2 SERPL-SCNC: 30 MMOL/L (ref 23–29)
CREAT SERPL-MCNC: 0.9 MG/DL (ref 0.5–1.4)
DIFFERENTIAL METHOD: ABNORMAL
EOSINOPHIL # BLD AUTO: 0.1 K/UL (ref 0–0.5)
EOSINOPHIL NFR BLD: 2.2 % (ref 0–8)
ERYTHROCYTE [DISTWIDTH] IN BLOOD BY AUTOMATED COUNT: 15.8 % (ref 11.5–14.5)
EST. GFR  (AFRICAN AMERICAN): >60 ML/MIN/1.73 M^2
EST. GFR  (NON AFRICAN AMERICAN): 56.5 ML/MIN/1.73 M^2
GLUCOSE SERPL-MCNC: 105 MG/DL (ref 70–110)
HCT VFR BLD AUTO: 28.1 % (ref 37–48.5)
HGB BLD-MCNC: 8.6 G/DL (ref 12–16)
IMM GRANULOCYTES # BLD AUTO: 0.02 K/UL (ref 0–0.04)
IMM GRANULOCYTES NFR BLD AUTO: 0.4 % (ref 0–0.5)
INR PPP: 1.1 (ref 0.8–1.2)
LYMPHOCYTES # BLD AUTO: 0.8 K/UL (ref 1–4.8)
LYMPHOCYTES NFR BLD: 16.2 % (ref 18–48)
MAGNESIUM SERPL-MCNC: 2 MG/DL (ref 1.6–2.6)
MCH RBC QN AUTO: 28.4 PG (ref 27–31)
MCHC RBC AUTO-ENTMCNC: 30.6 G/DL (ref 32–36)
MCV RBC AUTO: 93 FL (ref 82–98)
MONOCYTES # BLD AUTO: 0.7 K/UL (ref 0.3–1)
MONOCYTES NFR BLD: 13.8 % (ref 4–15)
NEUTROPHILS # BLD AUTO: 3.3 K/UL (ref 1.8–7.7)
NEUTROPHILS NFR BLD: 66.6 % (ref 38–73)
NRBC BLD-RTO: 0 /100 WBC
PHOSPHATE SERPL-MCNC: 1.9 MG/DL (ref 2.7–4.5)
PLATELET # BLD AUTO: 178 K/UL (ref 150–450)
PMV BLD AUTO: 10.3 FL (ref 9.2–12.9)
POCT GLUCOSE: 118 MG/DL (ref 70–110)
POTASSIUM SERPL-SCNC: 3.7 MMOL/L (ref 3.5–5.1)
PROT SERPL-MCNC: 5.3 G/DL (ref 6–8.4)
PROTHROMBIN TIME: 11.9 SEC (ref 9–12.5)
RBC # BLD AUTO: 3.03 M/UL (ref 4–5.4)
SODIUM SERPL-SCNC: 148 MMOL/L (ref 136–145)
WBC # BLD AUTO: 4.93 K/UL (ref 3.9–12.7)

## 2022-03-28 PROCEDURE — 85610 PROTHROMBIN TIME: CPT | Performed by: STUDENT IN AN ORGANIZED HEALTH CARE EDUCATION/TRAINING PROGRAM

## 2022-03-28 PROCEDURE — 33208 INSRT HEART PM ATRIAL & VENT: CPT | Mod: KX | Performed by: INTERNAL MEDICINE

## 2022-03-28 PROCEDURE — 63600175 PHARM REV CODE 636 W HCPCS: Performed by: INTERNAL MEDICINE

## 2022-03-28 PROCEDURE — 99233 PR SUBSEQUENT HOSPITAL CARE,LEVL III: ICD-10-PCS | Mod: ,,, | Performed by: INTERNAL MEDICINE

## 2022-03-28 PROCEDURE — 63600175 PHARM REV CODE 636 W HCPCS: Performed by: STUDENT IN AN ORGANIZED HEALTH CARE EDUCATION/TRAINING PROGRAM

## 2022-03-28 PROCEDURE — D9220A PRA ANESTHESIA: Mod: ANES,,, | Performed by: ANESTHESIOLOGY

## 2022-03-28 PROCEDURE — 83735 ASSAY OF MAGNESIUM: CPT | Performed by: STUDENT IN AN ORGANIZED HEALTH CARE EDUCATION/TRAINING PROGRAM

## 2022-03-28 PROCEDURE — 93010 EKG 12-LEAD: ICD-10-PCS | Mod: ,,, | Performed by: INTERNAL MEDICINE

## 2022-03-28 PROCEDURE — D9220A PRA ANESTHESIA: ICD-10-PCS | Mod: ANES,,, | Performed by: ANESTHESIOLOGY

## 2022-03-28 PROCEDURE — 25000003 PHARM REV CODE 250: Performed by: STUDENT IN AN ORGANIZED HEALTH CARE EDUCATION/TRAINING PROGRAM

## 2022-03-28 PROCEDURE — 99233 SBSQ HOSP IP/OBS HIGH 50: CPT | Mod: ,,, | Performed by: INTERNAL MEDICINE

## 2022-03-28 PROCEDURE — 33208 INSRT HEART PM ATRIAL & VENT: CPT | Mod: KX,,, | Performed by: INTERNAL MEDICINE

## 2022-03-28 PROCEDURE — D9220A PRA ANESTHESIA: Mod: CRNA,,, | Performed by: NURSE ANESTHETIST, CERTIFIED REGISTERED

## 2022-03-28 PROCEDURE — 25000003 PHARM REV CODE 250: Performed by: INTERNAL MEDICINE

## 2022-03-28 PROCEDURE — C1785 PMKR, DUAL, RATE-RESP: HCPCS | Performed by: INTERNAL MEDICINE

## 2022-03-28 PROCEDURE — 33208 PR INSER HART PACER XVENOUS ATR/VENTR: ICD-10-PCS | Mod: KX,,, | Performed by: INTERNAL MEDICINE

## 2022-03-28 PROCEDURE — D9220A PRA ANESTHESIA: ICD-10-PCS | Mod: CRNA,,, | Performed by: NURSE ANESTHETIST, CERTIFIED REGISTERED

## 2022-03-28 PROCEDURE — C1898 LEAD, PMKR, OTHER THAN TRANS: HCPCS | Performed by: INTERNAL MEDICINE

## 2022-03-28 PROCEDURE — 93010 ELECTROCARDIOGRAM REPORT: CPT | Mod: ,,, | Performed by: INTERNAL MEDICINE

## 2022-03-28 PROCEDURE — 99232 PR SUBSEQUENT HOSPITAL CARE,LEVL II: ICD-10-PCS | Mod: GC,,, | Performed by: INTERNAL MEDICINE

## 2022-03-28 PROCEDURE — 20000000 HC ICU ROOM

## 2022-03-28 PROCEDURE — 84100 ASSAY OF PHOSPHORUS: CPT | Performed by: STUDENT IN AN ORGANIZED HEALTH CARE EDUCATION/TRAINING PROGRAM

## 2022-03-28 PROCEDURE — 93005 ELECTROCARDIOGRAM TRACING: CPT

## 2022-03-28 PROCEDURE — 80053 COMPREHEN METABOLIC PANEL: CPT | Performed by: STUDENT IN AN ORGANIZED HEALTH CARE EDUCATION/TRAINING PROGRAM

## 2022-03-28 PROCEDURE — 63600175 PHARM REV CODE 636 W HCPCS: Performed by: NURSE ANESTHETIST, CERTIFIED REGISTERED

## 2022-03-28 PROCEDURE — 86901 BLOOD TYPING SEROLOGIC RH(D): CPT | Performed by: STUDENT IN AN ORGANIZED HEALTH CARE EDUCATION/TRAINING PROGRAM

## 2022-03-28 PROCEDURE — 37000009 HC ANESTHESIA EA ADD 15 MINS: Performed by: INTERNAL MEDICINE

## 2022-03-28 PROCEDURE — 85025 COMPLETE CBC W/AUTO DIFF WBC: CPT | Performed by: STUDENT IN AN ORGANIZED HEALTH CARE EDUCATION/TRAINING PROGRAM

## 2022-03-28 PROCEDURE — 99232 SBSQ HOSP IP/OBS MODERATE 35: CPT | Mod: GC,,, | Performed by: INTERNAL MEDICINE

## 2022-03-28 PROCEDURE — C1894 INTRO/SHEATH, NON-LASER: HCPCS | Performed by: INTERNAL MEDICINE

## 2022-03-28 PROCEDURE — 37000008 HC ANESTHESIA 1ST 15 MINUTES: Performed by: INTERNAL MEDICINE

## 2022-03-28 PROCEDURE — 25000003 PHARM REV CODE 250: Performed by: NURSE ANESTHETIST, CERTIFIED REGISTERED

## 2022-03-28 PROCEDURE — 85730 THROMBOPLASTIN TIME PARTIAL: CPT | Performed by: STUDENT IN AN ORGANIZED HEALTH CARE EDUCATION/TRAINING PROGRAM

## 2022-03-28 DEVICE — IMPLANTABLE DEVICE
Type: IMPLANTABLE DEVICE | Site: HEART | Status: FUNCTIONAL
Brand: SOLIA

## 2022-03-28 DEVICE — IMPLANTABLE DEVICE
Type: IMPLANTABLE DEVICE | Site: CHEST | Status: FUNCTIONAL
Brand: EDORA 8 DR-T

## 2022-03-28 RX ORDER — FENTANYL CITRATE 50 UG/ML
INJECTION, SOLUTION INTRAMUSCULAR; INTRAVENOUS
Status: DISCONTINUED | OUTPATIENT
Start: 2022-03-28 | End: 2022-03-28

## 2022-03-28 RX ORDER — LIDOCAINE HYDROCHLORIDE 20 MG/ML
INJECTION, SOLUTION INFILTRATION; PERINEURAL
Status: DISCONTINUED | OUTPATIENT
Start: 2022-03-28 | End: 2022-03-31

## 2022-03-28 RX ORDER — CEFAZOLIN SODIUM/D5W 2 G/100 ML
2 PLASTIC BAG, INJECTION (ML) INTRAVENOUS
Status: DISCONTINUED | OUTPATIENT
Start: 2022-03-28 | End: 2022-04-02 | Stop reason: HOSPADM

## 2022-03-28 RX ORDER — VANCOMYCIN HYDROCHLORIDE 1 G/20ML
INJECTION, POWDER, LYOPHILIZED, FOR SOLUTION INTRAVENOUS
Status: DISCONTINUED | OUTPATIENT
Start: 2022-03-28 | End: 2022-03-31

## 2022-03-28 RX ORDER — FENTANYL CITRATE 50 UG/ML
25 INJECTION, SOLUTION INTRAMUSCULAR; INTRAVENOUS EVERY 5 MIN PRN
Status: CANCELLED | OUTPATIENT
Start: 2022-03-28

## 2022-03-28 RX ORDER — FUROSEMIDE 40 MG/1
80 TABLET ORAL 2 TIMES DAILY
Status: DISCONTINUED | OUTPATIENT
Start: 2022-03-28 | End: 2022-03-28

## 2022-03-28 RX ORDER — VASOPRESSIN 20 [USP'U]/ML
INJECTION, SOLUTION INTRAMUSCULAR; SUBCUTANEOUS
Status: DISCONTINUED | OUTPATIENT
Start: 2022-03-28 | End: 2022-03-28

## 2022-03-28 RX ORDER — CEFAZOLIN SODIUM/D5W 2 G/100 ML
2 PLASTIC BAG, INJECTION (ML) INTRAVENOUS
Status: COMPLETED | OUTPATIENT
Start: 2022-03-29 | End: 2022-03-29

## 2022-03-28 RX ORDER — PROPOFOL 10 MG/ML
VIAL (ML) INTRAVENOUS CONTINUOUS PRN
Status: DISCONTINUED | OUTPATIENT
Start: 2022-03-28 | End: 2022-03-28

## 2022-03-28 RX ORDER — PHENYLEPHRINE HYDROCHLORIDE 10 MG/ML
INJECTION INTRAVENOUS
Status: DISCONTINUED | OUTPATIENT
Start: 2022-03-28 | End: 2022-03-28

## 2022-03-28 RX ORDER — BUPIVACAINE HYDROCHLORIDE 2.5 MG/ML
INJECTION, SOLUTION EPIDURAL; INFILTRATION; INTRACAUDAL
Status: DISCONTINUED | OUTPATIENT
Start: 2022-03-28 | End: 2022-03-31

## 2022-03-28 RX ORDER — SODIUM CHLORIDE 0.9 G/100ML
IRRIGANT IRRIGATION
Status: DISCONTINUED | OUTPATIENT
Start: 2022-03-28 | End: 2022-03-31

## 2022-03-28 RX ORDER — HYDROMORPHONE HYDROCHLORIDE 1 MG/ML
0.2 INJECTION, SOLUTION INTRAMUSCULAR; INTRAVENOUS; SUBCUTANEOUS EVERY 5 MIN PRN
Status: CANCELLED | OUTPATIENT
Start: 2022-03-28

## 2022-03-28 RX ORDER — POTASSIUM CHLORIDE 7.45 MG/ML
20 INJECTION INTRAVENOUS ONCE
Status: DISCONTINUED | OUTPATIENT
Start: 2022-03-28 | End: 2022-03-28

## 2022-03-28 RX ORDER — BUMETANIDE 1 MG/1
1 TABLET ORAL 2 TIMES DAILY
Status: DISCONTINUED | OUTPATIENT
Start: 2022-03-29 | End: 2022-03-29

## 2022-03-28 RX ORDER — BUMETANIDE 1 MG/1
4 TABLET ORAL 2 TIMES DAILY
Status: DISCONTINUED | OUTPATIENT
Start: 2022-03-28 | End: 2022-03-28

## 2022-03-28 RX ORDER — ONDANSETRON 2 MG/ML
4 INJECTION INTRAMUSCULAR; INTRAVENOUS ONCE AS NEEDED
Status: CANCELLED | OUTPATIENT
Start: 2022-03-28 | End: 2033-08-24

## 2022-03-28 RX ORDER — BUMETANIDE 1 MG/1
3 TABLET ORAL 2 TIMES DAILY
Status: DISCONTINUED | OUTPATIENT
Start: 2022-03-28 | End: 2022-03-28

## 2022-03-28 RX ORDER — SODIUM,POTASSIUM PHOSPHATES 280-250MG
2 POWDER IN PACKET (EA) ORAL ONCE
Status: COMPLETED | OUTPATIENT
Start: 2022-03-28 | End: 2022-03-28

## 2022-03-28 RX ORDER — DEXMEDETOMIDINE HYDROCHLORIDE 100 UG/ML
INJECTION, SOLUTION INTRAVENOUS
Status: DISCONTINUED | OUTPATIENT
Start: 2022-03-28 | End: 2022-03-28

## 2022-03-28 RX ADMIN — Medication: at 08:03

## 2022-03-28 RX ADMIN — Medication 2 G: at 11:03

## 2022-03-28 RX ADMIN — PHENYLEPHRINE HYDROCHLORIDE 100 MCG: 10 INJECTION, SOLUTION INTRAMUSCULAR; INTRAVENOUS; SUBCUTANEOUS at 02:03

## 2022-03-28 RX ADMIN — OXYCODONE 5 MG: 5 TABLET ORAL at 11:03

## 2022-03-28 RX ADMIN — ASPIRIN 81 MG: 81 TABLET, COATED ORAL at 08:03

## 2022-03-28 RX ADMIN — FUROSEMIDE 120 MG: 10 INJECTION, SOLUTION INTRAMUSCULAR; INTRAVENOUS at 04:03

## 2022-03-28 RX ADMIN — DONEPEZIL HYDROCHLORIDE 5 MG: 5 TABLET ORAL at 08:03

## 2022-03-28 RX ADMIN — PANTOPRAZOLE SODIUM 40 MG: 40 TABLET, DELAYED RELEASE ORAL at 08:03

## 2022-03-28 RX ADMIN — POTASSIUM & SODIUM PHOSPHATES POWDER PACK 280-160-250 MG 2 PACKET: 280-160-250 PACK at 09:03

## 2022-03-28 RX ADMIN — FENTANYL CITRATE 25 MCG: 0.05 INJECTION, SOLUTION INTRAMUSCULAR; INTRAVENOUS at 02:03

## 2022-03-28 RX ADMIN — VASOPRESSIN 0.5 UNITS: 20 INJECTION, SOLUTION INTRAMUSCULAR; SUBCUTANEOUS at 03:03

## 2022-03-28 RX ADMIN — HYDROMORPHONE HYDROCHLORIDE 0.2 MG: 1 INJECTION, SOLUTION INTRAMUSCULAR; INTRAVENOUS; SUBCUTANEOUS at 09:03

## 2022-03-28 RX ADMIN — PROPOFOL 50 MCG/KG/MIN: 10 INJECTION, EMULSION INTRAVENOUS at 02:03

## 2022-03-28 RX ADMIN — OXYCODONE 5 MG: 5 TABLET ORAL at 06:03

## 2022-03-28 RX ADMIN — DEXMEDETOMIDINE HYDROCHLORIDE 4 MCG: 100 INJECTION, SOLUTION, CONCENTRATE INTRAVENOUS at 02:03

## 2022-03-28 RX ADMIN — PHENYLEPHRINE HYDROCHLORIDE 100 MCG: 10 INJECTION, SOLUTION INTRAMUSCULAR; INTRAVENOUS; SUBCUTANEOUS at 03:03

## 2022-03-28 RX ADMIN — Medication 2 G: at 02:03

## 2022-03-28 RX ADMIN — Medication: at 09:03

## 2022-03-28 RX ADMIN — BUMETANIDE 3 MG: 1 TABLET ORAL at 08:03

## 2022-03-28 RX ADMIN — SODIUM CHLORIDE: 9 INJECTION, SOLUTION INTRAVENOUS at 02:03

## 2022-03-28 RX ADMIN — ATORVASTATIN CALCIUM 20 MG: 20 TABLET, FILM COATED ORAL at 08:03

## 2022-03-28 NOTE — PROGRESS NOTES
[facility]  Orthopedics  Progress Note    Patient Name: Lilia Hidalgo  MRN: [id number]  Admission Date: 3/21/2022  Hospital Length of Stay: 7 days  Attending Provider: Anirudh Guidry MD  Primary Care Provider: Anna Wood MD  Follow-up For: Procedure(s) (LRB):  APPLICATION, EXTERNAL FIXATION DEVICE, LARGE, TIBIA (Right)  IRRIGATION AND DEBRIDEMENT (Right)    Post-Operative Day: 7 Days Post-Op  Subjective:     Principal Problem:Complete AV block    Principal Orthopedic Problem: same    Interval History: NAEON. VS wnl except intermittent HTN. Bcx NGTD. Casts cdi. Physical therapy has not been by in several days. Cardiology planning to place pacemaker today.     Review of patient's allergies indicates:   Allergen Reactions    Aspirin Nausea Only and Other (See Comments)     Other reaction(s): esophageal pain    Celebrex [celecoxib] Rash    Morphine Hallucinations    Steroid [corticosteroids (glucocorticoids)] Other (See Comments)     Other reaction(s): Flushing (skin)    Sulfa dyne Other (See Comments)     Other reaction(s): esophogeal pain       Current Facility-Administered Medications   Medication    0.9%  NaCl infusion    acetaminophen tablet 1,000 mg    aspirin EC tablet 81 mg    atorvastatin tablet 20 mg    cefTRIAXone (ROCEPHIN) 1 g/50 mL D5W IVPB    cholecalciferol (vitamin D3) 125 mcg (5,000 unit) tablet 50,000 Units    diphenhydrAMINE capsule 25 mg    donepeziL tablet 5 mg    furosemide injection 120 mg    heparin (porcine) injection 5,000 Units    HYDROmorphone injection 0.5 mg    mupirocin 2 % ointment    oxyCODONE immediate release tablet Tab 10 mg    pantoprazole EC tablet 40 mg     Objective:     Vital Signs (Most Recent):  Temp: 98.4 °F (36.9 °C) (03/25/22 1105)  Pulse: 66 (03/25/22 1200)  Resp: (!) 22 (03/25/22 1201)  BP: (!) 122/56 (03/25/22 1200)  SpO2: 100 % (03/25/22 1200)   Vital Signs (24h Range):  Temp:  [97.9 °F (36.6 °C)-99.1 °F (37.3  "°C)] 98.4 °F (36.9 °C)  Pulse:  [63-66] 66  Resp:  [14-57] 22  SpO2:  [98 %-100 %] 100 %  BP: ()/(47-62) 122/56     Weight: 97.3 kg (214 lb 8.1 oz)  Height: 5' 6" (167.6 cm)  Body mass index is 34.62 kg/m².      Intake/Output Summary (Last 24 hours) at 3/25/2022 1212  Last data filed at 3/25/2022 1200  Gross per 24 hour   Intake 538.97 ml   Output 1510 ml   Net -971.03 ml         Ortho/SPM Exam  NAD, demented    RLE  Prevena right ankle intact good seal  Short leg cast in place, cdi  NVI distally    LLE  Long leg cast in place, cdi  NVI distally    Significant Labs: BMP:   Recent Labs   Lab 03/25/22  0234   GLU 93      K 4.2      CO2 27   BUN 45*   CREATININE 1.5*   CALCIUM 8.2*   MG 2.1       CBC:   Recent Labs   Lab 03/24/22  0333 03/25/22  0234   WBC 5.36 4.90   HGB 8.8* 8.7*   HCT 28.7* 28.7*   * 122*       All pertinent labs within the past 24 hours have been reviewed.    Significant Imaging: I have reviewed all pertinent imaging results/findings.    Assessment/Plan:     Open ankle fracture, right, type III, initial encounter  89F with grade 3a open right ankle fracture sp I&D and percutaneous fixation 3/21/22 by Dr. Carreno. Also has left periprosthetic proximal tibia fracture that is being treated non op in cast.        Plan  -No further orthopedic intervention at this time  -Okay to WBAT BLE in casts  -PT/OT daily  -Continue incisional wound vac right ankle (placed 3/22). Plan to dc 2 wks post op (4/4/22)  -Continue heparin TID for dvt ppx per primary team. Continue DVT ppx at discharge (heparin okay or can do ASA 81 bid or LVX)  -From orthopedic standpoint okay to dc once medically ready (still needs permanent pacemaker placement)  -FU 4/4/22 for prevena removal          Sidra Dc MD  Orthopedics  [facility] - [facility] Intensive [facility]  "

## 2022-03-28 NOTE — ASSESSMENT & PLAN NOTE
Presented with an elevated BNP and appeared hypervolemic  TTE with a normal EF- unable to determine diastolic function given she is being paced  Treated with IV lasix 120 mg BID per nephrology recs    PLAN:  -- Stop IV lasix and started on bumex 1 mg BID  -- monitor I/Os  -- consider restarting back on home ARB after PPM placement if hemodynamics tolerate it

## 2022-03-28 NOTE — ASSESSMENT & PLAN NOTE
Patient presented to ED with ankle fracture and found to be in complete heart block. Currently stable with junctional scape rhythm in the 30s and normal BP.     TVP was placed on 03/20 as a bridge to pacemaker. Underwent R fixation surgery on 03/21.  -Pacemaker placement delayed due to one febrile episode on 03/23. Cultures have had no growth and she was appropriately treated for a pansensitive ecoli uti.   -ID was consulted and cleared the patient to undergo ppm placement     PLAN:  --continue TVP for now  -- PPM placement with EP on 3/28

## 2022-03-28 NOTE — PROGRESS NOTES
[facility]  Cardiac Electrophysiology  Progress Note    Admission Date: 3/21/2022  Code Status: Full Code   Attending Physician: Anirudh Guidry MD   Expected Discharge Date: 3/31/2022  Principal Problem:Complete AV block    Subjective:     Interval History: Patient was seen this morning, no acute complaints, pleasant, AO x1. TVP at 66 mA, threshold 2.0 mA, set to 10 mA. NPO.    Review of Systems   Unable to perform ROS: Dementia   Objective:     Vital Signs (Most Recent):  Temp: 98 °F (36.7 °C) (03/28/22 0700)  Pulse: 66 (03/28/22 0915)  Resp: (!) 34 (03/28/22 0915)  BP: (!) 175/60 (03/28/22 0900)  SpO2: 99 % (03/28/22 0915)   Vital Signs (24h Range):  Temp:  [98 °F (36.7 °C)-99.5 °F (37.5 °C)] 98 °F (36.7 °C)  Pulse:  [65-67] 66  Resp:  [15-34] 34  SpO2:  [94 %-99 %] 99 %  BP: (120-175)/(53-70) 175/60     Weight: 94.4 kg (208 lb 1.8 oz) (Bed scale)  Body mass index is 33.59 kg/m².     SpO2: 99 %  O2 Device (Oxygen Therapy): room air    Physical Exam  Vitals reviewed.   Constitutional:       General: She is not in acute distress.     Appearance: Normal appearance. She is obese. She is not ill-appearing or toxic-appearing.   HENT:      Head: Normocephalic and atraumatic.      Mouth/Throat:      Mouth: Mucous membranes are moist.   Neck:      Comments: R IJ TVP  Cardiovascular:      Rate and Rhythm: Normal rate and regular rhythm.      Heart sounds: No murmur heard.    No friction rub. No gallop.   Pulmonary:      Effort: Pulmonary effort is normal. No respiratory distress.      Breath sounds: Normal breath sounds.   Abdominal:      Palpations: Abdomen is soft.   Genitourinary:     Comments: [place]  Musculoskeletal:      Right lower leg: No edema.      Left lower leg: No edema.      Comments: 2 Right peripheral IVs  Right lower extremity cast   Skin:     General: Skin is warm.   Neurological:      Mental Status: She is alert. Mental status is at baseline.      Comments: AO x1, alert      Significant Labs: EP:   Recent Labs   Lab 03/27/22  0403 03/28/22  0207    148*   K 3.9 3.7    109   CO2 29 30*    105   BUN 46* 40*   CREATININE 1.1 0.9   CALCIUM 8.9 8.8   PROT 5.8* 5.3*   ALBUMIN 2.0* 1.9*   BILITOT 0.6 0.6   ALKPHOS 85 81   AST 26 33   ALT <5* 8*   ANIONGAP 10 9   ESTGFRAFRICA 51.1* >60.0   EGFRNONAA 44.3* 56.5*   WBC 5.42 4.93   HGB 9.3* 8.6*   HCT 29.9* 28.1*    178   INR  --  1.1       Significant Imaging: Ejection fraction:   Recent Labs   Lab 03/21/22  1320   EF 65     Assessment and Plan:     * Complete AV block  Patient is a 89 year old female with history of cognitive impairment from [facility] for ankle fracture and was found to be in complete heart block in the ED. ECG confirmed heart block with escape rhythm at 35 bpm. Has TVP set at 65 bpm with output 10 mA, threshold 0.6 mA.    Plan:  Continue TVP for now, plan to dc with PPM today  Cleared by ID for PPM placement   NPO for PPM today  Treated with UTI with ceftriaxone, cefazolin on call  Preop orders in   COVID negative on 03/27/2022  Continue telemetry monitoring while inpatient    Plan of care was discussed with staff, Dr Rivera.     Dale Soriano MD  Cardiac Electrophysiology, Fellow PGY4  [facility]

## 2022-03-28 NOTE — ASSESSMENT & PLAN NOTE
89F with grade 3a open right ankle fracture sp I&D and percutaneous fixation 3/21/22 by Dr. Carreno. Also has left periprosthetic proximal tibia fracture that is being treated non op in cast.        Plan  -No further orthopedic intervention at this time  -Okay to WBAT BLE in casts  -PT/OT daily  -Continue incisional wound vac right ankle (placed 3/22). Plan to dc 2 wks post op (4/4/22)  -Continue heparin TID for dvt ppx per primary team. Continue DVT ppx at discharge (heparin okay or can do ASA 81 bid or LVX)  -From orthopedic standpoint okay to dc once medically ready (still needs permanent pacemaker placement)  -FU 4/4/22 for prevena removal

## 2022-03-28 NOTE — ASSESSMENT & PLAN NOTE
RESOLVED  Likely d/t hypervolemic state vs ATN from [facility]  RP ultrasound with chronic disease, no hydronephrosis    Nephrology consulted- suspect ATN w/ muddy brown casts after spinning urine    PLAN:  --started diuresis w lasix iv 120 bid with improvement in creatinine, now back at baseline. Transitioned to P.O, Bumex on 3/28

## 2022-03-28 NOTE — PROGRESS NOTES
[facility]  Cardiology  Progress Note    Patient Name: Lilia Hidalgo  MRN: [id number]  Admission Date: 3/21/2022  Hospital Length of Stay: 7 days  Code Status: Full Code   Attending Physician: Anirudh Guidry MD   Primary Care Physician: Anna Wood MD  Expected Discharge Date: 3/31/2022  Principal Problem:Complete AV block    Subjective:     Hospital Course:   Ms. Hidalgo was admitted to the hospital for surgical management of a fracture (s/p percutaneous fixation 03/21). She was noted to have a CHB, underwent TVP placement for optimization prior to the surgical procedure. Course complicated by a pansensitive Ecoli UTI for which she was treated with a 7 day course of ABX (cefazolin and CTX). ID was consulted and cleared the patient to undergo PPM placement; planning on undergoing placement on 03/28. IV lasix discontinued on 3/28 and patient was started on Bumex 1 mg BID      Interval History: No acute events overnight. Has remained afebrile and hemodynamically stable. PPM placement Today. Discontinued IV lasix and started on Bumex 1 mg BID      Review of Systems   Unable to perform ROS: Dementia     Objective:     Vital Signs (Most Recent):  Temp: 98 °F (36.7 °C) (03/28/22 0700)  Pulse: 66 (03/28/22 1245)  Resp: (!) 49 (03/28/22 1245)  BP: (!) 120/55 (03/28/22 1215)  SpO2: 96 % (03/28/22 1245)   Vital Signs (24h Range):  Temp:  [98 °F (36.7 °C)-99.5 °F (37.5 °C)] 98 °F (36.7 °C)  Pulse:  [65-67] 66  Resp:  [14-61] 49  SpO2:  [94 %-99 %] 96 %  BP: (120-175)/(53-70) 120/55     Weight: 94.4 kg (208 lb 1.8 oz) (Bed scale)  Body mass index is 33.59 kg/m².     SpO2: 96 %  O2 Device (Oxygen Therapy): room air      Intake/Output Summary (Last 24 hours) at 3/28/2022 1340  Last data filed at 3/28/2022 1100  Gross per 24 hour   Intake 199.9 ml   Output 1905 ml   Net -1705.1 ml       Lines/Drains/Airways       Central Venous Catheter Line  Duration             Percutaneous Central Line  Insertion/Assessment - single lumen  03/21/22 1124 right internal jugular 7 days              Peripheral Intravenous Line  Duration                  Peripheral IV - Single Lumen 03/27/22 1030 20 G Posterior;Right Forearm 1 day         Peripheral IV - Single Lumen 03/27/22 1035 20 G Posterior;Right Hand 1 day         Peripheral IV - Single Lumen 03/28/22 1215 22 G Left;Posterior Hand <1 day                  Physical Exam  Vitals and nursing note reviewed.   Constitutional:       General: She is not in acute distress.     Appearance: She is obese. She is not ill-appearing.   HENT:      Head: Normocephalic and atraumatic.      Nose: No congestion or rhinorrhea.      Mouth/Throat:      Pharynx: No oropharyngeal exudate or posterior oropharyngeal erythema.   Eyes:      General: No scleral icterus.     Conjunctiva/sclera: Conjunctivae normal.   Neck:      Comments: TVP in place on Right IJ  Cardiovascular:      Rate and Rhythm: Normal rate and regular rhythm.   Pulmonary:      Effort: Pulmonary effort is normal. No respiratory distress.      Breath sounds: No wheezing or rales.   Abdominal:      General: Bowel sounds are normal. There is no distension.      Palpations: Abdomen is soft.   Musculoskeletal:         General: Swelling present.      Right lower leg: Edema present.      Left lower leg: Edema present.      Comments: Both LE casted with wound vac in place   Skin:     General: Skin is warm.      Coloration: Skin is not jaundiced.      Findings: No lesion.   Neurological:      Mental Status: She is alert.   Psychiatric:         Mood and Affect: Mood normal.         Behavior: Behavior normal.         Thought Content: Thought content normal.         Judgment: Judgment normal.     Significant Labs: All pertinent lab results from the last 24 hours have been reviewed.    Assessment and Plan:     * Complete AV block  Patient presented to ED with ankle fracture and found to be in complete heart block. Currently stable with  "junctional scape rhythm in the 30s and normal BP.     TVP was placed on 03/20 as a bridge to pacemaker. Underwent R fixation surgery on 03/21.  -Pacemaker placement delayed due to one febrile episode on 03/23. Cultures have had no growth and she was appropriately treated for a pansensitive ecoli uti.   -ID was consulted and cleared the patient to undergo ppm placement     PLAN:  --continue TVP for now  -- PPM placement with EP on 3/28    Temporary transvenous cardiac pacemaker present  See complete Heart block    Bradycardia  See "complete av block"    Closed fracture of left proximal tibia  Ortho following. Recommendations:   -- Keep left long leg cast reinforced with bed pan bag at the top to prevent it from getting soiled with urine/feces  -- Continue incisional wound vac right ankle (placed 3/22)  -- Continue heparin TID for dvt ppx per primary team. Continue DVT ppx at discharge (heparin okay or can do ASA 81 bid or LVX)  -- From orthopedic standpoint okay to dc once medically ready (still needs permanent pacemaker placement)  -- FU 1 week after discharge for incisional wound vac removal from right ankle       LUANNE (acute kidney injury)  RESOLVED  Likely d/t hypervolemic state vs ATN from CHB  RP ultrasound with chronic disease, no hydronephrosis    Nephrology consulted- suspect ATN w/ muddy brown casts after spinning urine    PLAN:  --started diuresis w lasix iv 120 bid with improvement in creatinine, now back at baseline. Transitioned to P.O, Bumex on 3/28    Chronic congestive heart failure  Presented with an elevated BNP and appeared hypervolemic  TTE with a normal EF- unable to determine diastolic function given she is being paced  Treated with IV lasix 120 mg BID per nephrology recs    PLAN:  -- Stop IV lasix and started on bumex 1 mg BID  -- monitor I/Os  -- consider restarting back on home ARB after PPM placement if hemodynamics tolerate it    Open ankle fracture, right, type III, initial encounter  S/p " "external fixation    PLAN:  -continue pt/ot as tolerated, with restrictions per ortho  -- Ortho following; Appreciate recs    Essential hypertension   BP (!) 120/55   Pulse 66   Temp 98 °F (36.7 °C) (Axillary)   Resp (!) 49   Ht 5' 6" (1.676 m)   Wt 94.4 kg (208 lb 1.8 oz) Comment: Bed scale  LMP  (LMP Unknown)   SpO2 96%   Breastfeeding No   BMI 33.59 kg/m²      PLAN:  - Home antihypertensives held in the setting of heart block and plans for a PPM placement  -Pt advised to be compliant with their treatment regimen daily to avoid BP fluctuation and any complications.   -Pt educated that it is important to eat right. Following a reasonable-calorie low-salt diet (Such as the DASH diet) has been shown to control blood pressure better with less medications. Recommended to exercise at least 30 mins a day for 5 days a week and also told to avoid smoking all together.          VTE Risk Mitigation (From admission, onward)    None          Letha Osman DO  Cardiology  [facility]  "

## 2022-03-28 NOTE — NURSING
1630 Pt returned to unit from EP lab.  BP slightly elevated.  No pain reported.  Site intact.  Paced @ 69 ( per EP RN)  Purwick in place.

## 2022-03-28 NOTE — SUBJECTIVE & OBJECTIVE
Interval History: Patient was seen this morning, no acute complaints, pleasant, AO x1. TVP at 66 mA, threshold 2.0 mA, set to 10 mA. NPO.    Review of Systems   Unable to perform ROS: Dementia   Objective:     Vital Signs (Most Recent):  Temp: 98 °F (36.7 °C) (03/28/22 0700)  Pulse: 66 (03/28/22 0915)  Resp: (!) 34 (03/28/22 0915)  BP: (!) 175/60 (03/28/22 0900)  SpO2: 99 % (03/28/22 0915)   Vital Signs (24h Range):  Temp:  [98 °F (36.7 °C)-99.5 °F (37.5 °C)] 98 °F (36.7 °C)  Pulse:  [65-67] 66  Resp:  [15-34] 34  SpO2:  [94 %-99 %] 99 %  BP: (120-175)/(53-70) 175/60     Weight: 94.4 kg (208 lb 1.8 oz) (Bed scale)  Body mass index is 33.59 kg/m².     SpO2: 99 %  O2 Device (Oxygen Therapy): room air    Physical Exam  Vitals reviewed.   Constitutional:       General: She is not in acute distress.     Appearance: Normal appearance. She is obese. She is not ill-appearing or toxic-appearing.   HENT:      Head: Normocephalic and atraumatic.      Mouth/Throat:      Mouth: Mucous membranes are moist.   Neck:      Comments: R IJ TVP  Cardiovascular:      Rate and Rhythm: Normal rate and regular rhythm.      Heart sounds: No murmur heard.    No friction rub. No gallop.   Pulmonary:      Effort: Pulmonary effort is normal. No respiratory distress.      Breath sounds: Normal breath sounds.   Abdominal:      Palpations: Abdomen is soft.   Genitourinary:     Comments: Beverly  Musculoskeletal:      Right lower leg: No edema.      Left lower leg: No edema.      Comments: 2 Right peripheral IVs  Right lower extremity cast   Skin:     General: Skin is warm.   Neurological:      Mental Status: She is alert. Mental status is at baseline.      Comments: AO x1, alert     Significant Labs: EP:   Recent Labs   Lab 03/27/22  0403 03/28/22  0207    148*   K 3.9 3.7    109   CO2 29 30*    105   BUN 46* 40*   CREATININE 1.1 0.9   CALCIUM 8.9 8.8   PROT 5.8* 5.3*   ALBUMIN 2.0* 1.9*   BILITOT 0.6 0.6   ALKPHOS 85 81   AST 26  33   ALT <5* 8*   ANIONGAP 10 9   ESTGFRAFRICA 51.1* >60.0   EGFRNONAA 44.3* 56.5*   WBC 5.42 4.93   HGB 9.3* 8.6*   HCT 29.9* 28.1*    178   INR  --  1.1       Significant Imaging: Ejection fraction:   Recent Labs   Lab 03/21/22  1320   EF 65

## 2022-03-28 NOTE — SUBJECTIVE & OBJECTIVE
Interval History: No acute events overnight. Has remained afebrile and hemodynamically stable. PPM placement Today. Discontinued IV lasix and started on Bumex 1 mg BID      Review of Systems   Unable to perform ROS: Dementia     Objective:     Vital Signs (Most Recent):  Temp: 98 °F (36.7 °C) (03/28/22 0700)  Pulse: 66 (03/28/22 1245)  Resp: (!) 49 (03/28/22 1245)  BP: (!) 120/55 (03/28/22 1215)  SpO2: 96 % (03/28/22 1245)   Vital Signs (24h Range):  Temp:  [98 °F (36.7 °C)-99.5 °F (37.5 °C)] 98 °F (36.7 °C)  Pulse:  [65-67] 66  Resp:  [14-61] 49  SpO2:  [94 %-99 %] 96 %  BP: (120-175)/(53-70) 120/55     Weight: 94.4 kg (208 lb 1.8 oz) (Bed scale)  Body mass index is 33.59 kg/m².     SpO2: 96 %  O2 Device (Oxygen Therapy): room air      Intake/Output Summary (Last 24 hours) at 3/28/2022 1340  Last data filed at 3/28/2022 1100  Gross per 24 hour   Intake 199.9 ml   Output 1905 ml   Net -1705.1 ml       Lines/Drains/Airways       Central Venous Catheter Line  Duration             Percutaneous Central Line Insertion/Assessment - single lumen  03/21/22 1124 right internal jugular 7 days              Peripheral Intravenous Line  Duration                  Peripheral IV - Single Lumen 03/27/22 1030 20 G Posterior;Right Forearm 1 day         Peripheral IV - Single Lumen 03/27/22 1035 20 G Posterior;Right Hand 1 day         Peripheral IV - Single Lumen 03/28/22 1215 22 G Left;Posterior Hand <1 day                  Physical Exam  Vitals and nursing note reviewed.   Constitutional:       General: She is not in acute distress.     Appearance: She is obese. She is not ill-appearing.   HENT:      Head: Normocephalic and atraumatic.      Nose: No congestion or rhinorrhea.      Mouth/Throat:      Pharynx: No oropharyngeal exudate or posterior oropharyngeal erythema.   Eyes:      General: No scleral icterus.     Conjunctiva/sclera: Conjunctivae normal.   Neck:      Comments: TVP in place on Right IJ  Cardiovascular:      Rate and  Rhythm: Normal rate and regular rhythm.   Pulmonary:      Effort: Pulmonary effort is normal. No respiratory distress.      Breath sounds: No wheezing or rales.   Abdominal:      General: Bowel sounds are normal. There is no distension.      Palpations: Abdomen is soft.   Musculoskeletal:         General: Swelling present.      Right lower leg: Edema present.      Left lower leg: Edema present.      Comments: Both LE casted with wound vac in place   Skin:     General: Skin is warm.      Coloration: Skin is not jaundiced.      Findings: No lesion.   Neurological:      Mental Status: She is alert.   Psychiatric:         Mood and Affect: Mood normal.         Behavior: Behavior normal.         Thought Content: Thought content normal.         Judgment: Judgment normal.     Significant Labs: All pertinent lab results from the last 24 hours have been reviewed.

## 2022-03-28 NOTE — ASSESSMENT & PLAN NOTE
" BP (!) 120/55   Pulse 66   Temp 98 °F (36.7 °C) (Axillary)   Resp (!) 49   Ht 5' 6" (1.676 m)   Wt 94.4 kg (208 lb 1.8 oz) Comment: Bed scale  LMP  (LMP Unknown)   SpO2 96%   Breastfeeding No   BMI 33.59 kg/m²      PLAN:  - Home antihypertensives held in the setting of heart block and plans for a PPM placement  -Pt advised to be compliant with their treatment regimen daily to avoid BP fluctuation and any complications.   -Pt educated that it is important to eat right. Following a reasonable-calorie low-salt diet (Such as the DASH diet) has been shown to control blood pressure better with less medications. Recommended to exercise at least 30 mins a day for 5 days a week and also told to avoid smoking all together.    "

## 2022-03-28 NOTE — ANESTHESIA POSTPROCEDURE EVALUATION
Anesthesia Post Evaluation    Patient: Lilia Hidalgo    Procedure(s) Performed: Procedure(s) (LRB):  INSERTION, CARDIAC PACEMAKER, DUAL CHAMBER (Left)    Final Anesthesia Type: general      Patient location during evaluation: ICU  Patient participation: Yes- Able to Participate  Level of consciousness: awake  Post-procedure vital signs: reviewed and stable  Pain management: adequate  Airway patency: patent    PONV status at discharge: No PONV  Anesthetic complications: no      Cardiovascular status: hemodynamically stable  Respiratory status: unassisted  Follow-up not needed.            No case tracking events are documented in the log.      Pain/Lolly Score: Pain Rating Prior to Med Admin: 0 (3/28/2022 11:45 AM)  Pain Rating Post Med Admin: 0 (3/28/2022 11:45 AM)

## 2022-03-28 NOTE — PLAN OF CARE
Patient not stable for discharge at this time.  Patient awaits Dc PPM today. SW/CM will continue to follow patient's progress to discharge. SW anticipates upon discharge for patient to return back to [facility].  SW/CM remains available for any family concerns or needs.      Vero Lal LMSW  PRN - [facility]  [phone]

## 2022-03-28 NOTE — BRIEF OP NOTE
: Dr. Estiven Rivera MD  Date of procedure: 3/28/2022  Post-operative Diagnosis: Bradycardia    Procedure Performed: dual chamber permanent pacemaker implant    Description of Procedure: The patient was brought to the EP lab in the fasting state. Prepped and draped in sterile fashion. Safety timeout was performed. Sedation administered by anesthesia staff. Selective venogram of the left axillary and cephalic veins performed via left ac IV. Lidocaine used for local anesthetic. Fluoroscopic guided axillary access utilized. Guide/J Wire advanced and confirmed in IVC. Incision made. Blunt and electrocautery dissection performed. Pocket made. Second fluoroscopic guided axillary access obtained. Guide/J wire advanced and confirmed in IVC. Peel away sheath advanced over J wire. RV lead advanced into RV. R waves and injury pattern adequate. Lead fixed into place and sutured in place. Second peel away sheath advanced over J wire. RA lead advanced into RA via peel away sheath. After adequate p waves and current of injury detected, the RA lead was fixed into place in the RA appendage. Peel away sheath removed and RA lead sutured into place. Pocket washed using antibiotic solution. Leads connected to generator. Generator placed into pocket, sutured in place, and washed with antibiotic solution. Deep layer closed with interrupted 3-0 suture. Intermediate layer closed with running 3-0 suture. Superficial layer closed with running 4-0 suture. Skin closed with Dermabond. Meplex dressing to be placed after dermabond has dried.     EBL: <10 mL    Specimens Removed: None  Complications: no immediate    1. Ancef 2 grams q8 hours x 2 doses (ordered)  2. Sling to left arm - wear for 48 hours, then only at night for 6 weeks.  3. NO HEPARIN PRODUCTS  4. Doxycycline 100 mg BID for 5 days at discharge (or after the 2 doses of IV antibiotics if still in the hospital)  5. No lifting left elbow above shoulder height  6. No  lifting over 5 pounds  7. No driving for 1 week and for 4 weeks if patient uses left arm to make turns  8. Dressing will be removed in AM by EP  9. Chest Xray (ordered)  10. Follow up in device clinic in 1 week for device and wound checks.     Dr Rivera, the attending electrophysiologist, was present for the entirety of this procedure.    Mao Burrows MD PGY7

## 2022-03-28 NOTE — NURSING
"      SICU PLAN OF CARE NOTE    Dx: Complete AV block    Shift Events: NAEON    Goals of Care: Plan for permanent pacemaker placement today    Neuro: Oriented to self only, follows commands, moves BUE amd wiggles toes in both feet as BLE in hard casts    Vital Signs: BP (!) 155/62 (BP Location: Left arm, Patient Position: Lying)   Pulse 66   Temp 98.9 °F (37.2 °C) (Axillary)   Resp 15   Ht 5' 6" (1.676 m)   Wt 94.4 kg (208 lb 1.8 oz) Comment: Bed scale  LMP  (LMP Unknown)   SpO2 96%   Breastfeeding No   BMI 33.59 kg/m²     Respiratory: Room Air    Diet: NPO for procedure    Urine Output: Urinary Catheter 610 cc/shift    Drains: Wound vac to R ankle - 0cc/shift     Labs/Accuchecks: daily labs    Skin: Skin CDI. Repositioning pt q 2 hour. Waffle mattress inflated. Adhesive use limited.       "

## 2022-03-28 NOTE — TRANSFER OF CARE
"Anesthesia Transfer of Care Note    Patient: Lilia Hidalgo    Procedure(s) Performed: Procedure(s) (LRB):  INSERTION, CARDIAC PACEMAKER, DUAL CHAMBER (Left)    Patient location: PACU    Anesthesia Type: general    Transport from OR: Transported from OR on room air with adequate spontaneous ventilation    Post pain: adequate analgesia    Post assessment: no apparent anesthetic complications    Post vital signs: stable    Level of consciousness: awake and alert    Nausea/Vomiting: no nausea/vomiting    Complications: none    Transfer of care protocol was followed      Last vitals:   Visit Vitals  /65   Pulse 66   Temp 36.7 °C (98 °F) (Axillary)   Resp 70   Ht 5' 6" (1.676 m)   Wt 94.4 kg (208 lb 1.8 oz)   LMP  (LMP Unknown)   SpO2 96%   Breastfeeding No   BMI 33.59 kg/m²     "

## 2022-03-28 NOTE — ASSESSMENT & PLAN NOTE
S/p external fixation    PLAN:  -continue pt/ot as tolerated, with restrictions per ortho  -- Ortho following; Appreciate recs

## 2022-03-28 NOTE — ANESTHESIA PREPROCEDURE EVALUATION
03/28/2022  [facility]  Anesthesia Pre-Operative Evaluation         Patient Name: Lilia Hidalgo  YOB: 1931  MRN: [id number]    SUBJECTIVE:     Pre-operative evaluation for Procedure(s) (LRB):  INSERTION, CARDIAC PACEMAKER, DUAL CHAMBER (Left)     03/28/2022    Lilia Hidalgo is a 89 y.o. female admitted to the hospital for surgical management of a fracture (s/p percutaneous fixation 03/21). She was noted to have a CHB, underwent TVP placement for optimization prior to the surgical procedure. Course complicated by a pansensitive Ecoli UTI for which she was started on ceftriaxone.  Patient now presents for the above procedure(s).      LDA:   Percutaneous Central Line Insertion/Assessment - single lumen  03/21/22 1124 right internal jugular (Active)   Verification by X-ray Yes 03/28/22 0300   Site Assessment No drainage;No redness;No swelling;No warmth 03/28/22 0300   Line Securement Device Secured with sutures 03/28/22 0300   Dressing Type Biopatch in place;Central line dressing 03/28/22 0300   Dressing Status Clean;Dry;Intact 03/28/22 0300   Dressing Intervention Integrity maintained 03/28/22 0300   Date on Dressing 03/26/22 03/28/22 0300   Dressing Due to be Changed 04/02/22 03/28/22 0300   Distal Patency/Care infusing 03/28/22 0300   Waveform Not being transduced 03/28/22 0300   Line Necessity Review Other (comment) 03/28/22 0300   Number of days: 6            Peripheral IV - Single Lumen 03/27/22 1035 20 G Posterior;Right Hand (Active)   Site Assessment Intact;No redness;No swelling 03/28/22 0300   Extremity Assessment Distal to IV No abnormal discoloration;No warmth;No swelling;No redness 03/28/22 0300   Line Status Saline locked 03/28/22 0300   Dressing Status Intact 03/28/22 0300   Dressing Intervention First dressing 03/28/22 0300   Dressing Change Due 03/31/22  "03/28/22 0300   Site Change Due 03/31/22 03/28/22 0300   Reason Not Rotated Not due 03/28/22 0300   Number of days: 0            Peripheral IV - Single Lumen 03/27/22 1030 20 G Posterior;Right Forearm (Active)   Site Assessment Clean;Intact;Dry;No redness;No swelling 03/28/22 0300   Extremity Assessment Distal to IV No abnormal discoloration;No redness;No warmth;No swelling 03/28/22 0300   Line Status No blood return;Saline locked;Flushed 03/28/22 0300   Dressing Status Clean;Dry;Intact 03/28/22 0300   Dressing Intervention First dressing 03/28/22 0300   Dressing Change Due 03/31/22 03/28/22 0300   Site Change Due 03/31/22 03/28/22 0300   Reason Not Rotated Not due 03/28/22 0300   Number of days: 1            Peripheral IV - Single Lumen 03/28/22 1000 22 G Left Antecubital (Active)   Number of days: 0            Urethral Catheter 03/21/22 1001 Double-lumen 16 Fr. (Active)   $ Paul Insertion Complete 03/21/22 1500   Site Assessment Intact;Clean 03/28/22 0300   Collection Container Urimeter 03/28/22 0300   Securement Method secured to top of thigh w/ adhesive device 03/28/22 0300   Catheter Care Performed yes 03/28/22 0300   Reason for Continuing Urinary Catheterization Critically ill in ICU and requiring hourly monitoring of intake/output 03/28/22 0300   CAUTI Prevention Bundle Securement Device in place with 1" slack;Intact seal between catheter & drainage tubing;Drainage bag/urimeter off the floor;Sheeting clip in use;No dependent loops or kinks;Drainage bag/urimeter not overfilled (<2/3 full);Drainage bag/urimeter below bladder 03/28/22 0300   Output (mL) 200 mL 03/28/22 0900   Number of days: 7       Prev airway: None documented.    Drips:    sodium chloride 0.9% 10 mL/hr at 03/28/22 0900       Patient Active Problem List   Diagnosis    Essential hypertension    Hyperlipidemia    Arthritis    GERD (gastroesophageal reflux disease)    Primary osteoarthritis of left knee    Aortic stenosis    Hypertensive " heart disease with heart failure    Atrophic vaginitis    Slow transit constipation    Depressed mood    Bilateral edema of lower extremity    Atherosclerosis of aorta    Cholelithiasis without cholecystitis    Atypical chest pain    DM hyperosmolarity type II    Hypertensive urgency    Fever    Encephalopathy, metabolic    Dementia    Major depression, recurrent, chronic    Unspecified inflammatory spondylopathy, 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    DNR (do not resuscitate)    Ligamentum flavum hypertrophy    Lumbosacral stenosis with neurogenic claudication    Type 2 diabetes mellitus with hyperlipidemia    Class 1 obesity with serious comorbidity and body mass index (BMI) of 31.0 to 31.9 in adult    Complete AV block    Open ankle fracture, right, type III, initial encounter    Chronic congestive heart failure    LUANNE (acute kidney injury)    Closed fracture of left proximal tibia    Bradycardia    Temporary transvenous cardiac pacemaker present       Review of patient's allergies indicates:   Allergen Reactions    Aspirin Nausea Only and Other (See Comments)     Other reaction(s): esophageal pain    Celebrex [celecoxib] Rash    Morphine Hallucinations    Steroid [corticosteroids (glucocorticoids)] Other (See Comments)     Other reaction(s): Flushing (skin)    Sulfa dyne Other (See Comments)     Other reaction(s): esophogeal pain       Current Inpatient Medications:   aspirin  81 mg Oral BID    atorvastatin  20 mg Oral QHS    balsam peru-castor oiL   Topical (Top) BID    [START ON 3/29/2022] bumetanide  1 mg Oral BID    cholecalciferol (vitamin D3)  50,000 Units Oral Weekly    donepeziL  5 mg Oral QHS    pantoprazole  40 mg Oral Daily    potassium chloride  20 mEq Intravenous Once       No current facility-administered medications on file prior to encounter.     Current Outpatient Medications on File Prior to Encounter   Medication Sig Dispense Refill    acetaminophen (TYLENOL) 650 MG  TbSR Take 1 tablet (650 mg total) by mouth every 8 (eight) hours as needed. Give 1 tablet QAM, 1 QPM. 100 tablet 3    atorvastatin (LIPITOR) 20 MG tablet TAKE 1 TAB BY MOUTH AT BEDTIME 30 tablet 11    cholecalciferol, vitamin D3, 1,250 mcg (50,000 unit) capsule Take 50,000 Units by mouth once daily.      donepeziL (ARICEPT) 5 MG tablet TAKE 1 TAB BY MOUTH EVERY EVENING 30 tablet 11    EScitalopram oxalate (LEXAPRO) 20 MG tablet TAKE 1 TAB BY MOUTH AT BEDTIME FOR DEPRESSION 90 tablet 3    furosemide (LASIX) 40 MG tablet TAKE 1 TAB BY MOUTH EVERY DAY 30 tablet 11    losartan (COZAAR) 100 MG tablet TAKE 1 TAB BY MOUTH ONCE DAILY 90 tablet 3    melatonin 5 mg Tab Take 1 tablet by mouth every evening.       metoprolol succinate (TOPROL-XL) 200 MG 24 hr tablet TAKE 1 TAB BY MOUTH EVERY DAY 30 tablet 11    MULTIVITAMIN W-MINERALS/LUTEIN (CENTRUM SILVER ORAL) Take by mouth once daily.      omeprazole (PRILOSEC) 20 MG capsule TAKE 2 CAPS (40MG) BY MOUTH EVERY DAY (DX: GERD) 60 capsule 11    potassium chloride SA (K-DUR,KLOR-CON) 10 MEQ tablet Take 1 tablet (10 mEq total) by mouth once daily. May also take 2 tablets (20 mEq total) daily as needed (when taking lasix for leg swelling). 45 tablet 11    furosemide (LASIX) 40 MG tablet TAKE 2 TABLETS (80 MG TOTAL) BY MOUTH 2 (TWO) TIMES DAILY AS NEEDED (FOR WEIGHT GAIN OF 5 POUNDS). TAKE WITH POTASSIUM 60 tablet 11    nitroGLYCERIN (NITROSTAT) 0.4 MG SL tablet Place 1 tablet (0.4 mg total) under the tongue every 5 (five) minutes as needed for Chest pain. 15 tablet 3    nystatin (MYCOSTATIN) cream APPLY TO GROIN AREA TOPICALLY EVERY 12 HOURS FOR ANTIFUNGAL 30 g 11    NYSTOP powder APPLY TO GROIN AREA TOPICALLY EVERY 12 HOURS FOR ANTIFUNGAL 60 g 10       Past Surgical History:   Procedure Laterality Date    APPENDECTOMY      APPLICATION OF LARGE EXTERNAL FIXATION DEVICE TO TIBIA Right 3/21/2022    Procedure: APPLICATION, EXTERNAL FIXATION DEVICE, LARGE, TIBIA;   "Surgeon: Anirudh Carreno MD;  Location: [address];  Service: Orthopedics;  Laterality: Right;    COLONOSCOPY      08    EYE SURGERY      bilateral cataract    FINGER SURGERY      LT thumb surgery--"arthritis surgery"    HYSTERECTOMY      UNKNOWN BSO    JOINT REPLACEMENT      right hip replacement    KNEE ARTHROPLASTY Left 2001    TONSILLECTOMY      TUMOR EXCISION      esophageal tumor removal     UPPER GASTROINTESTINAL ENDOSCOPY      2013       Social History     Socioeconomic History    Marital status:    Tobacco Use    Smoking status: Never Smoker    Smokeless tobacco: Never Used   Substance and Sexual Activity    Alcohol use: No    Drug use: Never     Types: Hydrocodone    Sexual activity: Not Currently     Birth control/protection: None   Other Topics Concern    Are you pregnant or think you may be? No    Breast-feeding No     Social Determinants of Health     Financial Resource Strain: Unknown    Difficulty of Paying Living Expenses: Patient refused   Food Insecurity: No Food Insecurity    Worried About Running Out of Food in the Last Year: Never true    Ran Out of Food in the Last Year: Never true   Transportation Needs: No Transportation Needs    Lack of Transportation (Medical): No    Lack of Transportation (Non-Medical): No   Physical Activity: Inactive    Days of Exercise per Week: 0 days    Minutes of Exercise per Session: 0 min   Stress: No Stress Concern Present    Feeling of Stress : Only a little   Social Connections: Socially Isolated    Frequency of Communication with Friends and Family: More than three times a week    Frequency of Social Gatherings with Friends and Family: Twice a week    Attends Yazidi Services: Never    Active Member of Clubs or Organizations: No    Attends Club or Organization Meetings: Never    Marital Status:    Housing Stability: Low Risk     Unable to Pay for Housing in the Last Year: No    Number of Places " Lived in the Last Year: 1    Unstable Housing in the Last Year: No       OBJECTIVE:     Vital Signs Range (Last 24H):  Temp:  [36.7 °C (98 °F)-37.5 °C (99.5 °F)]   Pulse:  [65-67]   Resp:  [15-34]   BP: (120-175)/(53-70)   SpO2:  [94 %-99 %]       Significant Labs:  Lab Results   Component Value Date    WBC 4.93 03/28/2022    HGB 8.6 (L) 03/28/2022    HCT 28.1 (L) 03/28/2022     03/28/2022    CHOL 115 (L) 03/22/2022    TRIG 99 03/22/2022    HDL 40 03/22/2022    ALT 8 (L) 03/28/2022    AST 33 03/28/2022     (H) 03/28/2022    K 3.7 03/28/2022     03/28/2022    CREATININE 0.9 03/28/2022    BUN 40 (H) 03/28/2022    CO2 30 (H) 03/28/2022    TSH 1.405 05/27/2019    INR 1.1 03/28/2022    HGBA1C 5.4 03/21/2022       Diagnostic Studies: No relevant studies.    EKG:   Results for orders placed or performed during the hospital encounter of 03/21/22   EKG 12-lead    Collection Time: 03/22/22 11:24 AM    Narrative    Test Reason : R00.1,    Vent. Rate : 050 BPM     Atrial Rate : 061 BPM     P-R Int : 000 ms          QRS Dur : 152 ms      QT Int : 596 ms       P-R-T Axes : 000 -20 111 degrees     QTc Int : 543 ms    Ventricular-paced rhythm  Abnormal ECG  When compared with ECG of 21-MAR-2022 08:01,  Ventricular paced rhythm is now present  Confirmed by Tamika Mcnamara MD ([id number]) on 3/22/2022 2:35:23 PM    Referred By: [email or web address]   SELF           Confirmed By:Tamika Mcnamara MD       ECHOCARDIOGRAM:  TTE:  Results for orders placed or performed during the hospital encounter of 03/21/22   Echo   Result Value Ref Range    Ascending aorta 2.84 cm    STJ 2.21 cm    AV mean gradient 22 mmHg    Ao peak rodger 3.08 m/s    Ao VTI 74.00 cm    IVS 0.81 0.6 - 1.1 cm    LA size 1.98 cm    Left Atrium Major Axis 4.73 cm    LVIDd 3.36 (A) 3.5 - 6.0 cm    LVIDs 1.79 (A) 2.1 - 4.0 cm    LVOT diameter 1.98 cm    LVOT peak VTI 24.00 cm    Posterior Wall 0.70 0.6 - 1.1 cm    MV Peak A Rodger 1.33 m/s    E wave deceleration time 324.83 msec     MV Peak E Rodger 1.01 m/s    Sinus 3.13 cm    TR Max Rodger 3.10 m/s    TDI LATERAL 0.07 m/s    TDI SEPTAL 0.06 m/s    LA WIDTH 4.77 cm    MV stenosis pressure 1/2 time 94.20 ms    LV Diastolic Volume 46.19 mL    LV Systolic Volume 9.52 mL    RV S' 10.85 cm/s    LVOT peak rodger 1.05 m/s    LA volume (mod) 59.12 cm3    MV mean gradient 1 mmHg    MV peak gradient 7 mmHg    MV VTI 45.12 cm    LV LATERAL E/E' RATIO 14.43 m/s    LV SEPTAL E/E' RATIO 16.83 m/s    FS 47 %    LV mass 65.14 g    Left Ventricle Relative Wall Thickness 0.42 cm    AV valve area 1.00 cm2    AV Velocity Ratio 0.34     AV index (prosthetic) 0.32     MV valve area p 1/2 method 2.34 cm2    MV valve area by continuity eq 1.64 cm2    E/A ratio 0.76     Mean e' 0.07 m/s    LVOT area 3.1 cm2    LVOT stroke volume 73.86 cm3    AV peak gradient 38 mmHg    E/E' ratio 15.54 m/s    Triscuspid Valve Regurgitation Peak Gradient 38 mmHg    BSA 2.06 m2    LV Systolic Volume Index 4.8 mL/m2    LV Diastolic Volume Index 23.10 mL/m2    LV Mass Index 33 g/m2    LA Volume Index (Mod) 29.6 mL/m2    EF 65 %    Narrative    · Technically difficult study  · The left ventricle is normal in size with normal systolic function. The   estimated ejection fraction is 65%.  · Right ventricular systolic function appears normal, though imaging of   the right heart is challenging.  · Grade II left ventricular diastolic dysfunction.  · There is moderate aortic valve stenosis. Aortic valve area is 1.0 cm2;   peak velocity is 3.1 m/s; mean gradient is 22 mmHg.  · Mild to moderate tricuspid regurgitation.  · The estimated PA systolic pressure is > 40mm Hg.              ASSESSMENT/PLAN:           Pre-op Assessment    I have reviewed the Patient Summary Reports.     I have reviewed the Nursing Notes. I have reviewed the NPO Status.   I have reviewed the Medications.     Review of Systems  Anesthesia Hx:  No problems with previous Anesthesia  Neg history of prior surgery. Denies Family Hx of  Anesthesia complications.    Social:  No Alcohol Use, Non-Smoker    Hematology/Oncology:  Hematology Normal   Oncology Normal     EENT/Dental:EENT/Dental Normal   Cardiovascular:   Hypertension CHF hyperlipidemia 3rd degree AV block   Pulmonary:  Pulmonary Normal  Denies COPD.  Denies Asthma.    Renal/:   Chronic Renal Disease, ARF    Hepatic/GI:   GERD Denies Liver Disease.    Musculoskeletal:   Arthritis     Neurological:  Neurology Normal  Denies CVA. Denies Seizures. Dementia, oriented to self only.   Endocrine:   Diabetes, type 2  Obesity / BMI > 30  Psych:   Psychiatric History depression          Physical Exam  General: Well nourished and Cooperative    Airway:  Mallampati: II   Mouth Opening: Normal  TM Distance: Normal  Tongue: Normal  Neck ROM: Normal ROM    Dental:  Periodontal disease        Anesthesia Plan  Type of Anesthesia, risks & benefits discussed:    Anesthesia Type: MAC  Intra-op Monitoring Plan: Standard ASA Monitors  Post Op Pain Control Plan: multimodal analgesia and IV/PO Opioids PRN  Induction:  IV  Informed Consent: Informed consent signed with the Patient representative and all parties understand the risks and agree with anesthesia plan.  All questions answered.   ASA Score: 4  Day of Surgery Review of History & Physical: H&P Update referred to the surgeon/provider.    Ready For Surgery From Anesthesia Perspective.     .

## 2022-03-29 PROBLEM — E87.0 HYPERNATREMIA: Status: ACTIVE | Noted: 2022-03-29

## 2022-03-29 LAB
ALBUMIN SERPL BCP-MCNC: 2 G/DL (ref 3.5–5.2)
ALBUMIN SERPL BCP-MCNC: 2 G/DL (ref 3.5–5.2)
ALP SERPL-CCNC: 80 U/L (ref 55–135)
ALP SERPL-CCNC: 82 U/L (ref 55–135)
ALT SERPL W/O P-5'-P-CCNC: 14 U/L (ref 10–44)
ALT SERPL W/O P-5'-P-CCNC: 15 U/L (ref 10–44)
ANION GAP SERPL CALC-SCNC: 9 MMOL/L (ref 8–16)
APTT BLDCRRT: 21.4 SEC (ref 21–32)
AST SERPL-CCNC: 52 U/L (ref 10–40)
AST SERPL-CCNC: 55 U/L (ref 10–40)
BACTERIA BLD CULT: NORMAL
BASOPHILS # BLD AUTO: 0.02 K/UL (ref 0–0.2)
BASOPHILS NFR BLD: 0.4 % (ref 0–1.9)
BILIRUB SERPL-MCNC: 0.7 MG/DL (ref 0.1–1)
BILIRUB SERPL-MCNC: 0.7 MG/DL (ref 0.1–1)
BUN SERPL-MCNC: 29 MG/DL (ref 8–23)
BUN SERPL-MCNC: 31 MG/DL (ref 8–23)
BUN SERPL-MCNC: 32 MG/DL (ref 8–23)
CALCIUM SERPL-MCNC: 8.6 MG/DL (ref 8.7–10.5)
CALCIUM SERPL-MCNC: 8.6 MG/DL (ref 8.7–10.5)
CALCIUM SERPL-MCNC: 9 MG/DL (ref 8.7–10.5)
CHLORIDE SERPL-SCNC: 103 MMOL/L (ref 95–110)
CHLORIDE SERPL-SCNC: 104 MMOL/L (ref 95–110)
CHLORIDE SERPL-SCNC: 105 MMOL/L (ref 95–110)
CO2 SERPL-SCNC: 37 MMOL/L (ref 23–29)
CO2 SERPL-SCNC: 40 MMOL/L (ref 23–29)
CO2 SERPL-SCNC: 40 MMOL/L (ref 23–29)
CREAT SERPL-MCNC: 0.8 MG/DL (ref 0.5–1.4)
DIFFERENTIAL METHOD: ABNORMAL
EOSINOPHIL # BLD AUTO: 0.2 K/UL (ref 0–0.5)
EOSINOPHIL NFR BLD: 3.4 % (ref 0–8)
ERYTHROCYTE [DISTWIDTH] IN BLOOD BY AUTOMATED COUNT: 15.7 % (ref 11.5–14.5)
EST. GFR  (AFRICAN AMERICAN): >60 ML/MIN/1.73 M^2
EST. GFR  (NON AFRICAN AMERICAN): >60 ML/MIN/1.73 M^2
GLUCOSE SERPL-MCNC: 236 MG/DL (ref 70–110)
GLUCOSE SERPL-MCNC: 93 MG/DL (ref 70–110)
GLUCOSE SERPL-MCNC: 96 MG/DL (ref 70–110)
HCT VFR BLD AUTO: 28.8 % (ref 37–48.5)
HGB BLD-MCNC: 8.8 G/DL (ref 12–16)
IMM GRANULOCYTES # BLD AUTO: 0.04 K/UL (ref 0–0.04)
IMM GRANULOCYTES NFR BLD AUTO: 0.9 % (ref 0–0.5)
INR PPP: 1.2 (ref 0.8–1.2)
LYMPHOCYTES # BLD AUTO: 0.6 K/UL (ref 1–4.8)
LYMPHOCYTES NFR BLD: 12.7 % (ref 18–48)
MAGNESIUM SERPL-MCNC: 2 MG/DL (ref 1.6–2.6)
MCH RBC QN AUTO: 28.3 PG (ref 27–31)
MCHC RBC AUTO-ENTMCNC: 30.6 G/DL (ref 32–36)
MCV RBC AUTO: 93 FL (ref 82–98)
MONOCYTES # BLD AUTO: 0.6 K/UL (ref 0.3–1)
MONOCYTES NFR BLD: 11.9 % (ref 4–15)
NEUTROPHILS # BLD AUTO: 3.3 K/UL (ref 1.8–7.7)
NEUTROPHILS NFR BLD: 70.7 % (ref 38–73)
NRBC BLD-RTO: 0 /100 WBC
PHOSPHATE SERPL-MCNC: 2.9 MG/DL (ref 2.7–4.5)
PLATELET # BLD AUTO: 209 K/UL (ref 150–450)
PMV BLD AUTO: 10.4 FL (ref 9.2–12.9)
POCT GLUCOSE: 165 MG/DL (ref 70–110)
POTASSIUM SERPL-SCNC: 3.2 MMOL/L (ref 3.5–5.1)
POTASSIUM SERPL-SCNC: 3.6 MMOL/L (ref 3.5–5.1)
POTASSIUM SERPL-SCNC: 3.6 MMOL/L (ref 3.5–5.1)
PROT SERPL-MCNC: 4.8 G/DL (ref 6–8.4)
PROT SERPL-MCNC: 4.9 G/DL (ref 6–8.4)
PROTHROMBIN TIME: 11.9 SEC (ref 9–12.5)
RBC # BLD AUTO: 3.11 M/UL (ref 4–5.4)
SODIUM SERPL-SCNC: 149 MMOL/L (ref 136–145)
SODIUM SERPL-SCNC: 153 MMOL/L (ref 136–145)
SODIUM SERPL-SCNC: 154 MMOL/L (ref 136–145)
WBC # BLD AUTO: 4.64 K/UL (ref 3.9–12.7)

## 2022-03-29 PROCEDURE — 25000003 PHARM REV CODE 250: Performed by: STUDENT IN AN ORGANIZED HEALTH CARE EDUCATION/TRAINING PROGRAM

## 2022-03-29 PROCEDURE — 27000221 HC OXYGEN, UP TO 24 HOURS

## 2022-03-29 PROCEDURE — 25000003 PHARM REV CODE 250: Performed by: INTERNAL MEDICINE

## 2022-03-29 PROCEDURE — 99232 PR SUBSEQUENT HOSPITAL CARE,LEVL II: ICD-10-PCS | Mod: ,,, | Performed by: HOSPITALIST

## 2022-03-29 PROCEDURE — 83735 ASSAY OF MAGNESIUM: CPT | Performed by: INTERNAL MEDICINE

## 2022-03-29 PROCEDURE — 63600175 PHARM REV CODE 636 W HCPCS: Performed by: INTERNAL MEDICINE

## 2022-03-29 PROCEDURE — 99024 PR POST-OP FOLLOW-UP VISIT: ICD-10-PCS | Mod: ,,, | Performed by: INTERNAL MEDICINE

## 2022-03-29 PROCEDURE — 97535 SELF CARE MNGMENT TRAINING: CPT

## 2022-03-29 PROCEDURE — 99024 POSTOP FOLLOW-UP VISIT: CPT | Mod: ,,, | Performed by: INTERNAL MEDICINE

## 2022-03-29 PROCEDURE — 99900035 HC TECH TIME PER 15 MIN (STAT)

## 2022-03-29 PROCEDURE — 20000000 HC ICU ROOM

## 2022-03-29 PROCEDURE — 94761 N-INVAS EAR/PLS OXIMETRY MLT: CPT

## 2022-03-29 PROCEDURE — 92610 EVALUATE SWALLOWING FUNCTION: CPT

## 2022-03-29 PROCEDURE — 84100 ASSAY OF PHOSPHORUS: CPT | Performed by: INTERNAL MEDICINE

## 2022-03-29 PROCEDURE — 99232 SBSQ HOSP IP/OBS MODERATE 35: CPT | Mod: ,,, | Performed by: HOSPITALIST

## 2022-03-29 PROCEDURE — 80048 BASIC METABOLIC PNL TOTAL CA: CPT | Performed by: STUDENT IN AN ORGANIZED HEALTH CARE EDUCATION/TRAINING PROGRAM

## 2022-03-29 PROCEDURE — 85610 PROTHROMBIN TIME: CPT | Performed by: INTERNAL MEDICINE

## 2022-03-29 PROCEDURE — 85025 COMPLETE CBC W/AUTO DIFF WBC: CPT | Performed by: INTERNAL MEDICINE

## 2022-03-29 PROCEDURE — 85730 THROMBOPLASTIN TIME PARTIAL: CPT | Performed by: INTERNAL MEDICINE

## 2022-03-29 PROCEDURE — 99232 SBSQ HOSP IP/OBS MODERATE 35: CPT | Mod: GC,,, | Performed by: INTERNAL MEDICINE

## 2022-03-29 PROCEDURE — 80053 COMPREHEN METABOLIC PANEL: CPT | Mod: 91 | Performed by: INTERNAL MEDICINE

## 2022-03-29 PROCEDURE — 99232 PR SUBSEQUENT HOSPITAL CARE,LEVL II: ICD-10-PCS | Mod: GC,,, | Performed by: INTERNAL MEDICINE

## 2022-03-29 RX ORDER — DOXYCYCLINE 100 MG/1
100 CAPSULE ORAL EVERY 12 HOURS
Qty: 10 CAPSULE | Refills: 0 | Status: SHIPPED | OUTPATIENT
Start: 2022-03-29 | End: 2022-04-03

## 2022-03-29 RX ORDER — DEXTROSE MONOHYDRATE 50 MG/ML
INJECTION, SOLUTION INTRAVENOUS CONTINUOUS
Status: DISCONTINUED | OUTPATIENT
Start: 2022-03-29 | End: 2022-03-30

## 2022-03-29 RX ORDER — DOXYCYCLINE HYCLATE 100 MG
100 TABLET ORAL EVERY 12 HOURS
Status: DISCONTINUED | OUTPATIENT
Start: 2022-03-30 | End: 2022-04-02 | Stop reason: HOSPADM

## 2022-03-29 RX ORDER — LOSARTAN POTASSIUM 50 MG/1
50 TABLET ORAL DAILY
Status: DISCONTINUED | OUTPATIENT
Start: 2022-03-29 | End: 2022-04-02 | Stop reason: HOSPADM

## 2022-03-29 RX ADMIN — Medication 2 G: at 10:03

## 2022-03-29 RX ADMIN — Medication: at 10:03

## 2022-03-29 RX ADMIN — DEXTROSE: 5 SOLUTION INTRAVENOUS at 06:03

## 2022-03-29 RX ADMIN — HYDROMORPHONE HYDROCHLORIDE 0.2 MG: 1 INJECTION, SOLUTION INTRAMUSCULAR; INTRAVENOUS; SUBCUTANEOUS at 06:03

## 2022-03-29 RX ADMIN — POTASSIUM BICARBONATE 25 MEQ: 978 TABLET, EFFERVESCENT ORAL at 03:03

## 2022-03-29 RX ADMIN — POTASSIUM BICARBONATE 25 MEQ: 978 TABLET, EFFERVESCENT ORAL at 01:03

## 2022-03-29 RX ADMIN — ASPIRIN 81 MG: 81 TABLET, COATED ORAL at 09:03

## 2022-03-29 RX ADMIN — PANTOPRAZOLE SODIUM 40 MG: 40 TABLET, DELAYED RELEASE ORAL at 09:03

## 2022-03-29 RX ADMIN — LOSARTAN POTASSIUM 50 MG: 50 TABLET, FILM COATED ORAL at 09:03

## 2022-03-29 RX ADMIN — OXYCODONE 5 MG: 5 TABLET ORAL at 11:03

## 2022-03-29 RX ADMIN — Medication: at 09:03

## 2022-03-29 RX ADMIN — ATORVASTATIN CALCIUM 20 MG: 20 TABLET, FILM COATED ORAL at 10:03

## 2022-03-29 RX ADMIN — DEXTROSE: 5 SOLUTION INTRAVENOUS at 08:03

## 2022-03-29 RX ADMIN — DONEPEZIL HYDROCHLORIDE 5 MG: 5 TABLET ORAL at 10:03

## 2022-03-29 RX ADMIN — CHOLECALCIFEROL TAB 125 MCG (5000 UNIT) 50000 UNITS: 125 TAB at 09:03

## 2022-03-29 RX ADMIN — ASPIRIN 81 MG: 81 TABLET, COATED ORAL at 10:03

## 2022-03-29 NOTE — PLAN OF CARE
Problem: SLP  Goal: SLP Goal  Description: Speech Language Pathology Goals  Goals expected to be met by 4/5:  1. Patient will tolerate a dysphagia soft diet and nectar thickened liquids with no overt signs of airway compromise.     Outcome: Ongoing, Progressing

## 2022-03-29 NOTE — ASSESSMENT & PLAN NOTE
Ortho following. Recommendations:   -- Keep left long leg cast reinforced with bed pan bag at the top to prevent it from getting soiled with urine/feces  -- Continue incisional wound vac right ankle (placed 3/22)  -- Continue heparin TID for dvt ppx per primary team. Continue DVT ppx at discharge (heparin okay or can do ASA 81 bid or LVX) - However, Cardiology is holding this for 5 days given her recent PPM placement  -- From orthopedic standpoint okay to dc once medically ready (still needs permanent pacemaker placement)  -- FU 1 week after discharge for incisional wound vac removal from right ankle

## 2022-03-29 NOTE — PROGRESS NOTES
[facility]  Cardiology  Progress Note    Patient Name: Lilia Hidalgo  MRN: [id number]  Admission Date: 3/21/2022  Hospital Length of Stay: 8 days  Code Status: Full Code   Attending Physician: Anirudh Guidry MD   Primary Care Physician: Anna Wood MD  Expected Discharge Date: 3/30/2022  Principal Problem:Complete AV block    Subjective:     Hospital Course:   Ms. Hidalgo was admitted to the hospital for surgical management right ankle fracture sp I&D and percutaneous fixation 3/21/22 and then she also has a left tibia fracture which they decided not to operate on and placed long cast. She was noted to have a CHB, underwent TVP placement for optimization prior to the surgical procedure. Course complicated by a pansensitive Ecoli UTI for which she was treated with a 7 day course of ABX (cefazolin and CTX). ID was consulted and cleared the patient to undergo PPM placement; planning on undergoing placement on 03/28. IV lasix discontinued on 3/28 and patient was started on Bumex 1 mg BID. However, patient developed a contraction alkalosis with worsening hypernatremia. Started on D5W. Stepped down to  on 3/29      Interval History: No acute events overnight. S/P PPM placement yesterday Discontinued Bumex 1 mg BID due to worsening contraction alkalosis and Hypernatremia started on D5W ofr 3.7L deficit. Stepped down to       Review of Systems   Unable to perform ROS: Dementia     Objective:     Vital Signs (Most Recent):  Temp: 98.9 °F (37.2 °C) (03/29/22 0700)  Pulse: 88 (03/29/22 0800)  Resp: 16 (03/29/22 0800)  BP: (!) 148/67 (03/29/22 0903)  SpO2: 100 % (03/29/22 0800)   Vital Signs (24h Range):  Temp:  [97.8 °F (36.6 °C)-98.9 °F (37.2 °C)] 98.9 °F (37.2 °C)  Pulse:  [66-98] 88  Resp:  [13-61] 16  SpO2:  [94 %-100 %] 100 %  BP: (120-190)/(55-72) 148/67     Weight: 94.4 kg (208 lb 1.8 oz) (Bed scale)  Body mass index is 33.59 kg/m².     SpO2: 100 %  O2 Device (Oxygen Therapy):  nasal cannula      Intake/Output Summary (Last 24 hours) at 3/29/2022 0914  Last data filed at 3/29/2022 0713  Gross per 24 hour   Intake 385.35 ml   Output 1340 ml   Net -954.65 ml       Lines/Drains/Airways       Central Venous Catheter Line  Duration             Percutaneous Central Line Insertion/Assessment - single lumen  03/21/22 1124 right internal jugular 7 days              Drain  Duration             Female External Urinary Catheter 03/28/22 1200 <1 day              Peripheral Intravenous Line  Duration                  Peripheral IV - Single Lumen 03/27/22 1030 20 G Posterior;Right Forearm 1 day         Peripheral IV - Single Lumen 03/27/22 1035 20 G Posterior;Right Hand 1 day         Peripheral IV - Single Lumen 03/28/22 1215 22 G Left;Posterior Hand <1 day                  Physical Exam  Vitals and nursing note reviewed.   Constitutional:       General: She is not in acute distress.     Appearance: She is obese. She is not ill-appearing.   HENT:      Head: Normocephalic and atraumatic.      Nose: No congestion or rhinorrhea.      Mouth/Throat:      Pharynx: No oropharyngeal exudate or posterior oropharyngeal erythema.   Eyes:      General: No scleral icterus.     Conjunctiva/sclera: Conjunctivae normal.   Cardiovascular:      Rate and Rhythm: Normal rate and regular rhythm.   Pulmonary:      Effort: Pulmonary effort is normal. No respiratory distress.      Breath sounds: No wheezing or rales.   Abdominal:      General: Bowel sounds are normal. There is no distension.      Palpations: Abdomen is soft.   Musculoskeletal:         General: Swelling present.      Right lower leg: Edema present.      Left lower leg: Edema present.      Comments: Both LE casted with wound vac in place   Skin:     General: Skin is warm.      Coloration: Skin is not jaundiced.      Findings: No lesion.   Neurological:      Mental Status: She is alert.   Psychiatric:         Mood and Affect: Mood normal.         Behavior:  "Behavior normal.         Thought Content: Thought content normal.         Judgment: Judgment normal.     Significant Labs: All pertinent lab results from the last 24 hours have been reviewed.    Assessment and Plan:     * Complete AV block  Patient presented to ED with ankle fracture and found to be in complete heart block. Currently stable with junctional scape rhythm in the 30s and normal BP.     TVP was placed on 03/20 as a bridge to pacemaker. Underwent R fixation surgery on 03/21.  -Pacemaker placement delayed due to one febrile episode on 03/23. Cultures have had no growth and she was appropriately treated for a pansensitive ecoli uti.   -ID was consulted and cleared the patient to undergo ppm placement     PLAN:  --continue TVP for now  -- S/P PPM placement with EP on 3/28  -- Per EP:   1. Ancef 2 grams q8 hours x 2 doses - completed  2. Sling to left arm - wear for 48 hours, then only at night for 6 weeks.  3. NO HEPARIN PRODUCTS - for at least 5 days  4. Doxycycline 100 mg TID for 5 days at discharge (or after the 2 doses of IV antibiotics if still in the hospital) - Ordered  5. No lifting left elbow above shoulder height  6. No lifting over 5 pounds  7. No driving for 1 week and for 4 weeks if patient uses left arm to make turns  9. Follow up in device clinic in 1 week for device and wound check    Hypernatremia  Concerned for contraction alkalosis Given overdiruesis  FWD 3.7L    PLAN:  -- hold additional diuretics and start D5W for deficit    Temporary transvenous cardiac pacemaker present  See complete Heart block    Bradycardia  See "complete av block"    Closed fracture of left proximal tibia  Ortho following. Recommendations:   -- Keep left long leg cast reinforced with bed pan bag at the top to prevent it from getting soiled with urine/feces  -- Continue incisional wound vac right ankle (placed 3/22)  -- Continue heparin TID for dvt ppx per primary team. Continue DVT ppx at discharge (heparin okay or " "can do ASA 81 bid or LVX) - However, Cardiology is holding this for 5 days given her recent PPM placement  -- From orthopedic standpoint okay to dc once medically ready (still needs permanent pacemaker placement)  -- FU 1 week after discharge for incisional wound vac removal from right ankle       LUANNE (acute kidney injury)  RESOLVED  Likely d/t hypervolemic state vs ATN from CHB  RP ultrasound with chronic disease, no hydronephrosis    Nephrology consulted- suspect ATN w/ muddy brown casts after spinning urine    PLAN:  --started diuresis w lasix iv 120 bid with improvement in creatinine, now back at baseline. Transitioned to P.O, Bumex on 3/28; then held 3/29    Chronic congestive heart failure  Presented with an elevated BNP and appeared hypervolemic  TTE with a normal EF- unable to determine diastolic function given she is being paced  Treated with IV lasix 120 mg BID per nephrology recs    PLAN:  -- Hold diuretics given hypovolemia  -- monitor I/Os  -- consider restarting back on home ARB after PPM placement if hemodynamics tolerate it    Open ankle fracture, right, type III, initial encounter  S/p external fixation    PLAN:  -continue pt/ot as tolerated, with restrictions per ortho  -- Ortho following; Appreciate recs    Essential hypertension   BP (!) 120/55   Pulse 66   Temp 98 °F (36.7 °C) (Axillary)   Resp (!) 49   Ht 5' 6" (1.676 m)   Wt 94.4 kg (208 lb 1.8 oz) Comment: Bed scale  LMP  (LMP Unknown)   SpO2 96%   Breastfeeding No   BMI 33.59 kg/m²      PLAN:  - Home antihypertensives held in the setting of heart block and plans for a PPM placement  -Pt advised to be compliant with their treatment regimen daily to avoid BP fluctuation and any complications.   -Pt educated that it is important to eat right. Following a reasonable-calorie low-salt diet (Such as the DASH diet) has been shown to control blood pressure better with less medications. Recommended to exercise at least 30 mins a day for 5 " days a week and also told to avoid smoking all together.        VTE Risk Mitigation (From admission, onward)    None          Letha Osman DO  Cardiology  [facility]

## 2022-03-29 NOTE — RESIDENT HANDOFF
Handoff     Primary Team: [facility] CARDIOLOGY CCU Room Number: 71419/74074 A     Patient Name: Lilia Hidalgo MRN: [id number]     Date of Birth: [id number] Allergies: Aspirin, Celebrex [celecoxib], Morphine, Steroid [corticosteroids (glucocorticoids)], and Sulfa dyne     Age: 89 y.o. Admit Date: 3/21/2022     Sex: female  BMI: Body mass index is 33.59 kg/m².     Code Status: Full Code        Illness Level (current clinical status): Watcher - No    Reason for Admission: Complete AV block    Brief HPI:  89 y.o. female with HTN, MDD, mild cognitive impairment, and urinary incontinence. She is brought to ED from nursing home due to a ankle fracture. She uses a wheelchair and was being assisted to transfer from the toilet to her wheelchair when she twisted her ankle and fractured it. No reports of syncope. On arrival to ED, an ECG showed 3rd degree AV block and cardiology was consulted. She is awake and complaining of pain. Not able to give a detailed history due to patient's dementia. Discussed the events with daughter.    Procedure Date: 3/28 - PPM placement    Hospital Course:  Patient presented 3/21 following a right ankle fracture sp I&D and percutaneous fixation 3/21/22 and then she also has a left tibia fracture which they decided not to operate on and placed long cast and was noted to have a complete heart block with an escape rhythm at 35bmp. Had a TVP placed in the ED (3/21) placed prior to ortho's surgery with plans of getting a dc ppm later. Hopsital Course complicated by LUANNE and E. coli UTI causing initial pacemaker placement to be delayed. She has received a 7 day course of ABX for her ecoli uti and her blood cultures  have no growth. ID cleared her to get the pacemaker for which she underwent successful placement on 3/28. Hospital course complicated now by contraction alkalosis and Hypernatremia secondary to over diuresis as she was originally on 120 mg of IV lasix BID followed by transition to bumex per nephro recs  for Hypervolemia secondary to being bed bound after her ortho procedure. Currently getting D5W with a 4.4 L deficit.    Tasks:  -- Correct Hypernatremia  -- CM to assist with SNF placement  --  For Post-acute PPM Placement:  1. Ancef 2 grams q8 hours x 2 doses - completed  2. Sling to left arm - wear for 48 hours, then only at night for 6 weeks.  3. NO HEPARIN PRODUCTS  4. Doxycycline 100 mg TID for 5 days at discharge (or after the 2 doses of IV antibiotics if still in the hospital) - Ordered  5. No lifting left elbow above shoulder height  6. No lifting over 5 pounds  7. No driving for 1 week and for 4 weeks if patient uses left arm to make turns  9. Follow up in device clinic in 1 week for device and wound check    Estimated Discharge Date: 3/31/22    Discharge Disposition: Skilled Nursing Facility    Mentored By: Dr. Guidry

## 2022-03-29 NOTE — PROGRESS NOTES
[facility]  Nephrology  Progress Note    Patient Name: Lilia Hidalgo  MRN: [id number]  Admission Date: 3/21/2022  Hospital Length of Stay: 8 days  Attending Provider: Anirudh Guidry MD   Primary Care Physician: Anna Wood MD  Principal Problem:Complete AV block    Subjective:     HPI:   89 y.o. female with HTN, MDD, mild cognitive impairment, and urinary incontinence. She is brought to ED from nursing home due to a ankle fracture. She uses a wheelchair and was being assisted to transfer from the toilet to her wheelchair when she twisted her ankle and fractured it. No reports of syncope. On arrival to ED, an ECG showed 3rd degree AV block and cardiology was consulted. She is awake and complaining of pain. Not able to give a detailed history due to patient's dementia.     Nephrology consulted for LUANNE       Interval History: today. Patient is net neg 1.2 L     Review of patient's allergies indicates:   Allergen Reactions    Aspirin Nausea Only and Other (See Comments)     Other reaction(s): esophageal pain    Celebrex [celecoxib] Rash    Morphine Hallucinations    Steroid [corticosteroids (glucocorticoids)] Other (See Comments)     Other reaction(s): Flushing (skin)    Sulfa dyne Other (See Comments)     Other reaction(s): esophogeal pain     Current Facility-Administered Medications   Medication Frequency    0.9%  NaCl infusion Continuous    acetaminophen tablet 650 mg Q8H PRN    aspirin EC tablet 81 mg BID    atorvastatin tablet 20 mg QHS    balsam peru-castor oiL Oint BID    BUPivacaine (PF) 0.25% (2.5 mg/ml) injection PRN    ceFAZolin 2 gram in dextrose 5% 100 mL IVPB (premix) On Call Procedure    ceFAZolin 2 gram in dextrose 5% 100 mL IVPB (premix) Q8H    cholecalciferol (vitamin D3) 125 mcg (5,000 unit) tablet 50,000 Units Weekly    dextrose 5 % infusion Continuous    diphenhydrAMINE capsule 25 mg Q6H PRN    donepeziL tablet 5 mg QHS    [START ON  3/30/2022] doxycycline tablet 100 mg Q12H    HYDROmorphone injection 0.2 mg Q6H PRN    LIDOcaine HCL 20 mg/ml (2%) injection PRN    oxyCODONE immediate release tablet 5 mg Q6H PRN    pantoprazole EC tablet 40 mg Daily    sodium chloride 0.9% irrigation PRN    vancomycin injection PRN       Objective:     Vital Signs (Most Recent):  Temp: 98.9 °F (37.2 °C) (03/29/22 0700)  Pulse: 88 (03/29/22 0800)  Resp: 16 (03/29/22 0800)  BP: (!) 143/65 (03/29/22 0800)  SpO2: 100 % (03/29/22 0800)  O2 Device (Oxygen Therapy): nasal cannula (03/29/22 0728)   Vital Signs (24h Range):  Temp:  [97.8 °F (36.6 °C)-98.9 °F (37.2 °C)] 98.9 °F (37.2 °C)  Pulse:  [66-98] 88  Resp:  [13-61] 16  SpO2:  [94 %-100 %] 100 %  BP: (120-190)/(53-72) 143/65     Weight: 94.4 kg (208 lb 1.8 oz) (Bed scale) (03/26/22 0600)  Body mass index is 33.59 kg/m².  Body surface area is 2.1 meters squared.    I/O last 3 completed shifts:  In: 535.3 [I.V.:236.7; IV Piggyback:298.6]  Out: 2410 [Urine:2410]    Physical Exam  Vitals reviewed.   Constitutional:       Comments: Frail   Eyes:      Conjunctiva/sclera: Conjunctivae normal.   Cardiovascular:      Rate and Rhythm: Normal rate and regular rhythm.   Pulmonary:      Comments: Decreased air entry B/L   Abdominal:      General: Bowel sounds are normal.      Palpations: Abdomen is soft.      Tenderness: There is no abdominal tenderness. There is no right CVA tenderness, left CVA tenderness or guarding.   Musculoskeletal:      Right lower leg: Edema present.      Left lower leg: Edema present.      Comments: Edema improving in UE    Skin:     Coloration: Skin is not jaundiced.      Findings: No rash.   Neurological:      Mental Status: She is disoriented.       Significant Labs:  CBC:   Recent Labs   Lab 03/29/22  0257   WBC 4.64   RBC 3.11*   HGB 8.8*   HCT 28.8*      MCV 93   MCH 28.3   MCHC 30.6*       CMP:   Recent Labs   Lab 03/29/22  0409   GLU 93   CALCIUM 8.6*   ALBUMIN 2.0*   PROT 4.8*   NA  153*   K 3.6   CO2 40*      BUN 31*   CREATININE 0.8   ALKPHOS 80   ALT 15   AST 52*   BILITOT 0.7          Significant Imaging:  Reviewed     Assessment/Plan:     * Complete AV block  Per primary     LUANNE (acute kidney injury)  LUANNE resolved was secondary to cardiorenal resolved with diuretics   . No known history of CKD   Peak cr 1.9 and now 0.8   Patient neg 1.2 L in last 24 hours   NA up to 153. Free water deficit is 3.7L       Recs:   D/C bumex for now   Start d5W 75 cc/hr for 24 hours         Chronic congestive heart failure  Per primary     Open ankle fracture, right, type III, initial encounter  Per primary             Juanito Weldon MD  Nephrology  [facility] - Surgical Intensive Care

## 2022-03-29 NOTE — PROGRESS NOTES
[facility] - [facility] Int[facility]  Cardiac Electrophysiology  Progress Note    Admission Date: 3/21/2022  Code Status: Full Code   Attending Physician: Anirudh Guidry MD   Expected Discharge Date: 3/30/2022  Principal Problem:Complete AV block    Subjective:     Interval History: Patient was seen this morning, remains pleasant, denies pain. Telemetry shows pacing at 90 bpm    Review of Systems   Unable to perform ROS: Dementia   Objective:     Vital Signs (Most Recent):  Temp: 98.9 °F (37.2 °C) (03/29/22 0700)  Pulse: 88 (03/29/22 0800)  Resp: 16 (03/29/22 0800)  BP: (!) 148/67 (03/29/22 0903)  SpO2: 100 % (03/29/22 0800)   Vital Signs (24h Range):  Temp:  [97.8 °F (36.6 °C)-98.9 °F (37.2 °C)] 98.9 °F (37.2 °C)  Pulse:  [66-98] 88  Resp:  [13-61] 16  SpO2:  [94 %-100 %] 100 %  BP: (120-190)/(55-72) 148/67     Weight: 94.4 kg (208 lb 1.8 oz) (Bed scale)  Body mass index is 33.59 kg/m².     SpO2: 100 %  O2 Device (Oxygen Therapy): nasal cannula    Physical Exam  Vitals reviewed.   Constitutional:       General: She is not in acute distress.     Appearance: Normal appearance. She is not toxic-appearing.   HENT:      Head: Normocephalic and atraumatic.      Mouth/Throat:      Mouth: Mucous membranes are moist.   Neck:      Comments: TVP removed  Cardiovascular:      Rate and Rhythm: Normal rate and regular rhythm.      Heart sounds: No murmur heard.    No friction rub. No gallop.      Comments: LUCW device, Aquacil intact  Pulmonary:      Effort: Pulmonary effort is normal. No respiratory distress.      Breath sounds: Normal breath sounds.   Abdominal:      Palpations: Abdomen is soft.   Musculoskeletal:      Right lower leg: No edema.      Left lower leg: No edema.   Skin:     General: Skin is warm.   Neurological:      Mental Status: She is alert. Mental status is at baseline.     Significant Labs: EP:   Recent Labs   Lab 03/28/22  0207 03/29/22  0257 03/29/22  0409   * 154* 153*   K 3.7 3.6 3.6     105 104   CO2 30* 40* 40*    96 93   BUN 40* 32* 31*   CREATININE 0.9 0.8 0.8   CALCIUM 8.8 9.0 8.6*   PROT 5.3* 4.9* 4.8*   ALBUMIN 1.9* 2.0* 2.0*   BILITOT 0.6 0.7 0.7   ALKPHOS 81 82 80   AST 33 55* 52*   ALT 8* 14 15   ANIONGAP 9 9 9   ESTGFRAFRICA >60.0 >60.0 >60.0   EGFRNONAA 56.5* >60.0 >60.0   WBC 4.93 4.64  --    HGB 8.6* 8.8*  --    HCT 28.1* 28.8*  --     209  --    INR 1.1 1.2  --        Significant Imaging: Ejection fraction: No results for input(s): EF in the last 168 hours.    Assessment and Plan:     * Complete AV block  Patient is a 89 year old female with history of cognitive impairment from [facility] for ankle fracture and was found to be in complete heart block in the ED. ECG confirmed heart block with escape rhythm at 35 bpm. Had TVP set at 65 bpm with output 10 mA, threshold 0.6 mA. TVP removed and Biotronik dc PPM placed on 03/28/2022.     Plan:  - Abx x 5 days  - Sling to arm for 24 hours and then nightly x 4 weeks  - No aggressive left arm movements like raising arms, folding a sheet  - No heparin products please  - Follow up in device clinic in 1 week  - Follow up in EP clinic in 4 months  - No contraindication to discharge back to [facility] from EP standpoint    Thank you for your consultation. We will sign off. Please call with questions or concerns.     Plan of care was discussed with staff, Dr Rivera.     Dale Soriano MD  Cardiac Electrophysiology, Fellow PGY4  [facility]

## 2022-03-29 NOTE — ASSESSMENT & PLAN NOTE
RESOLVED  Likely d/t hypervolemic state vs ATN from [facility]  RP ultrasound with chronic disease, no hydronephrosis    Nephrology consulted- suspect ATN w/ muddy brown casts after spinning urine    PLAN:  --started diuresis w lasix iv 120 bid with improvement in creatinine, now back at baseline. Transitioned to P.O, Bumex on 3/28; then held 3/29

## 2022-03-29 NOTE — SUBJECTIVE & OBJECTIVE
Interval History: Patient was seen this morning, remains pleasant, denies pain. Telemetry shows pacing at 90 bpm    Review of Systems   Unable to perform ROS: Dementia   Objective:     Vital Signs (Most Recent):  Temp: 98.9 °F (37.2 °C) (03/29/22 0700)  Pulse: 88 (03/29/22 0800)  Resp: 16 (03/29/22 0800)  BP: (!) 148/67 (03/29/22 0903)  SpO2: 100 % (03/29/22 0800)   Vital Signs (24h Range):  Temp:  [97.8 °F (36.6 °C)-98.9 °F (37.2 °C)] 98.9 °F (37.2 °C)  Pulse:  [66-98] 88  Resp:  [13-61] 16  SpO2:  [94 %-100 %] 100 %  BP: (120-190)/(55-72) 148/67     Weight: 94.4 kg (208 lb 1.8 oz) (Bed scale)  Body mass index is 33.59 kg/m².     SpO2: 100 %  O2 Device (Oxygen Therapy): nasal cannula    Physical Exam  Vitals reviewed.   Constitutional:       General: She is not in acute distress.     Appearance: Normal appearance. She is not toxic-appearing.   HENT:      Head: Normocephalic and atraumatic.      Mouth/Throat:      Mouth: Mucous membranes are moist.   Neck:      Comments: TVP removed  Cardiovascular:      Rate and Rhythm: Normal rate and regular rhythm.      Heart sounds: No murmur heard.    No friction rub. No gallop.      Comments: LUCW device, Aquacil intact  Pulmonary:      Effort: Pulmonary effort is normal. No respiratory distress.      Breath sounds: Normal breath sounds.   Abdominal:      Palpations: Abdomen is soft.   Musculoskeletal:      Right lower leg: No edema.      Left lower leg: No edema.   Skin:     General: Skin is warm.   Neurological:      Mental Status: She is alert. Mental status is at baseline.     Significant Labs: EP:   Recent Labs   Lab 03/28/22  0207 03/29/22  0257 03/29/22  0409   * 154* 153*   K 3.7 3.6 3.6    105 104   CO2 30* 40* 40*    96 93   BUN 40* 32* 31*   CREATININE 0.9 0.8 0.8   CALCIUM 8.8 9.0 8.6*   PROT 5.3* 4.9* 4.8*   ALBUMIN 1.9* 2.0* 2.0*   BILITOT 0.6 0.7 0.7   ALKPHOS 81 82 80   AST 33 55* 52*   ALT 8* 14 15   ANIONGAP 9 9 9   ESTGFRAFRICA >60.0  >60.0 >60.0   EGFRNONAA 56.5* >60.0 >60.0   WBC 4.93 4.64  --    HGB 8.6* 8.8*  --    HCT 28.1* 28.8*  --     209  --    INR 1.1 1.2  --        Significant Imaging: Ejection fraction: No results for input(s): EF in the last 168 hours.

## 2022-03-29 NOTE — SUBJECTIVE & OBJECTIVE
Interval History: No acute events overnight. Has remained afebrile and hemodynamically stable. Sodium back within normal range discontinued IVF. Will likely resume diuretics at a lower dose int he next 24 hours      Review of Systems   Unable to perform ROS: Dementia     Objective:     Vital Signs (Most Recent):  Temp: 98.9 °F (37.2 °C) (03/30/22 0700)  Pulse: 81 (03/30/22 1015)  Resp: (!) 29 (03/30/22 1015)  BP: (!) 116/55 (03/30/22 1000)  SpO2: 98 % (03/30/22 1015)   Vital Signs (24h Range):  Temp:  [98.3 °F (36.8 °C)-99.3 °F (37.4 °C)] 98.9 °F (37.2 °C)  Pulse:  [77-93] 81  Resp:  [15-29] 29  SpO2:  [92 %-99 %] 98 %  BP: (116-159)/(53-66) 116/55     Weight: 94.4 kg (208 lb 1.8 oz) (Bed scale)  Body mass index is 33.59 kg/m².     SpO2: 98 %  O2 Device (Oxygen Therapy): room air      Intake/Output Summary (Last 24 hours) at 3/30/2022 1128  Last data filed at 3/30/2022 0600  Gross per 24 hour   Intake 2746.03 ml   Output 945 ml   Net 1801.03 ml       Lines/Drains/Airways       Central Venous Catheter Line  Duration             Percutaneous Central Line Insertion/Assessment - single lumen  03/21/22 1124 right internal jugular 9 days              Drain  Duration             Female External Urinary Catheter 03/30/22 0020 <1 day              Peripheral Intravenous Line  Duration                  Peripheral IV - Single Lumen 03/27/22 1030 20 G Posterior;Right Forearm 3 days         Peripheral IV - Single Lumen 03/27/22 1035 20 G Posterior;Right Hand 3 days         Peripheral IV - Single Lumen 03/28/22 1215 22 G Left;Posterior Hand 1 day                  Physical Exam  Vitals and nursing note reviewed.   Constitutional:       General: She is not in acute distress.     Appearance: She is obese. She is not ill-appearing.   HENT:      Head: Normocephalic and atraumatic.      Nose: No congestion or rhinorrhea.      Mouth/Throat:      Pharynx: No oropharyngeal exudate or posterior oropharyngeal erythema.   Eyes:      General:  No scleral icterus.     Conjunctiva/sclera: Conjunctivae normal.   Cardiovascular:      Rate and Rhythm: Normal rate and regular rhythm.   Pulmonary:      Effort: Pulmonary effort is normal. No respiratory distress.      Breath sounds: No wheezing or rales.   Abdominal:      General: Bowel sounds are normal. There is no distension.      Palpations: Abdomen is soft.   Musculoskeletal:         General: Swelling present.      Right lower leg: Edema present.      Left lower leg: Edema present.      Comments: Both LE casted with wound vac in place   Skin:     General: Skin is warm.      Coloration: Skin is not jaundiced.      Findings: No lesion.   Neurological:      Mental Status: She is alert.   Psychiatric:         Mood and Affect: Mood normal.         Behavior: Behavior normal.         Thought Content: Thought content normal.         Judgment: Judgment normal.     Significant Labs: All pertinent lab results from the last 24 hours have been reviewed.

## 2022-03-29 NOTE — DISCHARGE INSTRUCTIONS
Sling to left arm - wear for 48 hours after pacemaker placement, then only at night for 6 weeks.  Doxycycline 100 mg BID for 5 days at discharge (end date 4/3/22  No lifting left elbow above shoulder height  No lifting over 5 pounds  No driving for 1 week and for 4 weeks if patient uses left arm to make turns  Follow up in device clinic in 1 week for device and wound checks.

## 2022-03-29 NOTE — SUBJECTIVE & OBJECTIVE
Interval History: today. Patient is net neg 1.2 L     Review of patient's allergies indicates:   Allergen Reactions    Aspirin Nausea Only and Other (See Comments)     Other reaction(s): esophageal pain    Celebrex [celecoxib] Rash    Morphine Hallucinations    Steroid [corticosteroids (glucocorticoids)] Other (See Comments)     Other reaction(s): Flushing (skin)    Sulfa dyne Other (See Comments)     Other reaction(s): esophogeal pain     Current Facility-Administered Medications   Medication Frequency    0.9%  NaCl infusion Continuous    acetaminophen tablet 650 mg Q8H PRN    aspirin EC tablet 81 mg BID    atorvastatin tablet 20 mg QHS    balsam peru-castor oiL Oint BID    BUPivacaine (PF) 0.25% (2.5 mg/ml) injection PRN    ceFAZolin 2 gram in dextrose 5% 100 mL IVPB (premix) On Call Procedure    ceFAZolin 2 gram in dextrose 5% 100 mL IVPB (premix) Q8H    cholecalciferol (vitamin D3) 125 mcg (5,000 unit) tablet 50,000 Units Weekly    dextrose 5 % infusion Continuous    diphenhydrAMINE capsule 25 mg Q6H PRN    donepeziL tablet 5 mg QHS    [START ON 3/30/2022] doxycycline tablet 100 mg Q12H    HYDROmorphone injection 0.2 mg Q6H PRN    LIDOcaine HCL 20 mg/ml (2%) injection PRN    oxyCODONE immediate release tablet 5 mg Q6H PRN    pantoprazole EC tablet 40 mg Daily    sodium chloride 0.9% irrigation PRN    vancomycin injection PRN       Objective:     Vital Signs (Most Recent):  Temp: 98.9 °F (37.2 °C) (03/29/22 0700)  Pulse: 88 (03/29/22 0800)  Resp: 16 (03/29/22 0800)  BP: (!) 143/65 (03/29/22 0800)  SpO2: 100 % (03/29/22 0800)  O2 Device (Oxygen Therapy): nasal cannula (03/29/22 0728)   Vital Signs (24h Range):  Temp:  [97.8 °F (36.6 °C)-98.9 °F (37.2 °C)] 98.9 °F (37.2 °C)  Pulse:  [66-98] 88  Resp:  [13-61] 16  SpO2:  [94 %-100 %] 100 %  BP: (120-190)/(53-72) 143/65     Weight: 94.4 kg (208 lb 1.8 oz) (Bed scale) (03/26/22 0600)  Body mass index is 33.59 kg/m².  Body surface area is 2.1 meters  squared.    I/O last 3 completed shifts:  In: 535.3 [I.V.:236.7; IV Piggyback:298.6]  Out: 2410 [Urine:2410]    Physical Exam  Vitals reviewed.   Constitutional:       Comments: Frail   Eyes:      Conjunctiva/sclera: Conjunctivae normal.   Cardiovascular:      Rate and Rhythm: Normal rate and regular rhythm.   Pulmonary:      Comments: Decreased air entry B/L   Abdominal:      General: Bowel sounds are normal.      Palpations: Abdomen is soft.      Tenderness: There is no abdominal tenderness. There is no right CVA tenderness, left CVA tenderness or guarding.   Musculoskeletal:      Right lower leg: Edema present.      Left lower leg: Edema present.      Comments: Edema improving in UE    Skin:     Coloration: Skin is not jaundiced.      Findings: No rash.   Neurological:      Mental Status: She is disoriented.       Significant Labs:  CBC:   Recent Labs   Lab 03/29/22  0257   WBC 4.64   RBC 3.11*   HGB 8.8*   HCT 28.8*      MCV 93   MCH 28.3   MCHC 30.6*       CMP:   Recent Labs   Lab 03/29/22  0409   GLU 93   CALCIUM 8.6*   ALBUMIN 2.0*   PROT 4.8*   *   K 3.6   CO2 40*      BUN 31*   CREATININE 0.8   ALKPHOS 80   ALT 15   AST 52*   BILITOT 0.7          Significant Imaging:  Reviewed

## 2022-03-29 NOTE — ASSESSMENT & PLAN NOTE
Presented with an elevated BNP and appeared hypervolemic  TTE with a normal EF- unable to determine diastolic function given she is being paced  Treated with IV lasix 120 mg BID per nephrology recs    PLAN:  -- Hold diuretics given hypovolemia  -- monitor I/Os  -- consider restarting back on home ARB after PPM placement if hemodynamics tolerate it

## 2022-03-29 NOTE — ASSESSMENT & PLAN NOTE
Patient is a 89 year old female with history of cognitive impairment from [facility] for ankle fracture and was found to be in complete heart block in the ED. ECG confirmed heart block with escape rhythm at 35 bpm. Had TVP set at 65 bpm with output 10 mA, threshold 0.6 mA. TVP removed and Biotronik dc PPM placed on 03/28/2022.     Plan:  - Abx x 5 days  - Sling to arm for 24 hours and then nightly x 4 weeks  - No aggressive left arm movements like raising arms, folding a sheet  - No heparin products please  - Follow up in device clinic in 1 week  - Follow up in EP clinic in 4 months  - No contraindication to discharge back to [facility] from EP standpoint

## 2022-03-29 NOTE — PLAN OF CARE
[facility]  Discharge Reassessment    Primary Care Provider: Anna Wood MD    Expected Discharge Date: 3/31/2022    Reassessment (most recent)     Discharge Reassessment - 03/29/22 1644        Discharge Reassessment    Assessment Type Discharge Planning Reassessment     Did the patient's condition or plan change since previous assessment? Yes     Discharge Plan discussed with: Adult children     Communicated MARLO with patient/caregiver Yes     Discharge Plan A Skilled Nursing Facility   return back to [facility]    Discharge Plan B Assisted Living     DME Needed Upon Discharge  other (see comments)   TBD    Discharge Barriers Identified None     Why the patient remains in the hospital Requires continued medical care        Post-Acute Status    Coverage [employer] Managed Medicare     Discharge Delays None known at this time               When pt. Is medically ready will return back  To [facility] Care [facility].      Vero Lal LMSW  PRN - [facility]  [phone]

## 2022-03-29 NOTE — ASSESSMENT & PLAN NOTE
Patient presented to ED with ankle fracture and found to be in complete heart block. Currently stable with junctional scape rhythm in the 30s and normal BP.     TVP was placed on 03/20 as a bridge to pacemaker. Underwent R fixation surgery on 03/21.  -Pacemaker placement delayed due to one febrile episode on 03/23. Cultures have had no growth and she was appropriately treated for a pansensitive ecoli uti.   -ID was consulted and cleared the patient to undergo ppm placement     PLAN:  -- S/P PPM placement with EP on 3/28  -- Per EP:   1. Ancef 2 grams q8 hours x 2 doses - completed  2. Sling to left arm - wear for 48 hours, then only at night for 6 weeks.  3. NO HEPARIN PRODUCTS - for at least 5 days  4. Doxycycline 100 mg TID for 5 days at discharge (or after the 2 doses of IV antibiotics if still in the hospital) - Ordered  5. No lifting left elbow above shoulder height  6. No lifting over 5 pounds  7. No driving for 1 week and for 4 weeks if patient uses left arm to make turns  9. Follow up in device clinic in 1 week for device and wound check

## 2022-03-29 NOTE — ASSESSMENT & PLAN NOTE
Concerned for contraction alkalosis Given overdiruesis  FWD 3.7L    PLAN:  -- hold additional diuretics for another 24 hours and then resume at a lower dose.

## 2022-03-29 NOTE — PT/OT/SLP EVAL
Speech Language Pathology Evaluation  Bedside Swallow    Patient Name:  Lilia Hidalgo   MRN:  [id number]  Admitting Diagnosis: Complete AV block    Recommendations:                 General Recommendations:  Dysphagia therapy  Diet recommendations:  Dental Soft, [place] Thick   Aspiration Precautions: 1 bite/sip at a time, Assistance with meals, Eliminate distractions, Feed only when awake/alert, HOB to 90 degrees, Small bites/sips and Standard aspiration precautions   General Precautions: Standard, fall  Communication strategies:  go to room if call light pushed    History:     Past Medical History:   Diagnosis Date    Aortic stenosis 6/17/2015    Arthritis     Atrophic vaginitis 2/18/2016    Bilateral edema of lower extremity 6/22/2016    CHF (congestive heart failure)     Cholelithiasis without cholecystitis 5/5/2017    Chronic pain of left knee 8/3/2017    Depressed mood 6/22/2016    Diverticulitis of large intestine without perforation or abscess without bleeding 5/5/2017    Encounter for blood transfusion     Essential hypertension     GERD (gastroesophageal reflux disease)     Hyperlipidemia     Hypertension     Hypertensive heart disease with heart failure 7/14/2015    Insomnia     Joint pain     Knee pain, left 08/2016    pain with walking or standing    Primary osteoarthritis of knee 2/5/2013    PUD (peptic ulcer disease)     S/P knee replacement 2/13/2013    Slow transit constipation 2/18/2016       Past Surgical History:   Procedure Laterality Date    A-V CARDIAC PACEMAKER INSERTION Left 3/28/2022    Procedure: INSERTION, CARDIAC PACEMAKER, DUAL CHAMBER;  Surgeon: Estiven Rivera MD;  Location: [facility];  Service: Cardiology;  Laterality: Left;  CHB, DUAL PPM, BIO, DM, ANES, RM [id number]    APPENDECTOMY      APPLICATION OF LARGE EXTERNAL FIXATION DEVICE TO TIBIA Right 3/21/2022    Procedure: APPLICATION, EXTERNAL FIXATION DEVICE, LARGE, TIBIA;  Surgeon: Anirudh Carreno MD;   "Location: [facility] OR [address] [place] [address];  Service: Orthopedics;  Laterality: Right;    COLONOSCOPY      08    EYE SURGERY      bilateral cataract    FINGER SURGERY      LT thumb surgery--"arthritis surgery"    HYSTERECTOMY      UNKNOWN BSO    JOINT REPLACEMENT      right hip replacement    KNEE ARTHROPLASTY Left 2001    TONSILLECTOMY      TUMOR EXCISION      esophageal tumor removal     UPPER GASTROINTESTINAL ENDOSCOPY      2013     "Hospital Course:   Ms. Hidalgo was admitted to the hospital for surgical management right ankle fracture sp I&D and percutaneous fixation 3/21/22 and then she also has a left tibia fracture which they decided not to operate on and placed long cast. She was noted to have a CHB, underwent TVP placement for optimization prior to the surgical procedure. Course complicated by a pansensitive Ecoli UTI for which she was treated with a 7 day course of ABX (cefazolin and CTX). ID was consulted and cleared the patient to undergo PPM placement; planning on undergoing placement on 03/28. IV lasix discontinued on 3/28 and patient was started on Bumex 1 mg BID. However, patient developed a contraction alkalosis with worsening hypernatremia. Started on D5W. Stepped down to  on 3/29"    Social History: Patient previously lived at [facility]    Prior diet: no restrictions per daughter    Subjective     Patient awake and alert. Daughter present.    Pain/Comfort:  ·   No c/o pain     Respiratory Status: Room air    Objective:     Oral Musculature Evaluation  · Oral Musculature: unable to assess due to poor participation/comprehension    Bedside Swallow Eval:   Consistencies Assessed:  · Thin liquids via tsp cup and straw   · Nectar thick liquids via straw  · Puree x3  · Solids x3     Oral Phase:   · Prolonged mastication with mild oral residue with solids.     Pharyngeal Phase:   · multiple spontaneous swallows and frequent throat clearing with thin liquids.   · Intermittent throat clearing with nectar thickened " liquids.   · No difficulty with puree or solids.     Skilled education was provided to patient and family members re: diet recs, standard aspiration precautions of which to follow, nectar thickened liquids and ongoing ST plan of care. Whiteboard updated.     Assessment:     Lilia Hidalgo is a 89 y.o. female with an SLP diagnosis of Dysphagia.    Goals:   Multidisciplinary Problems     SLP Goals        Problem: SLP    Goal Priority Disciplines Outcome   SLP Goal     SLP Ongoing, Progressing   Description: Speech Language Pathology Goals  Goals expected to be met by 4/5:  1. Patient will tolerate a dysphagia soft diet and nectar thickened liquids with no overt signs of airway compromise.                            Plan:     · Patient to be seen:  4 x/week   · Plan of Care expires:     · Plan of Care reviewed with:  patient, daughter   · SLP Follow-Up:  Yes       Discharge recommendations:  nursing facility, skilled   Barriers to Discharge:  None    Time Tracking:     SLP Treatment Date:   03/29/22  Speech Start Time:  0900  Speech Stop Time:  0924     Speech Total Time (min):  24 min    Billable Minutes: Eval Swallow and Oral Function 10 and Self Care/Home Management Training 14    03/29/2022

## 2022-03-29 NOTE — ASSESSMENT & PLAN NOTE
LUANNE resolved was secondary to cardiorenal resolved with diuretics   . No known history of CKD   Peak cr 1.9 and now 0.8   Patient neg 1.2 L in last 24 hours   NA up to 153. Free water deficit is 3.7L       Recs:   D/C bumex for now   Start d5W 75 cc/hr for 24 hours

## 2022-03-30 PROBLEM — R07.89 ATYPICAL CHEST PAIN: Status: RESOLVED | Noted: 2019-05-08 | Resolved: 2022-03-30

## 2022-03-30 PROBLEM — F33.9 MAJOR DEPRESSION, RECURRENT, CHRONIC: Chronic | Status: ACTIVE | Noted: 2020-04-08

## 2022-03-30 PROBLEM — R50.9 FEVER: Status: RESOLVED | Noted: 2019-05-27 | Resolved: 2022-03-30

## 2022-03-30 PROBLEM — I16.0 HYPERTENSIVE URGENCY: Status: RESOLVED | Noted: 2019-05-09 | Resolved: 2022-03-30

## 2022-03-30 PROBLEM — G93.41 ENCEPHALOPATHY, METABOLIC: Status: RESOLVED | Noted: 2019-05-27 | Resolved: 2022-03-30

## 2022-03-30 PROBLEM — I50.32 CHRONIC DIASTOLIC CONGESTIVE HEART FAILURE: Chronic | Status: ACTIVE | Noted: 2021-07-29

## 2022-03-30 PROBLEM — E87.0 HYPERNATREMIA: Status: RESOLVED | Noted: 2022-03-29 | Resolved: 2022-03-30

## 2022-03-30 PROBLEM — I50.32 CHRONIC DIASTOLIC CONGESTIVE HEART FAILURE: Chronic | Status: ACTIVE | Noted: 2022-03-21

## 2022-03-30 PROBLEM — F03.90 DEMENTIA: Chronic | Status: ACTIVE | Noted: 2019-05-29

## 2022-03-30 PROBLEM — N17.9 AKI (ACUTE KIDNEY INJURY): Status: RESOLVED | Noted: 2022-03-21 | Resolved: 2022-03-30

## 2022-03-30 PROBLEM — Z95.0 STATUS POST PLACEMENT OF CARDIAC PACEMAKER: Status: ACTIVE | Noted: 2022-03-28

## 2022-03-30 LAB
ANION GAP SERPL CALC-SCNC: 8 MMOL/L (ref 8–16)
BACTERIA BLD CULT: NORMAL
BASOPHILS # BLD AUTO: 0.03 K/UL (ref 0–0.2)
BASOPHILS NFR BLD: 0.5 % (ref 0–1.9)
BUN SERPL-MCNC: 21 MG/DL (ref 8–23)
CALCIUM SERPL-MCNC: 8.5 MG/DL (ref 8.7–10.5)
CHLORIDE SERPL-SCNC: 98 MMOL/L (ref 95–110)
CO2 SERPL-SCNC: 38 MMOL/L (ref 23–29)
CREAT SERPL-MCNC: 0.7 MG/DL (ref 0.5–1.4)
DIFFERENTIAL METHOD: ABNORMAL
EOSINOPHIL # BLD AUTO: 0.2 K/UL (ref 0–0.5)
EOSINOPHIL NFR BLD: 3 % (ref 0–8)
ERYTHROCYTE [DISTWIDTH] IN BLOOD BY AUTOMATED COUNT: 15.8 % (ref 11.5–14.5)
EST. GFR  (AFRICAN AMERICAN): >60 ML/MIN/1.73 M^2
EST. GFR  (NON AFRICAN AMERICAN): >60 ML/MIN/1.73 M^2
GLUCOSE SERPL-MCNC: 119 MG/DL (ref 70–110)
HCT VFR BLD AUTO: 28 % (ref 37–48.5)
HGB BLD-MCNC: 8.7 G/DL (ref 12–16)
IMM GRANULOCYTES # BLD AUTO: 0.04 K/UL (ref 0–0.04)
IMM GRANULOCYTES NFR BLD AUTO: 0.7 % (ref 0–0.5)
LYMPHOCYTES # BLD AUTO: 0.7 K/UL (ref 1–4.8)
LYMPHOCYTES NFR BLD: 13.1 % (ref 18–48)
MCH RBC QN AUTO: 28.7 PG (ref 27–31)
MCHC RBC AUTO-ENTMCNC: 31.1 G/DL (ref 32–36)
MCV RBC AUTO: 92 FL (ref 82–98)
MONOCYTES # BLD AUTO: 0.7 K/UL (ref 0.3–1)
MONOCYTES NFR BLD: 12.2 % (ref 4–15)
NEUTROPHILS # BLD AUTO: 3.9 K/UL (ref 1.8–7.7)
NEUTROPHILS NFR BLD: 70.5 % (ref 38–73)
NRBC BLD-RTO: 0 /100 WBC
PLATELET # BLD AUTO: 219 K/UL (ref 150–450)
PMV BLD AUTO: 10.1 FL (ref 9.2–12.9)
POTASSIUM SERPL-SCNC: 3.5 MMOL/L (ref 3.5–5.1)
RBC # BLD AUTO: 3.03 M/UL (ref 4–5.4)
SODIUM SERPL-SCNC: 144 MMOL/L (ref 136–145)
WBC # BLD AUTO: 5.59 K/UL (ref 3.9–12.7)

## 2022-03-30 PROCEDURE — 92526 ORAL FUNCTION THERAPY: CPT

## 2022-03-30 PROCEDURE — 97530 THERAPEUTIC ACTIVITIES: CPT

## 2022-03-30 PROCEDURE — 99232 SBSQ HOSP IP/OBS MODERATE 35: CPT | Mod: ,,, | Performed by: INTERNAL MEDICINE

## 2022-03-30 PROCEDURE — 97110 THERAPEUTIC EXERCISES: CPT

## 2022-03-30 PROCEDURE — 99232 SBSQ HOSP IP/OBS MODERATE 35: CPT | Mod: GC,,, | Performed by: INTERNAL MEDICINE

## 2022-03-30 PROCEDURE — 85025 COMPLETE CBC W/AUTO DIFF WBC: CPT | Performed by: INTERNAL MEDICINE

## 2022-03-30 PROCEDURE — 99232 PR SUBSEQUENT HOSPITAL CARE,LEVL II: ICD-10-PCS | Mod: GC,,, | Performed by: INTERNAL MEDICINE

## 2022-03-30 PROCEDURE — 25000003 PHARM REV CODE 250: Performed by: STUDENT IN AN ORGANIZED HEALTH CARE EDUCATION/TRAINING PROGRAM

## 2022-03-30 PROCEDURE — 25000003 PHARM REV CODE 250: Performed by: INTERNAL MEDICINE

## 2022-03-30 PROCEDURE — 63600175 PHARM REV CODE 636 W HCPCS: Performed by: INTERNAL MEDICINE

## 2022-03-30 PROCEDURE — 80048 BASIC METABOLIC PNL TOTAL CA: CPT | Performed by: INTERNAL MEDICINE

## 2022-03-30 PROCEDURE — 20600001 HC STEP DOWN PRIVATE ROOM

## 2022-03-30 PROCEDURE — 99232 PR SUBSEQUENT HOSPITAL CARE,LEVL II: ICD-10-PCS | Mod: ,,, | Performed by: INTERNAL MEDICINE

## 2022-03-30 PROCEDURE — 94761 N-INVAS EAR/PLS OXIMETRY MLT: CPT

## 2022-03-30 RX ORDER — POTASSIUM CHLORIDE 20 MEQ/1
40 TABLET, EXTENDED RELEASE ORAL ONCE
Status: COMPLETED | OUTPATIENT
Start: 2022-03-30 | End: 2022-03-30

## 2022-03-30 RX ADMIN — DOXYCYCLINE HYCLATE 100 MG: 100 TABLET, COATED ORAL at 09:03

## 2022-03-30 RX ADMIN — ASPIRIN 81 MG: 81 TABLET, COATED ORAL at 09:03

## 2022-03-30 RX ADMIN — HYDROMORPHONE HYDROCHLORIDE 0.2 MG: 1 INJECTION, SOLUTION INTRAMUSCULAR; INTRAVENOUS; SUBCUTANEOUS at 05:03

## 2022-03-30 RX ADMIN — Medication: at 09:03

## 2022-03-30 RX ADMIN — LOSARTAN POTASSIUM 50 MG: 50 TABLET, FILM COATED ORAL at 09:03

## 2022-03-30 RX ADMIN — ACETAMINOPHEN 650 MG: 325 TABLET ORAL at 05:03

## 2022-03-30 RX ADMIN — POTASSIUM CHLORIDE 40 MEQ: 1500 TABLET, EXTENDED RELEASE ORAL at 05:03

## 2022-03-30 RX ADMIN — PANTOPRAZOLE SODIUM 40 MG: 40 TABLET, DELAYED RELEASE ORAL at 09:03

## 2022-03-30 RX ADMIN — ATORVASTATIN CALCIUM 20 MG: 20 TABLET, FILM COATED ORAL at 09:03

## 2022-03-30 RX ADMIN — DONEPEZIL HYDROCHLORIDE 5 MG: 5 TABLET ORAL at 09:03

## 2022-03-30 NOTE — NURSING
Pt was assisted with lunch, aspiration precautions maintained. Pt is now on tele-sitter monitoring. Safety measures maintianed.

## 2022-03-30 NOTE — PROGRESS NOTES
[facility] - Surgical Intensive Care  Adult Nutrition  Progress Note    SUMMARY       Recommendations    1) modify diet to cardiac diet + texture per SLP     2) add Boost plus BID     3) add MVI, zinc, vit C to aid in wound healing     Goals: 1) pt consuming % of EEN by RD f/u  Nutrition Goal Status: continues  Communication of RD Recs:  (POC)    Assessment and Plan    Increased nutrient needs (protein)  Related to physiological demands  As evidenced by wounds, surgery  CONTINUES     Malnutrition Assessment    Orbital Region (Subcutaneous Fat Loss): well nourished  Upper Arm Region (Subcutaneous Fat Loss): well nourished   [place] Region (Muscle Loss): well nourished  Clavicle Bone Region (Muscle Loss): well nourished  Clavicle and Acromion Bone Region (Muscle Loss): well nourished  Patellar Region (Muscle Loss): well nourished  Anterior Thigh Region (Muscle Loss): well nourished   Edema (Fluid Accumulation): 1-->trace   Subcutaneous Fat Loss (Final Summary): well nourished  Muscle Loss Evaluation (Final Summary): well nourished  Fluid Accumulation Evaluation: mild         Reason for Assessment    Reason For Assessment: RD follow-up  Diagnosis:  (complete AV block)  Relevant Medical History: aortic stenosis, edema, CHF, cholelithiasis, diverticulitis, HTN, GERD, HLD, PUD  Interdisciplinary Rounds: did not attend    General Information Comments: Noted pt is on a clear liquid diet.  Speech saw pt yesterday and suggested dental soft, nectar thickened liquids.  I spoke to the daughter.  Daughter states as far as she knows pt was eating well, 3 meals daily, has not noticed any wt loss.  .    Per chart review, wt gain noted, may be fluid related, edema 2+.  NFPE done 3/23, no wasting found, pt appears well nourished.     Nutrition Discharge Planning: To be determined: Cardiac diet    Nutrition Risk Screen    Nutrition Risk Screen: no indicators present    Nutrition/Diet History    Patient Reported  "Diet/Restrictions/Preferences: general  Typical Food/Fluid Intake: 3 meals  Spiritual, Cultural Beliefs, Methodist Practices, Values that Affect Care: no  Food Allergies: NKFA  Factors Affecting Nutritional Intake: clear liquid diet    Anthropometrics    Temp: 98.9 °F (37.2 °C)  Height Method: Stated  Height: 5' 6" (167.6 cm)  Height (inches): 66 in  Weight Method: Bed Scale  Weight: 94.4 kg (208 lb 1.8 oz) (Bed scale)  Weight (lb): 208.12 lb  Ideal Body Weight (IBW), Female: 130 lb  % Ideal Body Weight, Female (lb): 165.01 %  BMI (Calculated): 33.6  BMI Grade: 30 - 34.9- obesity - grade I       Lab/Procedures/Meds    Pertinent Labs Reviewed: reviewed  Pertinent Labs Comments: HIGH:glucose 119    Pertinent Medications Reviewed: reviewed  Pertinent Medications Comments: atorvastatin, Vit D3, pantoprazole, isosartan    Estimated/Assessed Needs    Weight Used For Calorie Calculations: 90.7 kg (199 lb 15.3 oz)  Energy Calorie Requirements (kcal): 1814 (20kcal/kg wounds/surgery/obese)  Energy Need Method: Kcal/kg  Protein Requirements: 109-136 (1.2-1.5 wound)  Weight Used For Protein Calculations: 90.7 kg (199 lb 15.3 oz)  Fluid Requirements (mL): 8948-8044 or per MD  Estimated Fluid Requirement Method: other (see comments) (CHF)  RDA Method (mL): 1814     Nutrition Prescription Ordered    Current Diet Order: Clear liquid diet    Evaluation of Received Nutrient/Fluid Intake    I/O: -0.433L since admit  Energy Calories Required: not meeting needs  Protein Required: not meeting needs  Tolerance: tolerating  % Intake of Estimated Energy Needs: 0-25%  % Meal Intake: 0-50%    Nutrition Risk    Level of Risk/Frequency of Follow-up:  (1x weekly)     Monitor and Evaluation    Food and Nutrient Intake: energy intake, food and beverage intake  Food and Nutrient Adminstration: diet order  Physical Activity and Function: nutrition-related ADLs and IADLs  Anthropometric Measurements: weight, weight change, body mass " index  Biochemical Data, Medical Tests and Procedures: electrolyte and renal panel, gastrointestinal profile, glucose/endocrine profile, inflammatory profile, lipid profile  Nutrition-Focused Physical Findings: overall appearance, skin     Nutrition Follow-Up    RD Follow-up?: Yes     Loulou Mckeon  Dietetic Intern

## 2022-03-30 NOTE — PLAN OF CARE
SHAREE spoke with [facility] ( [facility]) [phone] and reported that patient was living on the Assisting Living side and not longterm. The Assisting Living Side ([facility]) states she is unable to return and will need Skilled. SHAREE will send a skilled referral to them and contact the state to obtain a LOCET for Nursing Home Admission. SHAREE will continue to follow.    Vero Lal LMSW  PRN - [facility]  [phone]

## 2022-03-30 NOTE — PLAN OF CARE
Problem: Occupational Therapy  Goal: Occupational Therapy Goal  Description: Goals to be met by: 4/7/22     Patient will increase functional independence with ADLs by performing:    Feeding with Stand-by Assistance.  Sitting at edge of bed x10 minutes with Stand-by Assistance.  G/H: set-up bed level  Family to demo understanding of ROM and positioning exs/techniques.     Outcome: Ongoing, Progressing

## 2022-03-30 NOTE — PLAN OF CARE
Hospital Medicine ICU Acceptance Note    Date of Admit: 3/21/2022  Date of Transfer / Stepdown: 3/30/2022  Rohini, SHREYA/WANDA, L, Onc (IV chemo w/in 1 month), Gyn/Onc, or other special case?: No  Accepting  team: A    History of Present Illness:     89 y.o. female with HTN, MDD, mild cognitive impairment, and urinary incontinence. She is brought to ED from nursing home due to a ankle fracture. She uses a wheelchair and was being assisted to transfer from the toilet to her wheelchair when she twisted her ankle and fractured it. No reports of syncope. On arrival to ED, an ECG showed 3rd degree AV block and cardiology was consulted. She is awake and complaining of pain. Not able to give a detailed history due to patient's dementia. Discussed the events with daughter.    Hospital/ICU Course:     Patient presented 3/21 following a right ankle fracture sp I&D and percutaneous fixation 3/21/22 and then she also had a left tibia fracture which they decided not to operate on and placed long cast and was noted to have a complete heart block with an escape rhythm at 35bmp. Had a TVP placed in the ED (3/21) placed prior to ortho's surgery with plans of getting a dc ppm later. Hopsital Course complicated by LUANNE and E. coli UTI causing initial pacemaker placement to be delayed. She has received a 7 day course of ABX for her ecoli uti and her blood cultures  have no growth. ID cleared her to get the pacemaker for which she underwent successful placement on 3/28. Hospital course complicated now by contraction alkalosis and Hypernatremia secondary to over diuresis as she was originally on 120 mg of IV lasix BID followed by transition to bumex per nephro recs for Hypervolemia secondary to being bed bound after her ortho procedure. Now improved following IVF.     Deemed appropriate for transfer to the floor on 3/30/2022, and was accepted to  team A for further care and management.    Consultants and Procedures:      Consultants:  Consults (From admission, onward)        Status Ordering Provider     Inpatient consult to Infectious Diseases  Once        Provider:  (Not yet assigned)    Completed EDWARD CRAMER     Inpatient consult to Nephrology  Once        Provider:  (Not yet assigned)    Completed AUBREE BAILEY     Inpatient consult to Registered Dietitian/Nutritionist  Once        Provider:  (Not yet assigned)    Completed KRISTIAN BROWNLEE     Inpatient consult to Midline team  Once        Provider:  (Not yet assigned)    Completed KRISTIAN BROWNLEE     Inpatient consult to Cardiology  Once        Provider:  (Not yet assigned)    Completed CHAZ CALIX     Inpatient consult to Orthopedic Surgery  Once        Provider:  (Not yet assigned)    Completed CHAZ CALIX          Procedures:    As documented    Transfer Information:     Diet:  As ordered    Physical Activity:  As tolerated    Pending plan at time of transfer to the floor:  --  For Post-acute PPM Placement:  1. Ancef 2 grams q8 hours x 2 doses - completed  2. Sling to left arm - wear for 48 hours, then only at night for 6 weeks.  3. NO HEPARIN PRODUCTS  4. Doxycycline 100 mg TID for 5 days at discharge (or after the 2 doses of IV antibiotics if still in the hospital) - Ordered  5. No lifting left elbow above shoulder height  6. No lifting over 5 pounds  7. No driving for 1 week and for 4 weeks if patient uses left arm to make turns  9. Follow up in device clinic in 1 week for device and wound check    Continue current plan initiated by ICU, will further monitor and adjust as clinically indicated upon arrival to the floor.    Patient has been accepted by Hospital Medicine Team A, who will assume care of the patient upon arrival to the floor from the ICU. Please contact ICU team with any concerns prior to transfer to the floor.    Apolinar Arreguin MD  Department of Hospital Medicine

## 2022-03-30 NOTE — PT/OT/SLP PROGRESS
Speech Language Pathology Treatment    Patient Name:  Lilia Hidalgo   MRN:  [id number]  Admitting Diagnosis: Complete AV block    Recommendations:                 General Recommendations:  Dysphagia therapy  Diet recommendations:  Dental Soft, Liquid Diet Level: Nectar Thick   Aspiration Precautions: 1 bite/sip at a time, Assistance with meals and Standard aspiration precautions   General Precautions: Standard, fall  Communication strategies:  go to room if call light pushed    Subjective     Patient awake and alert. No family members present.     Pain/Comfort:  ·  No c/o pain     Respiratory Status: Room air    Objective:     Has the patient been evaluated by SLP for swallowing?   Yes  Keep patient NPO? No   Current Respiratory Status:        Patient seen for ongoing swallow assessment.  Patient with continued throat clearing and cough x1 post thin liquid trials over cup and straw sips.  No throat clears with subsequent nectar thickened liquids.  Prolonged mastication with regular solids with mild oral residue.  Overall, not appropriate for diet recommendations at this time.  Recommend nectar thickened liquids and dysphagia soft diet at this time.  Skilled education was provided to patient and family members re: diet recs, standard aspiration precautions of which to follow, and ongoing ST plan of care.    Assessment:     Lilia Hidalgo is a 89 y.o. female with an SLP diagnosis of Dysphagia.      Goals:   Multidisciplinary Problems     SLP Goals        Problem: SLP    Goal Priority Disciplines Outcome   SLP Goal     SLP Ongoing, Progressing   Description: Speech Language Pathology Goals  Goals expected to be met by 4/5:  1. Patient will tolerate a dysphagia soft diet and nectar thickened liquids with no overt signs of airway compromise.                            Plan:     · Patient to be seen:  4 x/week   · Plan of Care expires:     · Plan of Care reviewed with:  patient, daughter   · SLP Follow-Up:  Yes        Discharge recommendations:  nursing facility, skilled   Barriers to Discharge:  None    Time Tracking:     SLP Treatment Date:   03/30/22  Speech Start Time:  0812  Speech Stop Time:  0823     Speech Total Time (min):  11 min    Billable Minutes: Treatment Swallowing Dysfunction 11    03/30/2022

## 2022-03-30 NOTE — PROGRESS NOTES
[facility]  Nephrology  Progress Note    Patient Name: Lilia Hidalgo  MRN: [id number]  Admission Date: 3/21/2022  Hospital Length of Stay: 9 days  Attending Provider: Apolinar Still*   Primary Care Physician: Anna Wood MD  Principal Problem:Complete AV block    Subjective:     HPI:   89 y.o. female with HTN, MDD, mild cognitive impairment, and urinary incontinence. She is brought to ED from nursing home due to a ankle fracture. She uses a wheelchair and was being assisted to transfer from the toilet to her wheelchair when she twisted her ankle and fractured it. No reports of syncope. On arrival to ED, an ECG showed 3rd degree AV block and cardiology was consulted. She is awake and complaining of pain. Not able to give a detailed history due to patient's dementia.     Nephrology consulted for LUANNE       Interval History:  Overnight events:   VSS, pt was on D5W at 100ml/hr overnight for hypernatremia of 154-->144 this morning. Daughter at bedside and states pt is doing much better in terms of her mental status   In the last 24 hours pt's I&Os are:    Intake/Output Summary (Last 24 hours) at 3/30/2022 1421  Last data filed at 3/30/2022 1100  Gross per 24 hour   Intake 2646.03 ml   Output 895 ml   Net 1751.03 ml         Review of patient's allergies indicates:   Allergen Reactions    Aspirin Nausea Only and Other (See Comments)     Other reaction(s): esophageal pain    Celebrex [celecoxib] Rash    Morphine Hallucinations    Steroid [corticosteroids (glucocorticoids)] Other (See Comments)     Other reaction(s): Flushing (skin)    Sulfa dyne Other (See Comments)     Other reaction(s): esophogeal pain     Current Facility-Administered Medications   Medication Frequency    0.9%  NaCl infusion Continuous    acetaminophen tablet 650 mg Q8H PRN    aspirin EC tablet 81 mg BID    atorvastatin tablet 20 mg QHS    balsam peru-castor oiL Oint BID    BUPivacaine (PF) 0.25% (2.5  mg/ml) injection PRN    ceFAZolin 2 gram in dextrose 5% 100 mL IVPB (premix) On Call Procedure    cholecalciferol (vitamin D3) 125 mcg (5,000 unit) tablet 50,000 Units Weekly    diphenhydrAMINE capsule 25 mg Q6H PRN    donepeziL tablet 5 mg QHS    doxycycline tablet 100 mg Q12H    HYDROmorphone injection 0.2 mg Q6H PRN    LIDOcaine HCL 20 mg/ml (2%) injection PRN    losartan tablet 50 mg Daily    oxyCODONE immediate release tablet 5 mg Q6H PRN    pantoprazole EC tablet 40 mg Daily    sodium chloride 0.9% irrigation PRN    vancomycin injection PRN       Objective:     Vital Signs (Most Recent):  Temp: 98.5 °F (36.9 °C) (03/30/22 1243)  Pulse: 82 (03/30/22 1243)  Resp: 16 (03/30/22 1243)  BP: (!) 126/59 (03/30/22 1243)  SpO2: (!) 93 % (03/30/22 1243)  O2 Device (Oxygen Therapy): room air (03/30/22 1041)   Vital Signs (24h Range):  Temp:  [98.3 °F (36.8 °C)-99.3 °F (37.4 °C)] 98.5 °F (36.9 °C)  Pulse:  [77-93] 82  Resp:  [16-29] 16  SpO2:  [92 %-99 %] 93 %  BP: (116-159)/(53-66) 126/59     Weight: 94.4 kg (208 lb 1.8 oz) (Bed scale) (03/26/22 0600)  Body mass index is 33.59 kg/m².  Body surface area is 2.1 meters squared.    I/O last 3 completed shifts:  In: 3044.6 [P.O.:650; I.V.:2102.7; IV Piggyback:292]  Out: 2035 [Urine:2035]    Physical Exam  Vitals and nursing note reviewed.   Constitutional:       General: She is not in acute distress.     Appearance: Normal appearance. She is not ill-appearing, toxic-appearing or diaphoretic.   HENT:      Mouth/Throat:      Mouth: Mucous membranes are moist.   Cardiovascular:      Rate and Rhythm: Normal rate and regular rhythm.      Pulses: Normal pulses.      Heart sounds: No murmur heard.  Pulmonary:      Effort: Pulmonary effort is normal. No respiratory distress.      Breath sounds: No wheezing, rhonchi or rales.      Comments: Decreased breath sound in the bases   Abdominal:      General: Abdomen is flat. Bowel sounds are normal. There is no distension.       Palpations: Abdomen is soft. There is no mass.      Tenderness: There is no abdominal tenderness. There is no guarding or rebound.   Musculoskeletal:         General: Swelling, tenderness, deformity and signs of injury present.      Right lower leg: Edema present.      Left lower leg: Edema present.      Comments: George LE's with casts, swelling in upper thighs and foot    Pt has L arm sling      Skin:     General: Skin is warm.      Capillary Refill: Capillary refill takes 2 to 3 seconds.      Coloration: Skin is not jaundiced or pale.      Findings: No bruising, erythema, lesion or rash.   Neurological:      Mental Status: She is alert and oriented to person, place, and time.       Significant Labs:  ABGs: No results for input(s): PH, PCO2, HCO3, POCSATURATED, BE in the last 168 hours.  Cardiac Markers: No results for input(s): CKMB, TROPONINT, MYOGLOBIN in the last 168 hours.  CBC:   Recent Labs   Lab 03/30/22  0323   WBC 5.59   RBC 3.03*   HGB 8.7*   HCT 28.0*      MCV 92   MCH 28.7   MCHC 31.1*     CMP:   Recent Labs   Lab 03/29/22  0409 03/29/22  1116 03/30/22  0323   GLU 93   < > 119*   CALCIUM 8.6*   < > 8.5*   ALBUMIN 2.0*  --   --    PROT 4.8*  --   --    *   < > 144   K 3.6   < > 3.5   CO2 40*   < > 38*      < > 98   BUN 31*   < > 21   CREATININE 0.8   < > 0.7   ALKPHOS 80  --   --    ALT 15  --   --    AST 52*  --   --    BILITOT 0.7  --   --     < > = values in this interval not displayed.     Coagulation:   Recent Labs   Lab 03/29/22  0257   INR 1.2   APTT 21.4     LFTs:   Recent Labs   Lab 03/29/22  0409   ALT 15   AST 52*   ALKPHOS 80   BILITOT 0.7   PROT 4.8*   ALBUMIN 2.0*     PTH: No results for input(s): PTH in the last 168 hours.  All labs within the past 24 hours have been reviewed.     Significant Imaging:  Labs: Reviewed    Assessment/Plan:     * Complete AV block  3/28 dual chamber permanent pacemaker implant    Hypernatremia  Resolved after D5W 100ml/hr overnight   Na level  154-->144 this morning   Dietitian to f/u on diet now pt has passed swallow eval/dysphagia assessment       Closed fracture of left proximal tibia  -Plan per primary team       LUANNE (acute kidney injury)  Lab Results   Component Value Date    CREATININE 0.7 03/30/2022       UO 1L  resolved        Chronic diastolic congestive heart failure  Results for orders placed during the hospital encounter of 03/21/22    Echo    Interpretation Summary  · Technically difficult study  · The left ventricle is normal in size with normal systolic function. The estimated ejection fraction is 65%.  · Right ventricular systolic function appears normal, though imaging of the right heart is challenging.  · Grade II left ventricular diastolic dysfunction.  · There is moderate aortic valve stenosis. Aortic valve area is 1.0 cm2; peak velocity is 3.1 m/s; mean gradient is 22 mmHg.  · Mild to moderate tricuspid regurgitation.  · The estimated PA systolic pressure is > 40mm Hg.    -off diuretics, restart as per cardiology or primary team         Open ankle fracture, right, type III, initial encounter  -Plan per primary team       Dementia  Per daughter, pt much improved today           Thank you for your consult. I will sign off. Please contact us if you have any additional questions.      Kenya Chao MD  Nephrology  [facility]

## 2022-03-30 NOTE — ASSESSMENT & PLAN NOTE
" BP (!) 116/55   Pulse 81   Temp 98.9 °F (37.2 °C)   Resp (!) 29   Ht 5' 6" (1.676 m)   Wt 94.4 kg (208 lb 1.8 oz) Comment: Bed scale  LMP  (LMP Unknown)   SpO2 98%   Breastfeeding No   BMI 33.59 kg/m²      PLAN:  - Home antihypertensives held in the setting of heart block and plans for a PPM placement  -Pt advised to be compliant with their treatment regimen daily to avoid BP fluctuation and any complications.   -Pt educated that it is important to eat right. Following a reasonable-calorie low-salt diet (Such as the DASH diet) has been shown to control blood pressure better with less medications. Recommended to exercise at least 30 mins a day for 5 days a week and also told to avoid smoking all together.    "

## 2022-03-30 NOTE — PLAN OF CARE
"      SICU PLAN OF CARE NOTE    Dx: Complete AV block    Shift Events: No acute events throughout shift. VSS.    Goals of Care: MAP > 65    Neuro: Alert ad oriented to person. States she is in a hospital, but gets confused that she is at [facility] and in [place]. Does not know the situation. Follows commands.     Vital Signs: /64 (BP Location: Right arm, Patient Position: Lying)   Pulse 82   Temp 98.3 °F (36.8 °C) (Oral)   Resp (!) 25   Ht 5' 6" (1.676 m)   Wt 94.4 kg (208 lb 1.8 oz) Comment: Bed scale  LMP  (LMP Unknown)   SpO2 (!) 93%   Breastfeeding No   BMI 33.59 kg/m²     Respiratory: Room Air    Diet: Clear Liquid and Dental Soft    Gtts: MIVF    Urine Output: Voids Spontaneously 400 cc/shift      Wound Vac to continuous suction. 0 cc/shift      Skin: No signs of new skin breakdown. Skin edematous and thin. Repositioned Q2hr. Immerse mattress in use. Venelex applied to sacrum area.        "

## 2022-03-30 NOTE — PT/OT/SLP PROGRESS
Physical Therapy Co-Treatment    Patient Name:  Lilia Hidalgo   MRN:  [id number]  Admitting Diagnosis:  Complete AV block   Recent Surgery: Procedure(s) (LRB):  INSERTION, CARDIAC PACEMAKER, DUAL CHAMBER (Left) 2 Days Post-Op  Admit Date: 3/21/2022  Length of Stay: 9 days    Recommendations:     Discharge Recommendations:  nursing facility, skilled   Discharge Equipment Recommendations: other (see comments) (tbd pending progress)   Barriers to discharge: decreased level of function    Assessment:     Lilia Hidalgo is a 89 y.o. female admitted with a medical diagnosis of Complete AV block.  She presents with the following impairments/functional limitations:  impaired endurance, weakness, impaired self care skills, impaired functional mobilty, gait instability, impaired balance, impaired cognition, decreased upper extremity function, decreased lower extremity function, decreased safety awareness, pain, decreased ROM, impaired cardiopulmonary response to activity, orthopedic precautions. Pt tolerated session well today. Pt pleasantly confused throughout session but able to maintain attention to task. Of note, pt is s/p Lt PPM placement on 3/28/2022 since last session. Pt requires substantial assist for bed mobility 2/2 bilateral hard casts as well as inability to use LUE from recent PPM placement. Despite this, pt able to demo improvements in upright posture and core control once sitting EOB. Pt will continue to benefit from skilled PT services during this hospital admit to continue to improve transfer ability and efficiency as well as continue to progress pt's ambulation distance and cardiopulmonary endurance to maximize pt's functional independence and return to PLOF. Pt will benefit from SNF after discharge from acute services in order to continue to progress pt's strength, endurance, and balance to help pt maximize independence at or near PLOF.    Rehab Prognosis: Good; patient would benefit from acute skilled PT  "services to address these deficits and reach maximum level of function.    Recent Surgery: Procedure(s) (LRB):  INSERTION, CARDIAC PACEMAKER, DUAL CHAMBER (Left) 2 Days Post-Op    Plan:     During this hospitalization, patient to be seen 3 x/week to address the identified rehab impairments via gait training, therapeutic activities, therapeutic exercises, neuromuscular re-education, wheelchair management/training and progress toward the following goals:    · Plan of Care Expires:  04/23/22    Subjective     RN notified prior to session. Daughter present upon PT entrance into room.    Chief Complaint: "Make sure you tell my daughter all of this. I'm afraid I won't remember it."  Patient/Family Comments/goals: return to memory care unit at [facility] when able  Pain/Comfort:  · Pain Rating 1: other (see comments) (did not rate but reporting generalized LE pain with movement)  · Pain Addressed 1: Reposition, Distraction      Objective:     Additional staff present: OT for cotx due to pt's multiple medical comorbidities and functional deficits req'ing two skilled therapists to appropriately maximize functional potential by progressing pt's musculoskeletal strength and endurance, facilitating neuromuscular postural balance and control ,and accommodating for patient's impaired cardiopulmonary tolerance to activities.       Patient found HOB elevated with: telemetry, blood pressure cuff, pulse ox (continuous), PureWick, peripheral IV   Cognition:   · Alert and Cooperative  · AxOx3 (person, hospital, and situation [fall])  · Command following: Follows two-step verbal commands  · Fluency: clear/fluent  General Precautions: Standard, Cardiac fall, pacemaker   Orthopedic Precautions:LLE weight bearing as tolerated, RLE weight bearing as tolerated   Braces: Sling and swathe (sling and swathe LUE; BLE hard casts)   Body mass index is 33.59 kg/m².  Oxygen Device: Room Air  Vitals: BP (!) 126/59 (BP Location: Right arm, Patient " "Position: Lying)   Pulse 82   Temp 98.5 °F (36.9 °C) (Oral)   Resp 16   Ht 5' 6" (1.676 m)   Wt 94.4 kg (208 lb 1.8 oz) Comment: Bed scale  LMP  (LMP Unknown)   SpO2 (!) 93%   Breastfeeding No   BMI 33.59 kg/m²     Outcome Measures:  AM-PAC 6 CLICK MOBILITY  Turning over in bed (including adjusting bedclothes, sheets and blankets)?: 2  Sitting down on and standing up from a chair with arms (e.g., wheelchair, bedside commode, etc.): 1  Moving from lying on back to sitting on the side of the bed?: 2  Moving to and from a bed to a chair (including a wheelchair)?: 1  Need to walk in hospital room?: 1  Climbing 3-5 steps with a railing?: 1  Basic Mobility Total Score: 8     Functional Mobility:    Bed Mobility:   · Scooting to HOB via supine bridge: total assistance and 3 persons  · Supine to Sit: total assistance and 2 persons; from Rt side of bed  · Scooting anteriorly to EOB to have both feet planted on floor: total assistance  · Sit to Supine: total assistance and 2 persons; to  Rt side of bed    Sitting Balance at Edge of Bed:   Static Sitting Balance: Poor+ : able to maintain balance with minimal assistance from individual or chair   Dynamic Sitting Balance: Poor+ : able to sit unsupported with minimal assistance and reach to ipsilateral side but unable to weight shift   Assistance Level Required: Minimal Assistance, progressing to Stand-by Assistance   Time: 10 minutes   Postural deviations noted: slouched posture, rounded shoulders, forward head, poor midline awareness, trunk deviated right and increased cervical flexion   Comments: Time EOB focused primarily on tolerance to upright positioning, cardiopulmonary response and endurance for activities, and strength of postural musculature to perform dynamic sitting to prepare for tasks in the home. Pt with poor  with BLE 2/2 hard casts and demo'ed Rt trunk lean as well as difficulty with hips slipping anteriorly while EOB requiring total x 2 to " correct. Once adjusted and better positioning pt's sitting balance progressed to SBA with BUE support. Pt with increased pain as time EOB increased and frequently shifting hips causing anterior sliding.    Transfers/Gait: Deferred due to pt's performance with above listed functional mobility     Education:   Time provided for education, counseling and discussion of health disposition in regards to patient's current status   All questions answered within PT scope of practice and to patient's satisfaction   PT role in POC to address current functional deficits   Pt educated on proper body mechanics, safety techniques, and energy conservation with PT facilitation and cueing throughout session   Call nursing/pct to transfer to chair/use bathroom. Pt stated understanding.   Whiteboard updated with therapist name and pt's current mobility status documented above   Patient is appropriate for drawsheet transfer to/from cardiac chair with nursing staff    Patient left HOB elevated with all lines intact, call button in reach, RN notified and daughter present.    GOALS:   Multidisciplinary Problems     Physical Therapy Goals        Problem: Physical Therapy    Goal Priority Disciplines Outcome Goal Variances Interventions   Physical Therapy Goal     PT, PT/OT Ongoing, Progressing     Description: Goals to be met by: 22    Patient will increase functional independence with mobility by performin. Pt will perform supine<>sit with mod assist x1 to improve independence with mobility  2. Pt will perform functional transfers with max assist x2, WBAT BLE  3. Pt will perform w/c mobility >50 ft with min asisst x1  4. Pt will sit EOB for >10 minutes with min assist x1                 Time Tracking:     PT Received On: 22  PT Start Time: 1033     PT Stop Time: 1105  PT Total Time (min): 32 min     Billable Minutes:   · Therapeutic Exercise 25 minutes    Treatment Type: Treatment  PT/PTA: PT       Pebbles Lepe  PT DPT  3/30/2022  Pager: [phone]

## 2022-03-30 NOTE — PHYSICIAN QUERY
PT Name: Lilia Hidalgo  MR #: [id number]     DOCUMENTATION CLARIFICATION     CDS/: Deana Stephen RN CDIS             Contact information: [email or web address]    This form is a permanent document in the medical record.     Query Date: March 30, 2022    By submitting this query, we are merely seeking further clarification of documentation.  Please utilize your independent clinical judgment when addressing the question(s) below.    The Medical Record contains the following   Indicators Supporting Clinical Findings Location in Medical Record   x Heart Failure documented Chronic congestive heart failure  Presented with an elevated BNP and appeared hypervolemic  TTE with a normal EF- unable to determine diastolic function given she is being paced  Treated with IV lasix 120 mg BID per nephrology recs Cards note 3/30   x BNP 1070 Labs 3/21    x EF/Echo Technically difficult study  The left ventricle is normal in size with normal systolic function. The estimated ejection fraction is 65%.  Right ventricular systolic function appears normal, though imaging of the right heart is challenging.  Grade II left ventricular diastolic dysfunction.  There is moderate aortic valve stenosis. Aortic valve area is 1.0 cm2; peak velocity is 3.1 m/s; mean gradient is 22 mmHg.  Mild to moderate tricuspid regurgitation.  The estimated PA systolic pressure is > 40mm Hg.     TTE  3/21    x Radiology findings FINDINGS:  Heart is mildly enlarged, but the appearance of the cardiomediastinal silhouette demonstrates no detrimental change since the examination referenced above.  Pulmonary vascularity is normal, and there are no findings indicating current cardiac decompensation.  Left hemithorax is obscured to a considerable degree by an opacity relating to a structure external to the patient, but the lung zones appear clear, and free of significant airspace consolidation or volume loss.  No pleural fluid.  No pneumothorax. CXR 3/21      Subjective/Objective Respiratory Conditions      Recent/Current MI      Heart Transplant, LVAD     x Edema, JVD    Right lower leg: Edema present.      Left lower leg: Edema present.  Cards note 3/30     Ascites     x Diuretics/Meds furosemide injection 120 mg  Dose: 120 mg  Freq: Once Route: IV  Start: 03/24/22 1040 End: 03/24/22 1223    furosemide injection 120 mg  Dose: 120 mg  Freq: Once Route: IV  Start: 03/22/22 1815 End: 03/22/22 1715    bumetanide tablet 3 mg  Dose: 3 mg  Freq: 2 times daily Route: Oral  Start: 03/28/22 0900 End: 03/28/22 1030    furosemide injection 120 mg  Dose: 120 mg  Freq: Every 12 hours (non-standard times) Route: IV  Start: 03/23/22 1600 End: 03/28/22 0647 MAR           MAR           MAR           MAR     Other Treatment      Other       Heart failure is a clinical diagnosis which includes symptomatic fluid retention, elevated intracardiac pressures, and/or the inability of the heart to deliver adequate blood flow.    Heart Failure with reduced Ejection Fraction (HFrEF) or Systolic Heart Failure (loses ability to contract normally, EF is <40%)    Heart Failure with preserved Ejection Fraction (HFpEF) or Diastolic Heart Failure (stiff ventricles, does not relax properly, EF is >50%)     Heart Failure with Combined Systolic and Diastolic Failure (stiff ventricles, does not relax properly and EF is <50%)    Mid-range or mildly reduced ejection fraction (HFmrEF) is classified as systolic heart failure.   Common clues to acute exacerbation:  Rapidly progressive symptoms (w/in 2 weeks of presentation), using IV diuretics, using supplemental O2, pulmonary edema on Xray, new or worsening pleural effusion, +JVD or other signs of volume overload, MI w/in 4 weeks, and/or BNP >500  The clinical guidelines noted are only system guidelines, and do not replace the providers clinical judgment.    Provider, please specify the diagnosis associated with the above clinical findings.    [  XXX ]  Acute  on Chronic Diastolic Heart Failure (HFpEF) - worsening of CHF signs/symptoms in preexisting CHF   [   ]  Chronic Diastolic Heart Failure (HFpEF) - preexisting and stable   [   ]  Other (please specify): ___________________________________   [  ]  Clinically Undetermined         Please document in your progress notes daily for the duration of treatment until resolved and include in your discharge summary.    References:  American Heart Association editorial staff. (2017, May). Ejection Fraction Heart Failure Measurement. American Heart Association. https://www.heart.org/en/health-topics/heart-failure/diagnosing-heart-failure/ejection-fraction-heart-failure-measurement#:~:text=Ejection%20fraction%20(EF)%20is%20a,pushed%20out%20with%20each%20heartbeat  TRACY Stewart (2020, December 15). Heart failure with preserved ejection fraction: Clinical manifestations and diagnosis. [employer]castTo[employer]. https://www.[employer].dotloop/contents/heart-failure-with-preserved-ejection-fraction-clinical-manifestations-and-diagnosis.  ICD-10-CM/PCS Coding Clinic Third Quarter ICD-10, Effective with discharges: September 8, 2020 [facility] § Heart failure with mid-range or mildly reduced ejection fraction (2020).  Form No. 35957

## 2022-03-30 NOTE — NURSING
Recd pt from ICU, pt is awake , alert oriented to name and place. Pt has hx of dementia. Pt oriented to room and equipment. No c/o chest discomfort. Left arm sling in use, left upper chest with dressing ( pacemaker site)intact. Telemetry in use. Right leg with cast below the knee wound vac in place- pt wiggles toes, capillary refill is brisk. Left lower extremity with cast from toes to thigh, pt wiggles toes, brisk capillary refill. Right buttock noted to have non-blanchable reddish/purplish discoloration, sacrum Mepilex applied. Special air mattress bed in use. Upper extremities with multiple ecchymotic/bruised areas. Safety measures maintained.

## 2022-03-30 NOTE — ASSESSMENT & PLAN NOTE
Resolved after D5W 100ml/hr overnight   Na level 154-->144 this morning   Dietitian to f/u on diet now pt has passed swallow eval/dysphagia assessment

## 2022-03-30 NOTE — PROGRESS NOTES
[facility]  Cardiology  Progress Note    Patient Name: Lilia Hidalgo  MRN: [id number]  Admission Date: 3/21/2022  Hospital Length of Stay: 9 days  Code Status: Full Code   Attending Physician: Anirudh Guidry MD   Primary Care Physician: Anna Wood MD  Expected Discharge Date: 4/1/2022  Principal Problem:Complete AV block    Subjective:     Hospital Course:   Ms. Hidalgo was admitted to the hospital for surgical management right ankle fracture sp I&D and percutaneous fixation 3/21/22 and then she also has a left tibia fracture which they decided not to operate on and placed long cast. She was noted to have a CHB, underwent TVP placement for optimization prior to the surgical procedure. Course complicated by a pansensitive Ecoli UTI for which she was treated with a 7 day course of ABX (cefazolin and CTX). ID was consulted and cleared the patient to undergo PPM placement; planning on undergoing placement on 03/28. IV lasix discontinued on 3/28 and patient was started on Bumex 1 mg BID. However, patient developed a contraction alkalosis with worsening hypernatremia. Started on D5W - now at baseline. Stepped down to HM on 3/29 awaiting a bed      Interval History: No acute events overnight. Has remained afebrile and hemodynamically stable. Sodium back within normal range discontinued IVF. Will likely resume diuretics at a lower dose int he next 24 hours      Review of Systems   Unable to perform ROS: Dementia     Objective:     Vital Signs (Most Recent):  Temp: 98.9 °F (37.2 °C) (03/30/22 0700)  Pulse: 81 (03/30/22 1015)  Resp: (!) 29 (03/30/22 1015)  BP: (!) 116/55 (03/30/22 1000)  SpO2: 98 % (03/30/22 1015)   Vital Signs (24h Range):  Temp:  [98.3 °F (36.8 °C)-99.3 °F (37.4 °C)] 98.9 °F (37.2 °C)  Pulse:  [77-93] 81  Resp:  [15-29] 29  SpO2:  [92 %-99 %] 98 %  BP: (116-159)/(53-66) 116/55     Weight: 94.4 kg (208 lb 1.8 oz) (Bed scale)  Body mass index is 33.59 kg/m².     SpO2: 98  %  O2 Device (Oxygen Therapy): room air      Intake/Output Summary (Last 24 hours) at 3/30/2022 1128  Last data filed at 3/30/2022 0600  Gross per 24 hour   Intake 2746.03 ml   Output 945 ml   Net 1801.03 ml       Lines/Drains/Airways       Central Venous Catheter Line  Duration             Percutaneous Central Line Insertion/Assessment - single lumen  03/21/22 1124 right internal jugular 9 days              Drain  Duration             Female External Urinary Catheter 03/30/22 0020 <1 day              Peripheral Intravenous Line  Duration                  Peripheral IV - Single Lumen 03/27/22 1030 20 G Posterior;Right Forearm 3 days         Peripheral IV - Single Lumen 03/27/22 1035 20 G Posterior;Right Hand 3 days         Peripheral IV - Single Lumen 03/28/22 1215 22 G Left;Posterior Hand 1 day                  Physical Exam  Vitals and nursing note reviewed.   Constitutional:       General: She is not in acute distress.     Appearance: She is obese. She is not ill-appearing.   HENT:      Head: Normocephalic and atraumatic.      Nose: No congestion or rhinorrhea.      Mouth/Throat:      Pharynx: No oropharyngeal exudate or posterior oropharyngeal erythema.   Eyes:      General: No scleral icterus.     Conjunctiva/sclera: Conjunctivae normal.   Cardiovascular:      Rate and Rhythm: Normal rate and regular rhythm.   Pulmonary:      Effort: Pulmonary effort is normal. No respiratory distress.      Breath sounds: No wheezing or rales.   Abdominal:      General: Bowel sounds are normal. There is no distension.      Palpations: Abdomen is soft.   Musculoskeletal:         General: Swelling present.      Right lower leg: Edema present.      Left lower leg: Edema present.      Comments: Both LE casted with wound vac in place   Skin:     General: Skin is warm.      Coloration: Skin is not jaundiced.      Findings: No lesion.   Neurological:      Mental Status: She is alert.   Psychiatric:         Mood and Affect: Mood  "normal.         Behavior: Behavior normal.         Thought Content: Thought content normal.         Judgment: Judgment normal.     Significant Labs: All pertinent lab results from the last 24 hours have been reviewed.    Assessment and Plan:     * Complete AV block  Patient presented to ED with ankle fracture and found to be in complete heart block. Currently stable with junctional scape rhythm in the 30s and normal BP.     TVP was placed on 03/20 as a bridge to pacemaker. Underwent R fixation surgery on 03/21.  -Pacemaker placement delayed due to one febrile episode on 03/23. Cultures have had no growth and she was appropriately treated for a pansensitive ecoli uti.   -ID was consulted and cleared the patient to undergo ppm placement     PLAN:  -- S/P PPM placement with EP on 3/28  -- Per EP:   1. Ancef 2 grams q8 hours x 2 doses - completed  2. Sling to left arm - wear for 48 hours, then only at night for 6 weeks.  3. NO HEPARIN PRODUCTS - for at least 5 days  4. Doxycycline 100 mg TID for 5 days at discharge (or after the 2 doses of IV antibiotics if still in the hospital) - Ordered  5. No lifting left elbow above shoulder height  6. No lifting over 5 pounds  7. No driving for 1 week and for 4 weeks if patient uses left arm to make turns  9. Follow up in device clinic in 1 week for device and wound check    Hypernatremia  Concerned for contraction alkalosis Given overdiruesis  FWD 3.7L    PLAN:  -- hold additional diuretics for another 24 hours and then resume at a lower dose.    Temporary transvenous cardiac pacemaker present  See complete Heart block    Bradycardia  See "complete av block"    Closed fracture of left proximal tibia  Ortho following. Recommendations:   -- Keep left long leg cast reinforced with bed pan bag at the top to prevent it from getting soiled with urine/feces  -- Continue incisional wound vac right ankle (placed 3/22)  -- Continue heparin TID for dvt ppx per primary team. Continue DVT ppx " "at discharge (heparin okay or can do ASA 81 bid or LVX) - However, Cardiology is holding this for 5 days given her recent PPM placement  -- From orthopedic standpoint okay to dc once medically ready (still needs permanent pacemaker placement)  -- FU 1 week after discharge for incisional wound vac removal from right ankle       LUANNE (acute kidney injury)  RESOLVED  Likely d/t hypervolemic state vs ATN from CHB  RP ultrasound with chronic disease, no hydronephrosis    Nephrology consulted- suspect ATN w/ muddy brown casts after spinning urine    PLAN:  --started diuresis w lasix iv 120 bid with improvement in creatinine, now back at baseline. Transitioned to P.O, Bumex on 3/28; then held 3/29    Chronic congestive heart failure  Presented with an elevated BNP and appeared hypervolemic  TTE with a normal EF- unable to determine diastolic function given she is being paced  Treated with IV lasix 120 mg BID per nephrology recs    PLAN:  -- Hold diuretics given hypovolemia  -- monitor I/Os  -- consider restarting back on home ARB after PPM placement if hemodynamics tolerate it    Open ankle fracture, right, type III, initial encounter  S/p external fixation    PLAN:  -continue pt/ot as tolerated, with restrictions per ortho  -- Ortho following; Appreciate recs    Essential hypertension   BP (!) 116/55   Pulse 81   Temp 98.9 °F (37.2 °C)   Resp (!) 29   Ht 5' 6" (1.676 m)   Wt 94.4 kg (208 lb 1.8 oz) Comment: Bed scale  LMP  (LMP Unknown)   SpO2 98%   Breastfeeding No   BMI 33.59 kg/m²      PLAN:  - Home antihypertensives held in the setting of heart block and plans for a PPM placement  -Pt advised to be compliant with their treatment regimen daily to avoid BP fluctuation and any complications.   -Pt educated that it is important to eat right. Following a reasonable-calorie low-salt diet (Such as the DASH diet) has been shown to control blood pressure better with less medications. Recommended to exercise at least 30 " mins a day for 5 days a week and also told to avoid smoking all together.          VTE Risk Mitigation (From admission, onward)    None          Letha Osman DO  Cardiology  [facility]

## 2022-03-30 NOTE — ASSESSMENT & PLAN NOTE
Results for orders placed during the hospital encounter of 03/21/22    Echo    Interpretation Summary  · Technically difficult study  · The left ventricle is normal in size with normal systolic function. The estimated ejection fraction is 65%.  · Right ventricular systolic function appears normal, though imaging of the right heart is challenging.  · Grade II left ventricular diastolic dysfunction.  · There is moderate aortic valve stenosis. Aortic valve area is 1.0 cm2; peak velocity is 3.1 m/s; mean gradient is 22 mmHg.  · Mild to moderate tricuspid regurgitation.  · The estimated PA systolic pressure is > 40mm Hg.    -off diuretics, restart as per cardiology or primary team

## 2022-03-30 NOTE — PT/OT/SLP PROGRESS
"Occupational Therapy  Co Treatment    Name: Lilia Hidalgo  MRN: [id number]  Admitting Diagnosis:  Complete AV block  2 Days Post-Op   Pt with pacemaker placed 3/28/22     Recommendations:     Discharge Recommendations: nursing facility, skilled    Assessment:     Lilia Hidalgo is a 89 y.o. female with a medical diagnosis of Complete AV block. Performance deficits affecting function are weakness, impaired endurance, impaired self care skills, impaired functional mobilty, gait instability, impaired balance, impaired cognition, decreased upper extremity function, decreased lower extremity function, decreased safety awareness.   Pt tolerated session fairly well. Pt confused, but cooperative. Pt with significant limitations given B LE hard casts and new pacemaker placement. Goals and POC updated this date to reflect current limitations.   Anticipate pt will benefit from t/f to SNF in NH     Rehab Prognosis:  Fair; patient would benefit from acute skilled OT services to address these deficits and reach maximum level of function.       Plan:     Patient to be seen 3 x/week to address the above listed problems via self-care/home management, therapeutic activities, therapeutic exercises  · Plan of Care Expires: 04/23/22  · Plan of Care Reviewed with: patient    Subjective     Pt agreeable to therapy.   "Don't hurt yourself" pt states.   Pain/Comfort:  ·  Pt reports generalized pain. Pt unable to rate pain.     Objective:     Communicated with: nsg prior to session.  Patient found supine in bed with wound vac, tele, pulse ox, BP cuff, B hard casts to LE's.   Co-tx completed this date to optimize functional performance and safety given impaired tolerance for activity in setting of ICU and medical complexity of pt's status.  General Precautions: Standard, fall , pacemaker precautions.   Orthopedic Precautions:RLE weight bearing as tolerated, LLE weight bearing as tolerated     Occupational Performance:     Bed Mobility:  " "  · TOTAL A x 2 supine<>sit.   ·     Activities of Daily Living:  · Pt required MIN A for self feeding and g/h skills bed level simulated and per report of daughter  · UE/LE dressing: TOTAL A   · Toileting: TOTAL A       [facility] 6 Click ADL: 8    Treatment & Education:  Pt awake, alert and following commands. Pt oriented to self and "hospital" only. Impaired short term memory as prior to arrival.   Pt tolerated sitting EOB approx 10 min initially with MIN A progressing to SBA. Pt with increased discomfort sitting EOB and became restless causing decreased safety on EOB; thus, pt returned supine.     Sling was donned to left UE given recent placement of pacemaker. Education provided re: pacemaker precautions, sling use and impact of pacemaker precautions on functional activity. Daughter receptive and verb understanding. Pt with impaired memory making new learning difficult.     Education provided re: having pt perform basic tasks with right UE including self feeding, g/h skills. Education provided to daughter re: importance of constant reorientation, light on in room, blinds open and HOB elevated as often as tolerated.   Education provided re: removal of sling after 48 hours of pacemaker placement and appropriate positioning of left UE in bed to allow for neutral limb alignment.     Education provided re: OT POC and safety with functional mobility/ADl skills.     Patient left supine with all lines intact, call button in reach, nsg notified and daughter presentEducation:      GOALS:   Multidisciplinary Problems     Occupational Therapy Goals        Problem: Occupational Therapy    Goal Priority Disciplines Outcome Interventions   Occupational Therapy Goal     OT, PT/OT Ongoing, Progressing    Description: Goals to be met by: 4/7/22     Patient will increase functional independence with ADLs by performing:    Feeding with Stand-by Assistance.  Sitting at edge of bed x10 minutes with Stand-by Assistance.  G/H: set-up bed " level  Family to demo understanding of ROM and positioning exs/techniques.                      Time Tracking:     OT Date of Treatment: 03/30/22  OT Start Time: 1033  OT Stop Time: 1105  OT Total Time (min): 32 min    Billable Minutes:Therapeutic Activity 32    OT/RICKEY: OT          3/30/2022

## 2022-03-30 NOTE — PLAN OF CARE
03/30/22 0926   Post-Acute Status   Post-Acute Authorization Placement   Post-Acute Placement Status Referrals Sent   Coverage [employer] [facility] Medicare       SHAREE faxed referral to [facility] [phone] for SNF via [facility] for review. SHAREE will continue to follow patient.       9:37 AM  SHAREE hand faxed information to Hema at [facility] [phone]. Will continue to follow.      11:17 AM  SHAREE completed the LOCET via phone. SHAREE faxed [facility] to obtain the 142 for NH admission.    3:36 PM  [facility].            Vero Lal LMSW  PRN - [facility]  [phone]

## 2022-03-30 NOTE — SUBJECTIVE & OBJECTIVE
Interval History:  Overnight events:   VSS, pt was on D5W at 100ml/hr overnight for hypernatremia of 154-->144 this morning. Daughter at bedside and states pt is doing much better in terms of her mental status   In the last 24 hours pt's I&Os are:    Intake/Output Summary (Last 24 hours) at 3/30/2022 1421  Last data filed at 3/30/2022 1100  Gross per 24 hour   Intake 2646.03 ml   Output 895 ml   Net 1751.03 ml         Review of patient's allergies indicates:   Allergen Reactions    Aspirin Nausea Only and Other (See Comments)     Other reaction(s): esophageal pain    Celebrex [celecoxib] Rash    Morphine Hallucinations    Steroid [corticosteroids (glucocorticoids)] Other (See Comments)     Other reaction(s): Flushing (skin)    Sulfa dyne Other (See Comments)     Other reaction(s): esophogeal pain     Current Facility-Administered Medications   Medication Frequency    0.9%  NaCl infusion Continuous    acetaminophen tablet 650 mg Q8H PRN    aspirin EC tablet 81 mg BID    atorvastatin tablet 20 mg QHS    balsam peru-castor oiL Oint BID    BUPivacaine (PF) 0.25% (2.5 mg/ml) injection PRN    ceFAZolin 2 gram in dextrose 5% 100 mL IVPB (premix) On Call Procedure    cholecalciferol (vitamin D3) 125 mcg (5,000 unit) tablet 50,000 Units Weekly    diphenhydrAMINE capsule 25 mg Q6H PRN    donepeziL tablet 5 mg QHS    doxycycline tablet 100 mg Q12H    HYDROmorphone injection 0.2 mg Q6H PRN    LIDOcaine HCL 20 mg/ml (2%) injection PRN    losartan tablet 50 mg Daily    oxyCODONE immediate release tablet 5 mg Q6H PRN    pantoprazole EC tablet 40 mg Daily    sodium chloride 0.9% irrigation PRN    vancomycin injection PRN       Objective:     Vital Signs (Most Recent):  Temp: 98.5 °F (36.9 °C) (03/30/22 1243)  Pulse: 82 (03/30/22 1243)  Resp: 16 (03/30/22 1243)  BP: (!) 126/59 (03/30/22 1243)  SpO2: (!) 93 % (03/30/22 1243)  O2 Device (Oxygen Therapy): room air (03/30/22 1041)   Vital Signs (24h Range):  Temp:  [98.3 °F (36.8  °C)-99.3 °F (37.4 °C)] 98.5 °F (36.9 °C)  Pulse:  [77-93] 82  Resp:  [16-29] 16  SpO2:  [92 %-99 %] 93 %  BP: (116-159)/(53-66) 126/59     Weight: 94.4 kg (208 lb 1.8 oz) (Bed scale) (03/26/22 0600)  Body mass index is 33.59 kg/m².  Body surface area is 2.1 meters squared.    I/O last 3 completed shifts:  In: 3044.6 [P.O.:650; I.V.:2102.7; IV Piggyback:292]  Out: 2035 [Urine:2035]    Physical Exam  Vitals and nursing note reviewed.   Constitutional:       General: She is not in acute distress.     Appearance: Normal appearance. She is not ill-appearing, toxic-appearing or diaphoretic.   HENT:      Mouth/Throat:      Mouth: Mucous membranes are moist.   Cardiovascular:      Rate and Rhythm: Normal rate and regular rhythm.      Pulses: Normal pulses.      Heart sounds: No murmur heard.  Pulmonary:      Effort: Pulmonary effort is normal. No respiratory distress.      Breath sounds: No wheezing, rhonchi or rales.      Comments: Decreased breath sound in the bases   Abdominal:      General: Abdomen is flat. Bowel sounds are normal. There is no distension.      Palpations: Abdomen is soft. There is no mass.      Tenderness: There is no abdominal tenderness. There is no guarding or rebound.   Musculoskeletal:         General: Swelling, tenderness, deformity and signs of injury present.      Right lower leg: Edema present.      Left lower leg: Edema present.      Comments: George LE's with casts, swelling in upper thighs and foot    Pt has L arm sling      Skin:     General: Skin is warm.      Capillary Refill: Capillary refill takes 2 to 3 seconds.      Coloration: Skin is not jaundiced or pale.      Findings: No bruising, erythema, lesion or rash.   Neurological:      Mental Status: She is alert and oriented to person, place, and time.       Significant Labs:  ABGs: No results for input(s): PH, PCO2, HCO3, POCSATURATED, BE in the last 168 hours.  Cardiac Markers: No results for input(s): CKMB, TROPONINT, MYOGLOBIN in the  last 168 hours.  CBC:   Recent Labs   Lab 03/30/22  0323   WBC 5.59   RBC 3.03*   HGB 8.7*   HCT 28.0*      MCV 92   MCH 28.7   MCHC 31.1*     CMP:   Recent Labs   Lab 03/29/22  0409 03/29/22  1116 03/30/22  0323   GLU 93   < > 119*   CALCIUM 8.6*   < > 8.5*   ALBUMIN 2.0*  --   --    PROT 4.8*  --   --    *   < > 144   K 3.6   < > 3.5   CO2 40*   < > 38*      < > 98   BUN 31*   < > 21   CREATININE 0.8   < > 0.7   ALKPHOS 80  --   --    ALT 15  --   --    AST 52*  --   --    BILITOT 0.7  --   --     < > = values in this interval not displayed.     Coagulation:   Recent Labs   Lab 03/29/22  0257   INR 1.2   APTT 21.4     LFTs:   Recent Labs   Lab 03/29/22  0409   ALT 15   AST 52*   ALKPHOS 80   BILITOT 0.7   PROT 4.8*   ALBUMIN 2.0*     PTH: No results for input(s): PTH in the last 168 hours.  All labs within the past 24 hours have been reviewed.     Significant Imaging:  Labs: Reviewed

## 2022-03-31 DIAGNOSIS — Z95.0 CARDIAC PACEMAKER IN SITU: ICD-10-CM

## 2022-03-31 DIAGNOSIS — I44.2 COMPLETE HEART BLOCK: Primary | ICD-10-CM

## 2022-03-31 PROCEDURE — 30200315 PPD INTRADERMAL TEST REV CODE 302: Performed by: HOSPITALIST

## 2022-03-31 PROCEDURE — 25000003 PHARM REV CODE 250: Performed by: HOSPITALIST

## 2022-03-31 PROCEDURE — 94761 N-INVAS EAR/PLS OXIMETRY MLT: CPT

## 2022-03-31 PROCEDURE — 92526 ORAL FUNCTION THERAPY: CPT

## 2022-03-31 PROCEDURE — 25000003 PHARM REV CODE 250: Performed by: STUDENT IN AN ORGANIZED HEALTH CARE EDUCATION/TRAINING PROGRAM

## 2022-03-31 PROCEDURE — 99232 PR SUBSEQUENT HOSPITAL CARE,LEVL II: ICD-10-PCS | Mod: ,,, | Performed by: HOSPITALIST

## 2022-03-31 PROCEDURE — 20600001 HC STEP DOWN PRIVATE ROOM

## 2022-03-31 PROCEDURE — 99232 SBSQ HOSP IP/OBS MODERATE 35: CPT | Mod: ,,, | Performed by: HOSPITALIST

## 2022-03-31 PROCEDURE — 25000003 PHARM REV CODE 250: Performed by: INTERNAL MEDICINE

## 2022-03-31 PROCEDURE — 86580 TB INTRADERMAL TEST: CPT | Performed by: HOSPITALIST

## 2022-03-31 RX ORDER — AMOXICILLIN 250 MG
1 CAPSULE ORAL 2 TIMES DAILY
Status: DISCONTINUED | OUTPATIENT
Start: 2022-03-31 | End: 2022-04-02 | Stop reason: HOSPADM

## 2022-03-31 RX ORDER — HYDROCODONE BITARTRATE AND ACETAMINOPHEN 5; 325 MG/1; MG/1
1 TABLET ORAL EVERY 8 HOURS PRN
Status: DISCONTINUED | OUTPATIENT
Start: 2022-03-31 | End: 2022-03-31

## 2022-03-31 RX ORDER — TALC
6 POWDER (GRAM) TOPICAL NIGHTLY
Status: DISCONTINUED | OUTPATIENT
Start: 2022-03-31 | End: 2022-04-02 | Stop reason: HOSPADM

## 2022-03-31 RX ORDER — LACTULOSE 10 G/15ML
15 SOLUTION ORAL EVERY 6 HOURS PRN
Status: DISCONTINUED | OUTPATIENT
Start: 2022-03-31 | End: 2022-04-02 | Stop reason: HOSPADM

## 2022-03-31 RX ORDER — ESCITALOPRAM OXALATE 20 MG/1
20 TABLET ORAL NIGHTLY
Status: DISCONTINUED | OUTPATIENT
Start: 2022-03-31 | End: 2022-04-02 | Stop reason: HOSPADM

## 2022-03-31 RX ORDER — BISACODYL 5 MG
5 TABLET, DELAYED RELEASE (ENTERIC COATED) ORAL DAILY PRN
Status: DISCONTINUED | OUTPATIENT
Start: 2022-03-31 | End: 2022-04-02 | Stop reason: HOSPADM

## 2022-03-31 RX ORDER — OXYCODONE AND ACETAMINOPHEN 5; 325 MG/1; MG/1
1 TABLET ORAL EVERY 8 HOURS PRN
Status: DISCONTINUED | OUTPATIENT
Start: 2022-03-31 | End: 2022-04-01

## 2022-03-31 RX ORDER — POLYETHYLENE GLYCOL 3350 17 G/17G
17 POWDER, FOR SOLUTION ORAL 2 TIMES DAILY
Status: DISCONTINUED | OUTPATIENT
Start: 2022-03-31 | End: 2022-04-02 | Stop reason: HOSPADM

## 2022-03-31 RX ADMIN — POLYETHYLENE GLYCOL 3350 17 G: 17 POWDER, FOR SOLUTION ORAL at 09:03

## 2022-03-31 RX ADMIN — LOSARTAN POTASSIUM 50 MG: 50 TABLET, FILM COATED ORAL at 08:03

## 2022-03-31 RX ADMIN — ATORVASTATIN CALCIUM 20 MG: 20 TABLET, FILM COATED ORAL at 09:03

## 2022-03-31 RX ADMIN — ASPIRIN 81 MG: 81 TABLET, COATED ORAL at 09:03

## 2022-03-31 RX ADMIN — DONEPEZIL HYDROCHLORIDE 5 MG: 5 TABLET ORAL at 09:03

## 2022-03-31 RX ADMIN — HYDROCODONE BITARTRATE AND ACETAMINOPHEN 1 TABLET: 5; 325 TABLET ORAL at 08:03

## 2022-03-31 RX ADMIN — PANTOPRAZOLE SODIUM 40 MG: 40 TABLET, DELAYED RELEASE ORAL at 09:03

## 2022-03-31 RX ADMIN — Medication 6 MG: at 09:03

## 2022-03-31 RX ADMIN — TUBERCULIN PURIFIED PROTEIN DERIVATIVE 5 UNITS: 5 INJECTION, SOLUTION INTRADERMAL at 11:03

## 2022-03-31 RX ADMIN — SENNOSIDES AND DOCUSATE SODIUM 1 TABLET: 50; 8.6 TABLET ORAL at 09:03

## 2022-03-31 RX ADMIN — BISACODYL 5 MG: 5 TABLET, COATED ORAL at 08:03

## 2022-03-31 RX ADMIN — ACETAMINOPHEN 650 MG: 325 TABLET ORAL at 02:03

## 2022-03-31 RX ADMIN — OXYCODONE 5 MG: 5 TABLET ORAL at 02:03

## 2022-03-31 RX ADMIN — DOXYCYCLINE HYCLATE 100 MG: 100 TABLET, COATED ORAL at 09:03

## 2022-03-31 RX ADMIN — ASPIRIN 81 MG: 81 TABLET, COATED ORAL at 08:03

## 2022-03-31 RX ADMIN — ESCITALOPRAM OXALATE 20 MG: 20 TABLET ORAL at 09:03

## 2022-03-31 RX ADMIN — DOXYCYCLINE HYCLATE 100 MG: 100 TABLET, COATED ORAL at 08:03

## 2022-03-31 NOTE — RESPIRATORY THERAPY
RAPID RESPONSE RESPIRATORY CHART CHECK       Chart check completed.  Please call [phone] for further concerns or assistance.

## 2022-03-31 NOTE — PROGRESS NOTES
[facility] - Cardiology [facility] Medicine  Progress Note    Patient Name: Lilia Hidalgo  MRN: [id number]  Patient Class: IP- Inpatient   Admission Date: 3/21/2022  Length of Stay: 10 days  Attending Physician: Tha Lunsford MD  Primary Care Provider: Anna Wood MD        Subjective:     Principal Problem:Complete AV block        HPI:  Lilia Hidalgo is a 89 year old white woman with hypertension, concentric left ventricular remodeling, chronic diastolic heart failure, aortic stenosis, obesity, aortic atherosclerosis, venous insufficiency, diabetes mellitus type 2, hyperlipidemia, vascular dementia, depression, urinary incontinence, spondyloarthropathy, knee osteoarthritis, history of left total knee arthroplasty on 2/6/2013, history of right hip arthroplasty on 4/12/2010. She uses a wheelchair. She lives at [facility] in [place], [place]. She is . Her primary care physician is Dr. Anna Wood.               She presented to [facility] Emergency Department on 3/21/2022 after sustaining an ankle fracture while being assisted in transfer from the toilet to her wheelchair. She had no syncope. ECG showed 3rd degree atrioventricular block. Cardiology was consulted. History was taken from her daughter. She was admitted to Cardiology.       Overview/Hospital Course:  She underwent incision and debridement with percutaneous fixation of her right ankle on the day of admission. She was also found to have a left tibia fracture, which was treated with a long cast. She underwent transvenous pacemaker placement for optimization prior to surgery. Course was complicated by pan-sensitive E coli cystitis, which was treated with 7 days of antibiotics including ceftriaxone and cefazolin. Infectious Disease was consulted and cleared her for permanent pacemaker placement. She was treated with intravenous furosemide until 3/28/2022 then  started on bumetanide 1 mg twice daily. She developed contraction alkalosis with worsening hypernatremia so was given dextrose 5% with improvement. Pacemaker was placed. She was given doxycycline for 5 days for infection prophylaxis. She will wear a sling nightly for 6 weeks. She was put on dental soft diet with nectar thickened liquids. She was transferred to [facility] Medicine Team A on 3/29/2022 to await skilled nursing facility placement.       Interval History: Awaiting SNF.    Review of Systems   Constitutional:  Negative for chills and fever.   Gastrointestinal:  Negative for diarrhea and vomiting.   Neurological:  Negative for seizures and syncope.   Objective:     Vital Signs (Most Recent):  Temp: 98.4 °F (36.9 °C) (03/31/22 0722)  Pulse: 95 (03/31/22 0722)  Resp: 18 (03/31/22 0722)  BP: (!) 148/71 (03/31/22 0722)  SpO2: (!) 91 % (03/31/22 0722)   Vital Signs (24h Range):  Temp:  [98.4 °F (36.9 °C)-99.2 °F (37.3 °C)] 98.4 °F (36.9 °C)  Pulse:  [] 95  Resp:  [16-29] 18  SpO2:  [91 %-99 %] 91 %  BP: (116-148)/(55-71) 148/71     Weight: 87.3 kg (192 lb 6.3 oz)  Body mass index is 31.05 kg/m².    Intake/Output Summary (Last 24 hours) at 3/31/2022 0737  Last data filed at 3/31/2022 0700  Gross per 24 hour   Intake --   Output 600 ml   Net -600 ml      Physical Exam  Vitals and nursing note reviewed.   Constitutional:       General: She is not in acute distress.  Pulmonary:      Effort: Pulmonary effort is normal. No respiratory distress.   Neurological:      Mental Status: She is alert. Mental status is at baseline.   Psychiatric:         Mood and Affect: Mood and affect normal.           Significant Labs:  CBC:  Recent Labs   Lab 03/30/22  0323   WBC 5.59   HGB 8.7*   HCT 28.0*        CMP:  Recent Labs   Lab 03/29/22  1116 03/30/22  0323   * 144   K 3.2* 3.5    98   CO2 37* 38*   * 119*   BUN 29* 21   CREATININE 0.8 0.7   CALCIUM 8.6* 8.5*   ANIONGAP 9 8   EGFRNONAA >60.0 >60.0      X-Ray Chest AP Portable 3/21/22: FINDINGS:  Heart is mildly enlarged, but the appearance of the cardiomediastinal silhouette demonstrates no detrimental change since the examination referenced above.  Pulmonary vascularity is normal, and there are no findings indicating current cardiac decompensation.  Left hemithorax is obscured to a considerable degree by an opacity relating to a structure external to the patient, but the lung zones appear clear, and free of significant airspace consolidation or volume loss.  No pleural fluid.  No pneumothorax.  Impression:  Mild cardiomegaly is again noted, but there has been no significant detrimental interval change in the appearance of the chest since 05/27/2019.  X-Ray Ankle 2 View Right 3/21/22: FINDINGS:  Oblique fracture of the distal fibula at the level of the syndesmosis, with lateral displacement by half shaft width.  Transverse fracture of the medial malleolus, with lateral displacement by 8 mm.  Small mildly displaced fracture involving the posterior malleolus.  Lateral subluxation of the tibiotalar joint, with widening of the medial clear space.  Distal tibiofibular alignment appears congruent.  Tibiotalar joint effusion.  Foci of soft tissue gas in the anterior ankle.  Diffuse soft tissue swelling.  Impression:  Displaced trimalleolar ankle fracture as described.  Foci of soft tissue gas in the anterior ankle, compatible with reported compound fracture.  X-Ray Foot Complete Right 3/21/22: FINDINGS:  Foot complete three views right: There is a trimalleolar fracture of the distal tib fib with anterior dislocation at the ankle joint.  X-Ray Ankle Complete Right 3/21/22: FINDINGS:  Once again identified are fractures involving the medial and lateral malleoli as well as a likely fracture involving the posterior tibial margin.  Position/alignment of these fractures demonstrates no detrimental change since the prior exam.  Tibiotalar joint now appears unremarkable,  with relationship between the tibial and talar articular surfaces.  No additional fractures or other significant osseous abnormality noted.  Impression:  1. Redemonstration of fractures of the right medial and lateral malleoli and the posterior tibial margin.  2. Tibiotalar joint space now appears appears unremarkable.  3. No significant detrimental interval change since 03/21/2022 at 09:14.  X-Ray Knee 1 or 2 View Right 3/21/22: FINDINGS:  Visualized osseous structures appear intact, with no definite evidence of recent fracture or other significant abnormality identified.  No significant joint effusion.  X-Ray Hip 2 or 3 views Left (with Pelvis when performed) 3/21/22: FINDINGS:  Hip two views left: There is severe advanced DJD and impingement change.  No fracture dislocation bone destruction seen.  X-Ray Tibia Fibula Bilateral 3/21/22: CLINICAL HISTORY:  Open right ankle fracture;  Other fracture of right lower leg, initial encounter for open fracture type I or II  Tib fib two views bilateral.  Right: There is a trimalleolar fracture without dislocation.  There is a possible proximal fibular fracture.  Left: There is a TKA in place.  There is a proximal fibular fracture and a possible proximal tibial fracture.  X-Ray Chest AP Portable 3/21/22: FINDINGS:  Chest one view: Pacer tip RV.  Heart size is normal.  There is aortic plaque.  Lungs show mild perihilar atelectasis.  No line complication seen.  X-Ray Ankle Complete Right 3/21/22: FINDINGS:  Overlying bandage material obscures detail slightly.  There is interval postoperative change.  Previously identified fracture of the distal fibula again noted, there is interval placement of osseous screws through the femoral shaft, with improved alignment at the fracture site.  There is additional postoperative change at the level of the distal tibia, previous medial malleolar fracture, there appear to be 2 fixation screws present.  Alignment is improved.  The tibiotalar  space appears appropriate.  Subtle posterior malleolar fracture not as well demonstrated at this time.  The remainder of the osseous structures appear intact and stable.  Impression:   Previously identified distal fibular and tibia fractures are again noted, interval postoperative change as above.  Transthoracic echo (TTE) complete with contrast 3/21/22:   · Technically difficult study  · The left ventricle is normal in size with normal systolic function. The estimated ejection fraction is 65%.  · Right ventricular systolic function appears normal, though imaging of the right heart is challenging.  · Grade II left ventricular diastolic dysfunction.  · There is moderate aortic valve stenosis. Aortic valve area is 1.0 cm2; peak velocity is 3.1 m/s; mean gradient is 22 mmHg.  · Mild to moderate tricuspid regurgitation.  · The estimated PA systolic pressure is > 40mm Hg.  X-Ray Chest AP Portable 3/22/22: FINDINGS:  Right IJ pacer lead in stable position.  Mediastinal structures midline.  Cardiac silhouette stable.  Aortic arch calcification.  Lungs clear without consolidation.  Improved pulmonary venous congestion.  No pleural fluid or pneumothorax.  No significant osseous abnormality.  X-Ray Ankle Complete Left 3/22/22: FINDINGS:  Osseous demineralization.  No definite evidence of acute fracture or dislocation.  The talar dome appears intact.  Ankle mortise symmetric.  No findings of syndesmotic widening.  Minor enthesopathic change at the calcaneus.  Degenerative spurring of the dorsal midfoot.  No definite radiopaque foreign body seen.  Impression:  No definite evidence of acute fracture or dislocation.  X-Ray Knee 1 or 2 View Left 3/22/22: FINDINGS:  Status post total knee arthroplasty.  As seen on prior radiograph of 03/21/2022, there is an acute displaced fracture of the proximal tibia marginating tibial component of the hardware.  There is medial displacement of the major distal fracture fragment.  Additionally,  there is a mild displaced fracture of the proximal fibula.  Impression:  Redemonstrated displaced fractures of the proximal tibia and fibula, relatively similar configuration to 03/21/2022 radiograph.  CT 3D RECON WITH INDEPENDENT WS 3/22/22: FINDINGS:  Left total knee arthroplasty.  Streak artifact degrades evaluation of adjacent structures.  No evidence of loosening.  Redemonstrated is a perihardware fracture of the proximal tibia, with impaction, 8 mm medial displacement, and slight apex lateral angulation (201:57).  Redemonstrated is a minimally impacted transverse fracture of the proximal fibular shaft (201: 78).  In addition, there is a vertically oriented lucency extending through the medial aspect of the patella, which may be artifactual but could also represent a fracture.  The femur is intact.  Diffuse osteopenia.  No osteonecrosis.  No focal osseous lesion.  There is hyperdense fluid within the tibial fracture gap extending into the infrapatellar region, likely reflecting hematoma (202:89).  Diffuse soft tissue edema. Diffuse muscle atrophy.  Small knee joint effusion.  No Baker's cyst.  Vascular calcifications.  Impression:  Left total knee arthroplasty, with a displaced perihardware fracture involving the proximal tibia, minimally impacted fracture of the proximal fibula, and possible nondisplaced perihardware fracture of the patella.  Small hematoma adjacent to the tibial fracture.  CT Knee Without Contrast Left 3/22/22: FINDINGS:  Left total knee arthroplasty.  Streak artifact degrades evaluation of adjacent structures.  No evidence of loosening.  Redemonstrated is a perihardware fracture of the proximal tibia, with impaction, 8 mm medial displacement, and slight apex lateral angulation (201:57).  Redemonstrated is a minimally impacted transverse fracture of the proximal fibular shaft (201: 78).  In addition, there is a vertically oriented lucency extending through the medial aspect of the patella,  which may be artifactual but could also represent a fracture.  The femur is intact.  Diffuse osteopenia.  No osteonecrosis.  No focal osseous lesion.  There is hyperdense fluid within the tibial fracture gap extending into the infrapatellar region, likely reflecting hematoma (202:89).  Diffuse soft tissue edema. Diffuse muscle atrophy.  Small knee joint effusion.  No Baker's cyst.  Vascular calcifications.  Impression:  Left total knee arthroplasty, with a displaced perihardware fracture involving the proximal tibia, minimally impacted fracture of the proximal fibula, and possible nondisplaced perihardware fracture of the patella.  Small hematoma adjacent to the tibial fracture.  US Retroperitoneal Complete 3/22/22: FINDINGS:  Right kidney: The right kidney measures 8.6 cm. No cortical thinning. No loss of corticomedullary distinction. Resistive index measures 1.0.  No mass. No renal stone. No hydronephrosis.  Left kidney: The left kidney measures 9.1 cm. No cortical thinning. No loss of corticomedullary distinction. Resistive index measures 1.0.  No mass. No renal stone. No hydronephrosis.  Urinary bladder is decompressed with Paul catheter.  Impression:  Elevated resistive indices which is nonspecific but may be seen with medical renal disease.  No evidence of hydronephrosis.  X-Ray Chest AP Portable 3/23/22: FINDINGS:  Central venous catheter remain.  No significant changes.  X-Ray Elbow Complete Bilateral 3/23/22: FINDINGS:  Alignment is satisfactory.  No acute fracture, subluxation or dislocation.  No mass or foreign body.  No significant arthropathy.  No significant joint effusion.  Impression:   No acute radiographic abnormality.  X-Ray Femur 2 View Right 3/23/22: FINDINGS:  Right hip arthroplasty.  Hardware positioning appears adequate.  No acute fracture, subluxation or dislocation.  Mild-moderate degenerative changes of the right knee.  Impression:   No acute radiographic abnormality.  X-Ray Chest AP  Portable 3/28/22: FINDINGS:  There is a cardiac pacer with the battery pack in the left chest wall.  There is a right internal jugular central venous catheter terminating in the high SVC or at the confluence.  The lungs are well expanded.  There are bilateral interstitial opacities compatible with mild edema, similar to prior.  The pleural spaces are clear.  The cardiac silhouette is enlarged, unchanged.  The visualized osseous structures are intact.  Impression:  Interval placement of a cardiac pacer.  No pneumothorax.  Continued interstitial prominence suspicious for mild edema.  Cardiomegaly.      Assessment/Plan:      * Complete AV block  Status post placement of cardiac pacemaker    Closed fracture of left proximal tibia  Nonoperative management with cast    Chronic diastolic congestive heart failure  Holding home furosemide.    Open ankle fracture, right, type III, initial encounter  S/p fixation    Major depression, recurrent, chronic  Stopped home escitalopram.    Dementia  Continue home donepezil. Delirium precautions.    Dysphagia  Dental soft diet with nectar thickened liquids.    Essential hypertension  Continue home losartan. Stopped home metoprolol.      VTE Risk Mitigation (From admission, onward)    None          Discharge Planning   MARLO: 4/1/2022     Code Status: Full Code   Is the patient medically ready for discharge?: Yes    Reason for patient still in hospital (select all that apply): Pending disposition  Discharge Plan A: Skilled Nursing Facility (return back to [facility])   Discharge Delays: None known at this time              Tha Lunsford MD  Department of Hospital Medicine   [facility] - Cardiology Stepdown

## 2022-03-31 NOTE — PLAN OF CARE
03/31/22 1557   Post-Acute Status   Post-Acute Authorization Placement   Post-Acute Placement Status Set-up Complete/Auth obtained  (admit 4/1)     SW informed by Hema Hunter that they received auth and will prepared to accept pt tomorrow morning 4/1.  Dr Lunsford notified.    Marycarmen Arias, SHERRY  [facility]  [id number]

## 2022-03-31 NOTE — PLAN OF CARE
[facility] - Cardiology Stepdown  Discharge Reassessment    Primary Care Provider: Anna Wood MD    Expected Discharge Date: 4/1/2022    Reassessment (most recent)     Discharge Reassessment - 03/31/22 1143        Discharge Reassessment    Assessment Type Discharge Planning Reassessment     Did the patient's condition or plan change since previous assessment? Yes     Discharge Plan discussed with: Adult children     Communicated MARLO with patient/caregiver Yes     Discharge Plan A Skilled Nursing Facility     Discharge Plan B Assisted Living     Why the patient remains in the hospital Insurance issues   pend insurance auth       Post-Acute Status    Post-Acute Authorization Placement     Post-Acute Placement Status Pending payor review/awaiting authorization (if required)             SHAREE received call from pt's daughter Belkys wanting to confirm that discharge plan is for pt to go to SNF at [facility], where she currently lives in their asst living section.  SHAREE spoke with Zari in admissions at [facility] who confirmed they can accept pt pending insurance auth.  SHAREE emailed clinicals to Hema in admissions ([email or web address]) per their request since per Zari they are having issues with their fax and Hema did not get the clinicals that were faxed to him yesterday.  Per Dr Lunsford pt medically ready to discharge, although Belkys voiced some medical concerns to SHAREE when we spoke over the phone.  SHAREE requested that Dr Lunsford contact Belkys regarding these concerns.  Will continue to follow.    Marycarmen Arias, SHERRY  [facility]  [id number]

## 2022-03-31 NOTE — PLAN OF CARE
Problem: Adult Inpatient Plan of Care  Goal: Plan of Care Review  Outcome: Ongoing, Progressing  Flowsheets (Taken 3/31/2022 1730)  Plan of Care Reviewed With:   patient   daughter     Problem: Oral Intake Inadequate (Acute Kidney Injury/Impairment)  Goal: Optimal Nutrition Intake  Outcome: Ongoing, Progressing     Problem: Fall Injury Risk  Goal: Absence of Fall and Fall-Related Injury  Outcome: Ongoing, Progressing       Plan of care reviewed with daughter and she has now spoken with case mgt and the MD. Pt is frequently encouraged to consume food and drink and she is reoriented when confused. Pt is frequently explain about fall precautions and ways to stay safe pt has not attempted to leave bed but tele sitter is in room. Will continue to monitor pt.

## 2022-03-31 NOTE — NURSING
PM care done for pt. Pt's daughter visited. All safety measures maintained. Handoff report given to night nurse.

## 2022-03-31 NOTE — HPI
Lilia Hidalgo is a 89 year old white woman with hypertension, concentric left ventricular remodeling, chronic diastolic heart failure, aortic stenosis, obesity, aortic atherosclerosis, venous insufficiency, diabetes mellitus type 2, hyperlipidemia, vascular dementia, depression, urinary incontinence, spondyloarthropathy, knee osteoarthritis, history of left total knee arthroplasty on 2/6/2013, history of right hip arthroplasty on 4/12/2010. She uses a wheelchair. She lives at [facility] in [place], [place]. She is . Her primary care physician is Dr. Anna Wood.               She presented to [facility] Emergency Department on 3/21/2022 after sustaining an ankle fracture while being assisted in transfer from the toilet to her wheelchair. She had no syncope. ECG showed 3rd degree atrioventricular block. Cardiology was consulted. History was taken from her daughter. She was admitted to Cardiology.

## 2022-03-31 NOTE — HOSPITAL COURSE
She underwent incision and debridement with percutaneous fixation of her right ankle on the day of admission. She was also found to have a left tibia fracture, which was treated with a long cast. She underwent transvenous pacemaker placement for optimization prior to surgery. Course was complicated by pan-sensitive E coli cystitis, which was treated with 7 days of antibiotics including ceftriaxone and cefazolin. Infectious Disease was consulted and cleared her for permanent pacemaker placement. She was treated with intravenous furosemide until 3/28/2022 then started on bumetanide 1 mg twice daily. She developed contraction alkalosis with worsening hypernatremia so was given dextrose 5% with improvement. Pacemaker was placed. She was given doxycycline for 5 days for infection prophylaxis. She will wear a sling nightly for 6 weeks. She was put on dental soft diet with nectar thickened liquids. She was transferred to Hospital Medicine Team A on 3/29/2022 to await skilled nursing facility placement. On 3/31/2022 it was found that she had not had a bowel movement since 3/25/2022. She was started on stool softeners and laxatives. She had increased confusion after being given hydrocodone-acetaminophen, which resolved after it was stopped. She did fine on oxycodone. Urinalysis showed no UTI. She finally had a bowel movement in late afternoon of Friday 4/1/2022, after getting lactulose and bisacodyl suppository. She was able to be discharged to a skilled nursing facility before the weekend.

## 2022-03-31 NOTE — PT/OT/SLP PROGRESS
"  Speech Language Pathology Treatment    Patient Name:  Lilia Hidalgo   MRN:  [id number]  Admitting Diagnosis: Complete AV block    Recommendations:                 General Recommendations:  Dysphagia therapy  Diet recommendations:  Dental Soft, Liquid Diet Level: Thin   Aspiration Precautions: 1 bite/sip at a time, Assistance with meals and Standard aspiration precautions   General Precautions: Standard, fall  Communication strategies:  go to room if call light pushed    Subjective     Patient awake and alert. No family members present. Increased confusion.      "Who are these people walking down my arvizu"    Pain/Comfort:  ·  No c/o pain     Respiratory Status: Room air    Objective:     Has the patient been evaluated by SLP for swallowing?   Yes  Keep patient NPO? No   Current Respiratory Status:        Patient seen for ongoing swallow assessment.  Patient with dysphagia soft tray present.  Patient with confusion - patient stating she is in her own home, unawake she is in the hospital. Asking for .  Patient with functional though delayed mastication of soft solids. However, in previous sessions daughter stated this to be normal. Patient tolerated 5oz thin liquids via cup and straw sip trials this session with no overt signs of airway compromise.  Patient with no throat clears or coughing.   Appears appropriate to advance diet to thin liquids at this time with continued soft solids.  Skilled education was provided to patient re: diet recs, standard aspiration precautions of which to follow, and ongoing ST plan of care. Recommend reinforcement.       Assessment:     Lilia Hidalgo is a 89 y.o. female with an SLP diagnosis of Dysphagia.      Goals:   Multidisciplinary Problems     SLP Goals        Problem: SLP    Goal Priority Disciplines Outcome   SLP Goal     SLP Ongoing, Progressing   Description: Speech Language Pathology Goals  Goals expected to be met by 4/5:  1. Patient will tolerate a dysphagia soft " diet and nectar thickened liquids with no overt signs of airway compromise.                            Plan:     · Patient to be seen:  3 x/week   · Plan of Care expires:     · Plan of Care reviewed with:  patient   · SLP Follow-Up:  Yes       Discharge recommendations:  nursing facility, skilled   Barriers to Discharge:  None    Time Tracking:     SLP Treatment Date:   03/31/22  Speech Start Time:  0950  Speech Stop Time:  0958     Speech Total Time (min):  8 min    Billable Minutes: Treatment Swallowing Dysfunction 8    03/31/2022

## 2022-03-31 NOTE — NURSING
Pt daughter arrived to the floor and stated that pt seems more confused than yesterday and was wondering if certain medications were restarted. I asked the doctor and he restarted her lexapro. I explained what was restarted and she still had some concerned to address with case mgt and the MD. Both aware. Will continue to reorient and monitor pt status.

## 2022-03-31 NOTE — SUBJECTIVE & OBJECTIVE
Interval History: Awaiting SNF.    Review of Systems   Constitutional:  Negative for chills and fever.   Gastrointestinal:  Negative for diarrhea and vomiting.   Neurological:  Negative for seizures and syncope.   Objective:     Vital Signs (Most Recent):  Temp: 98.4 °F (36.9 °C) (03/31/22 0722)  Pulse: 95 (03/31/22 0722)  Resp: 18 (03/31/22 0722)  BP: (!) 148/71 (03/31/22 0722)  SpO2: (!) 91 % (03/31/22 0722)   Vital Signs (24h Range):  Temp:  [98.4 °F (36.9 °C)-99.2 °F (37.3 °C)] 98.4 °F (36.9 °C)  Pulse:  [] 95  Resp:  [16-29] 18  SpO2:  [91 %-99 %] 91 %  BP: (116-148)/(55-71) 148/71     Weight: 87.3 kg (192 lb 6.3 oz)  Body mass index is 31.05 kg/m².    Intake/Output Summary (Last 24 hours) at 3/31/2022 0737  Last data filed at 3/31/2022 0700  Gross per 24 hour   Intake --   Output 600 ml   Net -600 ml      Physical Exam  Vitals and nursing note reviewed.   Constitutional:       General: She is not in acute distress.  Pulmonary:      Effort: Pulmonary effort is normal. No respiratory distress.   Neurological:      Mental Status: She is alert. Mental status is at baseline.   Psychiatric:         Mood and Affect: Mood and affect normal.           Significant Labs:  CBC:  Recent Labs   Lab 03/30/22  0323   WBC 5.59   HGB 8.7*   HCT 28.0*        CMP:  Recent Labs   Lab 03/29/22  1116 03/30/22  0323   * 144   K 3.2* 3.5    98   CO2 37* 38*   * 119*   BUN 29* 21   CREATININE 0.8 0.7   CALCIUM 8.6* 8.5*   ANIONGAP 9 8   EGFRNONAA >60.0 >60.0     X-Ray Chest AP Portable 3/21/22: FINDINGS:  Heart is mildly enlarged, but the appearance of the cardiomediastinal silhouette demonstrates no detrimental change since the examination referenced above.  Pulmonary vascularity is normal, and there are no findings indicating current cardiac decompensation.  Left hemithorax is obscured to a considerable degree by an opacity relating to a structure external to the patient, but the lung zones appear  clear, and free of significant airspace consolidation or volume loss.  No pleural fluid.  No pneumothorax.  Impression:  Mild cardiomegaly is again noted, but there has been no significant detrimental interval change in the appearance of the chest since 05/27/2019.  X-Ray Ankle 2 View Right 3/21/22: FINDINGS:  Oblique fracture of the distal fibula at the level of the syndesmosis, with lateral displacement by half shaft width.  Transverse fracture of the medial malleolus, with lateral displacement by 8 mm.  Small mildly displaced fracture involving the posterior malleolus.  Lateral subluxation of the tibiotalar joint, with widening of the medial clear space.  Distal tibiofibular alignment appears congruent.  Tibiotalar joint effusion.  Foci of soft tissue gas in the anterior ankle.  Diffuse soft tissue swelling.  Impression:  Displaced trimalleolar ankle fracture as described.  Foci of soft tissue gas in the anterior ankle, compatible with reported compound fracture.  X-Ray Foot Complete Right 3/21/22: FINDINGS:  Foot complete three views right: There is a trimalleolar fracture of the distal tib fib with anterior dislocation at the ankle joint.  X-Ray Ankle Complete Right 3/21/22: FINDINGS:  Once again identified are fractures involving the medial and lateral malleoli as well as a likely fracture involving the posterior tibial margin.  Position/alignment of these fractures demonstrates no detrimental change since the prior exam.  Tibiotalar joint now appears unremarkable, with relationship between the tibial and talar articular surfaces.  No additional fractures or other significant osseous abnormality noted.  Impression:  1. Redemonstration of fractures of the right medial and lateral malleoli and the posterior tibial margin.  2. Tibiotalar joint space now appears appears unremarkable.  3. No significant detrimental interval change since 03/21/2022 at 09:14.  X-Ray Knee 1 or 2 View Right 3/21/22:  FINDINGS:  Visualized osseous structures appear intact, with no definite evidence of recent fracture or other significant abnormality identified.  No significant joint effusion.  X-Ray Hip 2 or 3 views Left (with Pelvis when performed) 3/21/22: FINDINGS:  Hip two views left: There is severe advanced DJD and impingement change.  No fracture dislocation bone destruction seen.  X-Ray Tibia Fibula Bilateral 3/21/22: CLINICAL HISTORY:  Open right ankle fracture;  Other fracture of right lower leg, initial encounter for open fracture type I or II  Tib fib two views bilateral.  Right: There is a trimalleolar fracture without dislocation.  There is a possible proximal fibular fracture.  Left: There is a TKA in place.  There is a proximal fibular fracture and a possible proximal tibial fracture.  X-Ray Chest AP Portable 3/21/22: FINDINGS:  Chest one view: Pacer tip RV.  Heart size is normal.  There is aortic plaque.  Lungs show mild perihilar atelectasis.  No line complication seen.  X-Ray Ankle Complete Right 3/21/22: FINDINGS:  Overlying bandage material obscures detail slightly.  There is interval postoperative change.  Previously identified fracture of the distal fibula again noted, there is interval placement of osseous screws through the femoral shaft, with improved alignment at the fracture site.  There is additional postoperative change at the level of the distal tibia, previous medial malleolar fracture, there appear to be 2 fixation screws present.  Alignment is improved.  The tibiotalar space appears appropriate.  Subtle posterior malleolar fracture not as well demonstrated at this time.  The remainder of the osseous structures appear intact and stable.  Impression:   Previously identified distal fibular and tibia fractures are again noted, interval postoperative change as above.  Transthoracic echo (TTE) complete with contrast 3/21/22:   Technically difficult study  The left ventricle is normal in size with  normal systolic function. The estimated ejection fraction is 65%.  Right ventricular systolic function appears normal, though imaging of the right heart is challenging.  Grade II left ventricular diastolic dysfunction.  There is moderate aortic valve stenosis. Aortic valve area is 1.0 cm2; peak velocity is 3.1 m/s; mean gradient is 22 mmHg.  Mild to moderate tricuspid regurgitation.  The estimated PA systolic pressure is > 40mm Hg.  X-Ray Chest AP Portable 3/22/22: FINDINGS:  Right IJ pacer lead in stable position.  Mediastinal structures midline.  Cardiac silhouette stable.  Aortic arch calcification.  Lungs clear without consolidation.  Improved pulmonary venous congestion.  No pleural fluid or pneumothorax.  No significant osseous abnormality.  X-Ray Ankle Complete Left 3/22/22: FINDINGS:  Osseous demineralization.  No definite evidence of acute fracture or dislocation.  The talar dome appears intact.  Ankle mortise symmetric.  No findings of syndesmotic widening.  Minor enthesopathic change at the calcaneus.  Degenerative spurring of the dorsal midfoot.  No definite radiopaque foreign body seen.  Impression:  No definite evidence of acute fracture or dislocation.  X-Ray Knee 1 or 2 View Left 3/22/22: FINDINGS:  Status post total knee arthroplasty.  As seen on prior radiograph of 03/21/2022, there is an acute displaced fracture of the proximal tibia marginating tibial component of the hardware.  There is medial displacement of the major distal fracture fragment.  Additionally, there is a mild displaced fracture of the proximal fibula.  Impression:  Redemonstrated displaced fractures of the proximal tibia and fibula, relatively similar configuration to 03/21/2022 radiograph.  CT 3D RECON WITH INDEPENDENT WS 3/22/22: FINDINGS:  Left total knee arthroplasty.  Streak artifact degrades evaluation of adjacent structures.  No evidence of loosening.  Redemonstrated is a perihardware fracture of the proximal tibia, with  impaction, 8 mm medial displacement, and slight apex lateral angulation (201:57).  Redemonstrated is a minimally impacted transverse fracture of the proximal fibular shaft (201: 78).  In addition, there is a vertically oriented lucency extending through the medial aspect of the patella, which may be artifactual but could also represent a fracture.  The femur is intact.  Diffuse osteopenia.  No osteonecrosis.  No focal osseous lesion.  There is hyperdense fluid within the tibial fracture gap extending into the infrapatellar region, likely reflecting hematoma (202:89).  Diffuse soft tissue edema. Diffuse muscle atrophy.  Small knee joint effusion.  No Baker's cyst.  Vascular calcifications.  Impression:  Left total knee arthroplasty, with a displaced perihardware fracture involving the proximal tibia, minimally impacted fracture of the proximal fibula, and possible nondisplaced perihardware fracture of the patella.  Small hematoma adjacent to the tibial fracture.  CT Knee Without Contrast Left 3/22/22: FINDINGS:  Left total knee arthroplasty.  Streak artifact degrades evaluation of adjacent structures.  No evidence of loosening.  Redemonstrated is a perihardware fracture of the proximal tibia, with impaction, 8 mm medial displacement, and slight apex lateral angulation (201:57).  Redemonstrated is a minimally impacted transverse fracture of the proximal fibular shaft (201: 78).  In addition, there is a vertically oriented lucency extending through the medial aspect of the patella, which may be artifactual but could also represent a fracture.  The femur is intact.  Diffuse osteopenia.  No osteonecrosis.  No focal osseous lesion.  There is hyperdense fluid within the tibial fracture gap extending into the infrapatellar region, likely reflecting hematoma (202:89).  Diffuse soft tissue edema. Diffuse muscle atrophy.  Small knee joint effusion.  No Baker's cyst.  Vascular calcifications.  Impression:  Left total knee  arthroplasty, with a displaced perihardware fracture involving the proximal tibia, minimally impacted fracture of the proximal fibula, and possible nondisplaced perihardware fracture of the patella.  Small hematoma adjacent to the tibial fracture.  US Retroperitoneal Complete 3/22/22: FINDINGS:  Right kidney: The right kidney measures 8.6 cm. No cortical thinning. No loss of corticomedullary distinction. Resistive index measures 1.0.  No mass. No renal stone. No hydronephrosis.  Left kidney: The left kidney measures 9.1 cm. No cortical thinning. No loss of corticomedullary distinction. Resistive index measures 1.0.  No mass. No renal stone. No hydronephrosis.  Urinary bladder is decompressed with Paul catheter.  Impression:  Elevated resistive indices which is nonspecific but may be seen with medical renal disease.  No evidence of hydronephrosis.  X-Ray Chest AP Portable 3/23/22: FINDINGS:  Central venous catheter remain.  No significant changes.  X-Ray Elbow Complete Bilateral 3/23/22: FINDINGS:  Alignment is satisfactory.  No acute fracture, subluxation or dislocation.  No mass or foreign body.  No significant arthropathy.  No significant joint effusion.  Impression:   No acute radiographic abnormality.  X-Ray Femur 2 View Right 3/23/22: FINDINGS:  Right hip arthroplasty.  Hardware positioning appears adequate.  No acute fracture, subluxation or dislocation.  Mild-moderate degenerative changes of the right knee.  Impression:   No acute radiographic abnormality.  X-Ray Chest AP Portable 3/28/22: FINDINGS:  There is a cardiac pacer with the battery pack in the left chest wall.  There is a right internal jugular central venous catheter terminating in the high SVC or at the confluence.  The lungs are well expanded.  There are bilateral interstitial opacities compatible with mild edema, similar to prior.  The pleural spaces are clear.  The cardiac silhouette is enlarged, unchanged.  The visualized osseous structures  are intact.  Impression:  Interval placement of a cardiac pacer.  No pneumothorax.  Continued interstitial prominence suspicious for mild edema.  Cardiomegaly.

## 2022-04-01 VITALS
HEIGHT: 66 IN | WEIGHT: 192.38 LBS | TEMPERATURE: 100 F | DIASTOLIC BLOOD PRESSURE: 60 MMHG | SYSTOLIC BLOOD PRESSURE: 133 MMHG | BODY MASS INDEX: 30.92 KG/M2 | RESPIRATION RATE: 18 BRPM | OXYGEN SATURATION: 92 % | HEART RATE: 99 BPM

## 2022-04-01 DIAGNOSIS — I44.2 COMPLETE AV BLOCK: Primary | ICD-10-CM

## 2022-04-01 PROBLEM — T40.2X5A THERAPEUTIC OPIOID INDUCED CONSTIPATION: Status: ACTIVE | Noted: 2022-03-31

## 2022-04-01 PROBLEM — K59.03 THERAPEUTIC OPIOID INDUCED CONSTIPATION: Status: ACTIVE | Noted: 2022-03-31

## 2022-04-01 LAB
ALBUMIN SERPL BCP-MCNC: 1.9 G/DL (ref 3.5–5.2)
ALP SERPL-CCNC: 83 U/L (ref 55–135)
ALT SERPL W/O P-5'-P-CCNC: 6 U/L (ref 10–44)
AMMONIA PLAS-SCNC: 22 UMOL/L (ref 10–50)
ANION GAP SERPL CALC-SCNC: 6 MMOL/L (ref 8–16)
AST SERPL-CCNC: 24 U/L (ref 10–40)
BACTERIA #/AREA URNS AUTO: NORMAL /HPF
BILIRUB SERPL-MCNC: 1 MG/DL (ref 0.1–1)
BILIRUB UR QL STRIP: NEGATIVE
BUN SERPL-MCNC: 17 MG/DL (ref 8–23)
CALCIUM SERPL-MCNC: 8.4 MG/DL (ref 8.7–10.5)
CHLORIDE SERPL-SCNC: 98 MMOL/L (ref 95–110)
CLARITY UR REFRACT.AUTO: ABNORMAL
CO2 SERPL-SCNC: 32 MMOL/L (ref 23–29)
COLOR UR AUTO: ABNORMAL
CREAT SERPL-MCNC: 0.7 MG/DL (ref 0.5–1.4)
ERYTHROCYTE [DISTWIDTH] IN BLOOD BY AUTOMATED COUNT: 15.9 % (ref 11.5–14.5)
EST. GFR  (AFRICAN AMERICAN): >60 ML/MIN/1.73 M^2
EST. GFR  (NON AFRICAN AMERICAN): >60 ML/MIN/1.73 M^2
GLUCOSE SERPL-MCNC: 138 MG/DL (ref 70–110)
GLUCOSE UR QL STRIP: NEGATIVE
HCT VFR BLD AUTO: 30.5 % (ref 37–48.5)
HGB BLD-MCNC: 9 G/DL (ref 12–16)
HGB UR QL STRIP: ABNORMAL
KETONES UR QL STRIP: NEGATIVE
LEUKOCYTE ESTERASE UR QL STRIP: ABNORMAL
MCH RBC QN AUTO: 28.1 PG (ref 27–31)
MCHC RBC AUTO-ENTMCNC: 29.5 G/DL (ref 32–36)
MCV RBC AUTO: 95 FL (ref 82–98)
MICROSCOPIC COMMENT: NORMAL
NITRITE UR QL STRIP: NEGATIVE
PH UR STRIP: 6 [PH] (ref 5–8)
PLATELET # BLD AUTO: 257 K/UL (ref 150–450)
PMV BLD AUTO: 9.7 FL (ref 9.2–12.9)
POTASSIUM SERPL-SCNC: 4 MMOL/L (ref 3.5–5.1)
PROT SERPL-MCNC: 5.2 G/DL (ref 6–8.4)
PROT UR QL STRIP: NEGATIVE
RBC # BLD AUTO: 3.2 M/UL (ref 4–5.4)
RBC #/AREA URNS AUTO: 2 /HPF (ref 0–4)
SODIUM SERPL-SCNC: 136 MMOL/L (ref 136–145)
SP GR UR STRIP: 1.02 (ref 1–1.03)
SQUAMOUS #/AREA URNS AUTO: 12 /HPF
URN SPEC COLLECT METH UR: ABNORMAL
WBC # BLD AUTO: 7.81 K/UL (ref 3.9–12.7)
WBC #/AREA URNS AUTO: 2 /HPF (ref 0–5)

## 2022-04-01 PROCEDURE — 97110 THERAPEUTIC EXERCISES: CPT

## 2022-04-01 PROCEDURE — 94761 N-INVAS EAR/PLS OXIMETRY MLT: CPT

## 2022-04-01 PROCEDURE — 25000003 PHARM REV CODE 250: Performed by: HOSPITALIST

## 2022-04-01 PROCEDURE — 25000003 PHARM REV CODE 250: Performed by: INTERNAL MEDICINE

## 2022-04-01 PROCEDURE — 25000003 PHARM REV CODE 250: Performed by: STUDENT IN AN ORGANIZED HEALTH CARE EDUCATION/TRAINING PROGRAM

## 2022-04-01 PROCEDURE — 80053 COMPREHEN METABOLIC PANEL: CPT | Performed by: HOSPITALIST

## 2022-04-01 PROCEDURE — 97112 NEUROMUSCULAR REEDUCATION: CPT

## 2022-04-01 PROCEDURE — 36415 COLL VENOUS BLD VENIPUNCTURE: CPT | Performed by: HOSPITALIST

## 2022-04-01 PROCEDURE — 97530 THERAPEUTIC ACTIVITIES: CPT

## 2022-04-01 PROCEDURE — 99900035 HC TECH TIME PER 15 MIN (STAT)

## 2022-04-01 PROCEDURE — 99233 PR SUBSEQUENT HOSPITAL CARE,LEVL III: ICD-10-PCS | Mod: ,,, | Performed by: HOSPITALIST

## 2022-04-01 PROCEDURE — 82140 ASSAY OF AMMONIA: CPT | Performed by: HOSPITALIST

## 2022-04-01 PROCEDURE — 99233 SBSQ HOSP IP/OBS HIGH 50: CPT | Mod: ,,, | Performed by: HOSPITALIST

## 2022-04-01 PROCEDURE — 97535 SELF CARE MNGMENT TRAINING: CPT

## 2022-04-01 PROCEDURE — 85027 COMPLETE CBC AUTOMATED: CPT | Performed by: HOSPITALIST

## 2022-04-01 PROCEDURE — 81001 URINALYSIS AUTO W/SCOPE: CPT | Performed by: HOSPITALIST

## 2022-04-01 RX ORDER — AMOXICILLIN 250 MG
1 CAPSULE ORAL 2 TIMES DAILY
Qty: 14 TABLET | Refills: 0
Start: 2022-04-01 | End: 2022-04-08

## 2022-04-01 RX ORDER — OXYCODONE HYDROCHLORIDE 5 MG/1
5 TABLET ORAL EVERY 12 HOURS PRN
Qty: 14 TABLET | Refills: 0 | Status: SHIPPED | OUTPATIENT
Start: 2022-04-01 | End: 2022-04-08

## 2022-04-01 RX ORDER — OXYCODONE HYDROCHLORIDE 5 MG/1
5 TABLET ORAL EVERY 12 HOURS PRN
Qty: 14 TABLET | Refills: 0 | Status: SHIPPED | OUTPATIENT
Start: 2022-04-01 | End: 2022-04-01 | Stop reason: SDUPTHER

## 2022-04-01 RX ORDER — POLYETHYLENE GLYCOL 3350 17 G/17G
17 POWDER, FOR SOLUTION ORAL 2 TIMES DAILY
Refills: 0
Start: 2022-04-01 | End: 2022-04-08

## 2022-04-01 RX ORDER — BISACODYL 10 MG
10 SUPPOSITORY, RECTAL RECTAL ONCE
Status: COMPLETED | OUTPATIENT
Start: 2022-04-01 | End: 2022-04-01

## 2022-04-01 RX ORDER — LOSARTAN POTASSIUM 50 MG/1
50 TABLET ORAL DAILY
Status: ON HOLD
Start: 2022-04-01 | End: 2024-01-08

## 2022-04-01 RX ORDER — BALSAM PERU/CASTOR OIL
OINTMENT (GRAM) TOPICAL 2 TIMES DAILY
Start: 2022-04-01 | End: 2022-07-11

## 2022-04-01 RX ORDER — ASPIRIN 81 MG/1
81 TABLET ORAL 2 TIMES DAILY
Qty: 60 TABLET | Refills: 11
Start: 2022-04-01 | End: 2022-07-11

## 2022-04-01 RX ORDER — OXYCODONE HYDROCHLORIDE 5 MG/1
5 TABLET ORAL EVERY 6 HOURS PRN
Status: DISCONTINUED | OUTPATIENT
Start: 2022-04-01 | End: 2022-04-02 | Stop reason: HOSPADM

## 2022-04-01 RX ADMIN — POLYETHYLENE GLYCOL 3350 17 G: 17 POWDER, FOR SOLUTION ORAL at 08:04

## 2022-04-01 RX ADMIN — Medication 6 MG: at 08:04

## 2022-04-01 RX ADMIN — ESCITALOPRAM OXALATE 20 MG: 20 TABLET ORAL at 08:04

## 2022-04-01 RX ADMIN — OXYCODONE 5 MG: 5 TABLET ORAL at 09:04

## 2022-04-01 RX ADMIN — DOXYCYCLINE HYCLATE 100 MG: 100 TABLET, COATED ORAL at 08:04

## 2022-04-01 RX ADMIN — ASPIRIN 81 MG: 81 TABLET, COATED ORAL at 08:04

## 2022-04-01 RX ADMIN — ATORVASTATIN CALCIUM 20 MG: 20 TABLET, FILM COATED ORAL at 08:04

## 2022-04-01 RX ADMIN — LACTULOSE 15 G: 20 SOLUTION ORAL at 12:04

## 2022-04-01 RX ADMIN — BISACODYL 10 MG: 10 SUPPOSITORY RECTAL at 12:04

## 2022-04-01 RX ADMIN — SENNOSIDES AND DOCUSATE SODIUM 1 TABLET: 50; 8.6 TABLET ORAL at 08:04

## 2022-04-01 RX ADMIN — PANTOPRAZOLE SODIUM 40 MG: 40 TABLET, DELAYED RELEASE ORAL at 08:04

## 2022-04-01 RX ADMIN — Medication: at 08:04

## 2022-04-01 RX ADMIN — LOSARTAN POTASSIUM 50 MG: 50 TABLET, FILM COATED ORAL at 08:04

## 2022-04-01 RX ADMIN — DONEPEZIL HYDROCHLORIDE 5 MG: 5 TABLET ORAL at 08:04

## 2022-04-01 NOTE — PT/OT/SLP PROGRESS
Physical Therapy   Progress Note/Co-tx with OT    Patient Name:  Lilia Hidalgo  MRN: [id number]    Admit Date: 3/21/2022  Admitting Diagnosis:  Complete AV block  Length of Stay: 11 days  Recent Surgery: Procedure(s) (LRB):  INSERTION, CARDIAC PACEMAKER, DUAL CHAMBER (Left) 4 Days Post-Op    Recommendations:     Discharge Recommendations: Skilled Nursing Facility   Equipment recommendations: TBD at next level of care  Barriers to discharge: Increased level of skilled assistance required and Fall risk     Assessment:     Lilia Hidalgo is a 89 y.o. female admitted to [facility] on 3/21/2022 with medical diagnosis of Complete AV block and s/p fall.  Pt presents with weakness, impaired endurance, impaired self care skills, impaired functional mobilty, gait instability, impaired balance, decreased lower extremity function, pain, decreased safety awareness, impaired cognition, impaired cardiopulmonary response to activity, orthopedic precautions. Pt is progressing towards goals, but has not yet reached prior level of function. Lilia Hidalgo would benefit from continued acute PT intervention to improve quality of life, focus on recovery of impairments, provide patient/caregiver education, reduce fall risk, and maximize (I) and safety with functional mobility. Once medically stable, recommending pt discharge to Skilled Nursing Facility .      Rehab Prognosis: Good    Plan:     During this hospitalization, patient to be seen 3 x/week to address the identified rehab impairments via therapeutic activities, therapeutic exercises, neuromuscular re-education, wheelchair management/training and progress towards stated goals.     Plan of Care Expires:  04/23/22  Plan of Care reviewed with: patient    This plan of care has been discussed with the patient/caregiver, who was included in its development and is in agreement with the identified goals and treatment plan.     Subjective     Communicated with RN prior to session.  Patient found  "HOB elevated upon PT entry to room, agreeable to therapy session.      Patient/Family Comments/goals: "I'm doing okay" "I'm not very hungry"     Pain/Comfort:  · Pain Rating 1:  (reported no pain at rest; pt groans/whinces with movement of BLEs, but did not report pain. Appears comfortable at rest.)  · Location - Side 1: Bilateral  · Location 1: leg  · Pain Addressed 1: Reposition, Distraction    Patients cultural, spiritual, Mandaen conflicts given the current situation: None identified     Objective:     Patient found with: telemetry, peripheral IV, PureWick, bed alarm    General Precautions: Standard, fall, pacemaker   Orthopedic Precautions:RLE weight bearing as tolerated, LLE weight bearing as tolerated   Braces: Sling and swathe (BLE hard casts)   Oxygen Device: room air    Cognition:   Pt is Alert during session. Pt is pleasantly confused. Able to follow single step commands.     Therapist provided skilled verbal and tactile cueing to facilitate the following functional mobility tasks. Listed tasks are focused on recovery of impairments and improving pt's independence and efficiency with bed mobility, transfers and ambulation as able.     Bed Mobility:  · Supine > Sit: Total Assistance of 2 persons   · Sit > Supine: Total Assistance of 2 persons     Transfers:   · N/T due to poor LE strength and ROM 2/2 BLE fractures and hard casts. Pt also unable to bear weight through LUE due to pacemaker precautions. Therapist placed dycem non-slip  under pt's B casts to prevent slipping while seated EOB.                  Gait:  · N/T     Balance:  · Dynamic Sitting: FAIR+: Maintains balance through MINIMAL excursions of active trunk motion with CGA-SBA     Outcome Measure: AM-PAC 6 CLICK MOBILITY  Total Score:8     Patient/Caregiver Education and Additional Therapeutic Activities/Exercises     TE facilitated to improve functional LE ROM and core strength. Pt performed seated LAQ and marches on RLE with max A " required for reaching optimal range of motion.    Provided pt/caregiver education regarding:    PT POC and goals for therapy    Safety with mobility and fall risk    Safe management of AD as needed    Energy conservation techniques    Instruction on use of call button and importance of calling nursing staff for assistance with mobility     Patient/caregiver able to verbalize understanding; will follow-up with pt/caregiver during current admit for additional questions/concerns within scope of practice.     White board updated.     Patient left HOB elevated with all lines intact, call button in reach and RN notified.    Goals:     Multidisciplinary Problems     Physical Therapy Goals        Problem: Physical Therapy    Goal Priority Disciplines Outcome Goal Variances Interventions   Physical Therapy Goal     PT, PT/OT Ongoing, Progressing     Description: Goals to be met by: 22    Patient will increase functional independence with mobility by performin. Pt will perform supine<>sit with mod assist x1 to improve independence with mobility  2. Pt will perform functional transfers with max assist x2, WBAT BLE  3. Pt will perform w/c mobility >50 ft with min asisst x1  4. Pt will sit EOB for >10 minutes with min assist x1                   Time Tracking:     PT Received On: 22  PT Start Time: 1345     PT Stop Time: 1409  PT Total Time (min): 24 min      Billable Minutes: Therapeutic Exercise 12 min  and Neuromuscular Re-education 12 min    2022

## 2022-04-01 NOTE — PROGRESS NOTES
[facility]  Progress Note    Patient Name: Lilia Hidalgo  MRN: [id number]  Patient Class: IP- Inpatient   Admission Date: 3/21/2022  Length of Stay: 11 days  Attending Physician: Tha Lunsford MD  Primary Care Provider: Anna Wood MD        Subjective:     Principal Problem:Complete AV block        HPI:  Lilia Hidalgo is a 89 year old white woman with hypertension, concentric left ventricular remodeling, chronic diastolic heart failure, aortic stenosis, obesity, aortic atherosclerosis, venous insufficiency, diabetes mellitus type 2, hyperlipidemia, vascular dementia, depression, urinary incontinence, spondyloarthropathy, knee osteoarthritis, history of left total knee arthroplasty on 2/6/2013, history of right hip arthroplasty on 4/12/2010. She uses a wheelchair. She lives at [facility] in [place], [place]. She is . Her primary care physician is Dr. Anna Wood.               She presented to [facility] on 3/21/2022 after sustaining an ankle fracture while being assisted in transfer from the toilet to her wheelchair. She had no syncope. ECG showed 3rd degree atrioventricular block. Cardiology was consulted. History was taken from her daughter. She was admitted to Cardiology.       Overview/Hospital Course:  She underwent incision and debridement with percutaneous fixation of her right ankle on the day of admission. She was also found to have a left tibia fracture, which was treated with a long cast. She underwent transvenous pacemaker placement for optimization prior to surgery. Course was complicated by pan-sensitive E coli cystitis, which was treated with 7 days of antibiotics including ceftriaxone and cefazolin. Infectious Disease was consulted and cleared her for permanent pacemaker placement. She was treated with intravenous furosemide until 3/28/2022 then  started on bumetanide 1 mg twice daily. She developed contraction alkalosis with worsening hypernatremia so was given dextrose 5% with improvement. Pacemaker was placed. She was given doxycycline for 5 days for infection prophylaxis. She will wear a sling nightly for 6 weeks. She was put on dental soft diet with nectar thickened liquids. She was transferred to Hospital Medicine Team A on 3/29/2022 to await skilled nursing facility placement. On 3/31/2022 it was found that she had not had a bowel movement since 3/25/2022. She was started on stool softeners and laxatives. She had increased confusion after being given hydrocodone-acetaminophen, which resolved after it was stopped. She did fine on oxycodone.       Interval History: Awaiting SNF. Will go after having a bowel movement.    Review of Systems   Constitutional:  Negative for chills and fever.   Gastrointestinal:  Negative for diarrhea and vomiting.   Neurological:  Negative for seizures and syncope.   Objective:     Vital Signs (Most Recent):  Temp: 97.6 °F (36.4 °C) (04/01/22 1150)  Pulse: 86 (04/01/22 1150)  Resp: 18 (04/01/22 1150)  BP: (!) 108/58 (04/01/22 1150)  SpO2: (!) 93 % (04/01/22 1150)   Vital Signs (24h Range):  Temp:  [97.3 °F (36.3 °C)-98.5 °F (36.9 °C)] 97.6 °F (36.4 °C)  Pulse:  [77-98] 86  Resp:  [18-20] 18  SpO2:  [91 %-94 %] 93 %  BP: (107-144)/(51-69) 108/58     Weight: 87.3 kg (192 lb 6.3 oz)  Body mass index is 31.05 kg/m².    Intake/Output Summary (Last 24 hours) at 4/1/2022 1515  Last data filed at 4/1/2022 1300  Gross per 24 hour   Intake 155 ml   Output 120 ml   Net 35 ml        Physical Exam  Vitals and nursing note reviewed.   Constitutional:       General: She is not in acute distress.     Appearance: She is obese. She is not toxic-appearing or diaphoretic.   Pulmonary:      Effort: Pulmonary effort is normal. No respiratory distress.   Abdominal:      General: There is no distension.      Palpations: Abdomen is soft.       Tenderness: There is no abdominal tenderness. There is no guarding.   Neurological:      Mental Status: She is alert. Mental status is at baseline.      Cranial Nerves: No facial asymmetry.      Motor: No seizure activity.   Psychiatric:         Attention and Perception: Attention normal.         Mood and Affect: Mood and affect normal.         Cognition and Memory: Memory is impaired.           Significant Labs:  CBC:  Recent Labs   Lab 04/01/22  1240   WBC 7.81   HGB 9.0*   HCT 30.5*          CMP:  Recent Labs   Lab 04/01/22  1240      K 4.0   CL 98   CO2 32*   *   BUN 17   CREATININE 0.7   CALCIUM 8.4*   PROT 5.2*   ALBUMIN 1.9*   BILITOT 1.0   ALKPHOS 83   AST 24   ALT 6*   ANIONGAP 6*   EGFRNONAA >60.0           Assessment/Plan:      * Complete AV block  Status post placement of cardiac pacemaker    Therapeutic opioid induced constipation  No bowel movement since 3/25/2022. Started senna-docusate and polyethylene glycol after information revealed on 3/31/2022. Gave bisacodyl, lactulose, bisacodyl suppository. If no relief, give enema.    Closed fracture of left proximal tibia  Nonoperative management with cast    Chronic diastolic congestive heart failure  Holding home furosemide.    Open ankle fracture, right, type III, initial encounter  S/p fixation    Major depression, recurrent, chronic  Stopped home escitalopram.    Dementia  Continue home donepezil. Delirium precautions.    Dysphagia  Dental soft diet with nectar thickened liquids.    Essential hypertension  Continue home losartan. Stopped home metoprolol.      VTE Risk Mitigation (From admission, onward)    None          Discharge Planning   MARLO: 4/1/2022     Code Status: Full Code   Is the patient medically ready for discharge?: No    Reason for patient still in hospital (select all that apply): Patient new problem and Pending disposition  Discharge Plan A: Skilled Nursing Facility   Discharge Delays: None known at this  time              Tha Lunsford MD  Department of Hospital Medicine   [facility] - Cardiology Stepdown

## 2022-04-01 NOTE — PLAN OF CARE
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]  No    Closed fracture of left proximal tibia [S82.102A]  Yes    Open ankle fracture, right, type III, initial encounter [S82.801C]  Yes    Chronic diastolic congestive heart failure [I50.32]  Yes     Chronic    Major depression, recurrent, chronic [F33.9]  Yes     Chronic     Controlled on lexapro      Dementia [F03.90]  Yes     Chronic     Resides in assisted living, gets assistance with all ADLs      Dysphagia [R13.10]  Yes    Essential hypertension [I10]  Yes     Chronic      Resolved Hospital Problems    Diagnosis Date Resolved POA    Hypernatremia [E87.0] 03/30/2022 No    Temporary transvenous cardiac pacemaker present [Z95.0] 03/30/2022 No    Bradycardia [R00.1] 03/30/2022 Yes    LUANNE (acute kidney injury) [N17.9] 03/30/2022 Yes     Allergies:  Review of patient's allergies indicates:   Allergen Reactions    Aspirin Nausea Only and Other (See Comments)     Other reaction(s): esophageal pain    Celebrex [celecoxib] Rash    Morphine Hallucinations    Steroid [corticosteroids (glucocorticoids)] Other (See Comments)     Other reaction(s): Flushing (skin)    Sulfa dyne Other (See Comments)     Other reaction(s): esophogeal pain       Vitals:       Every shift (Skilled Nursing patients)    Discharge Procedure Orders   Diet Adult Regular     Order Specific Question Answer Comments   Na restriction, if any: 2gNa      Notify your health care provider if you experience any of the following:   redness, tenderness, or signs of infection (pain, swelling, redness, odor or green/yellow discharge around incision site)     Notify your health care provider if you experience any of the following:  increased confusion or weakness     Notify your health care provider if you experience any of the following:  temperature >100.4     Leave dressing on - Keep it clean, dry, and intact until clinic visit     Activity as tolerated     Weight bearing restrictions (specify):   Order Comments: As tolerated both lower extremities. Wear left arm sling nightly for 6 weeks.       LABS:  Per facility protocol    Nursing Precautions:           - Fall precautions per nursing home protocol   - Decubitus precautions:        -  for positioning   - Pressure reducing foam mattress   - Turn patient every two hours. Use wedge pillows to anchor patient    CONSULTS:     Physical Therapy to evaluate and treat     Occupational Therapy to evaluate and treat        Medications:   Reconciled Home Medications:      Medication List      START taking these medications    aspirin 81 MG EC tablet  Commonly known as: ECOTRIN  Take 1 tablet (81 mg total) by mouth 2 (two) times a day. Until 4/27/2022 for DVT prophylaxis for 26 days     balsam peru-castor oiL Oint  Apply topically 2 (two) times daily. To sacrum     doxycycline 100 MG Cap  Commonly known as: VIBRAMYCIN  Take 1 capsule (100 mg total) by mouth every 12 (twelve) hours. for 5 days     oxyCODONE 5 MG immediate release tablet  Commonly known as: ROXICODONE  Take 1 tablet (5 mg total) by mouth every 12 (twelve) hours as needed for Pain.     polyethylene glycol 17 gram Pwpk  Commonly known as: GLYCOLAX  Take 17 g by mouth 2 (two) times daily. for 7 days     senna-docusate 8.6-50 mg 8.6-50 mg per tablet  Commonly known as: PERICOLACE  Take 1 tablet by mouth 2 (two) times daily. for 7 days        CHANGE how you take these medications    losartan 50 MG tablet  Commonly known as: COZAAR  Take  1 tablet (50 mg total) by mouth once daily.  What changed:   · medication strength  · See the new instructions.        CONTINUE taking these medications    acetaminophen 650 MG Tbsr  Commonly known as: TYLENOL  Take 1 tablet (650 mg total) by mouth every 8 (eight) hours as needed. Give 1 tablet QAM, 1 QPM.     atorvastatin 20 MG tablet  Commonly known as: LIPITOR  TAKE 1 TAB BY MOUTH AT BEDTIME     CENTRUM SILVER ORAL  Take by mouth once daily.     cholecalciferol (vitamin D3) 1,250 mcg (50,000 unit) capsule  Take 50,000 Units by mouth once daily.     donepeziL 5 MG tablet  Commonly known as: ARICEPT  TAKE 1 TAB BY MOUTH EVERY EVENING     EScitalopram oxalate 20 MG tablet  Commonly known as: LEXAPRO  TAKE 1 TAB BY MOUTH AT BEDTIME FOR DEPRESSION     * furosemide 40 MG tablet  Commonly known as: LASIX  TAKE 1 TAB BY MOUTH EVERY DAY     * furosemide 40 MG tablet  Commonly known as: LASIX  TAKE 2 TABLETS (80 MG TOTAL) BY MOUTH 2 (TWO) TIMES DAILY AS NEEDED (FOR WEIGHT GAIN OF 5 POUNDS). TAKE WITH POTASSIUM     melatonin 5 mg  Commonly known as: MELATIN  Take 1 tablet by mouth every evening.     nitroGLYCERIN 0.4 MG SL tablet  Commonly known as: NITROSTAT  Place 1 tablet (0.4 mg total) under the tongue every 5 (five) minutes as needed for Chest pain.     * NYSTOP powder  Generic drug: nystatin  APPLY TO GROIN AREA TOPICALLY EVERY 12 HOURS FOR ANTIFUNGAL     * nystatin cream  Commonly known as: MYCOSTATIN  APPLY TO GROIN AREA TOPICALLY EVERY 12 HOURS FOR ANTIFUNGAL     omeprazole 20 MG capsule  Commonly known as: PRILOSEC  TAKE 2 CAPS (40MG) BY MOUTH EVERY DAY (DX: GERD)     potassium chloride SA 10 MEQ tablet  Commonly known as: K-DUR,KLOR-CON  Take 1 tablet (10 mEq total) by mouth once daily. May also take 2 tablets (20 mEq total) daily as needed (when taking lasix for leg swelling).         * This list has 4 medication(s) that are the same as other medications prescribed for you. Read the directions carefully, and ask  your doctor or other care provider to review them with you.            STOP taking these medications    metoprolol succinate 200 MG 24 hr tablet  Commonly known as: TOPROL-XL           Follow-up Information     DEVICE CHECK CLINIC Follow up in 1 week(s).           Estiven Rivera MD Follow up in 4 month(s).    Specialties: Electrophysiology, Cardiology  Contact information:  [address]  [facility] [address]  [phone]             Foreign Hendrix NP Follow up on 4/4/2022.    Specialty: Orthopedic Surgery  Why: 10:30 AM  Contact information:  [address]  [facility] [address]  [phone]             COORDINATED DEVICE CHECK Follow up on 4/5/2022.    Why: 2:20 PM                         Electronically signed  _________________________________  Tha Lunsford MD  04/01/2022

## 2022-04-01 NOTE — PLAN OF CARE
"[facility]iology Stepdown  Discharge Final Note    Primary Care Provider: Anna Wood MD    Expected Discharge Date: 4/1/2022    Final Discharge Note (most recent)     Final Note - 04/01/22 1622        Final Note    Assessment Type Final Discharge Note     Anticipated Discharge Disposition Skilled Nursing Facility        Post-Acute Status    Post-Acute Authorization Placement     Post-Acute Placement Status Set-up Complete/Auth obtained             SW informed by EVONNE Gee" that pt had a bowel movement.  SW relayed this to Hema at [facility] who confirmed that they are still able to accept pt today.  Transportation scheduled via stretcher for 5:30pm to transfer to [facility].  EVONNE Carrion to call report to [phone], room P214.  EVONNE Carrion was notified of the above information.  SHAREE also called and notified pt's daughter Belkys of BM and transfer.      Important Message from Medicare  Important Message from Medicare regarding Discharge Appeal Rights: Given to patient/caregiver, Explained to patient/caregiver, Signed/date by patient/caregiver     Date IMM was signed: 04/01/22  Time IMM was signed: 1321    "

## 2022-04-01 NOTE — PROGRESS NOTES
Notified MD. Lunsford pt oriented more than this AM, she knows the place and her birthday except the year,and pt ha not had Bm since 3/25, suppository ordered and given.

## 2022-04-01 NOTE — DISCHARGE SUMMARY
[facility] - Cardiology [facility] Medicine  Discharge Summary      Patient Name: Lilia Hidalgo  MRN: [id number]  Patient Class: IP- Inpatient  Admission Date: 3/21/2022  Hospital Length of Stay: 11 days  Discharge Date and Time: 4/1/2022 11:24 PM  Attending Physician: Tha Lunsford MD   Discharging Provider: Tha Lunsford MD  Primary Care Provider: Anna Wood MD  [facility] Medicine Team: [facility] MED A Tha Lunsford MD    HPI:   Lilia Hidalgo is a 89 year old white woman with hypertension, concentric left ventricular remodeling, chronic diastolic heart failure, aortic stenosis, obesity, aortic atherosclerosis, venous insufficiency, diabetes mellitus type 2, hyperlipidemia, vascular dementia, depression, urinary incontinence, spondyloarthropathy, knee osteoarthritis, history of left total knee arthroplasty on 2/6/2013, history of right hip arthroplasty on 4/12/2010. She uses a wheelchair. She lives at [facility] in [place], [place]. She is . Her primary care physician is Dr. Anna Wood.               She presented to [facility] Emergency Department on 3/21/2022 after sustaining an ankle fracture while being assisted in transfer from the toilet to her wheelchair. She had no syncope. ECG showed 3rd degree atrioventricular block. Cardiology was consulted. History was taken from her daughter. She was admitted to Cardiology.       Procedure(s) (LRB):  INSERTION, CARDIAC PACEMAKER, DUAL CHAMBER (Left)      Hospital Course:   She underwent incision and debridement with percutaneous fixation of her right ankle on the day of admission. She was also found to have a left tibia fracture, which was treated with a long cast. She underwent transvenous pacemaker placement for optimization prior to surgery. Course was complicated by pan-sensitive E coli cystitis, which was treated with 7 days of antibiotics including ceftriaxone  and cefazolin. Infectious Disease was consulted and cleared her for permanent pacemaker placement. She was treated with intravenous furosemide until 3/28/2022 then started on bumetanide 1 mg twice daily. She developed contraction alkalosis with worsening hypernatremia so was given dextrose 5% with improvement. Pacemaker was placed. She was given doxycycline for 5 days for infection prophylaxis. She will wear a sling nightly for 6 weeks. She was put on dental soft diet with nectar thickened liquids. She was transferred to Hospital Medicine Team A on 3/29/2022 to await skilled nursing facility placement. On 3/31/2022 it was found that she had not had a bowel movement since 3/25/2022. She was started on stool softeners and laxatives. She had increased confusion after being given hydrocodone-acetaminophen, which resolved after it was stopped. She did fine on oxycodone. Urinalysis showed no UTI. She finally had a bowel movement in late afternoon of Friday 4/1/2022, after getting lactulose and bisacodyl suppository. She was able to be discharged to a skilled nursing facility before the weekend.       Goals of Care Treatment Preferences:  Code Status: Full Code    Living Will: Yes              Consults:   Consults (From admission, onward)        Status Ordering Provider     Inpatient consult to Infectious Diseases  Once        Provider:  (Not yet assigned)    Completed EDWARD CRAMER     Inpatient consult to Nephrology  Once        Provider:  (Not yet assigned)    Completed AUBREE BAILEY     Inpatient consult to Registered Dietitian/Nutritionist  Once        Provider:  (Not yet assigned)    Completed KRISTIAN BROWNLEE     Inpatient consult to Midline team  Once        Provider:  (Not yet assigned)    Completed KRISTIAN BROWNLEE     Inpatient consult to Cardiology  Once        Provider:  (Not yet assigned)    Completed CAHZ CALIX     Inpatient consult to Orthopedic Surgery  Once        Provider:  (Not yet  assigned)    Completed CHAZ CALIX        Final Active Diagnoses:    Diagnosis Date Noted POA    PRINCIPAL PROBLEM:  Complete AV block [I44.2] 03/21/2022 Yes    Therapeutic opioid induced constipation [K59.03, T40.2X5A] 03/31/2022 No    Status post placement of cardiac pacemaker [Z95.0] 03/28/2022 No    Closed fracture of left proximal tibia [S82.102A]  Yes    Open ankle fracture, right, type III, initial encounter [S82.891C] 03/21/2022 Yes    Chronic diastolic congestive heart failure [I50.32] 07/29/2021 Yes     Chronic    Major depression, recurrent, chronic [F33.9] 04/08/2020 Yes     Chronic    Dementia [F03.90] 05/29/2019 Yes     Chronic    Dysphagia [R13.10] 05/09/2017 Yes    Essential hypertension [I10]  Yes     Chronic      Problems Resolved During this Admission:    Diagnosis Date Noted Date Resolved POA    Hypernatremia [E87.0] 03/29/2022 03/30/2022 No    Temporary transvenous cardiac pacemaker present [Z95.0]  03/30/2022 No    Bradycardia [R00.1]  03/30/2022 Yes    LUANNE (acute kidney injury) [N17.9] 03/21/2022 03/30/2022 Yes       Discharged Condition: good    Disposition: Skilled Nursing Facility    Follow Up:   Follow-up Information     DEVICE CHECK CLINIC Follow up in 1 week(s).           Estiven Rivera MD Follow up in 4 month(s).    Specialties: Electrophysiology, Cardiology  Contact information:  [address]  [facility] [address]  [phone]             Foreign Hendrix NP Follow up on 4/4/2022.    Specialty: Orthopedic Surgery  Why: 10:30 AM  Contact information:  [address]  [facility] [address]  [phone]             COORDINATED DEVICE CHECK Follow up on 4/5/2022.    Why: 2:20 PM                     Patient Instructions:      Diet Adult Regular     Order Specific Question Answer Comments   Na restriction, if any: 2gNa      Notify your health care provider if you experience any of the following:  redness, tenderness, or signs of infection (pain, swelling,  redness, odor or green/yellow discharge around incision site)     Notify your health care provider if you experience any of the following:  increased confusion or weakness     Notify your health care provider if you experience any of the following:  temperature >100.4     Leave dressing on - Keep it clean, dry, and intact until clinic visit     Activity as tolerated     Weight bearing restrictions (specify):   Order Comments: As tolerated both lower extremities. Wear left arm sling nightly for 6 weeks.       Significant Diagnostic Studies:   X-Ray Chest AP Portable 3/21/22: FINDINGS:  Heart is mildly enlarged, but the appearance of the cardiomediastinal silhouette demonstrates no detrimental change since the examination referenced above.  Pulmonary vascularity is normal, and there are no findings indicating current cardiac decompensation.  Left hemithorax is obscured to a considerable degree by an opacity relating to a structure external to the patient, but the lung zones appear clear, and free of significant airspace consolidation or volume loss.  No pleural fluid.  No pneumothorax.  Impression:  Mild cardiomegaly is again noted, but there has been no significant detrimental interval change in the appearance of the chest since 05/27/2019.  X-Ray Ankle 2 View Right 3/21/22: FINDINGS:  Oblique fracture of the distal fibula at the level of the syndesmosis, with lateral displacement by half shaft width.  Transverse fracture of the medial malleolus, with lateral displacement by 8 mm.  Small mildly displaced fracture involving the posterior malleolus.  Lateral subluxation of the tibiotalar joint, with widening of the medial clear space.  Distal tibiofibular alignment appears congruent.  Tibiotalar joint effusion.  Foci of soft tissue gas in the anterior ankle.  Diffuse soft tissue swelling.  Impression:  Displaced trimalleolar ankle fracture as described.  Foci of soft tissue gas in the anterior ankle, compatible with  reported compound fracture.  X-Ray Foot Complete Right 3/21/22: FINDINGS:  Foot complete three views right: There is a trimalleolar fracture of the distal tib fib with anterior dislocation at the ankle joint.  X-Ray Ankle Complete Right 3/21/22: FINDINGS:  Once again identified are fractures involving the medial and lateral malleoli as well as a likely fracture involving the posterior tibial margin.  Position/alignment of these fractures demonstrates no detrimental change since the prior exam.  Tibiotalar joint now appears unremarkable, with relationship between the tibial and talar articular surfaces.  No additional fractures or other significant osseous abnormality noted.  Impression:  1. Redemonstration of fractures of the right medial and lateral malleoli and the posterior tibial margin.  2. Tibiotalar joint space now appears appears unremarkable.  3. No significant detrimental interval change since 03/21/2022 at 09:14.  X-Ray Knee 1 or 2 View Right 3/21/22: FINDINGS:  Visualized osseous structures appear intact, with no definite evidence of recent fracture or other significant abnormality identified.  No significant joint effusion.  X-Ray Hip 2 or 3 views Left (with Pelvis when performed) 3/21/22: FINDINGS:  Hip two views left: There is severe advanced DJD and impingement change.  No fracture dislocation bone destruction seen.  X-Ray Tibia Fibula Bilateral 3/21/22: CLINICAL HISTORY:  Open right ankle fracture;  Other fracture of right lower leg, initial encounter for open fracture type I or II  Tib fib two views bilateral.  Right: There is a trimalleolar fracture without dislocation.  There is a possible proximal fibular fracture.  Left: There is a TKA in place.  There is a proximal fibular fracture and a possible proximal tibial fracture.  X-Ray Chest AP Portable 3/21/22: FINDINGS:  Chest one view: Pacer tip RV.  Heart size is normal.  There is aortic plaque.  Lungs show mild perihilar atelectasis.  No line  complication seen.  X-Ray Ankle Complete Right 3/21/22: FINDINGS:  Overlying bandage material obscures detail slightly.  There is interval postoperative change.  Previously identified fracture of the distal fibula again noted, there is interval placement of osseous screws through the femoral shaft, with improved alignment at the fracture site.  There is additional postoperative change at the level of the distal tibia, previous medial malleolar fracture, there appear to be 2 fixation screws present.  Alignment is improved.  The tibiotalar space appears appropriate.  Subtle posterior malleolar fracture not as well demonstrated at this time.  The remainder of the osseous structures appear intact and stable.  Impression:   Previously identified distal fibular and tibia fractures are again noted, interval postoperative change as above.  Transthoracic echo (TTE) complete with contrast 3/21/22:   · Technically difficult study  · The left ventricle is normal in size with normal systolic function. The estimated ejection fraction is 65%.  · Right ventricular systolic function appears normal, though imaging of the right heart is challenging.  · Grade II left ventricular diastolic dysfunction.  · There is moderate aortic valve stenosis. Aortic valve area is 1.0 cm2; peak velocity is 3.1 m/s; mean gradient is 22 mmHg.  · Mild to moderate tricuspid regurgitation.  · The estimated PA systolic pressure is > 40mm Hg.  X-Ray Chest AP Portable 3/22/22: FINDINGS:  Right IJ pacer lead in stable position.  Mediastinal structures midline.  Cardiac silhouette stable.  Aortic arch calcification.  Lungs clear without consolidation.  Improved pulmonary venous congestion.  No pleural fluid or pneumothorax.  No significant osseous abnormality.  X-Ray Ankle Complete Left 3/22/22: FINDINGS:  Osseous demineralization.  No definite evidence of acute fracture or dislocation.  The talar dome appears intact.  Ankle mortise symmetric.  No findings of  syndesmotic widening.  Minor enthesopathic change at the calcaneus.  Degenerative spurring of the dorsal midfoot.  No definite radiopaque foreign body seen.  Impression:  No definite evidence of acute fracture or dislocation.  X-Ray Knee 1 or 2 View Left 3/22/22: FINDINGS:  Status post total knee arthroplasty.  As seen on prior radiograph of 03/21/2022, there is an acute displaced fracture of the proximal tibia marginating tibial component of the hardware.  There is medial displacement of the major distal fracture fragment.  Additionally, there is a mild displaced fracture of the proximal fibula.  Impression:  Redemonstrated displaced fractures of the proximal tibia and fibula, relatively similar configuration to 03/21/2022 radiograph.  CT 3D RECON WITH INDEPENDENT WS 3/22/22: FINDINGS:  Left total knee arthroplasty.  Streak artifact degrades evaluation of adjacent structures.  No evidence of loosening.  Redemonstrated is a perihardware fracture of the proximal tibia, with impaction, 8 mm medial displacement, and slight apex lateral angulation (201:57).  Redemonstrated is a minimally impacted transverse fracture of the proximal fibular shaft (201: 78).  In addition, there is a vertically oriented lucency extending through the medial aspect of the patella, which may be artifactual but could also represent a fracture.  The femur is intact.  Diffuse osteopenia.  No osteonecrosis.  No focal osseous lesion.  There is hyperdense fluid within the tibial fracture gap extending into the infrapatellar region, likely reflecting hematoma (202:89).  Diffuse soft tissue edema. Diffuse muscle atrophy.  Small knee joint effusion.  No Baker's cyst.  Vascular calcifications.  Impression:  Left total knee arthroplasty, with a displaced perihardware fracture involving the proximal tibia, minimally impacted fracture of the proximal fibula, and possible nondisplaced perihardware fracture of the patella.  Small hematoma adjacent to the  tibial fracture.  CT Knee Without Contrast Left 3/22/22: FINDINGS:  Left total knee arthroplasty.  Streak artifact degrades evaluation of adjacent structures.  No evidence of loosening.  Redemonstrated is a perihardware fracture of the proximal tibia, with impaction, 8 mm medial displacement, and slight apex lateral angulation (201:57).  Redemonstrated is a minimally impacted transverse fracture of the proximal fibular shaft (201: 78).  In addition, there is a vertically oriented lucency extending through the medial aspect of the patella, which may be artifactual but could also represent a fracture.  The femur is intact.  Diffuse osteopenia.  No osteonecrosis.  No focal osseous lesion.  There is hyperdense fluid within the tibial fracture gap extending into the infrapatellar region, likely reflecting hematoma (202:89).  Diffuse soft tissue edema. Diffuse muscle atrophy.  Small knee joint effusion.  No Baker's cyst.  Vascular calcifications.  Impression:  Left total knee arthroplasty, with a displaced perihardware fracture involving the proximal tibia, minimally impacted fracture of the proximal fibula, and possible nondisplaced perihardware fracture of the patella.  Small hematoma adjacent to the tibial fracture.  US Retroperitoneal Complete 3/22/22: FINDINGS:  Right kidney: The right kidney measures 8.6 cm. No cortical thinning. No loss of corticomedullary distinction. Resistive index measures 1.0.  No mass. No renal stone. No hydronephrosis.  Left kidney: The left kidney measures 9.1 cm. No cortical thinning. No loss of corticomedullary distinction. Resistive index measures 1.0.  No mass. No renal stone. No hydronephrosis.  Urinary bladder is decompressed with Paul catheter.  Impression:  Elevated resistive indices which is nonspecific but may be seen with medical renal disease.  No evidence of hydronephrosis.  X-Ray Chest AP Portable 3/23/22: FINDINGS:  Central venous catheter remain.  No significant  changes.  X-Ray Elbow Complete Bilateral 3/23/22: FINDINGS:  Alignment is satisfactory.  No acute fracture, subluxation or dislocation.  No mass or foreign body.  No significant arthropathy.  No significant joint effusion.  Impression:   No acute radiographic abnormality.  X-Ray Femur 2 View Right 3/23/22: FINDINGS:  Right hip arthroplasty.  Hardware positioning appears adequate.  No acute fracture, subluxation or dislocation.  Mild-moderate degenerative changes of the right knee.  Impression:   No acute radiographic abnormality.  X-Ray Chest AP Portable 3/28/22: FINDINGS:  There is a cardiac pacer with the battery pack in the left chest wall.  There is a right internal jugular central venous catheter terminating in the high SVC or at the confluence.  The lungs are well expanded.  There are bilateral interstitial opacities compatible with mild edema, similar to prior.  The pleural spaces are clear.  The cardiac silhouette is enlarged, unchanged.  The visualized osseous structures are intact.  Impression:  Interval placement of a cardiac pacer.  No pneumothorax.  Continued interstitial prominence suspicious for mild edema.  Cardiomegaly.     Medications:  Reconciled Home Medications:      Medication List      START taking these medications    aspirin 81 MG EC tablet  Commonly known as: ECOTRIN  Take 1 tablet (81 mg total) by mouth 2 (two) times a day. Until 4/27/2022 for DVT prophylaxis for 26 days     balsam peru-castor oiL Oint  Apply topically 2 (two) times daily. To sacrum     doxycycline 100 MG Cap  Commonly known as: VIBRAMYCIN  Take 1 capsule (100 mg total) by mouth every 12 (twelve) hours. for 5 days     oxyCODONE 5 MG immediate release tablet  Commonly known as: ROXICODONE  Take 1 tablet (5 mg total) by mouth every 12 (twelve) hours as needed for Pain.     polyethylene glycol 17 gram Pwpk  Commonly known as: GLYCOLAX  Take 17 g by mouth 2 (two) times daily. for 7 days     senna-docusate 8.6-50 mg 8.6-50 mg  per tablet  Commonly known as: PERICOLACE  Take 1 tablet by mouth 2 (two) times daily. for 7 days        CHANGE how you take these medications    losartan 50 MG tablet  Commonly known as: COZAAR  Take 1 tablet (50 mg total) by mouth once daily.  What changed:   · medication strength  · See the new instructions.        CONTINUE taking these medications    acetaminophen 650 MG Tbsr  Commonly known as: TYLENOL  Take 1 tablet (650 mg total) by mouth every 8 (eight) hours as needed. Give 1 tablet QAM, 1 QPM.     atorvastatin 20 MG tablet  Commonly known as: LIPITOR  TAKE 1 TAB BY MOUTH AT BEDTIME     CENTRUM SILVER ORAL  Take by mouth once daily.     cholecalciferol (vitamin D3) 1,250 mcg (50,000 unit) capsule  Take 50,000 Units by mouth once daily.     donepeziL 5 MG tablet  Commonly known as: ARICEPT  TAKE 1 TAB BY MOUTH EVERY EVENING     EScitalopram oxalate 20 MG tablet  Commonly known as: LEXAPRO  TAKE 1 TAB BY MOUTH AT BEDTIME FOR DEPRESSION     * furosemide 40 MG tablet  Commonly known as: LASIX  TAKE 1 TAB BY MOUTH EVERY DAY     * furosemide 40 MG tablet  Commonly known as: LASIX  TAKE 2 TABLETS (80 MG TOTAL) BY MOUTH 2 (TWO) TIMES DAILY AS NEEDED (FOR WEIGHT GAIN OF 5 POUNDS). TAKE WITH POTASSIUM     melatonin 5 mg  Commonly known as: MELATIN  Take 1 tablet by mouth every evening.     nitroGLYCERIN 0.4 MG SL tablet  Commonly known as: NITROSTAT  Place 1 tablet (0.4 mg total) under the tongue every 5 (five) minutes as needed for Chest pain.     * NYSTOP powder  Generic drug: nystatin  APPLY TO GROIN AREA TOPICALLY EVERY 12 HOURS FOR ANTIFUNGAL     * nystatin cream  Commonly known as: MYCOSTATIN  APPLY TO GROIN AREA TOPICALLY EVERY 12 HOURS FOR ANTIFUNGAL     omeprazole 20 MG capsule  Commonly known as: PRILOSEC  TAKE 2 CAPS (40MG) BY MOUTH EVERY DAY (DX: GERD)     potassium chloride SA 10 MEQ tablet  Commonly known as: K-DUR,KLOR-CON  Take 1 tablet (10 mEq total) by mouth once daily. May also take 2 tablets (20  mEq total) daily as needed (when taking lasix for leg swelling).         * This list has 4 medication(s) that are the same as other medications prescribed for you. Read the directions carefully, and ask your doctor or other care provider to review them with you.            STOP taking these medications    metoprolol succinate 200 MG 24 hr tablet  Commonly known as: TOPROL-XL            Indwelling Lines/Drains at time of discharge: None    Time spent on the discharge of patient: 40 minutes         Tha Lunsford MD  Department of Hospital Medicine  [facility] - Cardiology Stepdown

## 2022-04-01 NOTE — PROGRESS NOTES
Notified MD. Lunsford pt AAO x1, only know herself and know the situation every now and then. Lab and UA ordered.

## 2022-04-01 NOTE — SUBJECTIVE & OBJECTIVE
Interval History: Awaiting SNF. Will go after having a bowel movement.    Review of Systems   Constitutional:  Negative for chills and fever.   Gastrointestinal:  Negative for diarrhea and vomiting.   Neurological:  Negative for seizures and syncope.   Objective:     Vital Signs (Most Recent):  Temp: 97.6 °F (36.4 °C) (04/01/22 1150)  Pulse: 86 (04/01/22 1150)  Resp: 18 (04/01/22 1150)  BP: (!) 108/58 (04/01/22 1150)  SpO2: (!) 93 % (04/01/22 1150)   Vital Signs (24h Range):  Temp:  [97.3 °F (36.3 °C)-98.5 °F (36.9 °C)] 97.6 °F (36.4 °C)  Pulse:  [77-98] 86  Resp:  [18-20] 18  SpO2:  [91 %-94 %] 93 %  BP: (107-144)/(51-69) 108/58     Weight: 87.3 kg (192 lb 6.3 oz)  Body mass index is 31.05 kg/m².    Intake/Output Summary (Last 24 hours) at 4/1/2022 1515  Last data filed at 4/1/2022 1300  Gross per 24 hour   Intake 155 ml   Output 120 ml   Net 35 ml        Physical Exam  Vitals and nursing note reviewed.   Constitutional:       General: She is not in acute distress.     Appearance: She is obese. She is not toxic-appearing or diaphoretic.   Pulmonary:      Effort: Pulmonary effort is normal. No respiratory distress.   Abdominal:      General: There is no distension.      Palpations: Abdomen is soft.      Tenderness: There is no abdominal tenderness. There is no guarding.   Neurological:      Mental Status: She is alert. Mental status is at baseline.      Cranial Nerves: No facial asymmetry.      Motor: No seizure activity.   Psychiatric:         Attention and Perception: Attention normal.         Mood and Affect: Mood and affect normal.         Cognition and Memory: Memory is impaired.           Significant Labs:  CBC:  Recent Labs   Lab 04/01/22  1240   WBC 7.81   HGB 9.0*   HCT 30.5*          CMP:  Recent Labs   Lab 04/01/22  1240      K 4.0   CL 98   CO2 32*   *   BUN 17   CREATININE 0.7   CALCIUM 8.4*   PROT 5.2*   ALBUMIN 1.9*   BILITOT 1.0   ALKPHOS 83   AST 24   ALT 6*   ANIONGAP 6*    EGFRNONAA >60.0

## 2022-04-01 NOTE — ASSESSMENT & PLAN NOTE
No bowel movement since 3/25/2022. Started senna-docusate and polyethylene glycol after information revealed on 3/31/2022. Gave bisacodyl, lactulose, bisacodyl suppository. If no relief, give enema.

## 2022-04-01 NOTE — NURSING
Pt slept most of the night. Only woke up during vitals then went right back to sleep each time. Denied pain. Did not urinate during night. Bladder scan showed 342 ml urine.

## 2022-04-01 NOTE — PLAN OF CARE
"   04/01/22 1152   Post-Acute Status   Post-Acute Authorization Placement   Post-Acute Placement Status Pending medical clearance/testing     Per Dr Lunsford: "She is still confused more than baseline dementia this morning so I am checking labs and a urinalysis. Med side effects or UTI would be the most common causes of delirium in someone her age with dementia and it doesn't seem to be the meds."  Waiting on UA results to determine if pt is cleared to discharge.  Hema at [facility] updated.  Dr Lunsford notified that per facility they will not accept pt until she has had a BM within 72 of discharge and last documented BM was 3/25.  Daughter Belkys voiced being aware of this as well.    Meanwhile, SW received call from pt's daughter Belkys inquiring about how her mother will get to her follow up appts with ortho on Monday 4/4 and cardiology on Tuesday 4/5 if she is at SNF and [facility] is unable to transport her to these appts.  SHAREE informed Belkys that ortho followed-up with pt today and read Belkys the progress note from orthopedic surgery from today at 10:47am stating that pt does not need to be seen by them again until 3 weeks post-op.  Belkys voiced relief that pt has been seen by ortho and is doing well.  In regards to the question about pt's cardiology follow-up, SHAREE inquired to CCU resident Dr Osman who contacted EP directly and then called Belkys to inform her that per EP it's fine for that appt to be pushed back.  Will continue to follow.      UPDATE 2:42 PM  Still waiting for pt to have a BM.  Meanwhile, per Hema in admissions with [facility] their pharmacy closes at 4:00pm so any orders would need to be sent before then for pt to admit today.  SW emailed orders and script for oxy.  Will continue to follow.      Marycarmen Arias, SHAREE  [facility]  [id number]    "

## 2022-04-01 NOTE — PLAN OF CARE
Patient seen and examined at bedside. Right short leg cast and incisional wound vac were removed and incision sites were inspected. These appeared well healing with no signs of wound breakdown or infection. Nylon sutures in place. See media tab for images. Silver dressing, 4x4, tegaderm applied over incisions and then a new short leg cast was placed. She remains in left long leg cast for periprosthetic tibia fracture.     She is waiting on SNF placement. Plan is to remove short leg cast and evaluate taking out sutures at 3 weeks post op. Continue to WBAT for transfers BLE.

## 2022-04-01 NOTE — PT/OT/SLP PROGRESS
Occupational Therapy   Co-Treatment    Name: Lilia Hidalgo  MRN: [id number]  Admitting Diagnosis:  Complete AV block  4 Days Post-Op    Recommendations:     Discharge Recommendations: nursing facility, skilled  Discharge Equipment Recommendations:  wheelchair, hospital bed, lift device  Barriers to discharge:  None    Assessment:     Lilia Hidalgo is a 89 y.o. female with a medical diagnosis of Complete AV block.  She presents with ability to sit EOB and perform eating and grooming tasks with SBA after set-up. Sit to stand transfers deemed unsafe at this time 2* B hard casts with L knee immobilized and now with L UE NWB 2* PPM precautions. Performance deficits affecting function are decreased safety awareness, impaired self care skills, impaired functional mobilty, gait instability, decreased upper extremity function, decreased lower extremity function, orthopedic precautions, impaired cognition, pain. Pt appropriate for cotreat due to acuity and complexity of pt's medical status with 2 skilled disciplines needed to optimize pts functional performance      Rehab Prognosis:  Fair; patient would benefit from acute skilled OT services to address these deficits and reach maximum level of function.       Plan:     Patient to be seen 3 x/week to address the above listed problems via self-care/home management, therapeutic activities, therapeutic exercises, neuromuscular re-education  · Plan of Care Expires: 04/23/22  · Plan of Care Reviewed with: patient    Subjective     Pain/Comfort:  · Pain Rating 1:  (Groans with B LE movement, but does not rate pain)  · Pain Addressed 1: Reposition, Distraction, Cessation of Activity    Objective:     Communicated with: RN prior to session.  Patient found supine with telemetry, peripheral IV, PureWick upon OT entry to room.    General Precautions: Standard, fall   Orthopedic Precautions:LUE non weight bearing, RLE weight bearing as tolerated, LLE weight bearing as tolerated   Braces:  Sling and swathe  Respiratory Status: Room air     Occupational Performance:     Bed Mobility:    · Patient completed Supine to Sit with total assistance and 2 persons  · Patient completed Sit to Supine with total assistance and 2 persons     Functional Mobility/Transfers:  · Deferred  · Functional Mobility: NT    Activities of Daily Living:  · Feeding:  stand by assistance    · Grooming: stand by assistance    · Lower Body Dressing: total assistance        AMPAC 6 Click ADL: 12    Treatment & Education:  Pt ed on OT POC  Daily orientation provided  Tx focused on upright tolerance, ROM Ex's and self-care seated EOB    Patient left HOB elevated with all lines intact, call button in reach and RN notifiedEducation:      GOALS:   Multidisciplinary Problems     Occupational Therapy Goals        Problem: Occupational Therapy    Goal Priority Disciplines Outcome Interventions   Occupational Therapy Goal     OT, PT/OT Ongoing, Progressing    Description: Goals to be met by: 4/7/22     Patient will increase functional independence with ADLs by performing:    Feeding with Stand-by Assistance.  Sitting at edge of bed x10 minutes with Stand-by Assistance.  G/H: set-up bed level  Family to demo understanding of ROM and positioning exs/techniques.                      Time Tracking:     OT Date of Treatment: 04/01/22  OT Start Time: 1345  OT Stop Time: 1412  OT Total Time (min): 27 min    Billable Minutes:Self Care/Home Management 15  Therapeutic Activity 10               4/1/2022

## 2022-04-02 NOTE — NURSING
Received call from [phone], and gave report to Lis DOUGLASS at [facility].

## 2022-04-05 ENCOUNTER — TELEPHONE (OUTPATIENT)
Dept: ELECTROPHYSIOLOGY | Facility: CLINIC | Age: 87
End: 2022-04-05
Payer: MEDICARE

## 2022-04-05 NOTE — PT/OT/SLP DISCHARGE
Occupational Therapy Discharge Summary    Lilia Hidalgo  MRN: [id number]   Principal Problem: Complete AV block      Patient Discharged from acute Occupational Therapy on 4/1/22.      Assessment:      Patient was discharged unexpectedly.  Information required to complete an accurate discharge summary is unknown.  Refer to therapy initial evaluation and last progress note for initial and most recent functional status and goal achievement.  Recommendations made may be found in medical record.    Objective:     GOALS:   Multidisciplinary Problems     Occupational Therapy Goals        Problem: Occupational Therapy    Goal Priority Disciplines Outcome Interventions   Occupational Therapy Goal     OT, PT/OT Ongoing, Progressing    Description: Goals to be met by: 4/7/22     Patient will increase functional independence with ADLs by performing:    Feeding with Stand-by Assistance.  Sitting at edge of bed x10 minutes with Stand-by Assistance.  G/H: set-up bed level  Family to demo understanding of ROM and positioning exs/techniques.                      Reasons for Discontinuation of Therapy Services  Transfer to alternate level of care.      Plan:     Patient Discharged to: Skilled Nursing Facility    4/5/2022

## 2022-04-06 ENCOUNTER — TELEPHONE (OUTPATIENT)
Dept: CARDIOLOGY | Facility: HOSPITAL | Age: 87
End: 2022-04-06
Payer: MEDICARE

## 2022-04-06 ENCOUNTER — TELEPHONE (OUTPATIENT)
Dept: ELECTROPHYSIOLOGY | Facility: CLINIC | Age: 87
End: 2022-04-06
Payer: MEDICARE

## 2022-04-06 ENCOUNTER — TELEPHONE (OUTPATIENT)
Dept: ORTHOPEDICS | Facility: CLINIC | Age: 87
End: 2022-04-06
Payer: MEDICARE

## 2022-04-06 NOTE — TELEPHONE ENCOUNTER
Called and rescheduled pt's post-op appt with Foreign to 04/14/2022 @ 2:30 pm. Pt's daughter Ms Rizvi was satisfied with new appt date/time.

## 2022-04-06 NOTE — TELEPHONE ENCOUNTER
Pt's daughter Belkys, contacted the [facility] on this am and stated she was returning a call.  (please see prior telephone message from this am). The daughter stated they had cancelled the wound check that was scheduled on yesterday 4/5/22 due to the pt is currently in Inpatient rehab due to both legs are in casts following a fall and ortho surgery in March.  Pt will be coming in for an Ortho appt @ 2:30 pm on 4/14/22 @  therefor the wound/PPM check was rescheduled to 4/14/22 @ 1:40 pm.  Pt's remote monitor is connected and communicating.  The daughter was agreeable to this date and time.  Understanding was verbalized.

## 2022-04-06 NOTE — TELEPHONE ENCOUNTER
Called patient in relation to rescheduling cancelled wound check appointment on 4/5/22. Left VM with clinic phone number for patient to return call.

## 2022-04-08 ENCOUNTER — TELEPHONE (OUTPATIENT)
Dept: PRIMARY CARE CLINIC | Facility: CLINIC | Age: 87
End: 2022-04-08
Payer: MEDICARE

## 2022-04-08 NOTE — TELEPHONE ENCOUNTER
Where is patient? Is she in a NH or skilled facility? We can get our  involved if needed.    MORALES

## 2022-04-08 NOTE — TELEPHONE ENCOUNTER
Spoke to Belkys, pt's dtr, informed her that she really needs to speak with the SW on the skilled unit. Explained that this SW will have very good information and will have information readily available.     Explained that I did not know of any nsg home what has single rooms.     Belkys verbalized understanding.

## 2022-04-11 ENCOUNTER — TELEPHONE (OUTPATIENT)
Dept: PRIMARY CARE CLINIC | Facility: CLINIC | Age: 87
End: 2022-04-11
Payer: MEDICARE

## 2022-04-11 DIAGNOSIS — S82.90XA CLOSED FRACTURE OF LOWER EXTREMITY, UNSPECIFIED LATERALITY, INITIAL ENCOUNTER: Primary | ICD-10-CM

## 2022-04-11 NOTE — TELEPHONE ENCOUNTER
Patient and family need case management involvement to potentially get placed in a NH that has private rooms. A handful do have that.    OPCM referral placed to assist family with placement in a NH.    Thanks,  KJ

## 2022-04-11 NOTE — TELEPHONE ENCOUNTER
----- Message from Cheri Paniagua RN sent at 4/8/2022  1:38 PM CDT -----  Dr. NIELSEN,    This patient is a resident of [facility] living [facility]. She fractured both legs and required a pacemaker. Admit date was 3/21. At discharge on 4/1, she was transferred to [facility] rehab for 20 days. I spoke with the daughter today, Belkys. She is concerned that her mother will not be able to go back to the assisted living facility after 20 days because she will not be able to bear weight and will need more assistance. [facility] has a NH side but there is only semiprivate rooms and not interested. She looked into other NH in the area & sitter care and all are expensive. The patient has [employer] for insurance. Can we extend her stay at this rehab or set them up with more resources for NH afterwards?    Thanks,   EVONNE Alonzo, FNP-S

## 2022-04-12 ENCOUNTER — TELEPHONE (OUTPATIENT)
Dept: ELECTROPHYSIOLOGY | Facility: CLINIC | Age: 87
End: 2022-04-12
Payer: MEDICARE

## 2022-04-12 ENCOUNTER — OUTPATIENT CASE MANAGEMENT (OUTPATIENT)
Dept: ADMINISTRATIVE | Facility: OTHER | Age: 87
End: 2022-04-12
Payer: MEDICARE

## 2022-04-12 NOTE — TELEPHONE ENCOUNTER
New onset atrial fibrillation noted during device remote check:    Overall burden:  2%    Max duration seen:  7 hrs 58 mins  Most recent episode:4/11/22 starting at 1631  Ventricular rates:   Controlled/CHB    Anticoagulation status:  Aspirin 81mg    Recent PPM placed 3/28/22 by Dr. Rivera for CHB  Pt is 89 y/o, hx of dementia and has casts for recent mechanical fall/surgery    Patient symptoms:unknown, left vm for daughter.  Asked daughter to call clinic back and report if had symptoms  Has in clinic wound check 4/14/22    Will forward to Dr. Kelly(consults) once assess symptoms.    Pt 's daughter called back and pt lives in assisted living, staff reported no symptoms.            ___

## 2022-04-12 NOTE — PROGRESS NOTES
notified MATTHEW Benítez of referral for nursing home placement.  [facility] team to follow.

## 2022-04-14 ENCOUNTER — HOSPITAL ENCOUNTER (OUTPATIENT)
Dept: RADIOLOGY | Facility: HOSPITAL | Age: 87
Discharge: HOME | End: 2022-04-14
Attending: NURSE PRACTITIONER
Payer: MEDICARE

## 2022-04-14 ENCOUNTER — OFFICE VISIT (OUTPATIENT)
Dept: ORTHOPEDICS | Facility: CLINIC | Age: 87
End: 2022-04-14
Payer: MEDICARE

## 2022-04-14 ENCOUNTER — CLINICAL SUPPORT (OUTPATIENT)
Dept: CARDIOLOGY | Facility: HOSPITAL | Age: 87
End: 2022-04-14
Attending: INTERNAL MEDICINE
Payer: MEDICARE

## 2022-04-14 DIAGNOSIS — Z95.0 CARDIAC PACEMAKER IN SITU: ICD-10-CM

## 2022-04-14 DIAGNOSIS — R52 PAIN: ICD-10-CM

## 2022-04-14 DIAGNOSIS — S82.192D OTHER CLOSED FRACTURE OF PROXIMAL END OF LEFT TIBIA WITH ROUTINE HEALING, SUBSEQUENT ENCOUNTER: ICD-10-CM

## 2022-04-14 DIAGNOSIS — R52 PAIN: Primary | ICD-10-CM

## 2022-04-14 DIAGNOSIS — Z98.890 POST-OPERATIVE STATE: ICD-10-CM

## 2022-04-14 DIAGNOSIS — I44.2 COMPLETE HEART BLOCK: ICD-10-CM

## 2022-04-14 DIAGNOSIS — S82.891C: Primary | ICD-10-CM

## 2022-04-14 PROCEDURE — 73610 X-RAY EXAM OF ANKLE: CPT | Mod: TC,RT

## 2022-04-14 PROCEDURE — 93280 PM DEVICE PROGR EVAL DUAL: CPT | Mod: 26,,, | Performed by: INTERNAL MEDICINE

## 2022-04-14 PROCEDURE — 73560 X-RAY EXAM OF KNEE 1 OR 2: CPT | Mod: TC,LT

## 2022-04-14 PROCEDURE — 99024 POSTOP FOLLOW-UP VISIT: CPT | Mod: S$GLB,,, | Performed by: NURSE PRACTITIONER

## 2022-04-14 PROCEDURE — 73560 X-RAY EXAM OF KNEE 1 OR 2: CPT | Mod: 26,LT,, | Performed by: RADIOLOGY

## 2022-04-14 PROCEDURE — 73610 XR ANKLE COMPLETE 3 VIEW RIGHT: ICD-10-PCS | Mod: 26,RT,, | Performed by: RADIOLOGY

## 2022-04-14 PROCEDURE — 73560 XR KNEE 1 OR 2 VIEW LEFT: ICD-10-PCS | Mod: 26,LT,, | Performed by: RADIOLOGY

## 2022-04-14 PROCEDURE — 93280 PM DEVICE PROGR EVAL DUAL: CPT

## 2022-04-14 PROCEDURE — 1111F PR DISCHARGE MEDS RECONCILED W/ CURRENT OUTPATIENT MED LIST: ICD-10-PCS | Mod: CPTII,S$GLB,, | Performed by: NURSE PRACTITIONER

## 2022-04-14 PROCEDURE — 99999 PR PBB SHADOW E&M-EST. PATIENT-LVL III: ICD-10-PCS | Mod: PBBFAC,,, | Performed by: NURSE PRACTITIONER

## 2022-04-14 PROCEDURE — 1160F PR REVIEW ALL MEDS BY PRESCRIBER/CLIN PHARMACIST DOCUMENTED: ICD-10-PCS | Mod: CPTII,S$GLB,, | Performed by: NURSE PRACTITIONER

## 2022-04-14 PROCEDURE — 93280 CARDIAC DEVICE CHECK - IN CLINIC & HOSPITAL: ICD-10-PCS | Mod: 26,,, | Performed by: INTERNAL MEDICINE

## 2022-04-14 PROCEDURE — 99024 PR POST-OP FOLLOW-UP VISIT: ICD-10-PCS | Mod: S$GLB,,, | Performed by: NURSE PRACTITIONER

## 2022-04-14 PROCEDURE — 1157F PR ADVANCE CARE PLAN OR EQUIV PRESENT IN MEDICAL RECORD: ICD-10-PCS | Mod: CPTII,S$GLB,, | Performed by: NURSE PRACTITIONER

## 2022-04-14 PROCEDURE — 1111F DSCHRG MED/CURRENT MED MERGE: CPT | Mod: CPTII,S$GLB,, | Performed by: NURSE PRACTITIONER

## 2022-04-14 PROCEDURE — 99999 PR PBB SHADOW E&M-EST. PATIENT-LVL III: CPT | Mod: PBBFAC,,, | Performed by: NURSE PRACTITIONER

## 2022-04-14 PROCEDURE — 73610 X-RAY EXAM OF ANKLE: CPT | Mod: 26,RT,, | Performed by: RADIOLOGY

## 2022-04-14 PROCEDURE — 1159F PR MEDICATION LIST DOCUMENTED IN MEDICAL RECORD: ICD-10-PCS | Mod: CPTII,S$GLB,, | Performed by: NURSE PRACTITIONER

## 2022-04-14 PROCEDURE — 1157F ADVNC CARE PLAN IN RCRD: CPT | Mod: CPTII,S$GLB,, | Performed by: NURSE PRACTITIONER

## 2022-04-14 PROCEDURE — 1159F MED LIST DOCD IN RCRD: CPT | Mod: CPTII,S$GLB,, | Performed by: NURSE PRACTITIONER

## 2022-04-14 PROCEDURE — 1160F RVW MEDS BY RX/DR IN RCRD: CPT | Mod: CPTII,S$GLB,, | Performed by: NURSE PRACTITIONER

## 2022-04-14 NOTE — PROGRESS NOTES
Ms. Hidalgo is here today for a post-operative visit after undergoing an ORIF for her right trimalleolar ankle fracture without fixation of her posterior lip, debridement and irrigation for an open fracture of her right ankle including bone, fascia, subcutaneous tissue and closed management of her left proximal tibia periprosthetic fracture without manipulation by Dr. Carreno on 3/21/2022.    Interval History:  She reports that she is doing ok.  She is a resident of [facility] with SNF.  Pain is well controlled.  She is taking pain medication.  Currently on OxyIR PRN and Tylenol PRN for pain control.  She denies fever, chills, and sweats since the time of the surgery.     Physical exam:  Bilateral cast removed and dressing taken down.  Incision is clean, dry and intact.  Sutures to right ankle removed without difficulty.  There is no skin breakdown to LLE.  She has tactile stimulation to their lower leg, she denies calf pain, there is no leg edema and their pedal pulse is palpable x 2.     RADS: X-ray of her left knee and right ankle were obtained and personally reviewed by me.  Left knee shows a periprosthetic fracture to proximal tibia and proximal fibula that appears stable.  No evidence of hardware failure.  No new fractures when compared to prior x-ray.  X-ray to right ankle shows hardware to distal tibia and fibula is in good position and fracture is stable.  No new fractures seen when compared to prior x-ray.    Assessment:  Post-op visit (3 weeks)    Plan:    ICD-10-CM ICD-9-CM   1. Open ankle fracture, right, type III, initial encounter  S82.891C 824.9   2. Other closed fracture of proximal end of left tibia with routine healing, subsequent encounter  S82.192D V54.16   3. Post-operative state  Z98.890 V45.89     Current care, treatment plan, precautions, activity level/ modifications, limitations, rehabilitation exercises and proposed future treatment were discussed with the patient. We  discussed the need to monitor for changes in symptoms and condition and report them to the physician.  Discussed importance of compliance with all appointments and follow up examinations.     CAST CARE:  - Do not get cast wet, dirty or damage, if so call for us to check and change.    OTHER:  - Patient currently in SNF getting PT 5 days a week.  She is limited to just 1 limb due to bilateral leg fractures and recent pacemaker placement.  Advised son, may want to consider [facility] care temporarily until she can start using her other limbs as to not use all of her skilled days.  Recommend to speak with NH .       PHYSICAL THERAPY:   - As above.  - Weight bearing flat foot for transfers while in cast.  - Range of motion - No ROM of LLE or RLE.     PAIN MEDICATION:   - Pain medication: refill was not needed  - Pain medication refill policy provided to patient for review, yes.    - Patient was informed a multi-modal approach is used to treat their pain.     DVT PROPHYLAXIS:   - ASA 81 mg bid x 6 weeks.     FOLLOW UP:   - Patient will follow up in the clinic as scheduled. PATIENT WILL REQUIRE 30 MINUTE SLOT FOR FUTURE APPOINTMENT GIVEN COMPLEXITY OF CASE.  - X-ray of her left knee and right ankle non standing out of cast is needed.    - Future Appointments:   Future Appointments   Date Time Provider Department Center   5/2/2022 10:30 AM Foreign Hendrix NP [facility] ORTHO [place] [facility]   6/26/2022 10:00 AM HOME MONITOR DEVICE CHECK, [facility] CRYSTAL Mariee [facility]   7/13/2022  7:45 AM EKG, APPT [facility] EKG [facility]   7/13/2022  8:00 AM COORDINATED DEVICE CHECK [facility] CRYSTAL Mariee [facility]   7/13/2022  8:20 AM Estiven Rivera MD [facility] ARRHYTH [facility]         POST OP PLAN:  1 week for removal of incisional wound VAC, silver dressing on incision, placement into a cast - completed.  3 weeks possible suture removal pending appearance of the healing, continue cast bilateral lower extremities - Sutures removed 4/14/22  6 weeks - likely  continue cast bilateral lower extremities  10 weeks - pending healing of fractures, consider discontinuation of casting  4 months  6 months  1 year      If there are any questions prior to scheduled follow up, the patient was instructed to contact the office

## 2022-04-19 NOTE — TELEPHONE ENCOUNTER
Spoke with pt evangelista Rizvi. Pt is on skilled nsg and they found a private room [facility].    [facility]    She will call for any needs.

## 2022-05-02 ENCOUNTER — HOSPITAL ENCOUNTER (OUTPATIENT)
Dept: RADIOLOGY | Facility: HOSPITAL | Age: 87
Discharge: HOME | End: 2022-05-02
Attending: NURSE PRACTITIONER
Payer: MEDICARE

## 2022-05-02 ENCOUNTER — OFFICE VISIT (OUTPATIENT)
Dept: ORTHOPEDICS | Facility: CLINIC | Age: 87
End: 2022-05-02
Payer: MEDICARE

## 2022-05-02 DIAGNOSIS — R52 PAIN: ICD-10-CM

## 2022-05-02 DIAGNOSIS — S82.192D OTHER CLOSED FRACTURE OF PROXIMAL END OF LEFT TIBIA WITH ROUTINE HEALING, SUBSEQUENT ENCOUNTER: ICD-10-CM

## 2022-05-02 DIAGNOSIS — R52 PAIN: Primary | ICD-10-CM

## 2022-05-02 DIAGNOSIS — S82.851D TRIMALLEOLAR FRACTURE OF ANKLE, CLOSED, RIGHT, WITH ROUTINE HEALING, SUBSEQUENT ENCOUNTER: Primary | ICD-10-CM

## 2022-05-02 DIAGNOSIS — Z98.890 POST-OPERATIVE STATE: ICD-10-CM

## 2022-05-02 PROCEDURE — 73560 X-RAY EXAM OF KNEE 1 OR 2: CPT | Mod: 26,LT,, | Performed by: RADIOLOGY

## 2022-05-02 PROCEDURE — 73610 XR ANKLE COMPLETE 3 VIEW RIGHT: ICD-10-PCS | Mod: 26,RT,, | Performed by: RADIOLOGY

## 2022-05-02 PROCEDURE — 1157F ADVNC CARE PLAN IN RCRD: CPT | Mod: CPTII,S$GLB,, | Performed by: NURSE PRACTITIONER

## 2022-05-02 PROCEDURE — 1159F MED LIST DOCD IN RCRD: CPT | Mod: CPTII,S$GLB,, | Performed by: NURSE PRACTITIONER

## 2022-05-02 PROCEDURE — 29345 PR APPLY LONG LEG CAST: ICD-10-PCS | Mod: 58,LT,S$GLB, | Performed by: NURSE PRACTITIONER

## 2022-05-02 PROCEDURE — 99024 PR POST-OP FOLLOW-UP VISIT: ICD-10-PCS | Mod: S$GLB,,, | Performed by: NURSE PRACTITIONER

## 2022-05-02 PROCEDURE — 99999 PR PBB SHADOW E&M-EST. PATIENT-LVL III: CPT | Mod: PBBFAC,,, | Performed by: NURSE PRACTITIONER

## 2022-05-02 PROCEDURE — 29345 APPLICATION OF LONG LEG CAST: CPT | Mod: 58,LT,S$GLB, | Performed by: NURSE PRACTITIONER

## 2022-05-02 PROCEDURE — 1157F PR ADVANCE CARE PLAN OR EQUIV PRESENT IN MEDICAL RECORD: ICD-10-PCS | Mod: CPTII,S$GLB,, | Performed by: NURSE PRACTITIONER

## 2022-05-02 PROCEDURE — 73610 X-RAY EXAM OF ANKLE: CPT | Mod: 26,RT,, | Performed by: RADIOLOGY

## 2022-05-02 PROCEDURE — 73560 XR KNEE 1 OR 2 VIEW LEFT: ICD-10-PCS | Mod: 26,LT,, | Performed by: RADIOLOGY

## 2022-05-02 PROCEDURE — 73610 X-RAY EXAM OF ANKLE: CPT | Mod: TC,RT

## 2022-05-02 PROCEDURE — 29405 APPL SHORT LEG CAST: CPT | Mod: 58,59,51,RT | Performed by: NURSE PRACTITIONER

## 2022-05-02 PROCEDURE — 73560 X-RAY EXAM OF KNEE 1 OR 2: CPT | Mod: TC,LT

## 2022-05-02 PROCEDURE — 29405 PR APPLY SHORT LEG CAST: ICD-10-PCS | Mod: 58,59,51,RT | Performed by: NURSE PRACTITIONER

## 2022-05-02 PROCEDURE — 1159F PR MEDICATION LIST DOCUMENTED IN MEDICAL RECORD: ICD-10-PCS | Mod: CPTII,S$GLB,, | Performed by: NURSE PRACTITIONER

## 2022-05-02 PROCEDURE — 99999 PR PBB SHADOW E&M-EST. PATIENT-LVL III: ICD-10-PCS | Mod: PBBFAC,,, | Performed by: NURSE PRACTITIONER

## 2022-05-02 PROCEDURE — 99024 POSTOP FOLLOW-UP VISIT: CPT | Mod: S$GLB,,, | Performed by: NURSE PRACTITIONER

## 2022-05-02 NOTE — PROGRESS NOTES
Ms. Hidalgo is here today for a post-operative visit after undergoing an ORIF for her right trimalleolar ankle fracture without fixation of her posterior lip, debridement and irrigation for an open fracture of her right ankle including bone, fascia, subcutaneous tissue and closed management of her left proximal tibia periprosthetic fracture without manipulation by Dr. Carreno on 3/21/2022.    Interval History:  She reports that she is doing ok.  She is a resident of [facility] with SNF.  Pain is well controlled.  She is taking pain medication.  Currently on OxyIR PRN and Tylenol PRN for pain control.  She denies fever, chills, and sweats since the time of the surgery.     Physical exam:  Bilateral cast removed and dressing taken down.  Incision is clean, dry and intact.  No signs or symptoms of infection, incision to right ankle is healed.  There is no skin breakdown on right or left legs seen.  She has tactile stimulation to their lower leg, she denies calf pain, there is no leg edema and their pedal pulse is palpable x 4.     RADS: X-ray of her left knee and right ankle were obtained and personally reviewed by me.  Left knee shows a periprosthetic fracture to proximal tibia and proximal fibula that appears stable.  There appears to be beginning callus formation.  No evidence of hardware failure.  No new fractures when compared to prior x-ray.  X-ray to right ankle shows hardware to distal tibia and fibula is in good position and fracture is stable.  No new fractures seen when compared to prior x-ray.    Assessment:  Post-op visit (6 weeks)    Plan:    ICD-10-CM ICD-9-CM   1. Trimalleolar fracture of ankle, closed, right, with routine healing, subsequent encounter  S82.851D V54.19   2. Other closed fracture of proximal end of left tibia with routine healing, subsequent encounter  S82.192D V54.16   3. Post-operative state  Z98.890 V45.89     Current care, treatment plan, precautions, activity level/  modifications, limitations, rehabilitation exercises and proposed future treatment were discussed with the patient. We discussed the need to monitor for changes in symptoms and condition and report them to the physician.  Discussed importance of compliance with all appointments and follow up examinations.     CAST CARE:  - Do not get cast wet, dirty or damage, if so call for us to check and change.    OTHER:  - Patient currently in NH.  She is limited to just 1 limb due to bilateral leg fractures and recent pacemaker placement.         PHYSICAL THERAPY:   - As above.  - Weight bearing flat foot for transfers while in cast.  - Range of motion - No ROM of LLE or RLE.     PAIN MEDICATION:   - Pain medication: refill was not needed  - Pain medication refill policy provided to patient for review, yes.    - Patient was informed a multi-modal approach is used to treat their pain.     DVT PROPHYLAXIS:   - ASA 81 mg bid x 6 weeks. Continue to take ASA while immobile.     FOLLOW UP:   - Patient will follow up in the clinic as scheduled. PATIENT WILL REQUIRE 30 MINUTE SLOT FOR FUTURE APPOINTMENT GIVEN COMPLEXITY OF CASE.  - X-ray of her left knee and right ankle non standing out of cast is needed.    - Future Appointments:   Future Appointments   Date Time Provider Department Center   5/2/2022 10:30 AM Foreign Hendrix NP [facility] ORTHO [facility]   6/26/2022 10:00 AM HOME MONITOR DEVICE CHECK, [facility] CRYSTAL Mariee [facility]   7/13/2022  7:45 AM EKG, APPT [facility] EKG [facility]   7/13/2022  8:00 AM COORDINATED DEVICE CHECK [facility] CRYSTAL Mariee [facility]   7/13/2022  8:20 AM Estiven Rivera MD [facility] ARRHYTH [facility]         POST OP PLAN:  1 week for removal of incisional wound VAC, silver dressing on incision, placement into a cast - completed.  3 weeks possible suture removal pending appearance of the healing, continue cast bilateral lower extremities - Sutures removed 4/14/22  6 weeks - likely continue cast bilateral lower extremities -  continue  10 weeks - pending healing of fractures, consider discontinuation of casting  4 months  6 months  1 year      If there are any questions prior to scheduled follow up, the patient was instructed to contact the office

## 2022-05-04 NOTE — PROGRESS NOTES
PT placed in a fiberglass long leg cast on the LT side. She was also placed in RT short leg fiberglass cast. PT could not get on a table. No other complication or complaint from PT. Ordered by TRACY Hendrix NP.

## 2022-05-24 ENCOUNTER — TELEPHONE (OUTPATIENT)
Dept: ORTHOPEDICS | Facility: CLINIC | Age: 87
End: 2022-05-24
Payer: MEDICARE

## 2022-05-24 NOTE — TELEPHONE ENCOUNTER
Called and rescheduled pt's appt with Foreign to a sooner appt as requested to 05/27/2022 @ 9:30 am. Pt's daughter Ms Rizvi was satisfied with new appt date/time.

## 2022-05-26 ENCOUNTER — PATIENT MESSAGE (OUTPATIENT)
Dept: ORTHOPEDICS | Facility: CLINIC | Age: 87
End: 2022-05-26
Payer: MEDICARE

## 2022-05-27 ENCOUNTER — TELEPHONE (OUTPATIENT)
Dept: ORTHOPEDICS | Facility: CLINIC | Age: 87
End: 2022-05-27
Payer: MEDICARE

## 2022-05-27 NOTE — TELEPHONE ENCOUNTER
Called and informed pt's daughter Ms Rizvi pt has not arrive to her appt with Foreign and appt was rescheduled to the originally date[05/30/2022 @ 10:30 am] she was suppose to be seen. Ms Rizvi verbally understood and was satisfied.

## 2022-05-30 ENCOUNTER — HOSPITAL ENCOUNTER (OUTPATIENT)
Dept: RADIOLOGY | Facility: HOSPITAL | Age: 87
Discharge: HOME | End: 2022-05-30
Attending: NURSE PRACTITIONER
Payer: MEDICARE

## 2022-05-30 ENCOUNTER — OFFICE VISIT (OUTPATIENT)
Dept: ORTHOPEDICS | Facility: CLINIC | Age: 87
End: 2022-05-30
Payer: MEDICARE

## 2022-05-30 DIAGNOSIS — R52 PAIN: ICD-10-CM

## 2022-05-30 DIAGNOSIS — S82.851D TRIMALLEOLAR FRACTURE OF ANKLE, CLOSED, RIGHT, WITH ROUTINE HEALING, SUBSEQUENT ENCOUNTER: ICD-10-CM

## 2022-05-30 DIAGNOSIS — S82.192D OTHER CLOSED FRACTURE OF PROXIMAL END OF LEFT TIBIA WITH ROUTINE HEALING, SUBSEQUENT ENCOUNTER: Primary | ICD-10-CM

## 2022-05-30 DIAGNOSIS — Z98.890 POST-OPERATIVE STATE: ICD-10-CM

## 2022-05-30 DIAGNOSIS — R52 PAIN: Primary | ICD-10-CM

## 2022-05-30 PROCEDURE — 99999 PR PBB SHADOW E&M-EST. PATIENT-LVL III: ICD-10-PCS | Mod: PBBFAC,,, | Performed by: NURSE PRACTITIONER

## 2022-05-30 PROCEDURE — 73560 X-RAY EXAM OF KNEE 1 OR 2: CPT | Mod: TC,LT

## 2022-05-30 PROCEDURE — 1160F RVW MEDS BY RX/DR IN RCRD: CPT | Mod: CPTII,S$GLB,, | Performed by: NURSE PRACTITIONER

## 2022-05-30 PROCEDURE — 99999 PR PBB SHADOW E&M-EST. PATIENT-LVL III: CPT | Mod: PBBFAC,,, | Performed by: NURSE PRACTITIONER

## 2022-05-30 PROCEDURE — 1157F ADVNC CARE PLAN IN RCRD: CPT | Mod: CPTII,S$GLB,, | Performed by: NURSE PRACTITIONER

## 2022-05-30 PROCEDURE — 73610 XR ANKLE COMPLETE 3 VIEW RIGHT: ICD-10-PCS | Mod: 26,RT,, | Performed by: INTERNAL MEDICINE

## 2022-05-30 PROCEDURE — 73610 X-RAY EXAM OF ANKLE: CPT | Mod: 26,RT,, | Performed by: INTERNAL MEDICINE

## 2022-05-30 PROCEDURE — 73560 X-RAY EXAM OF KNEE 1 OR 2: CPT | Mod: 26,LT,, | Performed by: RADIOLOGY

## 2022-05-30 PROCEDURE — 99024 PR POST-OP FOLLOW-UP VISIT: ICD-10-PCS | Mod: S$GLB,,, | Performed by: NURSE PRACTITIONER

## 2022-05-30 PROCEDURE — 73610 X-RAY EXAM OF ANKLE: CPT | Mod: TC,RT

## 2022-05-30 PROCEDURE — 1159F MED LIST DOCD IN RCRD: CPT | Mod: CPTII,S$GLB,, | Performed by: NURSE PRACTITIONER

## 2022-05-30 PROCEDURE — 1160F PR REVIEW ALL MEDS BY PRESCRIBER/CLIN PHARMACIST DOCUMENTED: ICD-10-PCS | Mod: CPTII,S$GLB,, | Performed by: NURSE PRACTITIONER

## 2022-05-30 PROCEDURE — 99024 POSTOP FOLLOW-UP VISIT: CPT | Mod: S$GLB,,, | Performed by: NURSE PRACTITIONER

## 2022-05-30 PROCEDURE — 1159F PR MEDICATION LIST DOCUMENTED IN MEDICAL RECORD: ICD-10-PCS | Mod: CPTII,S$GLB,, | Performed by: NURSE PRACTITIONER

## 2022-05-30 PROCEDURE — 73560 XR KNEE 1 OR 2 VIEW LEFT: ICD-10-PCS | Mod: 26,LT,, | Performed by: RADIOLOGY

## 2022-05-30 PROCEDURE — 1157F PR ADVANCE CARE PLAN OR EQUIV PRESENT IN MEDICAL RECORD: ICD-10-PCS | Mod: CPTII,S$GLB,, | Performed by: NURSE PRACTITIONER

## 2022-05-30 NOTE — PROGRESS NOTES
Ms. Hidalgo is here today for a post-operative visit after undergoing an ORIF for her right trimalleolar ankle fracture without fixation of her posterior lip, debridement and irrigation for an open fracture of her right ankle including bone, fascia, subcutaneous tissue and closed management of her left proximal tibia periprosthetic fracture without manipulation by Dr. Carreno on 3/21/2022.    Interval History:  She reports that she is doing ok.  She is a resident of [facility] with SNF.  Pain is well controlled.  She is taking pain medication.  Currently on OxyIR PRN and Tylenol PRN for pain control.  She denies fever, chills, and sweats since the time of the surgery.     Physical exam:  Bilateral cast removed and dressing taken down.  Incision is clean, dry and intact.  No signs or symptoms of infection, incision to right ankle is healed.  There is no skin breakdown on right or left legs seen.  She has tactile stimulation to their lower leg, she denies calf pain, there is no leg edema and their pedal pulse is palpable x 4.  ROM of right ankle is approximately 15 degrees in all plans.  MS in left leg is 3/5.    RADS: X-ray of her left knee and right ankle were obtained and personally reviewed by me.  Left knee shows a periprosthetic fracture to proximal tibia and proximal fibula that appears stable.  There is no change in callus formation when compared to prior x-ray.  No evidence of hardware failure.  No new fractures when compared to prior x-ray.  X-ray to right ankle shows hardware to distal tibia and fibula is in good position and fracture is stable.  No new fractures seen when compared to prior x-ray.    Assessment:  Post-op visit (10 weeks)    Plan:    ICD-10-CM ICD-9-CM   1. Other closed fracture of proximal end of left tibia with routine healing, subsequent encounter  S82.192D V54.16   2. Trimalleolar fracture of ankle, closed, right, with routine healing, subsequent encounter  S82.851D V54.19    3. Post-operative state  Z98.890 V45.89     Current care, treatment plan, precautions, activity level/ modifications, limitations, rehabilitation exercises and proposed future treatment were discussed with the patient. We discussed the need to monitor for changes in symptoms and condition and report them to the physician.  Discussed importance of compliance with all appointments and follow up examinations.       OTHER:  - Patient currently in NH.  She is limited to just 1 limb due to bilateral leg fractures and recent pacemaker placement.         PHYSICAL THERAPY:   - PT/OT in NH  - Weight bearing flat foot for transfers to [facility].  - Will change to tall cam boot on right.  - Range of motion - Ok to start gentle ROM of R ankle and left knee not to exceed 90 degrees.       PAIN MEDICATION:   - Pain medication: refill was not needed  - Pain medication refill policy provided to patient for review, yes.    - Patient was informed a multi-modal approach is used to treat their pain.     DVT PROPHYLAXIS:   - ASA 81 mg bid x 6 weeks. Continue to take ASA while immobile.     FOLLOW UP:   - Patient will follow up in the clinic as scheduled. PATIENT WILL REQUIRE 30 MINUTE SLOT FOR FUTURE APPOINTMENT GIVEN COMPLEXITY OF CASE.  - X-ray of her left knee and right ankle non standing is needed.    - Future Appointments:   Future Appointments   Date Time Provider Department Center   6/26/2022 10:00 AM HOME MONITOR DEVICE CHECK, [facility] CRYSTAL Mariee [facility]   7/11/2022 10:30 AM Foreign Hendrix NP [facility] ORTHO [facility]   7/13/2022  7:45 AM EKG, APPT [facility] EKG [facility]   7/13/2022  8:00 AM COORDINATED DEVICE CHECK [facility] CRYSTAL Mariee [facility]   7/13/2022  8:20 AM Estiven Rivera MD [facility] ARRHYTH [facility]         POST OP PLAN:  1 week for removal of incisional wound VAC, silver dressing on incision, placement into a cast - completed.  3 weeks possible suture removal pending appearance of the healing, continue cast bilateral lower extremities -  Sutures removed 4/14/22  6 weeks - likely continue cast bilateral lower extremities - done  10 weeks - pending healing of fractures, consider discontinuation of casting - done  4 months - consider allowing weight bearing pending fracture healing.  6 months  1 year      If there are any questions prior to scheduled follow up, the patient was instructed to contact the office

## 2022-06-15 ENCOUNTER — TELEPHONE (OUTPATIENT)
Dept: ELECTROPHYSIOLOGY | Facility: CLINIC | Age: 87
End: 2022-06-15
Payer: MEDICARE
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
## 2022-06-26 ENCOUNTER — CLINICAL SUPPORT (OUTPATIENT)
Dept: CARDIOLOGY | Facility: HOSPITAL | Age: 87
End: 2022-06-26
Payer: MEDICARE

## 2022-06-26 DIAGNOSIS — Z95.0 PRESENCE OF CARDIAC PACEMAKER: ICD-10-CM

## 2022-06-26 PROCEDURE — 93296 REM INTERROG EVL PM/IDS: CPT | Performed by: INTERNAL MEDICINE

## 2022-06-26 PROCEDURE — 93294 CARDIAC DEVICE CHECK - REMOTE: ICD-10-PCS | Mod: ,,, | Performed by: INTERNAL MEDICINE

## 2022-06-26 PROCEDURE — 93294 REM INTERROG EVL PM/LDLS PM: CPT | Mod: ,,, | Performed by: INTERNAL MEDICINE

## 2022-06-28 ENCOUNTER — TELEPHONE (OUTPATIENT)
Dept: WOUND CARE | Facility: CLINIC | Age: 87
End: 2022-06-28
Payer: MEDICARE

## 2022-06-28 NOTE — TELEPHONE ENCOUNTER
Called patient number [phone] and left a message because wasn't able to speak to anyone at the phone number that was left for us to call was [phone].

## 2022-06-28 NOTE — TELEPHONE ENCOUNTER
----- Message from Ruma Gallegos sent at 6/28/2022  1:41 PM CDT -----  Contact: kaitlin Frye requesting call back RE: schedule appt,       Confirmed contact below:  Contact Name:kaitlin  Phone Number: [phone] kaitlin

## 2022-06-29 ENCOUNTER — TELEPHONE (OUTPATIENT)
Dept: PODIATRY | Facility: CLINIC | Age: 87
End: 2022-06-29
Payer: MEDICARE

## 2022-06-29 NOTE — TELEPHONE ENCOUNTER
Spoke to pt's daughter and scheduled pt for the next available appointment on [facility]. She states she will have the nursing home call and confirm. Call ended.

## 2022-07-05 NOTE — TELEPHONE ENCOUNTER
Pts daughter Belkys is asking for diflucan for possible yeast infection   Next Month's Dosage: Continue Current Dosage

## 2022-07-07 ENCOUNTER — PATIENT MESSAGE (OUTPATIENT)
Dept: PODIATRY | Facility: CLINIC | Age: 87
End: 2022-07-07

## 2022-07-07 ENCOUNTER — TELEPHONE (OUTPATIENT)
Dept: PODIATRY | Facility: CLINIC | Age: 87
End: 2022-07-07

## 2022-07-07 ENCOUNTER — OFFICE VISIT (OUTPATIENT)
Dept: PODIATRY | Facility: CLINIC | Age: 87
End: 2022-07-07
Payer: MEDICARE

## 2022-07-07 VITALS
DIASTOLIC BLOOD PRESSURE: 67 MMHG | WEIGHT: 192.44 LBS | HEART RATE: 97 BPM | BODY MASS INDEX: 30.93 KG/M2 | SYSTOLIC BLOOD PRESSURE: 107 MMHG | HEIGHT: 66 IN

## 2022-07-07 DIAGNOSIS — L97.429: ICD-10-CM

## 2022-07-07 DIAGNOSIS — B35.1 ONYCHOMYCOSIS DUE TO DERMATOPHYTE: ICD-10-CM

## 2022-07-07 DIAGNOSIS — E11.42 TYPE 2 DIABETES MELLITUS WITH DIABETIC POLYNEUROPATHY, UNSPECIFIED WHETHER LONG TERM INSULIN USE: Primary | ICD-10-CM

## 2022-07-07 PROCEDURE — 1101F PR PT FALLS ASSESS DOC 0-1 FALLS W/OUT INJ PAST YR: ICD-10-PCS | Mod: CPTII,S$GLB,, | Performed by: PODIATRIST

## 2022-07-07 PROCEDURE — 99204 OFFICE O/P NEW MOD 45 MIN: CPT | Mod: 25,S$GLB,, | Performed by: PODIATRIST

## 2022-07-07 PROCEDURE — 99999 PR PBB SHADOW E&M-EST. PATIENT-LVL IV: CPT | Mod: PBBFAC,,, | Performed by: PODIATRIST

## 2022-07-07 PROCEDURE — 11721 PR DEBRIDEMENT OF NAILS, 6 OR MORE: ICD-10-PCS | Mod: Q9,S$GLB,, | Performed by: PODIATRIST

## 2022-07-07 PROCEDURE — 1157F ADVNC CARE PLAN IN RCRD: CPT | Mod: CPTII,S$GLB,, | Performed by: PODIATRIST

## 2022-07-07 PROCEDURE — 1126F PR PAIN SEVERITY QUANTIFIED, NO PAIN PRESENT: ICD-10-PCS | Mod: CPTII,S$GLB,, | Performed by: PODIATRIST

## 2022-07-07 PROCEDURE — 3288F FALL RISK ASSESSMENT DOCD: CPT | Mod: CPTII,S$GLB,, | Performed by: PODIATRIST

## 2022-07-07 PROCEDURE — 1159F MED LIST DOCD IN RCRD: CPT | Mod: CPTII,S$GLB,, | Performed by: PODIATRIST

## 2022-07-07 PROCEDURE — 1101F PT FALLS ASSESS-DOCD LE1/YR: CPT | Mod: CPTII,S$GLB,, | Performed by: PODIATRIST

## 2022-07-07 PROCEDURE — 1159F PR MEDICATION LIST DOCUMENTED IN MEDICAL RECORD: ICD-10-PCS | Mod: CPTII,S$GLB,, | Performed by: PODIATRIST

## 2022-07-07 PROCEDURE — 1126F AMNT PAIN NOTED NONE PRSNT: CPT | Mod: CPTII,S$GLB,, | Performed by: PODIATRIST

## 2022-07-07 PROCEDURE — 11721 DEBRIDE NAIL 6 OR MORE: CPT | Mod: Q9,S$GLB,, | Performed by: PODIATRIST

## 2022-07-07 PROCEDURE — 99204 PR OFFICE/OUTPT VISIT, NEW, LEVL IV, 45-59 MIN: ICD-10-PCS | Mod: 25,S$GLB,, | Performed by: PODIATRIST

## 2022-07-07 PROCEDURE — 3288F PR FALLS RISK ASSESSMENT DOCUMENTED: ICD-10-PCS | Mod: CPTII,S$GLB,, | Performed by: PODIATRIST

## 2022-07-07 PROCEDURE — 1157F PR ADVANCE CARE PLAN OR EQUIV PRESENT IN MEDICAL RECORD: ICD-10-PCS | Mod: CPTII,S$GLB,, | Performed by: PODIATRIST

## 2022-07-07 PROCEDURE — 99999 PR PBB SHADOW E&M-EST. PATIENT-LVL IV: ICD-10-PCS | Mod: PBBFAC,,, | Performed by: PODIATRIST

## 2022-07-07 RX ORDER — CICLOPIROX 80 MG/ML
SOLUTION TOPICAL NIGHTLY
Qty: 6.6 ML | Refills: 11 | Status: SHIPPED | OUTPATIENT
Start: 2022-07-07

## 2022-07-07 NOTE — TELEPHONE ENCOUNTER
Spoke with patient daughter and informed her that patient declined care for wound and only wanted nail care.    Patient daughter asked that her mother be scheduled for wound care.    Appointment scheduled for 7/14 at 3:30p.    Patient daughter verbalized understanding.

## 2022-07-07 NOTE — PROGRESS NOTES
Subjective:      Patient ID: Lilia Hidalgo is a 89 y.o. female.    Chief Complaint: Diabetic Foot Exam    Diabetes, increased risk amputation needing evaluation/management/optomization of foot care.    Cc thick discolored misshapen toenails all toes.  Gradual onset, worsening over past several weeks, aggravated by increased weight bearing, shoe gear, pressure.  No previous medical treatment.  OTC pain med not helping. Denies trauma, surgery.    Review of Systems   Constitutional: Negative for chills, diaphoresis, fever, malaise/fatigue and night sweats.   Cardiovascular: Positive for leg swelling. Negative for claudication, cyanosis and syncope.   Skin: Positive for nail changes. Negative for color change, dry skin, rash, suspicious lesions and unusual hair distribution.   Musculoskeletal: Negative for falls, joint pain, joint swelling, muscle cramps, muscle weakness and stiffness.   Gastrointestinal: Negative for constipation, diarrhea, nausea and vomiting.   Neurological: Positive for numbness, paresthesias and sensory change. Negative for brief paralysis, disturbances in coordination, focal weakness and tremors.           Objective:      Physical Exam  Constitutional:       General: She is not in acute distress.     Appearance: She is well-developed. She is not diaphoretic.   Cardiovascular:      Pulses:           Popliteal pulses are 2+ on the right side and 2+ on the left side.        Dorsalis pedis pulses are 1+ on the right side and 1+ on the left side.        Posterior tibial pulses are 1+ on the right side and 1+ on the left side.      Comments: Capillary refill 3 seconds all toes/distal feet, all toes/both feet warm to touch.      Negative lymphadenopathy bilateral popliteal fossa and tarsal tunnel.      <2+ pitting lower extremity edema bilateral.    Musculoskeletal:      Right ankle: No swelling, deformity, ecchymosis or lacerations. Normal range of motion. Normal pulse.      Right Achilles Tendon:  Normal. No defects. Null's test negative.      Comments: Non ambulatory presently - heel ulcer left and fracture LLE.       Lymphadenopathy:      Lower Body: No right inguinal adenopathy. No left inguinal adenopathy.      Comments: Negative lymphadenopathy bilateral popliteal fossa and tarsal tunnel.    Negative lymphangitic streaking bilateral feet/ankles/legs.   Skin:     General: Skin is warm and dry.      Capillary Refill: Capillary refill takes 2 to 3 seconds.      Coloration: Skin is not pale.      Findings: No abrasion, bruising, burn, ecchymosis, erythema, laceration, lesion or rash.      Nails: There is no clubbing.      Comments:   Heel ulcer left - declines examination/evaluation.    Otherwise, Skin thin, shiny, atrophic, with decreased density and distribution of pedal hair bilateral, but without hyperpigmentation, vivek discoloration,  ulcers, masses, nodules or cords palpated bilateral feet and legs.    Toenails 1st, 2nd, 3rd, 4th, 5th  bilateral are hypertrophic thickened 2-3 mm, dystrophic, discolored tanish brown with tan, gray crumbly subungual debris.  Tender to distal nail plate pressure, without periungual skin abnormality of each.       Neurological:      Mental Status: She is alert and oriented to person, place, and time.      Sensory: No sensory deficit.      Motor: No tremor, atrophy or abnormal muscle tone.      Gait: Gait normal.      Deep Tendon Reflexes:      Reflex Scores:       Patellar reflexes are 2+ on the right side and 2+ on the left side.       Achilles reflexes are 2+ on the right side and 2+ on the left side.     Comments: Decreased/absent vibratory sensation bilateral feet to 128Hz tuning fork.    Paresthesias, bilateral feet with no clearly identified trigger or source.        Psychiatric:         Behavior: Behavior is cooperative.               Assessment:       Encounter Diagnoses   Name Primary?    Type 2 diabetes mellitus with diabetic polyneuropathy, unspecified  whether long term insulin use Yes    Chronic ulcer of heel, left, with unspecified severity     Onychomycosis due to dermatophyte          Plan:       Lilia was seen today for diabetic foot exam.    Diagnoses and all orders for this visit:    Type 2 diabetes mellitus with diabetic polyneuropathy, unspecified whether long term insulin use    Chronic ulcer of heel, left, with unspecified severity    Onychomycosis due to dermatophyte    Other orders  -     ciclopirox (PENLAC) 8 % Soln; Apply topically nightly.      I counseled the patient on her conditions, their implications and medical management.    Declines assessment/management of left heel ulcer - relates already cared for by facility wound care service.  I respectfully recommend offload boot rather than cushion boot.    - Shoe inspection. Diabetic Foot Education. Patient reminded of the importance of good nutrition and blood sugar control to help prevent podiatric complications of diabetes. Patient instructed on proper foot hygeine. We discussed wearing proper shoe gear, daily foot inspections, never walking without protective shoe gear, never putting sharp instruments to feet, routine podiatric visits at least annually.      - With patient's permission, nails were aggressively reduced and debrided x 10 to their soft tissue attachment mechanically and with electric , removing all offending nail and debris. Patient relates relief following the procedure. She will continue to monitor the areas daily, inspect her feet, wear protective shoe gear when ambulatory, moisturizer to maintain skin integrity and follow in this office p.r.n.    [facility]        No follow-ups on file.

## 2022-07-08 ENCOUNTER — TELEPHONE (OUTPATIENT)
Dept: PODIATRY | Facility: CLINIC | Age: 87
End: 2022-07-08
Payer: MEDICARE

## 2022-07-08 NOTE — TELEPHONE ENCOUNTER
Spoke with patient daughter and informed her that Dr. Washburn will be able to treat patient for heel wound.    Patient daughter verbalized understanding.            ----- Message from Marycarmne Arteaga sent at 7/8/2022  7:44 AM CDT -----  Type: Call Back      Who called: Belkys Moser (Daughter)      What is the request in detail: Belkys Moser (Daughter) is requesting a call back. She states that her mother had some issues at her appointment on yesterday and she had to reschedule. She just wanted to know if Dr. Washburn would be able to treat the heel. She states that they use a lift to get the patient from out of the wheelchair because of her hip and leg. She states that they ae barely able to look at the wound. Please advise.       Can the clinic reply by [facility]? No      Would the patient rather a call back or a response via [facility]? Call back       Best call back number: [phone]      Additional Information:

## 2022-07-11 ENCOUNTER — OFFICE VISIT (OUTPATIENT)
Dept: ORTHOPEDICS | Facility: CLINIC | Age: 87
End: 2022-07-11
Payer: MEDICARE

## 2022-07-11 ENCOUNTER — HOSPITAL ENCOUNTER (OUTPATIENT)
Dept: RADIOLOGY | Facility: HOSPITAL | Age: 87
Discharge: HOME | End: 2022-07-11
Attending: NURSE PRACTITIONER
Payer: MEDICARE

## 2022-07-11 DIAGNOSIS — S82.851D TRIMALLEOLAR FRACTURE OF ANKLE, CLOSED, RIGHT, WITH ROUTINE HEALING, SUBSEQUENT ENCOUNTER: Primary | ICD-10-CM

## 2022-07-11 DIAGNOSIS — M25.562 ACUTE PAIN OF LEFT KNEE: ICD-10-CM

## 2022-07-11 DIAGNOSIS — S82.192D OTHER CLOSED FRACTURE OF PROXIMAL END OF LEFT TIBIA WITH ROUTINE HEALING, SUBSEQUENT ENCOUNTER: ICD-10-CM

## 2022-07-11 DIAGNOSIS — M25.562 ACUTE PAIN OF LEFT KNEE: Primary | ICD-10-CM

## 2022-07-11 DIAGNOSIS — M25.571 ACUTE RIGHT ANKLE PAIN: ICD-10-CM

## 2022-07-11 PROCEDURE — 73560 XR KNEE 1 OR 2 VIEW LEFT: ICD-10-PCS | Mod: 26,LT,, | Performed by: RADIOLOGY

## 2022-07-11 PROCEDURE — 3288F FALL RISK ASSESSMENT DOCD: CPT | Mod: CPTII,S$GLB,, | Performed by: NURSE PRACTITIONER

## 2022-07-11 PROCEDURE — 73610 X-RAY EXAM OF ANKLE: CPT | Mod: TC,RT

## 2022-07-11 PROCEDURE — 73610 XR ANKLE COMPLETE 3 VIEW RIGHT: ICD-10-PCS | Mod: 26,RT,, | Performed by: RADIOLOGY

## 2022-07-11 PROCEDURE — 1159F PR MEDICATION LIST DOCUMENTED IN MEDICAL RECORD: ICD-10-PCS | Mod: CPTII,S$GLB,, | Performed by: NURSE PRACTITIONER

## 2022-07-11 PROCEDURE — 99999 PR PBB SHADOW E&M-EST. PATIENT-LVL III: ICD-10-PCS | Mod: PBBFAC,,, | Performed by: NURSE PRACTITIONER

## 2022-07-11 PROCEDURE — 73610 X-RAY EXAM OF ANKLE: CPT | Mod: 26,RT,, | Performed by: RADIOLOGY

## 2022-07-11 PROCEDURE — 1160F RVW MEDS BY RX/DR IN RCRD: CPT | Mod: CPTII,S$GLB,, | Performed by: NURSE PRACTITIONER

## 2022-07-11 PROCEDURE — 1159F MED LIST DOCD IN RCRD: CPT | Mod: CPTII,S$GLB,, | Performed by: NURSE PRACTITIONER

## 2022-07-11 PROCEDURE — 1101F PT FALLS ASSESS-DOCD LE1/YR: CPT | Mod: CPTII,S$GLB,, | Performed by: NURSE PRACTITIONER

## 2022-07-11 PROCEDURE — 1157F ADVNC CARE PLAN IN RCRD: CPT | Mod: CPTII,S$GLB,, | Performed by: NURSE PRACTITIONER

## 2022-07-11 PROCEDURE — 73560 X-RAY EXAM OF KNEE 1 OR 2: CPT | Mod: 26,LT,, | Performed by: RADIOLOGY

## 2022-07-11 PROCEDURE — 73560 X-RAY EXAM OF KNEE 1 OR 2: CPT | Mod: TC,LT

## 2022-07-11 PROCEDURE — 99213 PR OFFICE/OUTPT VISIT, EST, LEVL III, 20-29 MIN: ICD-10-PCS | Mod: S$GLB,,, | Performed by: NURSE PRACTITIONER

## 2022-07-11 PROCEDURE — 1101F PR PT FALLS ASSESS DOC 0-1 FALLS W/OUT INJ PAST YR: ICD-10-PCS | Mod: CPTII,S$GLB,, | Performed by: NURSE PRACTITIONER

## 2022-07-11 PROCEDURE — 99213 OFFICE O/P EST LOW 20 MIN: CPT | Mod: S$GLB,,, | Performed by: NURSE PRACTITIONER

## 2022-07-11 PROCEDURE — 1157F PR ADVANCE CARE PLAN OR EQUIV PRESENT IN MEDICAL RECORD: ICD-10-PCS | Mod: CPTII,S$GLB,, | Performed by: NURSE PRACTITIONER

## 2022-07-11 PROCEDURE — 3288F PR FALLS RISK ASSESSMENT DOCUMENTED: ICD-10-PCS | Mod: CPTII,S$GLB,, | Performed by: NURSE PRACTITIONER

## 2022-07-11 PROCEDURE — 99999 PR PBB SHADOW E&M-EST. PATIENT-LVL III: CPT | Mod: PBBFAC,,, | Performed by: NURSE PRACTITIONER

## 2022-07-11 PROCEDURE — 1160F PR REVIEW ALL MEDS BY PRESCRIBER/CLIN PHARMACIST DOCUMENTED: ICD-10-PCS | Mod: CPTII,S$GLB,, | Performed by: NURSE PRACTITIONER

## 2022-07-11 NOTE — PROGRESS NOTES
Ms. Hidalgo is here today for a follow up visit after undergoing an ORIF for her right trimalleolar ankle fracture without fixation of her posterior lip, debridement and irrigation for an open fracture of her right ankle including bone, fascia, subcutaneous tissue and closed management of her left proximal tibia periprosthetic fracture without manipulation by Dr. Carreno on 3/21/2022.    Interval History:  She reports that she is doing ok.  She is a resident of [facility] with SNF.  Pain is well controlled.  She is taking pain medication.  Currently on OxyIR PRN and Tylenol PRN for pain control.  She denies fever, chills, and sweats since the time of the surgery.     Physical exam:  Tall cam boot on right leg, incision is healing.  No signs of infection.  ROM of right ankle approximately 15 degrees in all planes.  Left knee skin has no breakdown.  ROM of her left knee is 0-90 degrees.  She currently has a left heel ulcer being managed by Podiatry.  She has tactile stimulation to their lower leg, she denies calf pain, there is no leg edema and their pedal pulse is palpable x 4.  MS in left leg is 3/5.    RADS: X-ray of her left knee and right ankle were obtained and personally reviewed by me.  Left knee shows a periprosthetic fracture to proximal tibia and proximal fibula that appears stable with progressive healing.  No evidence of hardware failure.  No new fractures when compared to prior x-ray.  X-ray to right ankle shows hardware to distal tibia and fibula is in good position and fracture is stable.  No new fractures seen when compared to prior x-ray.    Assessment:  Follow up visit (16 weeks)    Plan:    ICD-10-CM ICD-9-CM   1. Trimalleolar fracture of ankle, closed, right, with routine healing, subsequent encounter  S82.851D V54.19   2. Other closed fracture of proximal end of left tibia with routine healing, subsequent encounter  S82.192D V54.16     Current care, treatment plan, precautions,  activity level/ modifications, limitations, rehabilitation exercises and proposed future treatment were discussed with the patient. We discussed the need to monitor for changes in symptoms and condition and report them to the physician.  Discussed importance of compliance with all appointments and follow up examinations.         PHYSICAL THERAPY:   - PT/OT in NH  - Weight bearing - will advance to weight bear as tolerated from Ortho standpoint.  She will need to follow any weight restrictions per podiatry regarding left heel ulcer.    - Range of motion as tolerates.       PAIN MEDICATION:   - Pain medication: refill was not needed  - Pain medication refill policy provided to patient for review, yes.    - Patient was informed a multi-modal approach is used to treat their pain.     DVT PROPHYLAXIS:   - ASA 81 mg bid x 6 weeks. Continue to take ASA while immobile.     FOLLOW UP:   - Patient will follow up in the clinic as scheduled. PATIENT WILL REQUIRE 30 MINUTE SLOT FOR FUTURE APPOINTMENT GIVEN COMPLEXITY OF CASE.  - X-ray of her left knee and right ankle non standing is needed.    - Future Appointments:   Future Appointments   Date Time Provider Department Center   7/11/2022 10:30 AM Foreign Hendrix NP [facility]   7/14/2022  3:30 PM Cuco Washburn DPM [facility] PODIATR [place]   9/2/2022  1:40 PM COORDINATED DEVICE CHECK [facility] CRYSTAL Mariee [facility]   9/2/2022  2:15 PM EKG, APPT [facility] EKG [facility]   9/2/2022  2:40 PM Estiven Rivera MD [facility] ARRHYTH [facility]   9/24/2022 10:00 AM HOME MONITOR DEVICE CHECK, [facility] PAOLOANITA [facility]         POST OP PLAN:  1 week for removal of incisional wound VAC, silver dressing on incision, placement into a cast - completed.  3 weeks possible suture removal pending appearance of the healing, continue cast bilateral lower extremities - Sutures removed 4/14/22  6 weeks - likely continue cast bilateral lower extremities - done  10 weeks - pending healing of fractures, consider  discontinuation of casting - done  4 months - consider allowing weight bearing pending fracture healing. - done  6 months  1 year      If there are any questions prior to scheduled follow up, the patient was instructed to contact the office

## 2022-07-13 ENCOUNTER — PATIENT MESSAGE (OUTPATIENT)
Dept: PODIATRY | Facility: CLINIC | Age: 87
End: 2022-07-13
Payer: MEDICARE

## 2022-07-14 ENCOUNTER — APPOINTMENT (OUTPATIENT)
Dept: RADIOLOGY | Facility: OTHER | Age: 87
End: 2022-07-14
Attending: PODIATRIST
Payer: MEDICARE

## 2022-07-14 ENCOUNTER — OFFICE VISIT (OUTPATIENT)
Dept: PODIATRY | Facility: CLINIC | Age: 87
End: 2022-07-14
Payer: MEDICARE

## 2022-07-14 VITALS
BODY MASS INDEX: 30.93 KG/M2 | DIASTOLIC BLOOD PRESSURE: 71 MMHG | HEART RATE: 84 BPM | HEIGHT: 66 IN | SYSTOLIC BLOOD PRESSURE: 118 MMHG | WEIGHT: 192.44 LBS

## 2022-07-14 DIAGNOSIS — E11.42 TYPE 2 DIABETES MELLITUS WITH DIABETIC POLYNEUROPATHY, UNSPECIFIED WHETHER LONG TERM INSULIN USE: ICD-10-CM

## 2022-07-14 DIAGNOSIS — L97.429: ICD-10-CM

## 2022-07-14 DIAGNOSIS — L97.429: Primary | ICD-10-CM

## 2022-07-14 PROCEDURE — 3288F FALL RISK ASSESSMENT DOCD: CPT | Mod: CPTII,S$GLB,, | Performed by: PODIATRIST

## 2022-07-14 PROCEDURE — 3288F PR FALLS RISK ASSESSMENT DOCUMENTED: ICD-10-PCS | Mod: CPTII,S$GLB,, | Performed by: PODIATRIST

## 2022-07-14 PROCEDURE — 1126F PR PAIN SEVERITY QUANTIFIED, NO PAIN PRESENT: ICD-10-PCS | Mod: CPTII,S$GLB,, | Performed by: PODIATRIST

## 2022-07-14 PROCEDURE — 1160F RVW MEDS BY RX/DR IN RCRD: CPT | Mod: CPTII,S$GLB,, | Performed by: PODIATRIST

## 2022-07-14 PROCEDURE — 1157F ADVNC CARE PLAN IN RCRD: CPT | Mod: CPTII,S$GLB,, | Performed by: PODIATRIST

## 2022-07-14 PROCEDURE — 1101F PT FALLS ASSESS-DOCD LE1/YR: CPT | Mod: CPTII,S$GLB,, | Performed by: PODIATRIST

## 2022-07-14 PROCEDURE — 99213 PR OFFICE/OUTPT VISIT, EST, LEVL III, 20-29 MIN: ICD-10-PCS | Mod: S$GLB,,, | Performed by: PODIATRIST

## 2022-07-14 PROCEDURE — 1101F PR PT FALLS ASSESS DOC 0-1 FALLS W/OUT INJ PAST YR: ICD-10-PCS | Mod: CPTII,S$GLB,, | Performed by: PODIATRIST

## 2022-07-14 PROCEDURE — 99213 OFFICE O/P EST LOW 20 MIN: CPT | Mod: S$GLB,,, | Performed by: PODIATRIST

## 2022-07-14 PROCEDURE — 1126F AMNT PAIN NOTED NONE PRSNT: CPT | Mod: CPTII,S$GLB,, | Performed by: PODIATRIST

## 2022-07-14 PROCEDURE — 73650 X-RAY EXAM OF HEEL: CPT | Mod: TC,LT

## 2022-07-14 PROCEDURE — 99999 PR PBB SHADOW E&M-EST. PATIENT-LVL IV: CPT | Mod: PBBFAC,,, | Performed by: PODIATRIST

## 2022-07-14 PROCEDURE — 73650 X-RAY EXAM OF HEEL: CPT | Mod: 26,LT,, | Performed by: INTERNAL MEDICINE

## 2022-07-14 PROCEDURE — 99999 PR PBB SHADOW E&M-EST. PATIENT-LVL IV: ICD-10-PCS | Mod: PBBFAC,,, | Performed by: PODIATRIST

## 2022-07-14 PROCEDURE — 1159F PR MEDICATION LIST DOCUMENTED IN MEDICAL RECORD: ICD-10-PCS | Mod: CPTII,S$GLB,, | Performed by: PODIATRIST

## 2022-07-14 PROCEDURE — 1159F MED LIST DOCD IN RCRD: CPT | Mod: CPTII,S$GLB,, | Performed by: PODIATRIST

## 2022-07-14 PROCEDURE — 73650 XR CALCANEUS 2 VIEW LEFT: ICD-10-PCS | Mod: 26,LT,, | Performed by: INTERNAL MEDICINE

## 2022-07-14 PROCEDURE — 1160F PR REVIEW ALL MEDS BY PRESCRIBER/CLIN PHARMACIST DOCUMENTED: ICD-10-PCS | Mod: CPTII,S$GLB,, | Performed by: PODIATRIST

## 2022-07-14 PROCEDURE — 1157F PR ADVANCE CARE PLAN OR EQUIV PRESENT IN MEDICAL RECORD: ICD-10-PCS | Mod: CPTII,S$GLB,, | Performed by: PODIATRIST

## 2022-07-14 NOTE — PROGRESS NOTES
Subjective:      Patient ID: Lilia Hidalgo is a 89 y.o. female.    Chief Complaint: Wound Care (L heel)    Wound the back of the left heel with pain same.  Unknown onset, improving very slowly over the past 5 weeks.  Aggravated by pressure and immobility.  Current treatment daily with [facility].  Denies trauma signs of infection.  Currently has bordered gauze.    Review of Systems   Constitutional: Negative for chills, diaphoresis, fever, malaise/fatigue and night sweats.   Cardiovascular: Negative for claudication, cyanosis, leg swelling and syncope.   Skin: Positive for poor wound healing. Negative for color change, dry skin, nail changes, rash, suspicious lesions and unusual hair distribution.   Musculoskeletal: Negative for falls, joint pain, joint swelling, muscle cramps, muscle weakness and stiffness.   Gastrointestinal: Negative for constipation, diarrhea, nausea and vomiting.   Neurological: Negative for brief paralysis, disturbances in coordination, focal weakness, numbness, paresthesias, sensory change and tremors.           Objective:      Physical Exam  Constitutional:       General: She is not in acute distress.     Appearance: She is well-developed.   Cardiovascular:      Pulses:           Dorsalis pedis pulses are 1+ on the right side and 1+ on the left side.        Posterior tibial pulses are 1+ on the right side and 1+ on the left side.      Comments: All toes warm, pink    Lymphadenopathy:      Comments: Negative lymphadenopathy bilateral popliteal fossa and tarsal tunnel.     Skin:     Comments: Wound:  Posterior left heel.    Size:    Wound cannot be accurately measured due to inability to accurately evaluate visually.    Base:  Granular, pink with moderate serous/serosanaguinous drainage only.  No pus, tracking, fluctuance, malodor, or cardinal signs infection.    Borders:  Atrophic, flat, pink, blanchable skin edges without undermining.       Neurological:      Sensory:  Sensory deficit present.      Comments: Negative tinel sign to percussion sural, superficial peroneal, deep peroneal, saphenous, and posterior tibial nerves right and left ankles and feet.    Negative allodynia both feet     Psychiatric:         Behavior: Behavior is cooperative.               Assessment:       Encounter Diagnoses   Name Primary?    Chronic ulcer of heel, left, with unspecified severity Yes    Type 2 diabetes mellitus with diabetic polyneuropathy, unspecified whether long term insulin use          Plan:       Lilia was seen today for wound care.    Diagnoses and all orders for this visit:    Chronic ulcer of heel, left, with unspecified severity  -     X-Ray Calcaneus 2 View Left; Future    Type 2 diabetes mellitus with diabetic polyneuropathy, unspecified whether long term insulin use  -     X-Ray Calcaneus 2 View Left; Future      I counseled the patient on her conditions, their implications and medical management.    Patient due to his condition and fractures the right lower extremity is not able to get into the chair in the treatment room.  I am not able to lower myself into an appropriate position to evaluate this wound.  Patient needs to be transferred to a wound care center with a [place] lift as soon as possible.    I recommend [facility] as [place] is the closest geographically.    Multi Podus healed offload boot left to offload the wound facilitate healing.    X-rays left heel.    Continue current wound care with addition of elevating the wound off the resting surface entirely and/or multi Podus boot all times nonweightbearing left.    A fall this wound at 3 week intervals due to its stability and expected long-term slow course.  Otherwise, this wound needs better visualization and full scale wound care center.          No follow-ups on file.

## 2022-07-20 ENCOUNTER — TELEPHONE (OUTPATIENT)
Dept: ELECTROPHYSIOLOGY | Facility: CLINIC | Age: 87
End: 2022-07-20
Payer: MEDICARE

## 2022-07-20 NOTE — TELEPHONE ENCOUNTER
Returned the pt call to pt's daughter Belkys who stated it is extremely difficult to get the pt to in clinic appts due following a fall that resulting in the pt breaking both of her legs in March 2022.  Informed Belkys that I would send a message to the [facility] RN Supervisor to see if the pt meets criteria for a virtual visit.  Understanding was verbalized.  The daughter appreciated the call.

## 2022-07-20 NOTE — TELEPHONE ENCOUNTER
----- Message from Irene Banda sent at 7/20/2022 11:51 AM CDT -----  Regarding: Questions  Belkys [phone] pt daughter would like a call in ref to the pt upcoming appts. She said fell an broke both legs and is currently in a nursing home. She does have her device at the facility, but she would like to speak with someone.    Thanks

## 2022-07-25 ENCOUNTER — PATIENT MESSAGE (OUTPATIENT)
Dept: ORTHOPEDICS | Facility: CLINIC | Age: 87
End: 2022-07-25
Payer: MEDICARE

## 2022-07-29 ENCOUNTER — PATIENT MESSAGE (OUTPATIENT)
Dept: ORTHOPEDICS | Facility: CLINIC | Age: 87
End: 2022-07-29
Payer: MEDICARE

## 2022-07-29 DIAGNOSIS — S82.851D TRIMALLEOLAR FRACTURE OF ANKLE, CLOSED, RIGHT, WITH ROUTINE HEALING, SUBSEQUENT ENCOUNTER: Primary | ICD-10-CM

## 2022-08-03 ENCOUNTER — PATIENT MESSAGE (OUTPATIENT)
Dept: ORTHOPEDICS | Facility: CLINIC | Age: 87
End: 2022-08-03
Payer: MEDICARE

## 2022-09-09 DIAGNOSIS — I44.2 COMPLETE AV BLOCK: Primary | ICD-10-CM

## 2022-09-13 ENCOUNTER — TELEPHONE (OUTPATIENT)
Dept: ELECTROPHYSIOLOGY | Facility: CLINIC | Age: 87
End: 2022-09-13
Payer: MEDICARE

## 2022-09-14 ENCOUNTER — PATIENT MESSAGE (OUTPATIENT)
Dept: ELECTROPHYSIOLOGY | Facility: CLINIC | Age: 87
End: 2022-09-14

## 2022-09-14 ENCOUNTER — OFFICE VISIT (OUTPATIENT)
Dept: ELECTROPHYSIOLOGY | Facility: CLINIC | Age: 87
End: 2022-09-14
Payer: MEDICARE

## 2022-09-14 DIAGNOSIS — I35.0 NONRHEUMATIC AORTIC VALVE STENOSIS: Chronic | ICD-10-CM

## 2022-09-14 DIAGNOSIS — F01.50 VASCULAR DEMENTIA WITHOUT BEHAVIORAL DISTURBANCE: Primary | Chronic | ICD-10-CM

## 2022-09-14 DIAGNOSIS — I11.0 HYPERTENSIVE HEART DISEASE WITH HEART FAILURE: Chronic | ICD-10-CM

## 2022-09-14 DIAGNOSIS — I50.32 CHRONIC DIASTOLIC CONGESTIVE HEART FAILURE: Chronic | ICD-10-CM

## 2022-09-14 DIAGNOSIS — E66.9 CLASS 1 OBESITY WITH SERIOUS COMORBIDITY AND BODY MASS INDEX (BMI) OF 31.0 TO 31.9 IN ADULT, UNSPECIFIED OBESITY TYPE: ICD-10-CM

## 2022-09-14 DIAGNOSIS — I70.0 ATHEROSCLEROSIS OF AORTA: Chronic | ICD-10-CM

## 2022-09-14 DIAGNOSIS — E78.5 HYPERLIPIDEMIA, UNSPECIFIED HYPERLIPIDEMIA TYPE: Chronic | ICD-10-CM

## 2022-09-14 DIAGNOSIS — Z66 DNR (DO NOT RESUSCITATE): ICD-10-CM

## 2022-09-14 DIAGNOSIS — I10 ESSENTIAL HYPERTENSION: Chronic | ICD-10-CM

## 2022-09-14 DIAGNOSIS — I44.2 COMPLETE AV BLOCK: ICD-10-CM

## 2022-09-14 PROBLEM — I15.2 OBESITY, DIABETES, AND HYPERTENSION SYNDROME: Status: ACTIVE | Noted: 2020-09-21

## 2022-09-14 PROBLEM — E11.59 OBESITY, DIABETES, AND HYPERTENSION SYNDROME: Status: ACTIVE | Noted: 2020-09-21

## 2022-09-14 PROCEDURE — 1100F PTFALLS ASSESS-DOCD GE2>/YR: CPT | Mod: CPTII,95,, | Performed by: INTERNAL MEDICINE

## 2022-09-14 PROCEDURE — 99499 RISK ADDL DX/OHS AUDIT: ICD-10-PCS | Mod: HCNC,95,, | Performed by: INTERNAL MEDICINE

## 2022-09-14 PROCEDURE — 99214 PR OFFICE/OUTPT VISIT, EST, LEVL IV, 30-39 MIN: ICD-10-PCS | Mod: 95,,, | Performed by: INTERNAL MEDICINE

## 2022-09-14 PROCEDURE — 1157F ADVNC CARE PLAN IN RCRD: CPT | Mod: CPTII,95,, | Performed by: INTERNAL MEDICINE

## 2022-09-14 PROCEDURE — 1159F PR MEDICATION LIST DOCUMENTED IN MEDICAL RECORD: ICD-10-PCS | Mod: CPTII,95,, | Performed by: INTERNAL MEDICINE

## 2022-09-14 PROCEDURE — 99214 OFFICE O/P EST MOD 30 MIN: CPT | Mod: 95,,, | Performed by: INTERNAL MEDICINE

## 2022-09-14 PROCEDURE — 1100F PR PT FALLS ASSESS DOC 2+ FALLS/FALL W/INJURY/YR: ICD-10-PCS | Mod: CPTII,95,, | Performed by: INTERNAL MEDICINE

## 2022-09-14 PROCEDURE — 1159F MED LIST DOCD IN RCRD: CPT | Mod: CPTII,95,, | Performed by: INTERNAL MEDICINE

## 2022-09-14 PROCEDURE — 3288F PR FALLS RISK ASSESSMENT DOCUMENTED: ICD-10-PCS | Mod: CPTII,95,, | Performed by: INTERNAL MEDICINE

## 2022-09-14 PROCEDURE — 99499 UNLISTED E&M SERVICE: CPT | Mod: HCNC,95,, | Performed by: INTERNAL MEDICINE

## 2022-09-14 PROCEDURE — 1160F RVW MEDS BY RX/DR IN RCRD: CPT | Mod: CPTII,95,, | Performed by: INTERNAL MEDICINE

## 2022-09-14 PROCEDURE — 3288F FALL RISK ASSESSMENT DOCD: CPT | Mod: CPTII,95,, | Performed by: INTERNAL MEDICINE

## 2022-09-14 PROCEDURE — 1160F PR REVIEW ALL MEDS BY PRESCRIBER/CLIN PHARMACIST DOCUMENTED: ICD-10-PCS | Mod: CPTII,95,, | Performed by: INTERNAL MEDICINE

## 2022-09-14 PROCEDURE — 1157F PR ADVANCE CARE PLAN OR EQUIV PRESENT IN MEDICAL RECORD: ICD-10-PCS | Mod: CPTII,95,, | Performed by: INTERNAL MEDICINE

## 2022-09-14 RX ORDER — BALSAM PERU/CASTOR OIL
OINTMENT (GRAM) TOPICAL
COMMUNITY
Start: 2022-04-01

## 2022-09-14 RX ORDER — OXYCODONE HCL 5 MG/5 ML
SOLUTION, ORAL ORAL
COMMUNITY
Start: 2022-04-11

## 2022-09-14 RX ORDER — LEVOTHYROXINE SODIUM 25 UG/1
TABLET ORAL
COMMUNITY
Start: 2022-04-13

## 2022-09-14 NOTE — LETTER
September 14, 2022        [facility] - Electrophysiology 3rd Fl  [address]  [facility] [address]  Phone: [phone]  Fax: [phone]   Patient: Lilia Hidalgo   MR Number: [id number]   YOB: 1931   Date of Visit: 9/14/2022       Dear Dr. Acuña:    Thank you for referring Lilia Hidalgo to me for evaluation. Below are the relevant portions of my assessment and plan of care.            If you have questions, please do not hesitate to call me. I look forward to following Lilia along with you.    Sincerely,      Estiven Rivera MD           CC  No Recipients

## 2022-09-14 NOTE — PROGRESS NOTES
Subjective:    Patient ID:  Lilia Hidalgo is a 89 y.o. female who presents for evaluation of PPM / CHB    HPI  89 y.o. F nursing home resident  dementia  HTN on meds  HL on meds  moderate AS    She's chronically dependent for ambulation: always in wheelchair; needs to be pushed.  Broke both ankles in 3/2022. Found in CHB. TVP placed, and eventually PPM.  Since, doing well.  PPM working well.    echo 3/2022 65%. moderate AS.    My interpretation of recent ECG is ASVP    Review of Systems   Constitutional: Negative. Negative for malaise/fatigue.   HENT: Negative.  Negative for ear pain and tinnitus.    Eyes:  Negative for blurred vision.   Cardiovascular: Negative.  Negative for chest pain, dyspnea on exertion, near-syncope, palpitations and syncope.   Respiratory: Negative.  Negative for shortness of breath.    Endocrine: Negative.  Negative for polyuria.   Hematologic/Lymphatic: Does not bruise/bleed easily.   Skin: Negative.  Negative for rash.   Musculoskeletal: Negative.  Negative for joint pain and muscle weakness.   Gastrointestinal: Negative.  Negative for abdominal pain and change in bowel habit.   Genitourinary:  Negative for frequency.   Neurological: Negative.  Negative for dizziness and weakness.   Psychiatric/Behavioral: Negative.  Negative for depression. The patient is not nervous/anxious.    Allergic/Immunologic: Negative for environmental allergies.      Objective:      Seen with daughter present.  Well developed, well nourished.   No distress.  Speaks in full sentences.        Assessment:       1. Vascular dementia without behavioral disturbance    2. Nonrheumatic aortic valve stenosis    3. Atherosclerosis of aorta    4. Chronic diastolic congestive heart failure    5. Complete AV block    6. Essential hypertension    7. Hyperlipidemia, unspecified hyperlipidemia type    8. Hypertensive heart disease with heart failure    9. Class 1 obesity with serious comorbidity and body mass index (BMI) of  31.0 to 31.9 in adult, unspecified obesity type    10. DNR (do not resuscitate)         Plan:       Doing well re: PPM for CHB; continue to follow.  Referral to general cardiology re: AS, HTN.    Return in 1 year, or earlier prn.

## 2022-09-15 VITALS
WEIGHT: 162 LBS | HEART RATE: 70 BPM | HEIGHT: 66 IN | BODY MASS INDEX: 26.03 KG/M2 | DIASTOLIC BLOOD PRESSURE: 49 MMHG | SYSTOLIC BLOOD PRESSURE: 101 MMHG

## 2022-09-24 ENCOUNTER — CLINICAL SUPPORT (OUTPATIENT)
Dept: CARDIOLOGY | Facility: HOSPITAL | Age: 87
End: 2022-09-24
Payer: MEDICARE

## 2022-09-24 DIAGNOSIS — I44.2 ATRIOVENTRICULAR BLOCK, COMPLETE: ICD-10-CM

## 2022-09-24 DIAGNOSIS — Z95.0 PRESENCE OF CARDIAC PACEMAKER: ICD-10-CM

## 2022-09-24 PROCEDURE — 93296 REM INTERROG EVL PM/IDS: CPT | Performed by: INTERNAL MEDICINE

## 2022-09-30 ENCOUNTER — TELEPHONE (OUTPATIENT)
Dept: CARDIOLOGY | Facility: CLINIC | Age: 87
End: 2022-09-30
Payer: MEDICARE

## 2022-09-30 ENCOUNTER — PATIENT MESSAGE (OUTPATIENT)
Dept: OPHTHALMOLOGY | Facility: CLINIC | Age: 87
End: 2022-09-30
Payer: MEDICARE

## 2022-09-30 NOTE — TELEPHONE ENCOUNTER
----- Message from Javier Sanon sent at 9/30/2022  2:57 PM CDT -----  Contact: pt  .Type:  Needs Medical Advice    Who Called: pt. Daughter Belkys  Would the patient rather a call back or a response via [facility]? Call back  Best Call Back Number: [phone]  Additional Information: Pt. Daughter is requesting a call back from the office regarding her mother appts. That she canceled for on 10/03/2022.

## 2022-09-30 NOTE — TELEPHONE ENCOUNTER
Called pt back in regards to this message.    Spoke with pt daughter Belkys  Stated pt is under the care with providers at [facility]  Does not think the pt needs to follow up with general cardiology at this time  Stated pt is bed bound and has difficulty sitting up as well    Will reach out to the office if feels the need to be seen or schedule a virtual visit    ND

## 2022-09-30 NOTE — TELEPHONE ENCOUNTER
Called the pt in regards to this message.   The pt did not answer, but I left a detailed voice message as well as a call back number.    ND

## 2022-09-30 NOTE — TELEPHONE ENCOUNTER
----- Message from Paulette Hernandez sent at 9/30/2022  3:17 PM CDT -----  Type:  Patient Returning Call    Who Called:pt  Who Left Message for Patient:office  Does the patient know what this is regarding?:appointment  Would the patient rather a call back or a response via [facility]? call  Best Call Back Number:[phone]  Additional Information:

## 2022-10-10 ENCOUNTER — PATIENT MESSAGE (OUTPATIENT)
Dept: ORTHOPEDICS | Facility: CLINIC | Age: 87
End: 2022-10-10
Payer: MEDICARE

## 2022-10-19 NOTE — PROGRESS NOTES
Upon assessment, skin tear at medial buttock noted, site red,a dn pink, reposition pt with wedge and pillow. Site cleaned and BPCO ointment applied and foam changed. Wound consult ordered.   No

## 2022-11-15 ENCOUNTER — PES CALL (OUTPATIENT)
Dept: ADMINISTRATIVE | Facility: CLINIC | Age: 87
End: 2022-11-15
Payer: MEDICARE

## 2022-12-17 NOTE — PT/OT/SLP PROGRESS
Speech Language Pathology      Lilia Hidalgo  MRN: [id number]    SLP to bedside to attempt to see pt for swallow assessment earlier this AM. Pt currently NPO pending OR procedure for pacemaker placement. SLP discussed with RN speech service will re-attempt post-op as medically appropriate/schedule allows otherwise plan to see assessment tomorrow  03/29/22 .     Argelia Baltazar MS, CCC-SLP  Speech Language Pathologist  Pager: [phone]  Date 3/28/2022          [Mother] : mother

## 2022-12-23 ENCOUNTER — CLINICAL SUPPORT (OUTPATIENT)
Dept: CARDIOLOGY | Facility: HOSPITAL | Age: 87
End: 2022-12-23
Payer: MEDICARE

## 2022-12-23 DIAGNOSIS — Z95.0 PRESENCE OF CARDIAC PACEMAKER: ICD-10-CM

## 2022-12-23 DIAGNOSIS — I44.2 ATRIOVENTRICULAR BLOCK, COMPLETE: ICD-10-CM

## 2022-12-23 PROCEDURE — 93294 CARDIAC DEVICE CHECK - REMOTE: ICD-10-PCS | Mod: HCNC,,, | Performed by: INTERNAL MEDICINE

## 2022-12-23 PROCEDURE — 93294 REM INTERROG EVL PM/LDLS PM: CPT | Mod: HCNC,,, | Performed by: INTERNAL MEDICINE

## 2022-12-23 PROCEDURE — 93296 REM INTERROG EVL PM/IDS: CPT | Mod: HCNC | Performed by: INTERNAL MEDICINE

## 2023-02-09 DIAGNOSIS — Z00.00 ENCOUNTER FOR MEDICARE ANNUAL WELLNESS EXAM: ICD-10-CM

## 2023-02-10 ENCOUNTER — PATIENT MESSAGE (OUTPATIENT)
Dept: HOME HEALTH SERVICES | Facility: CLINIC | Age: 88
End: 2023-02-10
Payer: MEDICARE

## 2023-03-23 ENCOUNTER — CLINICAL SUPPORT (OUTPATIENT)
Dept: CARDIOLOGY | Facility: HOSPITAL | Age: 88
End: 2023-03-23
Payer: MEDICARE

## 2023-03-23 DIAGNOSIS — Z95.0 PRESENCE OF CARDIAC PACEMAKER: ICD-10-CM

## 2023-03-23 PROCEDURE — 93296 REM INTERROG EVL PM/IDS: CPT | Mod: HCNC | Performed by: INTERNAL MEDICINE

## 2023-06-21 ENCOUNTER — CLINICAL SUPPORT (OUTPATIENT)
Dept: CARDIOLOGY | Facility: HOSPITAL | Age: 88
End: 2023-06-21
Payer: MEDICARE

## 2023-06-21 DIAGNOSIS — Z95.0 PRESENCE OF CARDIAC PACEMAKER: ICD-10-CM

## 2023-06-21 PROCEDURE — 93294 CARDIAC DEVICE CHECK - REMOTE: ICD-10-PCS | Mod: ,,, | Performed by: INTERNAL MEDICINE

## 2023-06-21 PROCEDURE — 93294 REM INTERROG EVL PM/LDLS PM: CPT | Mod: ,,, | Performed by: INTERNAL MEDICINE

## 2023-06-21 PROCEDURE — 93296 REM INTERROG EVL PM/IDS: CPT | Performed by: INTERNAL MEDICINE

## 2023-08-14 ENCOUNTER — PATIENT MESSAGE (OUTPATIENT)
Dept: ELECTROPHYSIOLOGY | Facility: CLINIC | Age: 88
End: 2023-08-14
Payer: MEDICARE

## 2023-08-22 ENCOUNTER — HOSPITAL ENCOUNTER (EMERGENCY)
Facility: HOSPITAL | Age: 88
Discharge: HOME | End: 2023-08-23
Attending: STUDENT IN AN ORGANIZED HEALTH CARE EDUCATION/TRAINING PROGRAM
Payer: MEDICARE
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
## 2023-08-22 PROCEDURE — 99285 EMERGENCY DEPT VISIT HI MDM: CPT | Mod: 25,HCNC

## 2023-08-22 PROCEDURE — 25000003 PHARM REV CODE 250: Mod: HCNC | Performed by: PHYSICIAN ASSISTANT

## 2023-08-22 RX ORDER — OXYCODONE AND ACETAMINOPHEN 5; 325 MG/1; MG/1
1 TABLET ORAL
Status: COMPLETED | OUTPATIENT
Start: 2023-08-22 | End: 2023-08-22

## 2023-08-22 RX ADMIN — OXYCODONE HYDROCHLORIDE AND ACETAMINOPHEN 1 TABLET: 5; 325 TABLET ORAL at 10:08

## 2023-08-23 VITALS
DIASTOLIC BLOOD PRESSURE: 62 MMHG | OXYGEN SATURATION: 93 % | WEIGHT: 162.06 LBS | TEMPERATURE: 98 F | HEART RATE: 96 BPM | SYSTOLIC BLOOD PRESSURE: 133 MMHG | BODY MASS INDEX: 26.15 KG/M2 | RESPIRATION RATE: 17 BRPM

## 2023-08-23 PROCEDURE — 63600175 PHARM REV CODE 636 W HCPCS: Mod: HCNC | Performed by: PHYSICIAN ASSISTANT

## 2023-08-23 PROCEDURE — 96372 THER/PROPH/DIAG INJ SC/IM: CPT | Performed by: PHYSICIAN ASSISTANT

## 2023-08-23 RX ORDER — HYDROMORPHONE HYDROCHLORIDE 1 MG/ML
0.25 INJECTION, SOLUTION INTRAMUSCULAR; INTRAVENOUS; SUBCUTANEOUS
Status: DISCONTINUED | OUTPATIENT
Start: 2023-08-23 | End: 2023-08-23

## 2023-08-23 RX ORDER — HYDROMORPHONE HYDROCHLORIDE 1 MG/ML
0.25 INJECTION, SOLUTION INTRAMUSCULAR; INTRAVENOUS; SUBCUTANEOUS
Status: COMPLETED | OUTPATIENT
Start: 2023-08-23 | End: 2023-08-23

## 2023-08-23 RX ADMIN — HYDROMORPHONE HYDROCHLORIDE 0.25 MG: 1 INJECTION, SOLUTION INTRAMUSCULAR; INTRAVENOUS; SUBCUTANEOUS at 01:08

## 2023-08-23 NOTE — ED TRIAGE NOTES
"Patient presents to the Ed after having a fall at the nursing home. Family stated nursing home said they found patient on the floor during the night. Pt states "she was trying to get out of bed, and lost her footing while putting on slippers and fell on her butt." Pt. Denies hitting her head. Pt. Complaining of lower back pain. Hx. Of dementia. Daughter stated mother is bedridden, so does not really understand how she fell.   " Stable

## 2023-08-23 NOTE — ED PROVIDER NOTES
Encounter Date: 8/22/2023       History     Chief Complaint   Patient presents with    Fall     Slid out of bed and slippers gave out, pt landed on bottom     89-year-old female to the ER via EMS.  Patient resides at [facility].  Patient is bed-bound.  Patient can not get up without any assistance.  She apparently attempted to get out of bed tonight which resulted fall from the bed to the ground.  She landed on her bottom.  No head injury, no LOC. arrives to the ER via EMS.  Appears well and nontoxic, vital signs are normal.  No medications taken for pain prior to arrival.  Complaining of pain to the lower back and the hips.      Review of patient's allergies indicates:   Allergen Reactions    Aspirin Nausea Only and Other (See Comments)     Other reaction(s): esophageal pain    Celebrex [celecoxib] Rash    Morphine Hallucinations    Steroid [corticosteroids (glucocorticoids)] Other (See Comments)     Other reaction(s): Flushing (skin)    Sulfa dyne Other (See Comments)     Other reaction(s): esophogeal pain     Past Medical History:   Diagnosis Date    Aortic stenosis 6/17/2015    Arthritis     Atrophic vaginitis 2/18/2016    Bilateral edema of lower extremity 6/22/2016    CHF (congestive heart failure)     Cholelithiasis without cholecystitis 5/5/2017    Chronic pain of left knee 8/3/2017    Depressed mood 6/22/2016    Diverticulitis of large intestine without perforation or abscess without bleeding 5/5/2017    Encounter for blood transfusion     Essential hypertension     GERD (gastroesophageal reflux disease)     Hyperlipidemia     Hypertension     Hypertensive heart disease with heart failure 7/14/2015    Insomnia     Joint pain     Knee pain, left 08/2016    pain with walking or standing    Primary osteoarthritis of knee 2/5/2013    PUD (peptic ulcer disease)     S/P knee replacement 2/13/2013    Slow transit constipation 2/18/2016     Past Surgical History:   Procedure Laterality Date    A-V CARDIAC  "PACEMAKER INSERTION Left 3/28/2022    Procedure: INSERTION, CARDIAC PACEMAKER, DUAL CHAMBER;  Surgeon: Estiven Rivera MD;  Location: [facility] EP LAB;  Service: Cardiology;  Laterality: Left;  CHB, DUAL PPM, BIO, DM, [place], [place] [id number]    APPENDECTOMY      APPLICATION OF LARGE EXTERNAL FIXATION DEVICE TO TIBIA Right 3/21/2022    Procedure: APPLICATION, EXTERNAL FIXATION DEVICE, LARGE, TIBIA;  Surgeon: Anirudh Carreno MD;  Location: [facility] OR [address] [place] [address];  Service: Orthopedics;  Laterality: Right;    COLONOSCOPY      08    EYE SURGERY      bilateral cataract    FINGER SURGERY      LT thumb surgery--"arthritis surgery"    HYSTERECTOMY      UNKNOWN BSO    JOINT REPLACEMENT      right hip replacement    KNEE ARTHROPLASTY Left 2001    TONSILLECTOMY      TUMOR EXCISION      esophageal tumor removal     UPPER GASTROINTESTINAL ENDOSCOPY      2013     Family History   Problem Relation Age of Onset    Heart disease Mother     Heart disease Father     Asthma Father     Cancer Brother         breast    Heart disease Brother     Melanoma Neg Hx     Psoriasis Neg Hx     Lupus Neg Hx     Eczema Neg Hx     Acne Neg Hx     Breast cancer Neg Hx     Ovarian cancer Neg Hx     Cervical cancer Neg Hx     Endometrial cancer Neg Hx     Vaginal cancer Neg Hx      Social History     Tobacco Use    Smoking status: Never    Smokeless tobacco: Never   Substance Use Topics    Alcohol use: No    Drug use: Never     Types: Hydrocodone     Review of Systems   Constitutional:  Negative for fever.   HENT:  Negative for sore throat.    Respiratory:  Negative for shortness of breath.    Cardiovascular:  Negative for chest pain.   Gastrointestinal:  Negative for nausea.   Genitourinary:  Negative for dysuria.   Musculoskeletal:  Positive for arthralgias. Negative for back pain.   Skin:  Negative for rash.   Neurological:  Negative for weakness.   Hematological:  Does not bruise/bleed easily.       Physical Exam     Initial Vitals [08/22/23 2125]   BP Pulse " Resp Temp SpO2   123/60 60 18 98.7 °F (37.1 °C) 95 %      MAP       --         Physical Exam    Constitutional: Vital signs are normal. She appears well-developed and well-nourished.   HENT:   Head: Normocephalic and atraumatic.   Right Ear: Hearing normal.   Left Ear: Hearing normal.   Eyes: Conjunctivae are normal.   Cardiovascular:  Normal rate and regular rhythm.           Abdominal: Abdomen is soft. Bowel sounds are normal.   Musculoskeletal:         General: Normal range of motion.      Comments: Tenderness noted to both hips over the femoral head.  The left leg is shorter than the right.  The lumbar spine is tender.  Both knees are tender with ecchymosis     Neurological: She is alert and oriented to person, place, and time.   Neurovascularly intact to the upper and lower extremities, pulses are normal and palpable.    No neurological red flags or deficits noted on exam.   Skin: Skin is warm and intact.   Psychiatric: She has a normal mood and affect. Her speech is normal and behavior is normal. Cognition and memory are normal.         ED Course   Procedures  Labs Reviewed - No data to display         Imaging Results               CT Lumbar Spine Without Contrast (Final result)  Result time 08/23/23 03:08:53      Final result by Isaac Villavicencio MD (08/23/23 03:08:53)                   Impression:      No acute fracture or traumatic malalignment.    Advanced degenerative changes as above, most significantly contributing to moderate spinal canal stenosis at L3-L4.  No high-grade neural foraminal narrowing.    Prominent Schmorl's node deformities within the superior and inferior endplates of L1 vertebral body, new from prior study 2018.  Active Schmorl's nodes can be a potential source of back pain.  Further evaluation as clinically indicated.    Additional findings as above.    This report was flagged in Epic as abnormal.    Electronically signed by resident: Doni  Alex  Date:    08/23/2023  Time:    02:23    Electronically signed by: Isaac Villavicencio MD  Date:    08/23/2023  Time:    03:08               Narrative:    EXAMINATION:  CT LUMBAR SPINE WITHOUT CONTRAST    CLINICAL HISTORY:  Compression fracture, lumbar;abnormal Xray, loss of disc height;    TECHNIQUE:  Low-dose axial, sagittal and coronal reformations are obtained through the lumbar spine.  Contrast was not administered.    COMPARISON:  XR L-spine 08/22/2023; MRI L-spine 05/02/2018    FINDINGS:  Alignment: Normal sagittal alignment. No spondylolisthesis.    Vertebrae: Diffuse osteopenia.  Vertebral body heights are maintained.  No acute fracture.  No osseous destructive lesions.    Discs: Moderate disc height loss at L2-L3 with vacuum disc phenomenon.  Prominent Schmorl's node deformities within the superior and inferior endplates of L1 vertebral body, new from prior study 2018.    Sacrum and sacroiliac joints (partially imaged): No sacral fracture.  Mild degenerative changes of the bilateral sacroiliac joints.    Paraspinal muscles & soft tissues: Moderate paraspinal muscle atrophy.  Diverticulosis coli.  Aortoiliac vascular calcifications.    Degenerative findings:    T12-L1: No spinal canal stenosis or neural foraminal narrowing.    L1-L2: Diffuse disc bulge.  No spinal canal stenosis or neural foraminal narrowing.    L2-L3: Diffuse disc bulge.  Moderate bilateral facet arthropathy ligamentum flavum thickening.  Mild spinal canal stenosis.    L3-L4: Diffuse disc bulge.  Severe bilateral facet arthropathy and ligamentum flavum thickening.  Moderate spinal canal stenosis.  Mild bilateral neural foraminal narrowing.    L4-L5: Diffuse disc bulge.  Severe bilateral facet arthropathy and ligamentum flavum thickening.  Mild bilateral neural foraminal narrowing.    L5-S1: Small diffuse disc bulge.  Severe bilateral facet arthropathy.  No spinal canal stenosis or neural foraminal narrowing.                                        CT Hip Without Contrast Left (Final result)  Result time 08/23/23 03:30:09      Final result by Isaac Villavicencio MD (08/23/23 03:30:09)                   Impression:      No evidence of acute fracture or dislocation.    Advanced degenerative changes of the left hip.    Heterogeneous soft tissue attenuation in the joint space posteromedially, possibly representing complex left hip joint effusion.  Further assessment as clinically indicated.    Left acetabular enlargement, as above.    Colonic diverticulosis.    Electronically signed by resident: Doni Johnson  Date:    08/23/2023  Time:    02:13    Electronically signed by: Isaac Villavicencio MD  Date:    08/23/2023  Time:    03:30               Narrative:    EXAMINATION:  CT HIP WITHOUT CONTRAST LEFT    CLINICAL HISTORY:  Hip pain, stress fracture suspected, neg xray;Eval for Fx. Xray;    TECHNIQUE:  Axial 0.625-mm images of the left hip obtained without intravenous contrast.  Data submitted for coronal and sagittal reformats.    COMPARISON:  XR pelvis 08/22/2020    FINDINGS:  BONE: Diffuse osteopenia.  No acute fracture identified    JOINT: Advanced degenerative changes involving the left hip, including joint space narrowing, subchondral sclerotic and cystic change, and osteophyte production.  Left acetabulum appears enlarged.  Radiographically, left acetabular enlargement appears to have occurred between the 03/21/2022 exam and the 08/22/2023 exam.  Heterogeneous soft tissue attenuation in the joint space posteromedially, possibly representing complex effusion.  No dislocation.    SOFT TISSUES: Diffuse muscle atrophy.  No bursal collection.    MISCELLANEOUS: No acute findings within the partially imaged pelvis.  Colonic diverticulosis.  Moderate stool burden.                                       X-Ray Chest 1 View (Final result)  Result time 08/22/23 23:29:27      Final result by Chavo Moody MD (08/22/23 23:29:27)                   Impression:       No acute radiographic abnormality.      Electronically signed by: Chavo Moody  Date:    08/22/2023  Time:    23:29               Narrative:    EXAMINATION:  XR CHEST 1 VIEW    CLINICAL HISTORY:  FALL, FX;    TECHNIQUE:  Single frontal view of the chest was performed.    COMPARISON:  03/28/2022    FINDINGS:  Cardiac silhouette is normal size.  Cardiac pacemaker device on the left.    Lungs are clear.  No effusion or pneumothorax.    No acute osseous abnormality.    Interval improvement from the prior study.                                       X-Ray Lumbar Spine Ap And Lateral (Final result)  Result time 08/22/23 23:33:40      Final result by Chavo Moody MD (08/22/23 23:33:40)                   Impression:      Questionable mild loss of height of the L5 vertebral body with suboptimal visualization.      Electronically signed by: Chavo Moody  Date:    08/22/2023  Time:    23:33               Narrative:    EXAMINATION:  XR LUMBAR SPINE AP AND LATERAL    CLINICAL HISTORY:  fall;    TECHNIQUE:  AP, lateral and spot images were performed of the lumbar spine.    COMPARISON:  08/15/2017    FINDINGS:  Questionable mild loss of height of the L5 vertebral body.  No comminution or retropulsion.  Suboptimal positioning.  CT or MRI follow-up may better characterize if clinically indicated.    Mild facet arthropathy in the lower lumbar spine bilaterally.    Alignment appears satisfactory.    Aortic atherosclerosis.    Right hip arthroplasty.                                       X-Ray Femur Ap/Lat Bilateral (Final result)  Result time 08/22/23 23:42:47      Final result by Chavo Moody MD (08/22/23 23:42:47)                   Impression:      Chronic and postoperative changes.  No acute radiographic abnormality.      Electronically signed by: Chavo Moody  Date:    08/22/2023  Time:    23:42               Narrative:    EXAMINATION:  XR FEMUR 2 VIEW BILATERAL    CLINICAL HISTORY:  Unspecified fall, initial  encounter    TECHNIQUE:  Four views of the right femur and four views of the left femur were reviewed    COMPARISON:  03/23/2022    FINDINGS:  Right hip arthroplasty.    No acute fracture, subluxation or dislocation.    There are chronic changes of the left hip with acetabular remodeling and probable mild chronic subluxation.  Osseous demineralization.    Left knee arthroplasty                                       X-Ray Knee 1 or 2 View Bilateral (Final result)  Result time 08/22/23 23:37:31   Procedure changed from X-Ray Knee 3 View Bilateral     Final result by Chavo Moody MD (08/22/23 23:37:31)                   Impression:      Chronic and postop changes.  No acute radiographic abnormality.      Electronically signed by: Chavo Moody  Date:    08/22/2023  Time:    23:37               Narrative:    EXAMINATION:  XR KNEE 1 OR 2 VIEW BILATERAL    CLINICAL HISTORY:  FALL;  Unspecified fall, initial encounter    TECHNIQUE:  Two views of the right knee and two views of the left knee    COMPARISON:  07/11/2022, 05/22/2012    FINDINGS:  Left knee arthroplasty appears normally positioned.  Remote fracture of the proximal left fibula.    Moderate joint space narrowing throughout the right knee.  No acute fracture, subluxation or dislocation bilaterally.    No significant joint effusion or hemarthrosis bilaterally.  No significant change.                                       X-Ray Pelvis Routine AP (Final result)  Result time 08/22/23 23:39:14      Final result by Hernandez Cook MD (08/22/23 23:39:14)                   Impression:      Radiographic findings as above.      Electronically signed by: Hernandez Cook  Date:    08/22/2023  Time:    23:39               Narrative:    EXAMINATION:  XR PELVIS ROUTINE AP    CLINICAL HISTORY:  fall;    TECHNIQUE:  AP view of the pelvis was performed.    COMPARISON:  Radiograph left hip March 21, 2022, right femur, March 23, 2022, pelvic radiograph November 2, 2005,  bilateral femur radiograph August 22, 2023    FINDINGS:  Single AP pelvic radiograph is submitted.  The osseous structures demonstrate chronic change, there is diminished mineralization and there is degenerative change noted.  Postoperative change of right hip arthroplasty noted.  The hardware appears stable.  There is no dislocation.  Along the medial aspect of the proximal femur there is an area of mild curvilinear lucency however this does not have the typical appearance of acute fracture, appears similar to the prior radiograph, and on review of the bilateral femur radiograph of the same date there is no finding to suggest acute fracture at this location.  Note is made that the bilateral femur radiograph is dictated separately, referral to the femur report is recommended.    There is extensive chronic appearing change at the level of the left hip joint, this includes remodeling of the left acetabulum and left femoral head, superior orientation, and joint space narrowing, the overall configuration of which is similar to the prior radiograph of March 21, 2022, with progression.  There is no evidence for hip dislocation.  There is an area of somewhat curvilinear lucency projected over the superior aspect of the left femoral head, and superior left acetabulum, this may relate to the prominent chronic change however the possibility of acetabular fracture would be difficult to exclude.  On review of the bilateral femur radiograph, definitive fracture is not identified however close clinical and historical correlation is needed to determine need for additional follow-up imaging.    The remainder of the osseous structures appear intact with chronic change noted.                                       Medications   oxyCODONE-acetaminophen 5-325 mg per tablet 1 tablet (1 tablet Oral Given 8/22/23 2223)   HYDROmorphone injection 0.25 mg (0.25 mg Intramuscular Given 8/23/23 0100)     Medical Decision Making  89-year-old  female with a mechanical fall from bed onto her bottom presenting with arthralgias in the knees, hips and lower back.   Plan, oxycodone for pain control x-rays and reassessment.    X-rays were negative for acute fracture.  There were some inconclusive findings on the plain films and CT scan of the lumbar spine and hip was completed which was negative for acute fracture, there were no signs of spinal cord compression, or cauda equina.  There were some degenerative findings identified but nothing requiring emergent intervention.  Patient's pain had improved with a hydromorphone injection and the patient was discharged back to the assisted living facility.  Recommended to family and assisted living facility that she be monitored closely and her pain medication regimen may need to be modified given her fall today and she will likely have an increased level of discomfort over the next couple of weeks.    Amount and/or Complexity of Data Reviewed  Radiology: ordered.    Risk  Prescription drug management.                               Clinical Impression:   Final diagnoses:  [W19.XXXA] Fall (Primary)        ED Disposition Condition    Discharge Stable          ED Prescriptions    None       Follow-up Information    None          Robby Orozco PA-C  08/23/23 0343       Robby Orozco PA-C  08/23/23 0348

## 2023-08-23 NOTE — ED NOTES
Report call to [place] at [facility]. ETA for pickup 0700. Asked that they be called when transport arrives for pickup.

## 2023-08-23 NOTE — DISCHARGE INSTRUCTIONS
Return to assisted living facility.  Talk to the providers there for ongoing management, return to the ED for any new or worsening symptoms, inability to p.o. urinate, urinating on herself, for neurological weakness or deficits.    Thank you for coming to our Emergency Department today. It is important to remember that some problems are difficult to diagnose and may not be found during your Emergency Department visit. Be sure to follow up with your primary care doctor and review all labs/imaging/tests that were performed during this visit with them. Some labs/tests may be outside of the normal range and require non-emergent follow-up and further investigation to help diagnose/exclude/prevent complications or other medical conditions.    If you do not have a primary care doctor, you may contact the one listed on your discharge paperwork or you may also call the [facility] Appointment Desk at [phone] to schedule an appointment and establish care with one. It is important to your health that you have a primary care doctor.    Please take all medications as directed. All medications may potentially have side-effects and it is impossible to predict which medications may give you side-effects or what side-effects (if any) they will give you.. If you feel that you are having a negative effect or side-effect of any medication you should immediately stop taking them and seek medical attention. If you feel that you are having a life-threatening reaction call 911.    Return to the ER with any questions/concerns, new/concerning symptoms, worsening or failure to improve.     Do not drive, swim, climb to height, take a bath or make any important decisions for 24 hours if you have received any pain medications, sedatives or mood altering drugs during your ER visit.

## 2023-09-19 ENCOUNTER — CLINICAL SUPPORT (OUTPATIENT)
Dept: CARDIOLOGY | Facility: HOSPITAL | Age: 88
End: 2023-09-19
Payer: MEDICARE

## 2023-09-19 DIAGNOSIS — Z95.0 PRESENCE OF CARDIAC PACEMAKER: ICD-10-CM

## 2023-09-19 PROCEDURE — 93296 REM INTERROG EVL PM/IDS: CPT | Mod: HCNC | Performed by: INTERNAL MEDICINE

## 2023-11-09 ENCOUNTER — TELEPHONE (OUTPATIENT)
Dept: PRIMARY CARE CLINIC | Facility: CLINIC | Age: 88
End: 2023-11-09
Payer: MEDICARE

## 2023-11-09 NOTE — TELEPHONE ENCOUNTER
Sw called both daughters and left messages for them to call the clinic in to schedule for an appt. Daughter called back and stated that her mother broke both legs in March of 2022 and has been in the Nursing facility at [facility] since April 2022. Daughter stated that she is under the care of the doctor there.

## 2023-12-19 ENCOUNTER — CLINICAL SUPPORT (OUTPATIENT)
Dept: CARDIOLOGY | Facility: HOSPITAL | Age: 88
End: 2023-12-19
Payer: MEDICARE

## 2023-12-19 ENCOUNTER — CLINICAL SUPPORT (OUTPATIENT)
Dept: CARDIOLOGY | Facility: HOSPITAL | Age: 88
DRG: 951 | End: 2023-12-19
Attending: INTERNAL MEDICINE
Payer: MEDICARE

## 2023-12-19 DIAGNOSIS — Z95.0 PRESENCE OF CARDIAC PACEMAKER: ICD-10-CM

## 2023-12-19 PROCEDURE — 93294 REM INTERROG EVL PM/LDLS PM: CPT | Mod: ,,, | Performed by: INTERNAL MEDICINE

## 2023-12-19 PROCEDURE — 93296 REM INTERROG EVL PM/IDS: CPT | Performed by: INTERNAL MEDICINE

## 2024-01-02 ENCOUNTER — HOSPITAL ENCOUNTER (INPATIENT)
Facility: HOSPITAL | Age: 89
LOS: 4 days | Discharge: HOSPICE | DRG: 640 | End: 2024-01-08
Attending: STUDENT IN AN ORGANIZED HEALTH CARE EDUCATION/TRAINING PROGRAM | Admitting: STUDENT IN AN ORGANIZED HEALTH CARE EDUCATION/TRAINING PROGRAM
Payer: MEDICARE

## 2024-01-02 DIAGNOSIS — R07.9 CHEST PAIN: ICD-10-CM

## 2024-01-02 DIAGNOSIS — R79.89 TROPONIN LEVEL ELEVATED: ICD-10-CM

## 2024-01-02 DIAGNOSIS — N39.0 URINARY TRACT INFECTION WITHOUT HEMATURIA, SITE UNSPECIFIED: Primary | ICD-10-CM

## 2024-01-02 DIAGNOSIS — E87.0 HYPERNATREMIA: ICD-10-CM

## 2024-01-02 DIAGNOSIS — R41.82 AMS (ALTERED MENTAL STATUS): ICD-10-CM

## 2024-01-02 DIAGNOSIS — I44.7 LEFT BUNDLE BRANCH BLOCK: ICD-10-CM

## 2024-01-02 PROBLEM — N30.00 ACUTE CYSTITIS: Status: ACTIVE | Noted: 2024-01-02

## 2024-01-02 LAB
ALBUMIN SERPL BCP-MCNC: 3.2 G/DL (ref 3.5–5.2)
ALP SERPL-CCNC: 62 U/L (ref 55–135)
ALT SERPL W/O P-5'-P-CCNC: 14 U/L (ref 10–44)
ANION GAP SERPL CALC-SCNC: 14 MMOL/L (ref 8–16)
ANION GAP SERPL CALC-SCNC: 16 MMOL/L (ref 8–16)
AST SERPL-CCNC: 31 U/L (ref 10–40)
BACTERIA #/AREA URNS AUTO: ABNORMAL /HPF
BASOPHILS # BLD AUTO: 0.05 K/UL (ref 0–0.2)
BASOPHILS NFR BLD: 0.6 % (ref 0–1.9)
BILIRUB SERPL-MCNC: 0.7 MG/DL (ref 0.1–1)
BILIRUB UR QL STRIP: NEGATIVE
BNP SERPL-MCNC: 36 PG/ML (ref 0–99)
BUN SERPL-MCNC: 49 MG/DL (ref 10–30)
BUN SERPL-MCNC: 56 MG/DL (ref 10–30)
CALCIUM SERPL-MCNC: 8.6 MG/DL (ref 8.7–10.5)
CALCIUM SERPL-MCNC: 9.7 MG/DL (ref 8.7–10.5)
CHLORIDE SERPL-SCNC: 113 MMOL/L (ref 95–110)
CHLORIDE SERPL-SCNC: 115 MMOL/L (ref 95–110)
CLARITY UR REFRACT.AUTO: ABNORMAL
CO2 SERPL-SCNC: 27 MMOL/L (ref 23–29)
CO2 SERPL-SCNC: 27 MMOL/L (ref 23–29)
COLOR UR AUTO: YELLOW
CREAT SERPL-MCNC: 1.1 MG/DL (ref 0.5–1.4)
CREAT SERPL-MCNC: 1.3 MG/DL (ref 0.5–1.4)
DIFFERENTIAL METHOD BLD: ABNORMAL
EOSINOPHIL # BLD AUTO: 0 K/UL (ref 0–0.5)
EOSINOPHIL NFR BLD: 0.4 % (ref 0–8)
ERYTHROCYTE [DISTWIDTH] IN BLOOD BY AUTOMATED COUNT: 15.9 % (ref 11.5–14.5)
EST. GFR  (NO RACE VARIABLE): 38.6 ML/MIN/1.73 M^2
EST. GFR  (NO RACE VARIABLE): 47.1 ML/MIN/1.73 M^2
GLUCOSE SERPL-MCNC: 114 MG/DL (ref 70–110)
GLUCOSE SERPL-MCNC: 121 MG/DL (ref 70–110)
GLUCOSE UR QL STRIP: NEGATIVE
HCT VFR BLD AUTO: 49.9 % (ref 37–48.5)
HGB BLD-MCNC: 16.4 G/DL (ref 12–16)
HGB UR QL STRIP: ABNORMAL
HYALINE CASTS UR QL AUTO: 23 /LPF
IMM GRANULOCYTES # BLD AUTO: 0.02 K/UL (ref 0–0.04)
IMM GRANULOCYTES NFR BLD AUTO: 0.3 % (ref 0–0.5)
KETONES UR QL STRIP: NEGATIVE
LACTATE SERPL-SCNC: 1.2 MMOL/L (ref 0.5–2.2)
LEUKOCYTE ESTERASE UR QL STRIP: ABNORMAL
LIPASE SERPL-CCNC: 82 U/L (ref 4–60)
LYMPHOCYTES # BLD AUTO: 1.7 K/UL (ref 1–4.8)
LYMPHOCYTES NFR BLD: 21.3 % (ref 18–48)
MAGNESIUM SERPL-MCNC: 2.7 MG/DL (ref 1.6–2.6)
MCH RBC QN AUTO: 28.8 PG (ref 27–31)
MCHC RBC AUTO-ENTMCNC: 32.9 G/DL (ref 32–36)
MCV RBC AUTO: 88 FL (ref 82–98)
MICROSCOPIC COMMENT: ABNORMAL
MONOCYTES # BLD AUTO: 0.7 K/UL (ref 0.3–1)
MONOCYTES NFR BLD: 8.2 % (ref 4–15)
NEUTROPHILS # BLD AUTO: 5.5 K/UL (ref 1.8–7.7)
NEUTROPHILS NFR BLD: 69.2 % (ref 38–73)
NITRITE UR QL STRIP: NEGATIVE
NRBC BLD-RTO: 0 /100 WBC
PH UR STRIP: 7 [PH] (ref 5–8)
PLATELET # BLD AUTO: 194 K/UL (ref 150–450)
PMV BLD AUTO: 11 FL (ref 9.2–12.9)
POTASSIUM SERPL-SCNC: 3.8 MMOL/L (ref 3.5–5.1)
POTASSIUM SERPL-SCNC: 4.8 MMOL/L (ref 3.5–5.1)
PROT SERPL-MCNC: 7.5 G/DL (ref 6–8.4)
PROT UR QL STRIP: ABNORMAL
RBC # BLD AUTO: 5.69 M/UL (ref 4–5.4)
RBC #/AREA URNS AUTO: 31 /HPF (ref 0–4)
SODIUM SERPL-SCNC: 156 MMOL/L (ref 136–145)
SODIUM SERPL-SCNC: 156 MMOL/L (ref 136–145)
SP GR UR STRIP: 1.02 (ref 1–1.03)
SQUAMOUS #/AREA URNS AUTO: 1 /HPF
TROPONIN I SERPL DL<=0.01 NG/ML-MCNC: 0.75 NG/ML (ref 0–0.03)
TROPONIN I SERPL DL<=0.01 NG/ML-MCNC: 0.84 NG/ML (ref 0–0.03)
TSH SERPL DL<=0.005 MIU/L-ACNC: 0.55 UIU/ML (ref 0.4–4)
URN SPEC COLLECT METH UR: ABNORMAL
WBC # BLD AUTO: 7.89 K/UL (ref 3.9–12.7)
WBC #/AREA URNS AUTO: >100 /HPF (ref 0–5)
WBC CLUMPS UR QL AUTO: ABNORMAL

## 2024-01-02 PROCEDURE — 83880 ASSAY OF NATRIURETIC PEPTIDE: CPT | Mod: HCNC | Performed by: STUDENT IN AN ORGANIZED HEALTH CARE EDUCATION/TRAINING PROGRAM

## 2024-01-02 PROCEDURE — 84484 ASSAY OF TROPONIN QUANT: CPT | Mod: 91,HCNC | Performed by: STUDENT IN AN ORGANIZED HEALTH CARE EDUCATION/TRAINING PROGRAM

## 2024-01-02 PROCEDURE — 25000003 PHARM REV CODE 250: Mod: HCNC | Performed by: FAMILY MEDICINE

## 2024-01-02 PROCEDURE — 87186 SC STD MICRODIL/AGAR DIL: CPT | Mod: HCNC | Performed by: STUDENT IN AN ORGANIZED HEALTH CARE EDUCATION/TRAINING PROGRAM

## 2024-01-02 PROCEDURE — 93005 ELECTROCARDIOGRAM TRACING: CPT | Mod: HCNC

## 2024-01-02 PROCEDURE — 83690 ASSAY OF LIPASE: CPT | Mod: HCNC | Performed by: STUDENT IN AN ORGANIZED HEALTH CARE EDUCATION/TRAINING PROGRAM

## 2024-01-02 PROCEDURE — 25000003 PHARM REV CODE 250: Mod: HCNC | Performed by: STUDENT IN AN ORGANIZED HEALTH CARE EDUCATION/TRAINING PROGRAM

## 2024-01-02 PROCEDURE — 25500020 PHARM REV CODE 255: Mod: HCNC | Performed by: STUDENT IN AN ORGANIZED HEALTH CARE EDUCATION/TRAINING PROGRAM

## 2024-01-02 PROCEDURE — 87086 URINE CULTURE/COLONY COUNT: CPT | Mod: HCNC | Performed by: STUDENT IN AN ORGANIZED HEALTH CARE EDUCATION/TRAINING PROGRAM

## 2024-01-02 PROCEDURE — 84443 ASSAY THYROID STIM HORMONE: CPT | Mod: HCNC | Performed by: STUDENT IN AN ORGANIZED HEALTH CARE EDUCATION/TRAINING PROGRAM

## 2024-01-02 PROCEDURE — G0378 HOSPITAL OBSERVATION PER HR: HCPCS | Mod: HCNC

## 2024-01-02 PROCEDURE — 63600175 PHARM REV CODE 636 W HCPCS: Mod: HCNC | Performed by: STUDENT IN AN ORGANIZED HEALTH CARE EDUCATION/TRAINING PROGRAM

## 2024-01-02 PROCEDURE — 81001 URINALYSIS AUTO W/SCOPE: CPT | Mod: HCNC | Performed by: STUDENT IN AN ORGANIZED HEALTH CARE EDUCATION/TRAINING PROGRAM

## 2024-01-02 PROCEDURE — 87077 CULTURE AEROBIC IDENTIFY: CPT | Mod: HCNC | Performed by: STUDENT IN AN ORGANIZED HEALTH CARE EDUCATION/TRAINING PROGRAM

## 2024-01-02 PROCEDURE — 85025 COMPLETE CBC W/AUTO DIFF WBC: CPT | Mod: HCNC | Performed by: STUDENT IN AN ORGANIZED HEALTH CARE EDUCATION/TRAINING PROGRAM

## 2024-01-02 PROCEDURE — 83735 ASSAY OF MAGNESIUM: CPT | Mod: HCNC | Performed by: STUDENT IN AN ORGANIZED HEALTH CARE EDUCATION/TRAINING PROGRAM

## 2024-01-02 PROCEDURE — 87088 URINE BACTERIA CULTURE: CPT | Mod: HCNC | Performed by: STUDENT IN AN ORGANIZED HEALTH CARE EDUCATION/TRAINING PROGRAM

## 2024-01-02 PROCEDURE — 80048 BASIC METABOLIC PNL TOTAL CA: CPT | Mod: 91,HCNC,XB | Performed by: FAMILY MEDICINE

## 2024-01-02 PROCEDURE — 36415 COLL VENOUS BLD VENIPUNCTURE: CPT | Mod: HCNC | Performed by: FAMILY MEDICINE

## 2024-01-02 PROCEDURE — 83605 ASSAY OF LACTIC ACID: CPT | Mod: HCNC | Performed by: STUDENT IN AN ORGANIZED HEALTH CARE EDUCATION/TRAINING PROGRAM

## 2024-01-02 PROCEDURE — 96360 HYDRATION IV INFUSION INIT: CPT | Mod: HCNC

## 2024-01-02 PROCEDURE — 80053 COMPREHEN METABOLIC PANEL: CPT | Mod: HCNC | Performed by: STUDENT IN AN ORGANIZED HEALTH CARE EDUCATION/TRAINING PROGRAM

## 2024-01-02 PROCEDURE — 99285 EMERGENCY DEPT VISIT HI MDM: CPT | Mod: 25,HCNC

## 2024-01-02 PROCEDURE — 96361 HYDRATE IV INFUSION ADD-ON: CPT | Mod: HCNC

## 2024-01-02 PROCEDURE — 93010 ELECTROCARDIOGRAM REPORT: CPT | Mod: HCNC,,, | Performed by: INTERNAL MEDICINE

## 2024-01-02 RX ORDER — ESCITALOPRAM OXALATE 20 MG/1
20 TABLET ORAL NIGHTLY
Status: DISCONTINUED | OUTPATIENT
Start: 2024-01-02 | End: 2024-01-08 | Stop reason: HOSPADM

## 2024-01-02 RX ORDER — SODIUM CHLORIDE 9 MG/ML
INJECTION, SOLUTION INTRAVENOUS CONTINUOUS
Status: DISCONTINUED | OUTPATIENT
Start: 2024-01-02 | End: 2024-01-03

## 2024-01-02 RX ORDER — ATORVASTATIN CALCIUM 20 MG/1
20 TABLET, FILM COATED ORAL NIGHTLY
Status: DISCONTINUED | OUTPATIENT
Start: 2024-01-02 | End: 2024-01-08 | Stop reason: HOSPADM

## 2024-01-02 RX ORDER — OXYCODONE HYDROCHLORIDE 5 MG/1
5 TABLET ORAL EVERY 6 HOURS PRN
Status: DISCONTINUED | OUTPATIENT
Start: 2024-01-02 | End: 2024-01-08 | Stop reason: HOSPADM

## 2024-01-02 RX ORDER — PSEUDOEPHEDRINE/ACETAMINOPHEN 30MG-500MG
100 TABLET ORAL
Status: COMPLETED | OUTPATIENT
Start: 2024-01-02 | End: 2024-01-02

## 2024-01-02 RX ORDER — IBUPROFEN 200 MG
16 TABLET ORAL
Status: DISCONTINUED | OUTPATIENT
Start: 2024-01-02 | End: 2024-01-08 | Stop reason: HOSPADM

## 2024-01-02 RX ORDER — ALUMINUM HYDROXIDE, MAGNESIUM HYDROXIDE, AND SIMETHICONE 1200; 120; 1200 MG/30ML; MG/30ML; MG/30ML
30 SUSPENSION ORAL 4 TIMES DAILY PRN
Status: DISCONTINUED | OUTPATIENT
Start: 2024-01-02 | End: 2024-01-08 | Stop reason: HOSPADM

## 2024-01-02 RX ORDER — NALOXONE HCL 0.4 MG/ML
0.02 VIAL (ML) INJECTION
Status: DISCONTINUED | OUTPATIENT
Start: 2024-01-02 | End: 2024-01-08 | Stop reason: HOSPADM

## 2024-01-02 RX ORDER — DONEPEZIL HYDROCHLORIDE 5 MG/1
5 TABLET, FILM COATED ORAL NIGHTLY
Status: DISCONTINUED | OUTPATIENT
Start: 2024-01-02 | End: 2024-01-08 | Stop reason: HOSPADM

## 2024-01-02 RX ORDER — GLUCAGON 1 MG
1 KIT INJECTION
Status: DISCONTINUED | OUTPATIENT
Start: 2024-01-02 | End: 2024-01-08 | Stop reason: HOSPADM

## 2024-01-02 RX ORDER — AMOXICILLIN 250 MG
1 CAPSULE ORAL 2 TIMES DAILY
Status: DISCONTINUED | OUTPATIENT
Start: 2024-01-02 | End: 2024-01-08 | Stop reason: HOSPADM

## 2024-01-02 RX ORDER — ENOXAPARIN SODIUM 100 MG/ML
30 INJECTION SUBCUTANEOUS EVERY 24 HOURS
Status: DISCONTINUED | OUTPATIENT
Start: 2024-01-02 | End: 2024-01-03

## 2024-01-02 RX ORDER — LEVOTHYROXINE SODIUM 25 UG/1
25 TABLET ORAL
Status: DISCONTINUED | OUTPATIENT
Start: 2024-01-03 | End: 2024-01-08 | Stop reason: HOSPADM

## 2024-01-02 RX ORDER — SODIUM CHLORIDE, SODIUM LACTATE, POTASSIUM CHLORIDE, CALCIUM CHLORIDE 600; 310; 30; 20 MG/100ML; MG/100ML; MG/100ML; MG/100ML
250 INJECTION, SOLUTION INTRAVENOUS
Status: DISCONTINUED | OUTPATIENT
Start: 2024-01-02 | End: 2024-01-02

## 2024-01-02 RX ORDER — TALC
6 POWDER (GRAM) TOPICAL NIGHTLY PRN
Status: DISCONTINUED | OUTPATIENT
Start: 2024-01-02 | End: 2024-01-08 | Stop reason: HOSPADM

## 2024-01-02 RX ORDER — ONDANSETRON HYDROCHLORIDE 2 MG/ML
4 INJECTION, SOLUTION INTRAVENOUS EVERY 8 HOURS PRN
Status: DISCONTINUED | OUTPATIENT
Start: 2024-01-02 | End: 2024-01-08 | Stop reason: HOSPADM

## 2024-01-02 RX ORDER — IBUPROFEN 200 MG
24 TABLET ORAL
Status: DISCONTINUED | OUTPATIENT
Start: 2024-01-02 | End: 2024-01-08 | Stop reason: HOSPADM

## 2024-01-02 RX ORDER — SYRING-NEEDL,DISP,INSUL,0.3 ML 29 G X1/2"
296 SYRINGE, EMPTY DISPOSABLE MISCELLANEOUS
Status: COMPLETED | OUTPATIENT
Start: 2024-01-02 | End: 2024-01-02

## 2024-01-02 RX ORDER — ACETAMINOPHEN 500 MG
1000 TABLET ORAL EVERY 8 HOURS PRN
Status: DISCONTINUED | OUTPATIENT
Start: 2024-01-02 | End: 2024-01-08 | Stop reason: HOSPADM

## 2024-01-02 RX ORDER — SODIUM CHLORIDE, SODIUM LACTATE, POTASSIUM CHLORIDE, CALCIUM CHLORIDE 600; 310; 30; 20 MG/100ML; MG/100ML; MG/100ML; MG/100ML
1000 INJECTION, SOLUTION INTRAVENOUS
Status: COMPLETED | OUTPATIENT
Start: 2024-01-02 | End: 2024-01-02

## 2024-01-02 RX ORDER — SODIUM CHLORIDE 0.9 % (FLUSH) 0.9 %
10 SYRINGE (ML) INJECTION EVERY 12 HOURS PRN
Status: DISCONTINUED | OUTPATIENT
Start: 2024-01-02 | End: 2024-01-08 | Stop reason: HOSPADM

## 2024-01-02 RX ADMIN — CEFTRIAXONE SODIUM 1 G: 1 INJECTION, POWDER, FOR SOLUTION INTRAMUSCULAR; INTRAVENOUS at 06:01

## 2024-01-02 RX ADMIN — SODIUM CHLORIDE, POTASSIUM CHLORIDE, SODIUM LACTATE AND CALCIUM CHLORIDE 1000 ML: 600; 310; 30; 20 INJECTION, SOLUTION INTRAVENOUS at 02:01

## 2024-01-02 RX ADMIN — SODIUM CHLORIDE 500 ML: 9 INJECTION, SOLUTION INTRAVENOUS at 07:01

## 2024-01-02 RX ADMIN — IOHEXOL 75 ML: 350 INJECTION, SOLUTION INTRAVENOUS at 04:01

## 2024-01-02 RX ADMIN — SODIUM CHLORIDE: 9 INJECTION, SOLUTION INTRAVENOUS at 07:01

## 2024-01-02 RX ADMIN — MAGNESIUM CITRATE 296 ML: 1.75 LIQUID ORAL at 07:01

## 2024-01-02 RX ADMIN — Medication 100 ML: at 07:01

## 2024-01-02 NOTE — ED PROVIDER NOTES
Encounter Date: 1/2/2024       History     Chief Complaint   Patient presents with    Altered Mental Status     EMS from [facility] decrease in LOC since yesterday morning. Pt follows commands. Recently on antibiotics for upper respiratory infection. Oriented to self. Hx of dementia.      HPI    89 yo F w/HTN, hypothyroid, aortic stenosis (1cm valve area), CHF, pacemaker, dementia, bedbound at baseline, presenting from [facility] for 2 x days of AMS, decreased PO intake.    Daughter at bedside reports normally her mom is conversant, Aox1, however for the last 2 days has been drowsy, not eating or drinking. No known hx of falls.    No fever/chills/nausea/vomiting/no diarrhea  Had cough 3 weeks ago and was given zpac, no recent coughing.    Review of patient's allergies indicates:   Allergen Reactions    Aspirin Nausea Only and Other (See Comments)     Other reaction(s): esophageal pain    Celebrex [celecoxib] Rash    Morphine Hallucinations    Steroid [corticosteroids (glucocorticoids)] Other (See Comments)     Other reaction(s): Flushing (skin)    Sulfa dyne Other (See Comments)     Other reaction(s): esophogeal pain     Past Medical History:   Diagnosis Date    Aortic stenosis 6/17/2015    Arthritis     Atrophic vaginitis 2/18/2016    Bilateral edema of lower extremity 6/22/2016    CHF (congestive heart failure)     Cholelithiasis without cholecystitis 5/5/2017    Chronic pain of left knee 8/3/2017    Depressed mood 6/22/2016    Diverticulitis of large intestine without perforation or abscess without bleeding 5/5/2017    Encounter for blood transfusion     Essential hypertension     GERD (gastroesophageal reflux disease)     Hyperlipidemia     Hypertension     Hypertensive heart disease with heart failure 7/14/2015    Insomnia     Joint pain     Knee pain, left 08/2016    pain with walking or standing    Primary osteoarthritis of knee 2/5/2013    PUD (peptic ulcer disease)     S/P knee replacement 2/13/2013    Slow  "transit constipation 2/18/2016     Past Surgical History:   Procedure Laterality Date    A-V CARDIAC PACEMAKER INSERTION Left 3/28/2022    Procedure: INSERTION, CARDIAC PACEMAKER, DUAL CHAMBER;  Surgeon: Estiven Rivera MD;  Location: [facility] EP LAB;  Service: Cardiology;  Laterality: Left;  CHB, DUAL PPM, BIO, [place], [place], [place] [id number]    APPENDECTOMY      APPLICATION OF LARGE EXTERNAL FIXATION DEVICE TO TIBIA Right 3/21/2022    Procedure: APPLICATION, EXTERNAL FIXATION DEVICE, LARGE, TIBIA;  Surgeon: Anirudh Carreno MD;  [facility];  Service: Orthopedics;  Laterality: Right;    COLONOSCOPY      08    EYE SURGERY      bilateral cataract    FINGER SURGERY      LT thumb surgery--"arthritis surgery"    HYSTERECTOMY      UNKNOWN BSO    JOINT REPLACEMENT      right hip replacement    KNEE ARTHROPLASTY Left 2001    TONSILLECTOMY      TUMOR EXCISION      esophageal tumor removal     UPPER GASTROINTESTINAL ENDOSCOPY      2013     Family History   Problem Relation Age of Onset    Heart disease Mother     Heart disease Father     Asthma Father     Cancer Brother         breast    Heart disease Brother     Melanoma Neg Hx     Psoriasis Neg Hx     Lupus Neg Hx     Eczema Neg Hx     Acne Neg Hx     Breast cancer Neg Hx     Ovarian cancer Neg Hx     Cervical cancer Neg Hx     Endometrial cancer Neg Hx     Vaginal cancer Neg Hx      Social History     Tobacco Use    Smoking status: Never    Smokeless tobacco: Never   Substance Use Topics    Alcohol use: No    Drug use: Never     Types: Hydrocodone     Review of Systems    Physical Exam     Initial Vitals [01/02/24 1157]   BP Pulse Resp Temp SpO2   128/77 105 (!) 24 97.7 °F (36.5 °C) 96 %      MAP       --         Physical Exam    Nursing note and vitals reviewed.  Constitutional: No distress.   HENT:   Head: Atraumatic.   Mouth/Throat: Oropharynx is clear and moist and mucous membranes are normal.   Eyes: EOM are normal. Right conjunctiva is not injected. Left " conjunctiva is not injected. No scleral icterus.   3mm bl   Neck: Neck supple.   Cardiovascular:  Normal heart sounds.           Pulmonary/Chest: Effort normal and breath sounds normal. No respiratory distress. She has no wheezes. She has no rales.   Abdominal: Abdomen is soft. She exhibits no distension.   Mild diffuse tenderness, no guarding, no rebound, negative matamoros's   Musculoskeletal:      Cervical back: Neck supple.     Neurological: No cranial nerve deficit. Gait normal.   Aox1, moving all extremities weakly to command   Skin: Skin is warm. No ecchymosis and no rash noted.         ED Course   Procedures  Labs Reviewed   COMPREHENSIVE METABOLIC PANEL - Abnormal; Notable for the following components:       Result Value    Sodium 156 (*)     Chloride 113 (*)     Glucose 114 (*)     BUN 56 (*)     Albumin 3.2 (*)     eGFR 38.6 (*)     All other components within normal limits   TROPONIN I - Abnormal; Notable for the following components:    Troponin I 0.836 (*)     All other components within normal limits   URINALYSIS, REFLEX TO URINE CULTURE - Abnormal; Notable for the following components:    Appearance, UA Hazy (*)     Protein, UA 1+ (*)     Occult Blood UA 1+ (*)     Leukocytes, UA 3+ (*)     All other components within normal limits    Narrative:     Specimen Source->Urine   LIPASE - Abnormal; Notable for the following components:    Lipase 82 (*)     All other components within normal limits   MAGNESIUM - Abnormal; Notable for the following components:    Magnesium 2.7 (*)     All other components within normal limits   CBC W/ AUTO DIFFERENTIAL - Abnormal; Notable for the following components:    RBC 5.69 (*)     Hemoglobin 16.4 (*)     Hematocrit 49.9 (*)     RDW 15.9 (*)     All other components within normal limits   URINALYSIS MICROSCOPIC - Abnormal; Notable for the following components:    RBC, UA 31 (*)     WBC, UA >100 (*)     WBC Clumps, UA Few (*)     Bacteria Many (*)     Hyaline Casts, UA 23  (*)     All other components within normal limits    Narrative:     Specimen Source->Urine   CULTURE, URINE   LACTIC ACID, PLASMA   TSH   B-TYPE NATRIURETIC PEPTIDE   TROPONIN I   BASIC METABOLIC PANEL        ECG Results              EKG 12-lead (Final result)  Result time 01/02/24 16:35:53      Final result by Marielle, Lab In [facility] (01/02/24 16:35:53)                   Narrative:    Test Reason : R41.82,    Vent. Rate : 103 BPM     Atrial Rate : 103 BPM     P-R Int : 146 ms          QRS Dur : 144 ms      QT Int : 398 ms       P-R-T Axes : 064 040 229 degrees     QTc Int : 521 ms    atrial-sensed ventricular-paced complexes  Abnormal ECG  When compared with ECG of 28-MAR-2022 18:32,  No change  Confirmed by Chu SILVESTRE MD ([id number]) on 1/2/2024 3:19:26 PM    Referred By: LIZ   SELF           Confirmed By:Chu SILVESTRE MD                                  Imaging Results               CT Abdomen Pelvis With IV Contrast NO Oral Contrast (Final result)  Result time 01/02/24 16:16:55      Final result by Robby Ring MD (01/02/24 16:16:55)                   Impression:      This report was flagged in Epic as abnormal.    1. Large amount of stool within the rectum concerning for constipation or impaction.  Correlation is advised.  2. Cholelithiasis without secondary findings to suggest acute cholecystitis.  3. Possible hepatic steatosis, correlation with LFTs recommended.  4. Stable appearing endplate irregularity of L1.  There is height loss involving T8 and T11, unknown acuity.  Correlation with any focal tenderness recommended.  5. Please see above for several additional findings.      Electronically signed by: Robby Ring MD  Date:    01/02/2024  Time:    16:16               Narrative:    EXAMINATION:  CT ABDOMEN PELVIS WITH IV CONTRAST    CLINICAL HISTORY:  Abdominal pain, acute, nonlocalized;    TECHNIQUE:  Low dose axial images, sagittal and coronal reformations were obtained from the lung bases to the  pubic symphysis following the IV administration of 75 mL of Omnipaque 350 .  Oral contrast was not given.    COMPARISON:  CT lumbar spine 08/23/2023    FINDINGS:  Images of the lower thorax are remarkable for bilateral dependent atelectasis/scarring.  Partially visualized pacer and pacer wires noted.    Allowing for motion artifact, the liver is hypoattenuating, likely related to contrast phase however steatosis not excluded.  Correlation with LFTs recommended.  The spleen and adrenal glands are unremarkable.  There is atrophic change of the pancreas without pancreatic ductal dilation.  The stomach is decompressed without wall thickening.  There is cholelithiasis.  No significant biliary dilation.  There is a small hiatal hernia.  The portal vein, splenic vein, SMV, celiac axis and SMA all are patent.    The kidneys enhance symmetrically without hydronephrosis.  There is questionable left nonobstructive nephrolithiasis versus early contrast excretion.  Subcentimeter low attenuating lesion arises from the interpolar region of the right kidney, too small for characterization.  The bilateral ureters are unremarkable along their visualized course, no definite calculi seen or secondary findings to suggest obstructive uropathy.  The urinary bladder is decompressed noting residual wall thickening.  The uterus is not confidently identified noting extensive streak artifact from right hip arthroplasty.  There is a large amount of stool in the rectum without rectal wall thickening.  There is a large amount of stool throughout the remainder of the colon.  There are several scattered colonic diverticula without inflammation.  The terminal ileum is unremarkable.  The appendix is not confidently identified, no pericecal inflammation.  The small bowel is grossly unremarkable.  There are a few scattered shotty periaortic, pericaval, and mesenteric lymph nodes.  No focal organized pelvic fluid collection.  There is atherosclerotic  calcification of the aorta and its branches.    Right hip arthroplasty noted.  There is osteopenia.  There is degenerative change/posttraumatic change of the proximal left femur and adjacent acetabulum.  There are degenerative changes of the spine.  Schmorl's nodes involve the superior and inferior endplates of L1, and superior endplate of T11.  There is additional endplate height loss involving T8.  The appearance of L1 is stable from lumbar spine CT 08/23/2023, T11 and T8 are not imaged at that time.  No significant inguinal lymphadenopathy.                                       CT Head Without Contrast (Final result)  Result time 01/02/24 16:22:38      Final result by Daniel Escobedo MD (01/02/24 16:22:38)                   Impression:      No evidence of acute intracranial pathology.    Electronically signed by resident: Julisa Ramirez  Date:    01/02/2024  Time:    16:01    Electronically signed by: Daniel Escobedo  Date:    01/02/2024  Time:    16:22               Narrative:    EXAMINATION:  CT HEAD WITHOUT CONTRAST    CLINICAL HISTORY:  Mental status change, unknown cause;    TECHNIQUE:  Low dose axial CT images obtained throughout the head without the use of intravenous contrast.  Axial, sagittal, and coronal reconstructions were performed.    COMPARISON:  CT head 05/27/2019.    FINDINGS:  Intracranial compartment:    Diffuse cerebral volume loss with compensatory sulcal and ventricular enlargement without evidence of hydrocephalus.    Patchy areas of hypoattenuation in the periventricular white matter likely sequela of chronic microvascular ischemic change.No parenchymal mass, hemorrhage, edema, or major vascular distribution infarct.    No extra-axial blood or fluid collections.    Skull/extracranial contents (limited evaluation):    No fracture.    Mastoid air cells and paranasal sinuses are essentially clear.                                       X-Ray Chest 1 View (Final result)  Result time 01/02/24  13:49:59   Procedure changed from X-Ray Chest PA And Lateral     Final result by Salvador Cintron MD (01/02/24 13:49:59)                   Impression:      See above      Electronically signed by: Salvador Cintron MD  Date:    01/02/2024  Time:    13:49               Narrative:    EXAMINATION:  XR CHEST 1 VIEW    CLINICAL HISTORY:  Altered mental status; Altered mental status, unspecified    TECHNIQUE:  Single frontal view of the chest was performed.    COMPARISON:  No 08/22/2023 ne    FINDINGS:  Pacemaker identified.  Heart size normal.  The lungs are clear.  No pleural effusion.                                       Medications   glycerin 99.5% topical solution 100 mL (has no administration in time range)     And   magnesium citrate solution 296 mL (has no administration in time range)     And   sodium chloride 0.9% bolus 500 mL 500 mL (has no administration in time range)   cefTRIAXone (ROCEPHIN) 1 g in dextrose 5 % in water (D5W) 100 mL IVPB (MB+) (has no administration in time range)   atorvastatin tablet 20 mg (has no administration in time range)   donepeziL tablet 5 mg (has no administration in time range)   EScitalopram oxalate tablet 20 mg (has no administration in time range)   levothyroxine tablet 25 mcg (has no administration in time range)   0.9%  NaCl infusion (has no administration in time range)   sodium chloride 0.9% flush 10 mL (has no administration in time range)   naloxone 0.4 mg/mL injection 0.02 mg (has no administration in time range)   glucose chewable tablet 16 g (has no administration in time range)   glucose chewable tablet 24 g (has no administration in time range)   glucagon (human recombinant) injection 1 mg (has no administration in time range)   enoxaparin injection 30 mg (has no administration in time range)   acetaminophen tablet 1,000 mg (has no administration in time range)   oxyCODONE immediate release tablet 5 mg (has no administration in time range)   ondansetron injection 4 mg  (has no administration in time range)   senna-docusate 8.6-50 mg per tablet 1 tablet (has no administration in time range)   melatonin tablet 6 mg (has no administration in time range)   aluminum-magnesium hydroxide-simethicone 200-200-20 mg/5 mL suspension 30 mL (has no administration in time range)   dextrose 10% bolus 125 mL 125 mL (has no administration in time range)   dextrose 10% bolus 250 mL 250 mL (has no administration in time range)   lactated ringers infusion ( Intravenous Rate/Dose Change 1/2/24 1806)   iohexoL (OMNIPAQUE 350) injection 100 mL (75 mLs Intravenous Given 1/2/24 1604)   cefTRIAXone (ROCEPHIN) 1 g in dextrose 5 % in water (D5W) 100 mL IVPB (MB+) (1 g Intravenous New Bag 1/2/24 1805)     Medical Decision Making  89 yo F w/HTN, hypothyroid, aortic stenosis, CHF, pacemaker, dementia, bedbound at baseline, presenting from [facility] for 2 x days of AMS, decreased PO intake.    Differential: metabolic abnormalities, UTI, intraabdominal infection, dehydration    EKG with LBBB, paced, no acute ischemic changes, trop elevated to 0.836, patient denies chest pain, may be demand ischemia changes from significant dehydration complicated by severe aortic stenosis (1cm area)    UA straight cath obtained, positive for UTI, given ceftriaxone. Enema placed for constipation seen on CTAP.  CTH with no acute abnormalities.    Discussed with hospitalist for admission, with AMS, UTI, hypernatremia 2/2 hypovolemia/decreased PO intake, troponin elevation.    Amount and/or Complexity of Data Reviewed  Labs: ordered. Decision-making details documented in ED Course.  Radiology: ordered.    Risk  OTC drugs.  Prescription drug management.               ED Course as of 01/02/24 1846   Tue Jan 02, 2024   1455 Troponin I(!): 0.836 [LF]   1455 EKG independently interpreted by me with paced rhythm, left bundle-branch block, not meeting modified Sgarbossa criteria for STEMI, may be demand ischemia from persistent  tachycardia/dehydration [LF]   1456 Sodium(!): 156  Ordered for 1 L IV fluids, will correct slowly [LF]      ED Course User Index  [LF] Laney Bustamante MD                           Clinical Impression:  Final diagnoses:  [R41.82] AMS (altered mental status)  [N39.0] Urinary tract infection without hematuria, site unspecified (Primary)  [E87.0] Hypernatremia  [R79.89] Troponin level elevated  [I44.7] Left bundle branch block          ED Disposition Condition    Observation                 Laney Bustamante MD  01/02/24 1717       Laney Bustamante MD  01/02/24 [id number]

## 2024-01-02 NOTE — ED TRIAGE NOTES
Presents via ems for Altered Mental Status starting yesterday. Per daughter, hx of dementia,. On antibiotics for URI last week. Daughter states pt has not been eating or drinking normally. Pt follow commands, flat affect, oriented to self and place. Lives at [facility]

## 2024-01-03 PROBLEM — G93.41 ACUTE METABOLIC ENCEPHALOPATHY: Status: ACTIVE | Noted: 2020-09-21

## 2024-01-03 LAB
ANION GAP SERPL CALC-SCNC: 12 MMOL/L (ref 8–16)
ANION GAP SERPL CALC-SCNC: 13 MMOL/L (ref 8–16)
ANION GAP SERPL CALC-SCNC: 14 MMOL/L (ref 8–16)
ANION GAP SERPL CALC-SCNC: 16 MMOL/L (ref 8–16)
ANION GAP SERPL CALC-SCNC: 17 MMOL/L (ref 8–16)
ANION GAP SERPL CALC-SCNC: 7 MMOL/L (ref 8–16)
BASOPHILS # BLD AUTO: 0.04 K/UL (ref 0–0.2)
BASOPHILS NFR BLD: 0.5 % (ref 0–1.9)
BUN SERPL-MCNC: 36 MG/DL (ref 10–30)
BUN SERPL-MCNC: 39 MG/DL (ref 10–30)
BUN SERPL-MCNC: 43 MG/DL (ref 10–30)
BUN SERPL-MCNC: 46 MG/DL (ref 10–30)
BUN SERPL-MCNC: 47 MG/DL (ref 10–30)
BUN SERPL-MCNC: 51 MG/DL (ref 10–30)
CALCIUM SERPL-MCNC: 8.5 MG/DL (ref 8.7–10.5)
CALCIUM SERPL-MCNC: 8.7 MG/DL (ref 8.7–10.5)
CALCIUM SERPL-MCNC: 8.8 MG/DL (ref 8.7–10.5)
CALCIUM SERPL-MCNC: 8.9 MG/DL (ref 8.7–10.5)
CALCIUM SERPL-MCNC: 9.2 MG/DL (ref 8.7–10.5)
CALCIUM SERPL-MCNC: 9.4 MG/DL (ref 8.7–10.5)
CHLORIDE SERPL-SCNC: 114 MMOL/L (ref 95–110)
CHLORIDE SERPL-SCNC: 115 MMOL/L (ref 95–110)
CHLORIDE SERPL-SCNC: 117 MMOL/L (ref 95–110)
CHLORIDE SERPL-SCNC: 117 MMOL/L (ref 95–110)
CO2 SERPL-SCNC: 22 MMOL/L (ref 23–29)
CO2 SERPL-SCNC: 23 MMOL/L (ref 23–29)
CO2 SERPL-SCNC: 24 MMOL/L (ref 23–29)
CO2 SERPL-SCNC: 26 MMOL/L (ref 23–29)
CO2 SERPL-SCNC: 28 MMOL/L (ref 23–29)
CO2 SERPL-SCNC: 28 MMOL/L (ref 23–29)
CREAT SERPL-MCNC: 0.7 MG/DL (ref 0.5–1.4)
CREAT SERPL-MCNC: 0.7 MG/DL (ref 0.5–1.4)
CREAT SERPL-MCNC: 0.8 MG/DL (ref 0.5–1.4)
CREAT SERPL-MCNC: 0.8 MG/DL (ref 0.5–1.4)
CREAT SERPL-MCNC: 0.9 MG/DL (ref 0.5–1.4)
CREAT SERPL-MCNC: 0.9 MG/DL (ref 0.5–1.4)
DIFFERENTIAL METHOD BLD: ABNORMAL
EOSINOPHIL # BLD AUTO: 0.1 K/UL (ref 0–0.5)
EOSINOPHIL NFR BLD: 1.8 % (ref 0–8)
ERYTHROCYTE [DISTWIDTH] IN BLOOD BY AUTOMATED COUNT: 15.5 % (ref 11.5–14.5)
EST. GFR  (NO RACE VARIABLE): 60 ML/MIN/1.73 M^2
EST. GFR  (NO RACE VARIABLE): 60 ML/MIN/1.73 M^2
EST. GFR  (NO RACE VARIABLE): >60 ML/MIN/1.73 M^2
GLUCOSE SERPL-MCNC: 104 MG/DL (ref 70–110)
GLUCOSE SERPL-MCNC: 105 MG/DL (ref 70–110)
GLUCOSE SERPL-MCNC: 133 MG/DL (ref 70–110)
GLUCOSE SERPL-MCNC: 143 MG/DL (ref 70–110)
GLUCOSE SERPL-MCNC: 88 MG/DL (ref 70–110)
GLUCOSE SERPL-MCNC: 97 MG/DL (ref 70–110)
HCT VFR BLD AUTO: 45.1 % (ref 37–48.5)
HGB BLD-MCNC: 14.4 G/DL (ref 12–16)
IMM GRANULOCYTES # BLD AUTO: 0.01 K/UL (ref 0–0.04)
IMM GRANULOCYTES NFR BLD AUTO: 0.1 % (ref 0–0.5)
LYMPHOCYTES # BLD AUTO: 1.8 K/UL (ref 1–4.8)
LYMPHOCYTES NFR BLD: 23.1 % (ref 18–48)
MAGNESIUM SERPL-MCNC: 2.5 MG/DL (ref 1.6–2.6)
MCH RBC QN AUTO: 29.8 PG (ref 27–31)
MCHC RBC AUTO-ENTMCNC: 31.9 G/DL (ref 32–36)
MCV RBC AUTO: 93 FL (ref 82–98)
MONOCYTES # BLD AUTO: 0.6 K/UL (ref 0.3–1)
MONOCYTES NFR BLD: 7.1 % (ref 4–15)
NEUTROPHILS # BLD AUTO: 5.3 K/UL (ref 1.8–7.7)
NEUTROPHILS NFR BLD: 67.4 % (ref 38–73)
NRBC BLD-RTO: 0 /100 WBC
PHOSPHATE SERPL-MCNC: 2.9 MG/DL (ref 2.7–4.5)
PLATELET # BLD AUTO: 164 K/UL (ref 150–450)
PMV BLD AUTO: 10.8 FL (ref 9.2–12.9)
POTASSIUM SERPL-SCNC: 3.5 MMOL/L (ref 3.5–5.1)
POTASSIUM SERPL-SCNC: 3.8 MMOL/L (ref 3.5–5.1)
POTASSIUM SERPL-SCNC: 3.9 MMOL/L (ref 3.5–5.1)
POTASSIUM SERPL-SCNC: 4 MMOL/L (ref 3.5–5.1)
POTASSIUM SERPL-SCNC: 4 MMOL/L (ref 3.5–5.1)
POTASSIUM SERPL-SCNC: 4.8 MMOL/L (ref 3.5–5.1)
RBC # BLD AUTO: 4.83 M/UL (ref 4–5.4)
SODIUM SERPL-SCNC: 148 MMOL/L (ref 136–145)
SODIUM SERPL-SCNC: 149 MMOL/L (ref 136–145)
SODIUM SERPL-SCNC: 156 MMOL/L (ref 136–145)
SODIUM SERPL-SCNC: 158 MMOL/L (ref 136–145)
WBC # BLD AUTO: 7.88 K/UL (ref 3.9–12.7)

## 2024-01-03 PROCEDURE — G0378 HOSPITAL OBSERVATION PER HR: HCPCS | Mod: HCNC

## 2024-01-03 PROCEDURE — 25000003 PHARM REV CODE 250: Mod: HCNC | Performed by: STUDENT IN AN ORGANIZED HEALTH CARE EDUCATION/TRAINING PROGRAM

## 2024-01-03 PROCEDURE — 36415 COLL VENOUS BLD VENIPUNCTURE: CPT | Mod: HCNC,XB | Performed by: FAMILY MEDICINE

## 2024-01-03 PROCEDURE — 63600175 PHARM REV CODE 636 W HCPCS: Mod: HCNC | Performed by: FAMILY MEDICINE

## 2024-01-03 PROCEDURE — 25000003 PHARM REV CODE 250: Mod: HCNC | Performed by: FAMILY MEDICINE

## 2024-01-03 PROCEDURE — 85025 COMPLETE CBC W/AUTO DIFF WBC: CPT | Mod: HCNC | Performed by: FAMILY MEDICINE

## 2024-01-03 PROCEDURE — 94761 N-INVAS EAR/PLS OXIMETRY MLT: CPT | Mod: HCNC

## 2024-01-03 PROCEDURE — 84100 ASSAY OF PHOSPHORUS: CPT | Mod: HCNC | Performed by: FAMILY MEDICINE

## 2024-01-03 PROCEDURE — 63600175 PHARM REV CODE 636 W HCPCS: Mod: HCNC | Performed by: STUDENT IN AN ORGANIZED HEALTH CARE EDUCATION/TRAINING PROGRAM

## 2024-01-03 PROCEDURE — 92610 EVALUATE SWALLOWING FUNCTION: CPT | Mod: HCNC

## 2024-01-03 PROCEDURE — 80048 BASIC METABOLIC PNL TOTAL CA: CPT | Mod: 91,HCNC | Performed by: STUDENT IN AN ORGANIZED HEALTH CARE EDUCATION/TRAINING PROGRAM

## 2024-01-03 PROCEDURE — 96372 THER/PROPH/DIAG INJ SC/IM: CPT | Performed by: STUDENT IN AN ORGANIZED HEALTH CARE EDUCATION/TRAINING PROGRAM

## 2024-01-03 PROCEDURE — 36415 COLL VENOUS BLD VENIPUNCTURE: CPT | Mod: HCNC,XB | Performed by: STUDENT IN AN ORGANIZED HEALTH CARE EDUCATION/TRAINING PROGRAM

## 2024-01-03 PROCEDURE — 80048 BASIC METABOLIC PNL TOTAL CA: CPT | Mod: 91,HCNC | Performed by: FAMILY MEDICINE

## 2024-01-03 PROCEDURE — 83735 ASSAY OF MAGNESIUM: CPT | Mod: HCNC | Performed by: FAMILY MEDICINE

## 2024-01-03 RX ORDER — QUETIAPINE FUMARATE 25 MG/1
25 TABLET, FILM COATED ORAL NIGHTLY
Status: DISCONTINUED | OUTPATIENT
Start: 2024-01-03 | End: 2024-01-08 | Stop reason: HOSPADM

## 2024-01-03 RX ORDER — DEXTROSE MONOHYDRATE 50 MG/ML
INJECTION, SOLUTION INTRAVENOUS CONTINUOUS
Status: ACTIVE | OUTPATIENT
Start: 2024-01-03 | End: 2024-01-04

## 2024-01-03 RX ORDER — QUETIAPINE FUMARATE 25 MG/1
25 TABLET, FILM COATED ORAL NIGHTLY
COMMUNITY
Start: 2023-12-26

## 2024-01-03 RX ORDER — POLYETHYLENE GLYCOL 3350 17 G/17G
17 POWDER, FOR SOLUTION ORAL DAILY
Status: DISCONTINUED | OUTPATIENT
Start: 2024-01-03 | End: 2024-01-08 | Stop reason: HOSPADM

## 2024-01-03 RX ORDER — ENOXAPARIN SODIUM 100 MG/ML
40 INJECTION SUBCUTANEOUS EVERY 24 HOURS
Status: DISCONTINUED | OUTPATIENT
Start: 2024-01-03 | End: 2024-01-08 | Stop reason: HOSPADM

## 2024-01-03 RX ADMIN — DONEPEZIL HYDROCHLORIDE 5 MG: 5 TABLET ORAL at 09:01

## 2024-01-03 RX ADMIN — DEXTROSE MONOHYDRATE: 50 INJECTION, SOLUTION INTRAVENOUS at 09:01

## 2024-01-03 RX ADMIN — SENNOSIDES AND DOCUSATE SODIUM 1 TABLET: 8.6; 5 TABLET ORAL at 09:01

## 2024-01-03 RX ADMIN — CEFTRIAXONE 1 G: 1 INJECTION, POWDER, FOR SOLUTION INTRAMUSCULAR; INTRAVENOUS at 05:01

## 2024-01-03 RX ADMIN — ESCITALOPRAM OXALATE 20 MG: 20 TABLET, FILM COATED ORAL at 09:01

## 2024-01-03 RX ADMIN — POLYETHYLENE GLYCOL 3350 17 G: 17 POWDER, FOR SOLUTION ORAL at 09:01

## 2024-01-03 RX ADMIN — Medication 6 MG: at 09:01

## 2024-01-03 RX ADMIN — ATORVASTATIN CALCIUM 20 MG: 20 TABLET, FILM COATED ORAL at 09:01

## 2024-01-03 RX ADMIN — QUETIAPINE FUMARATE 25 MG: 25 TABLET ORAL at 09:01

## 2024-01-03 RX ADMIN — ENOXAPARIN SODIUM 40 MG: 40 INJECTION SUBCUTANEOUS at 05:01

## 2024-01-03 NOTE — PLAN OF CARE
SW met with patient at bedside. Patient was altered and could not be assessed. Patient was unaware that she was in a hospital.     MEG Monterroso, MSW-LMSW  Medical Social Worker/  ER Department

## 2024-01-03 NOTE — SUBJECTIVE & OBJECTIVE
"Past Medical History:   Diagnosis Date    Aortic stenosis 6/17/2015    Arthritis     Atrophic vaginitis 2/18/2016    Bilateral edema of lower extremity 6/22/2016    CHF (congestive heart failure)     Cholelithiasis without cholecystitis 5/5/2017    Chronic pain of left knee 8/3/2017    Depressed mood 6/22/2016    Diverticulitis of large intestine without perforation or abscess without bleeding 5/5/2017    Encounter for blood transfusion     Essential hypertension     GERD (gastroesophageal reflux disease)     Hyperlipidemia     Hypertension     Hypertensive heart disease with heart failure 7/14/2015    Insomnia     Joint pain     Knee pain, left 08/2016    pain with walking or standing    Primary osteoarthritis of knee 2/5/2013    PUD (peptic ulcer disease)     S/P knee replacement 2/13/2013    Slow transit constipation 2/18/2016       Past Surgical History:   Procedure Laterality Date    A-V CARDIAC PACEMAKER INSERTION Left 3/28/2022    Procedure: INSERTION, CARDIAC PACEMAKER, DUAL CHAMBER;  Surgeon: Estiven Rivera MD;  Location: [facility] EP LAB;  Service: Cardiology;  Laterality: Left;  CHB, DUAL PPM, BIO, DM, [place]ES, [place] 564[address]    APPENDECTOMY      APPLICATION OF LARGE EXTERNAL FIXATION DEVICE TO TIBIA Right 3/21/2022    Procedure: APPLICATION, EXTERNAL FIXATION DEVICE, LARGE, TIBIA;  Surgeon: Anirudh Carreno MD;  Location: [facility] OR [address] [place] [address];  Service: Orthopedics;  Laterality: Right;    COLONOSCOPY      08    EYE SURGERY      bilateral cataract    FINGER SURGERY      LT thumb surgery--"arthritis surgery"    HYSTERECTOMY      UNKNOWN BSO    JOINT REPLACEMENT      right hip replacement    KNEE ARTHROPLASTY Left 2001    TONSILLECTOMY      TUMOR EXCISION      esophageal tumor removal     UPPER GASTROINTESTINAL ENDOSCOPY      2013       Review of patient's allergies indicates:   Allergen Reactions    Aspirin Nausea Only and Other (See Comments)     Other reaction(s): esophageal pain    Celebrex [celecoxib] Rash    " Morphine Hallucinations    Steroid [corticosteroids (glucocorticoids)] Other (See Comments)     Other reaction(s): Flushing (skin)    Sulfa dyne Other (See Comments)     Other reaction(s): esophogeal pain       No current facility-administered medications on file prior to encounter.     Current Outpatient Medications on File Prior to Encounter   Medication Sig    acetaminophen (TYLENOL) 650 MG TbSR Take 1 tablet (650 mg total) by mouth every 8 (eight) hours as needed. Give 1 tablet QAM, 1 QPM.    atorvastatin (LIPITOR) 20 MG tablet TAKE 1 TAB BY MOUTH AT BEDTIME    balsam peru-castor oiL Oint     cholecalciferol, vitamin D3, 1,250 mcg (50,000 unit) capsule Take 50,000 Units by mouth once daily.    ciclopirox (PENLAC) 8 % Soln Apply topically nightly.    donepeziL (ARICEPT) 5 MG tablet TAKE 1 TAB BY MOUTH EVERY EVENING    EScitalopram oxalate (LEXAPRO) 20 MG tablet TAKE 1 TAB BY MOUTH AT BEDTIME FOR DEPRESSION    furosemide (LASIX) 40 MG tablet TAKE 2 TABLETS (80 MG TOTAL) BY MOUTH 2 (TWO) TIMES DAILY AS NEEDED (FOR WEIGHT GAIN OF 5 POUNDS). TAKE WITH POTASSIUM    levothyroxine (SYNTHROID) 25 MCG tablet     losartan (COZAAR) 50 MG tablet Take 1 tablet (50 mg total) by mouth once daily.    melatonin 5 mg Tab Take 1 tablet by mouth every evening.     MULTIVITAMIN W-MINERALS/LUTEIN (CENTRUM SILVER ORAL) Take by mouth once daily.    nitroGLYCERIN (NITROSTAT) 0.4 MG SL tablet Place 1 tablet (0.4 mg total) under the tongue every 5 (five) minutes as needed for Chest pain.    nystatin (MYCOSTATIN) cream APPLY TO GROIN AREA TOPICALLY EVERY 12 HOURS FOR ANTIFUNGAL    omeprazole (PRILOSEC) 20 MG capsule TAKE 2 CAPS (40MG) BY MOUTH EVERY DAY (DX: GERD)    oxyCODONE (ROXICODONE) 5 mg/5 mL Soln     potassium chloride SA (K-DUR,KLOR-CON) 10 MEQ tablet Take 1 tablet (10 mEq total) by mouth once daily. May also take 2 tablets (20 mEq total) daily as needed (when taking lasix for leg swelling).    protein supplement Liqd      [DISCONTINUED] metoprolol succinate (TOPROL-XL) 200 MG 24 hr tablet TAKE 1 TAB BY MOUTH EVERY DAY     Family History       Problem Relation (Age of Onset)    Asthma Father    Cancer Brother    Heart disease Mother, Father, Brother          Tobacco Use    Smoking status: Never    Smokeless tobacco: Never   Substance and Sexual Activity    Alcohol use: No    Drug use: Never     Types: Hydrocodone    Sexual activity: Not Currently     Birth control/protection: None     Review of Systems   Unable to perform ROS: Mental status change     Objective:     Vital Signs (Most Recent):  Temp: 98.3 °F (36.8 °C) (01/02/24 2126)  Pulse: 90 (01/02/24 2126)  Resp: 18 (01/02/24 2126)  BP: 133/64 (01/02/24 2126)  SpO2: 97 % (01/02/24 2126) Vital Signs (24h Range):  Temp:  [97.7 °F (36.5 °C)-99.1 °F (37.3 °C)] 98.3 °F (36.8 °C)  Pulse:  [] 90  Resp:  [17-24] 18  SpO2:  [94 %-100 %] 97 %  BP: (115-137)/(56-77) 133/64     Weight: 72.6 kg (160 lb)  Body mass index is 25.82 kg/m².     Physical Exam  Constitutional:       General: She is not in acute distress.     Appearance: She is not toxic-appearing or diaphoretic.   HENT:      Head: Normocephalic and atraumatic.      Comments: Temporal wasting     Nose: Nose normal.   Eyes:      General: No scleral icterus.     Extraocular Movements: Extraocular movements intact.      Pupils: Pupils are equal, round, and reactive to light.   Cardiovascular:      Rate and Rhythm: Regular rhythm. Tachycardia present.   Pulmonary:      Effort: Pulmonary effort is normal. No respiratory distress.      Breath sounds: No wheezing or rales.   Abdominal:      General: Abdomen is flat. There is no distension.      Palpations: Abdomen is soft.      Tenderness: There is no abdominal tenderness. There is no guarding.   Musculoskeletal:         General: Normal range of motion.      Cervical back: Normal range of motion and neck supple. No rigidity.      Right lower leg: No edema.      Left lower leg: No edema.    Skin:     General: Skin is warm and dry.   Neurological:      Mental Status: She is alert. She is disoriented.              CRANIAL NERVES     CN III, IV, VI   Pupils are equal, round, and reactive to light.       Significant Labs: All pertinent labs within the past 24 hours have been reviewed.  CBC:   Recent Labs   Lab 01/02/24  1446   WBC 7.89   HGB 16.4*   HCT 49.9*        CMP:   Recent Labs   Lab 01/02/24  1338 01/02/24  2038   * 156*   K 4.8 3.8   * 115*   CO2 27 27   * 121*   BUN 56* 49*   CREATININE 1.3 1.1   CALCIUM 9.7 8.6*   PROT 7.5  --    ALBUMIN 3.2*  --    BILITOT 0.7  --    ALKPHOS 62  --    AST 31  --    ALT 14  --    ANIONGAP 16 14     Cardiac Markers:   Recent Labs   Lab 01/02/24  1412   BNP 36     Troponin:   Recent Labs   Lab 01/02/24  1338 01/02/24  1812   TROPONINI 0.836* 0.753*     Urine Studies:   Recent Labs   Lab 01/02/24  1526   COLORU Yellow   APPEARANCEUA Hazy*   PHUR 7.0   SPECGRAV 1.020   PROTEINUA 1+*   GLUCUA Negative   KETONESU Negative   BILIRUBINUA Negative   OCCULTUA 1+*   NITRITE Negative   LEUKOCYTESUR 3+*   RBCUA 31*   WBCUA >100*   BACTERIA Many*   SQUAMEPITHEL 1   HYALINECASTS 23*       Significant Imaging: I have reviewed all pertinent imaging results/findings within the past 24 hours.

## 2024-01-03 NOTE — H&P
[facility] - Intensive Care ([address] Medicine  History & Physical    Patient Name: Lilia Hidalgo  MRN: [id number]  Patient Class: OP- Observation  Admission Date: 1/2/2024  Attending Physician: Laly Ness MD   Primary Care Provider: Anna Wood MD         Patient information was obtained from patient, past medical records, and ER records.     Subjective:     Principal Problem:Hypernatremia    Chief Complaint:   Chief Complaint   Patient presents with    Altered Mental Status     EMS from [facility] decrease in LOC since yesterday morning. Pt follows commands. Recently on antibiotics for upper respiratory infection. Oriented to self. Hx of dementia.         HPI: 89 yo F w/HTN, hypothyroid, aortic stenosis (1cm valve area), CHF, pacemaker, dementia, bedbound at baseline, presenting from [facility] for 2 x days of AMS, decreased PO intake. Daughter at bedside reports normally her mom is conversant, Aox1, however for the last 2 days has been drowsy, not eating or drinking. No known hx of falls. No fever/chills/nausea/vomiting/no diarrhea. Had cough 3 weeks ago and was given zpac, no recent coughing.    In the ED patient afebrile and hemodynamically stable saturating well on room air at rest. Na 156, Cl 113, Creat 1.3 and BUN 56. Patient appears very dry on exam. Troponin 0.83. Imaging studies with significant stool burden but otherwise without acute process (see full reports for details). UA concerning for UTI. Patient started on IVFs and admitted to the care of medicine for further evaluation and management.     Past Medical History:   Diagnosis Date    Aortic stenosis 6/17/2015    Arthritis     Atrophic vaginitis 2/18/2016    Bilateral edema of lower extremity 6/22/2016    CHF (congestive heart failure)     Cholelithiasis without cholecystitis 5/5/2017    Chronic pain of left knee 8/3/2017    Depressed mood 6/22/2016    Diverticulitis of large intestine without perforation or abscess  "without bleeding 5/5/2017    Encounter for blood transfusion     Essential hypertension     GERD (gastroesophageal reflux disease)     Hyperlipidemia     Hypertension     Hypertensive heart disease with heart failure 7/14/2015    Insomnia     Joint pain     Knee pain, left 08/2016    pain with walking or standing    Primary osteoarthritis of knee 2/5/2013    PUD (peptic ulcer disease)     S/P knee replacement 2/13/2013    Slow transit constipation 2/18/2016       Past Surgical History:   Procedure Laterality Date    A-V CARDIAC PACEMAKER INSERTION Left 3/28/2022    Procedure: INSERTION, CARDIAC PACEMAKER, DUAL CHAMBER;  Surgeon: Estiven Rivera MD;  Location: [facility] EP LAB;  Service: Cardiology;  Laterality: Left;  CHB, DUAL PPM, BIO, DM, [place], [place] [address]    APPENDECTOMY      APPLICATION OF LARGE EXTERNAL FIXATION DEVICE TO TIBIA Right 3/21/2022    Procedure: APPLICATION, EXTERNAL FIXATION DEVICE, LARGE, TIBIA;  Surgeon: Anirudh Carreno MD;  Location: [facility] OR [address] [place] [address];  Service: Orthopedics;  Laterality: Right;    COLONOSCOPY      08    EYE SURGERY      bilateral cataract    FINGER SURGERY      LT thumb surgery--"arthritis surgery"    HYSTERECTOMY      UNKNOWN BSO    JOINT REPLACEMENT      right hip replacement    KNEE ARTHROPLASTY Left 2001    TONSILLECTOMY      TUMOR EXCISION      esophageal tumor removal     UPPER GASTROINTESTINAL ENDOSCOPY      2013       Review of patient's allergies indicates:   Allergen Reactions    Aspirin Nausea Only and Other (See Comments)     Other reaction(s): esophageal pain    Celebrex [celecoxib] Rash    Morphine Hallucinations    Steroid [corticosteroids (glucocorticoids)] Other (See Comments)     Other reaction(s): Flushing (skin)    Sulfa dyne Other (See Comments)     Other reaction(s): esophogeal pain       No current facility-administered medications on file prior to encounter.     Current Outpatient Medications on File Prior to Encounter   Medication Sig    acetaminophen " (TYLENOL) 650 MG TbSR Take 1 tablet (650 mg total) by mouth every 8 (eight) hours as needed. Give 1 tablet QAM, 1 QPM.    atorvastatin (LIPITOR) 20 MG tablet TAKE 1 TAB BY MOUTH AT BEDTIME    balsam peru-castor oiL Oint     cholecalciferol, vitamin D3, 1,250 mcg (50,000 unit) capsule Take 50,000 Units by mouth once daily.    ciclopirox (PENLAC) 8 % Soln Apply topically nightly.    donepeziL (ARICEPT) 5 MG tablet TAKE 1 TAB BY MOUTH EVERY EVENING    EScitalopram oxalate (LEXAPRO) 20 MG tablet TAKE 1 TAB BY MOUTH AT BEDTIME FOR DEPRESSION    furosemide (LASIX) 40 MG tablet TAKE 2 TABLETS (80 MG TOTAL) BY MOUTH 2 (TWO) TIMES DAILY AS NEEDED (FOR WEIGHT GAIN OF 5 POUNDS). TAKE WITH POTASSIUM    levothyroxine (SYNTHROID) 25 MCG tablet     losartan (COZAAR) 50 MG tablet Take 1 tablet (50 mg total) by mouth once daily.    melatonin 5 mg Tab Take 1 tablet by mouth every evening.     MULTIVITAMIN W-MINERALS/LUTEIN (CENTRUM SILVER ORAL) Take by mouth once daily.    nitroGLYCERIN (NITROSTAT) 0.4 MG SL tablet Place 1 tablet (0.4 mg total) under the tongue every 5 (five) minutes as needed for Chest pain.    nystatin (MYCOSTATIN) cream APPLY TO GROIN AREA TOPICALLY EVERY 12 HOURS FOR ANTIFUNGAL    omeprazole (PRILOSEC) 20 MG capsule TAKE 2 CAPS (40MG) BY MOUTH EVERY DAY (DX: GERD)    oxyCODONE (ROXICODONE) 5 mg/5 mL Soln     potassium chloride SA (K-DUR,KLOR-CON) 10 MEQ tablet Take 1 tablet (10 mEq total) by mouth once daily. May also take 2 tablets (20 mEq total) daily as needed (when taking lasix for leg swelling).    protein supplement Liqd     [DISCONTINUED] metoprolol succinate (TOPROL-XL) 200 MG 24 hr tablet TAKE 1 TAB BY MOUTH EVERY DAY     Family History       Problem Relation (Age of Onset)    Asthma Father    Cancer Brother    Heart disease Mother, Father, Brother          Tobacco Use    Smoking status: Never    Smokeless tobacco: Never   Substance and Sexual Activity    Alcohol use: No    Drug use: Never     Types:  Hydrocodone    Sexual activity: Not Currently     Birth control/protection: None     Review of Systems   Unable to perform ROS: Mental status change     Objective:     Vital Signs (Most Recent):  Temp: 98.3 °F (36.8 °C) (01/02/24 2126)  Pulse: 90 (01/02/24 2126)  Resp: 18 (01/02/24 2126)  BP: 133/64 (01/02/24 2126)  SpO2: 97 % (01/02/24 2126) Vital Signs (24h Range):  Temp:  [97.7 °F (36.5 °C)-99.1 °F (37.3 °C)] 98.3 °F (36.8 °C)  Pulse:  [] 90  Resp:  [17-24] 18  SpO2:  [94 %-100 %] 97 %  BP: (115-137)/(56-77) 133/64     Weight: 72.6 kg (160 lb)  Body mass index is 25.82 kg/m².     Physical Exam  Constitutional:       General: She is not in acute distress.     Appearance: She is not toxic-appearing or diaphoretic.   HENT:      Head: Normocephalic and atraumatic.      Comments: Temporal wasting     Nose: Nose normal.   Eyes:      General: No scleral icterus.     Extraocular Movements: Extraocular movements intact.      Pupils: Pupils are equal, round, and reactive to light.   Cardiovascular:      Rate and Rhythm: Regular rhythm. Tachycardia present.   Pulmonary:      Effort: Pulmonary effort is normal. No respiratory distress.      Breath sounds: No wheezing or rales.   Abdominal:      General: Abdomen is flat. There is no distension.      Palpations: Abdomen is soft.      Tenderness: There is no abdominal tenderness. There is no guarding.   Musculoskeletal:         General: Normal range of motion.      Cervical back: Normal range of motion and neck supple. No rigidity.      Right lower leg: No edema.      Left lower leg: No edema.   Skin:     General: Skin is warm and dry.   Neurological:      Mental Status: She is alert. She is disoriented.              CRANIAL NERVES     CN III, IV, VI   Pupils are equal, round, and reactive to light.       Significant Labs: All pertinent labs within the past 24 hours have been reviewed.  CBC:   Recent Labs   Lab 01/02/24  1446   WBC 7.89   HGB 16.4*   HCT 49.9*         CMP:   Recent Labs   Lab 01/02/24  1338 01/02/24 2038   * 156*   K 4.8 3.8   * 115*   CO2 27 27   * 121*   BUN 56* 49*   CREATININE 1.3 1.1   CALCIUM 9.7 8.6*   PROT 7.5  --    ALBUMIN 3.2*  --    BILITOT 0.7  --    ALKPHOS 62  --    AST 31  --    ALT 14  --    ANIONGAP 16 14     Cardiac Markers:   Recent Labs   Lab 01/02/24  1412   BNP 36     Troponin:   Recent Labs   Lab 01/02/24  1338 01/02/24  1812   TROPONINI 0.836* 0.753*     Urine Studies:   Recent Labs   Lab 01/02/24  1526   COLORU Yellow   APPEARANCEUA Hazy*   PHUR 7.0   SPECGRAV 1.020   PROTEINUA 1+*   GLUCUA Negative   KETONESU Negative   BILIRUBINUA Negative   OCCULTUA 1+*   NITRITE Negative   LEUKOCYTESUR 3+*   RBCUA 31*   WBCUA >100*   BACTERIA Many*   SQUAMEPITHEL 1   HYALINECASTS 23*       Significant Imaging: I have reviewed all pertinent imaging results/findings within the past 24 hours.  Assessment/Plan:     * Hypernatremia  Patient has hypernatremia which is uncontrolled. The hypernatremia is due to Dehydration. We will aim to correct the sodium by 8-10mEq in 24 hours. We will correct their hypernatremia with Select IV fluids: NS  at a rate of 100 ml/hr. The patient's sodium results have been reviewed and are listed below.  Recent Labs   Lab 01/02/24 2038   *     - neurochecks q4h  - BMP q4h  - closely monitor and further management pending clinical course and future study review    Acute cystitis  - start ceftriaxone  - follow up urine culture      Chronic diastolic congestive heart failure  - holding home lasix (for prn use)    Dementia  - by report at baseline Oriented to person only, bedbound, conversational  - confirmed DNR status    Slow transit constipation  - enema x1  - start senna docusate BID  - strict I/OS      Essential hypertension  - holding home losartan ; resume as appropriate      VTE Risk Mitigation (From admission, onward)           Ordered     enoxaparin injection 30 mg  Daily         01/02/24  1825     IP VTE HIGH RISK PATIENT  Once         01/02/24 1825                       On 01/02/2024, patient should be placed in hospital observation services under my care.        Pharmacist Renal Dose Adjustment Note    Lilia Hidalgo is a 89 y.o. female being treated with the medication enoxaparin.     Patient Data:    Vital Signs (Most Recent):  Temp: 97.7 °F (36.5 °C) (01/02/24 1417)  Pulse: 95 (01/02/24 1802)  Resp: 17 (01/02/24 1802)  BP: 137/65 (01/02/24 1802)  SpO2: 100 % (01/02/24 1802) Vital Signs (72h Range):  Temp:  [97.7 °F (36.5 °C)]   Pulse:  []   Resp:  [17-24]   BP: (115-137)/(56-77)   SpO2:  [94 %-100 %]      Recent Labs   Lab 01/02/24  1338   CREATININE 1.3     Serum creatinine: 1.3 mg/dL 01/02/24 1338  Estimated creatinine clearance: 28.2 mL/min    Enoxaparin 40 mg Q24H will be changed to enoxaparin 30 mg Q24H.     Pharmacist's Name: Angelita Acharya  Pharmacist's Extension: [phone]      Dm Aviles MD  Department of Hospital Medicine  [facility] - Intensive Care ([facility]

## 2024-01-03 NOTE — SUBJECTIVE & OBJECTIVE
Interval History: Seen at bedside this AM. States that she feels much improved. Denies fevers, chills, shortness of breath, abdominal pain. One BM reported overnight    Review of Systems  Objective:     Vital Signs (Most Recent):  Temp: 97.6 °F (36.4 °C) (01/03/24 1155)  Pulse: 80 (01/03/24 1300)  Resp: 16 (01/03/24 1155)  BP: (!) 114/58 (01/03/24 1155)  SpO2: 97 % (01/03/24 1300) Vital Signs (24h Range):  Temp:  [97.5 °F (36.4 °C)-99.1 °F (37.3 °C)] 97.6 °F (36.4 °C)  Pulse:  [] 80  Resp:  [16-19] 16  SpO2:  [95 %-100 %] 97 %  BP: (114-154)/(56-70) 114/58     Weight: 72.6 kg (160 lb)  Body mass index is 25.82 kg/m².    Intake/Output Summary (Last 24 hours) at 1/3/2024 1347  Last data filed at 1/3/2024 0657  Gross per 24 hour   Intake --   Output 860 ml   Net -860 ml         Physical Exam  Constitutional:       General: She is not in acute distress.     Appearance: She is not toxic-appearing.   HENT:      Mouth/Throat:      Mouth: Mucous membranes are dry.   Cardiovascular:      Rate and Rhythm: Normal rate and regular rhythm.      Pulses: Normal pulses.      Heart sounds: No murmur heard.  Pulmonary:      Effort: No respiratory distress.      Breath sounds: Normal breath sounds. No wheezing.   Abdominal:      General: Bowel sounds are normal. There is distension.      Palpations: Abdomen is soft.      Tenderness: There is no abdominal tenderness.   Musculoskeletal:         General: No swelling, tenderness or deformity.      Cervical back: Normal range of motion.   Skin:     General: Skin is dry.   Neurological:      Mental Status: She is alert. Mental status is at baseline.   Psychiatric:         Mood and Affect: Mood normal.         Behavior: Behavior normal.             Significant Labs: All pertinent labs within the past 24 hours have been reviewed.    Significant Imaging: I have reviewed all pertinent imaging results/findings within the past 24 hours.

## 2024-01-03 NOTE — HPI
89 yo F w/HTN, hypothyroid, aortic stenosis (1cm valve area), CHF, pacemaker, dementia, bedbound at baseline, presenting from [facility] for 2 x days of AMS, decreased PO intake. Daughter at bedside reports normally her mom is conversant, Aox1, however for the last 2 days has been drowsy, not eating or drinking. No known hx of falls. No fever/chills/nausea/vomiting/no diarrhea. Had cough 3 weeks ago and was given zpac, no recent coughing.    In the ED patient afebrile and hemodynamically stable saturating well on room air at rest. Na 156, Cl 113, Creat 1.3 and BUN 56. Patient appears very dry on exam. Troponin 0.83. Imaging studies with significant stool burden but otherwise without acute process (see full reports for details). UA concerning for UTI. Patient started on IVFs and admitted to the care of medicine for further evaluation and management.

## 2024-01-03 NOTE — ASSESSMENT & PLAN NOTE
- by report at baseline Oriented to person only, bedbound, conversational (Now Aox2)  - confirmed DNR status

## 2024-01-03 NOTE — ASSESSMENT & PLAN NOTE
Patient has hypernatremia which is uncontrolled. The hypernatremia is due to Dehydration. We will aim to correct the sodium by 8-10mEq in 24 hours. We will correct their hypernatremia with Select IV fluids: NS  at a rate of 100 ml/hr. The patient's sodium results have been reviewed and are listed below.  Recent Labs   Lab 01/02/24 2038   *     - neurochecks q4h  - BMP q4h  - closely monitor and further management pending clinical course and future study review

## 2024-01-03 NOTE — PLAN OF CARE
Patient has remained within normal limits since arriving. She was more confused when she first arrived to the floor, she was refusing care and to take her meds. She has since become more cooperative. She has a reddened area on her sacrum. Skin is intact. Applied barrier cream to it. Did not apply mepilex because patient received an enema in the ER last night for impaction and since has diarrhea. Oral care has been performed on patient but she still has the presence of a brown crust around her lips and mouth. Unsure of what it is. All vital signs have remained within normal limits. Purewick was placed on patient. She has had no complaints of pain, discomfort, or distress.   Problem: Adult Inpatient Plan of Care  Goal: Plan of Care Review  Outcome: Ongoing, Progressing  Goal: Patient-Specific Goal (Individualized)  Outcome: Ongoing, Progressing  Goal: Absence of Hospital-Acquired Illness or Injury  Outcome: Ongoing, Progressing  Goal: Optimal Comfort and Wellbeing  Outcome: Ongoing, Progressing  Goal: Readiness for Transition of Care  Outcome: Ongoing, Progressing

## 2024-01-03 NOTE — NURSING
Nurses Note -- 4 Eyes      1/3/2024   6:18 AM      Skin assessed during: Admit      [] No Altered Skin Integrity Present    []Prevention Measures Documented      [x] Yes- Altered Skin Integrity Present or Discovered   [] LDA Added if Not in Epic (Describe Wound)   [x] New Altered Skin Integrity was Present on Admit and Documented in LDA   [] Wound Image Taken    Wound Care Consulted? No    Attending Nurse:  Enma Amaro RN/Staff Member:   EVONNE Malone        Patient has blanchable redness on sacrum with an area on right cheek that looks like a stage I on its way to stage II but skin is still intact. Applied orange barrier cream to sacrum. No mepilex because patient received an enema yesterday and has had diarrhea since.

## 2024-01-03 NOTE — PT/OT/SLP EVAL
Speech Language Pathology Evaluation  Bedside Swallow    Patient Name:  Lilia Hidalgo   MRN:  [id number]  Admitting Diagnosis: Hypernatremia    Recommendations:                 General Recommendations:  Dysphagia therapy  Diet recommendations:   , Full liquids   Aspiration Precautions: Eliminate distractions, HOB to 90 degrees, Meds crushed in puree, Small bites/sips, and Strict aspiration precautions   General Precautions: Standard, aspiration, fall  Communication strategies:  go to room if call light pushed    Assessment:     Lilia Hidalgo is a 89 y.o. female with an SLP diagnosis of Dysphagia.  She presents with decreased alertness.    History:     Past Medical History:   Diagnosis Date    Aortic stenosis 6/17/2015    Arthritis     Atrophic vaginitis 2/18/2016    Bilateral edema of lower extremity 6/22/2016    CHF (congestive heart failure)     Cholelithiasis without cholecystitis 5/5/2017    Chronic pain of left knee 8/3/2017    Depressed mood 6/22/2016    Diverticulitis of large intestine without perforation or abscess without bleeding 5/5/2017    Encounter for blood transfusion     Essential hypertension     GERD (gastroesophageal reflux disease)     Hyperlipidemia     Hypertension     Hypertensive heart disease with heart failure 7/14/2015    Insomnia     Joint pain     Knee pain, left 08/2016    pain with walking or standing    Primary osteoarthritis of knee 2/5/2013    PUD (peptic ulcer disease)     S/P knee replacement 2/13/2013    Slow transit constipation 2/18/2016       Past Surgical History:   Procedure Laterality Date    A-V CARDIAC PACEMAKER INSERTION Left 3/28/2022    Procedure: INSERTION, CARDIAC PACEMAKER, DUAL CHAMBER;  Surgeon: Estiven Rivera MD;  Location: [facility];  Service: Cardiology;  Laterality: Left;  CHB, DUAL PPM, BIO, DM, ANES, RM [id number]    APPENDECTOMY      APPLICATION OF LARGE EXTERNAL FIXATION DEVICE TO TIBIA Right 3/21/2022    Procedure: APPLICATION, EXTERNAL FIXATION DEVICE,  "LARGE, TIBIA;  Surgeon: Anirudh Carreno MD;  Location: [address];  Service: Orthopedics;  Laterality: Right;    COLONOSCOPY      08    EYE SURGERY      bilateral cataract    FINGER SURGERY      LT thumb surgery--"arthritis surgery"    HYSTERECTOMY      UNKNOWN BSO    JOINT REPLACEMENT      right hip replacement    KNEE ARTHROPLASTY Left 2001    TONSILLECTOMY      TUMOR EXCISION      esophageal tumor removal     UPPER GASTROINTESTINAL ENDOSCOPY      2013       Social History: Patient lives in nursing home      Prior diet: marcus.    Occupation/hobbies/homemaking: na.    Subjective     No family present  Patient goals: marcus     Pain/Comfort:  Pain Rating 1: 0/10  Pain Rating Post-Intervention 1: 0/10    Respiratory Status: Room air    Objective:     Oral Musculature Evaluation  Oral Musculature: WFL  Dentition: present and adequate  Secretion Management: adequate  Mucosal Quality: dry  Mandibular Strength and Mobility: WFL  Oral Labial Strength and Mobility: WFL  Lingual Strength and Mobility: WFL  Velar Elevation: WFL  Buccal Strength and Mobility: WFL  Volitional Cough: not elicited  Volitional Swallow: not elicited  Voice Prior to PO Intake: wfl    Bedside Swallow Eval:   Consistencies Assessed:  2 teaspoons of water then 5 sips of water via cup with a straw  4 ounces of pudding via spoon 1/3 cracker     Oral Phase:   Prolonged mastication    Pharyngeal Phase:   no overt clinical signs/symptoms of aspiration  no overt clinical signs/symptoms of pharyngeal dysphagia    Compensatory Strategies  None    Treatment: Recommend initiation of full liquid diet with plans to advance diet to soft with thin as long as level of alertness improves     Goals:   Multidisciplinary Problems       SLP Goals          Problem: SLP    Goal Priority Disciplines Outcome   SLP Goal     SLP    Description: Goals due 1/10  1.  Tolerate full liquid diet with no s/s of aspiration  2.  Participate in ongoing assessment of swallow to " initiate least restrictive diet                       Plan:     Patient to be seen:  3 x/week   Plan of Care expires:  01/31/24  Plan of Care reviewed with:  patient   SLP Follow-Up:  Yes       Discharge recommendations:  No Therapy Indicated   Barriers to Discharge:  None    Time Tracking:     SLP Treatment Date:   01/03/24  Speech Start Time:  1045  Speech Stop Time:  1053     Speech Total Time (min):  8 min    Billable Minutes: Eval Swallow and Oral Function 8    01/03/2024

## 2024-01-03 NOTE — ASSESSMENT & PLAN NOTE
Patient has hypernatremia which is uncontrolled. The hypernatremia is due to Dehydration. We will aim to correct the sodium by 8-10mEq in 24 hours. We will correct their hypernatremia with Select IV fluids: D5W  at a rate of 75 ml/hr. The patient's sodium results have been reviewed and are listed below.  Recent Labs   Lab 01/03/24  1136   *       -Likely due to dehydration incited by UTI  -Na initially uptrended to 158 after 0.9NS started  -Switch D5W at 75cc/hr based on FWD with goal Na of 150 in 24 hours as to not avoid overcorrection  -Sodium check q4h, fluid rate change as indicated  - neurochecks q4h

## 2024-01-03 NOTE — PROGRESS NOTES
Pharmacist Renal Dose Adjustment Note    Lilia Hidalgo is a 89 y.o. female being treated with the medication enoxaparin.     Patient Data:    Vital Signs (Most Recent):  Temp: 97.7 °F (36.5 °C) (01/02/24 1417)  Pulse: 95 (01/02/24 1802)  Resp: 17 (01/02/24 1802)  BP: 137/65 (01/02/24 1802)  SpO2: 100 % (01/02/24 1802) Vital Signs (72h Range):  Temp:  [97.7 °F (36.5 °C)]   Pulse:  []   Resp:  [17-24]   BP: (115-137)/(56-77)   SpO2:  [94 %-100 %]      Recent Labs   Lab 01/02/24  1338   CREATININE 1.3     Serum creatinine: 1.3 mg/dL 01/02/24 1338  Estimated creatinine clearance: 28.2 mL/min    Enoxaparin 40 mg Q24H will be changed to enoxaparin 30 mg Q24H.     Pharmacist's Name: Angelita Acharya  Pharmacist's Extension: [phone]

## 2024-01-03 NOTE — PROGRESS NOTES
[facility] Flex ([place] [id number])  [facility] Medicine  Progress Note    Patient Name: Lilia Hidalgo  MRN: [id number]  Patient Class: OP- Observation   Admission Date: 1/2/2024  Length of Stay: 0 days  Attending Physician: Jamey Alvarado DO  Primary Care Provider: Anna Wood MD        Subjective:     Principal Problem:Hypernatremia        HPI:  89 yo F w/HTN, hypothyroid, aortic stenosis (1cm valve area), CHF, pacemaker, dementia, bedbound at baseline, presenting from [facility] for 2 x days of AMS, decreased PO intake. Daughter at bedside reports normally her mom is conversant, Aox1, however for the last 2 days has been drowsy, not eating or drinking. No known hx of falls. No fever/chills/nausea/vomiting/no diarrhea. Had cough 3 weeks ago and was given zpac, no recent coughing.    In the ED patient afebrile and hemodynamically stable saturating well on room air at rest. Na 156, Cl 113, Creat 1.3 and BUN 56. Patient appears very dry on exam. Troponin 0.83. Imaging studies with significant stool burden but otherwise without acute process (see full reports for details). UA concerning for UTI. Patient started on IVFs and admitted to the care of medicine for further evaluation and management.     Overview/Hospital Course:  Presented with altered mental status, also found to have Na of 156. Found to have UTI likely contributing to decreased PO intake and subsequent hypernatremia. CT abdomen showed significant stool burden, improved after enema. Initially started on 0.9NS however given sodium uptrend, fluids were switched to D5W based on FWD. BMP to be checked every four hours. Mentation significantly improved with empiric IV rocephin for cystitis.    Interval History: Seen at bedside this AM. States that she feels much improved. Denies fevers, chills, shortness of breath, abdominal pain. One BM reported overnight    Review of Systems  Objective:     Vital Signs (Most Recent):  Temp: 97.6 °F (36.4 °C)  (01/03/24 1155)  Pulse: 80 (01/03/24 1300)  Resp: 16 (01/03/24 1155)  BP: (!) 114/58 (01/03/24 1155)  SpO2: 97 % (01/03/24 1300) Vital Signs (24h Range):  Temp:  [97.5 °F (36.4 °C)-99.1 °F (37.3 °C)] 97.6 °F (36.4 °C)  Pulse:  [] 80  Resp:  [16-19] 16  SpO2:  [95 %-100 %] 97 %  BP: (114-154)/(56-70) 114/58     Weight: 72.6 kg (160 lb)  Body mass index is 25.82 kg/m².    Intake/Output Summary (Last 24 hours) at 1/3/2024 1347  Last data filed at 1/3/2024 0657  Gross per 24 hour   Intake --   Output 860 ml   Net -860 ml         Physical Exam  Constitutional:       General: She is not in acute distress.     Appearance: She is not toxic-appearing.   HENT:      Mouth/Throat:      Mouth: Mucous membranes are dry.   Cardiovascular:      Rate and Rhythm: Normal rate and regular rhythm.      Pulses: Normal pulses.      Heart sounds: No murmur heard.  Pulmonary:      Effort: No respiratory distress.      Breath sounds: Normal breath sounds. No wheezing.   Abdominal:      General: Bowel sounds are normal. There is distension.      Palpations: Abdomen is soft.      Tenderness: There is no abdominal tenderness.   Musculoskeletal:         General: No swelling, tenderness or deformity.      Cervical back: Normal range of motion.   Skin:     General: Skin is dry.   Neurological:      Mental Status: She is alert. Mental status is at baseline.   Psychiatric:         Mood and Affect: Mood normal.         Behavior: Behavior normal.             Significant Labs: All pertinent labs within the past 24 hours have been reviewed.    Significant Imaging: I have reviewed all pertinent imaging results/findings within the past 24 hours.    Assessment/Plan:      * Hypernatremia  Patient has hypernatremia which is uncontrolled. The hypernatremia is due to Dehydration. We will aim to correct the sodium by 8-10mEq in 24 hours. We will correct their hypernatremia with Select IV fluids: D5W  at a rate of 75 ml/hr. The patient's sodium results  have been reviewed and are listed below.  Recent Labs   Lab 01/03/24  1136   *       -Likely due to dehydration incited by UTI  -Na initially uptrended to 158 after 0.9NS started  -Switch D5W at 75cc/hr based on FWD with goal Na of 150 in 24 hours as to not avoid overcorrection  -Sodium check q4h, fluid rate change as indicated  - neurochecks q4h    Acute cystitis  - Likely contributing to altered mentation and hypernatremia due to decreased PO intake  - start ceftriaxone  - follow up urine culture      Acute metabolic encephalopathy  -Improved, now at baseline  -Likely due to UTI  -IV CTX as above      Chronic diastolic congestive heart failure  - holding home lasix (for prn use)    Dementia  - by report at baseline Oriented to person only, bedbound, conversational (Now Aox2)  - confirmed DNR status    Slow transit constipation  - enema x1, resolved  - start senna docusate BID. Miralax added  - strict I/OS      Essential hypertension  - holding home losartan ; resume as appropriate      VTE Risk Mitigation (From admission, onward)           Ordered     enoxaparin injection 40 mg  Daily         01/03/24 0853     IP VTE HIGH RISK PATIENT  Once         01/02/24 1825                    Discharge Planning   MARLO: 1/5/2024     Code Status: DNR   Is the patient medically ready for discharge?: No    Reason for patient still in hospital (select all that apply): Patient trending condition                     Jamey Alvarado DO  Department of Hospital Medicine   [facility] ([place]

## 2024-01-03 NOTE — HOSPITAL COURSE
Presented with altered mental status, also found to have Na of 156. Found to have UTI likely contributing to decreased PO intake and subsequent hypernatremia. CT abdomen showed significant stool burden, improved after enema. Initially started on 0.9NS however given sodium uptrend, fluids were switched to D5W based on FWD. Mentation significantly improved with empiric IV rocephin for cystitis, now s/p 5 day course. Patient able to maintain normal sodium without additional IVF. Family concerned about patient's disease progression - patient discharged on hospice.

## 2024-01-03 NOTE — ASSESSMENT & PLAN NOTE
- Likely contributing to altered mentation and hypernatremia due to decreased PO intake  - start ceftriaxone  - follow up urine culture

## 2024-01-03 NOTE — PROGRESS NOTES
Pharmacist Renal Dose Adjustment Note    Lilia Hidalgo is a 89 y.o. female being treated with the medication Enoxaparin (Lovenox)    Patient Data:    Vital Signs (Most Recent):  Temp: 98.5 °F (36.9 °C) (01/03/24 0745)  Pulse: 80 (01/03/24 0745)  Resp: 17 (01/03/24 0745)  BP: (!) 132/59 (01/03/24 0745)  SpO2: 95 % (01/03/24 0745) Vital Signs (72h Range):  Temp:  [97.5 °F (36.4 °C)-99.1 °F (37.3 °C)]   Pulse:  []   Resp:  [17-24]   BP: (115-154)/(56-77)   SpO2:  [94 %-100 %]      Recent Labs   Lab 01/02/24 2038 01/02/24  2338 01/03/24  0536   CREATININE 1.1 0.9 0.8     Serum creatinine: 0.8 mg/dL 01/03/24 0536  Estimated creatinine clearance: 45.8 mL/min    Medication:Lovenox 30 mg subQ Q24h will be changed to Lovenox 40 mg subQ Q24h for CrCl > 30 mL/min per pharmacy protocol.     Pharmacist's Name: India Roche, PharmD  Pharmacist's Extension: [phone]

## 2024-01-03 NOTE — PLAN OF CARE
[facility] - Stepdown Flex ([place]-[facility])  Initial Discharge Assessment       Primary Care Provider: Anna Wood MD    Admission Diagnosis: Hypernatremia [E87.0]  Left bundle branch block [I44.7]  Troponin level elevated [R79.89]  Chest pain [R07.9]  Urinary tract infection without hematuria, site unspecified [N39.0]  AMS (altered mental status) [R41.82]    Admission Date: 1/2/2024  Expected Discharge Date: 1/5/2024    Transition of Care Barriers: (P) None    Payor: [employer] MANAGED MEDICARE / Plan: [employer] MEDICARE HMO / Product Type: Capitation /     Extended Emergency Contact Information  Primary Emergency Contact: Belkys Moser  Address: [address] [place] [address]  Home Phone: [phone]  Mobile Phone: [phone]  Relation: Daughter  Secondary Emergency Contact: Lina Benavides  Address: [address] [place] [address]  Mobile Phone: [phone]  Relation: Daughter              [facility] [place], [place] - [address] [facility]  [address] [facility] [address]  Phone: [phone] Fax: [phone]    [facility] [place], [place] - [address]  [address]  [address]  [facility] [address] [address]  Phone: [phone] Fax: [phone]      CM met with patient to discuss discharge planning. Patient asked if I could call her daughter. Spoke with Lina Benavides (daughter) [phone] to discuss plan and was informed that patient resides at [facility] and would be returning once medically stable. Prior to hospital admission patient required mod to max assistance with ADLs and utilizes a wheelchair to get around. Discharge Plan A and Plan B have been determined by review of patient's clinical status, future medical and therapeutic needs, and coverage/benefits for post-acute care in coordination with multidisciplinary team members.  Initial  Assessment (most recent)       Adult Discharge Assessment - 01/03/24 1547          Discharge Assessment    Assessment Type Discharge Planning Assessment (P)      Confirmed/corrected address, phone number and insurance Yes (P)      Confirmed Demographics Correct on Facesheet (P)      Source of Information family (P)      Communicated MARLO with patient/caregiver Date not available/Unable to determine (P)      Reason For Admission Hypernatremia (P)      People in Home facility resident (P)      Facility Arrived From: [facility] (P)      Do you expect to return to your current living situation? Yes (P)      Do you have help at home or someone to help you manage your care at home? Yes (P)      Who are your caregiver(s) and their phone number(s)? facility staff (P)      Prior to hospitilization cognitive status: Not Oriented to Place;Not Oriented to Time (P)      Current cognitive status: Not Oriented to Time;Not Oriented to Place (P)      Walking or Climbing Stairs Difficulty yes (P)      Walking or Climbing Stairs ambulation difficulty, assistance 1 person (P)      Mobility Management wheelchair (P)      Dressing/Bathing Difficulty yes (P)      Dressing/Bathing bathing difficulty, assistance 1 person (P)      Home Accessibility wheelchair accessible (P)      Home Layout Able to live on 1st floor (P)      Equipment Currently Used at Home wheelchair (P)      Readmission within 30 days? No (P)      Patient currently being followed by outpatient case management? No (P)      Do you currently have service(s) that help you manage your care at home? No (P)      Do you take prescription medications? Yes (P)      Do you have prescription coverage? Yes (P)      Coverage [employer] Managed medicare (P)      Do you have any problems affording any of your prescribed medications? No (P)      Is the patient taking medications as prescribed? yes (P)      Who is going to help you get home at discharge? facility staff (P)      How  do you get to doctors appointments? family or friend will provide (P)      Are you on dialysis? No (P)      Do you take coumadin? No (P)      DME Needed Upon Discharge  none (P)      Discharge Plan discussed with: Adult children (P)      Transition of Care Barriers None (P)         Physical Activity    On average, how many days per week do you engage in moderate to strenuous exercise (like a brisk walk)? 0 days (P)      On average, how many minutes do you engage in exercise at this level? 0 min (P)         Financial Resource Strain    How hard is it for you to pay for the very basics like food, housing, medical care, and heating? Not hard at all (P)         Housing Stability    In the last 12 months, was there a time when you were not able to pay the mortgage or rent on time? No (P)      In the last 12 months, how many places have you lived? 1 (P)      In the last 12 months, was there a time when you did not have a steady place to sleep or slept in a shelter (including now)? No (P)         Transportation Needs    In the past 12 months, has lack of transportation kept you from medical appointments or from getting medications? No (P)      In the past 12 months, has lack of transportation kept you from meetings, work, or from getting things needed for daily living? No (P)         Food Insecurity    Within the past 12 months, you worried that your food would run out before you got the money to buy more. Never true (P)      Within the past 12 months, the food you bought just didn't last and you didn't have money to get more. Never true (P)         Stress    Do you feel stress - tense, restless, nervous, or anxious, or unable to sleep at night because your mind is troubled all the time - these days? Patient unable to answer (P)         Social Connections    In a typical week, how many times do you talk on the phone with family, friends, or neighbors? More than three times a week (P)      How often do you get together with  friends or relatives? More than three times a week (P)      How often do you attend Yarsani or Hoahaoism services? Never (P)      Do you belong to any clubs or organizations such as Yarsani groups, unions, fraternal or athletic groups, or school groups? No (P)      How often do you attend meetings of the clubs or organizations you belong to? Never (P)      Are you , , , , never , or living with a partner?  (P)         Alcohol Use    Q1: How often do you have a drink containing alcohol? Never (P)      Q2: How many drinks containing alcohol do you have on a typical day when you are drinking? Patient does not drink (P)      Q3: How often do you have six or more drinks on one occasion? Never (P)         OTHER    Name(s) of People in Home Lina Benavides (daughter) [phone] (P)                           ANNIE Pinon  Case Management  [phone]

## 2024-01-04 LAB
ANION GAP SERPL CALC-SCNC: 11 MMOL/L (ref 8–16)
ANION GAP SERPL CALC-SCNC: 13 MMOL/L (ref 8–16)
BACTERIA UR CULT: ABNORMAL
BUN SERPL-MCNC: 26 MG/DL (ref 10–30)
BUN SERPL-MCNC: 31 MG/DL (ref 10–30)
CALCIUM SERPL-MCNC: 8.5 MG/DL (ref 8.7–10.5)
CALCIUM SERPL-MCNC: 9.3 MG/DL (ref 8.7–10.5)
CHLORIDE SERPL-SCNC: 108 MMOL/L (ref 95–110)
CHLORIDE SERPL-SCNC: 110 MMOL/L (ref 95–110)
CO2 SERPL-SCNC: 22 MMOL/L (ref 23–29)
CO2 SERPL-SCNC: 27 MMOL/L (ref 23–29)
CREAT SERPL-MCNC: 0.7 MG/DL (ref 0.5–1.4)
CREAT SERPL-MCNC: 0.7 MG/DL (ref 0.5–1.4)
EST. GFR  (NO RACE VARIABLE): >60 ML/MIN/1.73 M^2
EST. GFR  (NO RACE VARIABLE): >60 ML/MIN/1.73 M^2
GLUCOSE SERPL-MCNC: 163 MG/DL (ref 70–110)
GLUCOSE SERPL-MCNC: 99 MG/DL (ref 70–110)
POTASSIUM SERPL-SCNC: 3.6 MMOL/L (ref 3.5–5.1)
POTASSIUM SERPL-SCNC: 4.2 MMOL/L (ref 3.5–5.1)
SODIUM SERPL-SCNC: 143 MMOL/L (ref 136–145)
SODIUM SERPL-SCNC: 148 MMOL/L (ref 136–145)

## 2024-01-04 PROCEDURE — 36415 COLL VENOUS BLD VENIPUNCTURE: CPT | Mod: HCNC,XB | Performed by: STUDENT IN AN ORGANIZED HEALTH CARE EDUCATION/TRAINING PROGRAM

## 2024-01-04 PROCEDURE — 92526 ORAL FUNCTION THERAPY: CPT | Mod: HCNC

## 2024-01-04 PROCEDURE — 25000003 PHARM REV CODE 250: Mod: HCNC | Performed by: STUDENT IN AN ORGANIZED HEALTH CARE EDUCATION/TRAINING PROGRAM

## 2024-01-04 PROCEDURE — G0378 HOSPITAL OBSERVATION PER HR: HCPCS | Mod: HCNC

## 2024-01-04 PROCEDURE — 63600175 PHARM REV CODE 636 W HCPCS: Mod: HCNC | Performed by: FAMILY MEDICINE

## 2024-01-04 PROCEDURE — 25000003 PHARM REV CODE 250: Mod: HCNC | Performed by: FAMILY MEDICINE

## 2024-01-04 PROCEDURE — 80048 BASIC METABOLIC PNL TOTAL CA: CPT | Mod: HCNC | Performed by: STUDENT IN AN ORGANIZED HEALTH CARE EDUCATION/TRAINING PROGRAM

## 2024-01-04 PROCEDURE — 63600175 PHARM REV CODE 636 W HCPCS: Mod: HCNC | Performed by: STUDENT IN AN ORGANIZED HEALTH CARE EDUCATION/TRAINING PROGRAM

## 2024-01-04 PROCEDURE — 20600001 HC STEP DOWN PRIVATE ROOM: Mod: HCNC

## 2024-01-04 RX ORDER — DEXTROSE MONOHYDRATE 50 MG/ML
INJECTION, SOLUTION INTRAVENOUS CONTINUOUS
Status: DISCONTINUED | OUTPATIENT
Start: 2024-01-04 | End: 2024-01-05

## 2024-01-04 RX ADMIN — DONEPEZIL HYDROCHLORIDE 5 MG: 5 TABLET ORAL at 09:01

## 2024-01-04 RX ADMIN — DEXTROSE MONOHYDRATE: 50 INJECTION, SOLUTION INTRAVENOUS at 05:01

## 2024-01-04 RX ADMIN — SENNOSIDES AND DOCUSATE SODIUM 1 TABLET: 8.6; 5 TABLET ORAL at 09:01

## 2024-01-04 RX ADMIN — ESCITALOPRAM OXALATE 20 MG: 20 TABLET, FILM COATED ORAL at 09:01

## 2024-01-04 RX ADMIN — DEXTROSE MONOHYDRATE: 50 INJECTION, SOLUTION INTRAVENOUS at 01:01

## 2024-01-04 RX ADMIN — POLYETHYLENE GLYCOL 3350 17 G: 17 POWDER, FOR SOLUTION ORAL at 08:01

## 2024-01-04 RX ADMIN — CEFTRIAXONE 1 G: 1 INJECTION, POWDER, FOR SOLUTION INTRAMUSCULAR; INTRAVENOUS at 06:01

## 2024-01-04 RX ADMIN — QUETIAPINE FUMARATE 25 MG: 25 TABLET ORAL at 09:01

## 2024-01-04 RX ADMIN — LEVOTHYROXINE SODIUM 25 MCG: 25 TABLET ORAL at 05:01

## 2024-01-04 RX ADMIN — ENOXAPARIN SODIUM 40 MG: 40 INJECTION SUBCUTANEOUS at 06:01

## 2024-01-04 RX ADMIN — ATORVASTATIN CALCIUM 20 MG: 20 TABLET, FILM COATED ORAL at 09:01

## 2024-01-04 RX ADMIN — SENNOSIDES AND DOCUSATE SODIUM 1 TABLET: 8.6; 5 TABLET ORAL at 08:01

## 2024-01-04 NOTE — PLAN OF CARE
Problem: SLP  Goal: SLP Goal  Description: Goals due 1/10  1.  Tolerate full liquid diet with no s/s of aspiration  2.  Participate in ongoing assessment of swallow to initiate least restrictive diet  Outcome: Ongoing, Progressing     Pt seen for ongoing swallow assessment. REC: cautiously advance to Level VI soft and bite sized textures, thin liquids, provided strict 1:1 assistance with all PO, vital signs stable, Pt is awake/alert/attentive and accepting, upright, single bites/sips and strict aspiration precautions. Findings/recs reviewed with RN and MD. ST to f/u to monitor.     1/4/2024

## 2024-01-04 NOTE — SUBJECTIVE & OBJECTIVE
Interval History:  Patient resting comfortably this morning. Denies fevers, chills, shortness of breath, abdominal pain.     Review of Systems  Objective:     Vital Signs (Most Recent):  Temp: 97.4 °F (36.3 °C) (01/05/24 1222)  Pulse: 95 (01/05/24 1222)  Resp: 18 (01/05/24 1222)  BP: 122/64 (01/05/24 1222)  SpO2: 95 % (01/05/24 1222) Vital Signs (24h Range):  Temp:  [97.3 °F (36.3 °C)-98.1 °F (36.7 °C)] 97.4 °F (36.3 °C)  Pulse:  [] 95  Resp:  [18] 18  SpO2:  [95 %-100 %] 95 %  BP: (108-124)/(53-64) 122/64     Weight: 72.6 kg (160 lb)  Body mass index is 25.82 kg/m².    Intake/Output Summary (Last 24 hours) at 1/5/2024 1430  Last data filed at 1/5/2024 1317  Gross per 24 hour   Intake --   Output 550 ml   Net -550 ml         Physical Exam  Constitutional:       General: She is not in acute distress.     Appearance: She is not toxic-appearing.   HENT:      Mouth/Throat:      Mouth: Mucous membranes are moist.   Cardiovascular:      Rate and Rhythm: Normal rate and regular rhythm.      Pulses: Normal pulses.      Heart sounds: No murmur heard.  Pulmonary:      Effort: No respiratory distress.      Breath sounds: Normal breath sounds. No wheezing.   Abdominal:      General: Bowel sounds are normal. There is no distension.      Palpations: Abdomen is soft.      Tenderness: There is no abdominal tenderness.   Musculoskeletal:         General: No swelling, tenderness or deformity.      Cervical back: Normal range of motion.   Skin:     General: Skin is dry.   Neurological:      Mental Status: She is alert. Mental status is at baseline. She is disoriented.   Psychiatric:         Mood and Affect: Mood normal.         Behavior: Behavior normal.             Significant Labs: All pertinent labs within the past 24 hours have been reviewed.    Significant Imaging: I have reviewed all pertinent imaging results/findings within the past 24 hours.

## 2024-01-04 NOTE — ASSESSMENT & PLAN NOTE
Patient has hypernatremia which is uncontrolled. The hypernatremia is due to Dehydration. We will aim to correct the sodium by 8-10mEq in 24 hours. We will correct their hypernatremia with Select IV fluids: D5W  at a rate of 75 ml/hr. The patient's sodium results have been reviewed and are listed below.  Recent Labs   Lab 01/04/24  1148   *       -Likely due to dehydration incited by UTI  -Na initially uptrended to 158 after 0.9NS started  -Lab sticks have been difficult and pt lost IV access for prolonged time 1/4/24 causing delay of treatment  - required D5W at 75cc/hr 1/4   Plan:  -IVF stopped  -labs spaced out  - neurochecks q4h

## 2024-01-04 NOTE — NURSING
IV fluids switched to D5 at 75ml/hr first thing this morning.  BMPs drawn q4h.  At first patient was with very little urine output, bladder scan around 1100 showed >400ml.   Patient then voided before getting a straight cath.  Patient has since voided several times and last bladder scan showed 190ml.  Daughter Belkys updated.  Bed alarm on with safety precautions in place.

## 2024-01-04 NOTE — PLAN OF CARE
Patient has remained stable throughout the night. She has had no complaints of pain, discomfort or distress. She is still very confused but did take her meds with no issues. She did remove her IV site in right arm. Daughter called this morning for update. IVF was increased to 50mL/hr.   Problem: Adult Inpatient Plan of Care  Goal: Plan of Care Review  Outcome: Ongoing, Progressing  Goal: Patient-Specific Goal (Individualized)  Outcome: Ongoing, Progressing  Goal: Absence of Hospital-Acquired Illness or Injury  Outcome: Ongoing, Progressing  Goal: Optimal Comfort and Wellbeing  Outcome: Ongoing, Progressing  Goal: Readiness for Transition of Care  Outcome: Ongoing, Progressing     Problem: Diabetes Comorbidity  Goal: Blood Glucose Level Within Targeted Range  Outcome: Ongoing, Progressing

## 2024-01-04 NOTE — PROGRESS NOTES
[facility] ([place] [id number])  [facility] Medicine  Progress Note    Patient Name: Lilia Hidalgo  MRN: [id number]  Patient Class: IP- Inpatient   Admission Date: 1/2/2024  Length of Stay: 0 days  Attending Physician: Jamey Alvarado DO  Primary Care Provider: Anna Wood MD        Subjective:     Principal Problem:Hypernatremia        HPI:  89 yo F w/HTN, hypothyroid, aortic stenosis (1cm valve area), CHF, pacemaker, dementia, bedbound at baseline, presenting from [facility] for 2 x days of AMS, decreased PO intake. Daughter at bedside reports normally her mom is conversant, Aox1, however for the last 2 days has been drowsy, not eating or drinking. No known hx of falls. No fever/chills/nausea/vomiting/no diarrhea. Had cough 3 weeks ago and was given zpac, no recent coughing.    In the ED patient afebrile and hemodynamically stable saturating well on room air at rest. Na 156, Cl 113, Creat 1.3 and BUN 56. Patient appears very dry on exam. Troponin 0.83. Imaging studies with significant stool burden but otherwise without acute process (see full reports for details). UA concerning for UTI. Patient started on IVFs and admitted to the care of medicine for further evaluation and management.     Overview/Hospital Course:  Presented with altered mental status, also found to have Na of 156. Found to have UTI likely contributing to decreased PO intake and subsequent hypernatremia. CT abdomen showed significant stool burden, improved after enema. Initially started on 0.9NS however given sodium uptrend, fluids were switched to D5W based on FWD. BMP to be checked every four hours. Mentation significantly improved with empiric IV rocephin for cystitis.    Interval History: Seen at bedside this AM. Working with SLP. Pulled out her IV access this morning. Denies fevers, chills, shortness of breath, abdominal pain    Review of Systems  Objective:     Vital Signs (Most Recent):  Temp: 97.6 °F (36.4 °C) (01/04/24  1232)  Pulse: 93 (01/04/24 1232)  Resp: 16 (01/04/24 1232)  BP: 119/74 (01/04/24 1232)  SpO2: (!) 94 % (01/04/24 1232) Vital Signs (24h Range):  Temp:  [97.5 °F (36.4 °C)-98.2 °F (36.8 °C)] 97.6 °F (36.4 °C)  Pulse:  [80-93] 93  Resp:  [16-18] 16  SpO2:  [94 %-97 %] 94 %  BP: (101-136)/(51-74) 119/74     Weight: 72.6 kg (160 lb)  Body mass index is 25.82 kg/m².  No intake or output data in the 24 hours ending 01/04/24 1343        Physical Exam  Constitutional:       General: She is not in acute distress.     Appearance: She is not toxic-appearing.   HENT:      Mouth/Throat:      Mouth: Mucous membranes are dry.   Cardiovascular:      Rate and Rhythm: Normal rate and regular rhythm.      Pulses: Normal pulses.      Heart sounds: No murmur heard.  Pulmonary:      Effort: No respiratory distress.      Breath sounds: Normal breath sounds. No wheezing.   Abdominal:      General: Bowel sounds are normal. There is distension.      Palpations: Abdomen is soft.      Tenderness: There is no abdominal tenderness.   Musculoskeletal:         General: No swelling, tenderness or deformity.      Cervical back: Normal range of motion.   Skin:     General: Skin is dry.   Neurological:      Mental Status: She is alert. Mental status is at baseline.   Psychiatric:         Mood and Affect: Mood normal.         Behavior: Behavior normal.             Significant Labs: All pertinent labs within the past 24 hours have been reviewed.    Significant Imaging: I have reviewed all pertinent imaging results/findings within the past 24 hours.    Assessment/Plan:      * Hypernatremia  Patient has hypernatremia which is uncontrolled. The hypernatremia is due to Dehydration. We will aim to correct the sodium by 8-10mEq in 24 hours. We will correct their hypernatremia with Select IV fluids: D5W  at a rate of 75 ml/hr. The patient's sodium results have been reviewed and are listed below.  Recent Labs   Lab 01/04/24  1148   *       -Likely due to  dehydration incited by UTI  -Na initially uptrended to 158 after 0.9NS started  -Lab sticks have been difficult and pt lost IV access for prolonged time 1/4/24 causing delay of treatment  Plan:  -Increase D5W back to 75cc/hr based on FWD of 1.6L, continue for 24 hrs  -Sodium checks changed to q6h given difficult lab draws, fluid rate change as indicated  - neurochecks q4h    Acute cystitis  - Likely contributing to altered mentation and hypernatremia due to decreased PO intake  - start ceftriaxone  - follow up urine culture      Acute metabolic encephalopathy  -Improved, now at baseline  -Likely due to UTI  -IV CTX as above      Chronic diastolic congestive heart failure  - holding home lasix (for prn use)    Dementia  - by report at baseline Oriented to person only, bedbound, conversational (Now Aox2)  - confirmed DNR status    Slow transit constipation  - enema x1, resolved  - start senna docusate BID. Miralax added  - strict I/OS      Essential hypertension  - holding home losartan ; resume as appropriate      VTE Risk Mitigation (From admission, onward)           Ordered     enoxaparin injection 40 mg  Daily         01/03/24 0853     IP VTE HIGH RISK PATIENT  Once         01/02/24 1825                    Discharge Planning   MARLO: 1/6/2024     Code Status: DNR   Is the patient medically ready for discharge?: No    Reason for patient still in hospital (select all that apply): Patient trending condition                     Jamey Alvarado DO  Department of Hospital Medicine   [facility] ([place]

## 2024-01-04 NOTE — PT/OT/SLP PROGRESS
"Speech Language Pathology Treatment    Patient Name:  Lilia Hidalgo   MRN:  [id number]  Admitting Diagnosis: Hypernatremia    Recommendations:                 General Recommendations:  Dysphagia therapy  Diet recommendations:  Soft & Bite Sized Diet - IDDSI Level 6, Liquid Diet Level: Thin   Aspiration Precautions: 1 bite/sip at a time, strict 1:1 Assistance with meals, Feed only when awake/alert/attentive and accepting, only when vital signs stable, Frequent oral care, Small bites/sips, and Strict aspiration precautions. Continue to monitor for signs and symptoms of aspiration and discontinue oral feeding should you notice any of the following: watery eyes, reddened facial area, wet vocal quality, increased work of breathing, change in respiratory status, increased congestion, coughing, fever and/or change in level of alertness.  General Precautions: Standard, aspiration, fall  Communication strategies:  none    Assessment:     Lilia Hidalgo is a 89 y.o. female with an SLP diagnosis of Dysphagia.  She presents with improved alertness this service day. Risk of aspiration remains due to underlying cognitive status.  Strict aspiration precautions advised.     Subjective     SLP reviewed Pt with RN, RN cleared for tx  Pt presents pleasantly confused  She explains, "we were at mass at [facility]"     Pain/Comfort:  Pain Rating 1: other (see comments) (did not rate, difficulty to assess 22/ underlying cogntiive status)  Pain Addressed 1: Nurse notified  Pain Rating Post-Intervention 1: other (see comments) (did not rate)    Respiratory Status: Room air    Objective:     Has the patient been evaluated by SLP for swallowing?   Yes  Keep patient NPO? No   Current Respiratory Status:        Pt found upright in bed with pulse ox in hand, IV on bedside table and noticeable trace BRB on L arm. RN notified and in room to review Patient. HOB elevated. Pt with minimally completed breakfast meal tray for full liquids. She was " oriented to name only. She was easily distracted and benefited from minimal distractions to attend to structured tasks. Her voice was clear with low intensity. She willingly accepted trials of thin liquids (straw sips x5) and solid (bite of saltine cracker x1.) No overt S/S aspiration with minimal PO triasl accepted. Pt with mildly prolonged mastication of solid yet adequate oral clearance.  She politely declined additional PO trials as assessment progressed.  She was educated on SLP role, aspiration precautions and SLP POC. Pt did not demonstrate understanding. MD entered into the room during session. Findings reviewed with MD. Pt remained upright in bed with call light in reach upon SLP exit/handoff with RN.     Goals:   Multidisciplinary Problems       SLP Goals          Problem: SLP    Goal Priority Disciplines Outcome   SLP Goal     SLP Ongoing, Progressing   Description: Goals due 1/10  1.  Tolerate full liquid diet with no s/s of aspiration  2.  Participate in ongoing assessment of swallow to initiate least restrictive diet                     Plan:     Patient to be seen:  3 x/week   Plan of Care expires:  01/31/24  Plan of Care reviewed with:  patient   SLP Follow-Up:  Yes       Discharge recommendations:  No Therapy Indicated   Barriers to Discharge:  None    Time Tracking:     SLP Treatment Date:   01/04/24  Speech Start Time:  1016  Speech Stop Time:  1034     Speech Total Time (min):  18 min    Billable Minutes: Treatment Swallowing Dysfunction 14    01/04/2024

## 2024-01-05 LAB
ANION GAP SERPL CALC-SCNC: 10 MMOL/L (ref 8–16)
ANION GAP SERPL CALC-SCNC: 8 MMOL/L (ref 8–16)
BASOPHILS # BLD AUTO: 0.02 K/UL (ref 0–0.2)
BASOPHILS NFR BLD: 0.3 % (ref 0–1.9)
BUN SERPL-MCNC: 17 MG/DL (ref 10–30)
BUN SERPL-MCNC: 22 MG/DL (ref 10–30)
CALCIUM SERPL-MCNC: 8.2 MG/DL (ref 8.7–10.5)
CALCIUM SERPL-MCNC: 8.4 MG/DL (ref 8.7–10.5)
CHLORIDE SERPL-SCNC: 104 MMOL/L (ref 95–110)
CHLORIDE SERPL-SCNC: 105 MMOL/L (ref 95–110)
CO2 SERPL-SCNC: 23 MMOL/L (ref 23–29)
CO2 SERPL-SCNC: 26 MMOL/L (ref 23–29)
CREAT SERPL-MCNC: 0.7 MG/DL (ref 0.5–1.4)
CREAT SERPL-MCNC: 0.7 MG/DL (ref 0.5–1.4)
DIFFERENTIAL METHOD BLD: ABNORMAL
EOSINOPHIL # BLD AUTO: 0.2 K/UL (ref 0–0.5)
EOSINOPHIL NFR BLD: 2.4 % (ref 0–8)
ERYTHROCYTE [DISTWIDTH] IN BLOOD BY AUTOMATED COUNT: 14.7 % (ref 11.5–14.5)
EST. GFR  (NO RACE VARIABLE): >60 ML/MIN/1.73 M^2
EST. GFR  (NO RACE VARIABLE): >60 ML/MIN/1.73 M^2
GLUCOSE SERPL-MCNC: 100 MG/DL (ref 70–110)
GLUCOSE SERPL-MCNC: 93 MG/DL (ref 70–110)
HCT VFR BLD AUTO: 41.2 % (ref 37–48.5)
HGB BLD-MCNC: 13.5 G/DL (ref 12–16)
IMM GRANULOCYTES # BLD AUTO: 0.03 K/UL (ref 0–0.04)
IMM GRANULOCYTES NFR BLD AUTO: 0.4 % (ref 0–0.5)
LYMPHOCYTES # BLD AUTO: 1.3 K/UL (ref 1–4.8)
LYMPHOCYTES NFR BLD: 18.8 % (ref 18–48)
MAGNESIUM SERPL-MCNC: 2 MG/DL (ref 1.6–2.6)
MCH RBC QN AUTO: 29.3 PG (ref 27–31)
MCHC RBC AUTO-ENTMCNC: 32.8 G/DL (ref 32–36)
MCV RBC AUTO: 90 FL (ref 82–98)
MONOCYTES # BLD AUTO: 0.6 K/UL (ref 0.3–1)
MONOCYTES NFR BLD: 9.1 % (ref 4–15)
NEUTROPHILS # BLD AUTO: 4.8 K/UL (ref 1.8–7.7)
NEUTROPHILS NFR BLD: 69 % (ref 38–73)
NRBC BLD-RTO: 0 /100 WBC
PHOSPHATE SERPL-MCNC: 1.8 MG/DL (ref 2.7–4.5)
PLATELET # BLD AUTO: 145 K/UL (ref 150–450)
PMV BLD AUTO: 12.2 FL (ref 9.2–12.9)
POTASSIUM SERPL-SCNC: 3 MMOL/L (ref 3.5–5.1)
POTASSIUM SERPL-SCNC: 3.6 MMOL/L (ref 3.5–5.1)
RBC # BLD AUTO: 4.6 M/UL (ref 4–5.4)
SODIUM SERPL-SCNC: 137 MMOL/L (ref 136–145)
SODIUM SERPL-SCNC: 139 MMOL/L (ref 136–145)
WBC # BLD AUTO: 6.95 K/UL (ref 3.9–12.7)

## 2024-01-05 PROCEDURE — 25000003 PHARM REV CODE 250: Mod: HCNC | Performed by: FAMILY MEDICINE

## 2024-01-05 PROCEDURE — 63600175 PHARM REV CODE 636 W HCPCS: Mod: HCNC | Performed by: STUDENT IN AN ORGANIZED HEALTH CARE EDUCATION/TRAINING PROGRAM

## 2024-01-05 PROCEDURE — 20600001 HC STEP DOWN PRIVATE ROOM: Mod: HCNC

## 2024-01-05 PROCEDURE — 80048 BASIC METABOLIC PNL TOTAL CA: CPT | Mod: 91,HCNC | Performed by: STUDENT IN AN ORGANIZED HEALTH CARE EDUCATION/TRAINING PROGRAM

## 2024-01-05 PROCEDURE — 83735 ASSAY OF MAGNESIUM: CPT | Mod: HCNC | Performed by: FAMILY MEDICINE

## 2024-01-05 PROCEDURE — 36415 COLL VENOUS BLD VENIPUNCTURE: CPT | Mod: HCNC | Performed by: STUDENT IN AN ORGANIZED HEALTH CARE EDUCATION/TRAINING PROGRAM

## 2024-01-05 PROCEDURE — 25000003 PHARM REV CODE 250: Mod: HCNC | Performed by: STUDENT IN AN ORGANIZED HEALTH CARE EDUCATION/TRAINING PROGRAM

## 2024-01-05 PROCEDURE — 85025 COMPLETE CBC W/AUTO DIFF WBC: CPT | Mod: HCNC | Performed by: FAMILY MEDICINE

## 2024-01-05 PROCEDURE — 80048 BASIC METABOLIC PNL TOTAL CA: CPT | Mod: HCNC | Performed by: FAMILY MEDICINE

## 2024-01-05 PROCEDURE — 63600175 PHARM REV CODE 636 W HCPCS: Mod: HCNC | Performed by: FAMILY MEDICINE

## 2024-01-05 PROCEDURE — 84100 ASSAY OF PHOSPHORUS: CPT | Mod: HCNC | Performed by: FAMILY MEDICINE

## 2024-01-05 RX ORDER — POTASSIUM CHLORIDE 20 MEQ/1
40 TABLET, EXTENDED RELEASE ORAL ONCE
Status: COMPLETED | OUTPATIENT
Start: 2024-01-05 | End: 2024-01-05

## 2024-01-05 RX ADMIN — ESCITALOPRAM OXALATE 20 MG: 20 TABLET, FILM COATED ORAL at 08:01

## 2024-01-05 RX ADMIN — ENOXAPARIN SODIUM 40 MG: 40 INJECTION SUBCUTANEOUS at 05:01

## 2024-01-05 RX ADMIN — SENNOSIDES AND DOCUSATE SODIUM 1 TABLET: 8.6; 5 TABLET ORAL at 09:01

## 2024-01-05 RX ADMIN — CEFTRIAXONE 1 G: 1 INJECTION, POWDER, FOR SOLUTION INTRAMUSCULAR; INTRAVENOUS at 05:01

## 2024-01-05 RX ADMIN — QUETIAPINE FUMARATE 25 MG: 25 TABLET ORAL at 08:01

## 2024-01-05 RX ADMIN — POLYETHYLENE GLYCOL 3350 17 G: 17 POWDER, FOR SOLUTION ORAL at 09:01

## 2024-01-05 RX ADMIN — LEVOTHYROXINE SODIUM 25 MCG: 25 TABLET ORAL at 06:01

## 2024-01-05 RX ADMIN — ATORVASTATIN CALCIUM 20 MG: 20 TABLET, FILM COATED ORAL at 08:01

## 2024-01-05 RX ADMIN — POTASSIUM CHLORIDE 40 MEQ: 1500 TABLET, EXTENDED RELEASE ORAL at 01:01

## 2024-01-05 RX ADMIN — DONEPEZIL HYDROCHLORIDE 5 MG: 5 TABLET ORAL at 08:01

## 2024-01-05 RX ADMIN — SENNOSIDES AND DOCUSATE SODIUM 1 TABLET: 8.6; 5 TABLET ORAL at 08:01

## 2024-01-05 NOTE — NURSING
1815 Patient laying in bed resting comfortably at this time in no acute distress. She is alert and oriented to self only. She needed constant redirection today from pulling out IV and pulse ox. Otherwise, vital signs are stable and she remains on room air. IV Antbx for UTI. BMP checks q6hrs for sodium. Last value was 148. D5W increased to 75 ml/hr. Safety measures in place and call bell within reach.

## 2024-01-05 NOTE — PROGRESS NOTES
[facility] ([place] [address])  [facility] Medicine  Progress Note    Patient Name: Lilia Hidalgo  MRN: [id number]  Patient Class: IP- Inpatient   Admission Date: 1/2/2024  Length of Stay: 1 days  Attending Physician: Laly Ness MD  Primary Care Provider: Anna Wood MD        Subjective:     Principal Problem:Hypernatremia        HPI:  89 yo F w/HTN, hypothyroid, aortic stenosis (1cm valve area), CHF, pacemaker, dementia, bedbound at baseline, presenting from [facility] for 2 x days of AMS, decreased PO intake. Daughter at bedside reports normally her mom is conversant, Aox1, however for the last 2 days has been drowsy, not eating or drinking. No known hx of falls. No fever/chills/nausea/vomiting/no diarrhea. Had cough 3 weeks ago and was given zpac, no recent coughing.    In the ED patient afebrile and hemodynamically stable saturating well on room air at rest. Na 156, Cl 113, Creat 1.3 and BUN 56. Patient appears very dry on exam. Troponin 0.83. Imaging studies with significant stool burden but otherwise without acute process (see full reports for details). UA concerning for UTI. Patient started on IVFs and admitted to the care of medicine for further evaluation and management.     Overview/Hospital Course:  Presented with altered mental status, also found to have Na of 156. Found to have UTI likely contributing to decreased PO intake and subsequent hypernatremia. CT abdomen showed significant stool burden, improved after enema. Initially started on 0.9NS however given sodium uptrend, fluids were switched to D5W based on FWD. Mentation significantly improved with empiric IV rocephin for cystitis. Fluids for hypernatremia stopped Am 1/5 as sodium now within normal range. Will continue to monitor closely.     Interval History:  Patient resting comfortably this morning. Denies fevers, chills, shortness of breath, abdominal pain.     Review of Systems  Objective:     Vital Signs (Most  Recent):  Temp: 97.4 °F (36.3 °C) (01/05/24 1222)  Pulse: 95 (01/05/24 1222)  Resp: 18 (01/05/24 1222)  BP: 122/64 (01/05/24 1222)  SpO2: 95 % (01/05/24 1222) Vital Signs (24h Range):  Temp:  [97.3 °F (36.3 °C)-98.1 °F (36.7 °C)] 97.4 °F (36.3 °C)  Pulse:  [] 95  Resp:  [18] 18  SpO2:  [95 %-100 %] 95 %  BP: (108-124)/(53-64) 122/64     Weight: 72.6 kg (160 lb)  Body mass index is 25.82 kg/m².    Intake/Output Summary (Last 24 hours) at 1/5/2024 1430  Last data filed at 1/5/2024 1317  Gross per 24 hour   Intake --   Output 550 ml   Net -550 ml         Physical Exam  Constitutional:       General: She is not in acute distress.     Appearance: She is not toxic-appearing.   HENT:      Mouth/Throat:      Mouth: Mucous membranes are moist.   Cardiovascular:      Rate and Rhythm: Normal rate and regular rhythm.      Pulses: Normal pulses.      Heart sounds: No murmur heard.  Pulmonary:      Effort: No respiratory distress.      Breath sounds: Normal breath sounds. No wheezing.   Abdominal:      General: Bowel sounds are normal. There is no distension.      Palpations: Abdomen is soft.      Tenderness: There is no abdominal tenderness.   Musculoskeletal:         General: No swelling, tenderness or deformity.      Cervical back: Normal range of motion.   Skin:     General: Skin is dry.   Neurological:      Mental Status: She is alert. Mental status is at baseline. She is disoriented.   Psychiatric:         Mood and Affect: Mood normal.         Behavior: Behavior normal.             Significant Labs: All pertinent labs within the past 24 hours have been reviewed.    Significant Imaging: I have reviewed all pertinent imaging results/findings within the past 24 hours.    Assessment/Plan:      * Hypernatremia  Patient has hypernatremia which is uncontrolled. The hypernatremia is due to Dehydration. We will aim to correct the sodium by 8-10mEq in 24 hours. We will correct their hypernatremia with Select IV fluids: D5W  at  a rate of 75 ml/hr. The patient's sodium results have been reviewed and are listed below.  Recent Labs   Lab 01/04/24  1148   *       -Likely due to dehydration incited by UTI  -Na initially uptrended to 158 after 0.9NS started  -Lab sticks have been difficult and pt lost IV access for prolonged time 1/4/24 causing delay of treatment  - required D5W at 75cc/hr 1/4   Plan:  -IVF stopped  -labs spaced out  - neurochecks q4h    Acute metabolic encephalopathy  -Improved, now at baseline  -Likely due to UTI  -IV CTX as above      Dementia  - by report at baseline Oriented to person only, bedbound, conversational (Now Aox2)  - confirmed DNR status    Acute cystitis  - Likely contributing to altered mentation and hypernatremia due to decreased PO intake  - continue ceftriaxone  - follow up urine culture      Chronic diastolic congestive heart failure  - holding home lasix (for prn use)    Slow transit constipation  - enema x1, resolved  - start senna docusate BID. Miralax added  - strict I/OS      Essential hypertension  - holding home losartan ; resume as appropriate      VTE Risk Mitigation (From admission, onward)           Ordered     enoxaparin injection 40 mg  Daily         01/03/24 0853     IP VTE HIGH RISK PATIENT  Once         01/02/24 1825                    Discharge Planning   MARLO: 1/7/2024     Code Status: DNR   Is the patient medically ready for discharge?: No    Reason for patient still in hospital (select all that apply): Patient trending condition                     Laly Ness MD  Department of Hospital Medicine   [facility] ([place]

## 2024-01-05 NOTE — ASSESSMENT & PLAN NOTE
- Likely contributing to altered mentation and hypernatremia due to decreased PO intake  - continue ceftriaxone  - follow up urine culture

## 2024-01-06 LAB
ANION GAP SERPL CALC-SCNC: 9 MMOL/L (ref 8–16)
BUN SERPL-MCNC: 15 MG/DL (ref 10–30)
CALCIUM SERPL-MCNC: 8.5 MG/DL (ref 8.7–10.5)
CHLORIDE SERPL-SCNC: 109 MMOL/L (ref 95–110)
CO2 SERPL-SCNC: 23 MMOL/L (ref 23–29)
CREAT SERPL-MCNC: 0.6 MG/DL (ref 0.5–1.4)
EST. GFR  (NO RACE VARIABLE): >60 ML/MIN/1.73 M^2
GLUCOSE SERPL-MCNC: 63 MG/DL (ref 70–110)
MAGNESIUM SERPL-MCNC: 2.1 MG/DL (ref 1.6–2.6)
POCT GLUCOSE: 106 MG/DL (ref 70–110)
POCT GLUCOSE: 120 MG/DL (ref 70–110)
POTASSIUM SERPL-SCNC: 4.2 MMOL/L (ref 3.5–5.1)
SODIUM SERPL-SCNC: 141 MMOL/L (ref 136–145)

## 2024-01-06 PROCEDURE — 63600175 PHARM REV CODE 636 W HCPCS: Mod: HCNC | Performed by: FAMILY MEDICINE

## 2024-01-06 PROCEDURE — 83735 ASSAY OF MAGNESIUM: CPT | Mod: HCNC | Performed by: STUDENT IN AN ORGANIZED HEALTH CARE EDUCATION/TRAINING PROGRAM

## 2024-01-06 PROCEDURE — 20600001 HC STEP DOWN PRIVATE ROOM: Mod: HCNC

## 2024-01-06 PROCEDURE — 25000003 PHARM REV CODE 250: Mod: HCNC | Performed by: FAMILY MEDICINE

## 2024-01-06 PROCEDURE — 36415 COLL VENOUS BLD VENIPUNCTURE: CPT | Mod: HCNC | Performed by: STUDENT IN AN ORGANIZED HEALTH CARE EDUCATION/TRAINING PROGRAM

## 2024-01-06 PROCEDURE — 25000003 PHARM REV CODE 250: Mod: HCNC | Performed by: STUDENT IN AN ORGANIZED HEALTH CARE EDUCATION/TRAINING PROGRAM

## 2024-01-06 PROCEDURE — 63600175 PHARM REV CODE 636 W HCPCS: Mod: HCNC | Performed by: STUDENT IN AN ORGANIZED HEALTH CARE EDUCATION/TRAINING PROGRAM

## 2024-01-06 PROCEDURE — 80048 BASIC METABOLIC PNL TOTAL CA: CPT | Mod: HCNC | Performed by: STUDENT IN AN ORGANIZED HEALTH CARE EDUCATION/TRAINING PROGRAM

## 2024-01-06 RX ADMIN — CEFTRIAXONE 1 G: 1 INJECTION, POWDER, FOR SOLUTION INTRAMUSCULAR; INTRAVENOUS at 05:01

## 2024-01-06 RX ADMIN — QUETIAPINE FUMARATE 25 MG: 25 TABLET ORAL at 08:01

## 2024-01-06 RX ADMIN — SENNOSIDES AND DOCUSATE SODIUM 1 TABLET: 8.6; 5 TABLET ORAL at 08:01

## 2024-01-06 RX ADMIN — ESCITALOPRAM OXALATE 20 MG: 20 TABLET, FILM COATED ORAL at 08:01

## 2024-01-06 RX ADMIN — DONEPEZIL HYDROCHLORIDE 5 MG: 5 TABLET ORAL at 08:01

## 2024-01-06 RX ADMIN — POLYETHYLENE GLYCOL 3350 17 G: 17 POWDER, FOR SOLUTION ORAL at 09:01

## 2024-01-06 RX ADMIN — LEVOTHYROXINE SODIUM 25 MCG: 25 TABLET ORAL at 06:01

## 2024-01-06 RX ADMIN — ATORVASTATIN CALCIUM 20 MG: 20 TABLET, FILM COATED ORAL at 08:01

## 2024-01-06 RX ADMIN — ENOXAPARIN SODIUM 40 MG: 40 INJECTION SUBCUTANEOUS at 05:01

## 2024-01-06 NOTE — ASSESSMENT & PLAN NOTE
Patient has hypernatremia which is controlled. The hypernatremia is due to Dehydration. We will aim to correct the sodium by 8-10mEq in 24 hours. We corrected their hypernatremia with Select IV fluids: D5W  at a rate of 75 ml/hr. The patient's sodium results have been reviewed and are listed below.  Recent Labs   Lab 01/06/24  0517          -Likely due to dehydration incited by UTI  -Na initially uptrended to 158 after 0.9NS started  -Lab sticks have been difficult and pt lost IV access for prolonged time 1/4/24 causing delay of treatment  - required D5W at 75cc/hr 1/4   Plan:  -IVF stopped  -labs spaced out  - neurochecks q4h

## 2024-01-06 NOTE — NURSING
1820 Patient laying in bed sleeping at this time. She appears in no acute distress and has no complaints. Vital signs are stable and she remains on room air. Patient is still pleasantly confused, only oriented to self. Fluids stopped this morning and sodium now WNL. Still on IV ANTBX. Safety measures in place and call bell within reach.

## 2024-01-06 NOTE — ASSESSMENT & PLAN NOTE
- Likely contributing to altered mentation and hypernatremia due to decreased PO intake  - continue ceftriaxone (EOT 1/6)  - Ucx + proteus

## 2024-01-06 NOTE — PLAN OF CARE
01/06/24 0927   Discharge Reassessment   Assessment Type Discharge Planning Brief Assessment   Did the patient's condition or plan change since previous assessment? No   Discharge Plan discussed with: Adult children  (Belkys Moser (Daughter) [phone] (Mobile))   Communicated MARLO with patient/caregiver Yes   Discharge Plan A Skilled Nursing Facility  ([facility] Phone: [phone])   DME Needed Upon Discharge  other (see comments)  (TBD)   Transition of Care Barriers None   Why the patient remains in the hospital Requires continued medical care   Post-Acute Status   Post-Acute Authorization Placement   Post-Acute Placement Status Set-up Complete/Auth obtained   Coverage [employer] MANAGED MEDICARE - [employer] MEDICARE HMO -   Discharge Delays None known at this time     CM spoke  with patients daughter to discuss any changes in discharge planning.  Patients plan is to  [facility] Phone: [phone]     No changes in DC plans. MARLO:  01/07/24    CM sent updates and MARLO to  [facility] Phone: [phone]     via [employer].          Crystal Cutler RN  Case Management  [facility]  [phone]

## 2024-01-06 NOTE — SUBJECTIVE & OBJECTIVE
Interval History: Patient sleeping on rounds this morning. Easily arousable. Denies chest pain, SOB, n/v, c/d. Voiced understanding of plan to increase oral intake.     Review of Systems  Objective:     Vital Signs (Most Recent):  Temp: 98.3 °F (36.8 °C) (01/06/24 1132)  Pulse: 95 (01/06/24 1132)  Resp: 18 (01/06/24 1132)  BP: (!) 109/59 (01/06/24 1132)  SpO2: 96 % (01/06/24 1132) Vital Signs (24h Range):  Temp:  [97.1 °F (36.2 °C)-98.5 °F (36.9 °C)] 98.3 °F (36.8 °C)  Pulse:  [] 95  Resp:  [15-18] 18  SpO2:  [94 %-98 %] 96 %  BP: (104-128)/(56-84) 109/59     Weight: 72.6 kg (160 lb)  Body mass index is 25.82 kg/m².    Intake/Output Summary (Last 24 hours) at 1/6/2024 1238  Last data filed at 1/6/2024 0945  Gross per 24 hour   Intake 200 ml   Output 1200 ml   Net -1000 ml         Physical Exam  Vitals and nursing note reviewed.   Constitutional:       General: She is not in acute distress.     Appearance: She is not toxic-appearing.   HENT:      Mouth/Throat:      Mouth: Mucous membranes are moist.   Cardiovascular:      Rate and Rhythm: Normal rate and regular rhythm.      Pulses: Normal pulses.      Heart sounds: No murmur heard.  Pulmonary:      Effort: No respiratory distress.      Breath sounds: Normal breath sounds. No wheezing.   Abdominal:      General: Bowel sounds are normal. There is no distension.      Palpations: Abdomen is soft.      Tenderness: There is no abdominal tenderness.   Musculoskeletal:         General: No swelling, tenderness or deformity.      Cervical back: Normal range of motion.   Skin:     General: Skin is dry.   Neurological:      Mental Status: She is easily aroused. Mental status is at baseline. She is disoriented.   Psychiatric:         Mood and Affect: Mood normal.         Behavior: Behavior normal.             Significant Labs: All pertinent labs within the past 24 hours have been reviewed.    Significant Imaging: I have reviewed all pertinent imaging results/findings  within the past 24 hours.

## 2024-01-06 NOTE — PROGRESS NOTES
[facility] ([place] [address])  [facility] Medicine  Progress Note    Patient Name: Lilia Hidalgo  MRN: [id number]  Patient Class: IP- Inpatient   Admission Date: 1/2/2024  Length of Stay: 2 days  Attending Physician: Laly Ness MD  Primary Care Provider: Anna Wood MD        Subjective:     Principal Problem:Hypernatremia        HPI:  89 yo F w/HTN, hypothyroid, aortic stenosis (1cm valve area), CHF, pacemaker, dementia, bedbound at baseline, presenting from [facility] for 2 x days of AMS, decreased PO intake. Daughter at bedside reports normally her mom is conversant, Aox1, however for the last 2 days has been drowsy, not eating or drinking. No known hx of falls. No fever/chills/nausea/vomiting/no diarrhea. Had cough 3 weeks ago and was given zpac, no recent coughing.    In the ED patient afebrile and hemodynamically stable saturating well on room air at rest. Na 156, Cl 113, Creat 1.3 and BUN 56. Patient appears very dry on exam. Troponin 0.83. Imaging studies with significant stool burden but otherwise without acute process (see full reports for details). UA concerning for UTI. Patient started on IVFs and admitted to the care of medicine for further evaluation and management.     Overview/Hospital Course:  Presented with altered mental status, also found to have Na of 156. Found to have UTI likely contributing to decreased PO intake and subsequent hypernatremia. CT abdomen showed significant stool burden, improved after enema. Initially started on 0.9NS however given sodium uptrend, fluids were switched to D5W based on FWD. Mentation significantly improved with empiric IV rocephin for cystitis. Fluids for hypernatremia stopped Am 1/5 as sodium now within normal range. Sodium slowly uptrending without fluids. Encouraging PO intake.    Interval History: Patient sleeping on rounds this morning. Easily arousable. Denies chest pain, SOB, n/v, c/d. Voiced understanding of plan to increase  oral intake.     Review of Systems  Objective:     Vital Signs (Most Recent):  Temp: 98.3 °F (36.8 °C) (01/06/24 1132)  Pulse: 95 (01/06/24 1132)  Resp: 18 (01/06/24 1132)  BP: (!) 109/59 (01/06/24 1132)  SpO2: 96 % (01/06/24 1132) Vital Signs (24h Range):  Temp:  [97.1 °F (36.2 °C)-98.5 °F (36.9 °C)] 98.3 °F (36.8 °C)  Pulse:  [] 95  Resp:  [15-18] 18  SpO2:  [94 %-98 %] 96 %  BP: (104-128)/(56-84) 109/59     Weight: 72.6 kg (160 lb)  Body mass index is 25.82 kg/m².    Intake/Output Summary (Last 24 hours) at 1/6/2024 1238  Last data filed at 1/6/2024 0945  Gross per 24 hour   Intake 200 ml   Output 1200 ml   Net -1000 ml         Physical Exam  Vitals and nursing note reviewed.   Constitutional:       General: She is not in acute distress.     Appearance: She is not toxic-appearing.   HENT:      Mouth/Throat:      Mouth: Mucous membranes are moist.   Cardiovascular:      Rate and Rhythm: Normal rate and regular rhythm.      Pulses: Normal pulses.      Heart sounds: No murmur heard.  Pulmonary:      Effort: No respiratory distress.      Breath sounds: Normal breath sounds. No wheezing.   Abdominal:      General: Bowel sounds are normal. There is no distension.      Palpations: Abdomen is soft.      Tenderness: There is no abdominal tenderness.   Musculoskeletal:         General: No swelling, tenderness or deformity.      Cervical back: Normal range of motion.   Skin:     General: Skin is dry.   Neurological:      Mental Status: She is easily aroused. Mental status is at baseline. She is disoriented.   Psychiatric:         Mood and Affect: Mood normal.         Behavior: Behavior normal.             Significant Labs: All pertinent labs within the past 24 hours have been reviewed.    Significant Imaging: I have reviewed all pertinent imaging results/findings within the past 24 hours.    Assessment/Plan:      * Hypernatremia  Patient has hypernatremia which is controlled. The hypernatremia is due to Dehydration.  We will aim to correct the sodium by 8-10mEq in 24 hours. We corrected their hypernatremia with Select IV fluids: D5W  at a rate of 75 ml/hr. The patient's sodium results have been reviewed and are listed below.  Recent Labs   Lab 01/06/24  0517          -Likely due to dehydration incited by UTI  -Na initially uptrended to 158 after 0.9NS started  -Lab sticks have been difficult and pt lost IV access for prolonged time 1/4/24 causing delay of treatment  - required D5W at 75cc/hr 1/4   Plan:  -IVF stopped  -labs spaced out  - neurochecks q4h    Acute metabolic encephalopathy  -Improved, now at baseline  -Likely due to UTI  -IV CTX as above      Dementia  - by report at baseline Oriented to person only, bedbound, conversational (Now Aox2)  - confirmed DNR status    Acute cystitis  - Likely contributing to altered mentation and hypernatremia due to decreased PO intake  - continue ceftriaxone (EOT 1/6)  - Ucx + proteus      Chronic diastolic congestive heart failure  - holding home lasix (for prn use)    Slow transit constipation  - enema x1, resolved  - start senna docusate BID. Miralax added  - strict I/OS      Essential hypertension  - holding home losartan ; resume as appropriate      VTE Risk Mitigation (From admission, onward)           Ordered     enoxaparin injection 40 mg  Daily         01/03/24 0853     IP VTE HIGH RISK PATIENT  Once         01/02/24 1825                    Discharge Planning   MARLO: 1/7/2024     Code Status: DNR   Is the patient medically ready for discharge?: No    Reason for patient still in hospital (select all that apply): Patient trending condition  Discharge Plan A: Skilled Nursing Facility ([facility] Phone: [phone])   Discharge Delays: None known at this time              Laly Ness MD  Department of Hospital Medicine   [facility] ([place])

## 2024-01-07 LAB
ANION GAP SERPL CALC-SCNC: 8 MMOL/L (ref 8–16)
BUN SERPL-MCNC: 12 MG/DL (ref 10–30)
CALCIUM SERPL-MCNC: 8.6 MG/DL (ref 8.7–10.5)
CHLORIDE SERPL-SCNC: 106 MMOL/L (ref 95–110)
CO2 SERPL-SCNC: 27 MMOL/L (ref 23–29)
CREAT SERPL-MCNC: 0.6 MG/DL (ref 0.5–1.4)
EST. GFR  (NO RACE VARIABLE): >60 ML/MIN/1.73 M^2
GLUCOSE SERPL-MCNC: 83 MG/DL (ref 70–110)
POCT GLUCOSE: 78 MG/DL (ref 70–110)
POTASSIUM SERPL-SCNC: 3.9 MMOL/L (ref 3.5–5.1)
SODIUM SERPL-SCNC: 141 MMOL/L (ref 136–145)

## 2024-01-07 PROCEDURE — 63600175 PHARM REV CODE 636 W HCPCS: Mod: HCNC | Performed by: STUDENT IN AN ORGANIZED HEALTH CARE EDUCATION/TRAINING PROGRAM

## 2024-01-07 PROCEDURE — 20600001 HC STEP DOWN PRIVATE ROOM: Mod: HCNC

## 2024-01-07 PROCEDURE — 25000003 PHARM REV CODE 250: Mod: HCNC | Performed by: STUDENT IN AN ORGANIZED HEALTH CARE EDUCATION/TRAINING PROGRAM

## 2024-01-07 PROCEDURE — 25000003 PHARM REV CODE 250: Mod: HCNC | Performed by: FAMILY MEDICINE

## 2024-01-07 PROCEDURE — 36415 COLL VENOUS BLD VENIPUNCTURE: CPT | Mod: HCNC | Performed by: STUDENT IN AN ORGANIZED HEALTH CARE EDUCATION/TRAINING PROGRAM

## 2024-01-07 PROCEDURE — 80048 BASIC METABOLIC PNL TOTAL CA: CPT | Mod: HCNC | Performed by: STUDENT IN AN ORGANIZED HEALTH CARE EDUCATION/TRAINING PROGRAM

## 2024-01-07 RX ADMIN — DONEPEZIL HYDROCHLORIDE 5 MG: 5 TABLET ORAL at 08:01

## 2024-01-07 RX ADMIN — ESCITALOPRAM OXALATE 20 MG: 20 TABLET, FILM COATED ORAL at 08:01

## 2024-01-07 RX ADMIN — QUETIAPINE FUMARATE 25 MG: 25 TABLET ORAL at 08:01

## 2024-01-07 RX ADMIN — LEVOTHYROXINE SODIUM 25 MCG: 25 TABLET ORAL at 05:01

## 2024-01-07 RX ADMIN — ATORVASTATIN CALCIUM 20 MG: 20 TABLET, FILM COATED ORAL at 08:01

## 2024-01-07 RX ADMIN — ENOXAPARIN SODIUM 40 MG: 40 INJECTION SUBCUTANEOUS at 05:01

## 2024-01-07 NOTE — NURSING
Pts daughter called this am for update.  She is concerned that pt will be released today as she feels it is too early and wishes pt stays another day or two. Reported daughters wishes to elysia charge of this pm shift

## 2024-01-07 NOTE — PLAN OF CARE
01/07/24 1304   Post-Acute Status   Post-Acute Authorization Placement   Post-Acute Placement Status Pending medical clearance/testing   Coverage HU[employer] MANAGED MEDICARE - HU[employer] MEDICARE HMO -   Patient choice form signed by patient/caregiver List from CMS Compare   Discharge Delays None known at this time   Discharge Plan   Discharge Plan A Return to nursing home  ([facility] Phone [phone])     CM spoke  with patients  to discuss any changes in discharge planning.  Patients plan is to dc to [facility] DND as intermediate.    No changes in DC plans. MARLO: 01/08/24        Crystal Cutler RN  Case Management  [facility]  [phone]

## 2024-01-07 NOTE — NURSING
Pt oriented to self with advanced dementia.  Inc both bowel and bladder.  No tele ordered DNR in computer.  Poor appetite freq intake needed.  Blood sugar at 2100 was 106.  Pudding given with meds.  No need to crush.  Purewick changed bed bath complete

## 2024-01-07 NOTE — NURSING
Patient is  Alert  and oriented to self . Drinks offered  frequently . Had bladder movement ,external catheter present . Patient  family member wants to talk to doctor  , provider made aware . Call light within reach ,safety precaution maintained , will continue monitoring.

## 2024-01-08 VITALS
RESPIRATION RATE: 17 BRPM | SYSTOLIC BLOOD PRESSURE: 126 MMHG | TEMPERATURE: 99 F | BODY MASS INDEX: 20.14 KG/M2 | OXYGEN SATURATION: 97 % | WEIGHT: 125.31 LBS | HEIGHT: 66 IN | HEART RATE: 97 BPM | DIASTOLIC BLOOD PRESSURE: 58 MMHG

## 2024-01-08 LAB
OHS CV AF BURDEN PERCENT: < 1
OHS CV DC REMOTE DEVICE TYPE: NORMAL
OHS CV RV PACING PERCENT: 100 %

## 2024-01-08 PROCEDURE — 25000003 PHARM REV CODE 250: Mod: HCNC | Performed by: FAMILY MEDICINE

## 2024-01-08 RX ORDER — MIRTAZAPINE 7.5 MG/1
7.5 TABLET, FILM COATED ORAL NIGHTLY
COMMUNITY
Start: 2023-12-09

## 2024-01-08 RX ADMIN — SENNOSIDES AND DOCUSATE SODIUM 1 TABLET: 8.6; 5 TABLET ORAL at 09:01

## 2024-01-08 RX ADMIN — LEVOTHYROXINE SODIUM 25 MCG: 25 TABLET ORAL at 06:01

## 2024-01-08 NOTE — DISCHARGE SUMMARY
[facility] ([place] [address])  [facility] Medicine  Discharge Summary      Patient Name: Lilia Hidalgo  MRN: [id number]  TIM: [id number]  Patient Class: IP- Inpatient  Admission Date: 1/2/2024  Hospital Length of Stay: 4 days  Discharge Date and Time:  01/08/2024 5:14 PM  Attending Physician: Laly Ness MD   Discharging Provider: Laly Ness MD  Primary Care Provider: Anna Wood MD  Hospital Medicine Team: [facility] Laly Ness MD  Primary Care Team: [facility]    HPI:   89 yo F w/HTN, hypothyroid, aortic stenosis (1cm valve area), CHF, pacemaker, dementia, bedbound at baseline, presenting from [facility] for 2 x days of AMS, decreased PO intake. Daughter at bedside reports normally her mom is conversant, Aox1, however for the last 2 days has been drowsy, not eating or drinking. No known hx of falls. No fever/chills/nausea/vomiting/no diarrhea. Had cough 3 weeks ago and was given zpac, no recent coughing.    In the ED patient afebrile and hemodynamically stable saturating well on room air at rest. Na 156, Cl 113, Creat 1.3 and BUN 56. Patient appears very dry on exam. Troponin 0.83. Imaging studies with significant stool burden but otherwise without acute process (see full reports for details). UA concerning for UTI. Patient started on IVFs and admitted to the care of medicine for further evaluation and management.     * No surgery found *      Hospital Course:   Presented with altered mental status, also found to have Na of 156. Found to have UTI likely contributing to decreased PO intake and subsequent hypernatremia. CT abdomen showed significant stool burden, improved after enema. Initially started on 0.9NS however given sodium uptrend, fluids were switched to D5W based on FWD. Mentation significantly improved with empiric IV rocephin for cystitis, now s/p 5 day course. Patient able to maintain normal sodium without additional IVF. Family concerned about patient's  disease progression - patient discharged on hospice.      Goals of Care Treatment Preferences:  Code Status: DNR    Living Will: Yes              Consults:     Neuro  Dementia  Base line Oriented to person only, bedbound, conversational (Now Aox2). Family concerned for disease progression. Patient discharged on hospice.       Acute metabolic encephalopathy  Resolved  -Likely due to UTI + hypernatremia      Cardiac/Vascular  Chronic diastolic congestive heart failure  - holding home lasix (for prn use)    Essential hypertension  No longer on losartan     Renal/  * Hypernatremia  Resolved  Patient has hypernatremia which is controlled. The hypernatremia is due to Dehydration. We will aim to correct the sodium by 8-10mEq in 24 hours. We corrected their hypernatremia with Select IV fluids: D5W  at a rate of 75 ml/hr. The patient's sodium results have been reviewed and are listed below.        Acute cystitis  Likely contributing to altered mentation and hypernatremia due to decreased PO intake  - s/p ceftriaxone 5days  - Ucx + proteus      GI  Slow transit constipation  Resolved s/p enema      Palliative Care  DNR (do not resuscitate)          Final Active Diagnoses:    Diagnosis Date Noted POA    PRINCIPAL PROBLEM:  Hypernatremia [E87.0] 03/29/2022 Yes    Acute metabolic encephalopathy [G93.41] 09/21/2020 Yes    Dementia [F03.90] 05/29/2019 Yes     Chronic    Acute cystitis [N30.00] 01/02/2024 Yes    Chronic diastolic congestive heart failure [I50.32] 07/29/2021 Yes     Chronic    DNR (do not resuscitate) [Z66] 06/08/2020 Yes    Slow transit constipation [K59.01] 02/18/2016 Yes    Essential hypertension [I10]  Yes     Chronic      Problems Resolved During this Admission:       Discharged Condition: fair    Disposition: Hospice/Medical Facility    Follow Up:    Patient Instructions:      Ambulatory referral/consult to Outpatient Case Management   Referral Priority: Routine Referral Type: Consultation   Referral Reason:  Specialty Services Required   Number of Visits Requested: 1     Diet Adult Regular     Notify your health care provider if you experience any of the following:  temperature >100.4     Notify your health care provider if you experience any of the following:  persistent nausea and vomiting or diarrhea     Notify your health care provider if you experience any of the following:  severe uncontrolled pain     Notify your health care provider if you experience any of the following:  redness, tenderness, or signs of infection (pain, swelling, redness, odor or green/yellow discharge around incision site)     Notify your health care provider if you experience any of the following:  difficulty breathing or increased cough     Notify your health care provider if you experience any of the following:  severe persistent headache     Notify your health care provider if you experience any of the following:  worsening rash     Notify your health care provider if you experience any of the following:  persistent dizziness, light-headedness, or visual disturbances     Notify your health care provider if you experience any of the following:  increased confusion or weakness     Activity as tolerated       Significant Diagnostic Studies: Microbiology: Urine Culture    Lab Results   Component Value Date    LABURIN PROTEUS MIRABILIS  >100,000 cfu/ml   (A) 01/02/2024       Pending Diagnostic Studies:       None           Medications:  Reconciled Home Medications:      Medication List        CONTINUE taking these medications      acetaminophen 650 MG Tbsr  Commonly known as: TYLENOL  Take 1 tablet (650 mg total) by mouth every 8 (eight) hours as needed. Give 1 tablet QAM, 1 QPM.     atorvastatin 20 MG tablet  Commonly known as: LIPITOR  TAKE 1 TAB BY MOUTH AT BEDTIME     balsam peru-castor oiL Oint     CENTRUM SILVER ORAL  Take by mouth once daily.     cholecalciferol (vitamin D3) 1,250 mcg (50,000 unit) capsule  Take 50,000 Units by mouth  once daily.     ciclopirox 8 % Soln  Commonly known as: PENLAC  Apply topically nightly.     donepeziL 5 MG tablet  Commonly known as: ARICEPT  TAKE 1 TAB BY MOUTH EVERY EVENING     EScitalopram oxalate 20 MG tablet  Commonly known as: LEXAPRO  TAKE 1 TAB BY MOUTH AT BEDTIME FOR DEPRESSION     furosemide 40 MG tablet  Commonly known as: LASIX  TAKE 2 TABLETS (80 MG TOTAL) BY MOUTH 2 (TWO) TIMES DAILY AS NEEDED (FOR WEIGHT GAIN OF 5 POUNDS). TAKE WITH POTASSIUM     levothyroxine 25 MCG tablet  Commonly known as: SYNTHROID     melatonin 5 mg  Commonly known as: MELATIN  Take 1 tablet by mouth every evening.     mirtazapine 7.5 MG Tab  Commonly known as: REMERON  Take 7.5 mg by mouth every evening.     nitroGLYCERIN 0.4 MG SL tablet  Commonly known as: NITROSTAT  Place 1 tablet (0.4 mg total) under the tongue every 5 (five) minutes as needed for Chest pain.     nystatin cream  Commonly known as: MYCOSTATIN  APPLY TO GROIN AREA TOPICALLY EVERY 12 HOURS FOR ANTIFUNGAL     omeprazole 20 MG capsule  Commonly known as: PRILOSEC  TAKE 2 CAPS (40MG) BY MOUTH EVERY DAY (DX: GERD)     oxyCODONE 5 mg/5 mL Soln  Commonly known as: ROXICODONE     potassium chloride SA 10 MEQ tablet  Commonly known as: K-DURBOUBACAROR-CON M  Take 1 tablet (10 mEq total) by mouth once daily. May also take 2 tablets (20 mEq total) daily as needed (when taking lasix for leg swelling).     protein supplement Liqd     QUEtiapine 25 MG Tab  Commonly known as: SEROQUEL  Take 25 mg by mouth every evening.              Indwelling Lines/Drains at time of discharge:   Lines/Drains/Airways       Drain  Duration             Female External Urinary Catheter w/ Suction 01/04/24 1230 3 days                    Time spent on the discharge of patient: 45 minutes         Laly Ness MD  Department of Hospital Medicine  [facility] ([place]
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
## 2024-01-08 NOTE — ASSESSMENT & PLAN NOTE
Base line Oriented to person only, bedbound, conversational (Now Aox2). Family concerned for disease progression. Patient discharged on hospice.

## 2024-01-08 NOTE — ASSESSMENT & PLAN NOTE
Resolved  Patient has hypernatremia which is controlled. The hypernatremia is due to Dehydration. We will aim to correct the sodium by 8-10mEq in 24 hours. We corrected their hypernatremia with Select IV fluids: D5W  at a rate of 75 ml/hr. The patient's sodium results have been reviewed and are listed below.

## 2024-01-08 NOTE — NURSING
Pt suffers from dementia and is oriented to self.  Poor appetite.  Takes in only sips of beverages or pudding and applesauce.  Complete bed bath done .  Purewick was changed.  Poor urine output noted. Dark cloudy concentrated sukumar urine.  Probable dc back to nursing home today.  Pt was more alert thru the night and freq calling out

## 2024-01-08 NOTE — PLAN OF CARE
[facility]  Department of Hospital Medicine  [address] [place] [address]  [phone] after hours  [phone] fax    HOSPICE  ORDERS    01/08/2024    Admit to Hospice:  Nursing home Service    Diagnoses:   Active Hospital Problems    Diagnosis  POA    *Hypernatremia [E87.0]  Yes     Priority: 1 - High    Acute metabolic encephalopathy [G93.41]  Yes     Priority: 2      DM at goal, unclear benefit of statin      Dementia [F03.90]  Yes     Priority: 3      Chronic     Resides in assisted living, gets assistance with all ADLs      Acute cystitis [N30.00]  Yes     Priority: 4     Chronic diastolic congestive heart failure [I50.32]  Yes     Chronic    DNR (do not resuscitate) [Z66]  Yes     ACP discussion with PoA on 6/8/20. DNR order faxed to [facility]      Slow transit constipation [K59.01]  Yes    Essential hypertension [I10]  Yes     Chronic      Resolved Hospital Problems   No resolved problems to display.       Hospice Qualifying Diagnoses:        Patient has a life expectancy < 6 months due to:  Primary Hospice Diagnosis:  end stage dementia  Comorbid Conditions Contributing to Decline:  recurrent UTIs and hypernatremia    Vital Signs: Routine per Hospice Protocol.    Code Status: DNR    Allergies:   Review of patient's allergies indicates:   Allergen Reactions    Aspirin Nausea Only and Other (See Comments)     Other reaction(s): esophageal pain    Celebrex [celecoxib] Rash    Morphine Hallucinations    Steroid [corticosteroids (glucocorticoids)] Other (See Comments)     Other reaction(s): Flushing (skin)    Sulfa dyne Other (See Comments)     Other reaction(s): esophogeal pain       Diet:soft and bite sized  Activities: As tolerated    Goals of Care Treatment Preferences:  Code Status: DNR    Living Will: Yes              Nursing: Per Hospice Routine.        Routine Skin for Bedridden Patients: Apply moisture barrier cream to all skin folds and    wet areas in perineal area daily and after baths and all bowel movements.    Medications:        Medication List        CONTINUE taking these medications      acetaminophen 650 MG Tbsr  Commonly known as: TYLENOL  Take 1 tablet (650 mg total) by mouth every 8 (eight) hours as needed. Give 1 tablet QAM, 1 QPM.     atorvastatin 20 MG tablet  Commonly known as: LIPITOR  TAKE 1 TAB BY MOUTH AT BEDTIME     balsam peru-castor oiL Oint     CENTRUM SILVER ORAL  Take by mouth once daily.     cholecalciferol (vitamin D3) 1,250 mcg (50,000 unit) capsule  Take 50,000 Units by mouth once daily.     ciclopirox 8 % Soln  Commonly known as: PENLAC  Apply topically nightly.     donepeziL 5 MG tablet  Commonly known as: ARICEPT  TAKE 1 TAB BY MOUTH EVERY EVENING     EScitalopram oxalate 20 MG tablet  Commonly known as: LEXAPRO  TAKE 1 TAB BY MOUTH AT BEDTIME FOR DEPRESSION     furosemide 40 MG tablet  Commonly known as: LASIX  TAKE 2 TABLETS (80 MG TOTAL) BY MOUTH 2 (TWO) TIMES DAILY AS NEEDED (FOR WEIGHT GAIN OF 5 POUNDS). TAKE WITH POTASSIUM     levothyroxine 25 MCG tablet  Commonly known as: SYNTHROID     losartan 50 MG tablet  Commonly known as: COZAAR  Take 1 tablet (50 mg total) by mouth once daily.     melatonin 5 mg  Commonly known as: MELATIN  Take 1 tablet by mouth every evening.     nitroGLYCERIN 0.4 MG SL tablet  Commonly known as: NITROSTAT  Place 1 tablet (0.4 mg total) under the tongue every 5 (five) minutes as needed for Chest pain.     nystatin cream  Commonly known as: MYCOSTATIN  APPLY TO GROIN AREA TOPICALLY EVERY 12 HOURS FOR ANTIFUNGAL     omeprazole 20 MG capsule  Commonly known as: PRILOSEC  TAKE 2 CAPS (40MG) BY MOUTH EVERY DAY (DX: GERD)     oxyCODONE 5 mg/5 mL Soln  Commonly known as: ROXICODONE     potassium chloride SA 10 MEQ tablet  Commonly known as: K-DUR,KLOR-CON M  Take 1 tablet (10 mEq total) by mouth once daily. May also take 2 tablets (20 mEq total) daily as needed (when taking lasix for leg  swelling).     protein supplement Liqd     QUEtiapine 25 MG Tab  Commonly known as: SEROQUEL  Take 25 mg by mouth every evening.                Future Orders:  Hospice Medical Director may dictate new orders for comfortable care measures & sign death certificate.        _________________________________  Laly Ness MD  01/08/2024

## 2024-01-08 NOTE — ASSESSMENT & PLAN NOTE
Likely contributing to altered mentation and hypernatremia due to decreased PO intake  - s/p ceftriaxone 5days  - Ucx + proteus

## 2024-01-08 NOTE — NURSING
Pt oriented to self only, no s/s of pain. VSS, peripheral IV to left forearm is intact and flushes well. Neuro intact. Pt's position changed frequently. No acute events this shift. Daughter, Belkys, at the bedside for lunch. Bed low and locked, call light in reach.

## 2024-01-08 NOTE — PROGRESS NOTES
[facility] ([place] [address])  [facility] Medicine  Progress Note    Patient Name: Lilia Hidalgo  MRN: [id number]  Patient Class: IP- Inpatient   Admission Date: 1/2/2024  Length of Stay: 3 days  Attending Physician: Laly Ness MD  Primary Care Provider: Anna Wood MD        Subjective:     Principal Problem:Hypernatremia        HPI:  89 yo F w/HTN, hypothyroid, aortic stenosis (1cm valve area), CHF, pacemaker, dementia, bedbound at baseline, presenting from [facility] for 2 x days of AMS, decreased PO intake. Daughter at bedside reports normally her mom is conversant, Aox1, however for the last 2 days has been drowsy, not eating or drinking. No known hx of falls. No fever/chills/nausea/vomiting/no diarrhea. Had cough 3 weeks ago and was given zpac, no recent coughing.    In the ED patient afebrile and hemodynamically stable saturating well on room air at rest. Na 156, Cl 113, Creat 1.3 and BUN 56. Patient appears very dry on exam. Troponin 0.83. Imaging studies with significant stool burden but otherwise without acute process (see full reports for details). UA concerning for UTI. Patient started on IVFs and admitted to the care of medicine for further evaluation and management.     Overview/Hospital Course:  Presented with altered mental status, also found to have Na of 156. Found to have UTI likely contributing to decreased PO intake and subsequent hypernatremia. CT abdomen showed significant stool burden, improved after enema. Initially started on 0.9NS however given sodium uptrend, fluids were switched to D5W based on FWD. Mentation significantly improved with empiric IV rocephin for cystitis, now s/p 5 day course. Patient able to maintain normal sodium without additional IVF. Family concerned about patient's disease progression - interested in transitioning to hospice care.     Interval History: Remained IP today due to family's concern for decreased alertness in the afternoon.  Patient consistently alert with variable mentation due to dementia today. Denies chest pain, SOB, n/v, c/d.       Review of Systems  Objective:     Vital Signs (Most Recent):  Temp: 98.1 °F (36.7 °C) (01/07/24 1545)  Pulse: 92 (01/07/24 1545)  Resp: 18 (01/07/24 1545)  BP: (!) 118/58 (01/07/24 1545)  SpO2: 98 % (01/07/24 1545) Vital Signs (24h Range):  Temp:  [97.8 °F (36.6 °C)-100.2 °F (37.9 °C)] 98.1 °F (36.7 °C)  Pulse:  [] 92  Resp:  [16-18] 18  SpO2:  [95 %-98 %] 98 %  BP: (109-124)/(54-82) 118/58     Weight: 57.2 kg (126 lb 1.6 oz)  Body mass index is 20.35 kg/m².    Intake/Output Summary (Last 24 hours) at 1/7/2024 1833  Last data filed at 1/7/2024 1440  Gross per 24 hour   Intake 460 ml   Output 360 ml   Net 100 ml         Physical Exam  Vitals and nursing note reviewed.   Constitutional:       General: She is not in acute distress.     Appearance: She is not toxic-appearing.   HENT:      Mouth/Throat:      Mouth: Mucous membranes are moist.   Cardiovascular:      Rate and Rhythm: Regular rhythm. Tachycardia present.      Pulses: Normal pulses.      Heart sounds: No murmur heard.  Pulmonary:      Effort: No respiratory distress.      Breath sounds: Normal breath sounds. No wheezing.   Abdominal:      General: Bowel sounds are normal. There is no distension.      Palpations: Abdomen is soft.      Tenderness: There is no abdominal tenderness.   Musculoskeletal:         General: No swelling, tenderness or deformity.      Cervical back: Normal range of motion.   Skin:     General: Skin is dry.   Neurological:      Mental Status: She is alert and easily aroused. Mental status is at baseline. She is disoriented.   Psychiatric:         Mood and Affect: Mood normal.         Behavior: Behavior normal.             Significant Labs: All pertinent labs within the past 24 hours have been reviewed.    Significant Imaging: I have reviewed all pertinent imaging results/findings within the past 24  hours.    Assessment/Plan:      * Hypernatremia  Resolved  Patient has hypernatremia which is controlled. The hypernatremia is due to Dehydration. We will aim to correct the sodium by 8-10mEq in 24 hours. We corrected their hypernatremia with Select IV fluids: D5W  at a rate of 75 ml/hr. The patient's sodium results have been reviewed and are listed below.  Recent Labs   Lab 01/07/24  0858          -Likely due to dehydration incited by UTI  -Na initially uptrended to 158 after 0.9NS started  -Lab sticks have been difficult and pt lost IV access for prolonged time 1/4/24 causing delay of treatment  - required D5W at 75cc/hr 1/4   Plan:  -IVF stopped  -labs spaced out  - neurochecks q4h    Acute metabolic encephalopathy  Resolved  -Likely due to UTI  -IV CTX as above      Dementia  Base line Oriented to person only, bedbound, conversational (Now Aox2). Family concerned for disease progression. Interested in discharging with hospice.       Acute cystitis  Likely contributing to altered mentation and hypernatremia due to decreased PO intake  - s/p ceftriaxone 5days  - Ucx + proteus      Chronic diastolic congestive heart failure  - holding home lasix (for prn use)    Slow transit constipation  Resolved s/p enema      Essential hypertension  - holding home losartan ; resume as appropriate      VTE Risk Mitigation (From admission, onward)           Ordered     enoxaparin injection 40 mg  Daily         01/03/24 0853     IP VTE HIGH RISK PATIENT  Once         01/02/24 1825                    Discharge Planning   MARLO: 1/8/2024     Code Status: DNR   Is the patient medically ready for discharge?: No    Reason for patient still in hospital (select all that apply): Pending disposition  Discharge Plan A: Return to nursing home ([facility] Phone [phone])   Discharge Delays: None known at this time              Laly Ness MD  Department of Hospital Medicine   [facility] ([facility]

## 2024-01-08 NOTE — ASSESSMENT & PLAN NOTE
Base line Oriented to person only, bedbound, conversational (Now Aox2). Family concerned for disease progression. Interested in discharging with hospice.

## 2024-01-08 NOTE — PLAN OF CARE
Per Pancho Montana with [facility], ok to set up transportation for 3:30pm    Nurse to call report to [phone] ask for nurse covering rm [phone]    Darya with [facility] notified of dc- they are prepared to follow patient     Ambulance transportation set for 3:30pm through [facility].    Nurse Isabel notified of above.    -2:52pm  Notified patient daughter Belkys Moser of above discharge plan. She is in agreement and relayed her appreciation.

## 2024-01-08 NOTE — NURSING
Pt left the floor with all her belongs. Pt transported by [facility], Report called. Pt verbalized understanding. IVs removed.

## 2024-01-08 NOTE — PT/OT/SLP PROGRESS
Speech Language Pathology  Discharge Summary     Lilia Hidalgo  MRN: [id number]    Patient not seen today secondary to Patient to discharge to hospice. No additional ST warranted at this time.     1/8/2024

## 2024-01-08 NOTE — PLAN OF CARE
[facility]     Department of Hospital Medicine     [address] [place] [address]     [phone] after hours  [phone] fax                                        FACILITY TRANSFER ORDERS     01/08/2024    Admit to:  NH alf bed    Diagnoses:  Active Hospital Problems    Diagnosis  POA    *Hypernatremia [E87.0]  Yes     Priority: 1 - High    Acute metabolic encephalopathy [G93.41]  Yes     Priority: 2      DM at goal, unclear benefit of statin      Dementia [F03.90]  Yes     Priority: 3      Chronic     Resides in assisted living, gets assistance with all ADLs      Acute cystitis [N30.00]  Yes     Priority: 4     Chronic diastolic congestive heart failure [I50.32]  Yes     Chronic    DNR (do not resuscitate) [Z66]  Yes     ACP discussion with [facility] on 6/8/20. DNR order faxed to [facility]      Slow transit constipation [K59.01]  Yes    Essential hypertension [I10]  Yes     Chronic      Resolved Hospital Problems   No resolved problems to display.       Vital Signs: Routine.    Allergies:  Review of patient's allergies indicates:   Allergen Reactions    Aspirin Nausea Only and Other (See Comments)     Other reaction(s): esophageal pain    Celebrex [celecoxib] Rash    Morphine Hallucinations    Steroid [corticosteroids (glucocorticoids)] Other (See Comments)     Other reaction(s): Flushing (skin)    Sulfa dyne Other (See Comments)     Other reaction(s): esophogeal pain       Diet: soft and bite sized    Acitivities: as tolerated    Nursing: per hospice routine      MISCELLANEOUS CARE:       Routine Skin for Bedridden Patients:  Apply moisture barrier cream to all    skin folds and wet areas in perineal area daily and after baths and                           all bowel movements.    Medications:        Medication List        CONTINUE taking these medications      acetaminophen 650 MG Tbsr  Commonly known as: TYLENOL  Take 1 tablet (650 mg total) by  mouth every 8 (eight) hours as needed. Give 1 tablet QAM, 1 QPM.     atorvastatin 20 MG tablet  Commonly known as: LIPITOR  TAKE 1 TAB BY MOUTH AT BEDTIME     balsam peru-castor oiL Oint     CENTRUM SILVER ORAL     cholecalciferol (vitamin D3) 1,250 mcg (50,000 unit) capsule     ciclopirox 8 % Soln  Commonly known as: PENLAC  Apply topically nightly.     donepeziL 5 MG tablet  Commonly known as: ARICEPT  TAKE 1 TAB BY MOUTH EVERY EVENING     EScitalopram oxalate 20 MG tablet  Commonly known as: LEXAPRO  TAKE 1 TAB BY MOUTH AT BEDTIME FOR DEPRESSION     furosemide 40 MG tablet  Commonly known as: LASIX  TAKE 2 TABLETS (80 MG TOTAL) BY MOUTH 2 (TWO) TIMES DAILY AS NEEDED (FOR WEIGHT GAIN OF 5 POUNDS). TAKE WITH POTASSIUM     levothyroxine 25 MCG tablet  Commonly known as: SYNTHROID     melatonin 5 mg  Commonly known as: MELATIN     mirtazapine 7.5 MG Tab  Commonly known as: REMERON     nitroGLYCERIN 0.4 MG SL tablet  Commonly known as: NITROSTAT  Place 1 tablet (0.4 mg total) under the tongue every 5 (five) minutes as needed for Chest pain.     nystatin cream  Commonly known as: MYCOSTATIN  APPLY TO GROIN AREA TOPICALLY EVERY 12 HOURS FOR ANTIFUNGAL     omeprazole 20 MG capsule  Commonly known as: PRILOSEC  TAKE 2 CAPS (40MG) BY MOUTH EVERY DAY (DX: GERD)     oxyCODONE 5 mg/5 mL Soln  Commonly known as: ROXICODONE     potassium chloride SA 10 MEQ tablet  Commonly known as: K-DUR,KLOR-CON M  Take 1 tablet (10 mEq total) by mouth once daily. May also take 2 tablets (20 mEq total) daily as needed (when taking lasix for leg swelling).     protein supplement Liqd     QUEtiapine 25 MG Tab  Commonly known as: SEROQUEL                    _________________________________  Laly Ness MD  01/08/2024

## 2024-01-08 NOTE — SUBJECTIVE & OBJECTIVE
Interval History: Remained IP today due to family's concern for decreased alertness in the afternoon. Patient consistently alert with variable mentation due to dementia today. Denies chest pain, SOB, n/v, c/d.       Review of Systems  Objective:     Vital Signs (Most Recent):  Temp: 98.1 °F (36.7 °C) (01/07/24 1545)  Pulse: 92 (01/07/24 1545)  Resp: 18 (01/07/24 1545)  BP: (!) 118/58 (01/07/24 1545)  SpO2: 98 % (01/07/24 1545) Vital Signs (24h Range):  Temp:  [97.8 °F (36.6 °C)-100.2 °F (37.9 °C)] 98.1 °F (36.7 °C)  Pulse:  [] 92  Resp:  [16-18] 18  SpO2:  [95 %-98 %] 98 %  BP: (109-124)/(54-82) 118/58     Weight: 57.2 kg (126 lb 1.6 oz)  Body mass index is 20.35 kg/m².    Intake/Output Summary (Last 24 hours) at 1/7/2024 1833  Last data filed at 1/7/2024 1440  Gross per 24 hour   Intake 460 ml   Output 360 ml   Net 100 ml         Physical Exam  Vitals and nursing note reviewed.   Constitutional:       General: She is not in acute distress.     Appearance: She is not toxic-appearing.   HENT:      Mouth/Throat:      Mouth: Mucous membranes are moist.   Cardiovascular:      Rate and Rhythm: Regular rhythm. Tachycardia present.      Pulses: Normal pulses.      Heart sounds: No murmur heard.  Pulmonary:      Effort: No respiratory distress.      Breath sounds: Normal breath sounds. No wheezing.   Abdominal:      General: Bowel sounds are normal. There is no distension.      Palpations: Abdomen is soft.      Tenderness: There is no abdominal tenderness.   Musculoskeletal:         General: No swelling, tenderness or deformity.      Cervical back: Normal range of motion.   Skin:     General: Skin is dry.   Neurological:      Mental Status: She is alert and easily aroused. Mental status is at baseline. She is disoriented.   Psychiatric:         Mood and Affect: Mood normal.         Behavior: Behavior normal.             Significant Labs: All pertinent labs within the past 24 hours have been reviewed.    Significant  Imaging: I have reviewed all pertinent imaging results/findings within the past 24 hours.

## 2024-01-08 NOTE — ASSESSMENT & PLAN NOTE
Resolved  Patient has hypernatremia which is controlled. The hypernatremia is due to Dehydration. We will aim to correct the sodium by 8-10mEq in 24 hours. We corrected their hypernatremia with Select IV fluids: D5W  at a rate of 75 ml/hr. The patient's sodium results have been reviewed and are listed below.  Recent Labs   Lab 01/07/24  0858          -Likely due to dehydration incited by UTI  -Na initially uptrended to 158 after 0.9NS started  -Lab sticks have been difficult and pt lost IV access for prolonged time 1/4/24 causing delay of treatment  - required D5W at 75cc/hr 1/4   Plan:  -IVF stopped  -labs spaced out  - neurochecks q4h

## 2024-01-10 ENCOUNTER — TELEPHONE (OUTPATIENT)
Dept: PRIMARY CARE CLINIC | Facility: CLINIC | Age: 89
End: 2024-01-10
Payer: MEDICARE

## 2024-01-10 ENCOUNTER — OUTPATIENT CASE MANAGEMENT (OUTPATIENT)
Dept: ADMINISTRATIVE | Facility: OTHER | Age: 89
End: 2024-01-10
Payer: MEDICARE

## 2024-01-10 NOTE — PROGRESS NOTES
Outpatient Care Management  Patient Not Qualified    Patient: Lilia Hidalgo  MRN:  [id number]  Date of Service:  1/10/2024  Completed by:  Micki Vega RN    Chief Complaint   Patient presents with    OPCM Chart Review     1/10/24    Case Closure     1/10/24       Patient Summary           Reason Not Qualified:  Followed by SNF    1/10/24 Patient is not eligible for OPCM services at this time. Patient was discharged to nursing home with Hospice. Closing case.

## 2024-01-10 NOTE — TELEPHONE ENCOUNTER
SW called to schedule the pt for a hospital f/u. The daughter informed me that her mom is in a Nursing Facility at [facility] and that they just enrolled into Hospice with [facility].

## 2024-01-11 NOTE — PLAN OF CARE
[facility] ([place])  Discharge Final Note    Primary Care Provider: Anna Wood MD    Expected Discharge Date: 1/8/2024    Final Discharge Note (most recent)       Final Note - 01/08/24 1753          Final Note    Assessment Type Final Discharge Note     Anticipated Discharge Disposition Hospice/Home     Hospital Resources/Appts/Education Provided Provided patient/caregiver with written discharge plan information;Provided education on problems/symptoms using teachback                   [facility] in patient residence-[facility]

## 2024-03-18 ENCOUNTER — CLINICAL SUPPORT (OUTPATIENT)
Dept: CARDIOLOGY | Facility: HOSPITAL | Age: 89
End: 2024-03-18
Payer: MEDICARE

## 2024-03-18 DIAGNOSIS — Z95.0 PRESENCE OF CARDIAC PACEMAKER: ICD-10-CM

## 2024-03-19 ENCOUNTER — CLINICAL SUPPORT (OUTPATIENT)
Dept: CARDIOLOGY | Facility: HOSPITAL | Age: 89
End: 2024-03-19
Attending: INTERNAL MEDICINE
Payer: MEDICARE

## 2024-03-19 DIAGNOSIS — Z95.0 PRESENCE OF CARDIAC PACEMAKER: ICD-10-CM

## 2024-03-19 PROCEDURE — 93294 REM INTERROG EVL PM/LDLS PM: CPT | Mod: ,,, | Performed by: INTERNAL MEDICINE

## 2024-03-19 PROCEDURE — 93296 REM INTERROG EVL PM/IDS: CPT | Performed by: INTERNAL MEDICINE

## 2024-03-20 LAB
OHS CV AF BURDEN PERCENT: < 1
OHS CV DC REMOTE DEVICE TYPE: NORMAL
OHS CV ICD SHOCK: NO
OHS CV RV PACING PERCENT: 100 %

## 2024-06-17 ENCOUNTER — CLINICAL SUPPORT (OUTPATIENT)
Dept: CARDIOLOGY | Facility: HOSPITAL | Age: 89
End: 2024-06-17
Payer: MEDICARE

## 2024-06-17 DIAGNOSIS — Z95.0 PRESENCE OF CARDIAC PACEMAKER: ICD-10-CM

## 2024-06-17 DIAGNOSIS — I44.2 ATRIOVENTRICULAR BLOCK, COMPLETE: ICD-10-CM

## 2024-06-18 ENCOUNTER — CLINICAL SUPPORT (OUTPATIENT)
Dept: CARDIOLOGY | Facility: HOSPITAL | Age: 89
End: 2024-06-18
Attending: INTERNAL MEDICINE
Payer: MEDICARE

## 2024-06-18 DIAGNOSIS — Z95.0 PRESENCE OF CARDIAC PACEMAKER: ICD-10-CM

## 2024-06-18 DIAGNOSIS — I44.2 ATRIOVENTRICULAR BLOCK, COMPLETE: ICD-10-CM

## 2024-06-18 PROCEDURE — 93296 REM INTERROG EVL PM/IDS: CPT | Performed by: INTERNAL MEDICINE

## 2024-06-18 PROCEDURE — 93294 REM INTERROG EVL PM/LDLS PM: CPT | Mod: ,,, | Performed by: INTERNAL MEDICINE

## 2024-06-22 LAB
OHS CV AF BURDEN PERCENT: < 1
OHS CV DC REMOTE DEVICE TYPE: NORMAL
OHS CV RV PACING PERCENT: 100 %

## 2024-08-08 ENCOUNTER — TELEPHONE (OUTPATIENT)
Dept: PRIMARY CARE CLINIC | Facility: CLINIC | Age: 89
End: 2024-08-08
Payer: MEDICARE

## 2024-09-16 ENCOUNTER — CLINICAL SUPPORT (OUTPATIENT)
Dept: CARDIOLOGY | Facility: HOSPITAL | Age: 89
End: 2024-09-16
Payer: MEDICARE

## 2024-09-16 DIAGNOSIS — Z95.0 PRESENCE OF CARDIAC PACEMAKER: ICD-10-CM

## 2024-09-16 DIAGNOSIS — I44.2 ATRIOVENTRICULAR BLOCK, COMPLETE: ICD-10-CM

## 2024-09-17 ENCOUNTER — CLINICAL SUPPORT (OUTPATIENT)
Dept: CARDIOLOGY | Facility: HOSPITAL | Age: 89
End: 2024-09-17
Attending: INTERNAL MEDICINE
Payer: MEDICARE

## 2024-09-17 DIAGNOSIS — I44.2 ATRIOVENTRICULAR BLOCK, COMPLETE: ICD-10-CM

## 2024-09-17 DIAGNOSIS — Z95.0 PRESENCE OF CARDIAC PACEMAKER: ICD-10-CM

## 2024-09-17 PROCEDURE — 93296 REM INTERROG EVL PM/IDS: CPT | Performed by: INTERNAL MEDICINE

## 2024-09-17 PROCEDURE — 93294 REM INTERROG EVL PM/LDLS PM: CPT | Mod: ,,, | Performed by: INTERNAL MEDICINE

## 2024-09-18 LAB
OHS CV AF BURDEN PERCENT: < 1
OHS CV DC REMOTE DEVICE TYPE: NORMAL
OHS CV RV PACING PERCENT: 100 %

## 2024-12-18 ENCOUNTER — CLINICAL SUPPORT (OUTPATIENT)
Dept: CARDIOLOGY | Facility: HOSPITAL | Age: 89
End: 2024-12-18
Payer: MEDICARE

## 2024-12-18 ENCOUNTER — CLINICAL SUPPORT (OUTPATIENT)
Dept: CARDIOLOGY | Facility: HOSPITAL | Age: 89
End: 2024-12-18
Attending: STUDENT IN AN ORGANIZED HEALTH CARE EDUCATION/TRAINING PROGRAM
Payer: MEDICARE

## 2024-12-18 DIAGNOSIS — Z95.0 PRESENCE OF CARDIAC PACEMAKER: ICD-10-CM

## 2024-12-18 DIAGNOSIS — I44.2 ATRIOVENTRICULAR BLOCK, COMPLETE: ICD-10-CM

## 2024-12-18 PROCEDURE — 93294 REM INTERROG EVL PM/LDLS PM: CPT | Mod: HCNC,,, | Performed by: STUDENT IN AN ORGANIZED HEALTH CARE EDUCATION/TRAINING PROGRAM

## 2024-12-18 PROCEDURE — 93296 REM INTERROG EVL PM/IDS: CPT | Mod: HCNC | Performed by: STUDENT IN AN ORGANIZED HEALTH CARE EDUCATION/TRAINING PROGRAM

## 2024-12-27 NOTE — ASSESSMENT & PLAN NOTE
Differential includes infectious encephalopathy (septic encephalopathy 2017 diverticulitis), medication noncompliance/HTN encephalopathy, narcotic overdose, TIA/stroke  Procal negative.  Flu negative.  WBC WNL, lactic acid negative.  Tylenol level <3.  UA fairly unremarkable.    CT head no significant change from prior.  Continued cerebral volume loss with mild moderate patchy hypoattenuation supratentorial white matter suggestive for chronic ischemic change.  No evidence for acute intracranial hemorrhage or significant new abnormal parenchymal attenuation allowing for motion limitation.    CXR Mild cardiomegaly and accentuation of the basilar interstitial markings.  No significant airspace consolidation or pleural effusion identified.  Pt given naloxone with minimal improvement.  Pt given ceftriaxone 2 gm in the ED.  Pt improving at time of hospital medicine admission.  Discussed case with Dr. Felipe, no additional imaging or antibiotics at this time.  Ordered ESR, CRP.  Vit D, B1, B6, B12, TSH.  Neuro checks q 4 hrs.  Recommend MRI as next step if deficit or worsening of mental status.  1500 RN called to report additional fentanyl patch found behind L knee in addition to patch that was taken off of her arm earlier.  5/20 physical medicine and rehab AVS states 1 patch every 72 hrs.   Intubated patient with 7.5 ETT, 24 cm at bottom teeth. Upper airway appears to be swollen. Suctioned out of airway thick white dried substance/secretions. Color metric end tidal turned yellow, bilateral breath sounds.

## 2024-12-31 LAB
OHS CV AF BURDEN PERCENT: < 1
OHS CV DC REMOTE DEVICE TYPE: NORMAL
OHS CV RV PACING PERCENT: 100 %

## 2025-01-13 NOTE — ASSESSMENT & PLAN NOTE
Patient is a 89 year old female with history of cognitive impairment from [facility] for ankle fracture and was found to be in complete heart block in the ED. ECG confirmed heart block with escape rhythm at 35 bpm. Has TVP set at 65 bpm with output 10 mA, threshold 0.6 mA.    Plan:  Continue TVP for now, plan to dc with PPM today  Cleared by ID for PPM placement   NPO for PPM today  Treated with UTI with ceftriaxone, cefazolin on call  Preop orders in   COVID negative on 03/27/2022  Continue telemetry monitoring while inpatient   Wt Readings from Last 3 Encounters:   01/13/25 61.7 kg (136 lb)   12/27/24 56 kg (123 lb 6.4 oz)   11/20/24 55.2 kg (121 lb 12.8 oz)     Post hemodialysis weight was down to 59.5 kg.   Still with significant lower extremity edema.  Continue torsemide on nondialysis days.  Plan for extra hemodialysis treatment tomorrow, plan for ultrafiltration 3 kg today during dialysis treatment

## 2025-01-30 DIAGNOSIS — Z00.00 ENCOUNTER FOR MEDICARE ANNUAL WELLNESS EXAM: ICD-10-CM

## 2025-03-17 ENCOUNTER — CLINICAL SUPPORT (OUTPATIENT)
Dept: CARDIOLOGY | Facility: HOSPITAL | Age: OVER 89
End: 2025-03-17
Payer: MEDICARE

## 2025-03-17 ENCOUNTER — CLINICAL SUPPORT (OUTPATIENT)
Dept: CARDIOLOGY | Facility: HOSPITAL | Age: OVER 89
End: 2025-03-17
Attending: STUDENT IN AN ORGANIZED HEALTH CARE EDUCATION/TRAINING PROGRAM
Payer: MEDICARE

## 2025-03-17 DIAGNOSIS — Z95.0 PRESENCE OF CARDIAC PACEMAKER: ICD-10-CM

## 2025-03-17 DIAGNOSIS — I44.2 ATRIOVENTRICULAR BLOCK, COMPLETE: ICD-10-CM

## 2025-03-17 PROCEDURE — 93296 REM INTERROG EVL PM/IDS: CPT | Performed by: STUDENT IN AN ORGANIZED HEALTH CARE EDUCATION/TRAINING PROGRAM

## 2025-06-16 ENCOUNTER — CLINICAL SUPPORT (OUTPATIENT)
Dept: CARDIOLOGY | Facility: HOSPITAL | Age: OVER 89
End: 2025-06-16
Attending: STUDENT IN AN ORGANIZED HEALTH CARE EDUCATION/TRAINING PROGRAM
Payer: MEDICARE

## 2025-06-16 ENCOUNTER — CLINICAL SUPPORT (OUTPATIENT)
Dept: CARDIOLOGY | Facility: HOSPITAL | Age: OVER 89
End: 2025-06-16
Payer: MEDICARE

## 2025-06-16 DIAGNOSIS — Z95.0 PRESENCE OF CARDIAC PACEMAKER: ICD-10-CM

## 2025-06-16 DIAGNOSIS — I44.2 ATRIOVENTRICULAR BLOCK, COMPLETE: ICD-10-CM

## 2025-06-16 PROCEDURE — 93296 REM INTERROG EVL PM/IDS: CPT | Mod: HCNC | Performed by: STUDENT IN AN ORGANIZED HEALTH CARE EDUCATION/TRAINING PROGRAM

## 2025-06-16 PROCEDURE — 93294 REM INTERROG EVL PM/LDLS PM: CPT | Mod: HCNC,GW,, | Performed by: STUDENT IN AN ORGANIZED HEALTH CARE EDUCATION/TRAINING PROGRAM

## 2025-06-28 LAB
OHS CV AF BURDEN PERCENT: < 1
OHS CV DC REMOTE DEVICE TYPE: NORMAL
OHS CV RV PACING PERCENT: 100 %
